# Patient Record
Sex: FEMALE | Race: WHITE | NOT HISPANIC OR LATINO | Employment: OTHER | URBAN - METROPOLITAN AREA
[De-identification: names, ages, dates, MRNs, and addresses within clinical notes are randomized per-mention and may not be internally consistent; named-entity substitution may affect disease eponyms.]

---

## 2017-03-30 ENCOUNTER — OFFICE VISIT (OUTPATIENT)
Dept: URGENT CARE | Facility: URGENT CARE | Age: 62
End: 2017-03-30
Payer: COMMERCIAL

## 2017-03-30 ENCOUNTER — RADIANT APPOINTMENT (OUTPATIENT)
Dept: GENERAL RADIOLOGY | Facility: CLINIC | Age: 62
End: 2017-03-30
Attending: FAMILY MEDICINE
Payer: COMMERCIAL

## 2017-03-30 VITALS
OXYGEN SATURATION: 99 % | TEMPERATURE: 98.4 F | SYSTOLIC BLOOD PRESSURE: 138 MMHG | DIASTOLIC BLOOD PRESSURE: 84 MMHG | HEART RATE: 90 BPM | BODY MASS INDEX: 28.21 KG/M2 | WEIGHT: 169.5 LBS

## 2017-03-30 DIAGNOSIS — S99.912A ANKLE INJURY, LEFT, INITIAL ENCOUNTER: Primary | ICD-10-CM

## 2017-03-30 PROCEDURE — 73610 X-RAY EXAM OF ANKLE: CPT | Mod: LT

## 2017-03-30 PROCEDURE — 99213 OFFICE O/P EST LOW 20 MIN: CPT | Performed by: FAMILY MEDICINE

## 2017-03-30 RX ORDER — DEXTROAMPHETAMINE SACCHARATE, AMPHETAMINE ASPARTATE, DEXTROAMPHETAMINE SULFATE AND AMPHETAMINE SULFATE 3.75; 3.75; 3.75; 3.75 MG/1; MG/1; MG/1; MG/1
15-30 TABLET ORAL
COMMUNITY
Start: 2015-04-25 | End: 2018-06-27 | Stop reason: DRUGHIGH

## 2017-03-30 NOTE — MR AVS SNAPSHOT
After Visit Summary   3/30/2017    Karine Moss    MRN: [id number]           Patient Information     Date Of Birth          1955        Visit Information        Provider Department      3/30/2017 6:15 PM Austin Casper,  [facility]        Today's Diagnoses     Ankle injury, left, initial encounter    -  1       Follow-ups after your visit        Additional Services     PODIATRY/FOOT & ANKLE SURGERY REFERRAL       Your provider has referred you to: FMG: [facility] [phone]   [email or web address]  FMG: [facility] [phone]   [email or web address]  FMG: [facility] [phone]   [email or web address]  FMG: [facility] [phone]   [email or web address]    Please be aware that coverage of these services is subject to the terms and limitations of your health insurance plan.  Call member services at your health plan with any benefit or coverage questions.      Please bring the following to your appointment:  >>   Any x-rays, CTs or MRIs which have been performed.  Contact the facility where they were done to arrange for  prior to your scheduled appointment.    >>   List of current medications   >>   This referral request   >>   Any documents/labs given to you for this referral                  Who to contact     If you have questions or need follow up information about today's clinic visit or your schedule please contact [facility] directly at [phone].  Normal or non-critical lab and imaging results will be communicated to you by MyChart, letter or phone within 4 business days after the clinic has received the results. If you do not hear from us within 7 days, please contact the clinic through MyChart or phone. If  you have a critical or abnormal lab result, we will notify you by phone as soon as possible.  Submit refill requests through "[employer]," or call your pharmacy and they will forward the refill request to us. Please allow 3 business days for your refill to be completed.          Additional Information About Your Visit        [employer] Information     "[employer]," gives you secure access to your electronic health record. If you see a primary care provider, you can also send messages to your care team and make appointments. If you have questions, please call your primary care clinic.  If you do not have a primary care provider, please call [phone] and they will assist you.        Care EveryWhere ID     This is your Care EveryWhere ID. This could be used by other organizations to access your [place] medical records  [id number]        Your Vitals Were     Pulse Temperature Pulse Oximetry BMI (Body Mass Index)          90 98.4  F (36.9  C) (Oral) 99% 28.21 kg/m2         Blood Pressure from Last 3 Encounters:   03/30/17 138/84   12/12/13 143/78   12/03/13 150/84    Weight from Last 3 Encounters:   03/30/17 169 lb 8 oz (76.9 kg)   12/13/13 217 lb (98.4 kg)   12/12/13 217 lb 9.6 oz (98.7 kg)              We Performed the Following     PODIATRY/FOOT & ANKLE SURGERY REFERRAL     XR Ankle Left G/E 3 Views          Today's Medication Changes          These changes are accurate as of: 3/30/17  7:25 PM.  If you have any questions, ask your nurse or doctor.               Start taking these medicines.        Dose/Directions    order for DME   Used for:  Ankle injury, left, initial encounter   Started by:  Austin Casper,         Equipment being ordered: long left cam walker   Quantity:  1 Device   Refills:  0            Where to get your medicines      Some of these will need a paper prescription and others can be bought over the counter.  Ask your nurse if you have questions.     Bring a paper prescription for each of these  medications     order for DME                Primary Care Provider Office Phone # Fax #    Edmundo Cady Benjamin [phone]       [facility] [address]  [facility] [address]        Thank you!     Thank you for choosing [facility]  for your care. Our goal is always to provide you with excellent care. Hearing back from our patients is one way we can continue to improve our services. Please take a few minutes to complete the written survey that you may receive in the mail after your visit with us. Thank you!             Your Updated Medication List - Protect others around you: Learn how to safely use, store and throw away your medicines at www.disposemymeds.org.          This list is accurate as of: 3/30/17  7:25 PM.  Always use your most recent med list.                   Brand Name Dispense Instructions for use    amphetamine-dextroamphetamine 15 MG per tablet    ADDERALL     Take 15-30 mg by mouth       ASPIRIN LOW DOSE 81 MG EC tablet   Generic drug:  aspirin     90 tablet    TAKE 1 TABLET BY MOUTH DAILY       COZAAR 25 MG tablet   Generic drug:  losartan      Take 2 tablets by mouth daily Reported on 3/30/2017       hydrocortisone 2 % Lotn      Externally apply 1 applicator topically 2 times daily Reported on 3/30/2017       insulin pen needle 32G X 6 MM    NOVOFINE    200 each    1 Device 2 times daily       LANTUS SOLOSTAR 100 UNIT/ML injection   Generic drug:  insulin glargine     10 Month    Reported on 3/30/2017       metFORMIN 500 MG 24 hr tablet    GLUCOPHAGE-XR    360 tablet    TAKE 4 TABLETS BY MOUTH EVERY DAY       metoprolol 25 MG 24 hr tablet    TOPROL-XL    90 tablet    TAKE 1 TABLET BY MOUTH DAILY .       NEURONTIN 100 MG capsule   Generic drug:  gabapentin      Take 100 mg by mouth 2 times daily       nitroglycerin 0.4 MG sublingual tablet    NITROSTAT    25 tablet    Place 1 tablet (0.4 mg) under the tongue every 5 minutes as  needed for chest pain       NovoLOG FLEXpen 100 UNITS/ML injection   Generic drug:  insulin aspart      Inject Subcutaneous See Admin Instructions Reported on 3/30/2017       order for DME     540 each    Test strips for pt's glucometer, brand as covered by insurance. Test four times daily and prn.       order for DME     1 Device    Equipment being ordered: long left cam walker       pramipexole 0.125 MG tablet    MIRAPEX    360 tablet    Take 3-4 tablets (0.375-0.5 mg) by mouth At Bedtime       pravastatin 20 MG tablet    PRAVACHOL    90 tablet    Take 1 tablet (20 mg) by mouth daily       temazepam 15 MG capsule    RESTORIL     Take 1-2 capsules by mouth At Bedtime Reported on 3/30/2017       vitamin D 59100 UNIT capsule    ERGOCALCIFEROL     2000 mg 1 tab daily.

## 2017-03-30 NOTE — NURSING NOTE
"Chief Complaint   Patient presents with     Ankle/Foot left     Fell off a platform x 10 days ago. pain and swelling        Initial /84 (BP Location: Left arm, Patient Position: Chair, Cuff Size: Adult Regular)  Pulse 90  Temp 98.4  F (36.9  C) (Oral)  Wt 169 lb 8 oz (76.9 kg)  SpO2 99%  BMI 28.21 kg/m2 Estimated body mass index is 28.21 kg/(m^2) as calculated from the following:    Height as of 12/13/13: 5' 5\" (1.651 m).    Weight as of this encounter: 169 lb 8 oz (76.9 kg).  Medication Reconciliation: complete    "

## 2017-04-10 NOTE — PROGRESS NOTES
SUBJECTIVE:  Chief Complaint   Patient presents with     Ankle/Foot left     Fell off a platform x 10 days ago. pain and swelling    .juhi presents with a chief complaint of left ankle.  The injury occurred 10 days ago.   The injury happened while while walking.   How: fall  immediate pain  The patient complained of moderate pain and has had decreased ROM.    Pain exacerbated by movement    He treated it initially with no therapy.   This is the first time this type of injury has occurred to this patient.     Past Medical History:   Diagnosis Date     Arthritis      Chest pain     seeing Cardiology     Depression 1991    after 's death     Diabetes mellitus (H)      Diabetic renal disease (H)     microalbuminuria     DJD (degenerative joint disease) of knee      H/O vitamin D deficiency      Hypertension      IBS (irritable bowel syndrome)     diarrhea      Keratoconus      Low back pain      Narcolepsy 2007    Dx'd by Sleep specialist 347.00, pt discontinued Adderal mid Nov. 13 due to tacycardia concerns     Obesity      Obstructive sleep apnea     327.23, 3 sleep studies,Dr. Sam [facility]     Pancreatitis     related to Victoza, also on Xyrem     Panic      RLS (restless legs syndrome)      Sinus tachycardia      Allergies   Allergen Reactions     Codeine Sulfate      Social History   Substance Use Topics     Smoking status: Never Smoker     Smokeless tobacco: Never Used     Alcohol use Yes      Comment: rare       ROSINTEGUMENTARY/SKIN: NEGATIVE for open wound/bleeding and NEGATIVE for bruising    EXAM: /84 (BP Location: Left arm, Patient Position: Chair, Cuff Size: Adult Regular)  Pulse 90  Temp 98.4  F (36.9  C) (Oral)  Wt 169 lb 8 oz (76.9 kg)  SpO2 99%  BMI 28.21 kg/m2Gen: healthy,alert,no distress  Extremity: ankle has pain with palpation and rom.   There is not compromise to the distal circulation.  Pulses are +2 and CRT is brisk.GENERAL APPEARANCE: healthy, alert and no  distress  EXTREMITIES: peripheral pulses normal  SKIN: no suspicious lesions or rashes  NEURO: Normal strength and tone, sensory exam grossly normal, mentation intact and speech normal    Xray without acute findings, read by Austin Casper D.O.      ICD-10-CM    1. Ankle injury, left, initial encounter S99.912A XR Ankle Left G/E 3 Views     order for DME     PODIATRY/FOOT & ANKLE SURGERY REFERRAL       RICE

## 2017-06-03 ENCOUNTER — HEALTH MAINTENANCE LETTER (OUTPATIENT)
Age: 62
End: 2017-06-03

## 2018-04-27 ENCOUNTER — TRANSFERRED RECORDS (OUTPATIENT)
Dept: HEALTH INFORMATION MANAGEMENT | Facility: CLINIC | Age: 63
End: 2018-04-27

## 2018-06-27 ENCOUNTER — OFFICE VISIT (OUTPATIENT)
Dept: FAMILY MEDICINE | Facility: CLINIC | Age: 63
End: 2018-06-27
Payer: COMMERCIAL

## 2018-06-27 VITALS
SYSTOLIC BLOOD PRESSURE: 129 MMHG | DIASTOLIC BLOOD PRESSURE: 83 MMHG | WEIGHT: 154 LBS | BODY MASS INDEX: 26.29 KG/M2 | HEART RATE: 78 BPM | OXYGEN SATURATION: 98 % | HEIGHT: 64 IN

## 2018-06-27 DIAGNOSIS — Z98.84 HISTORY OF GASTRIC BYPASS: ICD-10-CM

## 2018-06-27 DIAGNOSIS — E11.9 TYPE 2 DIABETES MELLITUS WITHOUT COMPLICATION, WITHOUT LONG-TERM CURRENT USE OF INSULIN (H): Primary | ICD-10-CM

## 2018-06-27 DIAGNOSIS — G62.9 PERIPHERAL POLYNEUROPATHY: ICD-10-CM

## 2018-06-27 PROBLEM — Z79.4 TYPE 2 DIABETES MELLITUS WITHOUT COMPLICATION, WITH LONG-TERM CURRENT USE OF INSULIN (H): Status: ACTIVE | Noted: 2018-06-27

## 2018-06-27 LAB
ALBUMIN SERPL-MCNC: 3.8 G/DL (ref 3.2–4.5)
ALP SERPL-CCNC: 83 U/L (ref 40–150)
ALT SERPL-CCNC: 18 U/L (ref 0–50)
AST SERPL-CCNC: 25 U/L (ref 0–45)
BILIRUB SERPL-MCNC: 1 MG/DL (ref 0.2–1.3)
BUN SERPL-MCNC: 16 MG/DL (ref 7–30)
CALCIUM SERPL-MCNC: 10 MG/DL (ref 8.5–10.4)
CHLORIDE SERPLBLD-SCNC: 101 MMOL/L (ref 94–109)
CHOLEST SERPL-MCNC: 178 MG/DL (ref 0–200)
CHOLEST/HDLC SERPL: 3.8 {RATIO} (ref 0–5)
CO2 SERPL-SCNC: 29 MMOL/L (ref 20–32)
CREAT SERPL-MCNC: 1 MG/DL (ref 0.6–1.3)
CREAT UR-MCNC: 108 MG/DL
EGFR CALCULATED (BLACK REFERENCE): 72
EGFR CALCULATED (NON BLACK REFERENCE): 59.5
FASTING SPECIMEN: YES
FERRITIN SERPL-MCNC: 63 NG/ML (ref 8–252)
GLUCOSE SERPL-MCNC: 122 MG/DL (ref 60–109)
HBA1C MFR BLD: 6.7 % (ref 4.1–5.7)
HDLC SERPL-MCNC: 46 MG/DL
IRON SATN MFR SERPL: 26 % (ref 15–46)
IRON SERPL-MCNC: 94 UG/DL (ref 35–180)
LDLC SERPL CALC-MCNC: 107 MG/DL (ref 0–129)
MICROALBUMIN UR-MCNC: 194 MG/L
MICROALBUMIN/CREAT UR: 179.63 MG/G CR (ref 0–25)
POTASSIUM SERPL-SCNC: 4.3 MMOL/L (ref 3.4–5.3)
PROT SERPL-MCNC: 8.3 G/DL (ref 6.8–8.8)
SODIUM SERPL-SCNC: 140 MMOL/L (ref 133–144)
TIBC SERPL-MCNC: 364 UG/DL (ref 240–430)
TRIGL SERPL-MCNC: 120 MG/DL (ref 0–150)
TSH SERPL DL<=0.005 MIU/L-ACNC: 0.89 MU/L (ref 0.4–4)
VIT B12 SERPL-MCNC: 509 PG/ML (ref 193–986)
VLDL-CHOLESTEROL: 24 (ref 7–32)

## 2018-06-27 RX ORDER — LOSARTAN POTASSIUM 100 MG/1
100 TABLET ORAL DAILY
Refills: 2 | COMMUNITY
Start: 2018-06-15 | End: 2019-01-28

## 2018-06-27 RX ORDER — METFORMIN HCL 500 MG
TABLET, EXTENDED RELEASE 24 HR ORAL
Qty: 360 TABLET | Refills: 1 | COMMUNITY
Start: 2018-06-27 | End: 2019-01-28

## 2018-06-27 RX ORDER — DEXTROAMPHETAMINE SACCHARATE, AMPHETAMINE ASPARTATE, DEXTROAMPHETAMINE SULFATE AND AMPHETAMINE SULFATE 7.5; 7.5; 7.5; 7.5 MG/1; MG/1; MG/1; MG/1
30 TABLET ORAL DAILY
Refills: 0 | COMMUNITY
Start: 2018-06-18 | End: 2018-06-27

## 2018-06-27 RX ORDER — TRAZODONE HYDROCHLORIDE 50 MG/1
50 TABLET, FILM COATED ORAL DAILY
Refills: 4 | COMMUNITY
Start: 2018-06-04 | End: 2019-04-12

## 2018-06-27 RX ORDER — METFORMIN HCL 500 MG
1000 TABLET, EXTENDED RELEASE 24 HR ORAL 2 TIMES DAILY
COMMUNITY
Start: 2018-04-09 | End: 2018-06-27 | Stop reason: DRUGHIGH

## 2018-06-27 RX ORDER — GABAPENTIN 300 MG/1
2 CAPSULE ORAL 3 TIMES DAILY
Refills: 1 | COMMUNITY
Start: 2018-05-21 | End: 2018-06-27

## 2018-06-27 RX ORDER — CHOLECALCIFEROL (VITAMIN D3) 50 MCG
2000 TABLET ORAL DAILY
Qty: 100 TABLET | Refills: 3 | COMMUNITY
Start: 2018-06-27 | End: 2018-10-31

## 2018-06-27 RX ORDER — TERBINAFINE HYDROCHLORIDE 250 MG/1
250 TABLET ORAL DAILY
Refills: 0 | COMMUNITY
Start: 2018-05-18 | End: 2018-10-31

## 2018-06-27 RX ORDER — GABAPENTIN 300 MG/1
CAPSULE ORAL
Qty: 90 CAPSULE | Refills: 1 | COMMUNITY
Start: 2018-06-27 | End: 2019-04-12 | Stop reason: DRUGHIGH

## 2018-06-27 RX ORDER — DEXTROAMPHETAMINE SACCHARATE, AMPHETAMINE ASPARTATE, DEXTROAMPHETAMINE SULFATE AND AMPHETAMINE SULFATE 7.5; 7.5; 7.5; 7.5 MG/1; MG/1; MG/1; MG/1
TABLET ORAL
Qty: 90 TABLET | Refills: 0 | COMMUNITY
Start: 2018-06-27 | End: 2019-04-12

## 2018-06-27 ASSESSMENT — ANXIETY QUESTIONNAIRES
GAD7 TOTAL SCORE: 2
1. FEELING NERVOUS, ANXIOUS, OR ON EDGE: NOT AT ALL
2. NOT BEING ABLE TO STOP OR CONTROL WORRYING: NOT AT ALL
5. BEING SO RESTLESS THAT IT IS HARD TO SIT STILL: NOT AT ALL
6. BECOMING EASILY ANNOYED OR IRRITABLE: NOT AT ALL
7. FEELING AFRAID AS IF SOMETHING AWFUL MIGHT HAPPEN: NOT AT ALL
3. WORRYING TOO MUCH ABOUT DIFFERENT THINGS: MORE THAN HALF THE DAYS

## 2018-06-27 ASSESSMENT — PATIENT HEALTH QUESTIONNAIRE - PHQ9: 5. POOR APPETITE OR OVEREATING: NOT AT ALL

## 2018-06-27 NOTE — PROGRESS NOTES
Karine Moss is a 63 year old female, [place] [facility] She wishes to establish care. Karine is the mother of one of my longstanding patients. She lives in [place] from May 1st to October 1st, then moves to [place] the rest of the year.  She is here for the following issues.    HCM  Colonoscopy: hx of colon polyps in 2013, recent colonoscopy had inferior prep. She cannot recall location. Is not inclined to return for testing. Last mammogram 7/2017, normal. Last pap 6/27/2017    Specialists  Sleep clinic, Dr. Mary Kay Sam, for Narcolepsy and sleep apnea (CPAP)    Vaccines: Never had a flu shot or pneumococcal 23 vaccine.     DIABETES II  Karine was diagnosed in 2004. She initially started Metformin. She started using long acting and short acting insulin which brought her numbers down but her weight increased. Her diabetes became difficult to control with insulin because of her weight gain. She then had gastric bypass in 2015, she hasn't been back to see her gastric bypass surgeon. She has hx of hypertension but denies history of ASCVD.     Last eye exam was 5/2018 at [facility], they diagnosed Keratoconus, no retinopathy.. She also had Lasik bilaterally 1998.  Retinopathy: none    Peripheral neuropathy  She reports burning sensation in both feet, up to the level of her ankles. Does not find gabapentin to be very helpful. She is taking 600mg tid. She has not seen a podiatrist so we discussed referral. Pain started 12 years ago when the diabetes was diagnosed. Karine has seen a podiatrist for treatment of warts but not for evaluation for orthotics.     Karine is checking her blood sugars 3-4x per day and she notes she would like more strips to check more frequently.       Weight: 154 lbs 0 oz. She often loses weight during the summer, but will put it back on in the winter.    Wt Readings from Last 3 Encounters:   06/27/18 154 lb (69.9 kg)   03/30/17 169 lb 8 oz (76.9 kg)   12/13/13 217 lb (98.4 kg)      Frequency of FBS:3-4 x day, before and after meals   General range of FBS: This morning it was 150. Typically around 200.   Hypoglycemia: Never had low blood sugar taking 1000 mg metformin bid.     Lab Results   Component Value Date    A1C 9.4 10/09/2013    A1C 7.7 06/11/2013   Her last A1C was 14 in 3/12/18 from [place] with Dr. Alycia Avila, this was a surprise to her since she was feeling very good.     No results found for: MICROALBUMIN    DM Meds:   Metformin 1000 mg BID    Compliance: If her BS numbers are lower, she doesn't take her medications. She has done diabetic education. She feels overwhelmed with all the medications she is on.     Diet: According to her daughter, she eats irradically.  Sometimes she doesn't eat until 8 pm. She mainly cooks at home, she eats meats, veggies, fruits. Occasional pasta, rice, potatoes. Daily cheese. She drinks 1 cup caffeine/day. Karine tries to drink a protein drink daily.     Non-smoker. No history of heart problems.     Aspirin Use: Not currently taking anything because of gastric bypass surgery.     Peripheral neuropathy  She reports burning sensation in both feet, up to the level of her ankles. Does not find gabapentin to be very helpful. She is taking 600mg tid. She has not seen a podiatrist so we discussed referral. Pain started 12 years ago when the diabetes was diagnosed. Karine has seen a podiatrist for treatment of warts but not for evaluation for orthotics.     HYPERLIPIDEMIA  She is not currently on a statin medication. No hx of ASCVD or stroke.   Recent Labs   Lab Test  06/27/18   1439  10/09/13   1518   CHOL  178.0  180   HDL  46.0*  29*   LDL  107.0  106   TRIG  120.0  225*   CHOLHDLRATIO  3.8  6.2*   LDL is just above target range. .     HYPERTENSION  BP Readings from Last 3 Encounters:   06/27/18 142/87   03/30/17 138/84   12/12/13 143/78     Here for blood pressure check. Karine is currently on losartan 100 mg QD. No current chest pain.  No THAPA. She  "sometimes forgets her medication (yesterday). Denies any side effects. Blood pressure readings have usually been in target range.     EXAM  /87  Pulse 78  Ht 5' 3.58\" (161.5 cm)  Wt 154 lb (69.9 kg)  SpO2 98%  BMI 26.78 kg/m2  Repeat /83  Gen: Alert, NAD, a bit flustered, pleasant  HEENT: OP clear, no thrush  Neck without LAD or TM  Cor: S1S2, no murmurl.   Lungs: CTA bilaterally  Abd: soft nontender active BS  Ext: no edema, pulses  palpable  Skin: no rash  MS: Knees and ankles without redness, swelling or warmth  Feet: no ulcerations, Callouses absent, sensation dampened to level of ankles bilaterally  Ext: Good pedal pulses.    Assessment:  (E11.9) Type 2 diabetes mellitus without complication, without long-term current use of insulin (H)  (primary encounter diagnosis)  Comment:  Last A1c in 3/2018 was 14.1, previous A1c in 7 range. I believe this was a lab error.   Lab Results   Component Value Date    A1C 6.7 06/27/2018   Plan: Albumin Random Urine Quantitative with Creat         Ratio, TSH WITH FREE T4 REFLEX - (Today),         Hemoglobin A1c ([place]), Lipid Panel         ([facility])        Continue current dose of metformin. Recommend she check glucose readings 2-3 x per day. Recommend pneumovax 23 vaccine at her next visit. She is overwhelmed today with information.     (G62.9) Peripheral polyneuropathy  Comment: likely secondary to diabetes  Plan: Comprehensive Metabolic Panel ([place])        Recommend she see a podiatrist at the [facility]. Number will be forwarded via my chart.     (Z98.250) History of gastric bypass  Comment: she takes supplements intermittently, surgery was at [facility] but she has not returned for recheck  Plan: Vitamin B12, Vitamin D Deficiency, Iron and         iron binding capacity, Ferritin        Recommended checking levels today, recommend pill box to help her stay organized with meds.    Future points of disussion  Karine would like to discuss hair thinning, " sleep apnea, and joint pain at a future appointment.  Time did not allow us to do this today.     Total visit time today 60 minutes.  More than 50% of the time was spent in counseling on multiple medical issues, 20 min alone spent reconciling medication list and medical histories from outside records    Anay Martinez MD  Internal Medicine/Pediatrics      I, Yari Kenyon, am serving as a scribe to document services personally performed by Dr. Anay Martinez, based on data collection and the provider's statements to me.

## 2018-06-27 NOTE — NURSING NOTE
"63 year old  Chief Complaint   Patient presents with     [facility]     Diabetes     Diabetes management       Blood pressure 142/87, pulse 78, height 5' 3.58\" (161.5 cm), weight 154 lb (69.9 kg), SpO2 98 %. Body mass index is 26.78 kg/(m^2).  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)     Plantar warts     Comprehensive diabetic foot examination, type 2 DM, encounter for (H)     Low back pain     DJD (degenerative joint disease) of knee     Pancreatitis     Panic     Chest pain     Sinus tachycardia     IBS (irritable bowel syndrome)     Heart murmur     Hypertension goal BP (blood pressure) < 140/90     Type 2 diabetes, HbA1C goal < 8% (H)       Wt Readings from Last 2 Encounters:   06/27/18 154 lb (69.9 kg)   03/30/17 169 lb 8 oz (76.9 kg)     BP Readings from Last 3 Encounters:   06/27/18 142/87   03/30/17 138/84   12/12/13 143/78         Current Outpatient Prescriptions   Medication     amphetamine-dextroamphetamine (ADDERALL) 15 MG per tablet     ASPIRIN LOW DOSE 81 MG EC tablet     gabapentin (NEURONTIN) 100 MG capsule     hydrocortisone 2 % LOTN     insulin aspart (NOVOLOG FLEXPEN) SOLN 100 UNIT/ML     insulin pen needle (NOVOFINE) 32G X 6 MM MISC     LANTUS SOLOSTAR 100 UNIT/ML injection     losartan (COZAAR) 25 MG tablet     metFORMIN (GLUCOPHAGE-XR) 500 MG 24 hr tablet     metoprolol (TOPROL-XL) 25 MG 24 hr tablet     nitroglycerin (NITROSTAT) 0.4 MG SL tablet     ORDER FOR DME "     pramipexole (MIRAPEX) 0.125 MG tablet     pravastatin (PRAVACHOL) 20 MG tablet     temazepam (RESTORIL) 15 MG capsule     VITAMIN D (ERGOCALCIFEROL) 93828 UNIT PO CAPS     [DISCONTINUED] sertraline (ZOLOFT) 100 MG tablet     No current facility-administered medications for this visit.        Social History   Substance Use Topics     Smoking status: Never Smoker     Smokeless tobacco: Never Used     Alcohol use Yes      Comment: rare       Health Maintenance Due   Topic Date Due     FOOT EXAM Q1 YEAR  [birth year]11/1956     PNEUMOVAX 1X HI RISK PATIENT < 65 (NO IB MSG)  03/11/1957     HIV SCREEN (SYSTEM ASSIGNED)  03/11/1973     HEPATITIS C SCREENING  03/11/1973     ADVANCE DIRECTIVE PLANNING Q5 YRS  03/11/2010     A1C Q6 MO  04/09/2014     PHQ-9 Q6 MONTHS  05/05/2014     LIPID MONITORING Q1 YEAR  10/09/2014     MICROALBUMIN Q1 YEAR  10/09/2014     EYE EXAM Q1 YEAR  11/05/2014     DEPRESSION ACTION PLAN Q1 YR  11/05/2014     CREATININE Q1 YEAR  12/03/2014     MAMMO SCREEN Q2 YR (SYSTEM ASSIGNED)  06/06/2015     TSH W/ FREE T4 REFLEX Q2 YEAR  12/03/2015     PAP Q3 YR  01/30/2018       Lab Results   Component Value Date    PAP NIL 06/11/2013 June 27, 2018 1:45 PM

## 2018-06-27 NOTE — MR AVS SNAPSHOT
"              After Visit Summary   6/27/2018    Karine Moss    MRN: [id number]           Patient Information     Date Of Birth          1955        Visit Information        Provider Department      6/27/2018 1:40 PM Anay Martinez MD [facility]        Today's Diagnoses     Type 2 diabetes mellitus without complication, with long-term current use of insulin (H)    -  1    Need for hepatitis C screening test        Peripheral polyneuropathy        History of gastric bypass           Follow-ups after your visit        Who to contact     Please call your clinic at [phone] to:    Ask questions about your health    Make or cancel appointments    Discuss your medicines    Learn about your test results    Speak to your doctor            Additional Information About Your Visit        [employer] Information     [employer] gives you secure access to your electronic health record. If you see a primary care provider, you can also send messages to your care team and make appointments. If you have questions, please call your primary care clinic.  If you do not have a primary care provider, please call [phone] and they will assist you.      [employer] is an electronic gateway that provides easy, online access to your medical records. With [employer], you can request a clinic appointment, read your test results, renew a prescription or communicate with your care team.     To access your existing account, please contact your [facility] or call [phone] for assistance.        Care EveryWhere ID     This is your Care EveryWhere ID. This could be used by other organizations to access your [place] medical records  [id number]        Your Vitals Were     Pulse Height Pulse Oximetry BMI (Body Mass Index)          78 5' 3.58\" (161.5 cm) 98% 26.78 kg/m2         Blood Pressure from Last 3 Encounters:   06/27/18 142/87   03/30/17 138/84   12/12/13 143/78    Weight from Last 3 " Encounters:   06/27/18 154 lb (69.9 kg)   03/30/17 169 lb 8 oz (76.9 kg)   12/13/13 217 lb (98.4 kg)              We Performed the Following     Albumin Random Urine Quantitative with Creat Ratio     Comprehensive Metabolic Panel ([place])     Ferritin     Hemoglobin A1c ([place])     Iron and iron binding capacity     Lipid Panel ([facility])     TSH WITH FREE T4 REFLEX - (Today)     Vitamin B12     Vitamin D Deficiency        Primary Care Provider Office Phone # Fax #    Anay Martinez [phone]       [address] [place] [id number]        Equal Access to Services     MARGARITA SEGURA : Hadii jenniffer Welch, mandy yip, elroy galicia, jules tomas . So [facility] [phone].    ATENCIÓN: Si habla español, tiene a chávez disposición servicios gratuitos de asistencia lingüística. [facility] [phone].    We comply with applicable federal civil rights laws and [place] laws. We do not discriminate on the basis of race, color, national origin, age, disability, sex, sexual orientation, or gender identity.            Thank you!     Thank you for choosing [facility]  for your care. Our goal is always to provide you with excellent care. Hearing back from our patients is one way we can continue to improve our services. Please take a few minutes to complete the written survey that you may receive in the mail after your visit with us. Thank you!             Your Updated Medication List - Protect others around you: Learn how to safely use, store and throw away your medicines at www.disposemymeds.org.          This list is accurate as of 6/27/18  2:39 PM.  Always use your most recent med list.                   Brand Name Dispense Instructions for use Diagnosis    * amphetamine-dextroamphetamine 15 MG per tablet    ADDERALL     Take 15-30 mg by mouth        * amphetamine-dextroamphetamine 30 MG per tablet    ADDERALL     Take 30 mg by mouth  daily        ASPIRIN LOW DOSE 81 MG EC tablet   Generic drug:  aspirin     90 tablet    TAKE 1 TABLET BY MOUTH DAILY    Type II or unspecified type diabetes mellitus without mention of complication, not stated as uncontrolled       * COZAAR 25 MG tablet   Generic drug:  losartan      Take 2 tablets by mouth daily Reported on 3/30/2017        * losartan 100 MG tablet    COZAAR     Take 100 mg by mouth daily        hydrocortisone 2 % Lotn      Externally apply 1 applicator topically 2 times daily Reported on 3/30/2017        insulin pen needle 32G X 6 MM    NOVOFINE    200 each    1 Device 2 times daily    Type II or unspecified type diabetes mellitus without mention of complication, not stated as uncontrolled       LANTUS SOLOSTAR 100 UNIT/ML injection   Generic drug:  insulin glargine     10 Month    Reported on 3/30/2017    Type 2 diabetes, HbA1c goal < 7% (H)       * metFORMIN 500 MG 24 hr tablet    GLUCOPHAGE-XR    360 tablet    TAKE 4 TABLETS BY MOUTH EVERY DAY    Type II or unspecified type diabetes mellitus without mention of complication, not stated as uncontrolled       * metFORMIN 500 MG 24 hr tablet    GLUCOPHAGE-XR     Take 1,000 mg by mouth 2 times daily        metoprolol succinate 25 MG 24 hr tablet    TOPROL-XL    90 tablet    TAKE 1 TABLET BY MOUTH DAILY .    Unspecified essential hypertension       * NEURONTIN 100 MG capsule   Generic drug:  gabapentin      Take 100 mg by mouth 2 times daily        * gabapentin 300 MG capsule    NEURONTIN     Take 2 capsules by mouth 3 times daily        nitroGLYcerin 0.4 MG sublingual tablet    NITROSTAT    25 tablet    Place 1 tablet (0.4 mg) under the tongue every 5 minutes as needed for chest pain    Tachycardia       NovoLOG FLEXpen 100 UNITS/ML injection   Generic drug:  insulin aspart      Inject Subcutaneous See Admin Instructions Reported on 3/30/2017        order for [facility]     540 each    Test strips for pt's glucometer, brand as covered by insurance. Test  four times daily and prn.    Type II or unspecified type diabetes mellitus without mention of complication, not stated as uncontrolled       pramipexole 0.125 MG tablet    MIRAPEX    360 tablet    Take 3-4 tablets (0.375-0.5 mg) by mouth At Bedtime    Restless leg syndrome       pravastatin 20 MG tablet    PRAVACHOL    90 tablet    Take 1 tablet (20 mg) by mouth daily    Other and unspecified hyperlipidemia       temazepam 15 MG capsule    RESTORIL     Take 1-2 capsules by mouth At Bedtime Reported on 3/30/2017        terbinafine 250 MG tablet    lamISIL     Take 250 mg by mouth daily        traZODone 50 MG tablet    DESYREL     Take 50 mg by mouth daily        vitamin D 50523 UNIT capsule    ERGOCALCIFEROL     2000 mg 1 tab daily.        * Notice:  This list has 8 medication(s) that are the same as other medications prescribed for you. Read the directions carefully, and ask your doctor or other care provider to review them with you.

## 2018-06-28 LAB — DEPRECATED CALCIDIOL+CALCIFEROL SERPL-MC: 30 UG/L (ref 20–75)

## 2018-06-28 ASSESSMENT — ANXIETY QUESTIONNAIRES: GAD7 TOTAL SCORE: 2

## 2018-06-28 ASSESSMENT — PATIENT HEALTH QUESTIONNAIRE - PHQ9: SUM OF ALL RESPONSES TO PHQ QUESTIONS 1-9: 2

## 2018-06-30 ENCOUNTER — HEALTH MAINTENANCE LETTER (OUTPATIENT)
Age: 63
End: 2018-06-30

## 2018-08-01 ENCOUNTER — OFFICE VISIT (OUTPATIENT)
Dept: ORTHOPEDICS | Facility: CLINIC | Age: 63
End: 2018-08-01
Payer: COMMERCIAL

## 2018-08-01 ENCOUNTER — OFFICE VISIT (OUTPATIENT)
Dept: FAMILY MEDICINE | Facility: CLINIC | Age: 63
End: 2018-08-01
Payer: COMMERCIAL

## 2018-08-01 VITALS
HEIGHT: 64 IN | WEIGHT: 153 LBS | DIASTOLIC BLOOD PRESSURE: 76 MMHG | BODY MASS INDEX: 26.12 KG/M2 | SYSTOLIC BLOOD PRESSURE: 119 MMHG

## 2018-08-01 VITALS
SYSTOLIC BLOOD PRESSURE: 119 MMHG | TEMPERATURE: 97.8 F | HEART RATE: 76 BPM | OXYGEN SATURATION: 96 % | WEIGHT: 153.5 LBS | BODY MASS INDEX: 26.21 KG/M2 | HEIGHT: 64 IN | DIASTOLIC BLOOD PRESSURE: 76 MMHG

## 2018-08-01 DIAGNOSIS — M54.32 SCIATICA OF LEFT SIDE: Primary | ICD-10-CM

## 2018-08-01 DIAGNOSIS — M79.652 PAIN OF LEFT THIGH: Primary | ICD-10-CM

## 2018-08-01 RX ORDER — TRAMADOL HYDROCHLORIDE 50 MG/1
50 TABLET ORAL 3 TIMES DAILY PRN
Qty: 20 TABLET | Refills: 0 | Status: SHIPPED | OUTPATIENT
Start: 2018-08-01 | End: 2018-10-31

## 2018-08-01 RX ORDER — CYCLOBENZAPRINE HCL 10 MG
10 TABLET ORAL
Qty: 30 TABLET | Refills: 0 | Status: SHIPPED | OUTPATIENT
Start: 2018-08-01 | End: 2019-04-12

## 2018-08-01 ASSESSMENT — PAIN SCALES - GENERAL: PAINLEVEL: SEVERE PAIN (6)

## 2018-08-01 NOTE — LETTER
8/1/2018       RE: Karine Moss  [address]  [facility] [address]     Dear Colleague,    Thank you for referring your patient, Karine Moss, to the [facility] SPORTS AND ORTHOPAEDIC WALK IN CLINIC at [facility]. Please see a copy of my visit note below.          SPORTS & ORTHOPEDIC WALK-IN VISIT 8/1/2018    Primary Care Physician: Dr. Martinez    Starts in lateral hip and goes down leg into lower shin. Better when moving around, sitting and laying in bed are worse. Started about a week and a half ago, not sure what happened. Feels like a bruised bone. Reports some low back stiffness.     Reason for visit:     What part of your body is injured / painful?  left hip    What caused the injury /pain? Unsure    How long ago did your injury occur or pain begin? several days ago    What are your most bothersome symptoms? Pain    How would you characterize your symptom?  aching and radiating    What makes your symptoms better? Ice and Heat    What makes your symptoms worse? Sitting and Other: stairs, laying on left side    Have you been previously seen for this problem? Yes, Pingel - suspect trochanteric bursitis     Medical History:    Any recent changes to your medical history? No    Any new medication prescribed since last visit? No    Have you had surgery on this body part before? No    Social History:    Occupation: retired    Handedness: Right    Exercise: active but doesn't exercise    Review of Systems:    Do you have fever, chills, weight loss? No    Do you have any vision problems? No    Do you have any chest pain or edema? No    Do you have any shortness of breath or wheezing?  No    Do you have stomach problems? No    Do you have any numbness or focal weakness? Yes, neuropathy    Do you have diabetes? Yes, type 2    Do you have problems with bleeding or clotting? No    Do you have an rashes or other skin lesions? No           PMH:  Past Medical History:    Diagnosis Date     Arthritis      Chest pain     seeing Cardiology     Depression 1991    after 's death     Diabetes mellitus (H)      Diabetic renal disease (H)     microalbuminuria     DJD (degenerative joint disease) of knee      H/O vitamin D deficiency      Hypertension      IBS (irritable bowel syndrome)     diarrhea      Keratoconus      Low back pain      Narcolepsy 2007    Dx'd by Sleep specialist 347.00, pt discontinued Adderal mid Nov. 13 due to tacycardia concerns     Obesity      Obstructive sleep apnea     327.23, 3 sleep studies,Dr. Flaco SELBY Lung     Pancreatitis     related to Victoza, also on Xyrem     Panic      RLS (restless legs syndrome)      Sinus tachycardia        Active problem list:  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)     Keratoconus     Hyperlipidemia LDL goal <100     Obstructive sleep apnea     Vitamin D insufficiency     Major depression in partial remission (H)     Plantar warts     Low back pain     DJD (degenerative joint disease) of knee     Pancreatitis     Panic     Chest pain     IBS (irritable bowel syndrome)     Heart murmur     Hypertension goal BP (blood pressure) < 140/90     Type 2 diabetes mellitus without complication, with long-term current use of insulin (H)       FH:  Family History   Problem Relation Age of Onset     Diabetes Father      Cancer - colorectal Maternal Grandmother      Cancer Daughter      Obesity Daughter      s/p lap band     Cancer Brother      lung     Hypertension Sister        SH:  Social History     Social History     Marital status: Single     Spouse name: N/A     Number of children: 1     Years of education: N/A     Occupational History      [employer]     on disability     Social History Main Topics     Smoking status: Never Smoker     Smokeless tobacco: Never Used     Alcohol use Yes      Comment: rare     Drug use: No      Sexual activity: No     Other Topics Concern     Parent/Sibling W/ Cabg, Mi Or Angioplasty Before 65f 55m? No     Social History Narrative    Daughter- 42,  since 1991    Drives only short distances due to narcolepsy            --------------------------------------------------------------------------------    Surgical History  Return To Top     Status Surgery Time Frame Comment Record Date     Inactive  ear surgery  1/04 5/27/2008      Inactive  eye surgery  2002 5/27/2008      Inactive  Hemorrhoidectomy  9/14/00 5/27/2008          --------------------------------------------------------------------------------    Food Allergy  Return To Top     Allergen Reaction Comment Record Date     * No known food allergies      10/28/2008          --------------------------------------------------------------------------------    Drug Allergy  Return To Top     Allergen Reaction Comment Record Date     Codeine  nausea    6/21/2007          --------------------------------------------------------------------------------    Environment Allergy  Return To Top     Allergen Reaction Comment Record Date     * No known environmental allergies      10/28/2008          --------------------------------------------------------------------------------    Social History  Return To Top     Question Answer Comment Record Date     Marital status      5/27/2008      Advance Directive or Living Will  Form Given to Patient    1/18/2010      Emotional Abuse  Yes    1/18/2010      Exercise  No    1/18/2010      Caffeine  Yes    5/27/2008      Physical Abuse  No    1/18/2010      Sealtbelts  Yes    1/18/2010      Sexual Abuse  No    1/18/2010      Breast/Testicle Self Check  No    1/18/2010      Number of children  1    1/18/2010      Living arrangements  House    1/18/2010      Number of adults in household  2    1/18/2010      Education level  High School Graduate    1/18/2010      Employment  Currently employed     1/18/2010      Tobacco history  Has never smoked or chewed tobacco    5/27/2008      Alcohol history  Currently drinks alcohol    5/27/2008      Has the patient ever used illegal drugs?  Has never used illegal drugs    5/27/2008          --------------------------------------------------------------------------------    History - Overall Remark: 3/21/2012 Return To Top         --------------------------------------------------------------------------------    Medical History Return To Top     Status Diagnosis Time Frame Comment Record Date     Active (250.00) - C - Diabetes mellitus, type II controlled      5/15/2012      Active (788.41) - C - Urinary frequency      4/17/2012      Active (250.02) - C - Diabetes mellitus, type II uncontrolled      3/6/2012      Active (278.00) - C - Obesity      2/14/2011      Active (250.00) - C - Diabetes mellitus, type II controlled      2/14/2011      Active (739.3) - C - Somatic dysfunction, lumbar region      8/16/2010      Active (739.5) - C - Somatic dysfunction, pelvic region      8/16/2010      Active (401.9) - C - Hypertension      2/8/2010      Active 268.9 UNSP VITAMIN D DEFICIENCY      1/18/2010      Active (695.3) - C - Rosacea      1/18/2010      Active 272.1 Hypertriglyceridemia      1/18/2010      Active 300.4 Depression with Anxiety (Dysthymic Disorder)      10/28/2008      Active 739.6 Somatic dysfunction, lower extremities      10/28/2008      Active 780.79 Fatigue      6/23/2008      Active 278.01 Obesity, morbid      6/19/2008      Active 347.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, 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      6/19/2008      Inactive  (462) - C - Pharyngitis, acute      1/15/2011      Inactive  250.02 Diabetes mellitus, type II uncontrolled      1/18/2010      Inactive  726.71 Tendinitis, achilles      10/28/2008      Inactive  (250.00) - C - Diabetes mellitus, type II controlled      10/28/2008      Inactive  (250.00) - C - Diabetes mellitus, type II controlled      6/23/2008      Inactive   V77.91 Screening, lipids      2008      Inactive  599.0 Urinary tract infection, unspec./pyuria (UTI)      2008      Inactive  V70.0 Routine Medical Exam      2008      Active Constipation      2008      Active Hemorrhoids      2008      Pancreatitis pancreatitis 2013     --------------------------------------------------------------------------------    M        --------------------------------------------------------------------------------    Family History  Return To Top     Status Relationship Disease Comment Record Date     Alive Sister  *Denies any medical problems  3 sisters  2008      Alive Brother  *Denies any medical problems  2 brothers  2008         Father    Age of death 56  2010         Mother  Dementia  Age of death 82  2008          --------------------------------------------------------------------------------                           MEDS:  See EMR, reviewed  ALL:  See EMR, reviewed    REVIEW OF SYSTEMS:  CONSTITUTIONAL:NEGATIVE for fever, chills, change in weight  INTEGUMENTARY/SKIN: NEGATIVE for worrisome rashes, moles or lesions  EYES: NEGATIVE for vision changes or irritation  ENT/MOUTH: NEGATIVE for ear, mouth and throat problems  RESP:NEGATIVE for significant cough or SOB  BREAST: NEGATIVE for masses, tenderness or discharge  CV: NEGATIVE for chest pain, palpitations or peripheral edema  GI: NEGATIVE for nausea, abdominal pain, heartburn, or change in bowel habits  :NEGATIVE for frequency, dysuria, or hematuria  :NEGATIVE for frequency, dysuria, or hematuria  NEURO: NEGATIVE for weakness, dizziness or paresthesias  ENDOCRINE: NEGATIVE for temperature intolerance, skin/hair changes  HEME/ALLERGY/IMMUNE: NEGATIVE for bleeding problems  PSYCHIATRIC: NEGATIVE for changes in mood or affect      SUBJECTIVE:  This 53-year-old female in the last 3 days has had discomfort she feels on the left side of her leg that extends down  "the thigh past the knee into her calf and down to her foot.  It started about a week and half ago, but it is worse over the last 3 days.  It feels like a \"deep inside bruise\".  It is not associated with numbness and tingling.  It does not involve the foot or toes.  It is worse the longer she sits or longer she stands.  It is an aching, radiating discomfort.  She can think of no particular injury.  She denies past problems with her low back, although I do see chart indications of previous visits for low back problems in the past.  She has had no past imaging of her lumbar spine.  There have been no recent injuries.  Sometimes her back can feel some \"stiffness\" but no specific pain.  Her primary provider wondered if she had trochanteric bursitis and she was sent to the Urgent Care for a possible injection.  She lives in [place].  She is here with a family member who lives in the [place].  She has a past history of peripheral neuropathy that affects both her feet in the setting of diabetes.      OBJECTIVE:  She moves easily about the exam room.  She points in general to her left thigh, knee, shin and calf as the area of discomfort.  She can forward flex and extend the lumbar spine.  She is uncomfortable sitting for too long.  The skin overlying the low back is normal.  Spring testing of the lumbar spine is without discomfort and she is nontender over the sacroiliac joints or the sacrum.  She is consistently nontender over the greater trochanter on the left and nontender over the distal IT band.  She has normal range of motion at the hips to internal rotation, external rotation and abduction.  A straight leg raise is negative.  Lower extremity strength to flexion/extension at hip, knee, ankle including toe strength and foot evertor strength are 5 out of 5 symmetrically bilaterally.  Distal sensation is intact.  Pulses are normal.  There is no swelling at the calf, no tenderness or palpable cords in the calf.    "   ASSESSMENT:  Left radicular leg discomfort x1 week, suspect sciatica.      PLAN:  The nature of sciatica and, its common association with degenerative disk disease was explained.  She takes trazodone for sleep at night.  She would like something else to help with sleep.  I cautioned her that too many sedatives can affect and can affect breathing and be dangerous when combined.  She agrees to stop the trazodone and instead substitute Flexeril to assist with this current discomfort.  For daytime use she would like some tramadol.  She cannot take nonsteroidal anti-inflammatories because of GI intolerance.  She understands this is a narcotic.  She has had past trouble with not tolerating codeine but she has tolerated other narcotics in the past.  She knows this is a distant relative of narcotics and I think it would be unlikely to make her feel too tired or constipated with stool or have an adverse reaction.  She is willing to try it.  She knows I cannot refill this chronically.  She would like to see a physical therapist in [place] for sciatica and I think this is reasonable.  She knows if it is not improving over the upcoming weeks or she is having worsening symptoms of numbness, tingling, pain or weakness that she should follow up with a primary provider.  X-rays of the back or potentially an MRI of the lumbar spine to be done if needed.  She will follow up if not improved.     Again, thank you for allowing me to participate in the care of your patient.      Sincerely,    Harlan Wallis MD

## 2018-08-01 NOTE — PROGRESS NOTES
"Karine Moss is a 63 year old female here for the following issues:    Left Hip Pain  Karine notes a 10 day hx of left hip pain. She has not had left hip problems in the past, denies injury or fall. She woke up one morning and it was in one area of her hip, it then started to radiate down her leg into her knee and calf. She thought she had pulled a muscle but the pain has not resolved. The pain is very achy and radiates down the lateral side of her leg to her foot. She does have lower back pain. She is having problems getting into/out of a car, using the stairs, getting in and out of bed, and walking. She isn't able to lay on her left side. She is constantly having to change positions when sitting and lying down. She is still doing projects outside and around the house, but it just takes her longer. She uses ice, heat, and tylenol 650 mg. The ice and heat help the pain. She does not have a hx of a arthritis.     Rib Cage Pain   Karine has had rib pain at night for the past 2 months, \"it feels like a bruise.\" During the day she doesn't experience it but only when she rolls over at night. Denies any injury or fall. She has no problems breathing. The pain is on both sides of her ribs.   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)     Keratoconus     Hyperlipidemia LDL goal <100     Obstructive sleep apnea     Vitamin D insufficiency     Major depression in partial remission (H)     Plantar warts     Low back pain     DJD (degenerative joint disease) of knee     Pancreatitis     Panic     Chest pain     IBS (irritable bowel syndrome)     Heart murmur     Hypertension goal BP (blood pressure) < 140/90     Type 2 diabetes mellitus without complication, with long-term current use of insulin (H)       Current Outpatient Prescriptions   Medication Sig Dispense Refill     amphetamine-dextroamphetamine (ADDERALL) " "30 MG per tablet Take 2 po q am and one po in the afternoon 90 tablet 0     ASPIRIN LOW DOSE 81 MG EC tablet TAKE 1 TABLET BY MOUTH DAILY 90 tablet 2     Calcium-Magnesium-Vitamin D 600- MG-MG-UNIT TB24 Take 1 tablet by mouth 2 times daily 60 tablet 11     Cholecalciferol (VITAMIN D) 2000 units tablet Take 2,000 Units by mouth daily 100 tablet 3     cyanocobalamin 1000 MCG SUBL Take one daily under the tongue 90 tablet 3     gabapentin (NEURONTIN) 300 MG capsule Take 3 po bid 90 capsule 1     losartan (COZAAR) 100 MG tablet Take 100 mg by mouth daily  2     metFORMIN (GLUCOPHAGE-XR) 500 MG 24 hr tablet Take 2 tablet po bid 360 tablet 1     multivitamin peds with iron (FLINTSTONES PLUS IRON) CHEW Take one daily 90 tablet 3     terbinafine (LAMISIL) 250 MG tablet Take 250 mg by mouth daily  0     traZODone (DESYREL) 50 MG tablet Take 50 mg by mouth daily  4     [DISCONTINUED] sertraline (ZOLOFT) 100 MG tablet Take 1 tablet (100 mg) by mouth daily 90 tablet 3       Allergies   Allergen Reactions     Codeine Sulfate      Nsaids GI Disturbance     Pt had bariatric surgery, should not ever take oral NSAIDS due to risk of gastric ulcers.        EXAM  /76  Pulse 76  Temp 97.8  F (36.6  C) (Oral)  Ht 5' 3.58\" (161.5 cm)  Wt 153 lb 8 oz (69.6 kg)  SpO2 96%  BMI 26.69 kg/m2  Gen: Limping, unable to sit comfortably, pleasant, NAD  HEENT: PERRL, EOMI  COR: S1,S2, no murmur  Lungs: CTA bilaterally, no rhonchi, wheezes or rales.   Chest wall: No tenderness with palpation over the bony ribs anterior or posterior.   Hip: Good internal and external rotation of left hip. Point tenderness over bony lateral left hip, IT band, and joint line of the left lateral knee. Tenderness over the left gastrocnemius muscle. No swelling, redness, or warmth.   Back: Mild tenderness with palpation over the bony LS spine. Straight leg raise negative.   Neuro: DTR +1/4 at patella and achilles bilaterally.      Assessment:  (M79.652) " Pain of left thigh  (primary encounter diagnosis)  Comment: Suspect trochanteric bursitis vs sciatica, arthritis, IT band pain   Plan: ES Tylenol 1000 mg TID, cool pack to bony hip and knee  Refer to walk in sports clinic for Xrays, evaluation and treatment.     Anay Martinez MD  Internal Medicine/Pediatrics    I, Yari Kenyon, am serving as a scribe to document services personally performed by Dr. Anay Martinez, based on data collection and the provider's statements to me.

## 2018-08-01 NOTE — MR AVS SNAPSHOT
"              After Visit Summary   8/1/2018    Karine Moss    MRN: [id number]           Patient Information     Date Of Birth          1955        Visit Information        Provider Department      8/1/2018 6:10 PM Harlan Wallis MD [facility]        Today's Diagnoses     Sciatica of left side    -  1       Follow-ups after your visit        Additional Services     PHYSICAL THERAPY REFERRAL (External-Prints)       Physical Therapy Referral                  Who to contact     Please call your clinic at [phone] to:    Ask questions about your health    Make or cancel appointments    Discuss your medicines    Learn about your test results    Speak to your doctor            Additional Information About Your Visit        [employer] Information     [employer] gives you secure access to your electronic health record. If you see a primary care provider, you can also send messages to your care team and make appointments. If you have questions, please call your primary care clinic.  If you do not have a primary care provider, please call [phone] and they will assist you.      [employer] is an electronic gateway that provides easy, online access to your medical records. With [employer], you can request a clinic appointment, read your test results, renew a prescription or communicate with your care team.     To access your existing account, please contact your [facility] or call [phone] for assistance.        Care EveryWhere ID     This is your Care EveryWhere ID. This could be used by other organizations to access your [facility] medical records  [id number]        Your Vitals Were     Height BMI (Body Mass Index)                5' 3.5\" (1.613 m) 26.68 kg/m2           Blood Pressure from Last 3 Encounters:   08/01/18 119/76   08/01/18 119/76   06/27/18 129/83    Weight from Last 3 Encounters:   08/01/18 153 lb (69.4 kg)   08/01/18 153 lb 8 " oz (69.6 kg)   06/27/18 154 lb (69.9 kg)              We Performed the Following     PHYSICAL THERAPY REFERRAL (External-Prints)          Today's Medication Changes          These changes are accurate as of 8/1/18 11:59 PM.  If you have any questions, ask your nurse or doctor.               Start taking these medicines.        Dose/Directions    cyclobenzaprine 10 MG tablet   Commonly known as:  FLEXERIL   Used for:  Sciatica of left side   Started by:  Harlan Wallis MD        Dose:  10 mg   Take 1 tablet (10 mg) by mouth nightly as needed for muscle spasms   Quantity:  30 tablet   Refills:  0       traMADol 50 MG tablet   Commonly known as:  ULTRAM   Used for:  Sciatica of left side   Started by:  Harlan Wallis MD        Dose:  50 mg   Take 1 tablet (50 mg) by mouth 3 times daily as needed for severe pain   Quantity:  20 tablet   Refills:  0            Where to get your medicines      Some of these will need a paper prescription and others can be bought over the counter.  Ask your nurse if you have questions.     Bring a paper prescription for each of these medications     cyclobenzaprine 10 MG tablet    traMADol 50 MG tablet               Information about OPIOIDS     PRESCRIPTION OPIOIDS: WHAT YOU NEED TO KNOW   We gave you an opioid (narcotic) pain medicine. It is important to manage your pain, but opioids are not always the best choice. You should first try all the other options your care team gave you. Take this medicine for as short a time (and as few doses) as possible.     These medicines have risks:    DO NOT drive when on new or higher doses of pain medicine. These medicines can affect your alertness and reaction times, and you could be arrested for driving under the influence (DUI). If you need to use opioids long-term, talk to your care team about driving.    DO NOT operate heave machinery    DO NOT do any other dangerous activities while taking these medicines.     DO NOT drink any  alcohol while taking these medicines.      If the opioid prescribed includes acetaminophen, DO NOT take with any other medicines that contain acetaminophen. Read all labels carefully. Look for the word  acetaminophen  or  Tylenol.  Ask your pharmacist if you have questions or are unsure.    You can get addicted to pain medicines, especially if you have a history of addiction (chemical, alcohol or substance dependence). Talk to your care team about ways to reduce this risk.    Store your pills in a secure place, locked if possible. We will not replace any lost or stolen medicine. If you don t finish your medicine, please throw away (dispose) as directed by your pharmacist. The [place] Pollution Control Agency has more information about safe disposal: https://www.pca.[facility].[place].us/living-green/managing-unwanted-medications.     All opioids tend to cause constipation. Drink plenty of water and eat foods that have a lot of fiber, such as fruits, vegetables, prune juice, apple juice and high-fiber cereal. Take a laxative (Miralax, milk of magnesia, Colace, Senna) if you don t move your bowels at least every other day.          Primary Care Provider Office Phone # Fax #    Anay Martinez [phone] [address] [place] [address]        Equal Access to Services     MARGARITA SEGURA : Hadgriffin rodriguezo Rebekah, waaxda luqadaha, qaybta kaalmada juanda, jules moe. So [facility] [phone].    ATENCIÓN: Si habla español, tiene a chávez disposición servicios gratuitos de asistencia lingüística. Llame al [phone].    We comply with applicable federal civil rights laws and [place] laws. We do not discriminate on the basis of race, color, national origin, age, disability, sex, sexual orientation, or gender identity.            Thank you!     Thank you for choosing [facility]  for your care. Our goal is always to provide  you with excellent care. Hearing back from our patients is one way we can continue to improve our services. Please take a few minutes to complete the written survey that you may receive in the mail after your visit with us. Thank you!             Your Updated Medication List - Protect others around you: Learn how to safely use, store and throw away your medicines at www.disposemymeds.org.          This list is accurate as of 8/1/18 11:59 PM.  Always use your most recent med list.                   Brand Name Dispense Instructions for use Diagnosis    amphetamine-dextroamphetamine 30 MG per tablet    ADDERALL    90 tablet    Take 2 po q am and one po in the afternoon        ASPIRIN LOW DOSE 81 MG EC tablet   Generic drug:  aspirin     90 tablet    TAKE 1 TABLET BY MOUTH DAILY    Type II or unspecified type diabetes mellitus without mention of complication, not stated as uncontrolled       Calcium-Magnesium-Vitamin D 600- MG-MG-UNIT Tb24     60 tablet    Take 1 tablet by mouth 2 times daily        cyanocobalamin 1000 MCG Subl     90 tablet    Take one daily under the tongue        cyclobenzaprine 10 MG tablet    FLEXERIL    30 tablet    Take 1 tablet (10 mg) by mouth nightly as needed for muscle spasms    Sciatica of left side       gabapentin 300 MG capsule    NEURONTIN    90 capsule    Take 3 po bid        losartan 100 MG tablet    COZAAR     Take 100 mg by mouth daily        metFORMIN 500 MG 24 hr tablet    GLUCOPHAGE-XR    360 tablet    Take 2 tablet po bid        multivitamin peds with iron Chew     90 tablet    Take one daily        terbinafine 250 MG tablet    lamISIL     Take 250 mg by mouth daily        traMADol 50 MG tablet    ULTRAM    20 tablet    Take 1 tablet (50 mg) by mouth 3 times daily as needed for severe pain    Sciatica of left side       traZODone 50 MG tablet    DESYREL     Take 50 mg by mouth daily        vitamin D 2000 units tablet     100 tablet    Take 2,000 Units by mouth daily

## 2018-08-01 NOTE — Clinical Note
Thank you for allowing me to see your patient in Sports Medicine Clinic.  Please see the attached copy of our visit.  Sincerely,  Harlan Wallis MD

## 2018-08-01 NOTE — NURSING NOTE
"63 year old  Chief Complaint   Patient presents with     Pain     started in her rib cage about 2 months. Now her right hip has pain she reports its worse at night time but feels better when she is active during the day.       Blood pressure 119/76, pulse 76, temperature 97.8  F (36.6  C), temperature source Oral, height 5' 3.58\" (161.5 cm), weight 153 lb 8 oz (69.6 kg), SpO2 96 %. Body mass index is 26.69 kg/(m^2).  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)     Keratoconus     Hyperlipidemia LDL goal <100     Obstructive sleep apnea     Vitamin D insufficiency     Major depression in partial remission (H)     Plantar warts     Low back pain     DJD (degenerative joint disease) of knee     Pancreatitis     Panic     Chest pain     IBS (irritable bowel syndrome)     Heart murmur     Hypertension goal BP (blood pressure) < 140/90     Type 2 diabetes mellitus without complication, with long-term current use of insulin (H)       Wt Readings from Last 2 Encounters:   08/01/18 153 lb 8 oz (69.6 kg)   06/27/18 154 lb (69.9 kg)     BP Readings from Last 3 Encounters:   08/01/18 119/76   06/27/18 129/83   03/30/17 138/84         Current Outpatient Prescriptions   Medication     amphetamine-dextroamphetamine (ADDERALL) 30 MG per tablet     ASPIRIN LOW DOSE 81 MG EC tablet     Calcium-Magnesium-Vitamin D 600- MG-MG-UNIT TB24     Cholecalciferol (VITAMIN D) 2000 units tablet     cyanocobalamin 1000 MCG SUBL     gabapentin (NEURONTIN) 300 MG capsule     losartan (COZAAR) 100 MG tablet     metFORMIN (GLUCOPHAGE-XR) 500 MG 24 hr tablet     multivitamin peds with iron (FLINTSTONES PLUS IRON) CHEW     terbinafine (LAMISIL) 250 MG tablet     traZODone (DESYREL) 50 MG tablet     [DISCONTINUED] sertraline (ZOLOFT) 100 MG tablet     No current facility-administered medications for this visit.        Social History "   Substance Use Topics     Smoking status: Never Smoker     Smokeless tobacco: Never Used     Alcohol use Yes      Comment: rare       Health Maintenance Due   Topic Date Due     FOOT EXAM Q1 YEAR  03/11/1956     PNEUMOVAX 1X HI RISK PATIENT < 65 (NO IB MSG)  03/11/1957     HIV SCREEN (SYSTEM ASSIGNED)  03/11/1973     HEPATITIS C SCREENING  03/11/1973     ADVANCE DIRECTIVE PLANNING Q5 YRS  03/11/2010     EYE EXAM Q1 YEAR  11/05/2014     DEPRESSION ACTION PLAN Q1 YR  11/05/2014     MAMMO SCREEN Q2 YR (SYSTEM ASSIGNED)  06/06/2015     PAP Q3 YR  01/30/2018     COLON CANCER SCREEN (SYSTEM ASSIGNED)  08/20/2018       Lab Results   Component Value Date    PAP NIL 06/11/2013 August 1, 2018 2:36 PM

## 2018-08-01 NOTE — MR AVS SNAPSHOT
"              After Visit Summary   8/1/2018    Karine Moss    MRN: [id number]           Patient Information     Date Of Birth          1955        Visit Information        Provider Department      8/1/2018 2:40 PM Anay Martinez MD [facility]        Today's Diagnoses     Pain of left thigh    -  1       Follow-ups after your visit        Who to contact     Please call your clinic at [phone] to:    Ask questions about your health    Make or cancel appointments    Discuss your medicines    Learn about your test results    Speak to your doctor            Additional Information About Your Visit        MyChart Information     [employer] gives you secure access to your electronic health record. If you see a primary care provider, you can also send messages to your care team and make appointments. If you have questions, please call your primary care clinic.  If you do not have a primary care provider, please call [phone] and they will assist you.      [employer] is an electronic gateway that provides easy, online access to your medical records. With [employer], you can request a clinic appointment, read your test results, renew a prescription or communicate with your care team.     To access your existing account, please contact your [facility] or call [phone] for assistance.        Care EveryWhere ID     This is your Care EveryWhere ID. This could be used by other organizations to access your [facility] medical records  [id number]        Your Vitals Were     Pulse Temperature Height Pulse Oximetry BMI (Body Mass Index)       76 97.8  F (36.6  C) (Oral) 5' 3.58\" (161.5 cm) 96% 26.69 kg/m2        Blood Pressure from Last 3 Encounters:   08/01/18 119/76   08/01/18 119/76   06/27/18 129/83    Weight from Last 3 Encounters:   08/01/18 153 lb (69.4 kg)   08/01/18 153 lb 8 oz (69.6 kg)   06/27/18 154 lb (69.9 kg)              Today, you had the following     No " orders found for display         Today's Medication Changes          These changes are accurate as of 8/1/18 11:59 PM.  If you have any questions, ask your nurse or doctor.               Start taking these medicines.        Dose/Directions    cyclobenzaprine 10 MG tablet   Commonly known as:  FLEXERIL   Used for:  Sciatica of left side   Started by:  Harlan Wallis MD        Dose:  10 mg   Take 1 tablet (10 mg) by mouth nightly as needed for muscle spasms   Quantity:  30 tablet   Refills:  0       traMADol 50 MG tablet   Commonly known as:  ULTRAM   Used for:  Sciatica of left side   Started by:  Harlan Wallis MD        Dose:  50 mg   Take 1 tablet (50 mg) by mouth 3 times daily as needed for severe pain   Quantity:  20 tablet   Refills:  0            Where to get your medicines      Some of these will need a paper prescription and others can be bought over the counter.  Ask your nurse if you have questions.     Bring a paper prescription for each of these medications     cyclobenzaprine 10 MG tablet    traMADol 50 MG tablet                Primary Care Provider Office Phone # Fax #    Anay Martinez [phone]       [address] [place] [address]        Equal Access to Services     [facility]: Hadii jenniffer salmon Soelise, waneptalida lutyson, qaybta kaalbelkis galicia, jules tomas . So [facility] [phone].    ATENCIÓN: Si habla español, tiene a chávez disposición servicios gratuitos de asistencia lingüística. [facility] [phone].    We comply with applicable federal civil rights laws and [place] laws. We do not discriminate on the basis of race, color, national origin, age, disability, sex, sexual orientation, or gender identity.            Thank you!     Thank you for choosing [facility]  for your care. Our goal is always to provide you with excellent care. Hearing back from our patients is one way we can continue to improve our  services. Please take a few minutes to complete the written survey that you may receive in the mail after your visit with us. Thank you!             Your Updated Medication List - Protect others around you: Learn how to safely use, store and throw away your medicines at www.disposemymeds.org.          This list is accurate as of 8/1/18 11:59 PM.  Always use your most recent med list.                   Brand Name Dispense Instructions for use Diagnosis    amphetamine-dextroamphetamine 30 MG per tablet    ADDERALL    90 tablet    Take 2 po q am and one po in the afternoon        ASPIRIN LOW DOSE 81 MG EC tablet   Generic drug:  aspirin     90 tablet    TAKE 1 TABLET BY MOUTH DAILY    Type II or unspecified type diabetes mellitus without mention of complication, not stated as uncontrolled       Calcium-Magnesium-Vitamin D 600- MG-MG-UNIT Tb24     60 tablet    Take 1 tablet by mouth 2 times daily        cyanocobalamin 1000 MCG Subl     90 tablet    Take one daily under the tongue        cyclobenzaprine 10 MG tablet    FLEXERIL    30 tablet    Take 1 tablet (10 mg) by mouth nightly as needed for muscle spasms    Sciatica of left side       gabapentin 300 MG capsule    NEURONTIN    90 capsule    Take 3 po bid        losartan 100 MG tablet    COZAAR     Take 100 mg by mouth daily        metFORMIN 500 MG 24 hr tablet    GLUCOPHAGE-XR    360 tablet    Take 2 tablet po bid        multivitamin peds with iron Chew     90 tablet    Take one daily        terbinafine 250 MG tablet    lamISIL     Take 250 mg by mouth daily        traMADol 50 MG tablet    ULTRAM    20 tablet    Take 1 tablet (50 mg) by mouth 3 times daily as needed for severe pain    Sciatica of left side       traZODone 50 MG tablet    DESYREL     Take 50 mg by mouth daily        vitamin D 2000 units tablet     100 tablet    Take 2,000 Units by mouth daily

## 2018-08-01 NOTE — PROGRESS NOTES
SPORTS & ORTHOPEDIC WALK-IN VISIT 8/1/2018    Primary Care Physician: Dr. Martinez    Starts in lateral hip and goes down leg into lower shin. Better when moving around, sitting and laying in bed are worse. Started about a week and a half ago, not sure what happened. Feels like a bruised bone. Reports some low back stiffness.     Reason for visit:     What part of your body is injured / painful?  left hip    What caused the injury /pain? Unsure    How long ago did your injury occur or pain begin? several days ago    What are your most bothersome symptoms? Pain    How would you characterize your symptom?  aching and radiating    What makes your symptoms better? Ice and Heat    What makes your symptoms worse? Sitting and Other: stairs, laying on left side    Have you been previously seen for this problem? Yes, Michelle - suspect trochanteric bursitis     Medical History:    Any recent changes to your medical history? No    Any new medication prescribed since last visit? No    Have you had surgery on this body part before? No    Social History:    Occupation: retired    Handedness: Right    Exercise: active but doesn't exercise    Review of Systems:    Do you have fever, chills, weight loss? No    Do you have any vision problems? No    Do you have any chest pain or edema? No    Do you have any shortness of breath or wheezing?  No    Do you have stomach problems? No    Do you have any numbness or focal weakness? Yes, neuropathy    Do you have diabetes? Yes, type 2    Do you have problems with bleeding or clotting? No    Do you have an rashes or other skin lesions? No           PMH:  Past Medical History:   Diagnosis Date     Arthritis      Chest pain     seeing Cardiology     Depression 1991    after 's death     Diabetes mellitus (H)      Diabetic renal disease (H)     microalbuminuria     DJD (degenerative joint disease) of knee      H/O vitamin D deficiency      Hypertension      IBS (irritable bowel  syndrome)     diarrhea      Keratoconus      Low back pain      Narcolepsy 2007    Dx'd by Sleep specialist 347.00, pt discontinued Adderal mid Nov. 13 due to tacycardia concerns     Obesity      Obstructive sleep apnea     327.23, 3 sleep studies,Dr. Flaco [facility]     Pancreatitis     related to Victoza, also on Xyrem     Panic      RLS (restless legs syndrome)      Sinus tachycardia        Active problem list:  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)     Keratoconus     Hyperlipidemia LDL goal <100     Obstructive sleep apnea     Vitamin D insufficiency     Major depression in partial remission (H)     Plantar warts     Low back pain     DJD (degenerative joint disease) of knee     Pancreatitis     Panic     Chest pain     IBS (irritable bowel syndrome)     Heart murmur     Hypertension goal BP (blood pressure) < 140/90     Type 2 diabetes mellitus without complication, with long-term current use of insulin (H)       FH:  Family History   Problem Relation Age of Onset     Diabetes Father      Cancer - colorectal Maternal Grandmother      Cancer Daughter      Obesity Daughter      s/p lap band     Cancer Brother      lung     Hypertension Sister        SH:  Social History     Social History     Marital status: Single     Spouse name: N/A     Number of children: 1     Years of education: N/A     Occupational History      [employer]     on disability     Social History Main Topics     Smoking status: Never Smoker     Smokeless tobacco: Never Used     Alcohol use Yes      Comment: rare     Drug use: No     Sexual activity: No     Other Topics Concern     Parent/Sibling W/ Cabg, Mi Or Angioplasty Before 65f 55m? No     Social History Narrative    Daughter- 42,  since 1991    Drives only short distances due to narcolepsy             --------------------------------------------------------------------------------    Surgical History  Return To Top     Status Surgery Time Frame Comment Record Date     Inactive  ear surgery  1/04 5/27/2008      Inactive  eye surgery  2002 5/27/2008      Inactive  Hemorrhoidectomy  9/14/00 5/27/2008          --------------------------------------------------------------------------------    Food Allergy  Return To Top     Allergen Reaction Comment Record Date     * No known food allergies      10/28/2008          --------------------------------------------------------------------------------    Drug Allergy  Return To Top     Allergen Reaction Comment Record Date     Codeine  nausea    6/21/2007          --------------------------------------------------------------------------------    Environment Allergy  Return To Top     Allergen Reaction Comment Record Date     * No known environmental allergies      10/28/2008          --------------------------------------------------------------------------------    Social History  Return To Top     Question Answer Comment Record Date     Marital status      5/27/2008      Advance Directive or Living Will  Form Given to Patient    1/18/2010      Emotional Abuse  Yes    1/18/2010      Exercise  No    1/18/2010      Caffeine  Yes    5/27/2008      Physical Abuse  No    1/18/2010      Sealtbelts  Yes    1/18/2010      Sexual Abuse  No    1/18/2010      Breast/Testicle Self Check  No    1/18/2010      Number of children  1    1/18/2010      Living arrangements  House    1/18/2010      Number of adults in household  2    1/18/2010      Education level  High School Graduate    1/18/2010      Employment  Currently employed    1/18/2010      Tobacco history  Has never smoked or chewed tobacco    5/27/2008      Alcohol history  Currently drinks alcohol    5/27/2008      Has the patient ever used illegal drugs?  Has never used illegal drugs    5/27/2008           --------------------------------------------------------------------------------    History - Overall Remark: 3/21/2012 Return To Top         --------------------------------------------------------------------------------    Medical History Return To Top     Status Diagnosis Time Frame Comment Record Date     Active (250.00) - C - Diabetes mellitus, type II controlled      5/15/2012      Active (788.41) - C - Urinary frequency      4/17/2012      Active (250.02) - C - Diabetes mellitus, type II uncontrolled      3/6/2012      Active (278.00) - C - Obesity      2/14/2011      Active (250.00) - C - Diabetes mellitus, type II controlled      2/14/2011      Active (739.3) - C - Somatic dysfunction, lumbar region      8/16/2010      Active (739.5) - C - Somatic dysfunction, pelvic region      8/16/2010      Active (401.9) - C - Hypertension      2/8/2010      Active 268.9 UNSP VITAMIN D DEFICIENCY      1/18/2010      Active (695.3) - C - Rosacea      1/18/2010      Active 272.1 Hypertriglyceridemia      1/18/2010      Active 300.4 Depression with Anxiety (Dysthymic Disorder)      10/28/2008      Active 739.6 Somatic dysfunction, lower extremities      10/28/2008      Active 780.79 Fatigue      6/23/2008      Active 278.01 Obesity, morbid      6/19/2008      Active 347.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, 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      6/19/2008      Inactive  (462) - C - Pharyngitis, acute      1/15/2011      Inactive  250.02 Diabetes mellitus, type II uncontrolled      1/18/2010      Inactive  726.71 Tendinitis, achilles      10/28/2008      Inactive  (250.00) - C - Diabetes mellitus, type II controlled      10/28/2008      Inactive  (250.00) - C - Diabetes mellitus, type II controlled      6/23/2008      Inactive  V77.91 Screening, lipids      6/23/2008      Inactive  599.0 Urinary tract infection, unspec./pyuria (UTI)      6/23/2008      Inactive  V70.0 Routine Medical Exam      6/19/2008      Active Constipation      5/27/2008      Active  "Hemorrhoids      2008      Pancreatitis pancreatitis 2013     --------------------------------------------------------------------------------    M        --------------------------------------------------------------------------------    Family History  Return To Top     Status Relationship Disease Comment Record Date     Alive Sister  *Denies any medical problems  3 sisters  2008      Alive Brother  *Denies any medical problems  2 brothers  2008         Father    Age of death 56  2010         Mother  Dementia  Age of death 82  2008          --------------------------------------------------------------------------------                           MEDS:  See EMR, reviewed  ALL:  See EMR, reviewed    REVIEW OF SYSTEMS:  CONSTITUTIONAL:NEGATIVE for fever, chills, change in weight  INTEGUMENTARY/SKIN: NEGATIVE for worrisome rashes, moles or lesions  EYES: NEGATIVE for vision changes or irritation  ENT/MOUTH: NEGATIVE for ear, mouth and throat problems  RESP:NEGATIVE for significant cough or SOB  BREAST: NEGATIVE for masses, tenderness or discharge  CV: NEGATIVE for chest pain, palpitations or peripheral edema  GI: NEGATIVE for nausea, abdominal pain, heartburn, or change in bowel habits  :NEGATIVE for frequency, dysuria, or hematuria  :NEGATIVE for frequency, dysuria, or hematuria  NEURO: NEGATIVE for weakness, dizziness or paresthesias  ENDOCRINE: NEGATIVE for temperature intolerance, skin/hair changes  HEME/ALLERGY/IMMUNE: NEGATIVE for bleeding problems  PSYCHIATRIC: NEGATIVE for changes in mood or affect      SUBJECTIVE:  This 53-year-old female in the last 3 days has had discomfort she feels on the left side of her leg that extends down the thigh past the knee into her calf and down to her foot.  It started about a week and half ago, but it is worse over the last 3 days.  It feels like a \"deep inside bruise\".  It is not associated with numbness and tingling.  It " "does not involve the foot or toes.  It is worse the longer she sits or longer she stands.  It is an aching, radiating discomfort.  She can think of no particular injury.  She denies past problems with her low back, although I do see chart indications of previous visits for low back problems in the past.  She has had no past imaging of her lumbar spine.  There have been no recent injuries.  Sometimes her back can feel some \"stiffness\" but no specific pain.  Her primary provider wondered if she had trochanteric bursitis and she was sent to the Urgent Care for a possible injection.  She lives in [place].  She is here with a family member who lives in the [facility].  She has a past history of peripheral neuropathy that affects both her feet in the setting of diabetes.      OBJECTIVE:  She moves easily about the exam room.  She points in general to her left thigh, knee, shin and calf as the area of discomfort.  She can forward flex and extend the lumbar spine.  She is uncomfortable sitting for too long.  The skin overlying the low back is normal.  Spring testing of the lumbar spine is without discomfort and she is nontender over the sacroiliac joints or the sacrum.  She is consistently nontender over the greater trochanter on the left and nontender over the distal IT band.  She has normal range of motion at the hips to internal rotation, external rotation and abduction.  A straight leg raise is negative.  Lower extremity strength to flexion/extension at hip, knee, ankle including toe strength and foot evertor strength are 5 out of 5 symmetrically bilaterally.  Distal sensation is intact.  Pulses are normal.  There is no swelling at the calf, no tenderness or palpable cords in the calf.      ASSESSMENT:  Left radicular leg discomfort x1 week, suspect sciatica.      PLAN:  The nature of sciatica and, its common association with degenerative disk disease was explained.  She takes trazodone for sleep at night.  She would " like something else to help with sleep.  I cautioned her that too many sedatives can affect and can affect breathing and be dangerous when combined.  She agrees to stop the trazodone and instead substitute Flexeril to assist with this current discomfort.  For daytime use she would like some tramadol.  She cannot take nonsteroidal anti-inflammatories because of GI intolerance.  She understands this is a narcotic.  She has had past trouble with not tolerating codeine but she has tolerated other narcotics in the past.  She knows this is a distant relative of narcotics and I think it would be unlikely to make her feel too tired or constipated with stool or have an adverse reaction.  She is willing to try it.  She knows I cannot refill this chronically.  She would like to see a physical therapist in [place] for sciatica and I think this is reasonable.  She knows if it is not improving over the upcoming weeks or she is having worsening symptoms of numbness, tingling, pain or weakness that she should follow up with a primary provider.  X-rays of the back or potentially an MRI of the lumbar spine to be done if needed.  She will follow up if not improved.

## 2018-08-20 ENCOUNTER — OFFICE VISIT (OUTPATIENT)
Dept: FAMILY MEDICINE | Facility: CLINIC | Age: 63
End: 2018-08-20
Payer: COMMERCIAL

## 2018-08-20 ENCOUNTER — RADIANT APPOINTMENT (OUTPATIENT)
Dept: GENERAL RADIOLOGY | Facility: CLINIC | Age: 63
End: 2018-08-20
Attending: FAMILY MEDICINE
Payer: COMMERCIAL

## 2018-08-20 VITALS
SYSTOLIC BLOOD PRESSURE: 124 MMHG | TEMPERATURE: 97.7 F | OXYGEN SATURATION: 100 % | BODY MASS INDEX: 25.52 KG/M2 | HEIGHT: 64 IN | DIASTOLIC BLOOD PRESSURE: 77 MMHG | HEART RATE: 93 BPM | WEIGHT: 149.5 LBS

## 2018-08-20 DIAGNOSIS — M54.42 CHRONIC LEFT-SIDED LOW BACK PAIN WITH LEFT-SIDED SCIATICA: Primary | ICD-10-CM

## 2018-08-20 DIAGNOSIS — G89.29 CHRONIC LEFT-SIDED LOW BACK PAIN WITH LEFT-SIDED SCIATICA: Primary | ICD-10-CM

## 2018-08-20 ASSESSMENT — PAIN SCALES - GENERAL: PAINLEVEL: MODERATE PAIN (4)

## 2018-08-20 NOTE — PROGRESS NOTES
Karine Moss is a 63 year old female who presents to [facility] due to persistent LEFT low back and LEFT hip pain with associated pains in the LEFT leg. This started about 1 month ago in mid-to-late July. Has had off and on back pain for decades.     Saw Dr. Martinez on Aug 1 (3 weeks ago) sent to walk-in clinic. Recommendation was for PT. Ms Moss is skeptical about pursuing PT without imaging.     States persistent pains at night. Overall, daytime pains are becoming more tolerable. Interested in more in-depth evaluation.         Review Of Systems:  No fevers, sweats or chills.     Has otherwise been in usual state of health, e.g.   Cardiovascular: negative  Respiratory: No shortness of breath, dyspnea on exertion, cough, or hemoptysis  Gastrointestinal: negative  Genitourinary: negative    Problem list per EMR:  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)     Keratoconus     Hyperlipidemia LDL goal <100     Obstructive sleep apnea     Vitamin D insufficiency     Major depression in partial remission (H)     Plantar warts     Low back pain     DJD (degenerative joint disease) of knee     Pancreatitis     Panic     Chest pain     IBS (irritable bowel syndrome)     Heart murmur     Hypertension goal BP (blood pressure) < 140/90     Type 2 diabetes mellitus without complication, with long-term current use of insulin (H)       Current Outpatient Prescriptions   Medication Sig Dispense Refill     amphetamine-dextroamphetamine (ADDERALL) 30 MG per tablet Take 2 po q am and one po in the afternoon 90 tablet 0     ASPIRIN LOW DOSE 81 MG EC tablet TAKE 1 TABLET BY MOUTH DAILY 90 tablet 2     Calcium-Magnesium-Vitamin D 600- MG-MG-UNIT TB24 Take 1 tablet by mouth 2 times daily 60 tablet 11     Cholecalciferol (VITAMIN D) 2000 units tablet Take 2,000 Units by mouth daily 100 tablet 3      "cyanocobalamin 1000 MCG SUBL Take one daily under the tongue 90 tablet 3     cyclobenzaprine (FLEXERIL) 10 MG tablet Take 1 tablet (10 mg) by mouth nightly as needed for muscle spasms 30 tablet 0     gabapentin (NEURONTIN) 300 MG capsule Take 3 po bid 90 capsule 1     losartan (COZAAR) 100 MG tablet Take 100 mg by mouth daily  2     metFORMIN (GLUCOPHAGE-XR) 500 MG 24 hr tablet Take 2 tablet po bid 360 tablet 1     multivitamin peds with iron (FLINTSTONES PLUS IRON) CHEW Take one daily 90 tablet 3     terbinafine (LAMISIL) 250 MG tablet Take 250 mg by mouth daily  0     traMADol (ULTRAM) 50 MG tablet Take 1 tablet (50 mg) by mouth 3 times daily as needed for severe pain 20 tablet 0     traZODone (DESYREL) 50 MG tablet Take 50 mg by mouth daily  4     [DISCONTINUED] sertraline (ZOLOFT) 100 MG tablet Take 1 tablet (100 mg) by mouth daily 90 tablet 3       Allergies   Allergen Reactions     Nsaids GI Disturbance     Pt had bariatric surgery, should not ever take oral NSAIDS due to risk of gastric ulcers.        Social:   Lives in [place], [place] and also in [facility].     EXAM    Vitals: /77  Pulse 93  Temp 97.7  F (36.5  C) (Oral)  Ht 5' 3.58\" (161.5 cm)  Wt 149 lb 8 oz (67.8 kg)  SpO2 100%  BMI 26 kg/m2  BMI= Body mass index is 26 kg/(m^2).  Appears in mild - moderate distress due to the low back pain  Low back pain is across lumbar spine. Lacks normal lordosis  Can forward bend to touch mid lower legs.  Negative SLR and FROM of hip but with limited L4 /patella reflex on Left vs Right.   strength intact distally.         ASSESSMENT:  -LEFT sided low back pain with Left sided radiculopathy and decreased Left patellar reflex    PLAN:  - Discussed evaluation and treatment options at length.   -Will start with an X-ray and some physical therapy.   - Discussed that PT can be beneficial even when diagnosis is uncertain.  -If no improvement in 2-3 weeks, consider MRI evaluation.   Follow up in 3 weeks, " sooner if needed.     Over 50% of the 30 minute in room time spent discussing above treatment plan    --Mario Mckinley MD  [facility], Department of Family Medicine and Community Health

## 2018-08-20 NOTE — NURSING NOTE
"63 year old  Chief Complaint   Patient presents with     Pain     pt reports some back and lower leg pain. Pt reports its worse at night while in bed and its also hard to rollover. Also her ankle swells up.       Blood pressure 124/77, pulse 93, temperature 97.7  F (36.5  C), temperature source Oral, height 5' 3.58\" (161.5 cm), weight 149 lb 8 oz (67.8 kg), SpO2 100 %. Body mass index is 26 kg/(m^2).  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)     Keratoconus     Hyperlipidemia LDL goal <100     Obstructive sleep apnea     Vitamin D insufficiency     Major depression in partial remission (H)     Plantar warts     Low back pain     DJD (degenerative joint disease) of knee     Pancreatitis     Panic     Chest pain     IBS (irritable bowel syndrome)     Heart murmur     Hypertension goal BP (blood pressure) < 140/90     Type 2 diabetes mellitus without complication, with long-term current use of insulin (H)       Wt Readings from Last 2 Encounters:   08/20/18 149 lb 8 oz (67.8 kg)   08/01/18 153 lb (69.4 kg)     BP Readings from Last 3 Encounters:   08/20/18 124/77   08/01/18 119/76   08/01/18 119/76         Current Outpatient Prescriptions   Medication     amphetamine-dextroamphetamine (ADDERALL) 30 MG per tablet     ASPIRIN LOW DOSE 81 MG EC tablet     Calcium-Magnesium-Vitamin D 600- MG-MG-UNIT TB24     Cholecalciferol (VITAMIN D) 2000 units tablet     cyanocobalamin 1000 MCG SUBL     cyclobenzaprine (FLEXERIL) 10 MG tablet     gabapentin (NEURONTIN) 300 MG capsule     losartan (COZAAR) 100 MG tablet     metFORMIN (GLUCOPHAGE-XR) 500 MG 24 hr tablet     multivitamin peds with iron (FLINTSTONES PLUS IRON) CHEW     terbinafine (LAMISIL) 250 MG tablet     traMADol (ULTRAM) 50 MG tablet     traZODone (DESYREL) 50 MG tablet     [DISCONTINUED] sertraline (ZOLOFT) 100 MG tablet     No current " facility-administered medications for this visit.        Social History   Substance Use Topics     Smoking status: Never Smoker     Smokeless tobacco: Never Used     Alcohol use Yes      Comment: rare       Health Maintenance Due   Topic Date Due     FOOT EXAM Q1 YEAR  [birth year]11/1956     PNEUMOVAX 1X HI RISK PATIENT < 65 (NO IB MSG)  03/11/1957     HIV SCREEN (SYSTEM ASSIGNED)  03/11/1973     HEPATITIS C SCREENING  03/11/1973     ADVANCE DIRECTIVE PLANNING Q5 YRS  03/11/2010     EYE EXAM Q1 YEAR  11/05/2014     DEPRESSION ACTION PLAN Q1 YR  11/05/2014     MAMMO SCREEN Q2 YR (SYSTEM ASSIGNED)  06/06/2015     PAP Q3 YR  01/30/2018     COLON CANCER SCREEN (SYSTEM ASSIGNED)  08/20/2018       Lab Results   Component Value Date    PAP NIL 06/11/2013 August 20, 2018 1:48 PM

## 2018-08-20 NOTE — MR AVS SNAPSHOT
After Visit Summary   8/20/2018    Karine Moss    MRN: [id number]           Patient Information     Date Of Birth          1955        Visit Information        Provider Department      8/20/2018 1:40 PM Mario Mckinley MD [facility]        Today's Diagnoses     Chronic left-sided low back pain with left-sided sciatica    -  1      Care Instructions      ASSESSMENT:  -LEFT sided low back pain with Left sided radiculopathy and decreased Left patellar reflex    PLAN:  - Discussed evaluation and treatment options at length.   -Will start with an X-ray and some physical therapy.   - Discussed that PT can be beneficial even when diagnosis is uncertain.  -If no improvement in 2-3 weeks, consider MRI evaluation.   Follow up in 3 weeks, sooner if needed.          Follow-ups after your visit        Additional Services     [facility]ERO PT, HAND, AND CHIROPRACTIC REFERRAL       **This order will print in the [facility] Scheduling Office**    Physical Therapy, Hand Therapy and Chiropractic Care are available through:    *[facility]  *[facility]  *[facility]    Call one number to schedule at any of the above locations: [phone].    Your provider has referred you to: Physical Therapy at [facility] or [facility]    Indication/Reason for Referral: Low Back Pain  Onset of Illness: months  Therapy Orders: Evaluate and Treat  Please be aware that coverage of these services is subject to the terms and limitations of your health insurance plan.  Call member services at your health plan with any benefit or coverage questions.      Please bring the following to your appointment:    *Your personal calendar for scheduling future appointments  *Comfortable clothing                  Future tests that were ordered for you today     Open Future Orders        Priority Expected Expires Ordered    MRI Lumbar spine w/o contrast Routine  8/20/2019 8/20/2018            Who to  "contact     Please call your clinic at [phone] to:    Ask questions about your health    Make or cancel appointments    Discuss your medicines    Learn about your test results    Speak to your doctor            Additional Information About Your Visit        [employer] Information     [employer] gives you secure access to your electronic health record. If you see a primary care provider, you can also send messages to your care team and make appointments. If you have questions, please call your primary care clinic.  If you do not have a primary care provider, please call [phone] and they will assist you.      [employer] is an electronic gateway that provides easy, online access to your medical records. With [employer], you can request a clinic appointment, read your test results, renew a prescription or communicate with your care team.     To access your existing account, please contact your [facility] or call [phone] for assistance.        Care EveryWhere ID     This is your Care EveryWhere ID. This could be used by other organizations to access your [place] medical records  [id number]        Your Vitals Were     Pulse Temperature Height Pulse Oximetry BMI (Body Mass Index)       93 97.7  F (36.5  C) (Oral) 5' 3.58\" (161.5 cm) 100% 26 kg/m2        Blood Pressure from Last 3 Encounters:   08/20/18 124/77   08/01/18 119/76   08/01/18 119/76    Weight from Last 3 Encounters:   08/20/18 149 lb 8 oz (67.8 kg)   08/01/18 153 lb (69.4 kg)   08/01/18 153 lb 8 oz (69.6 kg)              We Performed the Following     [facility] PT, HAND, AND CHIROPRACTIC REFERRAL     X-ray lumbar spine 2-3 views*        Primary Care Provider Office Phone # Fax #    Anay Martinez [phone] [address] [place] [address]        Equal Access to Services     MARGARITA SEGURA : Joanie Welch, mandy yip, jules bello " lolis tomas ah. So [facility] [phone].    ATENCIÓN: Si joselyn pat, tiene a chávez disposición servicios gratuitos de asistencia lingüística. Alcon toney [phone].    We comply with applicable federal civil rights laws and [place] laws. We do not discriminate on the basis of race, color, national origin, age, disability, sex, sexual orientation, or gender identity.            Thank you!     Thank you for choosing [facility]  for your care. Our goal is always to provide you with excellent care. Hearing back from our patients is one way we can continue to improve our services. Please take a few minutes to complete the written survey that you may receive in the mail after your visit with us. Thank you!             Your Updated Medication List - Protect others around you: Learn how to safely use, store and throw away your medicines at www.disposemymeds.org.          This list is accurate as of 8/20/18  2:19 PM.  Always use your most recent med list.                   Brand Name Dispense Instructions for use Diagnosis    amphetamine-dextroamphetamine 30 MG per tablet    ADDERALL    90 tablet    Take 2 po q am and one po in the afternoon        ASPIRIN LOW DOSE 81 MG EC tablet   Generic drug:  aspirin     90 tablet    TAKE 1 TABLET BY MOUTH DAILY    Type II or unspecified type diabetes mellitus without mention of complication, not stated as uncontrolled       Calcium-Magnesium-Vitamin D 600- MG-MG-UNIT Tb24     60 tablet    Take 1 tablet by mouth 2 times daily        cyanocobalamin 1000 MCG Subl     90 tablet    Take one daily under the tongue        cyclobenzaprine 10 MG tablet    FLEXERIL    30 tablet    Take 1 tablet (10 mg) by mouth nightly as needed for muscle spasms    Sciatica of left side       gabapentin 300 MG capsule    NEURONTIN    90 capsule    Take 3 po bid        losartan 100 MG tablet    COZAAR     Take 100 mg by mouth daily        metFORMIN 500 MG 24 hr tablet    GLUCOPHAGE-XR    360 tablet     Take 2 tablet po bid        multivitamin peds with iron Chew     90 tablet    Take one daily        terbinafine 250 MG tablet    lamISIL     Take 250 mg by mouth daily        traMADol 50 MG tablet    ULTRAM    20 tablet    Take 1 tablet (50 mg) by mouth 3 times daily as needed for severe pain    Sciatica of left side       traZODone 50 MG tablet    DESYREL     Take 50 mg by mouth daily        vitamin D 2000 units tablet     100 tablet    Take 2,000 Units by mouth daily

## 2018-08-20 NOTE — PATIENT INSTRUCTIONS
ASSESSMENT:  -LEFT sided low back pain with Left sided radiculopathy and decreased Left patellar reflex    PLAN:  - Discussed evaluation and treatment options at length.   -Will start with an X-ray and some physical therapy.   - Discussed that PT can be beneficial even when diagnosis is uncertain.  -If no improvement in 2-3 weeks, consider MRI evaluation.   Follow up in 3 weeks, sooner if needed.

## 2018-08-24 ENCOUNTER — HOSPITAL ENCOUNTER (OUTPATIENT)
Dept: PHYSICAL THERAPY | Facility: CLINIC | Age: 63
Setting detail: THERAPIES SERIES
End: 2018-08-24
Attending: FAMILY MEDICINE
Payer: COMMERCIAL

## 2018-08-24 PROCEDURE — G8978 MOBILITY CURRENT STATUS: HCPCS | Mod: GP

## 2018-08-24 PROCEDURE — G8979 MOBILITY GOAL STATUS: HCPCS | Mod: CJ,GP

## 2018-08-24 PROCEDURE — 40000718 ZZHC STATISTIC PT DEPARTMENT ORTHO VISIT

## 2018-08-24 PROCEDURE — 97112 NEUROMUSCULAR REEDUCATION: CPT | Mod: GP

## 2018-08-24 PROCEDURE — 97163 PT EVAL HIGH COMPLEX 45 MIN: CPT | Mod: GP

## 2018-08-24 NOTE — PROGRESS NOTES
08/24/18 1315   General Information   Type of Visit Initial OP Ortho PT Evaluation   Start of Care Date 08/24/18   Referring Physician Mario Mckinley MD   Orders Evaluate and Treat   Date of Order 08/20/18   Insurance Type [employer]   Medical Diagnosis Chronic Left Sided LBP w/ Sciatica   Surgical/Medical history reviewed Yes   Precautions/Limitations no known precautions/limitations   General Information Comments Back pain off and on her whole life.  She woke up one day and it was really bad going down        Present No   Body Part(s)   Body Part(s) Lumbar Spine/SI   Presentation and Etiology   Impairments A. Pain   Symptom Location Pain in Low Back down to ankle on left and up to her left rib   How/Where did it occur From insidious onset   Onset date of current episode/exacerbation 07/27/18   Chronicity Chronic   Pain rating (0-10 point scale) Best (/10);Worst (/10)   Best (/10) 4   Worst (/10) 7   Pain quality C. Aching   Frequency of pain/symptoms B. Intermittent   Pain/symptoms are: Worse during the night   Pain/symptoms exacerbated by G. Certain positions;A. Sitting;I. Bending   Pain/symptoms eased by E. Changing positions;B. Walking   Progression of symptoms since onset: Worsened   Prior Level of Function   Prior Level of Function-Mobility Ind   Prior Level of Function-ADLs Ind   Functional Level Prior Comment Ind   Current Level of Function   Current Community Support Family/friend caregiver   Patient role/employment history E. Unemployed   [facility]   Current equipment-Gait/Locomotion None   Current equipment-ADL None   Fall Risk Screen   Fall screen completed by PT   Have you fallen 2 or more times in the past year? No   Have you fallen and had an injury in the past year? No   Is patient a fall risk? No   Lumbar Spine/SI Objective Findings   Observation Slow moving.  Looks like in pain. Leaning away from the pain.   Posture Leans towards right side while  sitting   Gait/Locomotion Slow antalgic on left.. Increased DL support.   Flexion ROM Restricted. No increase in pain   Extension ROM Restricted. Increased symptoms/   Right Side Bending ROM Restricted and caused pain to shift to the left side.   Left Side Bending ROM Restricted. Some pain.   Repeated Extension-Standing ROM Pain increased peripheralized   Repeated Flexion-Standing ROM Pain didn't increase or decrease   Hip Flexion (L2) Strength R: 4-/5 L: 4-/5   Hip Abduction Strength R/L: 4-/5    Knee Flexion Strength R: 4-/5 L: 4-/5   Knee Extension (L3) Strength R: 4+/5 L: 4-/5   Ankle Dorsiflexion (L4) Strength R: 4/5 L: 4/5   Great Toe Extension (L5) Strength R: 4/5 L: 4/5   Ankle Plantar Flexion (S1) Strength R: 4/5 L: 4/5   SLR Positive on Left   Slump Test Positve on Left, Negative on RIght   Planned Therapy Interventions   Planned Therapy Interventions joint mobilization;manual therapy;neuromuscular re-education;ROM;strengthening;stretching   Planned Modality Interventions   Planned Modality Interventions Cryotherapy;Electrical stimulation;Hot packs;TENS;Ultrasound;Traction   Clinical Impression   Criteria for Skilled Therapeutic Interventions Met yes, treatment indicated   PT Diagnosis Left Sided LBP with referred pain down to Left Foot   Influenced by the following impairments Pain down back of leg to left ankle, Pain with movement, LLE weakness, LBP   Functional limitations due to impairments Similar positions for long periods, Sleeping   Clinical Presentation Evolving/Changing   Clinical Decision Making (Complexity) High complexity   Therapy Frequency 2 times/Week   Predicted Duration of Therapy Intervention (days/wks) 6-12 weeks   Risk & Benefits of therapy have been explained Yes   Patient, Family & other staff in agreement with plan of care Yes   ORTHO GOALS   PT Ortho Eval Goals 1;2;3   Ortho Goal 1   Goal Identifier Sleep   Goal Description Patient will be able to get a full nights rest without  disturbance from back pain   Target Date 10/22/18   Ortho Goal 2   Goal Identifier Walking   Goal Description Patient will be able to walk without gait deviations due to pain for 1000+ feet   Target Date 11/21/18   Ortho Goal 3   Goal Identifier Pain   Goal Description Patient will experience centralization of pain and only experience pain of 4/10 or less in her low back.   Target Date 10/02/18   Total Evaluation Time   Total Evaluation Time 50

## 2018-08-24 NOTE — PROGRESS NOTES
08/24/18 1315   Signing Clinician's Name / Credentials   Signing clinician's name / credentials Freddy Alonzo, PT, DPT, CSCS   Session Number   Session Number 1   Authorization status Pending   Progress Note/Recertification   Progress Note Due Date 11/21/18   PT Medicare Only G-code   G-code Mobility: Walking & Moving Around   Mobility: Walking & Moving Around   Mobility Current Status,  (eval/re-eval & every progress note) CK: 40-59% impairment   Current Mobility Modifier Rationale Based on inability to walk, work, weakness, and loss of function    Mobility Goal,  (eval/re-eval, every progress note, & discharge) CJ: 20-39% impairment   Mobility Discharge Status,  (discharge) CI: 1-19% impairment   Adult Goals   PT Ortho Eval Goals 1;2;3   Ortho Goal 1   Goal Identifier Sleep   Goal Description Patient will be able to get a full nights rest without disturbance from back pain   Target Date 10/22/18   Ortho Goal 2   Goal Identifier Walking   Goal Description Patient will be able to walk without gait deviations due to pain for 1000+ feet   Target Date 11/21/18   Ortho Goal 3   Goal Identifier Pain   Goal Description Patient will experience centralization of pain and only experience pain of 4/10 or less in her low back.   Target Date 10/02/18   Subjective Report   Subjective Report See Eval for details   System Outcome Measures   Outcome Measures Low Back Pain (see Oswestry and Robinson)  (Robinson: 5/9)   Treatment Interventions   Interventions Neuromuscular Re-education   Neuromuscular Re-education   Minutes 16   Skilled Intervention Nerve Flossing, Neuromuscular Re-Education, Posture/positioning   Patient Response Patient felt sxs improve slightly   Treatment Detail Seated nerve flossing consisting of only knee extension and ankle DF (could not tolerate more due to pain) 3x15, DL Bridging 2x10 with cueing for increased glute activity, Prone and prone on elbow positioning for sxs relief   Plan   Homework  Follow HEP and avoid irritating   Home program Nerve Flossing, Prone Lying, Glute DL Bridging   Plan for next session Manual Therapy, Nerve Flossing/Gliding, Extension activities, glute strengthening/re-education   Total Session Time   Timed Code Treatment Minutes 16   Total Treatment Time (sum of timed and untimed services) 66

## 2018-08-27 ENCOUNTER — HOSPITAL ENCOUNTER (OUTPATIENT)
Dept: PHYSICAL THERAPY | Facility: CLINIC | Age: 63
Setting detail: THERAPIES SERIES
End: 2018-08-27
Attending: FAMILY MEDICINE
Payer: COMMERCIAL

## 2018-08-27 PROCEDURE — 97110 THERAPEUTIC EXERCISES: CPT | Mod: GP

## 2018-08-27 PROCEDURE — 40000718 ZZHC STATISTIC PT DEPARTMENT ORTHO VISIT

## 2018-08-27 PROCEDURE — 97112 NEUROMUSCULAR REEDUCATION: CPT | Mod: GP

## 2018-08-29 ENCOUNTER — HOSPITAL ENCOUNTER (OUTPATIENT)
Dept: PHYSICAL THERAPY | Facility: CLINIC | Age: 63
Setting detail: THERAPIES SERIES
End: 2018-08-29
Attending: FAMILY MEDICINE
Payer: COMMERCIAL

## 2018-08-29 PROCEDURE — 97110 THERAPEUTIC EXERCISES: CPT | Mod: GP

## 2018-08-29 PROCEDURE — 40000718 ZZHC STATISTIC PT DEPARTMENT ORTHO VISIT

## 2018-08-29 PROCEDURE — 97112 NEUROMUSCULAR REEDUCATION: CPT | Mod: GP

## 2018-09-07 ENCOUNTER — HOSPITAL ENCOUNTER (OUTPATIENT)
Dept: PHYSICAL THERAPY | Facility: CLINIC | Age: 63
Setting detail: THERAPIES SERIES
End: 2018-09-07
Attending: FAMILY MEDICINE
Payer: COMMERCIAL

## 2018-09-07 PROCEDURE — 40000718 ZZHC STATISTIC PT DEPARTMENT ORTHO VISIT: Performed by: PHYSICAL THERAPIST

## 2018-09-07 PROCEDURE — 97530 THERAPEUTIC ACTIVITIES: CPT | Mod: GP | Performed by: PHYSICAL THERAPIST

## 2018-09-10 ENCOUNTER — HOSPITAL ENCOUNTER (OUTPATIENT)
Dept: PHYSICAL THERAPY | Facility: CLINIC | Age: 63
Setting detail: THERAPIES SERIES
End: 2018-09-10
Attending: FAMILY MEDICINE
Payer: COMMERCIAL

## 2018-09-10 PROCEDURE — 97112 NEUROMUSCULAR REEDUCATION: CPT | Mod: GP

## 2018-09-10 PROCEDURE — 40000719 ZZHC STATISTIC PT DEPARTMENT NEURO VISIT

## 2018-09-13 ENCOUNTER — HOSPITAL ENCOUNTER (OUTPATIENT)
Dept: PHYSICAL THERAPY | Facility: CLINIC | Age: 63
Setting detail: THERAPIES SERIES
End: 2018-09-13
Attending: FAMILY MEDICINE
Payer: COMMERCIAL

## 2018-09-13 PROCEDURE — 97110 THERAPEUTIC EXERCISES: CPT | Mod: GP | Performed by: PHYSICAL THERAPIST

## 2018-09-13 PROCEDURE — 97140 MANUAL THERAPY 1/> REGIONS: CPT | Mod: GP | Performed by: PHYSICAL THERAPIST

## 2018-09-13 PROCEDURE — 97530 THERAPEUTIC ACTIVITIES: CPT | Mod: GP | Performed by: PHYSICAL THERAPIST

## 2018-09-13 PROCEDURE — 40000718 ZZHC STATISTIC PT DEPARTMENT ORTHO VISIT: Performed by: PHYSICAL THERAPIST

## 2018-09-18 ENCOUNTER — HOSPITAL ENCOUNTER (OUTPATIENT)
Dept: PHYSICAL THERAPY | Facility: CLINIC | Age: 63
Setting detail: THERAPIES SERIES
End: 2018-09-18
Attending: FAMILY MEDICINE
Payer: COMMERCIAL

## 2018-09-18 PROCEDURE — 97110 THERAPEUTIC EXERCISES: CPT | Mod: GP | Performed by: PHYSICAL THERAPIST

## 2018-09-18 PROCEDURE — 97140 MANUAL THERAPY 1/> REGIONS: CPT | Mod: GP | Performed by: PHYSICAL THERAPIST

## 2018-09-18 PROCEDURE — 40000718 ZZHC STATISTIC PT DEPARTMENT ORTHO VISIT: Performed by: PHYSICAL THERAPIST

## 2018-09-21 NOTE — ADDENDUM NOTE
Encounter addended by: Freddy Alonzo PT on: 9/21/2018 10:12 AM<BR>     Actions taken: Charge Capture section accepted

## 2018-09-21 NOTE — ADDENDUM NOTE
Encounter addended by: Freddy Alonzo PT on: 9/21/2018  7:54 AM<BR>     Actions taken: Flowsheet accepted

## 2018-09-24 ENCOUNTER — HOSPITAL ENCOUNTER (OUTPATIENT)
Dept: PHYSICAL THERAPY | Facility: CLINIC | Age: 63
Setting detail: THERAPIES SERIES
End: 2018-09-24
Attending: FAMILY MEDICINE
Payer: COMMERCIAL

## 2018-09-24 PROCEDURE — 40000718 ZZHC STATISTIC PT DEPARTMENT ORTHO VISIT: Performed by: PHYSICAL THERAPIST

## 2018-09-24 PROCEDURE — 97535 SELF CARE MNGMENT TRAINING: CPT | Mod: GP | Performed by: PHYSICAL THERAPIST

## 2018-09-26 ENCOUNTER — HOSPITAL ENCOUNTER (OUTPATIENT)
Dept: PHYSICAL THERAPY | Facility: CLINIC | Age: 63
Setting detail: THERAPIES SERIES
End: 2018-09-26
Attending: FAMILY MEDICINE
Payer: COMMERCIAL

## 2018-09-26 ENCOUNTER — HOSPITAL ENCOUNTER (OUTPATIENT)
Dept: MRI IMAGING | Facility: CLINIC | Age: 63
Discharge: HOME | End: 2018-09-26
Attending: FAMILY MEDICINE | Admitting: FAMILY MEDICINE
Payer: COMMERCIAL

## 2018-09-26 DIAGNOSIS — M54.42 CHRONIC LEFT-SIDED LOW BACK PAIN WITH LEFT-SIDED SCIATICA: ICD-10-CM

## 2018-09-26 DIAGNOSIS — G89.29 CHRONIC LEFT-SIDED LOW BACK PAIN WITH LEFT-SIDED SCIATICA: ICD-10-CM

## 2018-09-26 PROCEDURE — 40000718 ZZHC STATISTIC PT DEPARTMENT ORTHO VISIT

## 2018-09-26 PROCEDURE — 97110 THERAPEUTIC EXERCISES: CPT | Mod: GP

## 2018-09-26 PROCEDURE — 72148 MRI LUMBAR SPINE W/O DYE: CPT

## 2018-09-26 NOTE — PROGRESS NOTES
Outpatient Physical Therapy Discharge     Patient: Karine Moss  : 1955    Beginning/End Dates of Reporting Period:  18 to 2018    Referring Provider: Mario Mckinley MD    Therapy Diagnosis: Chronic LBP     Client Self Report: Patient will be moving and will no longer be able to attend PT    Objective Measurements:  Patient pain centralizing and now limited to only the Right side.    Outcome Measures (most recent score):  Not performed at this time.    Goals:  Patient did not accomplish sleep goal and is unable to get a good nights rest without limitation by pain.  Patient did accomplish walking goal and is able to ambulate 200 ft without gait deviations due to pain.  Patient did not accomplish pain goal due to variability of pain symptoms.    Plan:  Discharge from therapy.    Discharge:    Reason for Discharge: Patient chooses to discontinue therapy.    Equipment Issued: None    Discharge Plan: Patient to continue home program.

## 2018-10-01 NOTE — ADDENDUM NOTE
Encounter addended by: Melissa Vale PT on: 10/1/2018  9:03 AM<BR>     Actions taken: Flowsheet accepted

## 2018-10-03 ENCOUNTER — OFFICE VISIT (OUTPATIENT)
Dept: FAMILY MEDICINE | Facility: CLINIC | Age: 63
End: 2018-10-03
Payer: COMMERCIAL

## 2018-10-03 VITALS
BODY MASS INDEX: 24.97 KG/M2 | SYSTOLIC BLOOD PRESSURE: 153 MMHG | TEMPERATURE: 98.8 F | DIASTOLIC BLOOD PRESSURE: 102 MMHG | WEIGHT: 146.25 LBS | OXYGEN SATURATION: 98 % | HEIGHT: 64 IN | HEART RATE: 102 BPM

## 2018-10-03 DIAGNOSIS — M54.50 BILATERAL LOW BACK PAIN WITHOUT SCIATICA, UNSPECIFIED CHRONICITY: Primary | ICD-10-CM

## 2018-10-03 DIAGNOSIS — I10 HYPERTENSION GOAL BP (BLOOD PRESSURE) < 140/90: ICD-10-CM

## 2018-10-03 ASSESSMENT — PAIN SCALES - GENERAL: PAINLEVEL: NO PAIN (0)

## 2018-10-03 NOTE — PROGRESS NOTES
"REASON FOR VISIT:  LEFT hip and low back pain follow up       HISTORY OF PRESENT ILLNESS: Karine Moss is a 63 year old female who presents for follow-up. She was last here 8/20 due to persistent LEFT low back and LEFT hip pain with associated pains in the LEFT leg. Pain started mid-to-late July. She has had off and on back pain for decades. She was skeptical about pursuing PT without imaging so I ordered a lumbar MRI and X-ray. MRI from 9/26 showed multilevel degenerative changes. No focal disc herniations seen. X-ray from 8/20 showed mild multilevel spondylosis including grade 1 anterolisthesis at L4-5.     Today she reports that she has done physical therapy and is feeling much better. She is doing her exercises at home. She notes that she benefited from physical therapy but is unsure if she can continue due to finances.     Karine had elevated blood pressure in clinic today, 153/102. She has not taken her blood pressure medication in 5 days because she was stressed about switching homes. She was taking losartan 100 mg daily. She has her medication and knows where it is.         SOCIAL HISTORY: Karine spends May-September in [place]. She lives in the [facility] when the campground closes. She notes that things at home are stressful and she much prefers to live in [place].     MEDICATIONS:  Reviewed.       REVIEW OF SYSTEMS:  A 10-point review is negative except as otherwise noted.      OBJECTIVE:      EXAM:  VITAL SIGNS: BP (!) 153/102 (BP Location: Right arm, Patient Position: Chair, Cuff Size: Adult Regular)  Pulse 102  Temp 98.8  F (37.1  C) (Oral)  Ht 5' 3.58\" (161.5 cm)  Wt 146 lb 4 oz (66.3 kg)  SpO2 98%  BMI 25.43 kg/m2  Gen: Alert, pleasant, NAD  Ext: no peripheral edema, pulses full. Can forward bend to touch toes.   Neuro: DTR +2/4 on RIGHT leg. +1/4 on LEFT. Straight leg test is negative.     Imaging - Reviewed as per HPI      ASSESSMENT AND PLAN:   A/ [age] yo with low back pain secondary to " DJD. Much improved with Physical therapy     P/  Written for up to  8 more sessions of P.T.   Ask them to teach you a home exercise program with a physioball  Obtain a 55cm Non-burst physioball     Follow up if symptoms recur.      A/ - Elevated blood pressure, but has not medication in past 5 days.    P/ - Please take your Losartan as soon as you get home.   Also, take daily.   Take your blood pressure at home daily.   If over 140/90, return to clinic.    Call with any problems or questions.     --Mario Mckinley MD             I, Yari Kenyon, am serving as a scribe to document services personally performed by Dr. Mario Mckinley, based on data collection and the provider's statements to me.

## 2018-10-03 NOTE — NURSING NOTE
"63 year old  Chief Complaint   Patient presents with     RECHECK     F/U for hip and lower back pain. Pt wants to go over results of recent MRI.       Blood pressure (!) 153/102, pulse 102, temperature 98.8  F (37.1  C), temperature source Oral, height 5' 3.58\" (161.5 cm), weight 146 lb 4 oz (66.3 kg), SpO2 98 %. Body mass index is 25.43 kg/(m^2).  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)     Keratoconus     Hyperlipidemia LDL goal <100     Obstructive sleep apnea     Vitamin D insufficiency     Major depression in partial remission (H)     Plantar warts     Low back pain     DJD (degenerative joint disease) of knee     Pancreatitis     Panic     Chest pain     IBS (irritable bowel syndrome)     Heart murmur     Hypertension goal BP (blood pressure) < 140/90     Type 2 diabetes mellitus without complication, with long-term current use of insulin (H)       Wt Readings from Last 2 Encounters:   10/03/18 146 lb 4 oz (66.3 kg)   08/20/18 149 lb 8 oz (67.8 kg)     BP Readings from Last 3 Encounters:   10/03/18 (!) 153/102   08/20/18 124/77   08/01/18 119/76         Current Outpatient Prescriptions   Medication     amphetamine-dextroamphetamine (ADDERALL) 30 MG per tablet     ASPIRIN LOW DOSE 81 MG EC tablet     Calcium-Magnesium-Vitamin D 600- MG-MG-UNIT TB24     Cholecalciferol (VITAMIN D) 2000 units tablet     cyanocobalamin 1000 MCG SUBL     cyclobenzaprine (FLEXERIL) 10 MG tablet     gabapentin (NEURONTIN) 300 MG capsule     losartan (COZAAR) 100 MG tablet     metFORMIN (GLUCOPHAGE-XR) 500 MG 24 hr tablet     multivitamin peds with iron (FLINTSTONES PLUS IRON) CHEW     traZODone (DESYREL) 50 MG tablet     terbinafine (LAMISIL) 250 MG tablet     traMADol (ULTRAM) 50 MG tablet     [DISCONTINUED] sertraline (ZOLOFT) 100 MG tablet     No current facility-administered medications for this visit.  "       Social History   Substance Use Topics     Smoking status: Never Smoker     Smokeless tobacco: Never Used     Alcohol use Yes      Comment: rare       Health Maintenance Due   Topic Date Due     FOOT EXAM Q1 YEAR  03/11/1956     PNEUMOVAX 1X HI RISK PATIENT < 65 (NO IB MSG)  03/11/1957     HIV SCREEN (SYSTEM ASSIGNED)  03/11/1973     HEPATITIS C SCREENING  03/11/1973     ADVANCE DIRECTIVE PLANNING Q5 YRS  03/11/2010     EYE EXAM Q1 YEAR  11/05/2014     DEPRESSION ACTION PLAN Q1 YR  11/05/2014     MAMMO SCREEN Q2 YR (SYSTEM ASSIGNED)  06/06/2015     PAP Q3 YR  01/30/2018     INFLUENZA VACCINE (1) 09/01/2018     COLON CANCER SCREEN (SYSTEM ASSIGNED)  08/20/2018       Lab Results   Component Value Date    PAP NIL 06/11/2013       Gina Brooks MA  October 3, 2018 2:00 PM

## 2018-10-03 NOTE — PATIENT INSTRUCTIONS
A/ 64 yo with low back pain secondary to DJD. Much improved with Physical therapy    P/  Written for up to  8 more sessions of P.T.   Ask them to teach you a home exercise program with a physioball  Obtain a 55cm Non-burst physioball    Follow up if symptoms recur.     A/ - Elevated blood pressure, but has not medication in past 5 days.   P/ - Please take your Losartan as soon as you get home.   Also, take daily.   Take your blood pressure at home daily.   If over 140/90, return to clinic.      --Mario Mckinley MD

## 2018-10-03 NOTE — MR AVS SNAPSHOT
"              After Visit Summary   10/3/2018    Karine Moss    MRN: [id number]           Patient Information     Date Of Birth          1955        Visit Information        Provider Department      10/3/2018 2:00 PM Mario Mckinley MD [facility]        Care Instructions    A/ 64 yo with low back pain secondary to DJD. Much improved with Physical therapy    P/  Written for up to  8 more sessions of P.T.   Ask them to teach you a home exercise program with a physioball  Obtain a 55cm Non-burst physioball    Follow up if symptoms recur.     --Mario Mckinley MD          Follow-ups after your visit        Who to contact     Please call your clinic at [phone] to:    Ask questions about your health    Make or cancel appointments    Discuss your medicines    Learn about your test results    Speak to your doctor            Additional Information About Your Visit        [employer] Information     [facility] gives you secure access to your electronic health record. If you see a primary care provider, you can also send messages to your care team and make appointments. If you have questions, please call your primary care clinic.  If you do not have a primary care provider, please call [phone] and they will assist you.      [facility] is an electronic gateway that provides easy, online access to your medical records. With [facility], you can request a clinic appointment, read your test results, renew a prescription or communicate with your care team.     To access your existing account, please contact your [facility] or call [phone] for assistance.        Care EveryWhere ID     This is your Care EveryWhere ID. This could be used by other organizations to access your [place] medical records  [id number]        Your Vitals Were     Pulse Temperature Height Pulse Oximetry BMI (Body Mass Index)       102 98.8  F (37.1  C) (Oral) 5' 3.58\" (161.5 cm) 98% 25.43 kg/m2        " Blood Pressure from Last 3 Encounters:   10/03/18 (!) 153/102   08/20/18 124/77   08/01/18 119/76    Weight from Last 3 Encounters:   10/03/18 146 lb 4 oz (66.3 kg)   08/20/18 149 lb 8 oz (67.8 kg)   08/01/18 153 lb (69.4 kg)              Today, you had the following     No orders found for display       Primary Care Provider Office Phone # Fax #    Anay Martinez [phone]       [address] [place] [address]        Equal Access to Services     [facility]: Hadii jenniffer Welch, mandy yip, elroy galicia, jules tomas . So [facility] [phone].    ATENCIÓN: Si habla español, tiene a chávez disposición servicios gratuitos de asistencia lingüística. [facility] [phone].    We comply with applicable federal civil rights laws and [place] laws. We do not discriminate on the basis of race, color, national origin, age, disability, sex, sexual orientation, or gender identity.            Thank you!     Thank you for choosing [facility]  for your care. Our goal is always to provide you with excellent care. Hearing back from our patients is one way we can continue to improve our services. Please take a few minutes to complete the written survey that you may receive in the mail after your visit with us. Thank you!             Your Updated Medication List - Protect others around you: Learn how to safely use, store and throw away your medicines at www.disposemymeds.org.          This list is accurate as of 10/3/18  2:29 PM.  Always use your most recent med list.                   Brand Name Dispense Instructions for use Diagnosis    amphetamine-dextroamphetamine 30 MG per tablet    ADDERALL    90 tablet    Take 2 po q am and one po in the afternoon        ASPIRIN LOW DOSE 81 MG EC tablet   Generic drug:  aspirin     90 tablet    TAKE 1 TABLET BY MOUTH DAILY    Type II or unspecified type diabetes mellitus without mention of  complication, not stated as uncontrolled       Calcium-Magnesium-Vitamin D 600- MG-MG-UNIT Tb24     60 tablet    Take 1 tablet by mouth 2 times daily        cyanocobalamin 1000 MCG Subl     90 tablet    Take one daily under the tongue        cyclobenzaprine 10 MG tablet    FLEXERIL    30 tablet    Take 1 tablet (10 mg) by mouth nightly as needed for muscle spasms    Sciatica of left side       gabapentin 300 MG capsule    NEURONTIN    90 capsule    Take 3 po bid        losartan 100 MG tablet    COZAAR     Take 100 mg by mouth daily        metFORMIN 500 MG 24 hr tablet    GLUCOPHAGE-XR    360 tablet    Take 2 tablet po bid        multivitamin peds with iron Chew     90 tablet    Take one daily        terbinafine 250 MG tablet    lamISIL     Take 250 mg by mouth daily        traMADol 50 MG tablet    ULTRAM    20 tablet    Take 1 tablet (50 mg) by mouth 3 times daily as needed for severe pain    Sciatica of left side       traZODone 50 MG tablet    DESYREL     Take 50 mg by mouth daily        vitamin D 2000 units tablet     100 tablet    Take 2,000 Units by mouth daily

## 2018-10-05 ASSESSMENT — PATIENT HEALTH QUESTIONNAIRE - PHQ9: SUM OF ALL RESPONSES TO PHQ QUESTIONS 1-9: 6

## 2018-10-18 ENCOUNTER — TRANSFERRED RECORDS (OUTPATIENT)
Dept: HEALTH INFORMATION MANAGEMENT | Facility: CLINIC | Age: 63
End: 2018-10-18

## 2018-10-26 DIAGNOSIS — Z53.9 ERRONEOUS ENCOUNTER--DISREGARD: ICD-10-CM

## 2018-10-26 DIAGNOSIS — E11.9 TYPE 2 DIABETES MELLITUS WITHOUT COMPLICATION, WITHOUT LONG-TERM CURRENT USE OF INSULIN (H): Primary | ICD-10-CM

## 2018-10-26 DIAGNOSIS — Z79.4 TYPE 2 DIABETES MELLITUS WITHOUT COMPLICATION, WITH LONG-TERM CURRENT USE OF INSULIN (H): ICD-10-CM

## 2018-10-26 DIAGNOSIS — E11.9 TYPE 2 DIABETES MELLITUS WITHOUT COMPLICATION, WITH LONG-TERM CURRENT USE OF INSULIN (H): ICD-10-CM

## 2018-10-26 NOTE — TELEPHONE ENCOUNTER
Refill request received from pharmacy    Onetouch ultra blue test strip  Onetouch Ultra 2 kit  Onetouch Ultra Delica 30g Lancets 100's    Last Office Visit: 10/3/18  Future Office visit:   10/31/18    Routing refill request to provider for review/approval because:  Drug not active on patient's medication list    Agnieszka Sr RN   [facility], Primary Care

## 2018-10-29 DIAGNOSIS — E11.9 TYPE 2 DIABETES MELLITUS WITHOUT COMPLICATION, WITHOUT LONG-TERM CURRENT USE OF INSULIN (H): Primary | ICD-10-CM

## 2018-10-29 NOTE — TELEPHONE ENCOUNTER
Last visit 10/3/18, future appt 10/31/18    Prescription approved per [facility] Refill Protocol.  To provider for mail order signing  Davina Chawla RN  Care Coordinator  [facility]

## 2018-10-31 ENCOUNTER — OFFICE VISIT (OUTPATIENT)
Dept: FAMILY MEDICINE | Facility: CLINIC | Age: 63
End: 2018-10-31
Payer: COMMERCIAL

## 2018-10-31 VITALS
SYSTOLIC BLOOD PRESSURE: 142 MMHG | HEART RATE: 85 BPM | TEMPERATURE: 97.6 F | WEIGHT: 149.25 LBS | BODY MASS INDEX: 26.45 KG/M2 | DIASTOLIC BLOOD PRESSURE: 82 MMHG | OXYGEN SATURATION: 100 % | HEIGHT: 63 IN

## 2018-10-31 DIAGNOSIS — Z23 INFLUENZA VACCINE NEEDED: ICD-10-CM

## 2018-10-31 DIAGNOSIS — Z01.818 PREOP GENERAL PHYSICAL EXAM: Primary | ICD-10-CM

## 2018-10-31 DIAGNOSIS — I10 HYPERTENSION GOAL BP (BLOOD PRESSURE) < 140/90: ICD-10-CM

## 2018-10-31 DIAGNOSIS — E11.41 DIABETIC MONONEUROPATHY ASSOCIATED WITH TYPE 2 DIABETES MELLITUS (H): ICD-10-CM

## 2018-10-31 DIAGNOSIS — E11.9 TYPE 2 DIABETES MELLITUS WITHOUT COMPLICATION, WITH LONG-TERM CURRENT USE OF INSULIN (H): ICD-10-CM

## 2018-10-31 DIAGNOSIS — Z79.4 TYPE 2 DIABETES MELLITUS WITHOUT COMPLICATION, WITH LONG-TERM CURRENT USE OF INSULIN (H): ICD-10-CM

## 2018-10-31 DIAGNOSIS — R68.2 DRY MOUTH, UNSPECIFIED: ICD-10-CM

## 2018-10-31 DIAGNOSIS — L65.9 HAIR THINNING: ICD-10-CM

## 2018-10-31 LAB
ALBUMIN SERPL-MCNC: 3.8 G/DL (ref 3.2–4.5)
ALP SERPL-CCNC: 70 U/L (ref 40–150)
ALT SERPL-CCNC: 22 U/L (ref 0–50)
AST SERPL-CCNC: 27 U/L (ref 0–45)
BILIRUB SERPL-MCNC: 0.8 MG/DL (ref 0.2–1.3)
BUN SERPL-MCNC: 29 MG/DL (ref 7–30)
CALCIUM SERPL-MCNC: 9.5 MG/DL (ref 8.5–10.4)
CHLORIDE SERPLBLD-SCNC: 103 MMOL/L (ref 94–109)
CO2 SERPL-SCNC: 28 MMOL/L (ref 20–32)
CREAT SERPL-MCNC: 1.2 MG/DL (ref 0.6–1.3)
EGFR CALCULATED (BLACK REFERENCE): 58.4
EGFR CALCULATED (NON BLACK REFERENCE): 48.2
GLUCOSE SERPL-MCNC: 112 MG/DL (ref 60–109)
HBA1C MFR BLD: 6.3 % (ref 0–5.6)
POTASSIUM SERPL-SCNC: 4.9 MMOL/L (ref 3.4–5.3)
PROT SERPL-MCNC: 8.1 G/DL (ref 6.8–8.8)
SODIUM SERPL-SCNC: 145 MMOL/L (ref 133–144)
TSH SERPL DL<=0.005 MIU/L-ACNC: 1.47 MU/L (ref 0.4–4)
VIT B12 SERPL-MCNC: 348 PG/ML (ref 193–986)

## 2018-10-31 RX ORDER — CEVIMELINE HYDROCHLORIDE 30 MG/1
30 CAPSULE ORAL 3 TIMES DAILY
Qty: 90 CAPSULE | Refills: 0 | Status: SHIPPED | OUTPATIENT
Start: 2018-10-31 | End: 2018-11-26

## 2018-10-31 RX ORDER — CEVIMELINE HYDROCHLORIDE 30 MG/1
30 CAPSULE ORAL 3 TIMES DAILY
Qty: 90 CAPSULE | Refills: 0 | Status: SHIPPED | OUTPATIENT
Start: 2018-10-31 | End: 2018-12-26

## 2018-10-31 ASSESSMENT — PAIN SCALES - GENERAL: PAINLEVEL: NO PAIN (0)

## 2018-10-31 NOTE — PROGRESS NOTES
[address], [address]  [facility] [address]  [phone]  Dept: [phone]    PRE-OP EVALUATION:  Today's date: 10/31/2018    Karine Moss (: 1955) presents for pre-operative evaluation assessment as requested by Dr. Davin Kohli.  She requires evaluation and anesthesia risk assessment prior to undergoing surgery/procedure for treatment of cataracts.    Proposed Surgery/ Procedure: cataracts surgery of both eyes  Date of Surgery/ Procedure: 18  Time of Surgery/ Procedure: 1:00 PM  Hospital/Surgical Facility: [facility] - [place], [place]  Fax number for surgical facility: [phone]  Primary Physician: Anay Martinez  Type of Anesthesia Anticipated: General    Patient has a Health Care Directive or Living Will:  NO    1. NO - Do you have a history of heart attack, stroke, stent, bypass or surgery on an artery in the head, neck, heart or legs?  2. NO - Do you ever have any pain or discomfort in your chest?  3. NO - Do you have a history of  Heart Failure?  4. NO - Are you troubled by shortness of breath when: walking on the level, up a slight hill or at night?  5. NO - Do you currently have a cold, bronchitis or other respiratory infection?  6. NO - Do you have a cough, shortness of breath or wheezing?  7. NO - Do you sometimes get pains in the calves of your legs when you walk?  8. NO - Do you or anyone in your family have previous history of blood clots?  9. NO - Do you or does anyone in your family have a serious bleeding problem such as prolonged bleeding following surgeries or cuts?  10. NO - Have you ever had problems with anemia or been told to take iron pills?  11. NO - Have you had any abnormal blood loss such as black, tarry or bloody stools, or abnormal vaginal bleeding?  12. NO - Have you ever had a blood transfusion?  13. YES, nausea and vomiting. - Have you or any of your relatives ever had problems with  anesthesia?  14. YES, sleep apnea - Do you have sleep apnea, excessive snoring or daytime drowsiness?  15. NO - Do you have any prosthetic heart valves?  16. NO - Do you have prosthetic joints?  17. NO - Is there any chance that you may be pregnant?      HPI:     HPI related to upcoming procedure:   Karine will be having cataract surgery in both eyes due to secondary cataracts. She remembers her first cataract surgery was done 5 years ago at [facility]. She has no retinopathy or glaucoma.     DIABETES - Karine has a history of diabetes Type II . She is taking metformin 1000 mg daily BID and denies significant side effects. Her blood sugars have been running around 140. Control has been good.    She is concerned that her glucose meter is not reading properly, within 10 minutes at night she had readings of 315, 205, and 186. She was told she does not need to calibrate her meter.     Dry mouth  She notes having dry mouth, but has had this for a long time. She has tried to increase her fluids and use Biotene without relief. She has not been on any medications for dry mouth. No skin infections.                                            HYPERTENSION - Karine has a history of HTN she is taking losartan 100 mg daily. She currently denies any symptoms referable to elevated blood pressure. Denies chest pain, palpitations, dyspnea, orthopnea, PND or peripheral edema. Blood pressure readings have been in normal range. She sometimes forgets her medication (yesterday).     BP Readings from Last 3 Encounters:   10/31/18 142/82   10/03/18 (!) 153/102   08/20/18 124/77                                                                                                       Karine is a healthy individual, without history of heart dx, lung dx. Nonsmoker. Hx of sleep apnea (uses CPAP), follows with Dr. Sam . No personal or family history of bleeding or clotting disorders. No personal or family history of intolerance to  anesthesia. She has problems with vomiting and nausea when she comes out of anesthesia. She has not had any previous complications with surgeries.                              History of gastric bypass  Karine is s/p a gastric bypass. She stopped taking the calcium-magnesium-vitamin D supplement and multivitamin with iron. She stopped taking these is June because she read that they can cause hair loss.  She has not followed with gastric bypass surgeon since surgery.     MEDICAL HISTORY:     Patient Active Problem List    Diagnosis Date Noted     Type 2 diabetes mellitus without complication, with long-term current use of insulin (H) 06/27/2018     Priority: Medium     Hypertension goal BP (blood pressure) < 140/90 02/04/2014     Priority: Medium     Heart murmur 12/05/2013     Priority: Medium     Low back pain      Priority: Medium     DJD (degenerative joint disease) of knee      Priority: Medium     Pancreatitis      Priority: Medium     related to Victoza, also on Xyrem       Panic      Priority: Medium     Chest pain      Priority: Medium     seeing Cardiology       IBS (irritable bowel syndrome)      Priority: Medium     diarrhea        Plantar warts 10/09/2013     Priority: Medium     RIGHT OUTER POSTERIOR HEEL THERE FOR YEARS       Vitamin D insufficiency 06/03/2013     Priority: Medium     Major depression in partial remission (H) 06/03/2013     Priority: Medium     Obstructive sleep apnea      Priority: Medium     327.23       Hyperlipidemia LDL goal <100 12/12/2012     Priority: Medium     Keratoconus 11/14/2012     Priority: Medium     Vitamin D deficiency 11/12/2012     Priority: Medium     Problem list name updated by automated process. Provider to review       Dysthymic disorder 11/12/2012     Priority: Medium     Malaise and fatigue 11/12/2012     Priority: Medium     Problem list name updated by automated process. Provider to review       Narcolepsy without cataplexy(347.00) 11/12/2012     Priority:  Medium      Past Medical History:   Diagnosis Date     Arthritis      Chest pain     seeing Cardiology     Depression 1991    after 's death     Diabetes mellitus (H)      Diabetic renal disease (H)     microalbuminuria     DJD (degenerative joint disease) of knee      H/O vitamin D deficiency      Hypertension      IBS (irritable bowel syndrome)     diarrhea      Keratoconus      Low back pain      Narcolepsy 2007    Dx'd by Sleep specialist 347.00, pt discontinued Adderal mid Nov. 13 due to tacycardia concerns     Obesity      Obstructive sleep apnea     327.23, 3 sleep studies,Dr. Sam [facility]     Pancreatitis     related to Victoza, also on Xyrem     Panic      RLS (restless legs syndrome)      Sinus tachycardia      Past Surgical History:   Procedure Laterality Date     CHOLECYSTECTOMY  2015     ear surgery  2002 and 01/2004     GASTRIC BYPASS  2013    [place]     HEMORRHOIDECTOMY  09/14/2000     LASIK BILATERAL       UVULOPALATOPHARYNGOPLASTY       UVVP       Current Outpatient Prescriptions   Medication Sig Dispense Refill     amphetamine-dextroamphetamine (ADDERALL) 30 MG per tablet Take 2 po q am and one po in the afternoon 90 tablet 0     ASPIRIN LOW DOSE 81 MG EC tablet TAKE 1 TABLET BY MOUTH DAILY 90 tablet 2     blood glucose (NO BRAND SPECIFIED) lancets standard Use to test blood sugar 2 to 3 times daily or as directed. Dispense one touch ultra blue lancets 200 each 3     blood glucose calibration (NO BRAND SPECIFIED) solution Use to calibrate blood glucose monitor as needed as directed. To accompany: Blood Glucose Monitor Brands: per insurance. 1 Bottle 3     blood glucose monitoring (NO BRAND SPECIFIED) meter device kit Use to test blood sugar 4 times daily or as directed. 1 kit 0     blood glucose monitoring (NO BRAND SPECIFIED) test strip Use to test blood sugar 4 times daily or as directed. To accompany: Blood Glucose Monitor Brands: per insurance. 120 strip 11     blood glucose monitoring  (NO BRAND SPECIFIED) test strip Use to test blood sugars 2 to 3 times daily or as directed - send one touch ultra blue test strips 200 strip 3     cevimeline (EVOXAC) 30 MG capsule Take 1 capsule (30 mg) by mouth 3 times daily 90 capsule 0     cevimeline (EVOXAC) 30 MG capsule Take 1 capsule (30 mg) by mouth 3 times daily 90 capsule 0     cyclobenzaprine (FLEXERIL) 10 MG tablet Take 1 tablet (10 mg) by mouth nightly as needed for muscle spasms 30 tablet 0     gabapentin (NEURONTIN) 300 MG capsule Take 3 po bid 90 capsule 1     losartan (COZAAR) 100 MG tablet Take 100 mg by mouth daily  2     metFORMIN (GLUCOPHAGE-XR) 500 MG 24 hr tablet Take 2 tablet po bid 360 tablet 1     multivitamin peds with iron (FLINTSTONES PLUS IRON) CHEW Take one daily 90 tablet 3     traZODone (DESYREL) 50 MG tablet Take 50 mg by mouth daily  4     OTC products: None, except as noted above    Allergies   Allergen Reactions     Nsaids GI Disturbance     Pt had bariatric surgery, should not ever take oral NSAIDS due to risk of gastric ulcers.      Latex Allergy: NO    Social History   Substance Use Topics     Smoking status: Never Smoker     Smokeless tobacco: Never Used     Alcohol use Yes      Comment: rare     History   Drug Use No       REVIEW OF SYSTEMS:   CONSTITUTIONAL: NEGATIVE for fever, chills, change in weight  INTEGUMENTARY/SKIN: NEGATIVE for worrisome rashes, moles or lesions  EYES: NEGATIVE for vision changes or irritation  ENT/MOUTH: NEGATIVE for ear, mouth and throat problems  RESP: NEGATIVE for significant cough or SOB  BREAST: NEGATIVE for masses, tenderness or discharge  CV: NEGATIVE for chest pain, palpitations or peripheral edema  GI: NEGATIVE for nausea, abdominal pain, heartburn, or change in bowel habits  : NEGATIVE for frequency, dysuria, or hematuria  MUSCULOSKELETAL: NEGATIVE for significant arthralgias or myalgia  NEURO: NEGATIVE for weakness, dizziness or paresthesias  ENDOCRINE: NEGATIVE for temperature  "intolerance, skin/hair changes  HEME: NEGATIVE for bleeding problems  PSYCHIATRIC: NEGATIVE for changes in mood or affect    EXAM:   /82 (BP Location: Right arm, Patient Position: Chair, Cuff Size: Adult Regular)  Pulse 85  Temp 97.6  F (36.4  C) (Oral)  Ht 5' 3.35\" (160.9 cm)  Wt 149 lb 4 oz (67.7 kg)  SpO2 100%  BMI 26.15 kg/m2    GENERAL APPEARANCE: healthy, alert and no distress     EYES: EOMI, PERRL. Cataracts noted, L>R.      HENT: ear canals and TM's normal and nose and mouth without ulcers or lesions     NECK: no adenopathy, no asymmetry,  thyroid normal to palpation     RESP: lungs clear to auscultation - no rales, rhonchi or wheezes     CV: regular rates and rhythm, normal S1 S2, no murmur,     ABDOMEN:  soft, nontender, no HSM or masses and bowel sounds normal     MS: extremities normal- no gross deformities noted, no evidence of inflammation in joints, FROM in all extremities.     SKIN: no suspicious lesions or rashes     NEURO: Normal strength and tone, sensory exam grossly normal, mentation intact and speech normal.   Diabetic foot exam: normal DP and PT pulses,  monofilament exam-diminished sensation to the level of both ankles     PSYCH: mentation appears normal. and affect normal/bright     Ext: no edema    DIAGNOSTICS:     Results for orders placed or performed in visit on 10/31/18   Comprehensive Metabolic Panel ([place])   Result Value Ref Range    Glucose 112.0 (H) 60.0 - 109.0 mg/dL    Urea Nitrogen 29.0 7.0 - 30.0 mg/dL    Calcium 9.5 8.5 - 10.4 mg/dL    Creatinine 1.2 0.6 - 1.3 mg/dL    eGFR Calculated (Non Black Reference) 48.2 (L) >60.0    eGFR Calculated (Black Reference) 58.4 (L) >60.0    Sodium 145.0 (H) 133.0 - 144.0 mmol/L    Potassium 4.9 3.4 - 5.3 mmol/L    Chloride 103.0 94.0 - 109.0 mmol/L    Carbon Dioxide 28.0 20.0 - 32.0 mmol/L    Albumin 3.8 3.2 - 4.5 g/dL    Alkaline Phosphatase 70.0 40.0 - 150.0 U/L    ALT 22.0 0.0 - 50.0 U/L    AST 27.0 0.0 - 45.0 U/L    " Bilirubin Total 0.8 0.2 - 1.3 mg/dL    Protein Total 8.1 6.8 - 8.8 g/dL   Vitamin B12   Result Value Ref Range    Vitamin B12 348 193 - 986 pg/mL   Vitamin D Deficiency   Result Value Ref Range    Vitamin D Deficiency screening 27 20 - 75 ug/L   TSH with free T4 reflex   Result Value Ref Range    TSH 1.47 0.40 - 4.00 mU/L   SSA Ro RAMIRO Antibody IgG   Result Value Ref Range    SSA (Ro) (RAMIRO) Antibody, IgG <0.2 0.0 - 0.9 AI   SSB La RAMIRO Antibody IgG   Result Value Ref Range    SSB (La) (RAMIRO) Antibody, IgG <0.2 0.0 - 0.9 AI   Hemoglobin A1c   Result Value Ref Range    Hemoglobin A1C 6.3 (H) 0 - 5.6 %          IMPRESSION:   (Z01.818) Preop general physical exam  (primary encounter diagnosis)  Comment: elective cataract surgery, bilateral  Plan: no contraindication to surgery, proceed as planned. No NSAIDs, vitamins or herbs 10 days prior to procedure    (E11.9,  Z79.4) Type 2 diabetes mellitus without complication, with long-term current use of insulin (H)  Comment: doing very well  Lab Results   Component Value Date    A1C 6.3 10/31/2018    A1C 7.8 06/06/2013     Plan: Hemoglobin A1c, Comprehensive Metabolic Panel         ([place]), blood glucose monitoring (NO BRAND        SPECIFIED) meter device kit, blood glucose         monitoring (NO BRAND SPECIFIED) test strip,         blood glucose calibration (NO BRAND SPECIFIED)         solution        Hold metformin on morning of surgery    (I10) Hypertension goal BP (blood pressure) < 140/90  Comment: blood pressure in target range  Hold losartan on morning of surgery.       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    REVISED CARDIAC RISK INDEX  The patient has the following serious cardiovascular risks for perioperative complications such as (MI, PE, VFib and 3  AV Block):  No serious cardiac risks  INTERPRETATION: 0 risks: Class I (very low risk - 0.4% complication rate)    The patient has the following additional risks for perioperative complications:  No identified additional risks      ICD-10-CM    1. Preop general physical exam Z01.818    2. Type 2 diabetes mellitus without complication, with long-term current use of insulin (H) E11.9     Z79.4    3. Hypertension goal BP (blood pressure) < 140/90 I10    4. Dry mouth, unspecified R68.2        RECOMMENDATIONS:       Obstructive Sleep Apnea (or suspected sleep apnea)  Patient is to bring their home CPAP with them on the day of surgery    Hold Metformin before procedure. Hold losarten, no vitamins, herb, supplements 10 days prior to procedure. Take all scheduled medications on the day of surgery EXCEPT for modifications listed below.\"}    APPROVAL GIVEN to proceed with proposed procedure, without further diagnostic evaluation       Signed Electronically by: Anay Martinez MD    Copy of this evaluation report is provided to requesting physician.    [facility] Preop Guidelines    Revised Cardiac Risk Index    I, Yari Kenyon, am serving as a scribe to document services personally performed by Dr. Anay Martinez, based on data collection and the provider's statements to me.     "

## 2018-10-31 NOTE — MR AVS SNAPSHOT
After Visit Summary   10/31/2018    Karine Moss    MRN: [id number]           Patient Information     Date Of Birth          1955        Visit Information        Provider Department      10/31/2018 11:20 AM Anay Martinez MD [facility]        Today's Diagnoses     Preop general physical exam    -  1    Type 2 diabetes mellitus without complication, with long-term current use of insulin (H)        Hypertension goal BP (blood pressure) < 140/90        Dry mouth, unspecified        Influenza vaccine needed        Hair thinning        Diabetic mononeuropathy associated with type 2 diabetes mellitus (H)          Care Instructions      Before Your Surgery      Call your surgeon if there is any change in your health. This includes signs of a cold or flu (such as a sore throat, runny nose, cough, rash or fever).    Do not smoke, drink alcohol or take over the counter medicine (unless your surgeon or primary care doctor tells you to) for the 24 hours before and after surgery.    If you take prescribed drugs: Follow your doctor s orders about which medicines to take and which to stop until after surgery.    Eating and drinking prior to surgery: follow the instructions from your surgeon    Take a shower or bath the night before surgery. Use the soap your surgeon gave you to gently clean your skin. If you do not have soap from your surgeon, use your regular soap. Do not shave or scrub the surgery site.  Wear clean pajamas and have clean sheets on your bed.           Follow-ups after your visit        Who to contact     Please call your clinic at [phone] to:    Ask questions about your health    Make or cancel appointments    Discuss your medicines    Learn about your test results    Speak to your doctor            Additional Information About Your Visit        MyChart Information     [employer] gives you secure access to your electronic health record. If you see a primary care provider,  "you can also send messages to your care team and make appointments. If you have questions, please call your primary care clinic.  If you do not have a primary care provider, please call [phone] and they will assist you.      [employer]back is an electronic gateway that provides easy, online access to your medical records. With [employer], you can request a clinic appointment, read your test results, renew a prescription or communicate with your care team.     To access your existing account, please contact your [facility] or call [phone] for assistance.        Care EveryWhere ID     This is your Care EveryWhere ID. This could be used by other organizations to access your [place] medical records  [id number]        Your Vitals Were     Pulse Temperature Height Pulse Oximetry BMI (Body Mass Index)       85 97.6  F (36.4  C) (Oral) 5' 3.35\" (160.9 cm) 100% 26.15 kg/m2        Blood Pressure from Last 3 Encounters:   10/31/18 142/82   10/03/18 (!) 153/102   08/20/18 124/77    Weight from Last 3 Encounters:   10/31/18 149 lb 4 oz (67.7 kg)   10/03/18 146 lb 4 oz (66.3 kg)   08/20/18 149 lb 8 oz (67.8 kg)              We Performed the Following     C RIV4 (FLUBLOK) VACCINE RECOMBINANT DNA PRSRV ANTIBIO FREE, IM     Comprehensive Metabolic Panel ([place])     Hemoglobin A1c     SSA Ro RAMIRO Antibody IgG     SSB La RAMIRO Antibody IgG     TSH with free T4 reflex     Vitamin B12     Vitamin D Deficiency          Today's Medication Changes          These changes are accurate as of 10/31/18 12:13 PM.  If you have any questions, ask your nurse or doctor.               Start taking these medicines.        Dose/Directions    blood glucose calibration solution   Commonly known as:  NO BRAND SPECIFIED   Used for:  Type 2 diabetes mellitus without complication, with long-term current use of insulin (H)   Started by:  Anay Martinez MD        Use to calibrate blood glucose monitor as needed as " directed. To accompany: Blood Glucose Monitor Brands: per insurance.   Quantity:  1 Bottle   Refills:  3       blood glucose monitoring meter device kit   Commonly known as:  no brand specified   Used for:  Type 2 diabetes mellitus without complication, with long-term current use of insulin (H)   Started by:  Anay Martinez MD        Use to test blood sugar 4 times daily or as directed.   Quantity:  1 kit   Refills:  0       * cevimeline 30 MG capsule   Commonly known as:  EVOXAC   Used for:  Dry mouth, unspecified   Started by:  Anay Martinez MD        Dose:  30 mg   Take 1 capsule (30 mg) by mouth 3 times daily   Quantity:  90 capsule   Refills:  0       * cevimeline 30 MG capsule   Commonly known as:  EVOXAC   Used for:  Dry mouth, unspecified   Started by:  Anay Martinez MD        Dose:  30 mg   Take 1 capsule (30 mg) by mouth 3 times daily   Quantity:  90 capsule   Refills:  0       * Notice:  This list has 2 medication(s) that are the same as other medications prescribed for you. Read the directions carefully, and ask your doctor or other care provider to review them with you.      These medicines have changed or have updated prescriptions.        Dose/Directions    * blood glucose monitoring test strip   Commonly known as:  no brand specified   This may have changed:  Another medication with the same name was added. Make sure you understand how and when to take each.   Used for:  Type 2 diabetes mellitus without complication, without long-term current use of insulin (H)   Changed by:  Anay Martinez MD        Use to test blood sugars 2 to 3 times daily or as directed - send one touch ultra blue test strips   Quantity:  200 strip   Refills:  3       * blood glucose monitoring test strip   Commonly known as:  no brand specified   This may have changed:  You were already taking a medication with the same name, and this prescription was added. Make sure you understand how and  when to take each.   Used for:  Type 2 diabetes mellitus without complication, with long-term current use of insulin (H)   Changed by:  Anay Martinez MD        Use to test blood sugar 4 times daily or as directed. To accompany: Blood Glucose Monitor Brands: per insurance.   Quantity:  120 strip   Refills:  11       * Notice:  This list has 2 medication(s) that are the same as other medications prescribed for you. Read the directions carefully, and ask your doctor or other care provider to review them with you.         Where to get your medicines      These medications were sent to [address] [facility] [id number]    Hours:  24-hours Phone:  [phone]     cevimeline 30 MG capsule         These medications were sent to [facility] [address] [place] [address] AT [facility] & [address] [place] [id number]     Phone:  [phone]     blood glucose calibration solution    blood glucose monitoring meter device kit    blood glucose monitoring test strip    cevimeline 30 MG capsule                Primary Care Provider Office Phone # Fax #    Anay Martinez [phone]       [address] [place] [address]        Equal Access to Services     MARGARITA SEGURA AH: Hadii jenniffer min hadasho Soomaali, waaxda luqadaha, qaybta kaalmada adeegyada, jules wilcox hayeaston moe. So [facility] [phone].    ATENCIÓN: Si habla español, tiene a chávez disposición servicios gratuitos de asistencia lingüística. Llame al [phone].    We comply with applicable federal civil rights laws and [place] laws. We do not discriminate on the basis of race, color, national origin, age, disability, sex, sexual orientation, or gender identity.            Thank you!     Thank you for choosing [facility]  for your care. Our goal is always to provide you with excellent care.  Hearing back from our patients is one way we can continue to improve our services. Please take a few minutes to complete the written survey that you may receive in the mail after your visit with us. Thank you!             Your Updated Medication List - Protect others around you: Learn how to safely use, store and throw away your medicines at www.disposemymeds.org.          This list is accurate as of 10/31/18 12:13 PM.  Always use your most recent med list.                   Brand Name Dispense Instructions for use Diagnosis    amphetamine-dextroamphetamine 30 MG per tablet    ADDERALL    90 tablet    Take 2 po q am and one po in the afternoon        ASPIRIN LOW DOSE 81 MG EC tablet   Generic drug:  aspirin     90 tablet    TAKE 1 TABLET BY MOUTH DAILY    Type II or unspecified type diabetes mellitus without mention of complication, not stated as uncontrolled       blood glucose calibration solution    NO BRAND SPECIFIED    1 Bottle    Use to calibrate blood glucose monitor as needed as directed. To accompany: Blood Glucose Monitor Brands: per insurance.    Type 2 diabetes mellitus without complication, with long-term current use of insulin (H)       blood glucose lancets standard    no brand specified    200 each    Use to test blood sugar 2 to 3 times daily or as directed. Dispense one touch ultra blue lancets    Type 2 diabetes mellitus without complication, without long-term current use of insulin (H)       blood glucose monitoring meter device kit    no brand specified    1 kit    Use to test blood sugar 4 times daily or as directed.    Type 2 diabetes mellitus without complication, with long-term current use of insulin (H)       * blood glucose monitoring test strip    no brand specified    200 strip    Use to test blood sugars 2 to 3 times daily or as directed - send one touch ultra blue test strips    Type 2 diabetes mellitus without complication, without long-term current use of insulin (H)       * blood  glucose monitoring test strip    no brand specified    120 strip    Use to test blood sugar 4 times daily or as directed. To accompany: Blood Glucose Monitor Brands: per insurance.    Type 2 diabetes mellitus without complication, with long-term current use of insulin (H)       * cevimeline 30 MG capsule    EVOXAC    90 capsule    Take 1 capsule (30 mg) by mouth 3 times daily    Dry mouth, unspecified       * cevimeline 30 MG capsule    EVOXAC    90 capsule    Take 1 capsule (30 mg) by mouth 3 times daily    Dry mouth, unspecified       cyclobenzaprine 10 MG tablet    FLEXERIL    30 tablet    Take 1 tablet (10 mg) by mouth nightly as needed for muscle spasms    Sciatica of left side       gabapentin 300 MG capsule    NEURONTIN    90 capsule    Take 3 po bid        losartan 100 MG tablet    COZAAR     Take 100 mg by mouth daily        metFORMIN 500 MG 24 hr tablet    GLUCOPHAGE-XR    360 tablet    Take 2 tablet po bid        multivitamin peds with iron Chew     90 tablet    Take one daily        traZODone 50 MG tablet    DESYREL     Take 50 mg by mouth daily        * Notice:  This list has 4 medication(s) that are the same as other medications prescribed for you. Read the directions carefully, and ask your doctor or other care provider to review them with you.

## 2018-10-31 NOTE — NURSING NOTE
"63 year old  Chief Complaint   Patient presents with     Pre-Op Exam     Cataract surgery on 11/6/18.        Blood pressure 142/82, pulse 85, temperature 97.6  F (36.4  C), temperature source Oral, height 5' 3.35\" (160.9 cm), weight 149 lb 4 oz (67.7 kg), SpO2 100 %. Body mass index is 26.15 kg/(m^2).  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)     Keratoconus     Hyperlipidemia LDL goal <100     Obstructive sleep apnea     Vitamin D insufficiency     Major depression in partial remission (H)     Plantar warts     Low back pain     DJD (degenerative joint disease) of knee     Pancreatitis     Panic     Chest pain     IBS (irritable bowel syndrome)     Heart murmur     Hypertension goal BP (blood pressure) < 140/90     Type 2 diabetes mellitus without complication, with long-term current use of insulin (H)       Wt Readings from Last 2 Encounters:   10/31/18 149 lb 4 oz (67.7 kg)   10/03/18 146 lb 4 oz (66.3 kg)     BP Readings from Last 3 Encounters:   10/31/18 142/82   10/03/18 (!) 153/102   08/20/18 124/77         Current Outpatient Prescriptions   Medication     amphetamine-dextroamphetamine (ADDERALL) 30 MG per tablet     ASPIRIN LOW DOSE 81 MG EC tablet     blood glucose (NO BRAND SPECIFIED) lancets standard     blood glucose monitoring (NO BRAND SPECIFIED) test strip     Calcium-Magnesium-Vitamin D 600- MG-MG-UNIT TB24     Cholecalciferol (VITAMIN D) 2000 units tablet     cyanocobalamin 1000 MCG SUBL     cyclobenzaprine (FLEXERIL) 10 MG tablet     gabapentin (NEURONTIN) 300 MG capsule     losartan (COZAAR) 100 MG tablet     metFORMIN (GLUCOPHAGE-XR) 500 MG 24 hr tablet     multivitamin peds with iron (FLINTSTONES PLUS IRON) CHEW     terbinafine (LAMISIL) 250 MG tablet     traMADol (ULTRAM) 50 MG tablet     traZODone (DESYREL) 50 MG tablet     [DISCONTINUED] sertraline (ZOLOFT) 100 MG tablet " "    No current facility-administered medications for this visit.        Social History   Substance Use Topics     Smoking status: Never Smoker     Smokeless tobacco: Never Used     Alcohol use Yes      Comment: rare       Health Maintenance Due   Topic Date Due     FOOT EXAM Q1 YEAR  03/11/1956     PNEUMOVAX 1X HI RISK PATIENT < 65 (NO IB MSG)  03/11/1957     HIV SCREEN (SYSTEM ASSIGNED)  03/11/1973     HEPATITIS C SCREENING  03/11/1973     ADVANCE DIRECTIVE PLANNING Q5 YRS  03/11/2010     EYE EXAM Q1 YEAR  11/05/2014     DEPRESSION ACTION PLAN Q1 YR  11/05/2014     MAMMO SCREEN Q2 YR (SYSTEM ASSIGNED)  06/06/2015     PAP Q3 YR  01/30/2018     COLON CANCER SCREEN (SYSTEM ASSIGNED)  08/20/2018     INFLUENZA VACCINE (1) 09/01/2018       Lab Results   Component Value Date    PAP NIL 06/11/2013       Gina Brooks MA  October 31, 2018 11:32 AM      Injectable Influenza Immunization Documentation    1.  Has the patient received the information for the injectable influenza vaccine? YES     2. Is the patient 6 months of age or older? YES     3. Does the patient have any of the following contraindications?         Severe allergy to eggs? No     Severe allergic reaction to previous influenza vaccines? No   Severe allergy to latex? No       History of Guillain-Tilghman syndrome? No     Currently have a temperature greater than 100.4F? No        4.  Severely egg allergic patients should have flu vaccine eligibility assessed by an MD, RN, or pharmacist, and those who received flu vaccine should be observed for 15 min by an MD, RN, Pharmacist, Medical Technician, or member of clinic staff.\": YES    5. Latex-allergic patients should be given latex-free influenza vaccine Yes. Please reference the Vaccine latex table to determine if your clinic s product is latex-containing.       Vaccination given by Arianna Mitchell CMA  October 31, 2018 12:31 PM                "

## 2018-11-01 LAB — DEPRECATED CALCIDIOL+CALCIFEROL SERPL-MC: 27 UG/L (ref 20–75)

## 2018-11-01 NOTE — TELEPHONE ENCOUNTER
This encounter was opened in error. Please disregard. This request form 10/26 is old  Pt had refills done on 10/29 and in appt on 10/31 with Pingel and diabetic supplies were ordered then    Jessica Chandler RN  October 31, 2018 8:39 PM

## 2018-11-02 LAB
ENA SS-A IGG SER IA-ACNC: <0.2 AI (ref 0–0.9)
ENA SS-B IGG SER IA-ACNC: <0.2 AI (ref 0–0.9)

## 2018-11-16 DIAGNOSIS — Z79.4 TYPE 2 DIABETES MELLITUS WITHOUT COMPLICATION, WITH LONG-TERM CURRENT USE OF INSULIN (H): ICD-10-CM

## 2018-11-16 DIAGNOSIS — E55.9 VITAMIN D DEFICIENCY: Primary | ICD-10-CM

## 2018-11-16 DIAGNOSIS — E11.9 TYPE 2 DIABETES MELLITUS WITHOUT COMPLICATION, WITH LONG-TERM CURRENT USE OF INSULIN (H): ICD-10-CM

## 2018-11-26 DIAGNOSIS — R68.2 DRY MOUTH, UNSPECIFIED: ICD-10-CM

## 2018-11-26 RX ORDER — CEVIMELINE HYDROCHLORIDE 30 MG/1
30 CAPSULE ORAL 3 TIMES DAILY
Qty: 90 CAPSULE | Refills: 0 | Status: SHIPPED | OUTPATIENT
Start: 2018-11-26 | End: 2018-12-26

## 2018-11-26 NOTE — TELEPHONE ENCOUNTER
Last office visit was on 10/31/2018, no future visits scheduled.    To MD to auth, not on RN refill protocol     Would you like to add refills? , or make for 90 day supply.  (#[phone])  This is going to her mail order Pill [facility]    Jessica Chandler RN  November 26, 2018 10:18 AM

## 2018-12-26 DIAGNOSIS — R68.2 DRY MOUTH, UNSPECIFIED: ICD-10-CM

## 2018-12-26 RX ORDER — CEVIMELINE HYDROCHLORIDE 30 MG/1
30 CAPSULE ORAL 3 TIMES DAILY
Qty: 270 CAPSULE | Refills: 1 | Status: SHIPPED | OUTPATIENT
Start: 2018-12-26 | End: 2019-04-12

## 2019-01-28 DIAGNOSIS — E11.9 TYPE 2 DIABETES MELLITUS WITHOUT COMPLICATION, WITH LONG-TERM CURRENT USE OF INSULIN (H): ICD-10-CM

## 2019-01-28 DIAGNOSIS — I10 HYPERTENSION GOAL BP (BLOOD PRESSURE) < 140/90: Primary | ICD-10-CM

## 2019-01-28 DIAGNOSIS — Z79.4 TYPE 2 DIABETES MELLITUS WITHOUT COMPLICATION, WITH LONG-TERM CURRENT USE OF INSULIN (H): ICD-10-CM

## 2019-01-28 RX ORDER — LOSARTAN POTASSIUM 100 MG/1
100 TABLET ORAL DAILY
Qty: 90 TABLET | Refills: 1 | Status: SHIPPED | OUTPATIENT
Start: 2019-01-28 | End: 2019-04-12

## 2019-01-28 RX ORDER — METFORMIN HCL 500 MG
TABLET, EXTENDED RELEASE 24 HR ORAL
Qty: 360 TABLET | Refills: 0 | Status: SHIPPED | OUTPATIENT
Start: 2019-01-28 | End: 2019-03-20

## 2019-01-28 NOTE — TELEPHONE ENCOUNTER
Last seen 10/31/18, no future appt     BP was 142/82 at appt with Pingel.  She is happy with this BP ( see visit note).  OK to refill.    Prescription approved per [facility] Refill Protocol.   90 day supply times one for DM med,  6 month BP med .     Jessica Chandler RN  January 28, 2019 9:00 AM

## 2019-03-20 DIAGNOSIS — E11.9 TYPE 2 DIABETES MELLITUS WITHOUT COMPLICATION, WITH LONG-TERM CURRENT USE OF INSULIN (H): ICD-10-CM

## 2019-03-20 DIAGNOSIS — Z79.4 TYPE 2 DIABETES MELLITUS WITHOUT COMPLICATION, WITH LONG-TERM CURRENT USE OF INSULIN (H): ICD-10-CM

## 2019-03-20 RX ORDER — METFORMIN HCL 500 MG
TABLET, EXTENDED RELEASE 24 HR ORAL
Qty: 360 TABLET | Refills: 0 | Status: SHIPPED | OUTPATIENT
Start: 2019-03-20 | End: 2019-04-12

## 2019-03-20 NOTE — TELEPHONE ENCOUNTER
Last Office Visit: 10/31/18  Future [facility] Appointments: NONE  Medication last refilled: 1/28/19 #90  Last labs: 10/31/18     BP Readings from Last 3 Encounters:   10/31/18 142/82   10/03/18 (!) 153/102   08/20/18 124/77     Prescription approved per [facility] [place] Protocol.  Karen Sosa RN  03/20/19  2:49 PM

## 2019-04-10 ENCOUNTER — TRANSFERRED RECORDS (OUTPATIENT)
Dept: HEALTH INFORMATION MANAGEMENT | Facility: CLINIC | Age: 64
End: 2019-04-10

## 2019-04-12 ENCOUNTER — OFFICE VISIT (OUTPATIENT)
Dept: FAMILY MEDICINE | Facility: CLINIC | Age: 64
End: 2019-04-12
Payer: COMMERCIAL

## 2019-04-12 VITALS
DIASTOLIC BLOOD PRESSURE: 89 MMHG | TEMPERATURE: 98.6 F | WEIGHT: 163 LBS | HEIGHT: 63 IN | BODY MASS INDEX: 28.88 KG/M2 | OXYGEN SATURATION: 98 % | HEART RATE: 91 BPM | SYSTOLIC BLOOD PRESSURE: 143 MMHG

## 2019-04-12 DIAGNOSIS — E11.9 TYPE 2 DIABETES MELLITUS WITHOUT COMPLICATION, WITHOUT LONG-TERM CURRENT USE OF INSULIN (H): ICD-10-CM

## 2019-04-12 DIAGNOSIS — Z12.4 SCREENING FOR CERVICAL CANCER: ICD-10-CM

## 2019-04-12 DIAGNOSIS — Z12.31 VISIT FOR SCREENING MAMMOGRAM: ICD-10-CM

## 2019-04-12 DIAGNOSIS — R68.2 DRY MOUTH, UNSPECIFIED: ICD-10-CM

## 2019-04-12 DIAGNOSIS — Z13.9 SCREENING FOR CONDITION: ICD-10-CM

## 2019-04-12 DIAGNOSIS — E11.9 TYPE 2 DIABETES MELLITUS WITHOUT COMPLICATION, WITH LONG-TERM CURRENT USE OF INSULIN (H): ICD-10-CM

## 2019-04-12 DIAGNOSIS — I10 HYPERTENSION GOAL BP (BLOOD PRESSURE) < 140/90: ICD-10-CM

## 2019-04-12 DIAGNOSIS — Z00.00 PREVENTATIVE HEALTH CARE: Primary | ICD-10-CM

## 2019-04-12 DIAGNOSIS — F43.9 SITUATIONAL STRESS: ICD-10-CM

## 2019-04-12 DIAGNOSIS — L65.9 HAIR THINNING: ICD-10-CM

## 2019-04-12 DIAGNOSIS — Z79.4 TYPE 2 DIABETES MELLITUS WITHOUT COMPLICATION, WITH LONG-TERM CURRENT USE OF INSULIN (H): ICD-10-CM

## 2019-04-12 LAB
% GRANULOCYTES: 55.1 %G (ref 40–75)
ALBUMIN SERPL-MCNC: 4.1 G/DL (ref 3.2–4.5)
ALP SERPL-CCNC: 94 U/L (ref 40–150)
ALT SERPL-CCNC: 25 U/L (ref 0–50)
AST SERPL-CCNC: 27 U/L (ref 0–45)
BILIRUB SERPL-MCNC: 1.2 MG/DL (ref 0.2–1.3)
BUN SERPL-MCNC: 26 MG/DL (ref 7–30)
CALCIUM SERPL-MCNC: 9.5 MG/DL (ref 8.5–10.4)
CHLORIDE SERPLBLD-SCNC: 103 MMOL/L (ref 94–109)
CHOLEST SERPL-MCNC: 173 MG/DL
CO2 SERPL-SCNC: 31 MMOL/L (ref 20–32)
CREAT SERPL-MCNC: 1 MG/DL (ref 0.6–1.3)
CREAT UR-MCNC: 70 MG/DL
EGFR CALCULATED (BLACK REFERENCE): 71.8
EGFR CALCULATED (NON BLACK REFERENCE): 59.3
ERYTHROCYTE [DISTWIDTH] IN BLOOD BY AUTOMATED COUNT: 12.6 %
GLUCOSE SERPL-MCNC: 107 MG/DL (ref 60–109)
GRANULOCYTES #: 5.7 K/UL (ref 1.6–8.3)
HBA1C MFR BLD: 6.2 % (ref 4.1–5.7)
HCT VFR BLD AUTO: 41.9 % (ref 35–47)
HDLC SERPL-MCNC: 49 MG/DL
HEMOGLOBIN: 13.3 G/DL (ref 11.7–15.7)
LDLC SERPL CALC-MCNC: 97 MG/DL
LYMPHOCYTES # BLD AUTO: 4.1 K/UL (ref 0.8–5.3)
LYMPHOCYTES NFR BLD AUTO: 39.4 %L (ref 20–48)
MCH RBC QN AUTO: 28.2 PG (ref 26.5–35)
MCHC RBC AUTO-ENTMCNC: 31.7 G/DL (ref 32–36)
MCV RBC AUTO: 88.8 FL (ref 78–100)
MICROALBUMIN UR-MCNC: 197 MG/L
MICROALBUMIN/CREAT UR: 282.24 MG/G CR (ref 0–25)
MID #: 0.6 K/UL (ref 0–2.2)
MID %: 5.5 %M (ref 0–20)
NONHDLC SERPL-MCNC: 124 MG/DL
PLATELET # BLD AUTO: 192 K/UL (ref 150–450)
POTASSIUM SERPL-SCNC: 5.1 MMOL/L (ref 3.4–5.3)
PROT SERPL-MCNC: 8.2 G/DL (ref 6.8–8.8)
RBC # BLD AUTO: 4.72 M/UL (ref 3.8–5.2)
SODIUM SERPL-SCNC: 144 MMOL/L (ref 136–145)
TRIGL SERPL-MCNC: 135 MG/DL
WBC # BLD AUTO: 10.4 K/UL (ref 4–11)

## 2019-04-12 RX ORDER — TRAZODONE HYDROCHLORIDE 50 MG/1
TABLET, FILM COATED ORAL
Qty: 180 TABLET | Refills: 3 | COMMUNITY
Start: 2019-04-12 | End: 2019-10-22

## 2019-04-12 RX ORDER — DEXTROAMPHETAMINE SACCHARATE, AMPHETAMINE ASPARTATE, DEXTROAMPHETAMINE SULFATE AND AMPHETAMINE SULFATE 7.5; 7.5; 7.5; 7.5 MG/1; MG/1; MG/1; MG/1
TABLET ORAL
Qty: 90 TABLET | Refills: 0 | COMMUNITY
Start: 2019-04-12 | End: 2021-11-16

## 2019-04-12 RX ORDER — CEVIMELINE HYDROCHLORIDE 30 MG/1
30 CAPSULE ORAL 3 TIMES DAILY
Qty: 270 CAPSULE | Refills: 3 | Status: SHIPPED | OUTPATIENT
Start: 2019-04-12 | End: 2020-03-11

## 2019-04-12 RX ORDER — GLIPIZIDE 2.5 MG/1
TABLET, EXTENDED RELEASE ORAL
Qty: 60 TABLET | Refills: 0 | Status: SHIPPED | OUTPATIENT
Start: 2019-04-12 | End: 2020-02-06

## 2019-04-12 RX ORDER — METFORMIN HCL 500 MG
TABLET, EXTENDED RELEASE 24 HR ORAL
Qty: 360 TABLET | Refills: 3 | Status: SHIPPED | OUTPATIENT
Start: 2019-04-12 | End: 2020-03-20

## 2019-04-12 RX ORDER — GABAPENTIN 400 MG/1
CAPSULE ORAL
Qty: 360 CAPSULE | Refills: 3 | COMMUNITY
Start: 2019-04-12 | End: 2020-03-05 | Stop reason: DRUGHIGH

## 2019-04-12 RX ORDER — LOSARTAN POTASSIUM 100 MG/1
100 TABLET ORAL DAILY
Qty: 90 TABLET | Refills: 3 | Status: SHIPPED | OUTPATIENT
Start: 2019-04-12 | End: 2020-03-20

## 2019-04-12 ASSESSMENT — MIFFLIN-ST. JEOR: SCORE: 1263.98

## 2019-04-12 NOTE — Clinical Note
Karine Wilks would like to discuss counseling, intervention resources regarding mental health. Please refer to note.Anay Martinez MDInternal Medicine/Pediatrics
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
## 2019-04-12 NOTE — NURSING NOTE
"64 year old  Chief Complaint   Patient presents with     Gyn Exam     pap test      Blood Draw     A1C check and also pt reports she needs to talk with provider about other things.       Blood pressure 143/89, pulse 91, temperature 98.6  F (37  C), temperature source Oral, height 1.609 m (5' 3.35\"), weight 73.9 kg (163 lb), SpO2 98 %. Body mass index is 28.56 kg/m .  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)     Keratoconus     Hyperlipidemia LDL goal <100     Obstructive sleep apnea     Vitamin D insufficiency     Major depression in partial remission (H)     Plantar warts     Low back pain     DJD (degenerative joint disease) of knee     Pancreatitis     Panic     Chest pain     IBS (irritable bowel syndrome)     Heart murmur     Hypertension goal BP (blood pressure) < 140/90     Type 2 diabetes mellitus without complication, with long-term current use of insulin (H)       Wt Readings from Last 2 Encounters:   04/12/19 73.9 kg (163 lb)   10/31/18 67.7 kg (149 lb 4 oz)     BP Readings from Last 3 Encounters:   04/12/19 143/89   10/31/18 142/82   10/03/18 (!) 153/102         Current Outpatient Medications   Medication     amphetamine-dextroamphetamine (ADDERALL) 30 MG per tablet     ASPIRIN LOW DOSE 81 MG EC tablet     blood glucose (NO BRAND SPECIFIED) lancets standard     blood glucose calibration (NO BRAND SPECIFIED) solution     blood glucose monitoring (NO BRAND SPECIFIED) meter device kit     blood glucose monitoring (NO BRAND SPECIFIED) test strip     blood glucose monitoring (NO BRAND SPECIFIED) test strip     cevimeline (EVOXAC) 30 MG capsule     cholecalciferol (VITAMIN D3) 1000 UNIT tablet     cyclobenzaprine (FLEXERIL) 10 MG tablet     gabapentin (NEURONTIN) 300 MG capsule     losartan (COZAAR) 100 MG tablet     metFORMIN (GLUCOPHAGE-XR) 500 MG 24 hr tablet     multivitamin peds with iron " (FLINTSTONES PLUS IRON) CHEW     sertraline (ZOLOFT) 100 MG tablet     traZODone (DESYREL) 50 MG tablet     No current facility-administered medications for this visit.        Social History     Tobacco Use     Smoking status: Never Smoker     Smokeless tobacco: Never Used   Substance Use Topics     Alcohol use: Yes     Comment: rare     Drug use: No       Health Maintenance Due   Topic Date Due     HIV SCREEN (SYSTEM ASSIGNED)  03/11/1973     HEPATITIS C SCREENING  03/11/1973     ZOSTER IMMUNIZATION (1 of 2) 03/11/2005     ADVANCE DIRECTIVE PLANNING Q5 YRS  03/11/2010     EYE EXAM Q1 YEAR  11/05/2014     MAMMO SCREEN Q2 YR (SYSTEM ASSIGNED)  06/06/2015     PAP Q3 YR  01/30/2018     COLON CANCER SCREEN (SYSTEM ASSIGNED)  08/20/2018     PHQ-9 Q6 MONTHS  04/03/2019     A1C Q6 MO  04/30/2019       Lab Results   Component Value Date    PAP NIL 06/11/2013 April 12, 2019 1:26 PM

## 2019-04-12 NOTE — PATIENT INSTRUCTIONS
Call insurance to see what podiatrist is available in [place].    Diabetes  Metformin XL (long acting)   500mg , take 2 twice a day maximum, may take 4 each morning    Glipizide XL 2.5mg dose  This can lower blood sugar so make sure to eat consistently throughout the day  Take alongside the metformin 2.5mg dose, x 2-3 days    Fasting sugars goal <200 consistently  If not at goal,  Increase    Glipizide to 5mg daily  Then  If needed   7.5mg daily , etc    Touch base in 2-4 wks to discuss blood sugar readings.      Patrizia -      Mammogram and Bone density testing  For scheduling in the [facility] ([facility], [facility]) call [phone]  or   [phone]

## 2019-04-13 LAB — VZV IGG SER QL IA: 1.7 AI (ref 0–0.8)

## 2019-04-15 ENCOUNTER — TELEPHONE (OUTPATIENT)
Dept: FAMILY MEDICINE | Facility: CLINIC | Age: 64
End: 2019-04-15

## 2019-04-15 DIAGNOSIS — E11.9 TYPE 2 DIABETES MELLITUS WITHOUT COMPLICATION, WITH LONG-TERM CURRENT USE OF INSULIN (H): Primary | ICD-10-CM

## 2019-04-15 DIAGNOSIS — Z79.4 TYPE 2 DIABETES MELLITUS WITHOUT COMPLICATION, WITH LONG-TERM CURRENT USE OF INSULIN (H): Primary | ICD-10-CM

## 2019-04-15 NOTE — TELEPHONE ENCOUNTER
Call Center - OK to transfer to clinic nurse.   ++++++++++++++++++++++++++++++  Called and LVM for patient to call clinic and ask to be put through to one of the nurses. We would like to discuss her glucometer and to hold off on starting the new medication Dr. Martinez ordered on Saturday. Will wait to hear back from patient.   Karen Sosa RN  04/15/19  5:20 PM

## 2019-04-15 NOTE — TELEPHONE ENCOUNTER
----- Message from Anay Martinez MD sent at 4/13/2019  1:46 PM CDT -----  Regarding: glucometer and supplies  I saw this patient on Friday for diabetic check and she reports FBS in the 200s, however her A1c returned at 6.2%.  She was on metformin.  I prescribed Glipizide for her at the visit but now am unsure if the readings are an error.    I left message on her cell phone Sat to hold off on starting the Glipizide.     Will you please call her on Monday, 4/15 to see how old her glucometer is? If she has calibrated it? Thinking she may need a new device. She also needed supplies, test strips, etc refilled at the mail order pharmacy.     Thanks very much    Anay

## 2019-04-16 LAB
COPATH REPORT: NORMAL
PAP: NORMAL

## 2019-04-16 NOTE — TELEPHONE ENCOUNTER
Patient calls back and states the glucometer she is using is fairly new and she was having the same high blood glucose readings with her old meter. Both meters were One Touch brands. She is checking with insurance to find out which brand(s) are covered, and we can initiate a new order for a different brand blood glucose machine. She did receive Dr. Martinez's voicemail and she did not continue with the new order of glipizide. Will wait to hear back from patient.     Karen Sosa RN  04/16/19  12:12 PM

## 2019-04-18 ENCOUNTER — MYC MEDICAL ADVICE (OUTPATIENT)
Dept: FAMILY MEDICINE | Facility: CLINIC | Age: 64
End: 2019-04-18

## 2019-04-18 DIAGNOSIS — E11.9 TYPE 2 DIABETES MELLITUS WITHOUT COMPLICATION, WITH LONG-TERM CURRENT USE OF INSULIN (H): ICD-10-CM

## 2019-04-18 DIAGNOSIS — Z78.0 MENOPAUSE: Primary | ICD-10-CM

## 2019-04-18 DIAGNOSIS — Z79.4 TYPE 2 DIABETES MELLITUS WITHOUT COMPLICATION, WITH LONG-TERM CURRENT USE OF INSULIN (H): ICD-10-CM

## 2019-04-18 DIAGNOSIS — E11.9 TYPE 2 DIABETES MELLITUS WITHOUT COMPLICATION, WITHOUT LONG-TERM CURRENT USE OF INSULIN (H): ICD-10-CM

## 2019-04-18 LAB
FINAL DIAGNOSIS: NORMAL
HPV HR 12 DNA CVX QL NAA+PROBE: NEGATIVE
HPV16 DNA SPEC QL NAA+PROBE: NEGATIVE
HPV18 DNA SPEC QL NAA+PROBE: NEGATIVE
SPECIMEN DESCRIPTION: NORMAL
SPECIMEN SOURCE CVX/VAG CYTO: NORMAL

## 2019-04-19 ENCOUNTER — TELEPHONE (OUTPATIENT)
Dept: FAMILY MEDICINE | Facility: CLINIC | Age: 64
End: 2019-04-19

## 2019-04-19 NOTE — TELEPHONE ENCOUNTER
LM for pt to read Gabrielle Blas's Mychart note to her re: new meter and supplies.   Pt is to call back with any questions.  Davina Chawla RN  [facility]

## 2019-04-19 NOTE — TELEPHONE ENCOUNTER
Contacted by RN regarding a dysfunctioning meter for patient. Sent new Rx for new OneTouch Verio IQ meter that is covered by patient's insurance.     Gabrielle Blas, PharmD  Medication Management (MTM) Pharmacist  [facility]

## 2019-04-19 NOTE — TELEPHONE ENCOUNTER
Pt states Mica has texted her and would be authorizing the One Touch Ultra 2 , which pt already has. Spoke to pharmacy - they re-ran claim for One Touch Verio IQ, and states she can have this meter and supplies. LM for pt re; this.  Davina Chawla RN  [facility]

## 2019-04-19 NOTE — TELEPHONE ENCOUNTER
[facility] Call Center    Phone Message    May a detailed message be left on voicemail: yes    Reason for Call: Other: Blood glucose monitor she received is incorrect. Would like to discuss with Davina.      Action Taken: Message routed to:  [facility]: [facility]

## 2019-04-24 ASSESSMENT — PATIENT HEALTH QUESTIONNAIRE - PHQ9
SUM OF ALL RESPONSES TO PHQ QUESTIONS 1-9: 6
5. POOR APPETITE OR OVEREATING: SEVERAL DAYS

## 2019-04-24 ASSESSMENT — ANXIETY QUESTIONNAIRES
5. BEING SO RESTLESS THAT IT IS HARD TO SIT STILL: SEVERAL DAYS
2. NOT BEING ABLE TO STOP OR CONTROL WORRYING: MORE THAN HALF THE DAYS
1. FEELING NERVOUS, ANXIOUS, OR ON EDGE: MORE THAN HALF THE DAYS
3. WORRYING TOO MUCH ABOUT DIFFERENT THINGS: MORE THAN HALF THE DAYS
6. BECOMING EASILY ANNOYED OR IRRITABLE: NEARLY EVERY DAY
IF YOU CHECKED OFF ANY PROBLEMS ON THIS QUESTIONNAIRE, HOW DIFFICULT HAVE THESE PROBLEMS MADE IT FOR YOU TO DO YOUR WORK, TAKE CARE OF THINGS AT HOME, OR GET ALONG WITH OTHER PEOPLE: VERY DIFFICULT

## 2019-04-25 ENCOUNTER — PATIENT OUTREACH (OUTPATIENT)
Dept: FAMILY MEDICINE | Facility: CLINIC | Age: 64
End: 2019-04-25

## 2019-04-25 ENCOUNTER — OFFICE VISIT (OUTPATIENT)
Dept: FAMILY MEDICINE | Facility: CLINIC | Age: 64
End: 2019-04-25
Payer: COMMERCIAL

## 2019-04-25 VITALS
SYSTOLIC BLOOD PRESSURE: 123 MMHG | HEART RATE: 98 BPM | WEIGHT: 165.25 LBS | BODY MASS INDEX: 28.95 KG/M2 | OXYGEN SATURATION: 97 % | TEMPERATURE: 98.8 F | DIASTOLIC BLOOD PRESSURE: 81 MMHG

## 2019-04-25 DIAGNOSIS — E11.9 TYPE 2 DIABETES MELLITUS WITHOUT COMPLICATION, WITHOUT LONG-TERM CURRENT USE OF INSULIN (H): Primary | ICD-10-CM

## 2019-04-25 LAB
GLUCOSE CASUAL: 137 MG/DL (ref 51–200)
HBA1C MFR BLD: 6.5 % (ref 4.1–5.7)

## 2019-04-25 NOTE — NURSING NOTE
64 year old  Chief Complaint   Patient presents with     Diabetes     follow up, and check glucose to new meter        Blood pressure 123/81, pulse 98, temperature 98.8  F (37.1  C), temperature source Oral, weight 75 kg (165 lb 4 oz), SpO2 97 %. Body mass index is 28.95 kg/m .  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)     Keratoconus     Hyperlipidemia LDL goal <100     Obstructive sleep apnea     Vitamin D insufficiency     Major depression in partial remission (H)     Plantar warts     Low back pain     DJD (degenerative joint disease) of knee     Pancreatitis     Panic     Chest pain     IBS (irritable bowel syndrome)     Heart murmur     Hypertension goal BP (blood pressure) < 140/90     Type 2 diabetes mellitus without complication, with long-term current use of insulin (H)       Wt Readings from Last 2 Encounters:   04/25/19 75 kg (165 lb 4 oz)   04/12/19 73.9 kg (163 lb)     BP Readings from Last 3 Encounters:   04/25/19 123/81   04/12/19 143/89   10/31/18 142/82         Current Outpatient Medications   Medication     amphetamine-dextroamphetamine (ADDERALL) 30 MG tablet     ASPIRIN LOW DOSE 81 MG EC tablet     blood glucose (NO BRAND SPECIFIED) lancets standard     blood glucose (NO BRAND SPECIFIED) test strip     blood glucose (NO BRAND SPECIFIED) test strip     blood glucose calibration (NO BRAND SPECIFIED) solution     blood glucose monitoring (NO BRAND SPECIFIED) meter device kit     cevimeline (EVOXAC) 30 MG capsule     cholecalciferol (VITAMIN D3) 1000 UNIT tablet     gabapentin (NEURONTIN) 400 MG capsule     glipiZIDE (GLUCOTROL XL) 2.5 MG 24 hr tablet     losartan (COZAAR) 100 MG tablet     metFORMIN (GLUCOPHAGE-XR) 500 MG 24 hr tablet     traZODone (DESYREL) 50 MG tablet     No current facility-administered medications for this visit.        Social History     Tobacco Use     Smoking  status: Never Smoker     Smokeless tobacco: Never Used   Substance Use Topics     Alcohol use: Yes     Comment: rare     Drug use: No       Health Maintenance Due   Topic Date Due     HIV SCREEN (SYSTEM ASSIGNED)  03/11/1973     HEPATITIS C SCREENING  03/11/1973     ZOSTER IMMUNIZATION (1 of 2) 03/11/2005     ADVANCE DIRECTIVE PLANNING Q5 YRS  03/11/2010     EYE EXAM Q1 YEAR  11/05/2014     MAMMO SCREEN Q2 YR (SYSTEM ASSIGNED)  06/06/2015       Lab Results   Component Value Date    PAP NIL 04/12/2019 April 25, 2019 3:22 PM

## 2019-04-25 NOTE — PROGRESS NOTES
Karine Moss is a 64 year old female here for the following issues:    Type 2 diabetes follow-up  Karine has type II DM. At her appointment on 4/12, Karine reported that her home blood glucose readings were ranging 200-300. Initially, I added glipizide 2.5mg daily to existing Rx for Metformin. However, her A1c returned as 6.2%. Given this, I recommended she buy a new meter, hold on taking the glipizide and return to clinic.     She reports today that she has purchased a new meter. Her old meter is still reading very high. Her blood glucose at the start of her appointment was 140 by her new meter. Her readings for the past several days have been ranging from 113-145, with a few isolated readings of 170-205 2-3 hours after eating. The average is 138. She has been checking her glucose very frequently, because she was concerned about the higher readings. She reports she did not understand my message, and did start the glipizide 2.5mg. She denies hypoglycemia.      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)     Keratoconus     Hyperlipidemia LDL goal <100     Obstructive sleep apnea     Vitamin D insufficiency     Major depression in partial remission (H)     Plantar warts     Low back pain     DJD (degenerative joint disease) of knee     Pancreatitis     Panic     Chest pain     IBS (irritable bowel syndrome)     Heart murmur     Hypertension goal BP (blood pressure) < 140/90     Type 2 diabetes mellitus without complication, with long-term current use of insulin (H)       Current Outpatient Medications   Medication Sig Dispense Refill     amphetamine-dextroamphetamine (ADDERALL) 30 MG tablet Take 1.5 tablets bid, Rx from Dr. Sam, [place] LUNG 90 tablet 0     ASPIRIN LOW DOSE 81 MG EC tablet TAKE 1 TABLET BY MOUTH DAILY 90 tablet 2     blood glucose (NO BRAND SPECIFIED) lancets standard Use to test blood sugar 4  times daily or as directed. Dispense appropriate lancets for meter. 200 each 3     blood glucose (NO BRAND SPECIFIED) test strip Use to test blood sugar 4 times daily or as directed. To accompany: Blood Glucose Monitor Brands: per insurance. 120 strip 11     blood glucose (NO BRAND SPECIFIED) test strip Use to test blood sugar 4 times daily or as directed. To accompany: Blood Glucose Monitor Brands: per insurance. 120 strip 11     blood glucose calibration (NO BRAND SPECIFIED) solution Use to calibrate blood glucose monitor as needed as directed. To accompany: Blood Glucose Monitor Brands: per insurance. 1 Bottle 3     blood glucose monitoring (NO BRAND SPECIFIED) meter device kit Use to test blood sugar 4 times daily or as directed. Dispense One-Touch Verio IQ Kit. 1 kit 0     cevimeline (EVOXAC) 30 MG capsule Take 1 capsule (30 mg) by mouth 3 times daily 270 capsule 3     cholecalciferol (VITAMIN D3) 1000 UNIT tablet Take 1 tablet (1,000 Units) by mouth daily 100 tablet 3     gabapentin (NEURONTIN) 400 MG capsule Take 2 po bid, Dr. Sam prescribing 360 capsule 3     glipiZIDE (GLUCOTROL XL) 2.5 MG 24 hr tablet Take one daily, increase dose to 2 daily if fasting sugar is >200 60 tablet 0     losartan (COZAAR) 100 MG tablet Take 1 tablet (100 mg) by mouth daily 90 tablet 3     metFORMIN (GLUCOPHAGE-XR) 500 MG 24 hr tablet Take 4 po q am with breakfast 360 tablet 3     traZODone (DESYREL) 50 MG tablet Take 2 po q hs 180 tablet 3       Allergies   Allergen Reactions     Nsaids GI Disturbance     Pt had bariatric surgery, should not ever take oral NSAIDS due to risk of gastric ulcers.        EXAM  /81 (BP Location: Left arm, Patient Position: Sitting, Cuff Size: Adult Regular)   Pulse 98   Temp 98.8  F (37.1  C) (Oral)   Wt 75 kg (165 lb 4 oz)   SpO2 97%   BMI 28.95 kg/m    Gen: Alert, pleasant, NAD  Exam is deferred      Assessment:  (E11.9) Type 2 diabetes mellitus without complication, without long-term  current use of insulin (H)  (primary encounter diagnosis)  Comment: doing well, had a malfunctioning meter that was given false elevated readings  Lab Results   Component Value Date    A1C 6.5 04/25/2019    A1C 6.2 04/12/2019   Plan: Hemoglobin A1c ([place]), Glucose Casual         ([facility]), blood glucose (NO BRAND SPECIFIED)         test strip, blood glucose (NO BRAND SPECIFIED)         lancets standard        Home glucose monitor correlates with lab glucose        Continue Metformin, no  Need to take the glipizide at this time.  RTC 3 months      Anay Martinez MD  Internal Medicine    I, Ivy Patel, am serving as a scribe to document services personally performed by Dr. Anay Martinez, based on data collection and the provider's statements to me. Dr. Martinez has reviewed, edited, and approved the above note.

## 2019-04-26 ENCOUNTER — HOSPITAL ENCOUNTER (OUTPATIENT)
Dept: MAMMOGRAPHY | Facility: CLINIC | Age: 64
End: 2019-04-26
Attending: INTERNAL MEDICINE
Payer: COMMERCIAL

## 2019-04-26 ENCOUNTER — HOSPITAL ENCOUNTER (OUTPATIENT)
Dept: BONE DENSITY | Facility: CLINIC | Age: 64
Discharge: HOME | End: 2019-04-26
Attending: INTERNAL MEDICINE | Admitting: INTERNAL MEDICINE
Payer: COMMERCIAL

## 2019-04-26 ENCOUNTER — TRANSFERRED RECORDS (OUTPATIENT)
Dept: HEALTH INFORMATION MANAGEMENT | Facility: CLINIC | Age: 64
End: 2019-04-26

## 2019-04-26 DIAGNOSIS — Z78.0 MENOPAUSE: ICD-10-CM

## 2019-04-26 DIAGNOSIS — Z12.31 VISIT FOR SCREENING MAMMOGRAM: ICD-10-CM

## 2019-04-26 PROCEDURE — 77080 DXA BONE DENSITY AXIAL: CPT

## 2019-04-26 PROCEDURE — 77067 SCR MAMMO BI INCL CAD: CPT
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
## 2019-04-29 PROBLEM — M85.859 OSTEOPENIA OF HIP: Status: ACTIVE | Noted: 2019-04-29

## 2019-05-09 ENCOUNTER — PATIENT OUTREACH (OUTPATIENT)
Dept: FAMILY MEDICINE | Facility: CLINIC | Age: 64
End: 2019-05-09

## 2019-05-09 NOTE — PROGRESS NOTES
Called pt to f/u from visit 2 weeks prior.  No answer, left message encouraging pt to call clinic if needing any support.  I will reach out again in 2 weeks if I do not hear from her.    MARICEL Peraza

## 2019-05-11 ENCOUNTER — NURSE TRIAGE (OUTPATIENT)
Dept: NURSING | Facility: CLINIC | Age: 64
End: 2019-05-11

## 2019-05-11 NOTE — TELEPHONE ENCOUNTER
"\"I think I have pink eye and it is going on for a few days.\" Patient reporting left eye \"burning\" with mucus discharge. Reporting redness surrounding eye lid. Mild swelling. Afebrile.  Per guidelines advised to be seen with in 24 hours. Caller verbalized understanding. Denies further questions.      Soledad Wade RN  [facility]        Reason for Disposition    Eyelid is red and painful (or tender to touch)    Additional Information    Negative: Eye exposure to chemical or fumes    Negative: Redness of white of eye (sclera), but no pus or only a small amount of brief pus    Negative: SEVERE eye pain (e.g., excruciating)    Negative: [1] Eyelids are very swollen (shut or almost) AND [2] fever    Negative: [1] Eyelid (outer) is very red (or tender to touch) AND [2] fever    Negative: Patient sounds very sick or weak to the triager    Negative: MODERATE eye pain (e.g., interferes with normal activities)    Negative: Fever > 104 F (40 C)    Negative: Cloudy spot or sore seen on the cornea (clear part of the eye)    Negative: Blurred vision    Negative: Eye is very swollen (shut or almost)    Negative: [1] MILD eye pain or discomfort AND [2] wears contacts    Protocols used: EYE - PUS OR [id number]      "

## 2019-05-13 DIAGNOSIS — Z79.4 TYPE 2 DIABETES MELLITUS WITHOUT COMPLICATION, WITH LONG-TERM CURRENT USE OF INSULIN (H): ICD-10-CM

## 2019-05-13 DIAGNOSIS — E11.9 TYPE 2 DIABETES MELLITUS WITHOUT COMPLICATION, WITH LONG-TERM CURRENT USE OF INSULIN (H): ICD-10-CM

## 2019-05-13 NOTE — TELEPHONE ENCOUNTER
Last time prescribed: 4/12/19 , 60 tabs/caps x 0 refills  Last office visit: 4/25/19  Next appointment: 5/20/19     Med discontinued per Dr Martinez at visit on 4/25 - verified with pt, LM for pharmacy re: this.  Davina Chawla RN  [facility]

## 2019-05-14 ENCOUNTER — TELEPHONE (OUTPATIENT)
Dept: FAMILY MEDICINE | Facility: CLINIC | Age: 64
End: 2019-05-14

## 2019-05-14 DIAGNOSIS — B30.9 ACUTE VIRAL CONJUNCTIVITIS OF LEFT EYE: Primary | ICD-10-CM

## 2019-05-14 RX ORDER — GLIPIZIDE 2.5 MG/1
TABLET, EXTENDED RELEASE ORAL
Qty: 60 TABLET | Refills: 0 | OUTPATIENT
Start: 2019-05-14

## 2019-05-14 RX ORDER — POLYMYXIN B SULFATE AND TRIMETHOPRIM 1; 10000 MG/ML; [USP'U]/ML
SOLUTION OPHTHALMIC
Qty: 1 BOTTLE | Refills: 0 | Status: SHIPPED | OUTPATIENT
Start: 2019-05-14 | End: 2020-03-05

## 2019-05-14 NOTE — TELEPHONE ENCOUNTER
Spoke to pt today re; refill questions. States she has had mattering of left eye for several days. Notes left eye itchy and burning at times. Notes slight red color to sclera. Reports eye matted shut last 2 mornings. Drainage is yellowish in color. Has had pink eye in past, and feels she has this again. Does not report any visual problems; does not have a cold.   Per protocol for conjunctivitis - OK for polytrim drops for treatment. Script to pharmacy - pt will call back prn.  Davina Chawla RN  [facility]

## 2019-05-20 ENCOUNTER — TELEPHONE (OUTPATIENT)
Dept: OTHER | Facility: CLINIC | Age: 64
End: 2019-05-20

## 2019-05-20 ENCOUNTER — OFFICE VISIT (OUTPATIENT)
Dept: FAMILY MEDICINE | Facility: CLINIC | Age: 64
End: 2019-05-20
Payer: COMMERCIAL

## 2019-05-20 VITALS
DIASTOLIC BLOOD PRESSURE: 82 MMHG | HEIGHT: 63 IN | SYSTOLIC BLOOD PRESSURE: 148 MMHG | BODY MASS INDEX: 29.59 KG/M2 | OXYGEN SATURATION: 98 % | WEIGHT: 167 LBS | HEART RATE: 93 BPM

## 2019-05-20 DIAGNOSIS — L98.9 SKIN LESION: ICD-10-CM

## 2019-05-20 DIAGNOSIS — H10.32 ACUTE CONJUNCTIVITIS OF LEFT EYE, UNSPECIFIED ACUTE CONJUNCTIVITIS TYPE: ICD-10-CM

## 2019-05-20 DIAGNOSIS — I83.93 VARICOSE VEINS OF BOTH LOWER EXTREMITIES, UNSPECIFIED WHETHER COMPLICATED: Primary | ICD-10-CM

## 2019-05-20 ASSESSMENT — MIFFLIN-ST. JEOR: SCORE: 1282.13

## 2019-05-20 NOTE — PROGRESS NOTES
Karine Moss is a 64 year old female here for the following issues:    Eye problem  For the past 2 weeks, she has had mattering of her left eye. The drainage was yellow in color, and her eye was matted shut in the mornings. Her left eye has also been intermittently itching and burning. She called the clinic on 5/14/19, and I prescribed Polytrim drops. She reports that the mattering has improved with the eye drops, but has not completely resolved.    Skin concerns  She developed a lesion on her right forearm about 2 weeks ago, which consisted of a large, flat, dark purplish-red area on her arm. The area was painful, and would bleed if she bumped it. The discoloration has now resolved. She does not recall an injury to the area. She denies any abnormal bleeding. She takes aspirin 81mg daily.    She also reports that she has varicose and spider veins on her legs, which have worsened recently. She denies any pain. She is interested in treatment options.    Forms  She has 2 forms to fill out for [facility], verifying that she has completed her mammogram and hemoglobin A1c so she can receive a gift card.    Lab result  Her urine albumin was high when last checked on 4/12/19. I advised her to continue with her losartan 100mg. She has been taking the medication consistently, but forgot her dose last night, so blood pressure is elevated today.     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)     Keratoconus     Hyperlipidemia LDL goal <100     Obstructive sleep apnea     Vitamin D insufficiency     Major depression in partial remission (H)     Plantar warts     Low back pain     DJD (degenerative joint disease) of knee     Pancreatitis     Panic     Chest pain     IBS (irritable bowel syndrome)     Heart murmur     Hypertension goal BP (blood pressure) < 140/90     Type 2 diabetes mellitus without complication, with  long-term current use of insulin (H)     Osteopenia of hip       Current Outpatient Medications   Medication Sig Dispense Refill     amphetamine-dextroamphetamine (ADDERALL) 30 MG tablet Take 1.5 tablets bid, Rx from Dr. Sam, [place] LUNG 90 tablet 0     ASPIRIN LOW DOSE 81 MG EC tablet TAKE 1 TABLET BY MOUTH DAILY 90 tablet 2     blood glucose (NO BRAND SPECIFIED) lancets standard Use to test blood sugar 4 times daily or as directed. Dispense appropriate lancets for meter. 360 each 3     blood glucose (NO BRAND SPECIFIED) test strip Use to test blood sugar 4 times daily or as directed. To accompany: Blood Glucose Monitor Brands: per insurance. 360 strip 3     blood glucose (NO BRAND SPECIFIED) test strip Use to test blood sugar 4 times daily or as directed. To accompany: Blood Glucose Monitor Brands: per insurance. 120 strip 11     blood glucose calibration (NO BRAND SPECIFIED) solution Use to calibrate blood glucose monitor as needed as directed. To accompany: Blood Glucose Monitor Brands: per insurance. 1 Bottle 3     blood glucose monitoring (NO BRAND SPECIFIED) meter device kit Use to test blood sugar 4 times daily or as directed. Dispense One-Touch Verio IQ Kit. 1 kit 0     cevimeline (EVOXAC) 30 MG capsule Take 1 capsule (30 mg) by mouth 3 times daily 270 capsule 3     cholecalciferol (VITAMIN D3) 1000 UNIT tablet Take 1 tablet (1,000 Units) by mouth daily 100 tablet 3     gabapentin (NEURONTIN) 400 MG capsule Take 2 po bid, Dr. Sam prescribing 360 capsule 3     glipiZIDE (GLUCOTROL XL) 2.5 MG 24 hr tablet Take one daily, increase dose to 2 daily if fasting sugar is >200 60 tablet 0     losartan (COZAAR) 100 MG tablet Take 1 tablet (100 mg) by mouth daily 90 tablet 3     metFORMIN (GLUCOPHAGE-XR) 500 MG 24 hr tablet Take 4 po q am with breakfast 360 tablet 3     traZODone (DESYREL) 50 MG tablet Take 2 po q hs 180 tablet 3     trimethoprim-polymyxin b (POLYTRIM) 45203-3.1 UNIT/ML-% ophthalmic solution Instill 2  "drops into affected eye 4 times daily until 24 hours after symptoms gone 1 Bottle 0       Allergies   Allergen Reactions     Nsaids GI Disturbance     Pt had bariatric surgery, should not ever take oral NSAIDS due to risk of gastric ulcers.        EXAM  /82   Pulse 93   Ht 1.609 m (5' 3.35\")   Wt 75.8 kg (167 lb)   SpO2 98%   BMI 29.26 kg/m    Gen: Alert, pleasant, NAD  HEENT:  Conjunctiva nl, TM normal bilaterally, OP clear, no posterior erythema  COR: S1,S2, no murmur  Skin: Healing, scabbed lesion without surrounding erythema at right forearm.   Spider veins and varicose veins present at the medial thigh and calf, near knees on bilateral lower extremities.      Assessment:  (I83.93) Varicose veins of both lower extremities, unspecified whether complicated  (primary encounter diagnosis)  Comment: asymptomatic, would like referral for treatment  Plan: Vascular Surgery Referral - [facility]        Discussed compression hose. Referred to [facility].      (H10.9) Conjunctivitis  Comment: left eye treated with Polytrim drops on 5/14/19, improving  Plan: Use drops for one additional day    (L98.9) Skin lesion  Comment: right forearm, scabbed lesion, likely resolving bruise. No other symptoms, no other lesions.   Plan: no further workup indicated, reassurance.       Anay Martinez MD  Internal Medicine    I, Ivy Patel, am serving as a scribe to document services personally performed by Dr. Anay Martinez, based on data collection and the provider's statements to me. Dr. Martinez has reviewed, edited, and approved the above note.     "

## 2019-05-20 NOTE — TELEPHONE ENCOUNTER
"Referral received via [facility] \"Work Que\", per  guidelines referral forwarded to [facility].     Yari Dasilva, DELIAN, RN    "

## 2019-05-20 NOTE — NURSING NOTE
"64 year old  Chief Complaint   Patient presents with     Eye Problem     possible pink eye for ablut 2 weeks     Derm Problem     pt reports some red blotches that has since gone away but blotches were painful. Also has some dark veins on her legs she would like looked at.     Forms       Blood pressure 149/84, pulse 93, height 1.609 m (5' 3.35\"), weight 75.8 kg (167 lb), SpO2 98 %. Body mass index is 29.26 kg/m .  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)     Keratoconus     Hyperlipidemia LDL goal <100     Obstructive sleep apnea     Vitamin D insufficiency     Major depression in partial remission (H)     Plantar warts     Low back pain     DJD (degenerative joint disease) of knee     Pancreatitis     Panic     Chest pain     IBS (irritable bowel syndrome)     Heart murmur     Hypertension goal BP (blood pressure) < 140/90     Type 2 diabetes mellitus without complication, with long-term current use of insulin (H)     Osteopenia of hip       Wt Readings from Last 2 Encounters:   05/20/19 75.8 kg (167 lb)   04/25/19 75 kg (165 lb 4 oz)     BP Readings from Last 3 Encounters:   05/20/19 149/84   04/25/19 123/81   04/12/19 143/89         Current Outpatient Medications   Medication     amphetamine-dextroamphetamine (ADDERALL) 30 MG tablet     ASPIRIN LOW DOSE 81 MG EC tablet     blood glucose (NO BRAND SPECIFIED) lancets standard     blood glucose (NO BRAND SPECIFIED) test strip     blood glucose (NO BRAND SPECIFIED) test strip     blood glucose calibration (NO BRAND SPECIFIED) solution     blood glucose monitoring (NO BRAND SPECIFIED) meter device kit     cevimeline (EVOXAC) 30 MG capsule     cholecalciferol (VITAMIN D3) 1000 UNIT tablet     gabapentin (NEURONTIN) 400 MG capsule     glipiZIDE (GLUCOTROL XL) 2.5 MG 24 hr tablet     losartan (COZAAR) 100 MG tablet     metFORMIN (GLUCOPHAGE-XR) 500 MG 24 hr " tablet     traZODone (DESYREL) 50 MG tablet     trimethoprim-polymyxin b (POLYTRIM) 16793-9.1 UNIT/ML-% ophthalmic solution     No current facility-administered medications for this visit.        Social History     Tobacco Use     Smoking status: Never Smoker     Smokeless tobacco: Never Used   Substance Use Topics     Alcohol use: Yes     Comment: rare     Drug use: No       Health Maintenance Due   Topic Date Due     A1C  1955     HEPATITIS C SCREENING  1955     ADVANCED DIRECTIVE PLANNING  1955     DIABETIC EYE EXAM  1955     HIV SCREENING  03/11/1970     ZOSTER IMMUNIZATION (1 of 2) 03/11/2005       Lab Results   Component Value Date    PAP NIL 04/12/2019         May 20, 2019 3:36 PM

## 2019-05-21 NOTE — TELEPHONE ENCOUNTER
Rec'vd referral from PCP that patient needs to be seen at [facility] for varicose [facility]. I called ptPRINCESS.

## 2019-05-24 ENCOUNTER — PATIENT OUTREACH (OUTPATIENT)
Dept: FAMILY MEDICINE | Facility: CLINIC | Age: 64
End: 2019-05-24

## 2019-05-24 NOTE — PROGRESS NOTES
2nd attempt at follow up from face to face visit in April to check on coping and if any additional resources are needed.  Will attempt once more in 2 weeks if I do not hear from pt.     MARICEL Peraza

## 2019-05-29 ENCOUNTER — MYC MEDICAL ADVICE (OUTPATIENT)
Dept: FAMILY MEDICINE | Facility: CLINIC | Age: 64
End: 2019-05-29

## 2019-07-10 ENCOUNTER — TELEPHONE (OUTPATIENT)
Dept: FAMILY MEDICINE | Facility: CLINIC | Age: 64
End: 2019-07-10

## 2019-07-10 NOTE — TELEPHONE ENCOUNTER
[facility] Call Center    Phone Message    May a detailed message be left on voicemail: yes    Reason for Call: Medication Refill Request    Has the patient contacted the pharmacy for the refill? Yes   Name of medication being requested: Requesting a refill for test strips   Provider who prescribed the medication: Dr. Henderson   Pharmacy:    [address]    Date medication is needed: 07/22/2019         Action Taken: Message routed to:  [facility]: CINDY

## 2019-07-10 NOTE — TELEPHONE ENCOUNTER
Called Pharmacy, informed them that patient has refills on file with them, pharmacy confirmed this. No refill needed at this time.   Karen Sosa RN  07/10/19  3:46 PM

## 2019-09-05 ENCOUNTER — PATIENT OUTREACH (OUTPATIENT)
Dept: FAMILY MEDICINE | Facility: CLINIC | Age: 64
End: 2019-09-05
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
## 2019-09-29 ENCOUNTER — HEALTH MAINTENANCE LETTER (OUTPATIENT)
Age: 64
End: 2019-09-29

## 2019-10-01 ENCOUNTER — TELEPHONE (OUTPATIENT)
Dept: FAMILY MEDICINE | Facility: CLINIC | Age: 64
End: 2019-10-01

## 2019-10-01 NOTE — TELEPHONE ENCOUNTER
Called patient, LVM that she is due for an office visit with Dr. Martinez for DIABETES FOLLOW UP. Was due in clinic July/August. Please call and schedule.     Karen Sosa RN  10/01/19  3:05 PM

## 2019-10-04 ENCOUNTER — PATIENT OUTREACH (OUTPATIENT)
Dept: FAMILY MEDICINE | Facility: CLINIC | Age: 64
End: 2019-10-04
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
## 2019-10-14 ENCOUNTER — OFFICE VISIT (OUTPATIENT)
Dept: VASCULAR SURGERY | Facility: CLINIC | Age: 64
End: 2019-10-14
Payer: COMMERCIAL

## 2019-10-14 DIAGNOSIS — M79.662 PAIN OF LEFT LOWER LEG: Primary | ICD-10-CM

## 2019-10-14 PROCEDURE — 99202 OFFICE O/P NEW SF 15 MIN: CPT | Performed by: SURGERY

## 2019-10-14 NOTE — PROGRESS NOTES
[facility]: Adrianne Moss comes to see me today for evaluation of her legs.  This 64-year-old non-insulin-dependent diabetic has noted progressively painful legs and feet.  She has noticed problems when she walks that she is having difficulty feeling her feet and the position of her feet in space makes her balance more precarious.  She has had a history of localized dependent ankle edema for which she occasionally wears compression stockings.  She wanted to know if this was related to her spider veins or several of the small veins that she has on both legs.    Has been diagnosed in the past of having restless leg syndrome.  Her physicians gave her multiple medications which were not helpful and she is discontinued this.    She will wear the over-the-counter knee-high compression stockings.  She primarily wears these in the night thinking that the tingling and discomfort will get better.  She always feels that her distal feet and toes are very cold.  She is never had any ulcerations.    PMH: Medications: Metformin, losartan, aspirin,                                      Adderall,Celvimeline                Gastric bypass surgery in 2015 with significant weight                       loss.              Medical: Type II non-insulin-dependent diabetes recent                                         A1c= 6.5                               Hypertension.                             Narcolepsy-sleep apnea                            Restless leg syndrome    Non-smoker.  Lives independently.    Retired.  However when she is working she would stand for 8 to 10 hours.    Exam: Very pleasant woman.  Obviously frustrated.              Chest= clear               Cardiovascular= regular rate               Extremities= a few scattered 1 to 2 mm varicose veins but no clinical significance.  Spider telangiectasias are noted diffusely but more in the left distal thigh and medial knee area.  No distal edema.  No  ulcerations.  +3 palpable popliteal and posterior tibial pulses bilaterally.  Decreased 5.07SW probe sensation to the mid calf bilaterally.      Impression: #1.  Bilateral leg and foot discomfort.  This is not at all related to her varicose veins or spider veins.  Though she may desire sclerotherapy for spider veins for cosmetic reasons that will not help her leg discomfort and I discussed this with her at length today.  Her compression may help the dependent swelling but very unlikely to help with her leg discomfort.                        #2.  No evidence of PAD with excellent palpable popliteal and posterior tibial pulses bilaterally very adequate circulation.                          #3.  Symptoms are more compatible with diabetic peripheral neuropathy.  She has decreased sensation by 5.07 NSW probe which is protective sensation.  Discussed with the patient.  Would recommend with her pain which is likely related to worsening neuropathy that establishing care with a diabetic specialist who can give several options may be helpful.  She is tried gabapentin in the past with no help.     I spent 30 minutes with the patient today with over 50% in counseling.  No specific further vascular work-up is necessary.  PRN treatment of spider veins if she so desires.       Juan Manuel Smith MD     CC:  Dr Anay Martinez

## 2019-10-21 ENCOUNTER — PATIENT OUTREACH (OUTPATIENT)
Dept: FAMILY MEDICINE | Facility: CLINIC | Age: 64
End: 2019-10-21

## 2019-10-22 ENCOUNTER — OFFICE VISIT (OUTPATIENT)
Dept: FAMILY MEDICINE | Facility: CLINIC | Age: 64
End: 2019-10-22
Payer: COMMERCIAL

## 2019-10-22 VITALS
SYSTOLIC BLOOD PRESSURE: 145 MMHG | DIASTOLIC BLOOD PRESSURE: 97 MMHG | HEART RATE: 101 BPM | WEIGHT: 166.5 LBS | BODY MASS INDEX: 29.5 KG/M2 | HEIGHT: 63 IN | TEMPERATURE: 97.8 F | OXYGEN SATURATION: 99 %

## 2019-10-22 DIAGNOSIS — M79.672 PAIN IN BOTH FEET: ICD-10-CM

## 2019-10-22 DIAGNOSIS — E11.9 TYPE 2 DIABETES MELLITUS WITHOUT COMPLICATION, WITHOUT LONG-TERM CURRENT USE OF INSULIN (H): Primary | ICD-10-CM

## 2019-10-22 DIAGNOSIS — I10 HYPERTENSION GOAL BP (BLOOD PRESSURE) < 140/90: ICD-10-CM

## 2019-10-22 DIAGNOSIS — Z23 FLU VACCINE NEED: ICD-10-CM

## 2019-10-22 DIAGNOSIS — M79.671 PAIN IN BOTH FEET: ICD-10-CM

## 2019-10-22 DIAGNOSIS — G60.9 IDIOPATHIC PERIPHERAL NEUROPATHY: ICD-10-CM

## 2019-10-22 DIAGNOSIS — F34.1 DYSTHYMIC DISORDER: ICD-10-CM

## 2019-10-22 DIAGNOSIS — B07.0 PLANTAR WARTS: ICD-10-CM

## 2019-10-22 LAB
ALBUMIN SERPL-MCNC: 3.8 G/DL (ref 3.4–5)
ALP SERPL-CCNC: 93 U/L (ref 40–150)
ALT SERPL W P-5'-P-CCNC: 29 U/L (ref 0–50)
ANION GAP SERPL CALCULATED.3IONS-SCNC: 6 MMOL/L (ref 3–14)
AST SERPL W P-5'-P-CCNC: 20 U/L (ref 0–45)
BILIRUB SERPL-MCNC: 0.6 MG/DL (ref 0.2–1.3)
BUN SERPL-MCNC: 21 MG/DL (ref 7–30)
CALCIUM SERPL-MCNC: 9.4 MG/DL (ref 8.5–10.1)
CHLORIDE SERPL-SCNC: 108 MMOL/L (ref 94–109)
CO2 SERPL-SCNC: 26 MMOL/L (ref 20–32)
CREAT SERPL-MCNC: 0.94 MG/DL (ref 0.52–1.04)
DEPRECATED CALCIDIOL+CALCIFEROL SERPL-MC: 36 UG/L (ref 20–75)
GFR SERPL CREATININE-BSD FRML MDRD: 64 ML/MIN/{1.73_M2}
GLUCOSE SERPL-MCNC: 139 MG/DL (ref 70–99)
HBA1C MFR BLD: 6.7 % (ref 4.1–5.7)
POTASSIUM SERPL-SCNC: 4.8 MMOL/L (ref 3.4–5.3)
PROT SERPL-MCNC: 8.2 G/DL (ref 6.8–8.8)
SODIUM SERPL-SCNC: 139 MMOL/L (ref 133–144)
TSH SERPL DL<=0.005 MIU/L-ACNC: 1.69 MU/L (ref 0.4–4)
VIT B12 SERPL-MCNC: 236 PG/ML (ref 193–986)

## 2019-10-22 RX ORDER — ATORVASTATIN CALCIUM 20 MG/1
20 TABLET, FILM COATED ORAL DAILY
Qty: 90 TABLET | Refills: 3 | Status: SHIPPED | OUTPATIENT
Start: 2019-10-22 | End: 2020-09-05

## 2019-10-22 RX ORDER — AMLODIPINE BESYLATE 5 MG/1
5 TABLET ORAL DAILY
Qty: 90 TABLET | Refills: 0 | Status: SHIPPED | OUTPATIENT
Start: 2019-10-22 | End: 2019-12-26

## 2019-10-22 ASSESSMENT — MIFFLIN-ST. JEOR: SCORE: 1279.86

## 2019-10-22 NOTE — Clinical Note
Livan Bishop, refer to my note from 10/22 and 4/26 note from Patrizia. She has an appt with you scheduled in Nov. May need some additional resources. Thanks very much, Anay

## 2019-10-22 NOTE — NURSING NOTE
"64 year old  Chief Complaint   Patient presents with     Diabetes     check up. Pt reports her legs and feet are bothering her.       Blood pressure (!) 145/97, pulse 101, temperature 97.8  F (36.6  C), temperature source Oral, height 1.609 m (5' 3.35\"), weight 75.5 kg (166 lb 8 oz), SpO2 99 %. Body mass index is 29.17 kg/m .  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)     Keratoconus     Hyperlipidemia LDL goal <100     Obstructive sleep apnea     Vitamin D insufficiency     Major depression in partial remission (H)     Plantar warts     Low back pain     DJD (degenerative joint disease) of knee     Pancreatitis     Panic     Chest pain     IBS (irritable bowel syndrome)     Heart murmur     Hypertension goal BP (blood pressure) < 140/90     Type 2 diabetes mellitus without complication, with long-term current use of insulin (H)     Osteopenia of hip     Acute conjunctivitis of left eye       Wt Readings from Last 2 Encounters:   10/22/19 75.5 kg (166 lb 8 oz)   05/20/19 75.8 kg (167 lb)     BP Readings from Last 3 Encounters:   10/22/19 (!) 145/97   05/20/19 148/82   04/25/19 123/81         Current Outpatient Medications   Medication     amphetamine-dextroamphetamine (ADDERALL) 30 MG tablet     ASPIRIN LOW DOSE 81 MG EC tablet     blood glucose (NO BRAND SPECIFIED) lancets standard     blood glucose (NO BRAND SPECIFIED) test strip     blood glucose (NO BRAND SPECIFIED) test strip     blood glucose calibration (NO BRAND SPECIFIED) solution     cevimeline (EVOXAC) 30 MG capsule     cholecalciferol (VITAMIN D3) 1000 UNIT tablet     gabapentin (NEURONTIN) 400 MG capsule     glipiZIDE (GLUCOTROL XL) 2.5 MG 24 hr tablet     losartan (COZAAR) 100 MG tablet     metFORMIN (GLUCOPHAGE-XR) 500 MG 24 hr tablet     trimethoprim-polymyxin b (POLYTRIM) 61453-7.1 UNIT/ML-% ophthalmic solution     blood glucose monitoring " (NO BRAND SPECIFIED) meter device kit     traZODone (DESYREL) 50 MG tablet     No current facility-administered medications for this visit.        Social History     Tobacco Use     Smoking status: Never Smoker     Smokeless tobacco: Never Used   Substance Use Topics     Alcohol use: Yes     Comment: rare     Drug use: No       Health Maintenance Due   Topic Date Due     HEPATITIS C SCREENING  1955     ADVANCE CARE PLANNING  1955     EYE EXAM  1955     HIV SCREENING  03/11/1970     PNEUMOCOCCAL IMMUNIZATION 19-64 MEDIUM RISK (1 of 1 - PPSV23) 03/11/1974     ZOSTER IMMUNIZATION (1 of 2) 03/11/2005     INFLUENZA VACCINE (1) 09/01/2019     PHQ-9  10/12/2019     A1C  10/25/2019     DIABETIC FOOT EXAM  10/31/2019       Lab Results   Component Value Date    PAP NIL 04/12/2019 October 22, 2019 10:06 AM
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
## 2019-10-24 ASSESSMENT — ANXIETY QUESTIONNAIRES
GAD7 TOTAL SCORE: 7
6. BECOMING EASILY ANNOYED OR IRRITABLE: MORE THAN HALF THE DAYS
3. WORRYING TOO MUCH ABOUT DIFFERENT THINGS: NOT AT ALL
2. NOT BEING ABLE TO STOP OR CONTROL WORRYING: MORE THAN HALF THE DAYS
7. FEELING AFRAID AS IF SOMETHING AWFUL MIGHT HAPPEN: SEVERAL DAYS
5. BEING SO RESTLESS THAT IT IS HARD TO SIT STILL: NOT AT ALL
1. FEELING NERVOUS, ANXIOUS, OR ON EDGE: MORE THAN HALF THE DAYS

## 2019-10-24 ASSESSMENT — PATIENT HEALTH QUESTIONNAIRE - PHQ9: 5. POOR APPETITE OR OVEREATING: NOT AT ALL

## 2019-10-25 ASSESSMENT — ANXIETY QUESTIONNAIRES: GAD7 TOTAL SCORE: 7

## 2019-10-28 ENCOUNTER — PATIENT OUTREACH (OUTPATIENT)
Dept: FAMILY MEDICINE | Facility: CLINIC | Age: 64
End: 2019-10-28

## 2019-10-28 ENCOUNTER — HEALTH MAINTENANCE LETTER (OUTPATIENT)
Age: 64
End: 2019-10-28

## 2019-10-28 NOTE — PROGRESS NOTES
Called pt for f/u.  Pt has appointment with [facility] on 11/4.  LM encouraging pt to reach out with any needs.  Will follow up again in a week.

## 2019-11-04 ENCOUNTER — ALLIED HEALTH/NURSE VISIT (OUTPATIENT)
Dept: FAMILY MEDICINE | Facility: CLINIC | Age: 64
End: 2019-11-04
Payer: COMMERCIAL

## 2019-11-04 ENCOUNTER — OFFICE VISIT (OUTPATIENT)
Dept: BEHAVIORAL HEALTH | Facility: CLINIC | Age: 64
End: 2019-11-04
Payer: COMMERCIAL

## 2019-11-04 VITALS — HEART RATE: 81 BPM | DIASTOLIC BLOOD PRESSURE: 88 MMHG | SYSTOLIC BLOOD PRESSURE: 159 MMHG

## 2019-11-04 DIAGNOSIS — I10 HYPERTENSION GOAL BP (BLOOD PRESSURE) < 140/90: Primary | ICD-10-CM

## 2019-11-04 DIAGNOSIS — F43.10 PTSD (POST-TRAUMATIC STRESS DISORDER): ICD-10-CM

## 2019-11-04 ASSESSMENT — COLUMBIA-SUICIDE SEVERITY RATING SCALE - C-SSRS
TOTAL  NUMBER OF ABORTED OR SELF INTERRUPTED ATTEMPTS PAST 3 MONTHS: NO
5. HAVE YOU STARTED TO WORK OUT OR WORKED OUT THE DETAILS OF HOW TO KILL YOURSELF? DO YOU INTEND TO CARRY OUT THIS PLAN?: NO
2. HAVE YOU ACTUALLY HAD ANY THOUGHTS OF KILLING YOURSELF LIFETIME?: NO
4. HAVE YOU HAD THESE THOUGHTS AND HAD SOME INTENTION OF ACTING ON THEM?: NO
5. HAVE YOU STARTED TO WORK OUT OR WORKED OUT THE DETAILS OF HOW TO KILL YOURSELF? DO YOU INTEND TO CARRY OUT THIS PLAN?: NO
6. HAVE YOU EVER DONE ANYTHING, STARTED TO DO ANYTHING, OR PREPARED TO DO ANYTHING TO END YOUR LIFE?: NO
TOTAL  NUMBER OF INTERRUPTED ATTEMPTS LIFETIME: NO
ATTEMPT LIFETIME: NO
TOTAL  NUMBER OF INTERRUPTED ATTEMPTS PAST 3 MONTHS: NO
2. HAVE YOU ACTUALLY HAD ANY THOUGHTS OF KILLING YOURSELF?: NO
TOTAL  NUMBER OF ABORTED OR SELF INTERRUPTED ATTEMPTS PAST LIFETIME: NO
6. HAVE YOU EVER DONE ANYTHING, STARTED TO DO ANYTHING, OR PREPARED TO DO ANYTHING TO END YOUR LIFE?: NO
3. HAVE YOU BEEN THINKING ABOUT HOW YOU MIGHT KILL YOURSELF?: NO
4. HAVE YOU HAD THESE THOUGHTS AND HAD SOME INTENTION OF ACTING ON THEM?: NO
1. IN THE PAST MONTH, HAVE YOU WISHED YOU WERE DEAD OR WISHED YOU COULD GO TO SLEEP AND NOT WAKE UP?: NO
ATTEMPT PAST THREE MONTHS: NO

## 2019-11-04 ASSESSMENT — ANXIETY QUESTIONNAIRES
IF YOU CHECKED OFF ANY PROBLEMS ON THIS QUESTIONNAIRE, HOW DIFFICULT HAVE THESE PROBLEMS MADE IT FOR YOU TO DO YOUR WORK, TAKE CARE OF THINGS AT HOME, OR GET ALONG WITH OTHER PEOPLE: VERY DIFFICULT
5. BEING SO RESTLESS THAT IT IS HARD TO SIT STILL: NEARLY EVERY DAY
GAD7 TOTAL SCORE: 19
7. FEELING AFRAID AS IF SOMETHING AWFUL MIGHT HAPPEN: NEARLY EVERY DAY
6. BECOMING EASILY ANNOYED OR IRRITABLE: NEARLY EVERY DAY
1. FEELING NERVOUS, ANXIOUS, OR ON EDGE: NEARLY EVERY DAY
2. NOT BEING ABLE TO STOP OR CONTROL WORRYING: NEARLY EVERY DAY
3. WORRYING TOO MUCH ABOUT DIFFERENT THINGS: MORE THAN HALF THE DAYS

## 2019-11-04 ASSESSMENT — PATIENT HEALTH QUESTIONNAIRE - PHQ9
SUM OF ALL RESPONSES TO PHQ QUESTIONS 1-9: 7
5. POOR APPETITE OR OVEREATING: MORE THAN HALF THE DAYS

## 2019-11-04 NOTE — PROGRESS NOTES
"Karine Moss comes into clinic today at the request of Michelle Ordering Provider for BP Check: BP at visit: 159/88, a verage given for the 3 obtained BP's per clinic machine, HR 81.     3 BP's per protocol  were, 158/84, 159/91,  161/89    Pt requesting a BP check today while she was here to see our Mental Health Provider.  She was to have started amlodipine 5 mg daily from time of  last visit with MD on 10/22/19 when that pressure was 145/97.  Problem is ,  [facility] never sent medication yet, as it was not on her pack cycle creating schedule.?  I called [facility] and told the pt is to have it now, and should not wait until her \"cycle\" comes up .  They will mail med out by end of day today, so she should get it by tomorrow.  Pt instructed now to come back in for another BP check in a few weeks after on  Med for awhile.  Pt agrees to plan  Diastolic was better today.      This service provided today was under the supervising provider of the day michelle, who was available if needed.    Jessica Chandler RN    "
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
## 2019-11-05 ASSESSMENT — ANXIETY QUESTIONNAIRES: GAD7 TOTAL SCORE: 19

## 2019-11-07 ENCOUNTER — PATIENT OUTREACH (OUTPATIENT)
Dept: FAMILY MEDICINE | Facility: CLINIC | Age: 64
End: 2019-11-07

## 2019-11-08 NOTE — TELEPHONE ENCOUNTER
DIAGNOSIS: BILATERAL FOOT PAIN   APPOINTMENT DATE: 11/27/2019   NOTES STATUS DETAILS   OFFICE NOTE from referring provider Internal DR JOSEPH  10/22/2019   OFFICE NOTE from other specialist N/A    DISCHARGE SUMMARY from hospital N/A    DISCHARGE REPORT from the ER N/A    OPERATIVE REPORT N/A    MEDICATION LIST N/A    IMPLANT RECORD/STICKER N/A    LABS     CBC/DIFF N/A 04/12/2019     CULTURES N/A    INJECTIONS DONE IN RADIOLOGY N/A    MRI Internal 09/26/2019   CT SCAN N/A    XRAYS (IMAGES & REPORTS) Internal 08/20/2018   TUMOR     PATHOLOGY  Slides & report N/A

## 2019-11-12 PROBLEM — F43.10 PTSD (POST-TRAUMATIC STRESS DISORDER): Status: ACTIVE | Noted: 2019-11-12

## 2019-11-14 ENCOUNTER — TRANSFERRED RECORDS (OUTPATIENT)
Dept: HEALTH INFORMATION MANAGEMENT | Facility: CLINIC | Age: 64
End: 2019-11-14

## 2019-11-25 ENCOUNTER — PATIENT OUTREACH (OUTPATIENT)
Dept: FAMILY MEDICINE | Facility: CLINIC | Age: 64
End: 2019-11-25

## 2019-11-25 NOTE — PROGRESS NOTES
Called pt to check in and LM offering support and resources. Encouraged pt to return call if needing anything.  Will check back in 1 month.    MARICEL Peraza

## 2019-11-26 ENCOUNTER — TELEPHONE (OUTPATIENT)
Dept: WOUND CARE | Facility: CLINIC | Age: 64
End: 2019-11-26

## 2019-11-26 NOTE — TELEPHONE ENCOUNTER
Called pt to reschedule appt since Pat Marie no longer works in our clinic. Left [facility] for pt with clinic number to reschedule

## 2019-11-27 ENCOUNTER — PRE VISIT (OUTPATIENT)
Dept: ORTHOPEDICS | Facility: CLINIC | Age: 64
End: 2019-11-27

## 2019-12-23 DIAGNOSIS — I10 HYPERTENSION GOAL BP (BLOOD PRESSURE) < 140/90: ICD-10-CM

## 2019-12-23 NOTE — TELEPHONE ENCOUNTER
Last time prescribed: 10/22/19 , 90 tabs/caps x 0 refills  Last office visit: 10/22/19  Next appointment: No Future Appointment Scheduled    Medication is being filled for 1 time refill only due to:  Patient needs to be seen because needs BP check in clinic.   Davina Chawla RN  [facility]

## 2019-12-26 ENCOUNTER — PATIENT OUTREACH (OUTPATIENT)
Dept: FAMILY MEDICINE | Facility: CLINIC | Age: 64
End: 2019-12-26

## 2019-12-26 RX ORDER — AMLODIPINE BESYLATE 5 MG/1
5 TABLET ORAL DAILY
Qty: 90 TABLET | Refills: 0 | Status: SHIPPED | OUTPATIENT
Start: 2019-12-26 | End: 2020-03-20

## 2019-12-26 NOTE — PROGRESS NOTES
5th attempt to reach pt with no response. LM on pt's voicemail encouraging to call if she has any needs. No further follow up unless requested by PCP or patient.    ANNA PerazaSW

## 2020-01-09 NOTE — PROGRESS NOTES
Date of Service: 1/14/2020    Chief Complaint   Patient presents with     Consult     Bone spurs, verruca, neuropathy.            HPI: Karine is a 64 year old female who presents today for further evaluation of BL foot pain. She is a diabetic and has been for years. She relates that she was seen by a vascular physician and they thought that her pain was not associated with circulation, but with neuropathy. She has bone spurs on the dorsal feet that cause her pain. She was taking 2400mg gabapentin BID. She stopped this as it was not helping. She has an upcoming appt with neurology. Relates that she has a wart on the medial left heel that she cannot get cured. Has bone spurs that hurt in shoes on the tops of both feet.     Review of Systems: No n/v/d/f/c/ns/sob/cp    PMH:   Past Medical History:   Diagnosis Date     Arthritis      Chest pain     seeing Cardiology     Depression 1991    after 's death     Diabetes mellitus (H)      Diabetic renal disease (H)     microalbuminuria     DJD (degenerative joint disease) of knee      H/O vitamin D deficiency      Hypertension      IBS (irritable bowel syndrome)     diarrhea      Keratoconus      Low back pain      Narcolepsy 2007    Dx'd by Sleep specialist 347.00, pt discontinued Adderal mid Nov. 13 due to tacycardia concerns     Obesity      Obstructive sleep apnea     327.23, 3 sleep studies,Dr. [facility]     Pancreatitis     related to Victoza, also on Xyrem     Panic      RLS (restless legs syndrome)      Sinus tachycardia        PSxH:   Past Surgical History:   Procedure Laterality Date     CHOLECYSTECTOMY  2015     ear surgery  2002 and 01/2004     GASTRIC BYPASS  2013    [place]     HEMORRHOIDECTOMY  09/14/2000     LASIK BILATERAL       UVULOPALATOPHARYNGOPLASTY       UVVP         Allergies: Nsaids    SH:   Social History     Socioeconomic History     Marital status: Single     Spouse name: Not on file     Number of children: 1     Years of education: Not on  file     Highest education level: Not on file   Occupational History     Employer: [employer]     Comment: on disability   Social Needs     Financial resource strain: Not on file     Food insecurity:     Worry: Not on file     Inability: Not on file     Transportation needs:     Medical: Not on file     Non-medical: Not on file   Tobacco Use     Smoking status: Never Smoker     Smokeless tobacco: Never Used   Substance and Sexual Activity     Alcohol use: Yes     Comment: rare     Drug use: No     Sexual activity: Never   Lifestyle     Physical activity:     Days per week: Not on file     Minutes per session: Not on file     Stress: Not on file   Relationships     Social connections:     Talks on phone: Not on file     Gets together: Not on file     Attends Shinto service: Not on file     Active member of club or organization: Not on file     Attends meetings of clubs or organizations: Not on file     Relationship status: Not on file     Intimate partner violence:     Fear of current or ex partner: Not on file     Emotionally abused: Not on file     Physically abused: Not on file     Forced sexual activity: Not on file   Other Topics Concern     Parent/sibling w/ CABG, MI or angioplasty before 65F 55M? No   Social History Narrative    Daughter- 42,  since 1991    Drives only short distances due to narcolepsy            --------------------------------------------------------------------------------    Surgical History  Return To Top     Status Surgery Time Frame Comment Record Date     Inactive  ear surgery  1/04 5/27/2008      Inactive  eye surgery  2002    5/27/2008      Inactive  Hemorrhoidectomy  9/14/00 5/27/2008          --------------------------------------------------------------------------------    Food Allergy  Return To Top     Allergen Reaction Comment Record Date     * No known food allergies      10/28/2008           --------------------------------------------------------------------------------    Drug Allergy  Return To Top     Allergen Reaction Comment Record Date     Codeine  nausea    6/21/2007          --------------------------------------------------------------------------------    Environment Allergy  Return To Top     Allergen Reaction Comment Record Date     * No known environmental allergies      10/28/2008          --------------------------------------------------------------------------------    Social History  Return To Top     Question Answer Comment Record Date     Marital status      5/27/2008      Advance Directive or Living Will  Form Given to Patient    1/18/2010      Emotional Abuse  Yes    1/18/2010      Exercise  No    1/18/2010      Caffeine  Yes    5/27/2008      Physical Abuse  No    1/18/2010      Sealtbelts  Yes    1/18/2010      Sexual Abuse  No    1/18/2010      Breast/Testicle Self Check  No    1/18/2010      Number of children  1    1/18/2010      Living arrangements  House    1/18/2010      Number of adults in household  2    1/18/2010      Education level  High School Graduate    1/18/2010      Employment  Currently employed    1/18/2010      Tobacco history  Has never smoked or chewed tobacco    5/27/2008      Alcohol history  Currently drinks alcohol    5/27/2008      Has the patient ever used illegal drugs?  Has never used illegal drugs    5/27/2008          --------------------------------------------------------------------------------    History - Overall Remark: 3/21/2012 Return To Top         --------------------------------------------------------------------------------    Medical History Return To Top     Status Diagnosis Time Frame Comment Record Date     Active (250.00) - C - Diabetes mellitus, type II controlled      5/15/2012      Active (788.41) - C - Urinary frequency      4/17/2012      Active (250.02) - C - Diabetes mellitus, type II uncontrolled      3/6/2012       Active (278.00) - C - Obesity      2011      Active (250.00) - C - Diabetes mellitus, type II controlled      2011      Active (739.3) - C - Somatic dysfunction, lumbar region      2010      Active (739.5) - C - Somatic dysfunction, pelvic region      2010      Active (401.9) - C - Hypertension      2010      Active 268.9 UNSP VITAMIN D DEFICIENCY      2010      Active (695.3) - C - Rosacea      2010      Active 272.1 Hypertriglyceridemia      2010      Active 300.4 Depression with Anxiety (Dysthymic Disorder)      10/28/2008      Active 739.6 Somatic dysfunction, lower extremities      10/28/2008      Active 780.79 Fatigue      2008      Active 278.01 Obesity, morbid      2008      Active 347.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, 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      2008      Inactive  (462) - C - Pharyngitis, acute      1/15/2011      Inactive  250.02 Diabetes mellitus, type II uncontrolled      2010      Inactive  726.71 Tendinitis, achilles      10/28/2008      Inactive  (250.00) - C - Diabetes mellitus, type II controlled      10/28/2008      Inactive  (250.00) - C - Diabetes mellitus, type II controlled      2008      Inactive  V77.91 Screening, lipids      2008      Inactive  599.0 Urinary tract infection, unspec./pyuria (UTI)      2008      Inactive  V70.0 Routine Medical Exam      2008      Active Constipation      2008      Active Hemorrhoids      2008      Pancreatitis pancreatitis 2013-------------------------------------------------------------------------------    M        --------------------------------------------------------------------------------    Family History  Return To Top     Status Relationship Disease Comment Record Date     Alive Sister  *Denies any medical problems  3 sisters  2008      Alive Brother  *Denies any medical problems  2 brothers  2008         Father    Age of death 56  2010          Mother  Dementia  Age of death 82  5/27/2008          --------------------------------------------------------------------------------                           FH:   Family History   Problem Relation Age of Onset     Diabetes Father      Cancer - colorectal Maternal Grandmother      Cancer Daughter      Obesity Daughter         s/p lap band     Cancer Brother         lung     Hypertension Sister        Objective:  Data Unavailable Data Unavailable Data Unavailable Data Unavailable Data Unavailable 0 lbs 0 oz    Hemoglobin A1C   Date Value Ref Range Status   10/22/2019 6.7 (H) 4.1 - 5.7 % Final   04/25/2019 6.5 (H) 4.1 - 5.7 % Final   04/12/2019 6.2 (H) 4.1 - 5.7 % Final         PT and DP pulses are 2/4 bilaterally. CRT is instant. Positive pedal hair.   Gross sensation is diminished bilaterally. Protective sensation is diminished as demonstrated by a 5.07G monofilament. Right ankle and knee reflexes are decreased. Negative SLT. + Tinel's with percussion of the tibial and common peroneal nerves BL.   Equinus is noted bilaterally. No pain with active or passive ROM of the ankle, MTJ, 1st ray, or halluces bilaterally,. Painnoted at the base of the 1st met BL with dorsal osteophyte formation. Reduced BL 1st ray ROM.   Nails normal bilaterally. No open lesions are noted. Verruca noted to the medial left heel.     bilateral foot xrays indicated in 6 weightbearing views.    degenerative changes noted to the BL TN and 1st met-cuneiform joints. Spurring noted to the TN and 1st met-cuneiform. Retrocalcaneal and plantar calcaneal spurring noted.     MRi FINDINGS: The report is dictated assuming five lumbar-type vertebral  bodies, and radiographic correlation may be necessary.  The distal  spinal cord and cauda equina appear normal. [The bone marrow appears  normal.]  [The paraspinal soft tissues appear normal.]     T12-L1:  Degeneration of the disc. Mild annular disc bulge. Small left  central disc protrusion causing slight  mass effect on the dural sac.  Central canal and neural foramen are patent.      L1-L2:  Mild symmetric annular disc bulge. Central canal and neural  foramen are patent.     L2-L3:  Degeneration of the disc. Loss of disc space height. Mild  symmetric annular disc bulge. Central canal is normal. There is mild  bilateral foraminal stenosis due to the bulging disc.     L3-L4:  Degeneration of the disc. Loss of disc space height. Mild  symmetric annular disc bulge. Mild degenerative change in the facet  joints. Central canal lower limits of normal. Moderate right 2 mild  left foraminal stenosis due to the bulging disc.     L4-L5:  Degeneration of the disc. Loss of disc space height. Mild  symmetric annular disc bulge. Moderate degenerative change in the  facet joints. Mild foraminal stenosis due to the bulging disc loss of  disc space height.     L5-S1:  Degeneration of the disc. Loss of disc space height. Mild  annular disc bulge. Central canal normal. Mild-moderate degenerative  change in the right facet joint. Mild degenerative change left facet  joint. Central canal is normal. Mild foraminal stenosis due to the  bulging disc.                                                                      IMPRESSION:  Multilevel degenerative changes. No focal disc  herniations are seen.     JORGE MOLINA MD    Assessment: Karine is a 63 YO with DM2 and neuropathy. Likely has some radiculopathic symptoms. Seeing neurology. Will defer to their dept for treatment.   Verruca plantaris - has used duct tape with good results. Recommend continuation of this. Will hold off on cryo as she is neuropathic. Rx for PO zinc.   Bone spurring - OA.    Plan:  - Pt seen and evaluated.  - XRs taken and discussed with her.  - Recommend f/u with neuro to discuss neuropathic tx.  - Rx for PO zinc for verruca.  - She would like to defer tx for the bone spurring.   - See again PRN,.

## 2020-01-14 ENCOUNTER — ANCILLARY PROCEDURE (OUTPATIENT)
Dept: GENERAL RADIOLOGY | Facility: CLINIC | Age: 65
End: 2020-01-14
Attending: PODIATRIST
Payer: COMMERCIAL

## 2020-01-14 ENCOUNTER — OFFICE VISIT (OUTPATIENT)
Dept: ORTHOPEDICS | Facility: CLINIC | Age: 65
End: 2020-01-14
Payer: COMMERCIAL

## 2020-01-14 DIAGNOSIS — M79.672 PAIN IN BOTH FEET: ICD-10-CM

## 2020-01-14 DIAGNOSIS — M19.072 PRIMARY OSTEOARTHRITIS OF BOTH FEET: ICD-10-CM

## 2020-01-14 DIAGNOSIS — M79.671 PAIN IN BOTH FEET: ICD-10-CM

## 2020-01-14 DIAGNOSIS — B07.0 VERRUCA PLANTARIS: Primary | ICD-10-CM

## 2020-01-14 DIAGNOSIS — M19.071 PRIMARY OSTEOARTHRITIS OF BOTH FEET: ICD-10-CM

## 2020-01-14 RX ORDER — ZINC SULFATE 50(220)MG
220 CAPSULE ORAL 2 TIMES DAILY
Qty: 60 CAPSULE | Refills: 1 | Status: SHIPPED | OUTPATIENT
Start: 2020-01-14 | End: 2020-09-04

## 2020-01-14 NOTE — LETTER
1/14/2020       RE: Karine Moss  [address]  [facility] [address]     Dear Colleague,    Thank you for referring your patient, Karine Moss, to the [facility] at [facility]. Please see a copy of my visit note below.    Date of Service: 1/14/2020    Chief Complaint   Patient presents with     Consult     Bone spurs, verruca, neuropathy.            HPI: Karine is a 64 year old female who presents today for further evaluation of BL foot pain. She is a diabetic and has been for years. She relates that she was seen by a vascular physician and they thought that her pain was not associated with circulation, but with neuropathy. She has bone spurs on the dorsal feet that cause her pain. She was taking 2400mg gabapentin BID. She stopped this as it was not helping. She has an upcoming appt with neurology. Relates that she has a wart on the medial left heel that she cannot get cured. Has bone spurs that hurt in shoes on the tops of both feet.     Review of Systems: No n/v/d/f/c/ns/sob/cp    PMH:   Past Medical History:   Diagnosis Date     Arthritis      Chest pain     seeing Cardiology     Depression 1991    after 's death     Diabetes mellitus (H)      Diabetic renal disease (H)     microalbuminuria     DJD (degenerative joint disease) of knee      H/O vitamin D deficiency      Hypertension      IBS (irritable bowel syndrome)     diarrhea      Keratoconus      Low back pain      Narcolepsy 2007    Dx'd by Sleep specialist 347.00, pt discontinued Adderal mid Nov. 13 due to tacycardia concerns     Obesity      Obstructive sleep apnea     327.23, 3 sleep studies,Dr. [facility]     Pancreatitis     related to Victoza, also on Xyrem     Panic      RLS (restless legs syndrome)      Sinus tachycardia        PSxH:   Past Surgical History:   Procedure Laterality Date     CHOLECYSTECTOMY  2015     ear surgery  2002 and 01/2004     GASTRIC BYPASS  2013     [place]     HEMORRHOIDECTOMY  09/14/2000     LASIK BILATERAL       UVULOPALATOPHARYNGOPLASTY       UVVP         Allergies: Nsaids    SH:   Social History     Socioeconomic History     Marital status: Single     Spouse name: Not on file     Number of children: 1     Years of education: Not on file     Highest education level: Not on file   Occupational History     Employer: [employer]     Comment: on disability   Social Needs     Financial resource strain: Not on file     Food insecurity:     Worry: Not on file     Inability: Not on file     Transportation needs:     Medical: Not on file     Non-medical: Not on file   Tobacco Use     Smoking status: Never Smoker     Smokeless tobacco: Never Used   Substance and Sexual Activity     Alcohol use: Yes     Comment: rare     Drug use: No     Sexual activity: Never   Lifestyle     Physical activity:     Days per week: Not on file     Minutes per session: Not on file     Stress: Not on file   Relationships     Social connections:     Talks on phone: Not on file     Gets together: Not on file     Attends Yazidism service: Not on file     Active member of club or organization: Not on file     Attends meetings of clubs or organizations: Not on file     Relationship status: Not on file     Intimate partner violence:     Fear of current or ex partner: Not on file     Emotionally abused: Not on file     Physically abused: Not on file     Forced sexual activity: Not on file   Other Topics Concern     Parent/sibling w/ CABG, MI or angioplasty before 65F 55M? No   Social History Narrative    Daughter- 42,  since 1991    Drives only short distances due to narcolepsy            --------------------------------------------------------------------------------    Surgical History  Return To Top     Status Surgery Time Frame Comment Record Date     Inactive  ear surgery  1/04 5/27/2008      Inactive  eye surgery  2002 5/27/2008      Inactive  Hemorrhoidectomy  9/14/00     5/27/2008          --------------------------------------------------------------------------------    Food Allergy  Return To Top     Allergen Reaction Comment Record Date     * No known food allergies      10/28/2008          --------------------------------------------------------------------------------    Drug Allergy  Return To Top     Allergen Reaction Comment Record Date     Codeine  nausea    6/21/2007          --------------------------------------------------------------------------------    Environment Allergy  Return To Top     Allergen Reaction Comment Record Date     * No known environmental allergies      10/28/2008          --------------------------------------------------------------------------------    Social History  Return To Top     Question Answer Comment Record Date     Marital status      5/27/2008      Advance Directive or Living Will  Form Given to Patient    1/18/2010      Emotional Abuse  Yes    1/18/2010      Exercise  No    1/18/2010      Caffeine  Yes    5/27/2008      Physical Abuse  No    1/18/2010      Sealtbelts  Yes    1/18/2010      Sexual Abuse  No    1/18/2010      Breast/Testicle Self Check  No    1/18/2010      Number of children  1    1/18/2010      Living arrangements  House    1/18/2010      Number of adults in household  2    1/18/2010      Education level  High School Graduate    1/18/2010      Employment  Currently employed    1/18/2010      Tobacco history  Has never smoked or chewed tobacco    5/27/2008      Alcohol history  Currently drinks alcohol    5/27/2008      Has the patient ever used illegal drugs?  Has never used illegal drugs    5/27/2008          --------------------------------------------------------------------------------    History - Overall Remark: 3/21/2012 Return To Top         --------------------------------------------------------------------------------    Medical History Return To Top     Status Diagnosis Time Frame Comment Record  Date     Active (250.00) - C - Diabetes mellitus, type II controlled      5/15/2012      Active (788.41) - C - Urinary frequency      4/17/2012      Active (250.02) - C - Diabetes mellitus, type II uncontrolled      3/6/2012      Active (278.00) - C - Obesity      2/14/2011      Active (250.00) - C - Diabetes mellitus, type II controlled      2/14/2011      Active (739.3) - C - Somatic dysfunction, lumbar region      8/16/2010      Active (739.5) - C - Somatic dysfunction, pelvic region      8/16/2010      Active (401.9) - C - Hypertension      2/8/2010      Active 268.9 UNSP VITAMIN D DEFICIENCY      1/18/2010      Active (695.3) - C - Rosacea      1/18/2010      Active 272.1 Hypertriglyceridemia      1/18/2010      Active 300.4 Depression with Anxiety (Dysthymic Disorder)      10/28/2008      Active 739.6 Somatic dysfunction, lower extremities      10/28/2008      Active 780.79 Fatigue      6/23/2008      Active 278.01 Obesity, morbid      6/19/2008      Active 347.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, 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      6/19/2008      Inactive  (462) - C - Pharyngitis, acute      1/15/2011      Inactive  250.02 Diabetes mellitus, type II uncontrolled      1/18/2010      Inactive  726.71 Tendinitis, achilles      10/28/2008      Inactive  (250.00) - C - Diabetes mellitus, type II controlled      10/28/2008      Inactive  (250.00) - C - Diabetes mellitus, type II controlled      6/23/2008      Inactive  V77.91 Screening, lipids      6/23/2008      Inactive  599.0 Urinary tract infection, unspec./pyuria (UTI)      6/23/2008      Inactive  V70.0 Routine Medical Exam      6/19/2008      Active Constipation      5/27/2008      Active Hemorrhoids      5/27/2008      Pancreatitis pancreatitis 2013 April     --------------------------------------------------------------------------------    M        --------------------------------------------------------------------------------    Family History  Return To Top     Status Relationship Disease  Comment Record Date     Alive Sister  *Denies any medical problems  3 sisters  2008      Alive Brother  *Denies any medical problems  2 brothers  2008         Father    Age of death 56  2010         Mother  Dementia  Age of death 82  2008          --------------------------------------------------------------------------------                           FH:   Family History   Problem Relation Age of Onset     Diabetes Father      Cancer - colorectal Maternal Grandmother      Cancer Daughter      Obesity Daughter         s/p lap band     Cancer Brother         lung     Hypertension Sister        Objective:  Data Unavailable Data Unavailable Data Unavailable Data Unavailable Data Unavailable 0 lbs 0 oz    Hemoglobin A1C   Date Value Ref Range Status   10/22/2019 6.7 (H) 4.1 - 5.7 % Final   2019 6.5 (H) 4.1 - 5.7 % Final   2019 6.2 (H) 4.1 - 5.7 % Final         PT and DP pulses are 2/4 bilaterally. CRT is instant. Positive pedal hair.   Gross sensation is diminished bilaterally. Protective sensation is diminished as demonstrated by a 5.07G monofilament. Right ankle and knee reflexes are decreased. Negative SLT. + Tinel's with percussion of the tibial and common peroneal nerves BL.   Equinus is noted bilaterally. No pain with active or passive ROM of the ankle, MTJ, 1st ray, or halluces bilaterally,. Painnoted at the base of the 1st met BL with dorsal osteophyte formation. Reduced BL 1st ray ROM.   Nails normal bilaterally. No open lesions are noted. Verruca noted to the medial left heel.     bilateral foot xrays indicated in 6 weightbearing views.    degenerative changes noted to the BL TN and 1st met-cuneiform joints. Spurring noted to the TN and 1st met-cuneiform. Retrocalcaneal and plantar calcaneal spurring noted.     MRi FINDINGS: The report is dictated assuming five lumbar-type vertebral  bodies, and radiographic correlation may be necessary.  The distal  spinal  cord and cauda equina appear normal. [The bone marrow appears  normal.]  [The paraspinal soft tissues appear normal.]     T12-L1:  Degeneration of the disc. Mild annular disc bulge. Small left  central disc protrusion causing slight mass effect on the dural sac.  Central canal and neural foramen are patent.      L1-L2:  Mild symmetric annular disc bulge. Central canal and neural  foramen are patent.     L2-L3:  Degeneration of the disc. Loss of disc space height. Mild  symmetric annular disc bulge. Central canal is normal. There is mild  bilateral foraminal stenosis due to the bulging disc.     L3-L4:  Degeneration of the disc. Loss of disc space height. Mild  symmetric annular disc bulge. Mild degenerative change in the facet  joints. Central canal lower limits of normal. Moderate right 2 mild  left foraminal stenosis due to the bulging disc.     L4-L5:  Degeneration of the disc. Loss of disc space height. Mild  symmetric annular disc bulge. Moderate degenerative change in the  facet joints. Mild foraminal stenosis due to the bulging disc loss of  disc space height.     L5-S1:  Degeneration of the disc. Loss of disc space height. Mild  annular disc bulge. Central canal normal. Mild-moderate degenerative  change in the right facet joint. Mild degenerative change left facet  joint. Central canal is normal. Mild foraminal stenosis due to the  bulging disc.                                                                      IMPRESSION:  Multilevel degenerative changes. No focal disc  herniations are seen.     JORGE MOLINA MD    Assessment: Karine is a 65 YO with DM2 and neuropathy. Likely has some radiculopathic symptoms. Seeing neurology. Will defer to their dept for treatment.   Verruca plantaris - has used duct tape with good results. Recommend continuation of this. Will hold off on cryo as she is neuropathic. Rx for PO zinc.   Bone spurring - OA.    Plan:  - Pt seen and evaluated.  - XRs taken and discussed with  her.  - Recommend f/u with neuro to discuss neuropathic tx.  - Rx for PO zinc for verruca.  - She would like to defer tx for the bone spurring.   - See again PRN,.              Again, thank you for allowing me to participate in the care of your patient.      Sincerely,    Burak Mendez DPM

## 2020-01-14 NOTE — LETTER
Date:January 15, 2020      Patient was self referred, no letter generated. Do not send.        [facility] Health Information

## 2020-01-31 ENCOUNTER — TELEPHONE (OUTPATIENT)
Dept: FAMILY MEDICINE | Facility: CLINIC | Age: 65
End: 2020-01-31

## 2020-01-31 NOTE — TELEPHONE ENCOUNTER
"Called patient to encourage her to schedule DM follow up appointment. She agrees to do this and then proceeds to say that her blood glucose meter is giving her inconsistent results, sometimes it will be > 600 and when she takes it again it will be in the 200's. When asked what her blood glucose levels typically run, she reports \"in the 200's and I think it is my blood pressure medication that is causing my readings to be goofy\".     Karen Sosa RN  01/31/20  3:27 PM    "

## 2020-02-06 ENCOUNTER — OFFICE VISIT (OUTPATIENT)
Dept: FAMILY MEDICINE | Facility: CLINIC | Age: 65
End: 2020-02-06
Payer: COMMERCIAL

## 2020-02-06 VITALS
HEART RATE: 98 BPM | OXYGEN SATURATION: 99 % | BODY MASS INDEX: 28.98 KG/M2 | SYSTOLIC BLOOD PRESSURE: 122 MMHG | TEMPERATURE: 98 F | WEIGHT: 169.75 LBS | HEIGHT: 64 IN | DIASTOLIC BLOOD PRESSURE: 80 MMHG | RESPIRATION RATE: 16 BRPM

## 2020-02-06 DIAGNOSIS — I10 HYPERTENSION GOAL BP (BLOOD PRESSURE) < 140/90: ICD-10-CM

## 2020-02-06 DIAGNOSIS — E11.9 TYPE 2 DIABETES MELLITUS WITHOUT COMPLICATION, WITHOUT LONG-TERM CURRENT USE OF INSULIN (H): Primary | ICD-10-CM

## 2020-02-06 DIAGNOSIS — M54.31 BILATERAL SCIATICA: ICD-10-CM

## 2020-02-06 DIAGNOSIS — B07.0 PLANTAR WARTS: ICD-10-CM

## 2020-02-06 DIAGNOSIS — E78.5 HYPERLIPIDEMIA LDL GOAL <100: ICD-10-CM

## 2020-02-06 DIAGNOSIS — E11.42 DIABETIC PERIPHERAL NEUROPATHY (H): ICD-10-CM

## 2020-02-06 DIAGNOSIS — M54.32 BILATERAL SCIATICA: ICD-10-CM

## 2020-02-06 LAB
ALT SERPL-CCNC: 24 U/L (ref 0–50)
AST SERPL-CCNC: 25 U/L (ref 0–45)
CHOLEST SERPL-MCNC: 111 MG/DL (ref 0–200)
CHOLEST/HDLC SERPL: 2.4 {RATIO} (ref 0–5)
CREAT UR-MCNC: 141 MG/DL
FASTING SPECIMEN: NO
GLUCOSE SERPL-MCNC: 164 MG/DL (ref 60–99)
HBA1C MFR BLD: 7.9 % (ref 4.1–5.7)
HDLC SERPL-MCNC: 47 MG/DL
LDLC SERPL CALC-MCNC: 29 MG/DL (ref 0–129)
MICROALBUMIN UR-MCNC: 215 MG/L
MICROALBUMIN/CREAT UR: 152.48 MG/G CR (ref 0–25)
TRIGL SERPL-MCNC: 179 MG/DL (ref 0–150)
VLDL-CHOLESTEROL: 36 (ref 7–32)

## 2020-02-06 RX ORDER — GLIPIZIDE 2.5 MG/1
TABLET, EXTENDED RELEASE ORAL
Qty: 90 TABLET | Refills: 3 | Status: SHIPPED | OUTPATIENT
Start: 2020-02-06 | End: 2020-03-05 | Stop reason: DRUGHIGH

## 2020-02-06 ASSESSMENT — MIFFLIN-ST. JEOR: SCORE: 1300.23

## 2020-02-06 NOTE — NURSING NOTE
"64 year old  Chief Complaint   Patient presents with     Diabetes     follow up, needs A1C     Derm Problem     warts on feet     Recheck Medication     talk about cholesterol medication       Blood pressure 122/80, pulse 98, temperature 98  F (36.7  C), temperature source Oral, resp. rate 16, height 1.618 m (5' 3.7\"), weight 77 kg (169 lb 12 oz), SpO2 99 %. Body mass index is 29.41 kg/m .  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)     Keratoconus     Hyperlipidemia LDL goal <100     Obstructive sleep apnea     Vitamin D insufficiency     Major depression in partial remission (H)     Plantar warts     Low back pain     DJD (degenerative joint disease) of knee     Pancreatitis     Panic     Chest pain     IBS (irritable bowel syndrome)     Heart murmur     Hypertension goal BP (blood pressure) < 140/90     Type 2 diabetes mellitus without complication, with long-term current use of insulin (H)     Osteopenia of hip     Acute conjunctivitis of left eye     PTSD (post-traumatic stress disorder)       Wt Readings from Last 2 Encounters:   02/06/20 77 kg (169 lb 12 oz)   10/22/19 75.5 kg (166 lb 8 oz)     BP Readings from Last 3 Encounters:   02/06/20 122/80   11/04/19 (!) 159/88   10/22/19 (!) 145/97         Current Outpatient Medications   Medication     amLODIPine (NORVASC) 5 MG tablet     amphetamine-dextroamphetamine (ADDERALL) 30 MG tablet     ASPIRIN LOW DOSE 81 MG EC tablet     atorvastatin (LIPITOR) 20 MG tablet     blood glucose (NO BRAND SPECIFIED) lancets standard     blood glucose (NO BRAND SPECIFIED) test strip     blood glucose (NO BRAND SPECIFIED) test strip     blood glucose calibration (NO BRAND SPECIFIED) solution     blood glucose monitoring (NO BRAND SPECIFIED) meter device kit     cevimeline (EVOXAC) 30 MG capsule     cholecalciferol (VITAMIN D3) 1000 UNIT tablet     glipiZIDE (GLUCOTROL " XL) 2.5 MG 24 hr tablet     losartan (COZAAR) 100 MG tablet     metFORMIN (GLUCOPHAGE-XR) 500 MG 24 hr tablet     trimethoprim-polymyxin b (POLYTRIM) 74256-3.1 UNIT/ML-% ophthalmic solution     zinc sulfate (ZINCATE) 220 (50 Zn) MG capsule     gabapentin (NEURONTIN) 400 MG capsule     No current facility-administered medications for this visit.        Social History     Tobacco Use     Smoking status: Never Smoker     Smokeless tobacco: Never Used   Substance Use Topics     Alcohol use: Yes     Comment: rare     Drug use: No       Health Maintenance Due   Topic Date Due     HEPATITIS C SCREENING  1955     ADVANCE CARE PLANNING  1955     EYE EXAM  1955     HIV SCREENING  03/11/1970     PNEUMOCOCCAL IMMUNIZATION 19-64 MEDIUM RISK (1 of 1 - PPSV23) 03/11/1974     ZOSTER IMMUNIZATION (1 of 2) 03/11/2005     DIABETIC FOOT EXAM  10/31/2019       Lab Results   Component Value Date    PAP NIL 04/12/2019 February 6, 2020 2:40 PM

## 2020-02-06 NOTE — PROGRESS NOTES
Karine Moss is a 64 year old female who presents to the clinic today for a recheck of    DIABETES  Here for Type II diabetes.  She is due for an eye exam. She had Lasik bilaterally in 1998. Retinopathy: none  Peripheral neuropathy: yes, chronic issue, see below  Weight: 3 pound increase since 10/2019  Wt Readings from Last 3 Encounters:   02/06/20 77 kg (169 lb 12 oz)   10/22/19 75.5 kg (166 lb 8 oz)   05/20/19 75.8 kg (167 lb)     Candidiasis/skin issues: She has 2 warts on her left foot, currently taking zinc as recommended by podiatry. She has thickened callous, painful. Would like treatment today with liquid N2 as it seemed to help last time.   UTI/ yeast infections: none    Frequency of FBS: Daily   General range of FBS: 150s-250s, inconsistent readings  She worries her meter is not working properly or that test strips are bad  Hypoglycemia: none    Lab Results   Component Value Date    A1C 6.7 10/22/2019    A1C 6.5 04/25/2019     Lab Results   Component Value Date    ALBUMIN 3.8 10/22/2019     DM Meds:   Metformin 2000mg daily, no longer taking Glipizide XL 2.5 mg.   Had been using glipizide only if fasting sugars were running high, rarely used it.   Compliance: good    Aspirin Use: yes    PMH, PSH, FH, medications, allergies and immunizations are updated this visit.      HYPERLIPIDEMIA  Recent Labs   Lab Test 04/12/19  1443 06/27/18  1439 10/09/13  1518   CHOL 173 178.0 180   HDL 49* 46.0* 29*   LDL 97 107.0 106   TRIG 135 120.0 225*   CHOLHDLRATIO  --  3.8 6.2*     LDL is in target range. Currently taking atorvastatin 20 mg daily. Compliant with medication. No reported myalgias or other side effects. She asks if this medication causes memory loss. She tells me she heard the medication was taken off the market in [place] due to the issues with memory loss. I let her know that it can be associated with memory loss in older individuals and benefits/risks need to be weighed. I am not aware of it being taken  "off the market in [place], would ask our pharmacist to help confirm these concerns.     HYPERTENSION  BP Readings from Last 3 Encounters:   02/06/20 122/80   11/04/19 (!) 159/88   10/22/19 (!) 145/97     Here for blood pressure check. She is currently on losartan 100 mg and amlodipine 5 mg daily. No current chest pain.  No THAPA. She is compliant with meds, no reported side effects. Blood pressure readings have been in target range.     Chronic peripheral neuropathy  Karine has a history of peripheral neuropathy for the past 5 years. Followed with podiatry at the [facility]. She has had no success with gabapentin. Reports she was taking 2400mg twice daily. She will be following with neurologist in March 2020. She does not recall taking depakote or lyrica. Unsure if these would help if gabapentin failed.     Chronic low back pain  Karine has chronic low back pain radiating to both lower extremities, (R>L). She has had back pain in the past and did physical therapy with improvement. She has not done physical therapy, reports cost was a factor.  She had an MRI on 09/2018 that showed multilevel degenerative changes and an XR on 08/2018 that showed mild multilevel spondylosis including grade 1 anterolisthesis at L4-5.    EXAM  /80 (BP Location: Right arm, Patient Position: Sitting, Cuff Size: Adult Regular)   Pulse 98   Temp 98  F (36.7  C) (Oral)   Resp 16   Ht 1.618 m (5' 3.7\")   Wt 77 kg (169 lb 12 oz)   SpO2 99%   BMI 29.41 kg/m    Gen: Alert, NAD, overweight  Cor: S1S2, no murmur  Lungs: CTA bilaterally  Abd: soft nontender +BS   Ext: no edema, pulses palpable  Skin: no rash   Feet: no ulcerations, Callouses present at heels . Sensation dampened over both foot to level of ankles bilaterally  Verrucous lesions x2 at the heel of the left foot.    Procedure Note:   Callouses (2) at left heel pared with #15 blade scalpel. Multiple freeze thaw with liquid N2 using cotton swab over central cores at location of " "plantar warts. Patient tolerated procedure.    Assessment:  (E11.9) Type 2 diabetes mellitus without complication, without long-term current use of insulin (H)  (primary encounter diagnosis)  Comment: Current glucose 180 on her meter, erratic readings at home  Plan: Hemoglobin A1c (Page), Albumin Random         Urine Quantitative with Creat Ratio        Consider CGM, recommend appointment with our pharmD. Encouraged her to call for an appt.  Addendum  Lab Results   Component Value Date    A1C 7.9 02/06/2020    A1C 6.7 10/22/2019   Continue metformin  Add glipizide XL 2.5mg daily x 2 wks. Monitor for hypoglycemia. If readings still >140 in am or 2 hr after meals then increase dose to 5mg daily.  RTC 3 months for next check.      (M54.5) Nonspecific low back pain  Comment: Bilateral radiculopathy  Plan: [facility] REFERR[facility]        Refer to PT, no success with gabapentin 2400 mg BID, keep appointment with neurologist in March. Maybe candidate for epidural injection or other nerve block.    (I10) Hypertension goal BP (blood pressure) < 140/90  Comment: Blood pressure in target range.  Plan: No changes in medications    (E78.5) Hyperlipidemia LDL goal <100  Comment: Currently on atorvastatin, she mentions concerns about this medication being \"taken off the market in [place]\" due to memory loss.  Plan: Lipid Panel Plus (Page)        Will investigate this concern and contact patient, continue medication for now.    (B07.0) Plantar warts  Comment: 2 treated today  Plan: Keep covered with bandage until completely healed. Return if needed.    Total visit time today 40 minutes.  More than 50% of the time was spent in counseling and coordination of care. Procedure occurred within that 40 min time frame.    Anay Martinez MD  Internal Medicine/Pediatrics    I, Hal Box, am serving as a scribe to document services personally performed by Dr. Anay Martinez, based on data collection and the " provider's statements to me. Dr. Martinez has reviewed, edited and approved the above note.

## 2020-02-11 DIAGNOSIS — Z79.4 TYPE 2 DIABETES MELLITUS WITHOUT COMPLICATION, WITH LONG-TERM CURRENT USE OF INSULIN (H): ICD-10-CM

## 2020-02-11 DIAGNOSIS — E11.9 TYPE 2 DIABETES MELLITUS WITHOUT COMPLICATION, WITH LONG-TERM CURRENT USE OF INSULIN (H): ICD-10-CM

## 2020-02-11 NOTE — TELEPHONE ENCOUNTER
Received a refill request from [employer] for glucose meter device. Chart review shows patient has Rx from April, 2019 sent to local pharmacy, and pharmacy verified that patient picked this up 4/19/19. Called [facility] and patient has requested a new device through that pharmacy.     Prescription approved per [facility] Refill Protocol.    Karen Sosa RN  02/11/20  10:51 AM

## 2020-02-19 DIAGNOSIS — B07.0 VERRUCA PLANTARIS: ICD-10-CM

## 2020-02-21 ENCOUNTER — THERAPY VISIT (OUTPATIENT)
Dept: PHYSICAL THERAPY | Facility: CLINIC | Age: 65
End: 2020-02-21
Attending: INTERNAL MEDICINE
Payer: COMMERCIAL

## 2020-02-21 DIAGNOSIS — M54.32 BILATERAL SCIATICA: ICD-10-CM

## 2020-02-21 DIAGNOSIS — M54.42 BILATERAL LOW BACK PAIN WITH BILATERAL SCIATICA: ICD-10-CM

## 2020-02-21 DIAGNOSIS — M54.31 BILATERAL SCIATICA: ICD-10-CM

## 2020-02-21 DIAGNOSIS — M54.41 BILATERAL LOW BACK PAIN WITH BILATERAL SCIATICA: ICD-10-CM

## 2020-02-21 PROCEDURE — 97110 THERAPEUTIC EXERCISES: CPT | Mod: GP | Performed by: PHYSICAL THERAPIST

## 2020-02-21 PROCEDURE — 97161 PT EVAL LOW COMPLEX 20 MIN: CPT | Mod: GP | Performed by: PHYSICAL THERAPIST

## 2020-02-21 NOTE — PROGRESS NOTES
[facility] Initial Evaluation  Subjective:    Therapist Generated HPI Evaluation  Problem details: Pt presents to PT with a chief complaint of chronic LBP w/ radiation into bilateral LE's for ~2 years. Primary pain location identified at bilateral lower lumbar spine with intermittent radiation into bilateral thighs and calfs. Pain reported to be worse with prolonged standing, walking, and prolonged sitting. Pain improved with PT in 2018. .         Type of problem:  Lumbar.    This is a chronic condition.  Condition occurred with:  Degenerative joint disease.  Where condition occurred: for unknown reasons.  Patient reports pain:  Lower lumbar spine.  Pain is described as aching and is intermittent.  Pain radiates to:  Gluteals left, gluteals right, thigh left, thigh right, lower leg left and lower leg right. Pain is worse in the P.M..  Since onset symptoms are gradually worsening.  Associated symptoms:  Loss of motion/stiffness. Symptoms are exacerbated by standing, walking, lifting and twisting  and relieved by rest and heat.  Special tests included:  X-ray.  Previous treatment includes physical therapy. There was moderate improvement following previous treatment.  Barriers include:  None as reported by patient.    Patient Entered Health History - See Therapist Evaluation below  Symptom: LBP.     Date of Onset: 2 years ago (MD 01/2020)   HPI: Documentation: insidious.  and reported as 8/10 on pain scale.  General health as reported by patient is fair.  Pertinent medical history includes: diabetes and high blood pressure.   Red flags:  None as reported by patient.  Medical allergies: latex.   Surgeries include:  Other (gastric).    Current medications:  High blood pressure medication.    Occupation: Retired.                     Physical Exam                    Objective:  Standing Alignment:        Lumbar:  Lordosis decr                           Lumbar/SI Evaluation  ROM:    AROM Lumbar:    Flexion:          Min loss, dec pain   Ext:                    Mod loss, pain +   Side Bend:        Left:     Right:   Rotation:           Left:  Mod loss, pain ++    Right:  Min loss, pain ++  Side Glide:        Left:     Right:           Lumbar Myotomes:  normal                Lumbar Dermtomes:  normal                  Lumbar Palpation:    Tenderness present at Left:    Gluteus Medius  Tenderness present at Right: Gluteus Medius  Functional Tests:  Core strength and proprioception lumbar: Decreased LBP w/ manual traction.          Spinal Segmental Conclusions:     Level: noted at L4 and L5                                                   General     ROS    Assessment/Plan:    Patient is a 64 year old female with lumbar complaints.    Patient has the following significant findings with corresponding treatment plan.                Diagnosis 1:  LBP  Pain -  hot/cold therapy, manual therapy, splint/taping/bracing/orthotics, self management, education and directional preference exercise, mech traction  Decreased ROM/flexibility - manual therapy and therapeutic exercise  Decreased joint mobility - manual therapy and therapeutic exercise  Decreased strength - therapeutic exercise and therapeutic activities  Impaired muscle performance - neuro re-education    Therapy Evaluation Codes:   Cumulative Therapy Evaluation is: Low complexity.    Previous and current functional limitations:  (See Goal Flow Sheet for this information)    Short term and Long term goals: (See Goal Flow Sheet for this information)     Communication ability:  Patient appears to be able to clearly communicate and understand verbal and written communication and follow directions correctly.  Treatment Explanation - The following has been discussed with the patient:   RX ordered/plan of care  Anticipated outcomes  Possible risks and side effects  This patient would benefit from PT intervention to resume normal activities.   Rehab potential is  good.    Frequency:  2 X week, once daily  Duration:  for 4 weeks tapering to 1 x week for 4 weeks  Discharge Plan:  Achieve all LTG.  Independent in home treatment program.  Reach maximal therapeutic benefit.    Please refer to the daily flowsheet for treatment today, total treatment time and time spent performing 1:1 timed codes.

## 2020-02-25 PROBLEM — M54.42 BILATERAL LOW BACK PAIN WITH BILATERAL SCIATICA: Status: ACTIVE | Noted: 2020-02-25

## 2020-02-25 PROBLEM — M54.41 BILATERAL LOW BACK PAIN WITH BILATERAL SCIATICA: Status: ACTIVE | Noted: 2020-02-25

## 2020-02-27 DIAGNOSIS — E11.9 TYPE 2 DIABETES MELLITUS WITHOUT COMPLICATION, WITHOUT LONG-TERM CURRENT USE OF INSULIN (H): Primary | ICD-10-CM

## 2020-02-27 RX ORDER — GLIPIZIDE 5 MG/1
5 TABLET, FILM COATED, EXTENDED RELEASE ORAL DAILY
Qty: 90 TABLET | Refills: 1 | Status: SHIPPED | OUTPATIENT
Start: 2020-02-27 | End: 2020-08-19

## 2020-03-05 ENCOUNTER — THERAPY VISIT (OUTPATIENT)
Dept: PHYSICAL THERAPY | Facility: CLINIC | Age: 65
End: 2020-03-05
Payer: COMMERCIAL

## 2020-03-05 ENCOUNTER — OFFICE VISIT (OUTPATIENT)
Dept: FAMILY MEDICINE | Facility: CLINIC | Age: 65
End: 2020-03-05
Payer: COMMERCIAL

## 2020-03-05 ENCOUNTER — MYC MEDICAL ADVICE (OUTPATIENT)
Dept: FAMILY MEDICINE | Facility: CLINIC | Age: 65
End: 2020-03-05

## 2020-03-05 DIAGNOSIS — E11.9 TYPE 2 DIABETES MELLITUS WITHOUT COMPLICATION, WITHOUT LONG-TERM CURRENT USE OF INSULIN (H): Primary | ICD-10-CM

## 2020-03-05 DIAGNOSIS — M54.42 BILATERAL LOW BACK PAIN WITH BILATERAL SCIATICA: ICD-10-CM

## 2020-03-05 DIAGNOSIS — M54.41 BILATERAL LOW BACK PAIN WITH BILATERAL SCIATICA: ICD-10-CM

## 2020-03-05 DIAGNOSIS — Z00.00 PREVENTATIVE HEALTH CARE: ICD-10-CM

## 2020-03-05 PROCEDURE — 97012 MECHANICAL TRACTION THERAPY: CPT | Mod: GP | Performed by: PHYSICAL THERAPIST

## 2020-03-05 PROCEDURE — 97112 NEUROMUSCULAR REEDUCATION: CPT | Mod: GP | Performed by: PHYSICAL THERAPIST

## 2020-03-05 PROCEDURE — 97110 THERAPEUTIC EXERCISES: CPT | Mod: GP | Performed by: PHYSICAL THERAPIST

## 2020-03-05 RX ORDER — FLASH GLUCOSE SENSOR
KIT MISCELLANEOUS
Qty: 2 EACH | Refills: 11 | Status: SHIPPED | OUTPATIENT
Start: 2020-03-05 | End: 2020-11-06

## 2020-03-05 RX ORDER — LANOLIN ALCOHOL/MO/W.PET/CERES
1000 CREAM (GRAM) TOPICAL DAILY
Qty: 90 TABLET | Refills: 3 | Status: SHIPPED | OUTPATIENT
Start: 2020-03-05 | End: 2021-02-12

## 2020-03-05 RX ORDER — FLASH GLUCOSE SCANNING READER
EACH MISCELLANEOUS
Qty: 1 EACH | Refills: 0 | Status: SHIPPED | OUTPATIENT
Start: 2020-03-05 | End: 2020-11-06

## 2020-03-05 NOTE — PROGRESS NOTES
"SUBJECTIVE:  Karine is a 64 year old female here for a follow up visit for Medication Therapy Management Services. Primary care provider is Anay Martinez. Was referred by her provider for medication management. Karine brought medications to the visit: no    Main concern today is blood sugars and current SMBG meter accuracy.    Allergies reviewed: Yes, as listed  Pt reports using the following medical devices: OneTouch BG meter  Pt reports the following barriers to care: none  Adverse reactions to medications: none  Concerns with medications: none  Overall medication experience: comfortable taking medications, would like it to be as simple as possible    Overall Adherence:Patient reports taking medication 3 times each day and reports missing this many doses never.  Patient does  a pill box to help manage medications - using [employer] services. Reports able to afford medications.    HTN - is taking amlodipine 5 mg and losartan 100 mg daily. Denies dizziness, lightheadedness, chest pain, SOB or falls. Discussed in detail the mechanism of action of each drug.     Hyperlipidemia - is taking atorvastatin 20 mg. Denies muscle aches or pains.     DM - Glipizide 2.5mg currently, increasing to 5 in next shipment from [employer]. Also taking 4 tablets of metformin 500 mg daily. States comfortable with these medications. Received new meter, OneTouch Verio Flex. States she doesn't like it as much as other OneTouch, doesn't give averages, just readings. Still things readings are not accurate, not sure why but numbers seem to be \"all over the place\" States she can feel lightheaded or dizzy when numbers are less than 100. Doesn't feel any different if numbers are over 200. Eating very small meals frequently throughout the day due to hx of gastric bypass, gets full quickly. Usually eats brunch around 9-10am, sometimes won't eat again until dinner. Maybe a snack inbetween. Snacks later at night after dinner. Goes to bed between " midnight-2am. Checks sugar in the AM, before dinner and before bed. Readings below. Interested in CGM to get more consistent readings.     AM:   206  99  124  146  162  110  156  157  125  125  125  215  102  173    Afternoon:   175  187  203  193  180  166  194  276  180  241  250  188    Evenin  142  149  95  146  120  182    Pain/neuropathy - Lower back pain with pinching of nerves. Pain is influencing movement throughout the day, not moving as much. Could be influencing sugars with less movement. Working through physical therapy. Capsacin as needed topically. Tried Aspercreme with less effectiveness.       Supplements: yes  Alcohol use: no  Tobacco use: no  Illicit drug use:no   Aspirin Use: no      OBJECTIVE:   Patient Care Team:  Anay Martinez MD as PCP - General (Internal Medicine)  Patrizia Garces LICSW as Lead Care Coordinator  Gabrielle Blas RPH as Pharmacist (Pharmacist)  Current Outpatient Medications   Medication Sig Dispense Refill     amLODIPine (NORVASC) 5 MG tablet Take 1 tablet (5 mg) by mouth daily Needs clinic visit for further refills 90 tablet 0     amphetamine-dextroamphetamine (ADDERALL) 30 MG tablet Take 1.5 tablets bid, Rx from Dr. Sam, [place] [facility] 90 tablet 0     ASPIRIN LOW DOSE 81 MG EC tablet TAKE 1 TABLET BY MOUTH DAILY 90 tablet 2     atorvastatin (LIPITOR) 20 MG tablet Take 1 tablet (20 mg) by mouth daily 90 tablet 3     blood glucose (NO BRAND SPECIFIED) lancets standard Use to test blood sugar 4 times daily or as directed. Dispense appropriate lancets for meter. 360 each 3     blood glucose (NO BRAND SPECIFIED) test strip Use to test blood sugar 4 times daily or as directed. To accompany: Blood Glucose Monitor Brands: per insurance. 360 strip 3     blood glucose (NO BRAND SPECIFIED) test strip Use to test blood sugar 4 times daily or as directed. To accompany: Blood Glucose Monitor Brands: per insurance. 120 strip 11     blood glucose calibration (NO BRAND  SPECIFIED) solution Use to calibrate blood glucose monitor as needed as directed. To accompany: Blood Glucose Monitor Brands: per insurance. 1 Bottle 3     blood glucose monitoring (NO BRAND SPECIFIED) meter device kit Use to test blood sugar 4 times daily or as directed. Dispense One-Touch Verio IQ Kit - per patient request. 1 kit 0     cevimeline (EVOXAC) 30 MG capsule Take 1 capsule (30 mg) by mouth 3 times daily 270 capsule 3     cholecalciferol (VITAMIN D3) 1000 UNIT tablet Take 1 tablet (1,000 Units) by mouth daily 100 tablet 3     gabapentin (NEURONTIN) 400 MG capsule Take 2 po bid, Dr. Sam prescribing 360 capsule 3     glipiZIDE (GLIPIZIDE XL) 2.5 MG 24 hr tablet Take one daily 90 tablet 3     glipiZIDE (GLUCOTROL XL) 5 MG 24 hr tablet Take 1 tablet (5 mg) by mouth daily 90 tablet 1     losartan (COZAAR) 100 MG tablet Take 1 tablet (100 mg) by mouth daily 90 tablet 3     metFORMIN (GLUCOPHAGE-XR) 500 MG 24 hr tablet Take 4 po q am with breakfast 360 tablet 3     trimethoprim-polymyxin b (POLYTRIM) 37541-6.1 UNIT/ML-% ophthalmic solution Instill 2 drops into affected eye 4 times daily until 24 hours after symptoms gone 1 Bottle 0     zinc sulfate (ZINCATE) 220 (50 Zn) MG capsule Take 1 capsule (220 mg) by mouth 2 times daily 60 capsule 1       Allergies   Allergen Reactions     Pravastatin Other (See Comments)     woozy     Nsaids GI Disturbance and Other (See Comments)     Pt had bariatric surgery, should not ever take oral NSAIDS due to risk of gastric ulcers.  Pt had bariatric surgery, should not ever take oral NSAIDS due to risk of gastric ulcers.     Past Medical History:   Diagnosis Date     Arthritis      Chest pain     seeing Cardiology     Depression 1991    after 's death     Diabetes mellitus (H)      Diabetic renal disease (H)     microalbuminuria     DJD (degenerative joint disease) of knee      H/O vitamin D deficiency      Hypertension      IBS (irritable bowel syndrome)     diarrhea   "    Keratoconus      Low back pain      Narcolepsy 2007    Dx'd by Sleep specialist 347.00, pt discontinued Adderal mid Nov. 13 due to tacycardia concerns     Obesity      Obstructive sleep apnea     327.23, 3 sleep studies,Dr. Flaco [facility]     Pancreatitis     related to Victoza, also on Xyrem     Panic      RLS (restless legs syndrome)      Sinus tachycardia         Estimated body mass index is 29.41 kg/m  as calculated from the following:    Height as of 2/6/20: 1.618 m (5' 3.7\").    Weight as of 2/6/20: 77 kg (169 lb 12 oz).    Last Comprehensive Metabolic Panel:  Sodium   Date Value Ref Range Status   10/22/2019 139 133 - 144 mmol/L Final     Potassium   Date Value Ref Range Status   10/22/2019 4.8 3.4 - 5.3 mmol/L Final     Chloride   Date Value Ref Range Status   10/22/2019 108 94 - 109 mmol/L Final     Carbon Dioxide   Date Value Ref Range Status   10/22/2019 26 20 - 32 mmol/L Final     Anion Gap   Date Value Ref Range Status   10/22/2019 6 3 - 14 mmol/L Final     Glucose   Date Value Ref Range Status   02/06/2020 164.0 (H) 60.0 - 99.0 mg/dL Final     Urea Nitrogen   Date Value Ref Range Status   10/22/2019 21 7 - 30 mg/dL Final     Creatinine   Date Value Ref Range Status   10/22/2019 0.94 0.52 - 1.04 mg/dL Final     GFR Estimate   Date Value Ref Range Status   10/22/2019 64 >60 mL/min/[1.73_m2] Final     Comment:     Non  GFR Calc  Starting 12/18/2018, serum creatinine based estimated GFR (eGFR) will be   calculated using the Chronic Kidney Disease Epidemiology Collaboration   (CKD-EPI) equation.       Calcium   Date Value Ref Range Status   10/22/2019 9.4 8.5 - 10.1 mg/dL Final      BP Readings from Last 3 Encounters:   02/06/20 122/80   11/04/19 (!) 159/88   10/22/19 (!) 145/97       Cholesterol   Date Value Ref Range Status   02/06/2020 111.0 0.0 - 200.0 Final   04/12/2019 173 <200 mg/dL Final     HDL Cholesterol   Date Value Ref Range Status   02/06/2020 47.0 (L) >50.0 Final   04/12/2019 " 49 (L) >49 mg/dL Final     LDL Cholesterol Calculated   Date Value Ref Range Status   04/12/2019 97 <100 mg/dL Final     Comment:     Desirable:       <100 mg/dl   10/09/2013 106 0 - 129 mg/dL Final     Comment:     LDL Cholesterol is the primary guide to therapy: LDL-cholesterol goal in high   risk patients is <100 mg/dL and in very high risk patients is <70 mg/dL.     LDL Cholesterol Direct   Date Value Ref Range Status   02/06/2020 29.0 0.0 - 129.0 Final   06/27/2018 107.0 0.0 - 129.0 Final     Triglycerides   Date Value Ref Range Status   02/06/2020 179.0 (H) 0.0 - 150.0 Final   04/12/2019 135 <150 mg/dL Final     Cholesterol/HDL Ratio   Date Value Ref Range Status   02/06/2020 2.4 0.0 - 5.0 Final   06/27/2018 3.8 0.0 - 5.0 Final          ASSESSMENT:  HgbA1C goal: <8%: Pt is at goal.  BP goal: <140/90 mmHg: Pt is at goal.  Aspirin: Pt is at goal.  Statin therapy: Pt is at goal.  No tobacco use: Pt is at goal.    1. Condition - DM: Efficacy - Needs additional monitoring: Medication requires monitoring  Inconsistent glucose readings from current meter causes concern with ability to monitor blood sugars, especially with hypoglycemic medication use and borderline controlled A1c. Current A1c is <8%, although given patient's age, aiming for <7.5% would be reasonable. Fluctuations in blood glucose numbers gives reason to support use of CGM device to gain more information on BG trends with changes in therapy.      2. Condition - Supplements: Adherence - Adherence: Patient prefers not to take  Addition of vit b12 supplement to pillpack would better support patient's adherence to medication.     3. Condition - HTN, lipids: No drug therapy problem  4. Condition - Pain: No drug therapy problem       PLAN:  Medications comments/ concerns are:   1. Ordered Freestyle CGM to local [facility] pharmacy. -completed   2. Sent rx for cyanocobalmin 1000 mcg to [facility] to be included in next shipment. -completed    Follow up: As soon  as CGM sensors are received to place first sensor.     Options for treatment and/or follow-up care were reviewed with the patient. Karine was engaged and actively involved in the decision making process. Karine verbalized understanding of the options discussed and was satisfied with the final plan. Karine was given a copy of these recommendations and an up to date medication list in an after visit summary. This report was sent to Anay Martinez.    Joshua MeiD   [facility] Pharmacist  [facility]    ---   Flowsheet completed.  # of medical conditions reviewed: 4  # of medications reviewed: 10  # of DTP identified: 2  Time spent: 45 minutes  Level of service:3

## 2020-03-05 NOTE — PROGRESS NOTES
See other note for visit documentation.     Gabrielle Blas, PharmD  Medication Management (MTM) Pharmacist  [facility]

## 2020-03-10 ENCOUNTER — TRANSFERRED RECORDS (OUTPATIENT)
Dept: HEALTH INFORMATION MANAGEMENT | Facility: CLINIC | Age: 65
End: 2020-03-10

## 2020-03-10 LAB — RETINOPATHY: NEGATIVE

## 2020-03-10 RX ORDER — BLOOD-GLUCOSE CONTROL, NORMAL
EACH MISCELLANEOUS
Qty: 1 EACH | Refills: 0 | Status: SHIPPED | OUTPATIENT
Start: 2020-03-10 | End: 2021-08-17

## 2020-03-11 DIAGNOSIS — R68.2 DRY MOUTH, UNSPECIFIED: ICD-10-CM

## 2020-03-11 RX ORDER — CEVIMELINE HYDROCHLORIDE 30 MG/1
CAPSULE ORAL
Qty: 180 CAPSULE | Refills: 3 | Status: SHIPPED | OUTPATIENT
Start: 2020-03-11 | End: 2021-02-12

## 2020-03-12 ENCOUNTER — THERAPY VISIT (OUTPATIENT)
Dept: PHYSICAL THERAPY | Facility: CLINIC | Age: 65
End: 2020-03-12
Payer: COMMERCIAL

## 2020-03-12 DIAGNOSIS — M54.41 BILATERAL LOW BACK PAIN WITH BILATERAL SCIATICA: ICD-10-CM

## 2020-03-12 DIAGNOSIS — M54.42 BILATERAL LOW BACK PAIN WITH BILATERAL SCIATICA: ICD-10-CM

## 2020-03-12 PROCEDURE — 97110 THERAPEUTIC EXERCISES: CPT | Mod: GP | Performed by: PHYSICAL THERAPIST

## 2020-03-12 PROCEDURE — 97012 MECHANICAL TRACTION THERAPY: CPT | Mod: GP | Performed by: PHYSICAL THERAPIST

## 2020-03-12 PROCEDURE — 97112 NEUROMUSCULAR REEDUCATION: CPT | Mod: GP | Performed by: PHYSICAL THERAPIST

## 2020-03-20 DIAGNOSIS — E11.9 TYPE 2 DIABETES MELLITUS WITHOUT COMPLICATION, WITH LONG-TERM CURRENT USE OF INSULIN (H): ICD-10-CM

## 2020-03-20 DIAGNOSIS — I10 HYPERTENSION GOAL BP (BLOOD PRESSURE) < 140/90: ICD-10-CM

## 2020-03-20 DIAGNOSIS — Z79.4 TYPE 2 DIABETES MELLITUS WITHOUT COMPLICATION, WITH LONG-TERM CURRENT USE OF INSULIN (H): ICD-10-CM

## 2020-03-20 RX ORDER — METFORMIN HCL 500 MG
TABLET, EXTENDED RELEASE 24 HR ORAL
Qty: 360 TABLET | Refills: 3 | Status: SHIPPED | OUTPATIENT
Start: 2020-03-20 | End: 2021-02-12

## 2020-03-20 RX ORDER — AMLODIPINE BESYLATE 5 MG/1
5 TABLET ORAL DAILY
Qty: 90 TABLET | Refills: 3 | Status: SHIPPED | OUTPATIENT
Start: 2020-03-20 | End: 2021-02-12

## 2020-03-20 RX ORDER — LOSARTAN POTASSIUM 100 MG/1
100 TABLET ORAL DAILY
Qty: 90 TABLET | Refills: 3 | Status: SHIPPED | OUTPATIENT
Start: 2020-03-20 | End: 2021-02-12

## 2020-03-20 NOTE — TELEPHONE ENCOUNTER
Prescription approved per [facility] Refill Protocol.  Davina Chawla RN  [facility]

## 2020-03-24 ENCOUNTER — VIRTUAL VISIT (OUTPATIENT)
Dept: NEUROLOGY | Facility: CLINIC | Age: 65
End: 2020-03-24
Attending: INTERNAL MEDICINE
Payer: COMMERCIAL

## 2020-03-24 DIAGNOSIS — E11.42 DIABETIC POLYNEUROPATHY ASSOCIATED WITH TYPE 2 DIABETES MELLITUS (H): Primary | ICD-10-CM

## 2020-03-24 RX ORDER — SAW/PYGEUM/BETA/HERB/D3/B6/ZN 30 MG-25MG
200 CAPSULE ORAL 3 TIMES DAILY
Qty: 90 CAPSULE | Refills: 3 | Status: SHIPPED | OUTPATIENT
Start: 2020-03-24 | End: 2020-11-06

## 2020-03-24 RX ORDER — DULOXETIN HYDROCHLORIDE 30 MG/1
30 CAPSULE, DELAYED RELEASE ORAL 2 TIMES DAILY
Qty: 60 CAPSULE | Refills: 3 | Status: SHIPPED | OUTPATIENT
Start: 2020-03-24 | End: 2020-06-23

## 2020-03-24 NOTE — TELEPHONE ENCOUNTER
Called patient to follow up on pieces from [employer] message. Wondering if patient was able to  control solution for OneTouch meter from [facility] pharmacy. If so, it would be most beneficial and safest for patient to continue to use SMBG OneTouch meter and use control solution to verify accuracy of meter. Education regarding application and use of Freestyle Device will likely be difficult over the phone or through telehealth visit and would be best to wait until an in-person education session is available. LVM for patient with this information and for patient to call back with questions or respond via [employer].    Gabrielle Blas, PharmD  Medication Management (MTM) Pharmacist  [facility]

## 2020-03-24 NOTE — PATIENT INSTRUCTIONS
1. Start Cymbalta: take 1 pill (30 mg) for 1 week. After 1 week increase to 1 pill twice per day.   2. Start Alpha lipoic acid: 200 mg three times per day  3. Start melatonin: 1 mg about 1 hour before bedtime  4. We will arrange the EMG for June and follow up in clinic for sometime shortly after.

## 2020-03-24 NOTE — PROGRESS NOTES
"Karine Moss is a 65 year old female who is being evaluated via a billable telephone visit.      The patient has been notified of following:     \"This telephone visit will be conducted via a call between you and your physician/provider. We have found that certain health care needs can be provided without the need for a physical exam.  This service lets us provide the care you need with a short phone conversation.  If a prescription is necessary we can send it directly to your pharmacy.  If lab work is needed we can place an order for that and you can then stop by our lab to have the test done at a later time.    If during the course of the call the physician/provider feels a telephone visit is not appropriate, you will not be charged for this service.\"     Refugio Huerta [facility]  -----  History of Present Illness:    Karine Moss is a 65 year old woman who I am seeing during this telephone new patient visit for polyneuropathy. Her primary concern is leg, back and foot pain. She thinks she has had foot pain for at least 5 years. Pain is sometimes burning. She also describes paresthesias and dysesthesias in her feet. Both are affected, but the right slightly more so than the left. The more she is active on her feet the more pain she experiences in her feet. Pain is especially problematic at night. It keeps her awake. She also has numbness in her feet. The affected area is both feet on the plantar surface, and less so on the top of her feet below the ankle. Over the last several years pain has worsened. Her balance is poor. She does not use any gait support at home, but when out she likes to have something like a grocery cart for support.    She also reports pain in her low back. Low back pain has also been present for at least 5 years. She does not describe radicular shooting pain, but does have achy pain in her calves and thighs.  She does physical therapy, which has been helpful for back pain.     She denies " numbness or pain in her fingers. She has not experienced changes in sweating, anhidrosis, syncope, or early satiety and has not noticed changes in bowel or bladder function. She sometimes develops cramps, but not frequent. She does not describe fasciculations. Speech and swallowing are normal. Appetite is good and weight is relatively stable. She did have a gastric bypass several years ago. Sleep is poor. She has trouble falling and staying asleep due to pain.     She has tried gabapentin for pain, up to 2400 mg. This was not helpful. She has not seen a pain specialist.     Prior pertinent laboratory work-up:  2/20/20: Hba1c 7.9  10/19: B12 236. Hba1c 6.7. Normal TSH.   10/18: B12 348. Normal SSa, SSb. Hba1c 6.3.     Prior pertinent radiology work-up:  9/18: MRTI LS spine showed multilevel degenerative changes, generally mild to moderate.     Prior electrophysiologic work-up:  None    Past Medical History:   Past Medical History:   Diagnosis Date     Arthritis      Chest pain     seeing Cardiology     Depression 1991    after 's death     Diabetes mellitus (H)      Diabetic renal disease (H)     microalbuminuria     DJD (degenerative joint disease) of knee      H/O vitamin D deficiency      Hypertension      IBS (irritable bowel syndrome)     diarrhea      Keratoconus      Low back pain      Narcolepsy 2007    Dx'd by Sleep specialist 347.00, pt discontinued Adderal mid Nov. 13 due to tacycardia concerns     Obesity      Obstructive sleep apnea     327.23, 3 sleep studies,Dr. Sam [facility]     Pancreatitis     related to Victoza, also on Xyrem     Panic      RLS (restless legs syndrome)      Sinus tachycardia      Past Surgical History:  Past Surgical History:   Procedure Laterality Date     CHOLECYSTECTOMY  2015     ear surgery  2002 and 01/2004     GASTRIC BYPASS  2013    [place]     HEMORRHOIDECTOMY  09/14/2000     LASIK BILATERAL       UVULOPALATOPHARYNGOPLASTY       UVVP       Family history:    There is no  known family history of hereditary neuropathies or other neuromuscular disorders.    Social History:    She denies tobacco, alcohol, or illicit drug use.     Medical Allergies:    Allergies   Allergen Reactions     Pravastatin Other (See Comments)     woozy     Codeine Nausea and Vomiting     Nsaids GI Disturbance and Other (See Comments)     Pt had bariatric surgery, should not ever take oral NSAIDS due to risk of gastric ulcers.  Pt had bariatric surgery, should not ever take oral NSAIDS due to risk of gastric ulcers.      Current Medications:   Current Outpatient Medications   Medication     amLODIPine (NORVASC) 5 MG tablet     amphetamine-dextroamphetamine (ADDERALL) 30 MG tablet     ASPIRIN LOW DOSE 81 MG EC tablet     atorvastatin (LIPITOR) 20 MG tablet     blood glucose (FREESTYLE LITE) test strip     blood glucose (NO BRAND SPECIFIED) lancets standard     blood glucose (NO BRAND SPECIFIED) test strip     calcium carbonate (OS-KINJAL) 1500 (600 Ca) MG tablet     cevimeline (EVOXAC) 30 MG capsule     cholecalciferol (VITAMIN D3) 1000 UNIT tablet     cyanocobalamin (VITAMIN B-12) 1000 MCG tablet     glipiZIDE (GLUCOTROL XL) 5 MG 24 hr tablet     losartan (COZAAR) 100 MG tablet     metFORMIN (GLUCOPHAGE-XR) 500 MG 24 hr tablet     blood glucose calibration (NO BRAND SPECIFIED) solution     blood glucose calibration (NO BRAND SPECIFIED) solution     blood glucose monitoring (NO BRAND SPECIFIED) meter device kit     Continuous Blood Gluc  (FREESTYLE JORDIN 14 DAY READER) EBEN     Continuous Blood Gluc Sensor (FREESTYLE JORDIN 14 DAY SENSOR) MISC     zinc sulfate (ZINCATE) 220 (50 Zn) MG capsule     No current facility-administered medications for this visit.       Review of Systems: A complete review of systems was obtained and was negative except for what was noted above.    Physical examination:    No examination was performed for this telephone encounter.     Assessment:    Karine Moss is a 65 year old  woman with symptoms compatible with a length-dependant large and small fiber polyneuropathy. The most likely etiology is diabetes. Her B12 is in the low/normal range. While I do not think this caused her neuropathy, it may magnify the problem. Karine also has lumbosacral spondylosis. While it can be difficult to differentiate symptoms attributable to her back to those from the neuropathy, for several reasons I suspect the neuropathy to be largely responsible for her foot pain. NCS/EMG should be helpful to clarify this further. We discussed management of diabetic neuropathy, as detailed below. All questions answered.     Plan:      1. Nerve conduction studies/EMG: Due to COVID-19 we are only performing urgent NCS/EMG studies. NCS/EMG will be arranged at a later date.   2. Encouraged optimization of blood sugars. She will continue to work with her PCP, Dr. Martinez, on this.   3. Symptomatic management of pain and paresthesias: Will start Cymbalta 30 mg BID. Discussed side effects.  Also recommended she start Alpha lipoic acid 200 mg BID. Discussed side effects for both. Prescriptions provided.  4. Encouraged continued PT for balance and low back pain  5. Agree with B12 replacement, as is currently being coordinated by Dr. Martinez  6. Encouraged frequent foot inspection, good foot hygiene, and supportive comfortable foot wear.   7. Sleep: Start melatonin 1 gm 60 min before bedtime.   8. If pain persists or worsens then will arrange referral to pain management specialist.   9. Follow up for in person examination when able. Will arrange for follow up in 3 months and adjust if needed.     Phone call duration: 35  minutes    Brandon Paul MD  Department of Neurology

## 2020-03-26 ENCOUNTER — THERAPY VISIT (OUTPATIENT)
Dept: PHYSICAL THERAPY | Facility: CLINIC | Age: 65
End: 2020-03-26
Payer: COMMERCIAL

## 2020-03-26 DIAGNOSIS — M54.41 BILATERAL LOW BACK PAIN WITH BILATERAL SCIATICA: ICD-10-CM

## 2020-03-26 DIAGNOSIS — M54.42 BILATERAL LOW BACK PAIN WITH BILATERAL SCIATICA: ICD-10-CM

## 2020-03-26 PROCEDURE — 97012 MECHANICAL TRACTION THERAPY: CPT | Mod: GP | Performed by: PHYSICAL THERAPIST

## 2020-03-26 PROCEDURE — 97110 THERAPEUTIC EXERCISES: CPT | Mod: GP | Performed by: PHYSICAL THERAPIST

## 2020-04-01 ENCOUNTER — TELEPHONE (OUTPATIENT)
Dept: NEUROLOGY | Facility: CLINIC | Age: 65
End: 2020-04-01

## 2020-04-02 ENCOUNTER — VIRTUAL VISIT (OUTPATIENT)
Dept: PHYSICAL THERAPY | Facility: CLINIC | Age: 65
End: 2020-04-02
Payer: COMMERCIAL

## 2020-04-02 DIAGNOSIS — M54.41 BILATERAL LOW BACK PAIN WITH BILATERAL SCIATICA: ICD-10-CM

## 2020-04-02 DIAGNOSIS — M54.42 BILATERAL LOW BACK PAIN WITH BILATERAL SCIATICA: ICD-10-CM

## 2020-04-02 PROCEDURE — 97110 THERAPEUTIC EXERCISES: CPT | Mod: 95 | Performed by: PHYSICAL THERAPIST

## 2020-04-02 NOTE — PROGRESS NOTES
"Physical Therapy Virtual Follow Up Visit      The patient has been notified of following:     \"This virtual visit will be conducted between you and your provider. We have found that certain health care needs can be provided without the need for physical presence.  This service lets us provide the care you need with a virtual visit.\"    Due to external, as well as internal [facility] management of the COVID-19 Virus, Karine Moss was not seen in our clinic.  As a substitution, we implemented a virtual visit to manage this patient's condition utilizing the PTRx virtual visit platform via the patient s existing code.  The provider, Gerson Mahmood, reviewed the patient's chart, PTRx prescription, and spoke with the patient to determine the following telemedicine visit is appropriate and effective for the patient's care.    The following type of visit was completed:   Telephone Visit:  A telephone visit was used in conjunction with the online PTRx exercise platform.        S: Karine Moss is a 65 year old female. Connected virtually on the PTRx platform to discuss their condition/progress. They noted improvements in back and leg pain. Reports being painfree since her last traction session  They noted ongoing pain or limitations with prolonged ambulation/standing but much improved since our last session. Would like to schedule another traction session when appropriate     Current pain level: 0/10  8/10 at worst    O: Patient demonstrated self reported improvements in Lumbar AROM in all directions, but pain remains with repeated lumbar extension     PTRx Content from today's visit:  Exercise Name: Double Knee to Chest, Sets: 1 - Reps: 10 sec holds, 6 reps - Sessions: 2-3x day   Exercise Name: Piriformis Stretch Above 90 Degress Supine, Sets: 1 set  - Reps: 10 sec holds, 6 reps  - Sessions: 2-3x day  Exercise Name: Posterior Pelvic Tilt, Sets: 1 - Reps: 10 reps, 5 sec holds  - Sessions: 2-3x day  Exercise " Name: Towel Stretch Gastroc, Sets: 1 - Reps: 10 sec holds, 6 reps  - Sessions: 2x day  Exercise Name: Ankle Alphabet, Sets: 1 - Reps: 1-2x through  - Sessions: 2x day    A:   Patient is progressing as expected.  Response to therapy has shown an improvement in  pain level and radicular symptoms      P: Patient will continue with the exercise program assigned on their PTRx code and will add the following measures to manage their pain/condition: cont w/ current program     Continue current treatment plan until patient demonstrates readiness to progress to higher level exercises.      Virtual visit contact time    Time of service began: 3:15 PM  Time of service ended: 3:30 PM  Total Time for set up, visit, and documentation: 20 minutes    Payor: [employer] / Plan: ARE MEDICARE / Product Type: HMO /   .  Procedure Code/s   Therapeutic Exercise (39552): 10 minutes    I have reviewed the note as documented above.  This accurately captures the substance of my conversation with the patient.  Provider location: [facility] ([facility]State)  Patient location: Home    ___________________________________________________    Any subjective info about the quality of the visit, ease of use: very easy but would prefer to come in  Patient's opinion about reasonable cost for this visit n/a

## 2020-04-09 ENCOUNTER — TELEPHONE (OUTPATIENT)
Dept: FAMILY MEDICINE | Facility: CLINIC | Age: 65
End: 2020-04-09

## 2020-04-09 NOTE — TELEPHONE ENCOUNTER
Prior Authorization Retail Medication Request    Medication/Dose: freestyle torsten 14 day sensor  ICD code (if different than what is on RX):  same  Previously Tried and Failed:    Rationale:      Insurance Name:  same  Insurance ID:  same      Pharmacy Information (if different than what is on RX)  Name:  [facility]  Phone:  [phone]    Davina Chawla RN  [facility]

## 2020-04-10 NOTE — TELEPHONE ENCOUNTER
Rec'd a fax from insurance for additional information. I have completed and faxed back right away.

## 2020-04-10 NOTE — TELEPHONE ENCOUNTER
PRIOR AUTHORIZATION DENIED    Medication: freestyle torsten 14 day sensor    Denial Date: 4/10/2020    Denial Rational:         Appeal Information:

## 2020-04-10 NOTE — TELEPHONE ENCOUNTER
Pharmacy has started a Prior Authorization request via Cover My Med[employer] for FreeStyle Nirmal 14 Day Sensor, Key: [id number]        [place] Prior Authorization Team   Phone: [phone]    PA Initiation    Medication: freestyle nirmal 14 day sensor  Insurance Company:    Pharmacy Filling the Rx: [id number] [place], [place] - [address] AT [facility] & [address] [place] [address]  Filling Pharmacy Phone: [phone]  Filling Pharmacy Fax:    Start Date: 4/10/2020

## 2020-04-16 ENCOUNTER — VIRTUAL VISIT (OUTPATIENT)
Dept: PHYSICAL THERAPY | Facility: CLINIC | Age: 65
End: 2020-04-16
Payer: COMMERCIAL

## 2020-04-16 DIAGNOSIS — M54.41 BILATERAL LOW BACK PAIN WITH BILATERAL SCIATICA: Primary | ICD-10-CM

## 2020-04-16 DIAGNOSIS — M54.42 BILATERAL LOW BACK PAIN WITH BILATERAL SCIATICA: Primary | ICD-10-CM

## 2020-04-16 PROCEDURE — 97110 THERAPEUTIC EXERCISES: CPT | Mod: 95 | Performed by: PHYSICAL THERAPIST

## 2020-04-17 NOTE — PROGRESS NOTES
"Physical Therapy Virtual Follow Up Visit      The patient has been notified of following:     \"This virtual visit will be conducted between you and your provider. We have found that certain health care needs can be provided without the need for physical presence.  This service lets us provide the care you need with a virtual visit.\"    Due to external, as well as internal [facility] management of the COVID-19 Virus, Karine Moss was not seen in our clinic.  As a substitution, we implemented a virtual visit to manage this patient's condition utilizing the PTRx virtual visit platform via the patient s existing code.  The provider, Gerson Mahmood, reviewed the patient's chart, PTRx prescription, and spoke with the patient to determine the following telemedicine visit is appropriate and effective for the patient's care.    The following type of visit was completed:   Telephone Visit:  A telephone visit was used in conjunction with the online PTRx exercise platform.        S: Karine Moss is a 65 year old female. Connected virtually on the PTRx platform to discuss their condition/progress. They noted improvements in back pain and nerve pain.  They noted ongoing pain or limitations with still has difficulty with prolonged standing, but reports significant improvement in pain. Pt would like to cont HEP independently and f/u after she returns from her summer home in the fall.      Current pain level: 3/10      O: Patient demonstrated painfree Lumbar AROM in all directions today. Reports painfree HEP and good tolerance for core stabilization today.   System  Physical Exam  General   ROS  PTRx Content from today's visit:  Exercise Name: Double Knee to Chest, Sets: 1 - Reps: 10 sec holds, 6 reps - Sessions: 2-3x day   Exercise Name: Piriformis Stretch Above 90 Degress Supine, Sets: 1 set  - Reps: 10 sec holds, 6 reps  - Sessions: 2-3x day  Exercise Name: Posterior Pelvic Tilt, Sets: 1 - Reps: 10 reps, 5 sec " holds  - Sessions: 2-3x day  Exercise Name: Supine Abdominal Exercise #3 (Marching), Sets: 1 set - Reps: 8-12 reps each leg - Sessions: 1x day, Notes: Keep back flat and abdominals tight  Exercise Name: Supine Abdominal Exercise #5 (Arm and Leg Extension), Sets: 1 - Reps: 8-12 reps each leg - Sessions: 1  Exercise Name: Towel Stretch Gastroc, Sets: 1 - Reps: 10 sec holds, 6 reps  - Sessions: 2x day  Exercise Name: Ankle Alphabet, Sets: 1 - Reps: 1-2x through  - Sessions: 2x day    A:   Patient's symptoms are resolving.  Response to therapy has shown an improvement in  pain level, radicular symptoms and ROM       P: Patient will continue with the exercise program assigned on their PTRx code and will add the following measures to manage their pain/condition: core stabilization     Current treatment program is being advanced to more complex exercises.  Frequency and/or duration have been changed to:  Yes, f/u as needed       Virtual visit contact time    Time of service began: 3:20 PM  Time of service ended: 3:40 PM  Total Time for set up, visit, and documentation: 25 minutes    Payor: [facility] / Plan: [facility] MEDICARE / Product Type: HMO /   .  Procedure Code/s   Therapeutic Exercise (31722): 10 minutes    I have reviewed the note as documented above.  This accurately captures the substance of my conversation with the patient.  Provider location: [facility] ([facility])  Patient location: Home

## 2020-04-22 NOTE — TELEPHONE ENCOUNTER
Patient has been contacts several times to make a 3 month follow up with Dr. Paul. Patient has been put on a recall list to be contacted at a later date.

## 2020-04-29 NOTE — PROGRESS NOTES
"Karine Moss is a 65 year old female who is being evaluated via a billable telephone visit.      The patient has been notified of following: Diabetes f/u     \"This telephone visit will be conducted via a call between you and your physician/provider. We have found that certain health care needs can be provided without the need for a physical exam.  This service lets us provide the care you need with a short phone conversation.  If a prescription is necessary we can send it directly to your pharmacy.  If lab work is needed we can place an order for that and you can then stop by our lab to have the test done at a later time.    Telephone visits are billed at different rates depending on your insurance coverage. During this emergency period, for some insurers they may be billed the same as an in-person visit.  Please reach out to your insurance provider with any questions.    If during the course of the call the physician/provider feels a telephone visit is not appropriate, you will not be charged for this service.\"    Patient has given verbal consent for Telephone visit?  Yes    Karine Moss is a 65 year old female who presents to the clinic today for a recheck of    Start time: 2:33 PM  End time: 2:52  Total : 21 minutes    DIABETES  Here for Type II diabetes.  Last eye exam was March 10, 2020.  Retinopathy: none  Peripheral neuropathy: yes, improved with cymbalta    Weight: up 6 pounds since last visit, back pain, goes for traction, but had to put off due to COVID  Meals 2 per day  Exercise: regular walking  Wt Readings from Last 3 Encounters:   02/06/20 77 kg (169 lb 12 oz)   10/22/19 75.5 kg (166 lb 8 oz)   05/20/19 75.8 kg (167 lb)     Candidiasis/skin issues: none  UTI/ yeast infections: none.  Foot exam: no open sores    Frequency of FBS 3-4 x per day   General range of FBS: am 123,   132 before bed, 140 in evening 151, 167, 131, 120, 222, 98  Hypoglycemia: one episode, forgot to eat    Lab Results "   Component Value Date    A1C 7.9 02/06/2020    A1C 6.7 10/22/2019     No results found for: MICROALBUMIN    DM Meds:   Metformin XR 500mg take 4 each morning  Glipizide 5 mg dose    Compliance: good compliance    Aspirin Use: 81 mg daily    PMH, PSH, FH, medications, allergies and immunizations are updated this visit.      HYPERLIPIDEMIA  Recent Labs   Lab Test 02/06/20  1523 04/12/19  1443 06/27/18  1439   CHOL 111.0 173 178.0   HDL 47.0* 49* 46.0*   LDL 29.0 97 107.0   TRIG 179.0* 135 120.0   CHOLHDLRATIO 2.4  --  3.8     LDL is currently in target range. Currently taking Atorvastatin 20mg daily. Compliant with med(s). No reported myalgias or other side effects.     HYPERTENSION  BP Readings from Last 3 Encounters:   02/06/20 122/80   11/04/19 (!) 159/88   10/22/19 (!) 145/97     Here for blood pressure check. She is currently on amlodipine 5mg daily and losartan 100mg daily.   No current chest pain.  no THAPA. She is compliant with meds, no reported side effects.  She does not have a home BP machine.     EXAM  Not examined    Assessment:  (E11.9) Type 2 diabetes mellitus without complication, without long-term current use of insulin (H)  (primary encounter diagnosis)  Comment: currently reports better numbers using new glucometer, peripheral neuropathy better using cymbalta. CGM sensors not covered by ins as she is not on insulin  Plan: Hemoglobin A1c, Albumin Random Urine         Quantitative with Creat Ratio        Karine will come in next week for lab appt on Tues May 5 at 1:20pm  Will get full set of vitals and do A1c and urine for microalbumin    Addendum: A1c looking much better, 7.0. continue current meds  Lab Results   Component Value Date    A1C 7.0 05/06/2020    A1C 7.9 02/06/2020    A1C 6.7 10/22/2019    A1C 6.5 04/25/2019    A1C 6.2 04/12/2019           (I10) Hypertension goal BP (blood pressure) < 140/90  Comment: she does not have home BP cuff  Plan: check next week at lab visit    Anay Martinez  MD  Internal Medicine/Pediatrics

## 2020-04-30 ENCOUNTER — VIRTUAL VISIT (OUTPATIENT)
Dept: FAMILY MEDICINE | Facility: CLINIC | Age: 65
End: 2020-04-30
Payer: COMMERCIAL

## 2020-04-30 DIAGNOSIS — I10 HYPERTENSION GOAL BP (BLOOD PRESSURE) < 140/90: ICD-10-CM

## 2020-04-30 DIAGNOSIS — E11.9 TYPE 2 DIABETES MELLITUS WITHOUT COMPLICATION, WITHOUT LONG-TERM CURRENT USE OF INSULIN (H): Primary | ICD-10-CM

## 2020-05-06 ENCOUNTER — ALLIED HEALTH/NURSE VISIT (OUTPATIENT)
Dept: FAMILY MEDICINE | Facility: CLINIC | Age: 65
End: 2020-05-06
Payer: COMMERCIAL

## 2020-05-06 DIAGNOSIS — E11.9 TYPE 2 DIABETES MELLITUS WITHOUT COMPLICATION, WITHOUT LONG-TERM CURRENT USE OF INSULIN (H): Primary | ICD-10-CM

## 2020-05-06 DIAGNOSIS — E11.9 TYPE 2 DIABETES MELLITUS WITHOUT COMPLICATION, WITHOUT LONG-TERM CURRENT USE OF INSULIN (H): ICD-10-CM

## 2020-05-07 VITALS
TEMPERATURE: 98.5 F | WEIGHT: 171.04 LBS | DIASTOLIC BLOOD PRESSURE: 81 MMHG | SYSTOLIC BLOOD PRESSURE: 127 MMHG | BODY MASS INDEX: 29.64 KG/M2 | OXYGEN SATURATION: 95 % | HEART RATE: 116 BPM

## 2020-05-07 VITALS
DIASTOLIC BLOOD PRESSURE: 81 MMHG | SYSTOLIC BLOOD PRESSURE: 127 MMHG | OXYGEN SATURATION: 95 % | TEMPERATURE: 98.5 F | BODY MASS INDEX: 29.64 KG/M2 | HEART RATE: 116 BPM | WEIGHT: 171.04 LBS

## 2020-05-07 LAB
CREAT UR-MCNC: 302 MG/DL
HBA1C MFR BLD: 7 % (ref 0–5.6)
MICROALBUMIN UR-MCNC: 947 MG/L
MICROALBUMIN/CREAT UR: 313.58 MG/G CR (ref 0–25)

## 2020-05-11 RX ORDER — ZINC SULFATE 50(220)MG
220 CAPSULE ORAL 2 TIMES DAILY
Qty: 60 CAPSULE | Refills: 0 | OUTPATIENT
Start: 2020-05-11

## 2020-06-16 ENCOUNTER — NURSE TRIAGE (OUTPATIENT)
Dept: NURSING | Facility: CLINIC | Age: 65
End: 2020-06-16

## 2020-06-17 ENCOUNTER — VIRTUAL VISIT (OUTPATIENT)
Dept: FAMILY MEDICINE | Facility: CLINIC | Age: 65
End: 2020-06-17
Payer: COMMERCIAL

## 2020-06-17 VITALS — HEIGHT: 64 IN | WEIGHT: 171 LBS | BODY MASS INDEX: 29.19 KG/M2

## 2020-06-17 DIAGNOSIS — R21 RASH: Primary | ICD-10-CM

## 2020-06-17 ASSESSMENT — MIFFLIN-ST. JEOR: SCORE: 1305.65

## 2020-06-18 ENCOUNTER — OFFICE VISIT (OUTPATIENT)
Dept: FAMILY MEDICINE | Facility: CLINIC | Age: 65
End: 2020-06-18
Payer: COMMERCIAL

## 2020-06-18 VITALS
BODY MASS INDEX: 29.46 KG/M2 | HEART RATE: 95 BPM | DIASTOLIC BLOOD PRESSURE: 74 MMHG | TEMPERATURE: 97.7 F | SYSTOLIC BLOOD PRESSURE: 118 MMHG | WEIGHT: 171.6 LBS | OXYGEN SATURATION: 99 % | RESPIRATION RATE: 18 BRPM

## 2020-06-18 DIAGNOSIS — R21 RASH: ICD-10-CM

## 2020-06-18 DIAGNOSIS — R79.82 ELEVATED C-REACTIVE PROTEIN (CRP): ICD-10-CM

## 2020-06-18 DIAGNOSIS — A69.20 ACUTE LYME DISEASE: Primary | ICD-10-CM

## 2020-06-18 LAB
ALBUMIN SERPL-MCNC: 3.8 G/DL (ref 3.4–5)
ALP SERPL-CCNC: 95 U/L (ref 40–150)
ALT SERPL W P-5'-P-CCNC: 29 U/L (ref 0–50)
ANION GAP SERPL CALCULATED.3IONS-SCNC: 5 MMOL/L (ref 3–14)
AST SERPL W P-5'-P-CCNC: 26 U/L (ref 0–45)
BILIRUB SERPL-MCNC: 1.2 MG/DL (ref 0.2–1.3)
BUN SERPL-MCNC: 37 MG/DL (ref 7–30)
CALCIUM SERPL-MCNC: 9.7 MG/DL (ref 8.5–10.1)
CHLORIDE SERPL-SCNC: 104 MMOL/L (ref 94–109)
CO2 SERPL-SCNC: 26 MMOL/L (ref 20–32)
CREAT SERPL-MCNC: 1.67 MG/DL (ref 0.52–1.04)
ERYTHROCYTE [DISTWIDTH] IN BLOOD BY AUTOMATED COUNT: 14.6 % (ref 10–15)
GFR SERPL CREATININE-BSD FRML MDRD: 32 ML/MIN/{1.73_M2}
GLUCOSE SERPL-MCNC: 153 MG/DL (ref 70–99)
HCT VFR BLD AUTO: 37.6 % (ref 35–47)
HGB BLD-MCNC: 11.9 G/DL (ref 11.7–15.7)
KOH PREP SPEC: NORMAL
MCH RBC QN AUTO: 27.4 PG (ref 26.5–33)
MCHC RBC AUTO-ENTMCNC: 31.6 G/DL (ref 31.5–36.5)
MCV RBC AUTO: 87 FL (ref 78–100)
PLATELET # BLD AUTO: 238 10E9/L (ref 150–450)
POTASSIUM SERPL-SCNC: 5.2 MMOL/L (ref 3.4–5.3)
PROT SERPL-MCNC: 8.9 G/DL (ref 6.8–8.8)
RBC # BLD AUTO: 4.34 10E12/L (ref 3.8–5.2)
SODIUM SERPL-SCNC: 135 MMOL/L (ref 133–144)
SPECIMEN SOURCE: NORMAL
WBC # BLD AUTO: 11 10E9/L (ref 4–11)

## 2020-06-18 PROCEDURE — 85027 COMPLETE CBC AUTOMATED: CPT | Performed by: NURSE PRACTITIONER

## 2020-06-18 PROCEDURE — 99214 OFFICE O/P EST MOD 30 MIN: CPT | Performed by: NURSE PRACTITIONER

## 2020-06-18 PROCEDURE — 86617 LYME DISEASE ANTIBODY: CPT | Mod: 90 | Performed by: NURSE PRACTITIONER

## 2020-06-18 PROCEDURE — 99000 SPECIMEN HANDLING OFFICE-LAB: CPT | Performed by: NURSE PRACTITIONER

## 2020-06-18 PROCEDURE — 87220 TISSUE EXAM FOR FUNGI: CPT | Performed by: NURSE PRACTITIONER

## 2020-06-18 PROCEDURE — 36415 COLL VENOUS BLD VENIPUNCTURE: CPT | Performed by: NURSE PRACTITIONER

## 2020-06-18 PROCEDURE — 86618 LYME DISEASE ANTIBODY: CPT | Performed by: NURSE PRACTITIONER

## 2020-06-18 PROCEDURE — 80053 COMPREHEN METABOLIC PANEL: CPT | Performed by: NURSE PRACTITIONER

## 2020-06-18 NOTE — PROGRESS NOTES
"Family Medicine Video Visit Note  Karine Moss is being evaluated via a billable video visit.    Consent documented below.  Chief Complaint   Patient presents with     Derm Problem     recent rash chest, back, leg and right toes               HPI     Video Start Time: 1:36 pm  THIS is the time provider and patient connect.    Karine Moss is a 65 year old woman with history of type II diabetes, hypertension, hyperlipidemia, depression and sleep apnea.  She presents for the following health issues:    Helio Milton is a 64 yo woman who is currently living in [facility] at her cabin. She reports abrupt onset of a red blotchy rash.    Onset: One day ago, she awoke with rash on her upper torso, back and upper legs. Four days ago she sprayed the lawn with weedkiller.     Description:   Location: chest, abdomen, back, upper legs  Character: flat, egg sized lesions, oval, red, blanches to palpitation  Itching (Pruritis): no     Progression of Symptoms:  Rash on upper legs is gone but rest of rash is unchanged.     Accompanying Signs & Symptoms:  Fever/chills: no   Fatigue: no  Body aches or joint pain: no   GI symptoms: no abdominal pain, no nausea or diarrhea  Headache: no  Sore throat symptoms: no , no mucosal lesions  Recent cold symptoms: no   No urinary symptoms    History:   Previous similar rash: no , \"this is not bruising\"    Precipitating factors:   Exposure to similar rash: no   New exposures: no new medications or topicals. She sprays herbicide on the lawn yesterday but it would have landed on her legs as torso was covered   Recent travel: no     Alleviating factors:  unknown    Therapies Tried and outcome: none    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)     Keratoconus     Hyperlipidemia LDL goal <100     Obstructive sleep apnea     Vitamin D insufficiency     Major depression in partial " "remission (H)     Plantar warts     Low back pain     DJD (degenerative joint disease) of knee     Pancreatitis     Panic     Chest pain     IBS (irritable bowel syndrome)     Heart murmur     Hypertension goal BP (blood pressure) < 140/90     Type 2 diabetes mellitus without complication, with long-term current use of insulin (H)     Osteopenia of hip     Acute conjunctivitis of left eye     PTSD (post-traumatic stress disorder)     Diabetic peripheral neuropathy (H)     Bilateral sciatica     Bilateral low back pain with bilateral sciatica     Past Surgical History:   Procedure Laterality Date     CHOLECYSTECTOMY  2015     ear surgery  2002 and 01/2004     GASTRIC BYPASS  2013    [place]     HEMORRHOIDECTOMY  09/14/2000     LASIK BILATERAL       UVULOPALATOPHARYNGOPLASTY       UVVP         Social History     Tobacco Use     Smoking status: Never Smoker     Smokeless tobacco: Never Used   Substance Use Topics     Alcohol use: Yes     Comment: rare     Family History   Problem Relation Age of Onset     Diabetes Father      Cancer - colorectal Maternal Grandmother      Cancer Daughter      Obesity Daughter         s/p lap band     Cancer Brother         lung     Hypertension Sister            Reviewed and updated as needed this visit by Provider              Review of Systems:     Constitutional, HEENT, cardiovascular, pulmonary, gi and gu systems are negative, except as otherwise noted.         Physical Exam:     Ht 1.626 m (5' 4\")   Wt 77.6 kg (171 lb)   BMI 29.35 kg/m      GENERAL: Alert, pleasant,  no distress  EYES: Eyes grossly normal to inspection, conjunctivae and sclerae normal  RESP: no audible wheeze, cough, or visible cyanosis.  No visible retractions or increased work of breathing.  Able to speak fully in complete sentences.  NEURO: Cranial nerves grossly intact, mentation intact and speech normal  PSYCH: mentation appears normal, affect normal/bright, judgement and insight intact, normal speech and " "appearance well-groomed  Skin: egg sized oval flat erythematous lesions on her chest, abdomen and back. It spares the face and extremities and groin. Blanches to pressure.           Assessment and Plan   (R21) Rash  (primary encounter diagnosis)  Comment: large oval flat erythematous lesions, blanching, nonpruritic rash, acute in onset, asymptomatic, no associated systemic symptoms. Recent use of herbicide, no other exposures.   DDX is viral exanthem, vasculitis, drug reaction, disseminated lyme disease, ITP? Rash cannot be easily assessed with video due to low lighting and video malfunction.  Plan: recommend that Karine be seen at an urgent care clinic or dermatologist office for evaluation. Would consider CBC with diff, ESR, CRP, Lyme test, biopsy of lesion. Assess for other systemic symptoms, adenopathy? Evolution of systemic symptoms?  Karine and daughter agree to proceed to urgent care visit at nearby clinic.       After Visit Information:  Patient chose to view AVS via [email or web address]      Anay Martinez MD      Video-Visit Details         Video Visit Consent       The patient has been notified of following: recent rash on chest, back, legas and right toes     \"This video visit will be conducted via a call between you and your physician/provider. We have found that certain health care needs can be provided without the need for an in-person physical exam.  This service lets us provide the care you need with a video conversation.  If a prescription is necessary we can send it directly to your pharmacy.  If lab work is needed we can place an order for that and you can then stop by our lab to have the test done at a later time.    Video visits are billed at different rates depending on your insurance coverage.  Please reach out to your insurance provider with any questions.    If during the course of the call the physician/provider feels a video visit is not appropriate, you will not be charged for this " "service.\"    Patient has given verbal consent for Video visit? Yes    How would you like to obtain your AVS? Yuval    Patient would like the video invitation sent by: my chart     Will anyone else be joining your video visit? No          ASK patient if they have taken their temperature and or blood pressure today --if yes enter into vitals under \"patient reported\".    Type of service:  Video Visit  Video Start Time: 1:36 pm  THIS is the time provider and patient connect.  Video End Time (time video stopped): 1:56pm    Video froze during our visit and could not regain connection. Had to convert to telephone visit.     Originating Location (pt. Location): Home    Distant Location (provider location):  [facility]     Mode of Communication:  Video Conference via [employer]                          "

## 2020-06-18 NOTE — PROGRESS NOTES
Subjective     Karine Moss is a 65 year old female who presents to clinic today for the following health issues:    HPI   Rash      Duration: Since yesterday( 1Day)    Description  Location: Back, Chest and arms  Itching: no    Intensity:  severe    Accompanying signs and symptoms: Says that she noticed a spot on her head but is unsure if it is related     History (similar episodes/previous evaluation): None    Precipitating or alleviating factors:  New exposures:  None  Recent travel: no      Therapies tried and outcome: none        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)     Keratoconus     Hyperlipidemia LDL goal <100     Obstructive sleep apnea     Vitamin D insufficiency     Major depression in partial remission (H)     Plantar warts     Low back pain     DJD (degenerative joint disease) of knee     Pancreatitis     Panic     Chest pain     IBS (irritable bowel syndrome)     Heart murmur     Hypertension goal BP (blood pressure) < 140/90     Type 2 diabetes mellitus without complication, with long-term current use of insulin (H)     Osteopenia of hip     Acute conjunctivitis of left eye     PTSD (post-traumatic stress disorder)     Diabetic peripheral neuropathy (H)     Bilateral sciatica     Bilateral low back pain with bilateral sciatica     Past Surgical History:   Procedure Laterality Date     CHOLECYSTECTOMY  2015     ear surgery  2002 and 01/2004     GASTRIC BYPASS  2013    [place]     HEMORRHOIDECTOMY  09/14/2000     LASIK BILATERAL       UVULOPALATOPHARYNGOPLASTY       UVVP         Social History     Tobacco Use     Smoking status: Never Smoker     Smokeless tobacco: Never Used   Substance Use Topics     Alcohol use: Yes     Comment: rare     Family History   Problem Relation Age of Onset     Diabetes Father      Cancer - colorectal Maternal Grandmother      Cancer Daughter      Obesity  Daughter         s/p lap band     Cancer Brother         lung     Hypertension Sister          Current Outpatient Medications   Medication Sig Dispense Refill     amLODIPine (NORVASC) 5 MG tablet Take 1 tablet (5 mg) by mouth daily Needs clinic visit for further refills 90 tablet 3     amphetamine-dextroamphetamine (ADDERALL) 30 MG tablet Take 1.5 tablets bid, Rx from Dr. Sam, [place] LUNG 90 tablet 0     ASPIRIN LOW DOSE 81 MG EC tablet TAKE 1 TABLET BY MOUTH DAILY 90 tablet 2     atorvastatin (LIPITOR) 20 MG tablet Take 1 tablet (20 mg) by mouth daily 90 tablet 3     blood glucose (FREESTYLE LITE) test strip Use to test blood sugar 2 times daily or as directed. 50 each 1     blood glucose (NO BRAND SPECIFIED) lancets standard Use to test blood sugar 4 times daily or as directed. Dispense appropriate lancets for meter. 360 each 3     blood glucose (NO BRAND SPECIFIED) test strip Use to test blood sugar 4 times daily or as directed. To accompany: Blood Glucose Monitor Brands: per insurance. 120 strip 11     blood glucose calibration (NO BRAND SPECIFIED) solution Use to calibrate blood glucose monitor as needed as directed. 1 each 0     blood glucose calibration (NO BRAND SPECIFIED) solution Use to calibrate blood glucose monitor as needed as directed. To accompany: Blood Glucose Monitor Brands: per insurance. 1 Bottle 3     blood glucose monitoring (NO BRAND SPECIFIED) meter device kit Use to test blood sugar 4 times daily or as directed. Dispense One-Touch Verio IQ Kit - per patient request. 1 kit 0     calcium carbonate (OS-KINJAL) 1500 (600 Ca) MG tablet Take 2 tablets (1,200 mg) by mouth daily       cevimeline (EVOXAC) 30 MG capsule take 1 cap twice daily 180 capsule 3     cholecalciferol (VITAMIN D3) 1000 UNIT tablet Take 1 tablet (1,000 Units) by mouth daily 100 tablet 3     cyanocobalamin (VITAMIN B-12) 1000 MCG tablet Take 1 tablet (1,000 mcg) by mouth daily 90 tablet 3     DULoxetine (CYMBALTA) 30 MG capsule Take 1  capsule (30 mg) by mouth 2 times daily 60 capsule 3     glipiZIDE (GLUCOTROL XL) 5 MG 24 hr tablet Take 1 tablet (5 mg) by mouth daily 90 tablet 1     losartan (COZAAR) 100 MG tablet Take 1 tablet (100 mg) by mouth daily 90 tablet 3     melatonin 1 MG CAPS Take 1 mg by mouth At Bedtime 30 capsule 3     metFORMIN (GLUCOPHAGE-XR) 500 MG 24 hr tablet Take 4 po q am with breakfast 360 tablet 3     Alpha Lipoic Acid 200 MG CAPS Take 200 mg by mouth 3 times daily (Patient not taking: Reported on 6/18/2020) 90 capsule 3     Continuous Blood Gluc  (FREESTYLE JORDIN 14 DAY READER) EBEN Use to read blood sugars as per 's instructions. (Patient not taking: Reported on 6/18/2020) 1 each 0     Continuous Blood Gluc Sensor (FREESTYLE JORDIN 14 DAY SENSOR) MISC Change every 14 days. (Patient not taking: Reported on 6/18/2020) 2 each 11     zinc sulfate (ZINCATE) 220 (50 Zn) MG capsule Take 1 capsule (220 mg) by mouth 2 times daily (Patient not taking: Reported on 4/30/2020) 60 capsule 1     Allergies   Allergen Reactions     Pravastatin Other (See Comments)     woozy     Codeine Nausea and Vomiting     Nsaids GI Disturbance and Other (See Comments)     Pt had bariatric surgery, should not ever take oral NSAIDS due to risk of gastric ulcers.  Pt had bariatric surgery, should not ever take oral NSAIDS due to risk of gastric ulcers.     Recent Labs   Lab Test 05/06/20  1645 02/06/20  1523 02/06/20  1505 10/22/19  1130 10/22/19  1113 10/22/19  1027  04/12/19  1443 04/12/19  1417 10/31/18  1225  06/27/18  1439 12/03/13  1513   A1C 7.0*  --  7.9*  --   --  6.7*   < >  --  6.2* 6.3*  --  6.7*  --    LDL  --  29.0  --   --   --   --   --  97  --   --   --  107.0  --    HDL  --  47.0*  --   --   --   --   --  49*  --   --   --  46.0*  --    TRIG  --  179.0*  --   --   --   --   --  135  --   --   --  120.0  --    ALT  --  24.0  --  29  --   --   --   --  25.0  --    < > 18.0 49   CR  --   --   --  0.94  --   --   --   --   1.0  --    < > 1.0 0.97   GFRESTIMATED  --   --   --  64  --   --   --   --   --   --   --   --  59*   GFRESTBLACK  --   --   --  74  --   --   --   --   --   --   --   --  71   POTASSIUM  --   --   --  4.8  --   --   --   --  5.1  --    < > 4.3 4.5   TSH  --   --   --   --  1.69  --   --   --   --  1.47   < >  --  2.31    < > = values in this interval not displayed.      BP Readings from Last 3 Encounters:   06/18/20 118/74   05/07/20 127/81   05/07/20 127/81    Wt Readings from Last 3 Encounters:   06/18/20 77.8 kg (171 lb 9.6 oz)   06/17/20 77.6 kg (171 lb)   05/07/20 77.6 kg (171 lb 0.6 oz)                    Reviewed and updated as needed this visit by Provider         Review of Systems   Constitutional, HEENT, cardiovascular, pulmonary, gi and gu systems are negative, except as otherwise noted.      Objective    /74 (BP Location: Right arm, Patient Position: Sitting, Cuff Size: Adult Large)   Pulse 95   Temp 97.7  F (36.5  C) (Tympanic)   Resp 18   Wt 77.8 kg (171 lb 9.6 oz)   SpO2 99%   BMI 29.46 kg/m    Body mass index is 29.46 kg/m .  Physical Exam   GENERAL: healthy, alert and no distress  NECK: no adenopathy, no asymmetry, masses, or scars and thyroid normal to palpation  RESP: lungs clear to auscultation - no rales, rhonchi or wheezes  CV: regular rate and rhythm, normal S1 S2, no S3 or S4, no murmur, click or rub, no peripheral edema and peripheral pulses strong  MS: no gross musculoskeletal defects noted, no edema  SKIN: no suspicious lesions or rashes  SKIN: Multiple annular rash to chest and back  NEURO: Normal strength and tone, mentation intact and speech normal  PSYCH: mentation appears normal, affect normal/bright        Results for orders placed or performed in visit on 06/18/20   Lyme Disease Kassy with reflex to WB Serum     Status: Abnormal   Result Value Ref Range    Lyme Disease Antibodies Serum 7.87 (H) 0.00 - 0.89   CBC with platelets     Status: None   Result Value Ref Range     WBC 11.0 4.0 - 11.0 10e9/L    RBC Count 4.34 3.8 - 5.2 10e12/L    Hemoglobin 11.9 11.7 - 15.7 g/dL    Hematocrit 37.6 35.0 - 47.0 %    MCV 87 78 - 100 fl    MCH 27.4 26.5 - 33.0 pg    MCHC 31.6 31.5 - 36.5 g/dL    RDW 14.6 10.0 - 15.0 %    Platelet Count 238 150 - 450 10e9/L   Comprehensive metabolic panel     Status: Abnormal   Result Value Ref Range    Sodium 135 133 - 144 mmol/L    Potassium 5.2 3.4 - 5.3 mmol/L    Chloride 104 94 - 109 mmol/L    Carbon Dioxide 26 20 - 32 mmol/L    Anion Gap 5 3 - 14 mmol/L    Glucose 153 (H) 70 - 99 mg/dL    Urea Nitrogen 37 (H) 7 - 30 mg/dL    Creatinine 1.67 (H) 0.52 - 1.04 mg/dL    GFR Estimate 32 (L) >60 mL/min/[1.73_m2]    GFR Estimate If Black 37 (L) >60 mL/min/[1.73_m2]    Calcium 9.7 8.5 - 10.1 mg/dL    Bilirubin Total 1.2 0.2 - 1.3 mg/dL    Albumin 3.8 3.4 - 5.0 g/dL    Protein Total 8.9 (H) 6.8 - 8.8 g/dL    Alkaline Phosphatase 95 40 - 150 U/L    ALT 29 0 - 50 U/L    AST 26 0 - 45 U/L   Lyme Conf IgG and IgM by Immunoblot     Status: Abnormal   Result Value Ref Range    Lyme Confirm IgG by Immunoblot Positive (A) Negative    Lyme Confirm IgM by Immunoblot Positive (A) Negative   KOH prep (skin, hair or nails only)     Status: None    Specimen: Back   Result Value Ref Range    Specimen Description Back     KOH Skin Hair Nails Test No fungal elements seen          Assessment & Plan     (A69.20) Acute Lyme disease  (primary encounter diagnosis)  Comment: Lyme's test did come back positive we will treat with a course of doxycycline  Plan: doxycycline hyclate (VIBRA-TABS) 100 MG tablet        *    (R21) Rash  Comment:  Plan: Lyme Disease Kassy with reflex to WB Serum, CBC         with platelets, Comprehensive metabolic panel,         KOH prep (skin, hair or nails only),         DERMATOLOGY REFERRAL, Lyme Conf IgG and IgM by         Immunoblot            (R79.82) Elevated C-reactive protein (CRP)  Comment: Patient noted to have elevated creatinine recommend rechecking in 1 to  "2 weeks lab only appointment  Plan: **Basic metabolic panel FUTURE anytime,         CANCELED: Basic metabolic panel               BMI:   Estimated body mass index is 29.46 kg/m  as calculated from the following:    Height as of 6/17/20: 1.626 m (5' 4\").    Weight as of this encounter: 77.8 kg (171 lb 9.6 oz).           See Patient Instructions    No follow-ups on file.    PARIS Mccain CNP  [facility]      "

## 2020-06-19 DIAGNOSIS — E11.42 DIABETIC POLYNEUROPATHY ASSOCIATED WITH TYPE 2 DIABETES MELLITUS (H): ICD-10-CM

## 2020-06-19 LAB — B BURGDOR IGG+IGM SER QL: 7.87 (ref 0–0.89)

## 2020-06-19 RX ORDER — DOXYCYCLINE HYCLATE 100 MG
100 TABLET ORAL 2 TIMES DAILY
Qty: 42 TABLET | Refills: 0 | Status: SHIPPED | OUTPATIENT
Start: 2020-06-19 | End: 2020-07-10

## 2020-06-20 ENCOUNTER — NURSE TRIAGE (OUTPATIENT)
Dept: NURSING | Facility: CLINIC | Age: 65
End: 2020-06-20

## 2020-06-20 NOTE — TELEPHONE ENCOUNTER
Patient asking general questions about Lyme Disease as she was seen at office on 6/18/20 and tested positive; prescribed medication.  Patient states provider told her it could have been dormant.  Patient asking if all ticks carry Lyme Disease, if able to tell how long she may have had Lyme Disease, and if she has had for a long time that may be the reason for her long standing joint pain.  Advised patient to call back with any new/worsening symptoms.

## 2020-06-21 LAB
B BURGDOR IGG SER QL IB: POSITIVE
B BURGDOR IGM SER QL IB: POSITIVE

## 2020-06-22 NOTE — PATIENT INSTRUCTIONS
Patient Education     Lyme Disease  Lyme disease is caused by bacteria. The infection is most often passed during the bite of a deer tick. The tick is very small, so many people with Lyme disease don't know they have been bitten. Tests for Lyme disease are not always accurate early in the disease. If the disease is suspected, treatment may start before testing confirms the infection. A long course of antibiotics is the standard treatment.  If untreated, Lyme disease can cause symptoms in many parts of the body that may worsen.    Early symptoms limited to a small area may appear within a few days to a month after the tick bite. These symptoms may include a round, red rash that sometimes looks like a bull's-eye target with darker outer ring and a darker center. There may fever, chills, fatigue, body aches, and headache. In time, the rash goes away, even without treatment. That doesn't mean the infection has gone away, however. In some cases, early local symptoms never develop.    Early body-wide symptoms may appear weeks to months after the bite. These can include rashes on the skin of various parts of the body, muscle aches, fatigue, fever, headache, stiff neck, weakness on one side of the face, dizziness, palpitations, passing out, and joint pain and swelling.    Late-stage symptoms can include weakness in an arm, or leg, headache, fever, and numbness and tingling in the arms or legs, joint pain and swelling, confusion, and memory loss.    Many people will have left over symptoms even after treatment and cure of the Lyme disease. These are called post-Lyme symptoms and may include fatigue, body aches, joint aches, and headaches, which generally improve with time. Repeated courses of antibiotics don't help these symptoms to resolve faster. And, having the symptoms after a course of treatment does not mean that the Lyme bacteria is still active in the body.  Testing is done to check for the bacteria. When the  infection is treated early, it can be cured. In some cases, a second or third course of antibiotics may be needed. Be sure to follow your healthcare providers directions about treatment.  Home care  If antibiotic pills have been prescribed, take them exactly as directed until they are completely gone. Don't stop taking them until you have taken the full course or your healthcare provider has told you to stop.  Ask your healthcare provider about taking over-the-counter medicines to control symptoms such as aches and fever.  Follow-up care  Follow up with your healthcare provider, or as advised. Be sure to return for follow-up testing as directed to be sure the infection has been treated.  When to seek medical advice  Call your healthcare provider right away if any of the following occur:    Current symptoms get worse    Unexplained fever, neck pain or stiffness, or headache    Arm, leg or facial weakness    Joint pain or swelling    Numbness and tingling in the arms or legs    Confusion or memory loss    Irregular or rapid heartbeat, palpitations, dizziness, or passing out  Date Last Reviewed: 3/1/2018    [phone] [employer]. [address] [place] [id number]. All rights reserved. This information is not intended as a substitute for professional medical care. Always follow your healthcare professional's instructions.

## 2020-06-23 RX ORDER — DULOXETIN HYDROCHLORIDE 30 MG/1
30 CAPSULE, DELAYED RELEASE ORAL 2 TIMES DAILY
Qty: 60 CAPSULE | Refills: 3 | Status: SHIPPED | OUTPATIENT
Start: 2020-06-23 | End: 2020-09-05

## 2020-08-13 NOTE — PROGRESS NOTES
"Family Medicine Telephone Visit Note    TELEPHONE VISIT DETAILS and Consent  Can you please confirm what state you are currently located in? [place]  \"If you are located in a state other than [place] we cannot proceed with your visit.  This is due to state licensing laws that do not allow your provider to practice in another state.  This is not a billing or insurance issue. Please let me know if you need prescriptions filled or have other immediate concerns and I will get that message to your care team. I can also have you speak to our  to make an appointment when you are back in the [place].      Karine Moss is a 65 year old female who is being evaluated via a billable telephone visit.      The patient has been notified of following:     \"This telephone visit will be conducted via a call between you and your physician/provider. We have found that certain health care needs can be provided without the need for a physical exam.  This service lets us provide the care you need with a short phone conversation.  If a prescription is necessary we can send it directly to your pharmacy.  If lab work is needed we can place an order for that and you can then stop by our lab to have the test done at a later time.    Telephone visits are billed at different rates depending on your insurance coverage. During this emergency period, for some insurers they may be billed the same as an in-person visit.  Please reach out to your insurance provider with any questions.    If during the course of the call the physician/provider feels a telephone visit is not appropriate, you will not be charged for this service.\"    Patient has given verbal consent for Telephone visit?  Yes    How would you like to obtain your AVS? MyChart    Subjective     Karine Moss is a 65 year old female with hx of type 2 DM, osteopenia, sleep apnea, PTSD, irritable bowel, HTN, hyperlipidemia and depression. She presents to clinic today for the " "following health issues:    Chief Complaint   Patient presents with     Incontinence     Started about 3-4 years ago but recently has got worse.       Urinary incontinence  Karine reports a 3-4 yr hx of urinary incontinence. It has progressively gotten worse, to the point that when she stands up, urine leaks. She now wears a pad daily, has nocturia x 2. No current dysuria, no hematuria or flank pain. She also has urinary frequency, sometime passes only a \"teaspoon\" of urine.     Does not typically eat or acidic foods. Rare etoh 2/month  Caffeine: 2/week    She has never had a formal evaluation for urinary incontinence. Has never done Kegels consistently or taken medication.     S/p vaginal delivery x 1 , 7# 14 oz    Diabetes II  Karine is checking sugars regularly, most are well under 200 range.   120-140 in the morning, sometime 80, no dizziness or shakiness  Weather has been hot so she is drinking more water but not sure if this is what is causing the urinary issues.       Fecal incontinence  Karine also describes a 2 yr hx of recal incontinence. She reports passing a \" thick paste\" and sometimes is not aware she is passing stools. Denies saddle paresthesia. No hx of low back problems or injury.   She is wearing a pad daily.    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)     Keratoconus     Hyperlipidemia LDL goal <100     Obstructive sleep apnea     Vitamin D insufficiency     Major depression in partial remission (H)     Plantar warts     Low back pain     DJD (degenerative joint disease) of knee     Pancreatitis     Panic     Chest pain     IBS (irritable bowel syndrome)     Heart murmur     Hypertension goal BP (blood pressure) < 140/90     Type 2 diabetes mellitus without complication, with long-term current use of insulin (H)     Osteopenia of hip     Acute conjunctivitis of left eye     PTSD " "(post-traumatic stress disorder)     Diabetic peripheral neuropathy (H)     Bilateral sciatica     Bilateral low back pain with bilateral sciatica     Past Surgical History:   Procedure Laterality Date     CHOLECYSTECTOMY  2015     ear surgery  2002 and 01/2004     GASTRIC BYPASS  2013    [place]     HEMORRHOIDECTOMY  09/14/2000     LASIK BILATERAL       UVULOPALATOPHARYNGOPLASTY       UVVP         Social History     Tobacco Use     Smoking status: Never Smoker     Smokeless tobacco: Never Used   Substance Use Topics     Alcohol use: Yes     Comment: rare     Family History   Problem Relation Age of Onset     Diabetes Father      Cancer - colorectal Maternal Grandmother      Cancer Daughter      Obesity Daughter         s/p lap band     Cancer Brother         lung     Hypertension Sister              Review of Systems   Constitutional, HEENT, cardiovascular, pulmonary, gi and gu systems are negative, except as otherwise noted.      Objective    Ht 1.618 m (5' 3.7\")   Wt 72.6 kg (160 lb)   BMI 27.72 kg/m        General:healthy, alert and no distress  Psych: Alert and oriented times 3; coherent speech, normal   rate and volume, able to articulate logical thoughts, able   to abstract reason, no tangential thoughts, no hallucinations   or delusions  Her affect is positive     Assessment/Plan:    ICD-10-CM    1. Mixed incontinence urge and stress (male)(female)  N39.46 tolterodine ER (DETROL LA) 2 MG 24 hr capsule   2. Incontinence of feces, unspecified fecal incontinence type  R15.9      Discussed she has likely mixed stress and urge incontinence. She would like a trial of Detrol LA 2mg daily. May increase to 4mg daily if needed. Discussed potential side effects.     Given progression of symptoms and associated fecal incontinence, I recommended referral to the Pelvic floor clinic. She is living in [facility] until the end of summer . She will see me the first week of Sept. Wishes to hold off on making an appt at this " time. We can discuss further when I see her for a DM II check at the clinic.     Anay Martinez MD      After Visit Information:  Patient chose to view AVS via [employer]    Start time:5:36 PM  5:58 PM end  Phone call duration:  22 minutes

## 2020-08-14 ENCOUNTER — VIRTUAL VISIT (OUTPATIENT)
Dept: FAMILY MEDICINE | Facility: CLINIC | Age: 65
End: 2020-08-14
Payer: COMMERCIAL

## 2020-08-14 VITALS — HEIGHT: 64 IN | BODY MASS INDEX: 27.31 KG/M2 | WEIGHT: 160 LBS

## 2020-08-14 DIAGNOSIS — N39.46 MIXED INCONTINENCE URGE AND STRESS (MALE)(FEMALE): Primary | ICD-10-CM

## 2020-08-14 DIAGNOSIS — R15.9 INCONTINENCE OF FECES, UNSPECIFIED FECAL INCONTINENCE TYPE: ICD-10-CM

## 2020-08-14 RX ORDER — TOLTERODINE 2 MG/1
2 CAPSULE, EXTENDED RELEASE ORAL DAILY
Qty: 30 CAPSULE | Refills: 1 | Status: SHIPPED | OUTPATIENT
Start: 2020-08-14 | End: 2020-09-04

## 2020-08-14 ASSESSMENT — MIFFLIN-ST. JEOR: SCORE: 1251

## 2020-08-18 DIAGNOSIS — E11.9 TYPE 2 DIABETES MELLITUS WITHOUT COMPLICATION, WITHOUT LONG-TERM CURRENT USE OF INSULIN (H): ICD-10-CM

## 2020-08-18 NOTE — TELEPHONE ENCOUNTER
Last time prescribed: 2/27/2020 , 90 tabs x 1 refills  Last office visit: 8/14/2020  Next appointment: 9/4/2020    Prescription approved per [facility] Refill Protocol.  Davina Chawla RN  [facility]

## 2020-08-19 RX ORDER — GLIPIZIDE 5 MG/1
5 TABLET, FILM COATED, EXTENDED RELEASE ORAL DAILY
Qty: 90 TABLET | Refills: 1 | Status: SHIPPED | OUTPATIENT
Start: 2020-08-19 | End: 2021-02-12

## 2020-09-04 ENCOUNTER — OFFICE VISIT (OUTPATIENT)
Dept: FAMILY MEDICINE | Facility: CLINIC | Age: 65
End: 2020-09-04
Payer: COMMERCIAL

## 2020-09-04 VITALS
SYSTOLIC BLOOD PRESSURE: 122 MMHG | HEART RATE: 100 BPM | BODY MASS INDEX: 29.9 KG/M2 | TEMPERATURE: 98.1 F | DIASTOLIC BLOOD PRESSURE: 65 MMHG | HEIGHT: 63 IN | WEIGHT: 168.75 LBS | OXYGEN SATURATION: 98 %

## 2020-09-04 DIAGNOSIS — E11.42 DIABETIC PERIPHERAL NEUROPATHY (H): ICD-10-CM

## 2020-09-04 DIAGNOSIS — R19.7 DIARRHEA, UNSPECIFIED TYPE: ICD-10-CM

## 2020-09-04 DIAGNOSIS — D64.9 ANEMIA, UNSPECIFIED TYPE: ICD-10-CM

## 2020-09-04 DIAGNOSIS — E11.9 TYPE 2 DIABETES MELLITUS WITHOUT COMPLICATION, WITHOUT LONG-TERM CURRENT USE OF INSULIN (H): Primary | ICD-10-CM

## 2020-09-04 DIAGNOSIS — I10 HYPERTENSION GOAL BP (BLOOD PRESSURE) < 140/90: ICD-10-CM

## 2020-09-04 DIAGNOSIS — E11.42 DIABETIC POLYNEUROPATHY ASSOCIATED WITH TYPE 2 DIABETES MELLITUS (H): ICD-10-CM

## 2020-09-04 DIAGNOSIS — N39.46 MIXED INCONTINENCE URGE AND STRESS (MALE)(FEMALE): ICD-10-CM

## 2020-09-04 LAB
ALBUMIN SERPL-MCNC: 3.7 G/DL (ref 3.2–4.5)
ALP SERPL-CCNC: 88 U/L (ref 40–150)
ALT SERPL-CCNC: 24 U/L (ref 0–50)
AST SERPL-CCNC: 29 U/L (ref 0–45)
BILIRUB SERPL-MCNC: 1.1 MG/DL (ref 0.2–1.3)
BUN SERPL-MCNC: 24 MG/DL (ref 7–30)
CALCIUM SERPL-MCNC: 9.9 MG/DL (ref 8.5–10.4)
CHLORIDE SERPLBLD-SCNC: 106 MMOL/L (ref 94–109)
CO2 SERPL-SCNC: 26 MMOL/L (ref 20–32)
CREAT SERPL-MCNC: 1.4 MG/DL (ref 0.6–1.3)
CREAT UR-MCNC: 183 MG/DL
EGFR CALCULATED (BLACK REFERENCE): 48.5
EGFR CALCULATED (NON BLACK REFERENCE): 40.1
ERYTHROCYTE [DISTWIDTH] IN BLOOD BY AUTOMATED COUNT: 13.8 %
GLUCOSE SERPL-MCNC: 69 MG/DL (ref 60–99)
HBA1C MFR BLD: 6.2 % (ref 4.1–5.7)
HCT VFR BLD AUTO: 32.8 % (ref 35–47)
HEMOGLOBIN: 9.7 G/DL (ref 11.7–15.7)
MCH RBC QN AUTO: 25.1 PG (ref 26.5–35)
MCHC RBC AUTO-ENTMCNC: 29.6 G/DL (ref 32–36)
MCV RBC AUTO: 84.8 FL (ref 78–100)
MICROALBUMIN UR-MCNC: 40 MG/L
MICROALBUMIN/CREAT UR: 21.97 MG/G CR (ref 0–25)
PLATELET # BLD AUTO: 232 K/UL (ref 150–450)
POTASSIUM SERPL-SCNC: 4.6 MMOL/L (ref 3.4–5.3)
PROT SERPL-MCNC: 8.2 G/DL (ref 6.8–8.8)
RBC # BLD AUTO: 3.87 M/UL (ref 3.8–5.2)
SODIUM SERPL-SCNC: 147 MMOL/L (ref 137.3–146.3)
WBC # BLD AUTO: 11.4 K/UL (ref 4–11)

## 2020-09-04 RX ORDER — TOLTERODINE 4 MG/1
4 CAPSULE, EXTENDED RELEASE ORAL DAILY
Qty: 30 CAPSULE | Refills: 1 | Status: SHIPPED | OUTPATIENT
Start: 2020-09-04 | End: 2020-09-18

## 2020-09-04 ASSESSMENT — PATIENT HEALTH QUESTIONNAIRE - PHQ9
5. POOR APPETITE OR OVEREATING: SEVERAL DAYS
SUM OF ALL RESPONSES TO PHQ QUESTIONS 1-9: 3

## 2020-09-04 ASSESSMENT — ANXIETY QUESTIONNAIRES
7. FEELING AFRAID AS IF SOMETHING AWFUL MIGHT HAPPEN: NOT AT ALL
5. BEING SO RESTLESS THAT IT IS HARD TO SIT STILL: MORE THAN HALF THE DAYS
1. FEELING NERVOUS, ANXIOUS, OR ON EDGE: SEVERAL DAYS
GAD7 TOTAL SCORE: 6
IF YOU CHECKED OFF ANY PROBLEMS ON THIS QUESTIONNAIRE, HOW DIFFICULT HAVE THESE PROBLEMS MADE IT FOR YOU TO DO YOUR WORK, TAKE CARE OF THINGS AT HOME, OR GET ALONG WITH OTHER PEOPLE: SOMEWHAT DIFFICULT
3. WORRYING TOO MUCH ABOUT DIFFERENT THINGS: SEVERAL DAYS
2. NOT BEING ABLE TO STOP OR CONTROL WORRYING: SEVERAL DAYS
6. BECOMING EASILY ANNOYED OR IRRITABLE: NOT AT ALL

## 2020-09-04 ASSESSMENT — MIFFLIN-ST. JEOR: SCORE: 1279.57

## 2020-09-04 NOTE — PROGRESS NOTES
Karine Moss is a 65 year old female who presents to the clinic today for a recheck of    DIABETES  Here for Type II diabetes.  Last eye exam was last month.  Retinopathy: none  Peripheral neuropathy: bilateral to level of ankles, mainly on bottom of both feet  Weight: up 8 pounds  Wt Readings from Last 3 Encounters:   09/04/20 76.5 kg (168 lb 12 oz)   08/14/20 72.6 kg (160 lb)   06/18/20 77.8 kg (171 lb 9.6 oz)     Candidiasis/skin issues: none  UTI/ yeast infections: none.    Frequency of FBS in am before meals   General range of FBS: 140, occasional 300s  Hypoglycemia: none    Diet: balanced diet, veggies, meats    Lab Results   Component Value Date    A1C 7.0 05/06/2020    A1C 7.9 02/06/2020     History of significant microalbuminuria    DM Meds:   Metformin XR 500mg, take 4 daily  Glipizide 5mg daily    Compliance: good    Aspirin Use: 81mg daily    PMH, PSH, FH, medications, allergies and immunizations are updated this visit.      HYPERLIPIDEMIA  Recent Labs   Lab Test 02/06/20  1523 04/12/19  1443 06/27/18  1439   CHOL 111.0 173 178.0   HDL 47.0* 49* 46.0*   LDL 29.0 97 107.0   TRIG 179.0* 135 120.0   CHOLHDLRATIO 2.4  --  3.8     LDL is in target range. Currently taking atorvastatin, 20mg daily. Compliant with med(s). No reported myalgias or other side effects.     HYPERTENSION  BP Readings from Last 3 Encounters:   09/04/20 122/65   06/18/20 118/74   05/07/20 127/81     Here for blood pressure check. She is currently on amlodipine 5mg daily, and losartan 100mg daily. No current chest pain.  No THAPA. She is compliant with meds, no reported side effects. Blood pressure readings have been in target range.     Urinary incontinence  Karine reports 3-4 yr hx of urinary incontinence. She wears a pad during the day and night. Has urine leakage with position change, standing up. Also reported nocturia x 2 at her last visit and urinary frequency. She was started on Detrol LA 2mg daily and that is helping. She  "wishes to try the 4mg dose. Denies dysuria or hematuria.  We had also discussed referral to the pelvic floor clinic but she is living in [facility] until October and wishes to defer at this time.     Fecal incontinence  At last visit, Karine divulged a 2 yr hx of fecal incontinence, worsening in the past 6 months. 2-4 stools per day, she is wearing a pad. Reports passing \"thick paste\" and sometimes not aware of when she is having a BM. No paresthesias. No hx of back pain or injury.  She has hx of adenomatous polyps. Last colonoscopy in 2018 with very poor prep. They recommended repeating with doing double prep prior. She is in need of a referral . Denies bloody stool. No weight loss or abdominal discomfort.    EXAM  /65 (BP Location: Right arm, Patient Position: Sitting, Cuff Size: Adult Regular)   Pulse 100   Temp 98.1  F (36.7  C) (Temporal)   Ht 1.6 m (5' 3\")   Wt 76.5 kg (168 lb 12 oz)   SpO2 98%   BMI 29.89 kg/m    Gen: Alert, NAD  Cor: S1S2, soft 2/6  Murmur at RUSB  Lungs: CTA bilaterally  Ext: no edema, pulses full palpable  Skin: no lesion   Feet: no ulcerations, Callouses absent, microfilament testing shows dampened sensation on plantar surfaces of both feet and dampened sensation over dorsum of left foot and toes of the right foot.       Assessment:  (E11.9) Type 2 diabetes mellitus without complication, without long-term current use of insulin (H)  (primary encounter diagnosis)  Comment: A1c is much better and no evidence for microalbumin in urine today, Cr coming down but still not in normal range.  1/67 to 1.4 today.  Lab Results   Component Value Date    A1C 6.2 09/04/2020    A1C 7.0 05/06/2020    A1C 7.9 02/06/2020    A1C 6.7 10/22/2019    A1C 6.5 04/25/2019     Plan: Hemoglobin A1c ([place]), Comprehensive         Metabolic Panel ([place]), Albumin Random         Urine Quantitative with Creat Ratio        Continue taking the Metformin XR 2000mg  and Glipizide, 5mg daily. I'd like to " see you again for a diabetic check in 6 months    (N39.46) Mixed incontinence urge and stress (male)(female)  Comment: you reported some improvement with using Detrol LA 2mg dose  Plan: tolterodine ER (DETROL LA) 4 MG 24 hr capsule        I have increased your dose to 4mg daily. Lets see how this works. When you return from living up [place], then I think referral to the pelvic floor clinic would be helpful    (E11.42) Diabetic peripheral neuropathy (H)  Comment: bilateral, L>R  Plan: I recommend you look at the bottoms of your feet daily for any sores or skin changes    (I10) Hypertension goal BP (blood pressure) < 140/90  Comment: your blood pressure is in target range  Plan: I will refill your medications for the next 6 months as I would like you to return to clinic then.    (R19.7) Diarrhea, unspecified type  Comment: worsening in the past 6 months, fecal incontinence, new noted anemia with CBC today  Plan: GASTROENTEROLOGY ADULT REF PROCEDURE ONLY, CBC         with Plt ([facility])        I am concerned that blood loss is from your colon and there may be an underlying polyp or cancer that is bleeding . I am recommending colonoscopy as soon as you are able. We can also address the fecal incontinence at the pelvic floor clinic when you return in October.    Anay Martinez MD  Internal Medicine/Pediatrics

## 2020-09-04 NOTE — NURSING NOTE
"65 year old  Chief Complaint   Patient presents with     Diabetes     f/u diabetes        Blood pressure 122/65, pulse 100, temperature 98.1  F (36.7  C), temperature source Temporal, height 1.6 m (5' 3\"), weight 76.5 kg (168 lb 12 oz), SpO2 98 %. Body mass index is 29.89 kg/m .  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)     Keratoconus     Hyperlipidemia LDL goal <100     Obstructive sleep apnea     Vitamin D insufficiency     Major depression in partial remission (H)     Plantar warts     Low back pain     DJD (degenerative joint disease) of knee     Pancreatitis     Panic     Chest pain     IBS (irritable bowel syndrome)     Heart murmur     Hypertension goal BP (blood pressure) < 140/90     Type 2 diabetes mellitus without complication, with long-term current use of insulin (H)     Osteopenia of hip     Acute conjunctivitis of left eye     PTSD (post-traumatic stress disorder)     Diabetic peripheral neuropathy (H)     Bilateral sciatica     Bilateral low back pain with bilateral sciatica       Wt Readings from Last 2 Encounters:   09/04/20 76.5 kg (168 lb 12 oz)   08/14/20 72.6 kg (160 lb)     BP Readings from Last 3 Encounters:   09/04/20 122/65   06/18/20 118/74   05/07/20 127/81         Current Outpatient Medications   Medication     amLODIPine (NORVASC) 5 MG tablet     amphetamine-dextroamphetamine (ADDERALL) 30 MG tablet     ASPIRIN LOW DOSE 81 MG EC tablet     atorvastatin (LIPITOR) 20 MG tablet     blood glucose (FREESTYLE LITE) test strip     blood glucose (NO BRAND SPECIFIED) lancets standard     blood glucose (NO BRAND SPECIFIED) test strip     blood glucose calibration (NO BRAND SPECIFIED) solution     blood glucose calibration (NO BRAND SPECIFIED) solution     blood glucose monitoring (NO BRAND SPECIFIED) meter device kit     calcium carbonate (OS-KINJAL) 1500 (600 Ca) MG tablet     cevimeline " (EVOXAC) 30 MG capsule     cholecalciferol (VITAMIN D3) 1000 UNIT tablet     Continuous Blood Gluc  (FREESTYLE JORDIN 14 DAY READER) EBEN     Continuous Blood Gluc Sensor (FREESTYLE JORDIN 14 DAY SENSOR) MISC     cyanocobalamin (VITAMIN B-12) 1000 MCG tablet     DULoxetine (CYMBALTA) 30 MG capsule     glipiZIDE (GLUCOTROL XL) 5 MG 24 hr tablet     losartan (COZAAR) 100 MG tablet     metFORMIN (GLUCOPHAGE-XR) 500 MG 24 hr tablet     tolterodine ER (DETROL LA) 2 MG 24 hr capsule     Alpha Lipoic Acid 200 MG CAPS     melatonin 1 MG CAPS     zinc sulfate (ZINCATE) 220 (50 Zn) MG capsule     No current facility-administered medications for this visit.        Social History     Tobacco Use     Smoking status: Never Smoker     Smokeless tobacco: Never Used   Substance Use Topics     Alcohol use: Yes     Comment: rare     Drug use: No       Health Maintenance Due   Topic Date Due     HEPATITIS C SCREENING  1955     ADVANCE CARE PLANNING  1955     HIV SCREENING  03/11/1970     HEPATITIS B IMMUNIZATION (1 of 3 - Risk 3-dose series) 03/11/1974     ZOSTER IMMUNIZATION (1 of 2) 03/11/2005     DIABETIC FOOT EXAM  10/31/2019     MEDICARE ANNUAL WELLNESS VISIT  04/12/2020     PHQ-9  05/04/2020     INFLUENZA VACCINE (1) 09/01/2020     PNEUMOCOCCAL IMMUNIZATION 65+ LOW/MEDIUM RISK (1 of 2 - PCV13) 03/11/2020       Lab Results   Component Value Date    PAP NIL 04/12/2019 September 4, 2020 3:10 PM

## 2020-09-05 RX ORDER — DULOXETIN HYDROCHLORIDE 30 MG/1
30 CAPSULE, DELAYED RELEASE ORAL 2 TIMES DAILY
Qty: 180 CAPSULE | Refills: 1 | Status: SHIPPED | OUTPATIENT
Start: 2020-09-05 | End: 2021-04-09

## 2020-09-05 RX ORDER — ATORVASTATIN CALCIUM 20 MG/1
20 TABLET, FILM COATED ORAL DAILY
Qty: 90 TABLET | Refills: 1 | Status: SHIPPED | OUTPATIENT
Start: 2020-09-05 | End: 2021-03-08

## 2020-09-05 ASSESSMENT — ANXIETY QUESTIONNAIRES: GAD7 TOTAL SCORE: 6

## 2020-09-09 LAB — FERRITIN SERPL-MCNC: 8 NG/ML (ref 8–252)

## 2020-09-11 DIAGNOSIS — E55.9 VITAMIN D DEFICIENCY: Primary | ICD-10-CM

## 2020-09-11 NOTE — TELEPHONE ENCOUNTER
Last office visit was on 09/04/2020, no future visits scheduled.    Prescription approved per [facility] Refill Protocol.      Jessica Chandler RN  September 12, 2020 11:13 AM

## 2020-09-12 RX ORDER — VITAMIN B COMPLEX
1 TABLET ORAL DAILY
Qty: 90 TABLET | Refills: 3 | Status: SHIPPED | OUTPATIENT
Start: 2020-09-12 | End: 2021-08-09

## 2020-09-17 DIAGNOSIS — N39.46 MIXED INCONTINENCE URGE AND STRESS (MALE)(FEMALE): ICD-10-CM

## 2020-09-17 NOTE — TELEPHONE ENCOUNTER
Last office visit was on 09/04/2020, no future visits scheduled.   Called pt , she states the increased dose of 4 mg is working well.   Would like a 90 day supply if can get.     She will Mychart Dr. Martinez an update on meds effectiveness in a few weeks., as requested.     Prescription approved per [facility] Refill Protocol.    Jessica Chandler RN  September 18, 2020 1:42 PM

## 2020-09-18 RX ORDER — TOLTERODINE 4 MG/1
4 CAPSULE, EXTENDED RELEASE ORAL DAILY
Qty: 90 CAPSULE | Refills: 0 | Status: SHIPPED | OUTPATIENT
Start: 2020-09-18 | End: 2020-12-16

## 2020-09-30 ENCOUNTER — NURSE TRIAGE (OUTPATIENT)
Dept: FAMILY MEDICINE | Facility: CLINIC | Age: 65
End: 2020-09-30

## 2020-09-30 NOTE — TELEPHONE ENCOUNTER
Called daughter back - relayed message from Dr. Martinez below and she will be with patient today to discuss and assist. All questions answered. Call back if questions/concerns.     Karen Sosa RN  09/30/20  11:53 AM    +++++++++++++++++++++++++++++  Do not take glipizide or metformin today or on the day of the procedure.   May resume medications the day after the procedure, when she is eating a regular diet again.     Anay Martinez MD   Internal Medicine/Pediatrics

## 2020-09-30 NOTE — TELEPHONE ENCOUNTER
Patient having colonoscopy tomorrow 1:30pm at [facility]. Colonoscopy prep started and yesterday began with clear liquid diet and she is to continue with this until 3 hours prior to the appointment. She is eating popsicles, broth, clear juice. She has not taken medication yet today and will hold off until she hears otherwise.    Michelle Vaughn - what is advice for diabetes medications during today and tomorrow for colonoscopy prep and day of surgery.    Karen Sosa RN  09/30/20  9:48 AM

## 2020-09-30 NOTE — TELEPHONE ENCOUNTER
[facility] Call Center    Phone Message    May a detailed message be left on voicemail: yes     Reason for Call: Symptoms or Concerns     If patient has red-flag symptoms, warm transfer to triage line    Current symptom or concern: blood sugar issues    Symptoms have been present for:  1 day(s)    Has patient previously been seen for this? No      Are there any new or worsening symptoms? Yes: daughter called stating patient is prepping for a colonoscopy and is having issues with her blood sugar, Please call to discuss thank you.       Action Taken: Message routed to:  [facility]: [place]    Travel Screening: Not Applicable

## 2020-10-01 ENCOUNTER — TRANSFERRED RECORDS (OUTPATIENT)
Dept: HEALTH INFORMATION MANAGEMENT | Facility: CLINIC | Age: 65
End: 2020-10-01

## 2020-10-07 ENCOUNTER — OFFICE VISIT (OUTPATIENT)
Dept: FAMILY MEDICINE | Facility: CLINIC | Age: 65
End: 2020-10-07
Payer: COMMERCIAL

## 2020-10-07 VITALS
HEART RATE: 113 BPM | OXYGEN SATURATION: 98 % | DIASTOLIC BLOOD PRESSURE: 65 MMHG | HEIGHT: 64 IN | TEMPERATURE: 98 F | BODY MASS INDEX: 28.85 KG/M2 | SYSTOLIC BLOOD PRESSURE: 122 MMHG | WEIGHT: 169 LBS

## 2020-10-07 DIAGNOSIS — R15.9 INCONTINENCE OF FECES, UNSPECIFIED FECAL INCONTINENCE TYPE: ICD-10-CM

## 2020-10-07 DIAGNOSIS — D64.9 ANEMIA, UNSPECIFIED TYPE: ICD-10-CM

## 2020-10-07 DIAGNOSIS — Z23 NEED FOR PROPHYLACTIC VACCINATION AND INOCULATION AGAINST INFLUENZA: ICD-10-CM

## 2020-10-07 DIAGNOSIS — L72.3 SEBACEOUS CYST: ICD-10-CM

## 2020-10-07 DIAGNOSIS — R60.0 LOWER EXTREMITY EDEMA: Primary | ICD-10-CM

## 2020-10-07 ASSESSMENT — MIFFLIN-ST. JEOR: SCORE: 1293.09

## 2020-10-07 NOTE — PROGRESS NOTES
"Karine Moss is a 65 year old female here for the following issues:       Lower extremity edema  Karine is a 66 yo woman who presents with bilateral lower extremity edema, L>R. This has been worsening over the past 6 month. She reports tight shiny skin and associated pain and tingling over her feet. She is applying ice for comfort. She reports remote history of left sided ankle fracture. No recent injury. Symptoms were worse in the summer months. She is not wearing support hose. No orthopnea or PND. No limping, overlying redness or warmth.    Incontinence of feces  Karine reports a 2 yr hx of fecal incontinence, worsening in the past 6 months. 2-4 stools per day, she is wearing a pad. Reports passing \"thick paste\" and sometimes not aware of when she is having a BM. No paresthesias. No hx of back pain or injury.  She has hx of adenomatous polyps. Last colonoscopy in 2018 with very poor prep. They recommended repeating with doing double prep prior. She recently underwent colonoscopy on Oct 1. Prep was fair. No obvious polyps or abnormalities. They recommended repeating in one year.     Anemia  Karine has new onset of iron deficiency anemia since June. She denies bleeding gums, no epistaxis, no blood in urine or stool. Colonoscopy on 10/1/20 did not find obvious source. Prep was fair. Karine is s/p Rai en Y and has had to take iron supplements since her surgery in 2015. She is not currently on iron supplements. We discussed proceeding with an upper endoscopy to complete the workup. As she has had previous gastric bypass surgery, will want to check with GI to ensure they are comfortable with doing the procedure at [facility]. She denies shortness of breath or fatigue.     Component      Latest Ref Rng & Units 6/18/2020 9/4/2020 9/9/2020   WBC      4.0 - 11.0 10e9/L 11.0     RBC Count      3.8 - 5.2 10e12/L 4.34     Hemoglobin      11.7 - 15.7 g/dL 11.9 9.7 (L)    Hematocrit      35.0 - 47.0 % 37.6 32.8 " (L)    MCV      78.0 - 100.0 fL 87 84.8    MCH      26.5 - 35.0 pg 27.4 25.1 (L)    MCHC      32.0 - 36.0 g/dL 31.6 29.6 (L)    RDW      % 14.6 13.8    Platelet Count      150 - 450 10e9/L 238     WBC      4.0 - 11.0 K/uL  11.4 (H)    RBC      3.80 - 5.20 M/uL  3.87    Platelets      150.0 - 450.0 K/uL  232.0    Ferritin      8 - 252 ng/mL   8     Lesion behind right ear  Karine reports nodule behind her ear, that sometimes enlarges and becomes painful. She would like it removed. Not inflamed today    Need for prophylactic vaccination and inoculation against influenza  Karine requests flu vaccine today    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)     Keratoconus     Hyperlipidemia LDL goal <100     Obstructive sleep apnea     Vitamin D insufficiency     Major depression in partial remission (H)     Plantar warts     Low back pain     DJD (degenerative joint disease) of knee     Pancreatitis     Panic     Chest pain     IBS (irritable bowel syndrome)     Heart murmur     Hypertension goal BP (blood pressure) < 140/90     Type 2 diabetes mellitus without complication, with long-term current use of insulin (H)     Osteopenia of hip     Acute conjunctivitis of left eye     PTSD (post-traumatic stress disorder)     Diabetic peripheral neuropathy (H)     Bilateral sciatica     Bilateral low back pain with bilateral sciatica       Current Outpatient Medications   Medication Sig Dispense Refill     Alpha Lipoic Acid 200 MG CAPS Take 200 mg by mouth 3 times daily 90 capsule 3     amLODIPine (NORVASC) 5 MG tablet Take 1 tablet (5 mg) by mouth daily Needs clinic visit for further refills 90 tablet 3     amphetamine-dextroamphetamine (ADDERALL) 30 MG tablet Take 1.5 tablets bid, Rx from Dr. Sam, [facility] 90 tablet 0     ASPIRIN LOW DOSE 81 MG EC tablet TAKE 1 TABLET BY MOUTH DAILY 90 tablet 2     atorvastatin (LIPITOR)  20 MG tablet Take 1 tablet (20 mg) by mouth daily 90 tablet 1     blood glucose (FREESTYLE LITE) test strip Use to test blood sugar 2 times daily or as directed. 50 each 1     blood glucose (NO BRAND SPECIFIED) lancets standard Use to test blood sugar 4 times daily or as directed. Dispense appropriate lancets for meter. 360 each 3     blood glucose (NO BRAND SPECIFIED) test strip Use to test blood sugar 4 times daily or as directed. To accompany: Blood Glucose Monitor Brands: per insurance. 120 strip 11     blood glucose calibration (NO BRAND SPECIFIED) solution Use to calibrate blood glucose monitor as needed as directed. 1 each 0     blood glucose calibration (NO BRAND SPECIFIED) solution Use to calibrate blood glucose monitor as needed as directed. To accompany: Blood Glucose Monitor Brands: per insurance. 1 Bottle 3     blood glucose monitoring (NO BRAND SPECIFIED) meter device kit Use to test blood sugar 4 times daily or as directed. Dispense One-Touch Verio IQ Kit - per patient request. 1 kit 0     calcium carbonate (OS-KINJAL) 1500 (600 Ca) MG tablet Take 2 tablets (1,200 mg) by mouth daily       cevimeline (EVOXAC) 30 MG capsule take 1 cap twice daily 180 capsule 3     Continuous Blood Gluc  (FREESTYLE JORDIN 14 DAY READER) EBEN Use to read blood sugars as per 's instructions. 1 each 0     Continuous Blood Gluc Sensor (FREESTYLE JORDIN 14 DAY SENSOR) MISC Change every 14 days. 2 each 11     cyanocobalamin (VITAMIN B-12) 1000 MCG tablet Take 1 tablet (1,000 mcg) by mouth daily 90 tablet 3     DULoxetine (CYMBALTA) 30 MG capsule Take 1 capsule (30 mg) by mouth 2 times daily 180 capsule 1     glipiZIDE (GLUCOTROL XL) 5 MG 24 hr tablet Take 1 tablet (5 mg) by mouth daily 90 tablet 1     losartan (COZAAR) 100 MG tablet Take 1 tablet (100 mg) by mouth daily 90 tablet 3     melatonin 1 MG CAPS Take 1 mg by mouth At Bedtime 30 capsule 3     metFORMIN (GLUCOPHAGE-XR) 500 MG 24 hr tablet Take 4 po q am  "with breakfast 360 tablet 3     tolterodine ER (DETROL LA) 4 MG 24 hr capsule Take 1 capsule (4 mg) by mouth daily 90 capsule 0     Vitamin D3 (CHOLECALCIFEROL) 25 mcg (1000 units) tablet Take 1 tablet (25 mcg) by mouth daily 90 tablet 3       Allergies   Allergen Reactions     Pravastatin Other (See Comments)     woozy     Codeine Nausea and Vomiting     Nsaids GI Disturbance and Other (See Comments)     Pt had bariatric surgery, should not ever take oral NSAIDS due to risk of gastric ulcers.  Pt had bariatric surgery, should not ever take oral NSAIDS due to risk of gastric ulcers.        EXAM  /65 (BP Location: Right arm, Patient Position: Sitting, Cuff Size: Adult Regular)   Pulse 113   Temp 98  F (36.7  C) (Temporal)   Ht 1.62 m (5' 3.78\")   Wt 76.7 kg (169 lb)   SpO2 98%   BMI 29.21 kg/m    Gen: Alert, pleasant, NAD  COR: S1,S2, no murmur  Lungs: CTA bilaterally, no rhonchi, wheezes or rales  Ext: +1-2 bilateral pedal edema, with tenderness over dorsum of feet. Subjective tingling sensation over plantar surfaces of feet. No redness or warmth.    Assessment:  (R60.0) Lower extremity edema  (primary encounter diagnosis)  Comment: suspect chronic lymphedema, lungs clear, do not suspect underlying heart failure.  Plan: CARDIOLOGY EVAL ADULT REFERRAL        Recommend referral to vascular team for further evaluation. Recommend support hose or simply wrap with ACE bandage if unable to use support hose. Gave instruction on how to wrap, keep feet elevated during the day.     (R15.9) Incontinence of feces, unspecified fecal incontinence type  Comment: discussed option to refer to the Pelvic floor clinic at [facility] for further evaluation. Colonoscopy was unremarkable.   Plan: hold at this time on referral, pending workup below. Will address next visit.    (D64.9) Anemia, unspecified type  Comment: significant drop in hemoglobin in the past 6 months. No obvious clinical symptoms  Plan: refer to [facility] for EGD. Will notify them regarding her previous jimmy en y surgery.      (Z23) Need for prophylactic vaccination and inoculation against influenza  Comment: per pt request  Plan: FluBlok preserve-free/prefilled, 18+ years         [27063], ADMIN INFLUENZA VIRUS VACCINE        given    (L72.3) Sebaceous cyst  Comment: recurrent sebaceous cyst at her right ear  Plan: DERMATOLOGY ADULT REFERRAL        Refer to dermatology for evaluation and treatment.     Anay Martinez MD  Internal Medicine/Pediatrics

## 2020-10-07 NOTE — NURSING NOTE
"65 year old  Chief Complaint   Patient presents with     Musculoskeletal Problem      bilateral pain, swelling x 6 mons        Blood pressure 122/65, pulse 113, temperature 98  F (36.7  C), temperature source Temporal, height 1.62 m (5' 3.78\"), weight 76.7 kg (169 lb), SpO2 98 %. Body mass index is 29.21 kg/m .  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)     Keratoconus     Hyperlipidemia LDL goal <100     Obstructive sleep apnea     Vitamin D insufficiency     Major depression in partial remission (H)     Plantar warts     Low back pain     DJD (degenerative joint disease) of knee     Pancreatitis     Panic     Chest pain     IBS (irritable bowel syndrome)     Heart murmur     Hypertension goal BP (blood pressure) < 140/90     Type 2 diabetes mellitus without complication, with long-term current use of insulin (H)     Osteopenia of hip     Acute conjunctivitis of left eye     PTSD (post-traumatic stress disorder)     Diabetic peripheral neuropathy (H)     Bilateral sciatica     Bilateral low back pain with bilateral sciatica       Wt Readings from Last 2 Encounters:   10/07/20 76.7 kg (169 lb)   09/04/20 76.5 kg (168 lb 12 oz)     BP Readings from Last 3 Encounters:   10/07/20 122/65   09/04/20 122/65   06/18/20 118/74         Current Outpatient Medications   Medication     Alpha Lipoic Acid 200 MG CAPS     amLODIPine (NORVASC) 5 MG tablet     amphetamine-dextroamphetamine (ADDERALL) 30 MG tablet     ASPIRIN LOW DOSE 81 MG EC tablet     atorvastatin (LIPITOR) 20 MG tablet     blood glucose (FREESTYLE LITE) test strip     blood glucose (NO BRAND SPECIFIED) lancets standard     blood glucose (NO BRAND SPECIFIED) test strip     blood glucose calibration (NO BRAND SPECIFIED) solution     blood glucose calibration (NO BRAND SPECIFIED) solution     blood glucose monitoring (NO BRAND SPECIFIED) meter device kit "     calcium carbonate (OS-KINJAL) 1500 (600 Ca) MG tablet     cevimeline (EVOXAC) 30 MG capsule     Continuous Blood Gluc  (FREESTYLE JORDIN 14 DAY READER) EBEN     Continuous Blood Gluc Sensor (FREESTYLE JORDIN 14 DAY SENSOR) MISC     cyanocobalamin (VITAMIN B-12) 1000 MCG tablet     DULoxetine (CYMBALTA) 30 MG capsule     glipiZIDE (GLUCOTROL XL) 5 MG 24 hr tablet     losartan (COZAAR) 100 MG tablet     melatonin 1 MG CAPS     metFORMIN (GLUCOPHAGE-XR) 500 MG 24 hr tablet     tolterodine ER (DETROL LA) 4 MG 24 hr capsule     Vitamin D3 (CHOLECALCIFEROL) 25 mcg (1000 units) tablet     No current facility-administered medications for this visit.        Social History     Tobacco Use     Smoking status: Never Smoker     Smokeless tobacco: Never Used   Substance Use Topics     Alcohol use: Yes     Comment: rare     Drug use: No       Health Maintenance Due   Topic Date Due     HEPATITIS C SCREENING  1955     ADVANCE CARE PLANNING  1955     HIV SCREENING  03/11/1970     HEPATITIS B IMMUNIZATION (1 of 3 - Risk 3-dose series) 03/11/1974     ZOSTER IMMUNIZATION (1 of 2) 03/11/2005     DIABETIC FOOT EXAM  10/31/2019     Pneumococcal Vaccine: 65+ Years (1 of 1 - PPSV23) 03/11/2020     MEDICARE ANNUAL WELLNESS VISIT  04/12/2020     INFLUENZA VACCINE (1) 09/01/2020       Lab Results   Component Value Date    PAP NIL 04/12/2019 October 7, 2020 3:41 PM    Prior to immunization administration, verified patients identity using patient s name and date of birth. Please see Immunization Activity for additional information.     Screening Questionnaire for Adult Immunization    Are you sick today?   No   Do you have allergies to medications, food, a vaccine component or latex?   No   Have you ever had a serious reaction after receiving a vaccination?   No   Do you have a long-term health problem with heart, lung, kidney, or metabolic disease (e.g., diabetes), asthma, a blood disorder, no spleen, complement  component deficiency, a cochlear implant, or a spinal fluid leak?  Are you on long-term aspirin therapy?   No   Do you have cancer, leukemia, HIV/AIDS, or any other immune system problem?   No   Do you have a parent, brother, or sister with an immune system problem?   No   In the past 3 months, have you taken medications that affect  your immune system, such as prednisone, other steroids, or anticancer drugs; drugs for the treatment of rheumatoid arthritis, Crohn s disease, or psoriasis; or have you had radiation treatments?   No   Have you had a seizure, or a brain or other nervous system problem?   No   During the past year, have you received a transfusion of blood or blood    products, or been given immune (gamma) globulin or antiviral drug?   No   For women: Are you pregnant or is there a chance you could become       pregnant during the next month?   No   Have you received any vaccinations in the past 4 weeks?   No     Immunization questionnaire answers were all negative.        Per orders of Dr. Martinez , injection of Flu shot  given by Sammie Chew. Patient instructed to remain in clinic for 15 minutes afterwards, and to report any adverse reaction to me immediately.       Screening performed by Sammie Chew on 10/7/2020 at 3:42 PM.

## 2020-10-19 ENCOUNTER — OFFICE VISIT (OUTPATIENT)
Dept: DERMATOLOGY | Facility: CLINIC | Age: 65
End: 2020-10-19
Payer: COMMERCIAL

## 2020-10-19 VITALS — SYSTOLIC BLOOD PRESSURE: 135 MMHG | DIASTOLIC BLOOD PRESSURE: 80 MMHG

## 2020-10-19 DIAGNOSIS — L65.8 FEMALE PATTERN HAIR LOSS: Primary | ICD-10-CM

## 2020-10-19 DIAGNOSIS — L72.0 EPIDERMAL CYST: ICD-10-CM

## 2020-10-19 LAB
IRON SERPL-MCNC: 19 UG/DL (ref 35–180)
PROT SERPL-MCNC: 8.6 G/DL (ref 6.8–8.8)
TIBC SERPL-MCNC: 519 UG/DL (ref 240–430)

## 2020-10-19 PROCEDURE — 84270 ASSAY OF SEX HORMONE GLOBUL: CPT | Performed by: PHYSICIAN ASSISTANT

## 2020-10-19 PROCEDURE — 99203 OFFICE O/P NEW LOW 30 MIN: CPT | Performed by: PHYSICIAN ASSISTANT

## 2020-10-19 PROCEDURE — 84134 ASSAY OF PREALBUMIN: CPT | Performed by: PHYSICIAN ASSISTANT

## 2020-10-19 PROCEDURE — 84403 ASSAY OF TOTAL TESTOSTERONE: CPT | Performed by: PHYSICIAN ASSISTANT

## 2020-10-19 PROCEDURE — 99000 SPECIMEN HANDLING OFFICE-LAB: CPT | Performed by: PHYSICIAN ASSISTANT

## 2020-10-19 PROCEDURE — 82627 DEHYDROEPIANDROSTERONE: CPT | Performed by: PHYSICIAN ASSISTANT

## 2020-10-19 PROCEDURE — 83550 IRON BINDING TEST: CPT | Performed by: PHYSICIAN ASSISTANT

## 2020-10-19 PROCEDURE — 86038 ANTINUCLEAR ANTIBODIES: CPT | Performed by: PHYSICIAN ASSISTANT

## 2020-10-19 PROCEDURE — 86431 RHEUMATOID FACTOR QUANT: CPT | Performed by: PHYSICIAN ASSISTANT

## 2020-10-19 PROCEDURE — 86376 MICROSOMAL ANTIBODY EACH: CPT | Performed by: PHYSICIAN ASSISTANT

## 2020-10-19 PROCEDURE — 83540 ASSAY OF IRON: CPT | Performed by: PHYSICIAN ASSISTANT

## 2020-10-19 PROCEDURE — 84425 ASSAY OF VITAMIN B-1: CPT | Mod: 90 | Performed by: PHYSICIAN ASSISTANT

## 2020-10-19 PROCEDURE — 84155 ASSAY OF PROTEIN SERUM: CPT | Performed by: PHYSICIAN ASSISTANT

## 2020-10-19 PROCEDURE — 36415 COLL VENOUS BLD VENIPUNCTURE: CPT | Performed by: PHYSICIAN ASSISTANT

## 2020-10-19 NOTE — PATIENT INSTRUCTIONS
Female pattern alopecia or androgenetic alopecia is mediated by dihydrotestosterone causing miniaturization of the hair follicles. Treatment options to prevent further hair loss are Finasteride, Spironolactone, and Minoxidil. Treatment will not cause hair to regrow. Once treatment is discontinued hair loss will progress.    Side effects of oral Finasteride include and are not limited to impotence, decreased libido, abnormal ejaculation, sexual dysfunction and gynecomastia.    Side effects of Minoxidil (Rogaine) include contact dermatitis, pruritis (itch), and skin irritation. Recommend using men's Rogaine once a day.     Side effects of Spironolactone: This oral medication blocks androgens (hormones that can aggravate acne).  Since this medication is also used as a diuretic, baseline blood work is needed to check your electrolytes and kidney function. Do not get pregnant while on this medication. Drink plenty of water to avoid dehydration. May feel lightheaded from medication and may increase urination.     Check RUDI, DHEA-S, ,Iron and iron binding capacity, total protein, total and free T,  vit B1,zinc, thyroid peroxidase antibody, RF

## 2020-10-19 NOTE — LETTER
10/19/2020         RE: Karine Moss  [address]  [facility] [address]        Dear Colleague,    Thank you for referring your patient, Karine Moss, to the [facility]. Please see a copy of my visit note below.    HPI:  Karine Moss is a 65 year old female patient here today for growth on right neck .  Patient states this has been present for years.  Patient reports the following symptoms: bothersom .  Patient reports the following previous treatments: none.  Patient reports the following modifying factors: none.  Associated symptoms: none. Also has had loss of hair since she was 30 years old. States it has been gradual. No treatment tried.  Patient has no other skin complaints today.  Remainder of the HPI, Meds, PMH, Allergies, FH, and SH was reviewed in chart.    Pertinent Hx:   No personal or family history of skin cancer    Past Medical History:   Diagnosis Date     Arthritis      Chest pain     seeing Cardiology     Depression 1991    after 's death     Diabetes mellitus (H)      Diabetic renal disease (H)     microalbuminuria     DJD (degenerative joint disease) of knee      H/O vitamin D deficiency      Hypertension      IBS (irritable bowel syndrome)     diarrhea      Keratoconus      Low back pain      Narcolepsy 2007    Dx'd by Sleep specialist 347.00, pt discontinued Adderal mid Nov. 13 due to tacycardia concerns     Obesity      Obstructive sleep apnea     327.23, 3 sleep studies,Dr. Flaco [facility]     Pancreatitis     related to Victoza, also on Xyrem     Panic      RLS (restless legs syndrome)      Sinus tachycardia        Past Surgical History:   Procedure Laterality Date     CHOLECYSTECTOMY  2015     ear surgery  2002 and 01/2004     GASTRIC BYPASS  2013    [place]     HEMORRHOIDECTOMY  09/14/2000     LASIK BILATERAL       UVULOPALATOPHARYNGOPLASTY       UVVP          Family History   Problem Relation Age of Onset     Diabetes Father       Cancer - colorectal Maternal Grandmother      Cancer Daughter      Obesity Daughter         s/p lap band     Cancer Brother         lung     Hypertension Sister        Social History     Socioeconomic History     Marital status: Single     Spouse name: Not on file     Number of children: 1     Years of education: Not on file     Highest education level: Not on file   Occupational History     Employer: [employer]     Comment: on disability   Social Needs     Financial resource strain: Not on file     Food insecurity     Worry: Not on file     Inability: Not on file     Transportation needs     Medical: Not on file     Non-medical: Not on file   Tobacco Use     Smoking status: Never Smoker     Smokeless tobacco: Never Used   Substance and Sexual Activity     Alcohol use: Yes     Comment: rare     Drug use: No     Sexual activity: Never   Lifestyle     Physical activity     Days per week: Not on file     Minutes per session: Not on file     Stress: Not on file   Relationships     Social connections     Talks on phone: Not on file     Gets together: Not on file     Attends Latter day service: Not on file     Active member of club or organization: Not on file     Attends meetings of clubs or organizations: Not on file     Relationship status: Not on file     Intimate partner violence     Fear of current or ex partner: Not on file     Emotionally abused: Not on file     Physically abused: Not on file     Forced sexual activity: Not on file   Other Topics Concern     Parent/sibling w/ CABG, MI or angioplasty before 65F 55M? No   Social History Narrative    Daughter- 42,  since 1991    Drives only short distances due to narcolepsy            --------------------------------------------------------------------------------    Surgical History  Return To Top     Status Surgery Time Frame Comment Record Date     Inactive  ear surgery  1/04 5/27/2008      Inactive  eye surgery  2002 5/27/2008      Inactive   Hemorrhoidectomy  9/14/00 5/27/2008          --------------------------------------------------------------------------------    Food Allergy  Return To Top     Allergen Reaction Comment Record Date     * No known food allergies      10/28/2008          --------------------------------------------------------------------------------    Drug Allergy  Return To Top     Allergen Reaction Comment Record Date     Codeine  nausea    6/21/2007          --------------------------------------------------------------------------------    Environment Allergy  Return To Top     Allergen Reaction Comment Record Date     * No known environmental allergies      10/28/2008          --------------------------------------------------------------------------------    Social History  Return To Top     Question Answer Comment Record Date     Marital status      5/27/2008      Advance Directive or Living Will  Form Given to Patient    1/18/2010      Emotional Abuse  Yes    1/18/2010      Exercise  No    1/18/2010      Caffeine  Yes    5/27/2008      Physical Abuse  No    1/18/2010      Sealtbelts  Yes    1/18/2010      Sexual Abuse  No    1/18/2010      Breast/Testicle Self Check  No    1/18/2010      Number of children  1    1/18/2010      Living arrangements  House    1/18/2010      Number of adults in household  2    1/18/2010      Education level  High School Graduate    1/18/2010      Employment  Currently employed    1/18/2010      Tobacco history  Has never smoked or chewed tobacco    5/27/2008      Alcohol history  Currently drinks alcohol    5/27/2008      Has the patient ever used illegal drugs?  Has never used illegal drugs    5/27/2008          --------------------------------------------------------------------------------    History - Overall Remark: 3/21/2012 Return To Top         --------------------------------------------------------------------------------    Medical History Return To Top     Status Diagnosis  Time Frame Comment Record Date     Active (250.00) - C - Diabetes mellitus, type II controlled      5/15/2012      Active (788.41) - C - Urinary frequency      4/17/2012      Active (250.02) - C - Diabetes mellitus, type II uncontrolled      3/6/2012      Active (278.00) - C - Obesity      2/14/2011      Active (250.00) - C - Diabetes mellitus, type II controlled      2/14/2011      Active (739.3) - C - Somatic dysfunction, lumbar region      8/16/2010      Active (739.5) - C - Somatic dysfunction, pelvic region      8/16/2010      Active (401.9) - C - Hypertension      2/8/2010      Active 268.9 UNSP VITAMIN D DEFICIENCY      1/18/2010      Active (695.3) - C - Rosacea      1/18/2010      Active 272.1 Hypertriglyceridemia      1/18/2010      Active 300.4 Depression with Anxiety (Dysthymic Disorder)      10/28/2008      Active 739.6 Somatic dysfunction, lower extremities      10/28/2008      Active 780.79 Fatigue      6/23/2008      Active 278.01 Obesity, morbid      6/19/2008      Active 347.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, 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      6/19/2008      Inactive  (462) - C - Pharyngitis, acute      1/15/2011      Inactive  250.02 Diabetes mellitus, type II uncontrolled      1/18/2010      Inactive  726.71 Tendinitis, achilles      10/28/2008      Inactive  (250.00) - C - Diabetes mellitus, type II controlled      10/28/2008      Inactive  (250.00) - C - Diabetes mellitus, type II controlled      6/23/2008      Inactive  V77.91 Screening, lipids      6/23/2008      Inactive  599.0 Urinary tract infection, unspec./pyuria (UTI)      6/23/2008      Inactive  V70.0 Routine Medical Exam      6/19/2008      Active Constipation      5/27/2008      Active Hemorrhoids      5/27/2008      Pancreatitis pancreatitis 2013 April     --------------------------------------------------------------------------------    M        --------------------------------------------------------------------------------    Family History  Return To Top      Status Relationship Disease Comment Record Date     Alive Sister  *Denies any medical problems  3 sisters  2008      Alive Brother  *Denies any medical problems  2 brothers  2008         Father    Age of death 56  2010         Mother  Dementia  Age of death 82  2008          --------------------------------------------------------------------------------                           Outpatient Encounter Medications as of 10/19/2020   Medication Sig Dispense Refill     Alpha Lipoic Acid 200 MG CAPS Take 200 mg by mouth 3 times daily 90 capsule 3     amLODIPine (NORVASC) 5 MG tablet Take 1 tablet (5 mg) by mouth daily Needs clinic visit for further refills 90 tablet 3     amphetamine-dextroamphetamine (ADDERALL) 30 MG tablet Take 1.5 tablets bid, Rx from Dr. Sam, [place] LUNG 90 tablet 0     ASPIRIN LOW DOSE 81 MG EC tablet TAKE 1 TABLET BY MOUTH DAILY 90 tablet 2     atorvastatin (LIPITOR) 20 MG tablet Take 1 tablet (20 mg) by mouth daily 90 tablet 1     blood glucose (FREESTYLE LITE) test strip Use to test blood sugar 2 times daily or as directed. 50 each 1     blood glucose (NO BRAND SPECIFIED) lancets standard Use to test blood sugar 4 times daily or as directed. Dispense appropriate lancets for meter. 360 each 3     blood glucose (NO BRAND SPECIFIED) test strip Use to test blood sugar 4 times daily or as directed. To accompany: Blood Glucose Monitor Brands: per insurance. 120 strip 11     blood glucose calibration (NO BRAND SPECIFIED) solution Use to calibrate blood glucose monitor as needed as directed. 1 each 0     blood glucose calibration (NO BRAND SPECIFIED) solution Use to calibrate blood glucose monitor as needed as directed. To accompany: Blood Glucose Monitor Brands: per insurance. 1 Bottle 3     blood glucose monitoring (NO BRAND SPECIFIED) meter device kit Use to test blood sugar 4 times daily or as directed. Dispense One-Touch Verio IQ Kit - per patient request. 1 kit 0      calcium carbonate (OS-KINJAL) 1500 (600 Ca) MG tablet Take 2 tablets (1,200 mg) by mouth daily       cevimeline (EVOXAC) 30 MG capsule take 1 cap twice daily 180 capsule 3     Continuous Blood Gluc  (FREESTYLE JORDIN 14 DAY READER) EBEN Use to read blood sugars as per 's instructions. 1 each 0     Continuous Blood Gluc Sensor (FREESTYLE JORDIN 14 DAY SENSOR) MISC Change every 14 days. 2 each 11     cyanocobalamin (VITAMIN B-12) 1000 MCG tablet Take 1 tablet (1,000 mcg) by mouth daily 90 tablet 3     DULoxetine (CYMBALTA) 30 MG capsule Take 1 capsule (30 mg) by mouth 2 times daily 180 capsule 1     glipiZIDE (GLUCOTROL XL) 5 MG 24 hr tablet Take 1 tablet (5 mg) by mouth daily 90 tablet 1     losartan (COZAAR) 100 MG tablet Take 1 tablet (100 mg) by mouth daily 90 tablet 3     melatonin 1 MG CAPS Take 1 mg by mouth At Bedtime 30 capsule 3     metFORMIN (GLUCOPHAGE-XR) 500 MG 24 hr tablet Take 4 po q am with breakfast 360 tablet 3     tolterodine ER (DETROL LA) 4 MG 24 hr capsule Take 1 capsule (4 mg) by mouth daily 90 capsule 0     Vitamin D3 (CHOLECALCIFEROL) 25 mcg (1000 units) tablet Take 1 tablet (25 mcg) by mouth daily 90 tablet 3     No facility-administered encounter medications on file as of 10/19/2020.        Review Of Systems:  Skin: spot and hair loss  Eyes: negative  Ears/Nose/Throat: negative  Respiratory: No shortness of breath, dyspnea on exertion, cough, or hemoptysis  Cardiovascular: negative  Gastrointestinal: negative  Genitourinary: negative  Musculoskeletal: negative  Neurologic: negative  Psychiatric: negative  Hematologic/Lymphatic/Immunologic: negative  Endocrine: negative      Objective:     /80   Eyes: Conjunctivae/lids: Normal   ENT: Lips:  Normal  MSK: Normal  Cardiovascular: Peripheral edema none  Pulm: Breathing Normal  Neuro/Psych: Orientation: A/O x 3 Normal; Mood/Affect: Normal, NAD, WDWN  Pt accompanied by: self  Following areas examined: face ( wearing mask),  neck, hands, scalp  [place] skin type:i   Findings:  Thinning of hair at frontal, mid and crown. nml appearing scalp. No erythema, scaling, scarring.  Soft mobile smooth nodule with punctum on right PA neck 1.0cm  Assessment and Plan:  1) epidermal cyst  Disc excision with Dr. Hollingsworth, scarring, bandage change    2) FPHL  Disc etiology and treatment. Pt defers tx. Would like labs.   Female pattern alopecia or androgenetic alopecia is mediated by dihydrotestosterone causing miniaturization of the hair follicles. Treatment options to prevent further hair loss are Finasteride, Spironolactone, and Minoxidil. Treatment will not cause hair to regrow. Once treatment is discontinued hair loss will progress.    Side effects of oral Finasteride include and are not limited to impotence, decreased libido, abnormal ejaculation, sexual dysfunction and gynecomastia.    Side effects of Minoxidil (Rogaine) include contact dermatitis, pruritis (itch), and skin irritation. Recommend using men's Rogaine once a day.     Side effects of Spironolactone: This oral medication blocks androgens (hormones that can aggravate acne).  Since this medication is also used as a diuretic, baseline blood work is needed to check your electrolytes and kidney function. Do not get pregnant while on this medication. Drink plenty of water to avoid dehydration. May feel lightheaded from medication and may increase urination.     Check RUDI, DHEA-S, Iron and iron binding capacity, total protein, total and free T,  vit B1,zinc, thyroid peroxidase antibody, RF. Pt defers zinc. Insurance does not cover.       Follow up in based on labs        Again, thank you for allowing me to participate in the care of your patient.        Sincerely,        Roberta Borden PA-C

## 2020-10-19 NOTE — PROGRESS NOTES
HPI:  Karine Moss is a 65 year old female patient here today for growth on right neck .  Patient states this has been present for years.  Patient reports the following symptoms: bothersom .  Patient reports the following previous treatments: none.  Patient reports the following modifying factors: none.  Associated symptoms: none. Also has had loss of hair since she was 30 years old. States it has been gradual. No treatment tried.  Patient has no other skin complaints today.  Remainder of the HPI, Meds, PMH, Allergies, FH, and SH was reviewed in chart.    Pertinent Hx:   No personal or family history of skin cancer    Past Medical History:   Diagnosis Date     Arthritis      Chest pain     seeing Cardiology     Depression 1991    after 's death     Diabetes mellitus (H)      Diabetic renal disease (H)     microalbuminuria     DJD (degenerative joint disease) of knee      H/O vitamin D deficiency      Hypertension      IBS (irritable bowel syndrome)     diarrhea      Keratoconus      Low back pain      Narcolepsy 2007    Dx'd by Sleep specialist 347.00, pt discontinued Adderal mid Nov. 13 due to tacycardia concerns     Obesity      Obstructive sleep apnea     327.23, 3 sleep studies,Dr. Sam [facility]     Pancreatitis     related to Victoza, also on Xyrem     Panic      RLS (restless legs syndrome)      Sinus tachycardia        Past Surgical History:   Procedure Laterality Date     CHOLECYSTECTOMY  2015     ear surgery  2002 and 01/2004     GASTRIC BYPASS  2013    [place]     HEMORRHOIDECTOMY  09/14/2000     LASIK BILATERAL       UVULOPALATOPHARYNGOPLASTY       UVVP          Family History   Problem Relation Age of Onset     Diabetes Father      Cancer - colorectal Maternal Grandmother      Cancer Daughter      Obesity Daughter         s/p lap band     Cancer Brother         lung     Hypertension Sister        Social History     Socioeconomic History     Marital status: Single     Spouse name: Not on file      Number of children: 1     Years of education: Not on file     Highest education level: Not on file   Occupational History     Employer: [employer]     Comment: on disability   Social Needs     Financial resource strain: Not on file     Food insecurity     Worry: Not on file     Inability: Not on file     Transportation needs     Medical: Not on file     Non-medical: Not on file   Tobacco Use     Smoking status: Never Smoker     Smokeless tobacco: Never Used   Substance and Sexual Activity     Alcohol use: Yes     Comment: rare     Drug use: No     Sexual activity: Never   Lifestyle     Physical activity     Days per week: Not on file     Minutes per session: Not on file     Stress: Not on file   Relationships     Social connections     Talks on phone: Not on file     Gets together: Not on file     Attends Bahai service: Not on file     Active member of club or organization: Not on file     Attends meetings of clubs or organizations: Not on file     Relationship status: Not on file     Intimate partner violence     Fear of current or ex partner: Not on file     Emotionally abused: Not on file     Physically abused: Not on file     Forced sexual activity: Not on file   Other Topics Concern     Parent/sibling w/ CABG, MI or angioplasty before 65F 55M? No   Social History Narrative    Daughter- 42,  since 1991    Drives only short distances due to narcolepsy            --------------------------------------------------------------------------------    Surgical History  Return To Top     Status Surgery Time Frame Comment Record Date     Inactive  ear surgery  1/04 5/27/2008      Inactive  eye surgery  2002    5/27/2008      Inactive  Hemorrhoidectomy  9/14/00 5/27/2008          --------------------------------------------------------------------------------    Food Allergy  Return To Top     Allergen Reaction Comment Record Date     * No known food allergies      10/28/2008           --------------------------------------------------------------------------------    Drug Allergy  Return To Top     Allergen Reaction Comment Record Date     Codeine  nausea    6/21/2007          --------------------------------------------------------------------------------    Environment Allergy  Return To Top     Allergen Reaction Comment Record Date     * No known environmental allergies      10/28/2008          --------------------------------------------------------------------------------    Social History  Return To Top     Question Answer Comment Record Date     Marital status      5/27/2008      Advance Directive or Living Will  Form Given to Patient    1/18/2010      Emotional Abuse  Yes    1/18/2010      Exercise  No    1/18/2010      Caffeine  Yes    5/27/2008      Physical Abuse  No    1/18/2010      Sealtbelts  Yes    1/18/2010      Sexual Abuse  No    1/18/2010      Breast/Testicle Self Check  No    1/18/2010      Number of children  1    1/18/2010      Living arrangements  House    1/18/2010      Number of adults in household  2    1/18/2010      Education level  High School Graduate    1/18/2010      Employment  Currently employed    1/18/2010      Tobacco history  Has never smoked or chewed tobacco    5/27/2008      Alcohol history  Currently drinks alcohol    5/27/2008      Has the patient ever used illegal drugs?  Has never used illegal drugs    5/27/2008          --------------------------------------------------------------------------------    History - Overall Remark: 3/21/2012 Return To Top         --------------------------------------------------------------------------------    Medical History Return To Top     Status Diagnosis Time Frame Comment Record Date     Active (250.00) - C - Diabetes mellitus, type II controlled      5/15/2012      Active (788.41) - C - Urinary frequency      4/17/2012      Active (250.02) - C - Diabetes mellitus, type II uncontrolled      3/6/2012       Active (278.00) - C - Obesity      2011      Active (250.00) - C - Diabetes mellitus, type II controlled      2011      Active (739.3) - C - Somatic dysfunction, lumbar region      2010      Active (739.5) - C - Somatic dysfunction, pelvic region      2010      Active (401.9) - C - Hypertension      2010      Active 268.9 UNSP VITAMIN D DEFICIENCY      2010      Active (695.3) - C - Rosacea      2010      Active 272.1 Hypertriglyceridemia      2010      Active 300.4 Depression with Anxiety (Dysthymic Disorder)      10/28/2008      Active 739.6 Somatic dysfunction, lower extremities      10/28/2008      Active 780.79 Fatigue      2008      Active 278.01 Obesity, morbid      2008      Active 347.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, 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      2008      Inactive  (462) - C - Pharyngitis, acute      1/15/2011      Inactive  250.02 Diabetes mellitus, type II uncontrolled      2010      Inactive  726.71 Tendinitis, achilles      10/28/2008      Inactive  (250.00) - C - Diabetes mellitus, type II controlled      10/28/2008      Inactive  (250.00) - C - Diabetes mellitus, type II controlled      2008      Inactive  V77.91 Screening, lipids      2008      Inactive  599.0 Urinary tract infection, unspec./pyuria (UTI)      2008      Inactive  V70.0 Routine Medical Exam      2008      Active Constipation      2008      Active Hemorrhoids      2008      Pancreatitis pancreatitis 2013-------------------------------------------------------------------------------    M        --------------------------------------------------------------------------------    Family History  Return To Top     Status Relationship Disease Comment Record Date     Alive Sister  *Denies any medical problems  3 sisters  2008      Alive Brother  *Denies any medical problems  2 brothers  2008         Father    Age of death 56  2010          Mother  Dementia  Age of death 82  5/27/2008          --------------------------------------------------------------------------------                           Outpatient Encounter Medications as of 10/19/2020   Medication Sig Dispense Refill     Alpha Lipoic Acid 200 MG CAPS Take 200 mg by mouth 3 times daily 90 capsule 3     amLODIPine (NORVASC) 5 MG tablet Take 1 tablet (5 mg) by mouth daily Needs clinic visit for further refills 90 tablet 3     amphetamine-dextroamphetamine (ADDERALL) 30 MG tablet Take 1.5 tablets bid, Rx from Dr. Sam, [facility] 90 tablet 0     ASPIRIN LOW DOSE 81 MG EC tablet TAKE 1 TABLET BY MOUTH DAILY 90 tablet 2     atorvastatin (LIPITOR) 20 MG tablet Take 1 tablet (20 mg) by mouth daily 90 tablet 1     blood glucose (FREESTYLE LITE) test strip Use to test blood sugar 2 times daily or as directed. 50 each 1     blood glucose (NO BRAND SPECIFIED) lancets standard Use to test blood sugar 4 times daily or as directed. Dispense appropriate lancets for meter. 360 each 3     blood glucose (NO BRAND SPECIFIED) test strip Use to test blood sugar 4 times daily or as directed. To accompany: Blood Glucose Monitor Brands: per insurance. 120 strip 11     blood glucose calibration (NO BRAND SPECIFIED) solution Use to calibrate blood glucose monitor as needed as directed. 1 each 0     blood glucose calibration (NO BRAND SPECIFIED) solution Use to calibrate blood glucose monitor as needed as directed. To accompany: Blood Glucose Monitor Brands: per insurance. 1 Bottle 3     blood glucose monitoring (NO BRAND SPECIFIED) meter device kit Use to test blood sugar 4 times daily or as directed. Dispense One-Touch Verio IQ Kit - per patient request. 1 kit 0     calcium carbonate (OS-KINJAL) 1500 (600 Ca) MG tablet Take 2 tablets (1,200 mg) by mouth daily       cevimeline (EVOXAC) 30 MG capsule take 1 cap twice daily 180 capsule 3     Continuous Blood Gluc  (FREESTYLE JORDIN 14 DAY READER) EBEN Use to read  blood sugars as per 's instructions. 1 each 0     Continuous Blood Gluc Sensor (FREESTYLE JORDIN 14 DAY SENSOR) MISC Change every 14 days. 2 each 11     cyanocobalamin (VITAMIN B-12) 1000 MCG tablet Take 1 tablet (1,000 mcg) by mouth daily 90 tablet 3     DULoxetine (CYMBALTA) 30 MG capsule Take 1 capsule (30 mg) by mouth 2 times daily 180 capsule 1     glipiZIDE (GLUCOTROL XL) 5 MG 24 hr tablet Take 1 tablet (5 mg) by mouth daily 90 tablet 1     losartan (COZAAR) 100 MG tablet Take 1 tablet (100 mg) by mouth daily 90 tablet 3     melatonin 1 MG CAPS Take 1 mg by mouth At Bedtime 30 capsule 3     metFORMIN (GLUCOPHAGE-XR) 500 MG 24 hr tablet Take 4 po q am with breakfast 360 tablet 3     tolterodine ER (DETROL LA) 4 MG 24 hr capsule Take 1 capsule (4 mg) by mouth daily 90 capsule 0     Vitamin D3 (CHOLECALCIFEROL) 25 mcg (1000 units) tablet Take 1 tablet (25 mcg) by mouth daily 90 tablet 3     No facility-administered encounter medications on file as of 10/19/2020.        Review Of Systems:  Skin: spot and hair loss  Eyes: negative  Ears/Nose/Throat: negative  Respiratory: No shortness of breath, dyspnea on exertion, cough, or hemoptysis  Cardiovascular: negative  Gastrointestinal: negative  Genitourinary: negative  Musculoskeletal: negative  Neurologic: negative  Psychiatric: negative  Hematologic/Lymphatic/Immunologic: negative  Endocrine: negative      Objective:     /80   Eyes: Conjunctivae/lids: Normal   ENT: Lips:  Normal  MSK: Normal  Cardiovascular: Peripheral edema none  Pulm: Breathing Normal  Neuro/Psych: Orientation: A/O x 3 Normal; Mood/Affect: Normal, NAD, WDWN  Pt accompanied by: self  Following areas examined: face ( wearing mask), neck, hands, scalp  [place] skin type:i   Findings:  Thinning of hair at frontal, mid and crown. nml appearing scalp. No erythema, scaling, scarring.  Soft mobile smooth nodule with punctum on right PA neck 1.0cm  Assessment and Plan:  1) epidermal  cyst  Disc excision with Dr. Hollingsworth, scarring, bandage change    2) FPHL  Disc etiology and treatment. Pt defers tx. Would like labs.   Female pattern alopecia or androgenetic alopecia is mediated by dihydrotestosterone causing miniaturization of the hair follicles. Treatment options to prevent further hair loss are Finasteride, Spironolactone, and Minoxidil. Treatment will not cause hair to regrow. Once treatment is discontinued hair loss will progress.    Side effects of oral Finasteride include and are not limited to impotence, decreased libido, abnormal ejaculation, sexual dysfunction and gynecomastia.    Side effects of Minoxidil (Rogaine) include contact dermatitis, pruritis (itch), and skin irritation. Recommend using men's Rogaine once a day.     Side effects of Spironolactone: This oral medication blocks androgens (hormones that can aggravate acne).  Since this medication is also used as a diuretic, baseline blood work is needed to check your electrolytes and kidney function. Do not get pregnant while on this medication. Drink plenty of water to avoid dehydration. May feel lightheaded from medication and may increase urination.     Check RUDI, DHEA-S, Iron and iron binding capacity, total protein, total and free T,  vit B1,zinc, thyroid peroxidase antibody, RF. Pt defers zinc. Insurance does not cover.       Follow up in based on labs

## 2020-10-20 LAB
ANA SER QL IF: NEGATIVE
DHEA-S SERPL-MCNC: 72 UG/DL (ref 35–430)
PREALB SERPL IA-MCNC: 20 MG/DL (ref 15–45)
RHEUMATOID FACT SER NEPH-ACNC: <7 IU/ML (ref 0–20)
THYROPEROXIDASE AB SERPL-ACNC: 22 IU/ML

## 2020-10-21 LAB
SHBG SERPL-SCNC: 60 NMOL/L (ref 30–135)
TESTOST FREE SERPL-MCNC: 0.16 NG/DL (ref 0.06–0.38)
TESTOST SERPL-MCNC: 14 NG/DL (ref 8–60)

## 2020-10-22 LAB — VIT B1 BLD-MCNC: 119 NMOL/L (ref 70–180)

## 2020-10-28 ENCOUNTER — TELEPHONE (OUTPATIENT)
Dept: FAMILY MEDICINE | Facility: CLINIC | Age: 65
End: 2020-10-28

## 2020-10-28 NOTE — TELEPHONE ENCOUNTER
[facility] Call Center    Phone Message    May a detailed message be left on voicemail: yes     Reason for Call: Other: Request: Patient's daughter reports that she contacted [facility] to see if the Upper Endoscopy has been sent yet and the clinic states they have not received any referral for the patient form Dr. Martinez. Patient's daughter states that [facility] would like a call for verbal orders for this patient. The phone number given by the patient's daughter is [phone]. Please review and advise.       Action Taken: Message routed to:  [facility]: [facility]    Travel Screening: Not Applicable

## 2020-11-02 ENCOUNTER — TRANSFERRED RECORDS (OUTPATIENT)
Dept: HEALTH INFORMATION MANAGEMENT | Facility: CLINIC | Age: 65
End: 2020-11-02

## 2020-11-03 ENCOUNTER — TRANSFERRED RECORDS (OUTPATIENT)
Dept: HEALTH INFORMATION MANAGEMENT | Facility: CLINIC | Age: 65
End: 2020-11-03

## 2020-11-05 ENCOUNTER — OFFICE VISIT (OUTPATIENT)
Dept: CARDIOLOGY | Facility: CLINIC | Age: 65
End: 2020-11-05
Attending: INTERNAL MEDICINE
Payer: COMMERCIAL

## 2020-11-05 VITALS
SYSTOLIC BLOOD PRESSURE: 130 MMHG | BODY MASS INDEX: 28.7 KG/M2 | DIASTOLIC BLOOD PRESSURE: 70 MMHG | HEART RATE: 84 BPM | HEIGHT: 64 IN | WEIGHT: 168.1 LBS

## 2020-11-05 DIAGNOSIS — K25.4 GASTROINTESTINAL HEMORRHAGE ASSOCIATED WITH GASTRIC ULCER: Primary | ICD-10-CM

## 2020-11-05 DIAGNOSIS — R60.0 LOWER EXTREMITY EDEMA: ICD-10-CM

## 2020-11-05 PROCEDURE — 99203 OFFICE O/P NEW LOW 30 MIN: CPT | Performed by: INTERNAL MEDICINE

## 2020-11-05 PROCEDURE — 93005 ELECTROCARDIOGRAM TRACING: CPT | Performed by: INTERNAL MEDICINE

## 2020-11-05 RX ORDER — OMEPRAZOLE 40 MG/1
CAPSULE, DELAYED RELEASE ORAL
Qty: 90 CAPSULE | Refills: 1 | COMMUNITY
Start: 2020-11-05 | End: 2021-03-08

## 2020-11-05 ASSESSMENT — MIFFLIN-ST. JEOR: SCORE: 1289.01

## 2020-11-05 NOTE — LETTER
11/5/2020    Anay Martinez MD  [address]  [place] [address]    RE: Karine Moss       Dear Colleague,    I had the pleasure of seeing Karine CARINA Moss in the [facility].        Cardiology Consultation     Assessment & Plan     1.  Pedal edema  2.  Diabetes mellitus  3.  Chronic arthritis  4.  Hyperlipidemia  5.  Diabetic neuropathy  6.  Obstructive sleep apnea  7.  Chronic renal insufficiency    Recommendations    1.  Pedal edema: Multifactorial.  Possibly related to her longstanding diabetes mellitus with neuropathy.  It does appear to go in that distribution and does not progress above the ankles.  She has a picture of what her feet look like at that time and possibly it is related to this.  She does have prominent varicosities on physical exam.  Today she states that her feet are normal.  No edema is noted at present.  We will also given her history exclude congestive heart failure as a possible etiology although I have a low suspicion for this.  We will get an echocardiogram.  Also local factors such as venous insufficiency could be contributing.  We will order a venous insufficiency competency study to look into this.    2.  I will have her follow-up in the vein clinic with the JOHANA is to go over the findings of her the above ordered studies.    Thank you kindly for allowing us participate in her care    Jerrica Fierro MD      History of present illness    Patient is a very pleasant 65-year-old female who is here to establish care in the cardiovascular clinic.  She has a notable history of a negative dobutamine stress echocardiogram performed in 2013.  She has longstanding diabetes mellitus with manifestations of neuropathy and renal insufficiency.  This past summer she had a period of time where she felt the tops of her foot and extending slightly above the ankles which had developed and edema.  It was painful to touch.  She has had loss of  sensation due to neuropathy longstanding.  This occurred without any warning or any injury.  Reports of prominent varicosities in her legs however no open wounds or sores.  She states that her symptoms resolved on their own with conservative therapy.  Periodically since that timeframe she has had flareups where she has noticed this edema.  She reports of no calf or thigh edema.  She watches her salt intake as best as she can.  She also due to loss of the hair has had an extensive rheumatologic work-up by her dermatologist.  This is included testing for lupus due to her nonspecific symptoms as well as rheumatoid factors which have all been negative for any inflammatory rheumatologic condition.  Remotely she has had a TSH checked within the past year and this has also been within normal limits.  Due to the findings of her pedal edema she was referred to the cardiology clinic for evaluation.          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)     Keratoconus     Hyperlipidemia LDL goal <100     Obstructive sleep apnea     Vitamin D insufficiency     Major depression in partial remission (H)     Plantar warts     Low back pain     DJD (degenerative joint disease) of knee     Pancreatitis     Panic     Chest pain     IBS (irritable bowel syndrome)     Heart murmur     Hypertension goal BP (blood pressure) < 140/90     Type 2 diabetes mellitus without complication, with long-term current use of insulin (H)     Osteopenia of hip     Acute conjunctivitis of left eye     PTSD (post-traumatic stress disorder)     Diabetic peripheral neuropathy (H)     Bilateral sciatica     Bilateral low back pain with bilateral sciatica       Past Medical History   I have reviewed this patient's medical history and updated it with pertinent information if needed.   Past Medical  History:   Diagnosis Date     Arthritis      Chest pain     seeing Cardiology     Depression 1991    after 's death     Diabetes mellitus (H)      Diabetic renal disease (H)     microalbuminuria     DJD (degenerative joint disease) of knee      H/O vitamin D deficiency      Hypertension      IBS (irritable bowel syndrome)     diarrhea      Keratoconus      Low back pain      Narcolepsy 2007    Dx'd by Sleep specialist 347.00, pt discontinued Adderal mid Nov. 13 due to tacycardia concerns     Obesity      Obstructive sleep apnea     327.23, 3 sleep studies,Dr. [facility]     Pancreatitis     related to Victoza, also on Xyrem     Panic      RLS (restless legs syndrome)      Sinus tachycardia        Past Surgical History   I have reviewed this patient's surgical history and updated it with pertinent information if needed.  Past Surgical History:   Procedure Laterality Date     COLONOSCOPY  10/01/2020    poor quality prep     ear surgery  2002 and 01/2004     GASTRIC BYPASS  2013    laparoscopic jimmy en y c ometopexy     HEMORRHOIDECTOMY  09/14/2000     LAPAROSCOPIC CHOLECYSTECTOMY  2015     LASIK BILATERAL       UVULOPALATOPHARYNGOPLASTY       UVVP         Prior to Admission Medications   Cannot display prior to admission medications because the patient has not been admitted in this contact.     [unfilled]  [unfilled]  Allergies   Allergies   Allergen Reactions     Pravastatin Other (See Comments)     woozy     Codeine Nausea and Vomiting     Nsaids GI Disturbance and Other (See Comments)     Pt had bariatric surgery, should not ever take oral NSAIDS due to risk of gastric ulcers.  Pt had bariatric surgery, should not ever take oral NSAIDS due to risk of gastric ulcers.       Social History    reports that she has never smoked. She has never used smokeless tobacco. She reports current alcohol use. She reports that she does not use drugs.    Family History   Family History   Problem Relation Age of Onset  "    Diabetes Father      Cancer - colorectal Maternal Grandmother      Cancer Daughter      Obesity Daughter         s/p lap band     Cancer Brother         lung     Hypertension Sister        Review of Systems   The comprehensive 10 point Review of Systems is negative other than noted in the HPI or here.     Physical Exam   Vital Signs with Ranges  Pulse:  [84] 84  BP: (130)/(70) 130/70  Wt Readings from Last 4 Encounters:   11/05/20 76.2 kg (168 lb 1.6 oz)   10/07/20 76.7 kg (169 lb)   09/04/20 76.5 kg (168 lb 12 oz)   08/14/20 72.6 kg (160 lb)     [unfilled]      Vitals: /70   Pulse 84   Ht 1.62 m (5' 3.78\")   Wt 76.2 kg (168 lb 1.6 oz)   BMI 29.05 kg/m      GENERAL: Healthy, alert and no distress  EYES: Eyes grossly normal to inspection.  No discharge or erythema, or obvious scleral/conjunctival abnormalities.  RESP: No audible wheeze, cough, or visible cyanosis.  No visible retractions or increased work of breathing.    SKIN: Visible skin clear. No significant rash, abnormal pigmentation or lesions.  NEURO: Cranial nerves grossly intact.  Mentation and speech appropriate for age.  PSYCH: Mentation appears normal, affect normal/bright, judgement and insight intact, normal speech and appearance well-groomed.    @LABRCNTIPR(tropi:5,troponinies:5)@    @LABRCNTIPR(wbc:3,hgb:3,mcv:3,plt:3,inr:3,na:3,potassium:3,chloride:3,co2:3,bun:3,cr:3,gfrestimated:3,gfrestblack:3,aniongap:3,neo:3,glc:3,albumin:2,prottotal:2,bilitotal:2,alkphos:2,alt:2,ast:2,lipase:2,tropi:3)@  Recent Labs   Lab Test 02/06/20  1523 04/12/19  1443 06/27/18  1439   CHOL 111.0 173 178.0   HDL 47.0* 49* 46.0*   LDL 29.0 97 107.0   TRIG 179.0* 135 120.0   CHOLHDLRATIO 2.4  --  3.8 "     @LABRCNTIP(wbc:3,hgb:3,hct:3,mcv:3,plt:3,iron:3,ironsat:3,reticabsct:3,retp:3,feb:3,shantelle:3,b12:3,folic:3,epoe:3,morph:3)@  @LABRCNTIP(PH:3,PHV:3,PO2:3,PO2V:3,sat:3,PCO2:3,PCO2V:3,HCO3:3,HCO3V:3)@  @LABRCNTIP(NTBNPI:3,NTBNP:3)@  @LABRCNTIP(DD:1)@  @LABRCNTIP(sed:3,crp:3)@  @LABRCNTIP(PLT:3)@  @LABRCNTIP(TSH:3)@  @LABRCNTIP(color:1,appearance:1,urineg,urinebili:1,urineketone:1,s,ubld:1,urineph:1,protein:1,urobilinogen:1,nitrite:1,leukest:1,rbcu:1,wbcu:1)@    Imaging:  No results found for this or any previous visit (from the past 48 hour(s)).    Echo:  No results found for this or any previous visit (from the past 4320 hour(s)).               Thank you for allowing me to participate in the care of your patient.      Sincerely,     Jerrica Fierro MD     [facility]    cc:   Anay Martinez MD  [address] [place] [id number]

## 2020-11-05 NOTE — PROGRESS NOTES
Cardiology Consultation     Assessment & Plan     1.  Pedal edema  2.  Diabetes mellitus  3.  Chronic arthritis  4.  Hyperlipidemia  5.  Diabetic neuropathy  6.  Obstructive sleep apnea  7.  Chronic renal insufficiency    Recommendations    1.  Pedal edema: Multifactorial.  Possibly related to her longstanding diabetes mellitus with neuropathy.  It does appear to go in that distribution and does not progress above the ankles.  She has a picture of what her feet look like at that time and possibly it is related to this.  She does have prominent varicosities on physical exam.  Today she states that her feet are normal.  No edema is noted at present.  We will also given her history exclude congestive heart failure as a possible etiology although I have a low suspicion for this.  We will get an echocardiogram.  Also local factors such as venous insufficiency could be contributing.  We will order a venous insufficiency competency study to look into this.    2.  I will have her follow-up in the vein clinic with the JOHANA is to go over the findings of her the above ordered studies.    Thank you kindly for allowing us participate in her care    Jerrica Fierro MD      History of present illness    Patient is a very pleasant 65-year-old female who is here to establish care in the cardiovascular clinic.  She has a notable history of a negative dobutamine stress echocardiogram performed in 2013.  She has longstanding diabetes mellitus with manifestations of neuropathy and renal insufficiency.  This past summer she had a period of time where she felt the tops of her foot and extending slightly above the ankles which had developed and edema.  It was painful to touch.  She has had loss of sensation due to neuropathy longstanding.  This occurred without any warning or any injury.  Reports of prominent varicosities in her legs however no open wounds or sores.  She states that her symptoms resolved on their own with  conservative therapy.  Periodically since that timeframe she has had flareups where she has noticed this edema.  She reports of no calf or thigh edema.  She watches her salt intake as best as she can.  She also due to loss of the hair has had an extensive rheumatologic work-up by her dermatologist.  This is included testing for lupus due to her nonspecific symptoms as well as rheumatoid factors which have all been negative for any inflammatory rheumatologic condition.  Remotely she has had a TSH checked within the past year and this has also been within normal limits.  Due to the findings of her pedal edema she was referred to the cardiology clinic for evaluation.          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)     Keratoconus     Hyperlipidemia LDL goal <100     Obstructive sleep apnea     Vitamin D insufficiency     Major depression in partial remission (H)     Plantar warts     Low back pain     DJD (degenerative joint disease) of knee     Pancreatitis     Panic     Chest pain     IBS (irritable bowel syndrome)     Heart murmur     Hypertension goal BP (blood pressure) < 140/90     Type 2 diabetes mellitus without complication, with long-term current use of insulin (H)     Osteopenia of hip     Acute conjunctivitis of left eye     PTSD (post-traumatic stress disorder)     Diabetic peripheral neuropathy (H)     Bilateral sciatica     Bilateral low back pain with bilateral sciatica       Past Medical History   I have reviewed this patient's medical history and updated it with pertinent information if needed.   Past Medical History:   Diagnosis Date     Arthritis      Chest pain     seeing Cardiology     Depression 1991    after 's death     Diabetes mellitus (H)      Diabetic renal disease (H)     microalbuminuria     DJD (degenerative joint  disease) of knee      H/O vitamin D deficiency      Hypertension      IBS (irritable bowel syndrome)     diarrhea      Keratoconus      Low back pain      Narcolepsy 2007    Dx'd by Sleep specialist 347.00, pt discontinued Adderal mid Nov. 13 due to tacycardia concerns     Obesity      Obstructive sleep apnea     327.23, 3 sleep studies,Dr. [facility]     Pancreatitis     related to Victoza, also on Xyrem     Panic      RLS (restless legs syndrome)      Sinus tachycardia        Past Surgical History   I have reviewed this patient's surgical history and updated it with pertinent information if needed.  Past Surgical History:   Procedure Laterality Date     COLONOSCOPY  10/01/2020    poor quality prep     ear surgery  2002 and 01/2004     GASTRIC BYPASS  2013    laparoscopic jimmy en y c ometopexy     HEMORRHOIDECTOMY  09/14/2000     LAPAROSCOPIC CHOLECYSTECTOMY  2015     LASIK BILATERAL       UVULOPALATOPHARYNGOPLASTY       UVVP         Prior to Admission Medications   Cannot display prior to admission medications because the patient has not been admitted in this contact.     [unfilled]  [unfilled]  Allergies   Allergies   Allergen Reactions     Pravastatin Other (See Comments)     woozy     Codeine Nausea and Vomiting     Nsaids GI Disturbance and Other (See Comments)     Pt had bariatric surgery, should not ever take oral NSAIDS due to risk of gastric ulcers.  Pt had bariatric surgery, should not ever take oral NSAIDS due to risk of gastric ulcers.       Social History    reports that she has never smoked. She has never used smokeless tobacco. She reports current alcohol use. She reports that she does not use drugs.    Family History   Family History   Problem Relation Age of Onset     Diabetes Father      Cancer - colorectal Maternal Grandmother      Cancer Daughter      Obesity Daughter         s/p lap band     Cancer Brother         lung     Hypertension Sister        Review of Systems   The comprehensive  "10 point Review of Systems is negative other than noted in the HPI or here.     Physical Exam   Vital Signs with Ranges  Pulse:  [84] 84  BP: (130)/(70) 130/70  Wt Readings from Last 4 Encounters:   20 76.2 kg (168 lb 1.6 oz)   10/07/20 76.7 kg (169 lb)   20 76.5 kg (168 lb 12 oz)   20 72.6 kg (160 lb)     [unfilled]      Vitals: /70   Pulse 84   Ht 1.62 m (5' 3.78\")   Wt 76.2 kg (168 lb 1.6 oz)   BMI 29.05 kg/m      GENERAL: Healthy, alert and no distress  EYES: Eyes grossly normal to inspection.  No discharge or erythema, or obvious scleral/conjunctival abnormalities.  RESP: No audible wheeze, cough, or visible cyanosis.  No visible retractions or increased work of breathing.    SKIN: Visible skin clear. No significant rash, abnormal pigmentation or lesions.  NEURO: Cranial nerves grossly intact.  Mentation and speech appropriate for age.  PSYCH: Mentation appears normal, affect normal/bright, judgement and insight intact, normal speech and appearance well-groomed.    @LABRCNTIPR(tropi:5,troponinies:5)@    @LABRCNTIPR(wbc:3,hgb:3,mcv:3,plt:3,inr:3,na:3,potassium:3,chloride:3,co2:3,bun:3,cr:3,gfrestimated:3,gfrestblack:3,aniongap:3,neo:3,glc:3,albumin:2,prottotal:2,bilitotal:2,alkphos:2,alt:2,ast:2,lipase:2,tropi:3)@  Recent Labs   Lab Test 20  1523 19  1443 18  1439   CHOL 111.0 173 178.0   HDL 47.0* 49* 46.0*   LDL 29.0 97 107.0   TRIG 179.0* 135 120.0   CHOLHDLRATIO 2.4  --  3.8     @LABRCNTIP(wbc:3,hgb:3,hct:3,mcv:3,plt:3,iron:3,ironsat:3,reticabsct:3,retp:3,feb:3,shantelle:3,b12:3,folic:3,epoe:3,morph:3)@  @LABRCNTIP(PH:3,PHV:3,PO2:3,PO2V:3,sat:3,PCO2:3,PCO2V:3,HCO3:3,HCO3V:3)@  @LABRCNTIP(NTBNPI:3,NTBNP:3)@  @LABRCNTIP(DD:1)@  @LABRCNTIP(sed:3,crp:3)@  @LABRCNTIP(PLT:3)@  @LABRCNTIP(TSH:3)@  @LABRCNTIP(color:1,appearance:1,urineg,urinebili:1,urineketone:1,s,ubld:1,urineph:1,protein:1,urobilinogen:1,nitrite:1,leukest:1,rbcu:1,wbcu:1)@    Imaging:  No results " found for this or any previous visit (from the past 48 hour(s)).    Echo:  No results found for this or any previous visit (from the past 4320 hour(s)).

## 2020-11-05 NOTE — LETTER
11/5/2020    Anay Martinez MD  [address] [facility] [address]  [place] [address]    RE: Karine Moss       Dear Colleague,    I had the pleasure of seeing Karine CARINA Moss in the [facility].      Cardiology Consultation     Assessment & Plan     1.  Pedal edema  2.  Diabetes mellitus  3.  Chronic arthritis  4.  Hyperlipidemia  5.  Diabetic neuropathy  6.  Obstructive sleep apnea  7.  Chronic renal insufficiency    Recommendations    1.  Pedal edema: Multifactorial.  Possibly related to her longstanding diabetes mellitus with neuropathy.  It does appear to go in that distribution and does not progress above the ankles.  She has a picture of what her feet look like at that time and possibly it is related to this.  She does have prominent varicosities on physical exam.  Today she states that her feet are normal.  No edema is noted at present.  We will also given her history exclude congestive heart failure as a possible etiology although I have a low suspicion for this.  We will get an echocardiogram.  Also local factors such as venous insufficiency could be contributing.  We will order a venous insufficiency competency study to look into this.    2.  I will have her follow-up in the vein clinic with the JOHANA is to go over the findings of her the above ordered studies.    Thank you kindly for allowing us participate in her care    Jerrica Fierro MD      History of present illness    Patient is a very pleasant 65-year-old female who is here to establish care in the cardiovascular clinic.  She has a notable history of a negative dobutamine stress echocardiogram performed in 2013.  She has longstanding diabetes mellitus with manifestations of neuropathy and renal insufficiency.  This past summer she had a period of time where she felt the tops of her foot and extending slightly above the ankles which had developed and edema.  It was painful to touch.  She has had loss of  sensation due to neuropathy longstanding.  This occurred without any warning or any injury.  Reports of prominent varicosities in her legs however no open wounds or sores.  She states that her symptoms resolved on their own with conservative therapy.  Periodically since that timeframe she has had flareups where she has noticed this edema.  She reports of no calf or thigh edema.  She watches her salt intake as best as she can.  She also due to loss of the hair has had an extensive rheumatologic work-up by her dermatologist.  This is included testing for lupus due to her nonspecific symptoms as well as rheumatoid factors which have all been negative for any inflammatory rheumatologic condition.  Remotely she has had a TSH checked within the past year and this has also been within normal limits.  Due to the findings of her pedal edema she was referred to the cardiology clinic for evaluation.          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)     Keratoconus     Hyperlipidemia LDL goal <100     Obstructive sleep apnea     Vitamin D insufficiency     Major depression in partial remission (H)     Plantar warts     Low back pain     DJD (degenerative joint disease) of knee     Pancreatitis     Panic     Chest pain     IBS (irritable bowel syndrome)     Heart murmur     Hypertension goal BP (blood pressure) < 140/90     Type 2 diabetes mellitus without complication, with long-term current use of insulin (H)     Osteopenia of hip     Acute conjunctivitis of left eye     PTSD (post-traumatic stress disorder)     Diabetic peripheral neuropathy (H)     Bilateral sciatica     Bilateral low back pain with bilateral sciatica       Past Medical History   I have reviewed this patient's medical history and updated it with pertinent information if needed.   Past Medical  History:   Diagnosis Date     Arthritis      Chest pain     seeing Cardiology     Depression 1991    after 's death     Diabetes mellitus (H)      Diabetic renal disease (H)     microalbuminuria     DJD (degenerative joint disease) of knee      H/O vitamin D deficiency      Hypertension      IBS (irritable bowel syndrome)     diarrhea      Keratoconus      Low back pain      Narcolepsy 2007    Dx'd by Sleep specialist 347.00, pt discontinued Adderal mid Nov. 13 due to tacycardia concerns     Obesity      Obstructive sleep apnea     327.23, 3 sleep studies,Dr. Flaco [facility]     Pancreatitis     related to Victoza, also on Xyrem     Panic      RLS (restless legs syndrome)      Sinus tachycardia        Past Surgical History   I have reviewed this patient's surgical history and updated it with pertinent information if needed.  Past Surgical History:   Procedure Laterality Date     COLONOSCOPY  10/01/2020    poor quality prep     ear surgery  2002 and 01/2004     GASTRIC BYPASS  2013    laparoscopic jimmy en y c ometopexy     HEMORRHOIDECTOMY  09/14/2000     LAPAROSCOPIC CHOLECYSTECTOMY  2015     LASIK BILATERAL       UVULOPALATOPHARYNGOPLASTY       UVVP         Prior to Admission Medications   Cannot display prior to admission medications because the patient has not been admitted in this contact.     [unfilled]  [unfilled]  Allergies   Allergies   Allergen Reactions     Pravastatin Other (See Comments)     woozy     Codeine Nausea and Vomiting     Nsaids GI Disturbance and Other (See Comments)     Pt had bariatric surgery, should not ever take oral NSAIDS due to risk of gastric ulcers.  Pt had bariatric surgery, should not ever take oral NSAIDS due to risk of gastric ulcers.       Social History    reports that she has never smoked. She has never used smokeless tobacco. She reports current alcohol use. She reports that she does not use drugs.    Family History   Family History   Problem Relation Age of Onset  "    Diabetes Father      Cancer - colorectal Maternal Grandmother      Cancer Daughter      Obesity Daughter         s/p lap band     Cancer Brother         lung     Hypertension Sister        Review of Systems   The comprehensive 10 point Review of Systems is negative other than noted in the HPI or here.     Physical Exam   Vital Signs with Ranges  Pulse:  [84] 84  BP: (130)/(70) 130/70  Wt Readings from Last 4 Encounters:   11/05/20 76.2 kg (168 lb 1.6 oz)   10/07/20 76.7 kg (169 lb)   09/04/20 76.5 kg (168 lb 12 oz)   08/14/20 72.6 kg (160 lb)     [unfilled]      Vitals: /70   Pulse 84   Ht 1.62 m (5' 3.78\")   Wt 76.2 kg (168 lb 1.6 oz)   BMI 29.05 kg/m      GENERAL: Healthy, alert and no distress  EYES: Eyes grossly normal to inspection.  No discharge or erythema, or obvious scleral/conjunctival abnormalities.  RESP: No audible wheeze, cough, or visible cyanosis.  No visible retractions or increased work of breathing.    SKIN: Visible skin clear. No significant rash, abnormal pigmentation or lesions.  NEURO: Cranial nerves grossly intact.  Mentation and speech appropriate for age.  PSYCH: Mentation appears normal, affect normal/bright, judgement and insight intact, normal speech and appearance well-groomed.    @LABRCNTIPR(tropi:5,troponinies:5)@    @LABRCNTIPR(wbc:3,hgb:3,mcv:3,plt:3,inr:3,na:3,potassium:3,chloride:3,co2:3,bun:3,cr:3,gfrestimated:3,gfrestblack:3,aniongap:3,neo:3,glc:3,albumin:2,prottotal:2,bilitotal:2,alkphos:2,alt:2,ast:2,lipase:2,tropi:3)@  Recent Labs   Lab Test 02/06/20  1523 04/12/19  1443 06/27/18  1439   CHOL 111.0 173 178.0   HDL 47.0* 49* 46.0*   LDL 29.0 97 107.0   TRIG 179.0* 135 120.0   CHOLHDLRATIO 2.4  --  3.8 "     @LABRCNTIP(wbc:3,hgb:3,hct:3,mcv:3,plt:3,iron:3,ironsat:3,reticabsct:3,retp:3,feb:3,shantelle:3,b12:3,folic:3,epoe:3,morph:3)@  @LABRCNTIP(PH:3,PHV:3,PO2:3,PO2V:3,sat:3,PCO2:3,PCO2V:3,HCO3:3,HCO3V:3)@  @LABRCNTIP(NTBNPI:3,NTBNP:3)@  @LABRCNTIP(DD:1)@  @LABRCNTIP(sed:3,crp:3)@  @LABRCNTIP(PLT:3)@  @LABRCNTIP(TSH:3)@  @LABRCNTIP(color:1,appearance:1,urineg,urinebili:1,urineketone:1,s,ubld:1,urineph:1,protein:1,urobilinogen:1,nitrite:1,leukest:1,rbcu:1,wbcu:1)@    Imaging:  No results found for this or any previous visit (from the past 48 hour(s)).    Echo:  No results found for this or any previous visit (from the past 4320 hour(s)).      Thank you for allowing me to participate in the care of your patient.    Sincerely,     Jerrica Fierro MD     [facility]

## 2020-11-09 ENCOUNTER — ANCILLARY PROCEDURE (OUTPATIENT)
Dept: VASCULAR ULTRASOUND | Facility: CLINIC | Age: 65
End: 2020-11-09
Attending: INTERNAL MEDICINE
Payer: COMMERCIAL

## 2020-11-09 DIAGNOSIS — R60.0 LOWER EXTREMITY EDEMA: ICD-10-CM

## 2020-11-09 PROCEDURE — 93970 EXTREMITY STUDY: CPT | Performed by: INTERNAL MEDICINE

## 2020-11-13 ENCOUNTER — HOSPITAL ENCOUNTER (OUTPATIENT)
Dept: CARDIOLOGY | Facility: CLINIC | Age: 65
Discharge: HOME | End: 2020-11-13
Attending: INTERNAL MEDICINE | Admitting: INTERNAL MEDICINE
Payer: COMMERCIAL

## 2020-11-13 DIAGNOSIS — R60.0 LOWER EXTREMITY EDEMA: ICD-10-CM

## 2020-11-13 PROCEDURE — 93306 TTE W/DOPPLER COMPLETE: CPT | Mod: 26 | Performed by: INTERNAL MEDICINE

## 2020-11-13 PROCEDURE — 93306 TTE W/DOPPLER COMPLETE: CPT

## 2020-12-11 ENCOUNTER — VIRTUAL VISIT (OUTPATIENT)
Dept: CARDIOLOGY | Facility: CLINIC | Age: 65
End: 2020-12-11
Attending: INTERNAL MEDICINE
Payer: COMMERCIAL

## 2020-12-11 DIAGNOSIS — R60.0 LOWER EXTREMITY EDEMA: ICD-10-CM

## 2020-12-11 PROCEDURE — 99213 OFFICE O/P EST LOW 20 MIN: CPT | Mod: 95 | Performed by: NURSE PRACTITIONER

## 2020-12-11 NOTE — LETTER
"12/11/2020    Anay Martinez MD  [address] [place] [id number]    RE: Karine Moss       Dear Colleague,    I had the pleasure of seeing Karine Moss in the [facility].    Karine Moss is a 65 year old female who is being evaluated via a billable video visit.      The patient has been notified of following:     \"This video visit will be conducted via a call between you and your physician/provider. We have found that certain health care needs can be provided without the need for an in-person physical exam.  This service lets us provide the care you need with a video conversation.  If a prescription is necessary we can send it directly to your pharmacy.  If lab work is needed we can place an order for that and you can then stop by our lab to have the test done at a later time.    Video visits are billed at different rates depending on your insurance coverage.  Please reach out to your insurance provider with any questions.    If during the course of the call the physician/provider feels a video visit is not appropriate, you will not be charged for this service.\"    Patient has given verbal consent for Video visit? Yes  How would you like to obtain your AVS? MyChart  If you are dropped from the video visit, the video invite should be resent to: Text to cell phone: [phone]  Will anyone else be joining your video visit? No      Vitals - Patient Reported  Weight (Patient Reported): 74.8 kg (165 lb)  Height (Patient Reported): 162.6 cm (5' 4\")  BMI (Based on Pt Reported Ht/Wt): 28.32      Review Of Systems  Skin: NEGATIVE  Eyes:Ears/Nose/Throat: NEGATIVE  Respiratory: NEGATIVE  Cardiovascular: edema  Gastrointestinal: NEGATIVE  Genitourinary:NEGATIVE   Musculoskeletal: joint pain, arthritis  Neurologic: NEGATIVE  Psychiatric: NEGATIVE  Hematologic/Lymphatic/Immunologic: NEGATIVE  Endocrine:  diabetic    Telephone of patient- [phone]    Video-Visit " Details    Type of service:  Video Visit    Video Start Time: 1:38 pm  Video End Time: 1:50 pm    Originating Location (pt. Location): Home    Distant Location (provider location):  [facility]     Platform used for Video Visit: [facility]       Cardiology Clinic Progress Note  Karine Moss MRN# [id number]   YOB: 1955 Age: 65 year old     Primary cardiologist: Dr. Fierro    Reason for visit: Follow-up echocardiogram insufficiency study    History of presenting illness:    Karine Moss, a pleasant 65 year old patient who diabetes, arthritis, hyperlipidemia, diabetic neuropathy, obstructive sleep apnea, chronic kidney disease and bilateral lower extremity edema.    Given her ongoing and progressive lower extremity edema was referred to cardiology and was evaluated by Dr. Fierro.  Her echocardiogram noted LVEF estimated at 65 to 70% with grade 2 early diastolic dysfunction.  There were no significant wall motion or valvular abnormalities.  The venous competency study noted no significant venous incompetency bilaterally.    Today she presents for a video visit per her request to discuss testing.  Discussed imaging studies with her in detail.  She states that she was recently started on Cymbalta with significant improvement in her leg discomfort.  She is now able to sleep without significant leg pain.  Although, she does report ongoing pins and needle sensations in the outer of her feet and a pain that feels like a block of the work on the top of her feet.  She denies any significant pedal edema at this point.         Assessment and Plan:     ASSESSMENT:    1. Lower extremity discomfort and swelling    Echocardiogram revealed LVEF of 65-70 and venous competency study showed no significant insufficiency.    She utilizes compression stockings as needed and has had improvement in her swelling and pain of her legs since starting Cymbalta    PLAN:      1. Follow up with cardiology as needed.             Review of Systems:     Negative unless noted in HPI          Physical Exam:     General Appearance:  No distress, normal body habitus, upright.    ENT/Mouth:  Membranes moist, no nasal discharge or bleeding gums. Normal head shape, no evidence of injury or laceration.    EYES:  No scleral icterus, normal conjunctivae    Neck:  No evidence of thyromegaly. Supple    Chest/Lungs:  No audible wheezing equal chest wall expansion. Non labored breathing. No cough.    Cardiovascular:  No evidence of elevated jugular venous pressure. No evidence of pitting edema bilaterally    Abdomen:  No evidence of abdominal distention. No observed jaundice.    Extremities:  No cyanosis or clubbing noted.    Skin:  No xanthelasma, normal skin collar. No evidence of facial lacerations.    Neurologic:  Normal arm motion bilateral, no tremors. No evidence of focal defect.    Psychiatric:  Alert and oriented x3, calm         Medications:     Current Outpatient Medications   Medication Sig Dispense Refill     amLODIPine (NORVASC) 5 MG tablet Take 1 tablet (5 mg) by mouth daily Needs clinic visit for further refills 90 tablet 3     amphetamine-dextroamphetamine (ADDERALL) 30 MG tablet Take 1.5 tablets bid, Rx from Dr. Sam, [place] LUNG 90 tablet 0     ASPIRIN LOW DOSE 81 MG EC tablet TAKE 1 TABLET BY MOUTH DAILY 90 tablet 2     atorvastatin (LIPITOR) 20 MG tablet Take 1 tablet (20 mg) by mouth daily 90 tablet 1     calcium carbonate (OS-KINJAL) 1500 (600 Ca) MG tablet Take 2 tablets (1,200 mg) by mouth daily       cevimeline (EVOXAC) 30 MG capsule take 1 cap twice daily 180 capsule 3     cyanocobalamin (VITAMIN B-12) 1000 MCG tablet Take 1 tablet (1,000 mcg) by mouth daily 90 tablet 3     DULoxetine (CYMBALTA) 30 MG capsule Take 1 capsule (30 mg) by mouth 2 times daily 180 capsule 1     glipiZIDE (GLUCOTROL XL) 5 MG 24 hr tablet Take 1 tablet (5 mg) by mouth daily 90 tablet 1     losartan (COZAAR)  100 MG tablet Take 1 tablet (100 mg) by mouth daily 90 tablet 3     metFORMIN (GLUCOPHAGE-XR) 500 MG 24 hr tablet Take 4 po q am with breakfast 360 tablet 3     omeprazole (PRILOSEC) 40 MG DR capsule Take one daily, follow up EGD in Jan 2021 90 capsule 1     tolterodine ER (DETROL LA) 4 MG 24 hr capsule Take 1 capsule (4 mg) by mouth daily 90 capsule 0     Vitamin D3 (CHOLECALCIFEROL) 25 mcg (1000 units) tablet Take 1 tablet (25 mcg) by mouth daily 90 tablet 3     blood glucose (NO BRAND SPECIFIED) lancets standard Use to test blood sugar 4 times daily or as directed. Dispense appropriate lancets for meter. 360 each 3     blood glucose (NO BRAND SPECIFIED) test strip Use to test blood sugar 4 times daily or as directed. To accompany: Blood Glucose Monitor Brands: per insurance. 120 strip 11     blood glucose calibration (NO BRAND SPECIFIED) solution Use to calibrate blood glucose monitor as needed as directed. 1 each 0     blood glucose monitoring (NO BRAND SPECIFIED) meter device kit Use to test blood sugar 4 times daily or as directed. Dispense One-Touch Verio IQ Kit - per patient request. 1 kit 0       Family History   Problem Relation Age of Onset     Diabetes Father      Cancer - colorectal Maternal Grandmother      Cancer Daughter      Obesity Daughter         s/p lap band     Cancer Brother         lung     Hypertension Sister        Social History     Socioeconomic History     Marital status: Single     Spouse name: Not on file     Number of children: 1     Years of education: Not on file     Highest education level: Not on file   Occupational History     Employer: [employer]     Comment: on disability   Social Needs     Financial resource strain: Not on file     Food insecurity     Worry: Not on file     Inability: Not on file     Transportation needs     Medical: Not on file     Non-medical: Not on file   Tobacco Use     Smoking status: Never Smoker     Smokeless tobacco: Never Used   Substance and Sexual  Activity     Alcohol use: Yes     Comment: rare     Drug use: No     Sexual activity: Never   Lifestyle     Physical activity     Days per week: Not on file     Minutes per session: Not on file     Stress: Not on file   Relationships     Social connections     Talks on phone: Not on file     Gets together: Not on file     Attends Christianity service: Not on file     Active member of club or organization: Not on file     Attends meetings of clubs or organizations: Not on file     Relationship status: Not on file     Intimate partner violence     Fear of current or ex partner: Not on file     Emotionally abused: Not on file     Physically abused: Not on file     Forced sexual activity: Not on file   Other Topics Concern     Parent/sibling w/ CABG, MI or angioplasty before 65F 55M? No   Social History Narrative    Daughter- 42,  since 1991    Drives only short distances due to narcolepsy            --------------------------------------------------------------------------------    Surgical History  Return To Top     Status Surgery Time Frame Comment Record Date     Inactive  ear surgery  1/04 5/27/2008      Inactive  eye surgery  2002    5/27/2008      Inactive  Hemorrhoidectomy  9/14/00 5/27/2008          --------------------------------------------------------------------------------    Food Allergy  Return To Top     Allergen Reaction Comment Record Date     * No known food allergies      10/28/2008          --------------------------------------------------------------------------------    Drug Allergy  Return To Top     Allergen Reaction Comment Record Date     Codeine  nausea    6/21/2007          --------------------------------------------------------------------------------    Environment Allergy  Return To Top     Allergen Reaction Comment Record Date     * No known environmental allergies      10/28/2008          --------------------------------------------------------------------------------     Social History  Return To Top     Question Answer Comment Record Date     Marital status      5/27/2008      Advance Directive or Living Will  Form Given to Patient    1/18/2010      Emotional Abuse  Yes    1/18/2010      Exercise  No    1/18/2010      Caffeine  Yes    5/27/2008      Physical Abuse  No    1/18/2010      Sealtbelts  Yes    1/18/2010      Sexual Abuse  No    1/18/2010      Breast/Testicle Self Check  No    1/18/2010      Number of children  1    1/18/2010      Living arrangements  House    1/18/2010      Number of adults in household  2    1/18/2010      Education level  High School Graduate    1/18/2010      Employment  Currently employed    1/18/2010      Tobacco history  Has never smoked or chewed tobacco    5/27/2008      Alcohol history  Currently drinks alcohol    5/27/2008      Has the patient ever used illegal drugs?  Has never used illegal drugs    5/27/2008          --------------------------------------------------------------------------------    History - Overall Remark: 3/21/2012 Return To Top         --------------------------------------------------------------------------------    Medical History Return To Top     Status Diagnosis Time Frame Comment Record Date     Active (250.00) - C - Diabetes mellitus, type II controlled      5/15/2012      Active (788.41) - C - Urinary frequency      4/17/2012      Active (250.02) - C - Diabetes mellitus, type II uncontrolled      3/6/2012      Active (278.00) - C - Obesity      2/14/2011      Active (250.00) - C - Diabetes mellitus, type II controlled      2/14/2011      Active (739.3) - C - Somatic dysfunction, lumbar region      8/16/2010      Active (739.5) - C - Somatic dysfunction, pelvic region      8/16/2010      Active (401.9) - C - Hypertension      2/8/2010      Active 268.9 UNSP VITAMIN D DEFICIENCY      1/18/2010      Active (695.3) - C - Rosacea      1/18/2010      Active 272.1 Hypertriglyceridemia      1/18/2010      Active  300.4 Depression with Anxiety (Dysthymic Disorder)      10/28/2008      Active 739.6 Somatic dysfunction, lower extremities      10/28/2008      Active 780.79 Fatigue      2008      Active 278.01 Obesity, morbid      2008      Active 347.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, 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      2008      Inactive  (462) - C - Pharyngitis, acute      1/15/2011      Inactive  250.02 Diabetes mellitus, type II uncontrolled      2010      Inactive  726.71 Tendinitis, achilles      10/28/2008      Inactive  (250.00) - C - Diabetes mellitus, type II controlled      10/28/2008      Inactive  (250.00) - C - Diabetes mellitus, type II controlled      2008      Inactive  V77.91 Screening, lipids      2008      Inactive  599.0 Urinary tract infection, unspec./pyuria (UTI)      2008      Inactive  V70.0 Routine Medical Exam      2008      Active Constipation      2008      Active Hemorrhoids      2008      Pancreatitis pancreatitis 2013-------------------------------------------------------------------------------    M        --------------------------------------------------------------------------------    Family History  Return To Top     Status Relationship Disease Comment Record Date     Alive Sister  *Denies any medical problems  3 sisters  2008      Alive Brother  *Denies any medical problems  2 brothers  2008         Father    Age of death 56  2010         Mother  Dementia  Age of death 82  2008          --------------------------------------------------------------------------------                                Past Medical History:     Past Medical History:   Diagnosis Date     Arthritis      Chest pain     seeing Cardiology     Depression     after 's death     Diabetes mellitus (H)      Diabetic renal disease (H)     microalbuminuria     DJD (degenerative joint disease) of knee      H/O vitamin D deficiency      Hypertension       IBS (irritable bowel syndrome)     diarrhea      Keratoconus      Low back pain      Narcolepsy 2007    Dx'd by Sleep specialist 347.00, pt discontinued Adderal mid Nov. 13 due to tacycardia concerns     Obesity      Obstructive sleep apnea     327.23, 3 sleep studies,Dr. Flaco [facility]     Pancreatitis     related to Victoza, also on Xyrem     Panic      RLS (restless legs syndrome)      Sinus tachycardia               Past Surgical History:     Past Surgical History:   Procedure Laterality Date     COLONOSCOPY  10/01/2020    poor quality prep     ear surgery  2002 and 01/2004     GASTRIC BYPASS  2013    laparoscopic jimmy en y c ometopexy     HEMORRHOIDECTOMY  09/14/2000     LAPAROSCOPIC CHOLECYSTECTOMY  2015     LASIK BILATERAL       UVULOPALATOPHARYNGOPLASTY       UVVP                Allergies:   Pravastatin, Codeine, and Nsaids       Data:   All laboratory data reviewed:    Recent Labs   Lab Test 10/19/20  1038 09/09/20  0827 02/06/20  1523 10/22/19  1113 04/12/19  1443 10/31/18  1225 06/27/18  1454 06/27/18  1439 06/27/18  1439   LDL  --   --  29.0  --  97  --   --   --  107.0   HDL  --   --  47.0*  --  49*  --   --   --  46.0*   NHDL  --   --   --   --  124  --   --   --   --    CHOL  --   --  111.0  --  173  --   --   --  178.0   TRIG  --   --  179.0*  --  135  --   --   --  120.0   TSH  --   --   --  1.69  --  1.47 0.89  --   --    IRON 19*  --   --   --   --   --  94   < >  --    *  --   --   --   --   --  364   < >  --    IRONSAT  --   --   --   --   --   --  26  --   --    WESTLEY  --  8  --   --   --   --  63   < >  --     < > = values in this interval not displayed.       Lab Results   Component Value Date    WBC 11.0 06/18/2020    RBC 4.34 06/18/2020    HGB 9.7 (L) 09/04/2020    HCT 32.8 (L) 09/04/2020    MCV 84.8 09/04/2020    MCH 25.1 (L) 09/04/2020    MCHC 29.6 (L) 09/04/2020    RDW 13.8 09/04/2020     06/18/2020       Lab Results   Component Value Date    .0 (H) 09/04/2020    POTASSIUM  4.6 09/04/2020    CHLORIDE 106.0 09/04/2020    CO2 26.0 09/04/2020    ANIONGAP 5 06/18/2020    GLC 69.0 09/04/2020    BUN 24.0 09/04/2020    CR 1.4 (H) 09/04/2020    GFRESTIMATED 32 (L) 06/18/2020    GFRESTBLACK 37 (L) 06/18/2020    KINJAL 9.9 09/04/2020      Lab Results   Component Value Date    AST 29.0 09/04/2020    ALT 24.0 09/04/2020       Lab Results   Component Value Date    A1C 6.2 (H) 09/04/2020       No results found for: INR    PARIS CAMPOS CNP  [facility]  Pager: [phone]  RN phone: [phone]      Thank you for allowing me to participate in the care of your patient.    Sincerely,     PARIS CAMPOS CNP     [facility]

## 2020-12-11 NOTE — PROGRESS NOTES
"Karine Moss is a 65 year old female who is being evaluated via a billable video visit.      The patient has been notified of following:     \"This video visit will be conducted via a call between you and your physician/provider. We have found that certain health care needs can be provided without the need for an in-person physical exam.  This service lets us provide the care you need with a video conversation.  If a prescription is necessary we can send it directly to your pharmacy.  If lab work is needed we can place an order for that and you can then stop by our lab to have the test done at a later time.    Video visits are billed at different rates depending on your insurance coverage.  Please reach out to your insurance provider with any questions.    If during the course of the call the physician/provider feels a video visit is not appropriate, you will not be charged for this service.\"    Patient has given verbal consent for Video visit? Yes  How would you like to obtain your AVS? MyChart  If you are dropped from the video visit, the video invite should be resent to: Text to cell phone: [phone]  Will anyone else be joining your video visit? No      Vitals - Patient Reported  Weight (Patient Reported): 74.8 kg (165 lb)  Height (Patient Reported): 162.6 cm (5' 4\")  BMI (Based on Pt Reported Ht/Wt): 28.32      Review Of Systems  Skin: NEGATIVE  Eyes:Ears/Nose/Throat: NEGATIVE  Respiratory: NEGATIVE  Cardiovascular: edema  Gastrointestinal: NEGATIVE  Genitourinary:NEGATIVE   Musculoskeletal: joint pain, arthritis  Neurologic: NEGATIVE  Psychiatric: NEGATIVE  Hematologic/Lymphatic/Immunologic: NEGATIVE  Endocrine:  diabetic    Telephone of patient- [phone]    Video-Visit Details    Type of service:  Video Visit    Video Start Time: 1:38 pm  Video End Time: 1:50 pm    Originating Location (pt. Location): Home    Distant Location (provider location):  [facility]     Platform " used for Video Visit: [facility]         Cardiology Clinic Progress Note  Karine Moss MRN# [id number]   YOB: 1955 Age: 65 year old     Primary cardiologist: Dr. Fierro    Reason for visit: Follow-up echocardiogram insufficiency study    History of presenting illness:    Karine Moss, a pleasant 65 year old patient who diabetes, arthritis, hyperlipidemia, diabetic neuropathy, obstructive sleep apnea, chronic kidney disease and bilateral lower extremity edema.    Given her ongoing and progressive lower extremity edema was referred to cardiology and was evaluated by Dr. Fierro.  Her echocardiogram noted LVEF estimated at 65 to 70% with grade 2 early diastolic dysfunction.  There were no significant wall motion or valvular abnormalities.  The venous competency study noted no significant venous incompetency bilaterally.    Today she presents for a video visit per her request to discuss testing.  Discussed imaging studies with her in detail.  She states that she was recently started on Cymbalta with significant improvement in her leg discomfort.  She is now able to sleep without significant leg pain.  Although, she does report ongoing pins and needle sensations in the outer of her feet and a pain that feels like a block of the work on the top of her feet.  She denies any significant pedal edema at this point.         Assessment and Plan:     ASSESSMENT:    1. Lower extremity discomfort and swelling    Echocardiogram revealed LVEF of 65-70 and venous competency study showed no significant insufficiency.    She utilizes compression stockings as needed and has had improvement in her swelling and pain of her legs since starting Cymbalta    PLAN:     1. Follow up with cardiology as needed.             Review of Systems:     Negative unless noted in HPI          Physical Exam:     General Appearance:  No distress, normal body habitus, upright.    ENT/Mouth:  Membranes moist, no  nasal discharge or bleeding gums. Normal head shape, no evidence of injury or laceration.    EYES:  No scleral icterus, normal conjunctivae    Neck:  No evidence of thyromegaly. Supple    Chest/Lungs:  No audible wheezing equal chest wall expansion. Non labored breathing. No cough.    Cardiovascular:  No evidence of elevated jugular venous pressure. No evidence of pitting edema bilaterally    Abdomen:  No evidence of abdominal distention. No observed jaundice.    Extremities:  No cyanosis or clubbing noted.    Skin:  No xanthelasma, normal skin collar. No evidence of facial lacerations.    Neurologic:  Normal arm motion bilateral, no tremors. No evidence of focal defect.    Psychiatric:  Alert and oriented x3, calm         Medications:     Current Outpatient Medications   Medication Sig Dispense Refill     amLODIPine (NORVASC) 5 MG tablet Take 1 tablet (5 mg) by mouth daily Needs clinic visit for further refills 90 tablet 3     amphetamine-dextroamphetamine (ADDERALL) 30 MG tablet Take 1.5 tablets bid, Rx from Dr. Sam, [place] LUNG 90 tablet 0     ASPIRIN LOW DOSE 81 MG EC tablet TAKE 1 TABLET BY MOUTH DAILY 90 tablet 2     atorvastatin (LIPITOR) 20 MG tablet Take 1 tablet (20 mg) by mouth daily 90 tablet 1     calcium carbonate (OS-KINJAL) 1500 (600 Ca) MG tablet Take 2 tablets (1,200 mg) by mouth daily       cevimeline (EVOXAC) 30 MG capsule take 1 cap twice daily 180 capsule 3     cyanocobalamin (VITAMIN B-12) 1000 MCG tablet Take 1 tablet (1,000 mcg) by mouth daily 90 tablet 3     DULoxetine (CYMBALTA) 30 MG capsule Take 1 capsule (30 mg) by mouth 2 times daily 180 capsule 1     glipiZIDE (GLUCOTROL XL) 5 MG 24 hr tablet Take 1 tablet (5 mg) by mouth daily 90 tablet 1     losartan (COZAAR) 100 MG tablet Take 1 tablet (100 mg) by mouth daily 90 tablet 3     metFORMIN (GLUCOPHAGE-XR) 500 MG 24 hr tablet Take 4 po q am with breakfast 360 tablet 3     omeprazole (PRILOSEC) 40 MG DR capsule Take one daily, follow up EGD in  Jan 2021 90 capsule 1     tolterodine ER (DETROL LA) 4 MG 24 hr capsule Take 1 capsule (4 mg) by mouth daily 90 capsule 0     Vitamin D3 (CHOLECALCIFEROL) 25 mcg (1000 units) tablet Take 1 tablet (25 mcg) by mouth daily 90 tablet 3     blood glucose (NO BRAND SPECIFIED) lancets standard Use to test blood sugar 4 times daily or as directed. Dispense appropriate lancets for meter. 360 each 3     blood glucose (NO BRAND SPECIFIED) test strip Use to test blood sugar 4 times daily or as directed. To accompany: Blood Glucose Monitor Brands: per insurance. 120 strip 11     blood glucose calibration (NO BRAND SPECIFIED) solution Use to calibrate blood glucose monitor as needed as directed. 1 each 0     blood glucose monitoring (NO BRAND SPECIFIED) meter device kit Use to test blood sugar 4 times daily or as directed. Dispense One-Touch Verio IQ Kit - per patient request. 1 kit 0       Family History   Problem Relation Age of Onset     Diabetes Father      Cancer - colorectal Maternal Grandmother      Cancer Daughter      Obesity Daughter         s/p lap band     Cancer Brother         lung     Hypertension Sister        Social History     Socioeconomic History     Marital status: Single     Spouse name: Not on file     Number of children: 1     Years of education: Not on file     Highest education level: Not on file   Occupational History     Employer: [employer]     Comment: on disability   Social Needs     Financial resource strain: Not on file     Food insecurity     Worry: Not on file     Inability: Not on file     Transportation needs     Medical: Not on file     Non-medical: Not on file   Tobacco Use     Smoking status: Never Smoker     Smokeless tobacco: Never Used   Substance and Sexual Activity     Alcohol use: Yes     Comment: rare     Drug use: No     Sexual activity: Never   Lifestyle     Physical activity     Days per week: Not on file     Minutes per session: Not on file     Stress: Not on file   Relationships      Social connections     Talks on phone: Not on file     Gets together: Not on file     Attends Sabianism service: Not on file     Active member of club or organization: Not on file     Attends meetings of clubs or organizations: Not on file     Relationship status: Not on file     Intimate partner violence     Fear of current or ex partner: Not on file     Emotionally abused: Not on file     Physically abused: Not on file     Forced sexual activity: Not on file   Other Topics Concern     Parent/sibling w/ CABG, MI or angioplasty before 65F 55M? No   Social History Narrative    Daughter- 42,  since 1991    Drives only short distances due to narcolepsy            --------------------------------------------------------------------------------    Surgical History  Return To Top     Status Surgery Time Frame Comment Record Date     Inactive  ear surgery  1/04 5/27/2008      Inactive  eye surgery  2002 5/27/2008      Inactive  Hemorrhoidectomy  9/14/00 5/27/2008          --------------------------------------------------------------------------------    Food Allergy  Return To Top     Allergen Reaction Comment Record Date     * No known food allergies      10/28/2008          --------------------------------------------------------------------------------    Drug Allergy  Return To Top     Allergen Reaction Comment Record Date     Codeine  nausea    6/21/2007          --------------------------------------------------------------------------------    Environment Allergy  Return To Top     Allergen Reaction Comment Record Date     * No known environmental allergies      10/28/2008          --------------------------------------------------------------------------------    Social History  Return To Top     Question Answer Comment Record Date     Marital status      5/27/2008      Advance Directive or Living Will  Form Given to Patient    1/18/2010      Emotional Abuse  Yes    1/18/2010      Exercise   No    1/18/2010      Caffeine  Yes    5/27/2008      Physical Abuse  No    1/18/2010      Sealtbelts  Yes    1/18/2010      Sexual Abuse  No    1/18/2010      Breast/Testicle Self Check  No    1/18/2010      Number of children  1    1/18/2010      Living arrangements  House    1/18/2010      Number of adults in household  2    1/18/2010      Education level  High School Graduate    1/18/2010      Employment  Currently employed    1/18/2010      Tobacco history  Has never smoked or chewed tobacco    5/27/2008      Alcohol history  Currently drinks alcohol    5/27/2008      Has the patient ever used illegal drugs?  Has never used illegal drugs    5/27/2008          --------------------------------------------------------------------------------    History - Overall Remark: 3/21/2012 Return To Top         --------------------------------------------------------------------------------    Medical History Return To Top     Status Diagnosis Time Frame Comment Record Date     Active (250.00) - C - Diabetes mellitus, type II controlled      5/15/2012      Active (788.41) - C - Urinary frequency      4/17/2012      Active (250.02) - C - Diabetes mellitus, type II uncontrolled      3/6/2012      Active (278.00) - C - Obesity      2/14/2011      Active (250.00) - C - Diabetes mellitus, type II controlled      2/14/2011      Active (739.3) - C - Somatic dysfunction, lumbar region      8/16/2010      Active (739.5) - C - Somatic dysfunction, pelvic region      8/16/2010      Active (401.9) - C - Hypertension      2/8/2010      Active 268.9 UNSP VITAMIN D DEFICIENCY      1/18/2010      Active (695.3) - C - Rosacea      1/18/2010      Active 272.1 Hypertriglyceridemia      1/18/2010      Active 300.4 Depression with Anxiety (Dysthymic Disorder)      10/28/2008      Active 739.6 Somatic dysfunction, lower extremities      10/28/2008      Active 780.79 Fatigue      6/23/2008      Active 278.01 Obesity, morbid      6/19/2008       Active 347.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, 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      2008      Inactive  (462) - C - Pharyngitis, acute      1/15/2011      Inactive  250.02 Diabetes mellitus, type II uncontrolled      2010      Inactive  726.71 Tendinitis, achilles      10/28/2008      Inactive  (250.00) - C - Diabetes mellitus, type II controlled      10/28/2008      Inactive  (250.00) - C - Diabetes mellitus, type II controlled      2008      Inactive  V77.91 Screening, lipids      2008      Inactive  599.0 Urinary tract infection, unspec./pyuria (UTI)      2008      Inactive  V70.0 Routine Medical Exam      2008      Active Constipation      2008      Active Hemorrhoids      2008      Pancreatitis pancreatitis 2013-------------------------------------------------------------------------------    M        --------------------------------------------------------------------------------    Family History  Return To Top     Status Relationship Disease Comment Record Date     Alive Sister  *Denies any medical problems  3 sisters  2008      Alive Brother  *Denies any medical problems  2 brothers  2008         Father    Age of death 56  2010         Mother  Dementia  Age of death 82  2008          --------------------------------------------------------------------------------                                Past Medical History:     Past Medical History:   Diagnosis Date     Arthritis      Chest pain     seeing Cardiology     Depression     after 's death     Diabetes mellitus (H)      Diabetic renal disease (H)     microalbuminuria     DJD (degenerative joint disease) of knee      H/O vitamin D deficiency      Hypertension      IBS (irritable bowel syndrome)     diarrhea      Keratoconus      Low back pain      Narcolepsy     Dx'd by Sleep specialist 347.00, pt discontinued Adderal mid  due to tacycardia concerns     Obesity      Obstructive sleep  apnea     327.23, 3 sleep studies,Dr. Flaco [facility]     Pancreatitis     related to Victoza, also on Xyrem     Panic      RLS (restless legs syndrome)      Sinus tachycardia               Past Surgical History:     Past Surgical History:   Procedure Laterality Date     COLONOSCOPY  10/01/2020    poor quality prep     ear surgery  2002 and 01/2004     GASTRIC BYPASS  2013    laparoscopic jimmy en y c ometopexy     HEMORRHOIDECTOMY  09/14/2000     LAPAROSCOPIC CHOLECYSTECTOMY  2015     LASIK BILATERAL       UVULOPALATOPHARYNGOPLASTY       UVVP                Allergies:   Pravastatin, Codeine, and Nsaids       Data:   All laboratory data reviewed:    Recent Labs   Lab Test 10/19/20  1038 09/09/20  0827 02/06/20  1523 10/22/19  1113 04/12/19  1443 10/31/18  1225 06/27/18  1454 06/27/18  1439 06/27/18  1439   LDL  --   --  29.0  --  97  --   --   --  107.0   HDL  --   --  47.0*  --  49*  --   --   --  46.0*   NHDL  --   --   --   --  124  --   --   --   --    CHOL  --   --  111.0  --  173  --   --   --  178.0   TRIG  --   --  179.0*  --  135  --   --   --  120.0   TSH  --   --   --  1.69  --  1.47 0.89  --   --    IRON 19*  --   --   --   --   --  94   < >  --    *  --   --   --   --   --  364   < >  --    IRONSAT  --   --   --   --   --   --  26  --   --    WESTLEY  --  8  --   --   --   --  63   < >  --     < > = values in this interval not displayed.       Lab Results   Component Value Date    WBC 11.0 06/18/2020    RBC 4.34 06/18/2020    HGB 9.7 (L) 09/04/2020    HCT 32.8 (L) 09/04/2020    MCV 84.8 09/04/2020    MCH 25.1 (L) 09/04/2020    MCHC 29.6 (L) 09/04/2020    RDW 13.8 09/04/2020     06/18/2020       Lab Results   Component Value Date    .0 (H) 09/04/2020    POTASSIUM 4.6 09/04/2020    CHLORIDE 106.0 09/04/2020    CO2 26.0 09/04/2020    ANIONGAP 5 06/18/2020    GLC 69.0 09/04/2020    BUN 24.0 09/04/2020    CR 1.4 (H) 09/04/2020    GFRESTIMATED 32 (L) 06/18/2020    GFRESTBLACK 37 (L) 06/18/2020     KINJAL 9.9 09/04/2020      Lab Results   Component Value Date    AST 29.0 09/04/2020    ALT 24.0 09/04/2020       Lab Results   Component Value Date    A1C 6.2 (H) 09/04/2020       No results found for: INR    [facility]  Pager: [phone]  RN phone: [phone]

## 2020-12-15 DIAGNOSIS — N39.46 MIXED INCONTINENCE URGE AND STRESS (MALE)(FEMALE): ICD-10-CM

## 2020-12-16 RX ORDER — TOLTERODINE 4 MG/1
4 CAPSULE, EXTENDED RELEASE ORAL DAILY
Qty: 90 CAPSULE | Refills: 1 | Status: SHIPPED | OUTPATIENT
Start: 2020-12-16 | End: 2021-06-09

## 2020-12-16 NOTE — TELEPHONE ENCOUNTER
Last Office Visit: 10/7/20  Future [facility] Appointments: none  Medication last refilled: 9/18/20 #90 + 0 refills    Prescription approved per [facility] Refill Protocol.  Karen Sosa RN  12/16/20  11:03 AM

## 2021-01-11 ENCOUNTER — TRANSFERRED RECORDS (OUTPATIENT)
Dept: HEALTH INFORMATION MANAGEMENT | Facility: CLINIC | Age: 66
End: 2021-01-11

## 2021-01-11 DIAGNOSIS — E11.9 TYPE 2 DIABETES MELLITUS WITHOUT COMPLICATION, WITH LONG-TERM CURRENT USE OF INSULIN (H): ICD-10-CM

## 2021-01-11 DIAGNOSIS — Z79.4 TYPE 2 DIABETES MELLITUS WITHOUT COMPLICATION, WITH LONG-TERM CURRENT USE OF INSULIN (H): ICD-10-CM

## 2021-01-11 NOTE — TELEPHONE ENCOUNTER
Last time prescribed: 3/20/20 , 120 tabs/caps x 11 refills  Last office visit: 10/7/20  Next appointment: No Future Appointment Scheduled    Routing refill request to provider for review/approval because:    Do you want a diabetes management appointment soon?    Is there a reason for testing blood sugars 4 times daily, for Medicare they only want once daily if not on insulin. The rx is changed to once daily    Karen Sosa RN  01/13/21  10:05 AM

## 2021-01-14 ENCOUNTER — HEALTH MAINTENANCE LETTER (OUTPATIENT)
Age: 66
End: 2021-01-14

## 2021-01-28 ENCOUNTER — TRANSFERRED RECORDS (OUTPATIENT)
Dept: HEALTH INFORMATION MANAGEMENT | Facility: CLINIC | Age: 66
End: 2021-01-28

## 2021-02-09 PROBLEM — M54.41 BILATERAL LOW BACK PAIN WITH BILATERAL SCIATICA: Status: RESOLVED | Noted: 2020-02-25 | Resolved: 2021-02-09

## 2021-02-09 PROBLEM — M54.42 BILATERAL LOW BACK PAIN WITH BILATERAL SCIATICA: Status: RESOLVED | Noted: 2020-02-25 | Resolved: 2021-02-09

## 2021-02-11 DIAGNOSIS — R68.2 DRY MOUTH, UNSPECIFIED: ICD-10-CM

## 2021-02-11 DIAGNOSIS — Z00.00 PREVENTATIVE HEALTH CARE: ICD-10-CM

## 2021-02-11 DIAGNOSIS — Z79.4 TYPE 2 DIABETES MELLITUS WITHOUT COMPLICATION, WITH LONG-TERM CURRENT USE OF INSULIN (H): ICD-10-CM

## 2021-02-11 DIAGNOSIS — E11.9 TYPE 2 DIABETES MELLITUS WITHOUT COMPLICATION, WITHOUT LONG-TERM CURRENT USE OF INSULIN (H): ICD-10-CM

## 2021-02-11 DIAGNOSIS — E11.9 TYPE 2 DIABETES MELLITUS WITHOUT COMPLICATION, WITH LONG-TERM CURRENT USE OF INSULIN (H): ICD-10-CM

## 2021-02-11 DIAGNOSIS — I10 HYPERTENSION GOAL BP (BLOOD PRESSURE) < 140/90: ICD-10-CM

## 2021-02-11 NOTE — TELEPHONE ENCOUNTER
Last office visit was on 10/07/2020, no future visits scheduled.     Medication is being filled for 1 time refill only due to:  Patient needs labs cmp. Future labs ordered yes. for creatinine/GFR.  BP and DM meds    Prescription approved per [employer] Refill Protocol.  Vit B12    Routing refill request to provider for review/approval because:  Drug not on the [facility] refill protocol -Evoxac    Jessica Chandler RN  February 12, 2021 11:13 AM

## 2021-02-12 RX ORDER — CEVIMELINE HYDROCHLORIDE 30 MG/1
CAPSULE ORAL
Qty: 180 CAPSULE | Refills: 0 | Status: SHIPPED | OUTPATIENT
Start: 2021-02-12 | End: 2021-03-08

## 2021-02-12 RX ORDER — METFORMIN HCL 500 MG
TABLET, EXTENDED RELEASE 24 HR ORAL
Qty: 360 TABLET | Refills: 0 | Status: SHIPPED | OUTPATIENT
Start: 2021-02-12 | End: 2021-03-08

## 2021-02-12 RX ORDER — AMLODIPINE BESYLATE 5 MG/1
5 TABLET ORAL DAILY
Qty: 90 TABLET | Refills: 0 | Status: SHIPPED | OUTPATIENT
Start: 2021-02-12 | End: 2021-05-10

## 2021-02-12 RX ORDER — LANOLIN ALCOHOL/MO/W.PET/CERES
1000 CREAM (GRAM) TOPICAL DAILY
Qty: 90 TABLET | Refills: 2 | Status: SHIPPED | OUTPATIENT
Start: 2021-02-12 | End: 2021-11-09

## 2021-02-12 RX ORDER — GLIPIZIDE 5 MG/1
5 TABLET, FILM COATED, EXTENDED RELEASE ORAL DAILY
Qty: 90 TABLET | Refills: 0 | Status: SHIPPED | OUTPATIENT
Start: 2021-02-12 | End: 2021-03-08

## 2021-02-12 RX ORDER — LOSARTAN POTASSIUM 100 MG/1
100 TABLET ORAL DAILY
Qty: 90 TABLET | Refills: 0 | Status: SHIPPED | OUTPATIENT
Start: 2021-02-12 | End: 2021-03-08

## 2021-03-01 ENCOUNTER — TELEPHONE (OUTPATIENT)
Dept: FAMILY MEDICINE | Facility: CLINIC | Age: 66
End: 2021-03-01

## 2021-03-01 NOTE — TELEPHONE ENCOUNTER
----- Message from Karen Sosa RN sent at 3/1/2021 10:58 AM CST -----  Please contact patient and assist with scheduling appointment - in-clinic visit, needs labs    Patient is due for a Diabetes Management follow up appointment with DR. JOSEPH  Due: MARCH, 2021     Thank you!  Karen

## 2021-03-08 ENCOUNTER — OFFICE VISIT (OUTPATIENT)
Dept: FAMILY MEDICINE | Facility: CLINIC | Age: 66
End: 2021-03-08
Payer: COMMERCIAL

## 2021-03-08 VITALS
WEIGHT: 164 LBS | BODY MASS INDEX: 28 KG/M2 | TEMPERATURE: 97.7 F | HEART RATE: 107 BPM | RESPIRATION RATE: 14 BRPM | OXYGEN SATURATION: 96 % | DIASTOLIC BLOOD PRESSURE: 74 MMHG | SYSTOLIC BLOOD PRESSURE: 110 MMHG | HEIGHT: 64 IN

## 2021-03-08 DIAGNOSIS — I10 HYPERTENSION GOAL BP (BLOOD PRESSURE) < 140/90: ICD-10-CM

## 2021-03-08 DIAGNOSIS — E78.5 HYPERLIPIDEMIA LDL GOAL <100: ICD-10-CM

## 2021-03-08 DIAGNOSIS — R68.2 DRY MOUTH, UNSPECIFIED: ICD-10-CM

## 2021-03-08 DIAGNOSIS — Z23 NEED FOR PNEUMOCOCCAL VACCINE: ICD-10-CM

## 2021-03-08 DIAGNOSIS — E11.9 TYPE 2 DIABETES MELLITUS WITHOUT COMPLICATION, WITHOUT LONG-TERM CURRENT USE OF INSULIN (H): Primary | ICD-10-CM

## 2021-03-08 DIAGNOSIS — N18.32 STAGE 3B CHRONIC KIDNEY DISEASE (H): ICD-10-CM

## 2021-03-08 DIAGNOSIS — G60.9 IDIOPATHIC PERIPHERAL NEUROPATHY: ICD-10-CM

## 2021-03-08 DIAGNOSIS — K25.4 GASTROINTESTINAL HEMORRHAGE ASSOCIATED WITH GASTRIC ULCER: ICD-10-CM

## 2021-03-08 DIAGNOSIS — G47.00 INSOMNIA, UNSPECIFIED TYPE: ICD-10-CM

## 2021-03-08 LAB
ALBUMIN SERPL-MCNC: 3.6 G/DL (ref 3.2–4.5)
ALP SERPL-CCNC: 88 U/L (ref 40–150)
ALT SERPL-CCNC: 23 U/L (ref 0–50)
AST SERPL-CCNC: 23 U/L (ref 0–45)
BILIRUB SERPL-MCNC: 1.1 MG/DL (ref 0.2–1.3)
BUN SERPL-MCNC: 32 MG/DL (ref 7–30)
CALCIUM SERPL-MCNC: 9.7 MG/DL (ref 8.5–10.4)
CHLORIDE SERPLBLD-SCNC: 102 MMOL/L (ref 94–109)
CHOLEST SERPL-MCNC: 113 MG/DL (ref 0–200)
CHOLEST/HDLC SERPL: 3.2 {RATIO} (ref 0–5)
CO2 SERPL-SCNC: 27 MMOL/L (ref 20–32)
CREAT SERPL-MCNC: 1.4 MG/DL (ref 0.6–1.3)
EGFR CALCULATED (BLACK REFERENCE): 48.5
EGFR CALCULATED (NON BLACK REFERENCE): 40.1
ERYTHROCYTE [DISTWIDTH] IN BLOOD BY AUTOMATED COUNT: 14 % (ref 10–15)
FASTING SPECIMEN: NO
GLUCOSE SERPL-MCNC: 163 MG/DL (ref 60–99)
HBA1C MFR BLD: 7 % (ref 0–5.6)
HCT VFR BLD AUTO: 41.5 % (ref 35–47)
HDLC SERPL-MCNC: 35 MG/DL
HGB BLD-MCNC: 12.6 G/DL (ref 11.7–15.7)
LDLC SERPL CALC-MCNC: 46 MG/DL (ref 0–129)
MCH RBC QN AUTO: 26.5 PG (ref 26.5–33)
MCHC RBC AUTO-ENTMCNC: 30.4 G/DL (ref 31.5–36.5)
MCV RBC AUTO: 87 FL (ref 78–100)
PLATELET # BLD AUTO: 253 10E9/L (ref 150–450)
POTASSIUM SERPL-SCNC: 5.4 MMOL/L (ref 3.4–5.3)
PROT SERPL-MCNC: 9 G/DL (ref 6.8–8.8)
RBC # BLD AUTO: 4.76 10E12/L (ref 3.8–5.2)
SODIUM SERPL-SCNC: 147 MMOL/L (ref 137.3–146.3)
TRIGL SERPL-MCNC: 157 MG/DL (ref 0–150)
VLDL-CHOLESTEROL: 31 (ref 7–32)
WBC # BLD AUTO: 11.6 10E9/L (ref 4–11)

## 2021-03-08 RX ORDER — CEVIMELINE HYDROCHLORIDE 30 MG/1
CAPSULE ORAL
Qty: 270 CAPSULE | Refills: 3 | Status: SHIPPED | OUTPATIENT
Start: 2021-03-08 | End: 2022-02-24

## 2021-03-08 RX ORDER — ATORVASTATIN CALCIUM 20 MG/1
20 TABLET, FILM COATED ORAL DAILY
Qty: 90 TABLET | Refills: 3 | Status: SHIPPED | OUTPATIENT
Start: 2021-03-08 | End: 2022-02-17

## 2021-03-08 RX ORDER — LOSARTAN POTASSIUM 100 MG/1
100 TABLET ORAL DAILY
Qty: 90 TABLET | Refills: 3 | Status: SHIPPED | OUTPATIENT
Start: 2021-03-08 | End: 2022-02-17

## 2021-03-08 RX ORDER — OMEPRAZOLE 40 MG/1
CAPSULE, DELAYED RELEASE ORAL
Qty: 90 CAPSULE | Refills: 3 | Status: ON HOLD | OUTPATIENT
Start: 2021-03-08 | End: 2021-11-18

## 2021-03-08 RX ORDER — GLIPIZIDE 5 MG/1
5 TABLET, FILM COATED, EXTENDED RELEASE ORAL DAILY
Qty: 90 TABLET | Refills: 3 | Status: SHIPPED | OUTPATIENT
Start: 2021-03-08 | End: 2021-08-17 | Stop reason: DRUGHIGH

## 2021-03-08 RX ORDER — METFORMIN HCL 500 MG
TABLET, EXTENDED RELEASE 24 HR ORAL
Qty: 360 TABLET | Refills: 3 | Status: SHIPPED | OUTPATIENT
Start: 2021-03-08 | End: 2022-02-24

## 2021-03-08 ASSESSMENT — MIFFLIN-ST. JEOR: SCORE: 1270.41

## 2021-03-08 NOTE — PROGRESS NOTES
"Karine Moss is a 65 year old female who presents to the clinic today for a recheck of    Insomnia  Karine is a 66 yo woman who has hx of sleep apnea. She follows with [facility]. She had asked for a medication to help with insomnia and was given Rx for Lunesta. She was told to use it sparingly as it could cause memory loss. She does not wish to take it. She brinks in some THC gummies and indicates a friend shared them and they were helpful. This looks like a fruit chew, unclear if this is simply CBD or other.   She reports difficulty in falling asleep, takes 30 min then awakens again after 15-30 min of sleep. She has a CPAP mask but pulls it off in the middle of the night. Her sleep doctor has suggested a dental device.     Bedtime Midnight  When she wakes up she \"tries to eva off my brain\". watches TV, falls asleep there  Up by 10 am,   Rarely naps.     Reports hx of narcolepsy.    Was not familiar with sleep hygiene, so I shared a handout from [employer].     Symptoms ongoing x 1 year.  She asked me about medical cannabis program for insomnia.       Dry mouth  Karine takes Evoxac,  2 per day, an no longer thinks it is working. Wondering if TID dosing would be better. Initially TID dosing was \"too much\". She reports issues with her teeth due to dry mouth. Reports she would like to have her teeth pulled so she could have dentures.     DIABETES  Here for Type II diabetes.  Last eye exam was <1 yr ago.  Retinopathy: none  Peripheral neuropathy:  YES  Feet feel cold inside, cannot get them warm  Prickly sensation  Weight: down 4 pounds  Diet: cooks at home \"not great\"  Wt Readings from Last 3 Encounters:   03/08/21 74.4 kg (164 lb)   11/05/20 76.2 kg (168 lb 1.6 oz)   10/07/20 76.7 kg (169 lb)     Candidiasis/skin issues: none  UTI/ yeast infections: none.    Frequency of FBS   3-4 x per day  AM , 130s  220s on occasion after eating.  Hypoglycemia: occasional 60s, it occurred when she had skipped a " "meal    Lab Results   Component Value Date    A1C 6.2 09/04/2020    A1C 7.0 05/06/2020     No results found for: MICROALBUMIN    DM Meds  Metformin XR 500mg , take 4 po q am  Glucotrol XL 5mg daily  Compliance: good    Aspirin Use: 81 mg daily    PMH, PSH, FH, medications, allergies and immunizations are updated this visit.    Peripheral neuropathy  She reports bilateral foot discomfort, prickly sensation. I had referred her on to neurologist last year but due to COVID pandemic, she did not follow up on recommendation to pursue EMG. She is interested in returning for work up. She reports that she took gabapentin many years ago and it \"did nothing\". Cannot recall if she tried Lyrica or Depakote.     HYPERLIPIDEMIA  Recent Labs   Lab Test 02/06/20  1523 04/12/19  1443 06/27/18  1439   CHOL 111.0 173 178.0   HDL 47.0* 49* 46.0*   LDL 29.0 97 107.0   TRIG 179.0* 135 120.0   CHOLHDLRATIO 2.4  --  3.8     LDL is in target range. Currently taking atorvastatin 20mg daily. Compliant with med(s). No reported myalgias or other side effects.     HYPERTENSION  BP Readings from Last 3 Encounters:   11/05/20 130/70   10/19/20 135/80   10/07/20 122/65     Here for blood pressure check. She is currently on amlodipine 5mg daily and losartan 100mg daily.   No current chest pain.  No THAPA. She is compliant with meds, no reported side effects. Blood pressure readings have been in target range.     Gastric ulcer  Karine had anemia secondary to gastric ulcer, diagnosed in 2020. She was placed on omeprazole 40mg daily and had follow up endoscopy in Jan 2021. It looked like the stomach lining was healing. GI doctor recommended she continue taking omeprazole 40mg daily. No black stools, consistent on omeprazole use    Caffeine: 1/day  Etoh: none  NSAIDs none    EXAM  /74   Pulse 107   Temp 97.7  F (36.5  C)   Resp 14   Ht 1.62 m (5' 3.78\")   Wt 74.4 kg (164 lb)   SpO2 96%   BMI 28.35 kg/m    Gen: Alert, NAD  Neck: no LAD or " TM  Cor: S1S2, no murmur  Lungs: CTA bilaterally  Abd: soft nontender +BS   Ext: no edema, pulses palpable  Skin: no rash   Feet: no ulcerations, Callouses present at heels, sensation dampened on toes, metatarsal pads. Sensation improves but she has stocking distribution paresthesias  Thigh and calves ache like growing pains      Assessment:  (E11.9) Type 2 diabetes mellitus without complication, without long-term current use of insulin (H)  (primary encounter diagnosis)  Comment:  A1c has risen to 7%, still at goal  Lab Results   Component Value Date    A1C 7.0 03/08/2021    A1C 6.2 09/04/2020    A1C 7.0 05/06/2020    A1C 7.9 02/06/2020    A1C 6.7 10/22/2019       Plan: Hemoglobin A1c, glipiZIDE (GLUCOTROL XL) 5 MG         24 hr tablet, metFORMIN (GLUCOPHAGE-XR) 500 MG         24 hr tablet        Recommend continuing current medications. Continue checking sugars. Recommend she meet with diabetic educator to discuss nutritional goals.   RTC 3 months    (I10) Hypertension goal BP (blood pressure) < 140/90  Comment: blood pressure in target range  Plan: Comprehensive Metabolic Panel ([place]),         losartan (COZAAR) 100 MG tablet            (E78.5) Hyperlipidemia LDL goal <100  Comment:  LDL in target range, HDL is low  Recent Labs   Lab Test 03/08/21  1615 02/06/20  1523   CHOL 113.0 111.0   HDL 35.0* 47.0*   LDL 46.0 29.0   TRIG 157.0* 179.0*   CHOLHDLRATIO 3.2 2.4     Plan: Lipid Panel ([place]), Comprehensive         Metabolic Panel ([place]), atorvastatin         (LIPITOR) 20 MG tablet        Continue atorvasatin. Recheck lipids in one year.    (G47.00) Insomnia, unspecified type  Comment: currently working with sleep clinic for IZABEL and for narcolepsy. Asking about us of THC gummies or enrolling in cannabis program  Plan: discussed qualifying conditions for medical cannibis. Insomnia does not meet criteria. OK to try CBD gummies. Discussed short term of lunesta to see if that would help. Discussed good  sleep hygiene and handout given.     (K25.4) Gastrointestinal hemorrhage associated with gastric ulcer  Comment: hx of ulcer, anemia last year. Ulcer finally healing on EGD in Jan 2021 Hemoglobin level looks very good, no anemia.   Plan: omeprazole (PRILOSEC) 40 MG DR capsule, CBC         with Platelets  Refilled meds.           (R68.2) Dry mouth, unspecified  Comment: needing rx for cevimeline  Plan: cevimeline (EVOXAC) 30 MG capsule        May increase to 30mg po TID     (Z23) Need for pneumococcal vaccine  Comment: due for she may be getting COVID vaccine soon  Plan: will defer dose until next visit.    (G60.9) Idiopathic peripheral neuropathy  Comment: burning and prickling sensation in feet, stocking distribution, to 1/3 way up her calves.  Plan: Vitamin B12        Refer back to neurology clinic for evaluation and treatment. Failed previous trial of gabapentin.     78 minutes spend on the date of this encounter doing chart review, history and exam, documentation and further activities as noted above.       Anay Martinez MD  Internal Medicine/Pediatrics

## 2021-03-09 LAB — VIT B12 SERPL-MCNC: 1816 PG/ML (ref 193–986)

## 2021-03-12 ENCOUNTER — IMMUNIZATION (OUTPATIENT)
Dept: NURSING | Facility: CLINIC | Age: 66
End: 2021-03-12
Payer: COMMERCIAL

## 2021-03-12 PROCEDURE — 91300 PR COVID VAC PFIZER DIL RECON 30 MCG/0.3 ML IM: CPT

## 2021-03-12 PROCEDURE — 0001A PR COVID VAC PFIZER DIL RECON 30 MCG/0.3 ML IM: CPT

## 2021-03-18 NOTE — TELEPHONE ENCOUNTER
RECORDS RECEIVED FROM: Internal   DATE RECEIVED: 05.21.2021   NOTES STATUS DETAILS   OFFICE NOTE from referring provider Internal 03.08.2021 Anay Martinez MD   OFFICE NOTE from other specialist  N/A    *Only VASCULITIS or LUPUS gather office notes for the following N/A    *PULMONARY   N/A    *ENT N/A    *DERMATOLOGY N/A    *RHEUMATOLOGY N/A    DISCHARGE SUMMARY from hospital N/A    DISCHARGE REPORT from the ER N/A    MEDICATION LIST Internal / CE    IMAGING  (NEED IMAGES AND REPORTS)     KIDNEY CT SCAN N/A    KIDNEY ULTRASOUND N/A    MR ABDOMEN N/A    NUCLEAR MEDICINE RENAL N/A    LABS     CBC Internal 03.08.2021   CMP Internal 03.08.2021   BMP N/A    UA N/A    URINE PROTEIN N/A    RENAL PANEL N/A    BIOPSY     KIDNEY BIOPSY  N/A

## 2021-04-02 ENCOUNTER — IMMUNIZATION (OUTPATIENT)
Dept: NURSING | Facility: CLINIC | Age: 66
End: 2021-04-02
Attending: INTERNAL MEDICINE
Payer: COMMERCIAL

## 2021-04-02 PROCEDURE — 0002A PR COVID VAC PFIZER DIL RECON 30 MCG/0.3 ML IM: CPT

## 2021-04-02 PROCEDURE — 91300 PR COVID VAC PFIZER DIL RECON 30 MCG/0.3 ML IM: CPT

## 2021-04-09 DIAGNOSIS — E11.42 DIABETIC POLYNEUROPATHY ASSOCIATED WITH TYPE 2 DIABETES MELLITUS (H): ICD-10-CM

## 2021-04-09 RX ORDER — DULOXETIN HYDROCHLORIDE 30 MG/1
30 CAPSULE, DELAYED RELEASE ORAL 2 TIMES DAILY
Qty: 180 CAPSULE | Refills: 1 | Status: SHIPPED | OUTPATIENT
Start: 2021-04-09 | End: 2021-10-06

## 2021-04-09 NOTE — TELEPHONE ENCOUNTER
Last time prescribed: 9/5/20 , 180 tabs/caps x 1 refills  Last office visit: 3/8/21  Next appointment: No Future Appointment Scheduled    Prescription approved per [facility] Refill Protocol.  Davina Chawla RN  [facility]

## 2021-05-10 DIAGNOSIS — I10 HYPERTENSION GOAL BP (BLOOD PRESSURE) < 140/90: ICD-10-CM

## 2021-05-10 RX ORDER — AMLODIPINE BESYLATE 5 MG/1
5 TABLET ORAL DAILY
Qty: 90 TABLET | Refills: 0 | Status: SHIPPED | OUTPATIENT
Start: 2021-05-10 | End: 2021-08-09

## 2021-05-10 NOTE — TELEPHONE ENCOUNTER
Last time prescribed: 2/12/21 , 90 tabs/caps x 0 refills  Last office visit: 3/8/21  Next appointment: No Future Appointment Scheduled  Last CMP: 3/8/21      Routing refill request to provider for review/approval because:  You were concerned about kidney function with pt's last CMP on 3/8/21    Has video visit with Nephrology for 5/21/21    How would you like to proceed with this refill ?    Jessica Chandler RN  May 10, 2021 2:42 PM

## 2021-05-21 ENCOUNTER — PRE VISIT (OUTPATIENT)
Dept: NEPHROLOGY | Facility: CLINIC | Age: 66
End: 2021-05-21

## 2021-05-21 ENCOUNTER — VIRTUAL VISIT (OUTPATIENT)
Dept: NEPHROLOGY | Facility: CLINIC | Age: 66
End: 2021-05-21
Attending: INTERNAL MEDICINE
Payer: COMMERCIAL

## 2021-05-21 DIAGNOSIS — I10 ESSENTIAL HYPERTENSION: Primary | ICD-10-CM

## 2021-05-21 DIAGNOSIS — R77.9 ELEVATED BLOOD PROTEIN: ICD-10-CM

## 2021-05-21 DIAGNOSIS — N18.31 STAGE 3A CHRONIC KIDNEY DISEASE (H): ICD-10-CM

## 2021-05-21 PROCEDURE — 99204 OFFICE O/P NEW MOD 45 MIN: CPT | Mod: 95 | Performed by: INTERNAL MEDICINE

## 2021-05-21 RX ORDER — TRAZODONE HYDROCHLORIDE 50 MG/1
50 TABLET, FILM COATED ORAL AT BEDTIME
COMMUNITY
End: 2022-02-04

## 2021-05-21 NOTE — PROGRESS NOTES
Karine is a 66 year old who is being evaluated via a billable video visit.      How would you like to obtain your AVS? MyChart  If the video visit is dropped, the invitation should be resent by: Text to cell phone: [phone]  Will anyone else be joining your video visit? No    Video Start Time: 11:12 AM  Video-Visit Details    Type of service:  Video Visit    Video End Time:12:10    Originating Location (pt. Location): Home    Distant Location (provider location):  [facility] [place]     Platform used for Video Visit: [employer]     May 21, 2021    I was asked to see this patient by Anay Forrester    CC: CKD    HPI: Karine Moss is a 66 year old female with PMH significant for DM, HTN, obesity s/p gastric bypass surgery 5 years, IZABEL, not on CPAP any more, who presents for evaluation of CKD.     Pt endorses continue to have neuropathy symptoms in her legs but improving with duloxetine. Up on questioning have had dry mouth for which she takes Cevimeline and does had overflow incontinence and take Tolterodine which helps her with symptoms. Other wise review of systemic symptoms were negative including fever/chills, nausea/vomting/diarrhea, urgency or frequency of urination, chest pain or SOB,    Does have long history of IZABEL,continue to snore a lot. Tried CPAP for a while, but was unable to go to sleep, so she discontinued, she have her sleep apt coming up.    Known diabetic from 2010 and had intermittently high A1c >10, but in past couple years her A1c been around 7. Have had gastric Bypass done few years ago for obesity. She was initially on insulin, but now on oral hypoglycemics only.    Drinks Water about 32 oz/day    BG :100-113 in the morning prior to breakfast, some times go low to 73 and was symptomatic   BP at home : no BP monitor at home, at [facility] controlled well on losartan and amlo    - History of Hematuria: no  - Swelling: no  - Hx of UTIs: no  - Hx of stones: no  -  Rashes/Joint pain: no  - Family hx of kidney disease: no  - NSAID use: no       Allergies   Allergen Reactions     Pravastatin Other (See Comments)     woozy     Codeine Nausea and Vomiting     Nsaids GI Disturbance and Other (See Comments)     Pt had bariatric surgery, should not ever take oral NSAIDS due to risk of gastric ulcers.  Pt had bariatric surgery, should not ever take oral NSAIDS due to risk of gastric ulcers.       amLODIPine (NORVASC) 5 MG tablet, Take 1 tablet (5 mg) by mouth daily  amphetamine-dextroamphetamine (ADDERALL) 30 MG tablet, Take 1.5 tablets bid, Rx from Dr. Sam, [facility]  ASPIRIN LOW DOSE 81 MG EC tablet, TAKE 1 TABLET BY MOUTH DAILY  atorvastatin (LIPITOR) 20 MG tablet, Take 1 tablet (20 mg) by mouth daily  blood glucose (NO BRAND SPECIFIED) lancets standard, Use to test blood sugar 4 times daily or as directed. Dispense appropriate lancets for meter.  blood glucose (NO BRAND SPECIFIED) test strip, 1 strip by In Vitro route daily To accompany: Blood Glucose Monitor Brands: per insurance.  blood glucose calibration (NO BRAND SPECIFIED) solution, Use to calibrate blood glucose monitor as needed as directed.  blood glucose monitoring (NO BRAND SPECIFIED) meter device kit, Use to test blood sugar 4 times daily or as directed. Dispense One-Touch Verio IQ Kit - per patient request.  calcium carbonate (OS-KINJAL) 1500 (600 Ca) MG tablet, Take 2 tablets (1,200 mg) by mouth daily  cevimeline (EVOXAC) 30 MG capsule, take 1 cap TID  cyanocobalamin (VITAMIN B-12) 1000 MCG tablet, Take 1 tablet (1,000 mcg) by mouth daily  DULoxetine (CYMBALTA) 30 MG capsule, Take 1 capsule (30 mg) by mouth 2 times daily  glipiZIDE (GLUCOTROL XL) 5 MG 24 hr tablet, Take 1 tablet (5 mg) by mouth daily  losartan (COZAAR) 100 MG tablet, Take 1 tablet (100 mg) by mouth daily  metFORMIN (GLUCOPHAGE-XR) 500 MG 24 hr tablet, Take 4 po q am with breakfast.  omeprazole (PRILOSEC) 40 MG DR capsule, Take one daily  tolterodine ER  (DETROL LA) 4 MG 24 hr capsule, Take 1 capsule (4 mg) by mouth daily  traZODone (DESYREL) 50 MG tablet, Take 50 mg by mouth At Bedtime  Vitamin D3 (CHOLECALCIFEROL) 25 mcg (1000 units) tablet, Take 1 tablet (25 mcg) by mouth daily    No current facility-administered medications on file prior to visit.       Past Medical History:   Diagnosis Date     Arthritis      Chest pain     seeing Cardiology     Depression 1991    after 's death     Diabetes mellitus (H)      Diabetic renal disease (H)     microalbuminuria     DJD (degenerative joint disease) of knee      H/O vitamin D deficiency      Hypertension      IBS (irritable bowel syndrome)     diarrhea      Keratoconus      Low back pain      Narcolepsy 2007    Dx'd by Sleep specialist 347.00, pt discontinued Adderal mid Nov. 13 due to tacycardia concerns     Obesity      Obstructive sleep apnea     327.23, 3 sleep studies,Dr. Sam [facility]     Pancreatitis     related to Victoza, also on Xyrem     Panic      RLS (restless legs syndrome)      Sinus tachycardia        Past Surgical History:   Procedure Laterality Date     COLONOSCOPY  10/01/2020    poor quality prep     ear surgery  2002 and 01/2004     GASTRIC BYPASS  2013    laparoscopic jimmy en y c ometopexy     HEMORRHOIDECTOMY  09/14/2000     LAPAROSCOPIC CHOLECYSTECTOMY  2015     LASIK BILATERAL       UVULOPALATOPHARYNGOPLASTY       UVVP         Social History     Tobacco Use     Smoking status: Never Smoker     Smokeless tobacco: Never Used   Substance Use Topics     Alcohol use: Yes     Comment: rare     Drug use: No       Family History   Problem Relation Age of Onset     Diabetes Father      Cancer - colorectal Maternal Grandmother      Cancer Daughter      Obesity Daughter         s/p lap band     Cancer Brother         lung     Hypertension Sister        ROS: A 12 system review of systems was negative other than noted here or above.     Exam:  There were no vitals taken for this visit.    GENERAL  APPEARANCE: alert and no distress  EYES: no scleral icterus  RESP: no respiratory distress, able to speak in full sentences    Extremities: no edema per history  SKIN: no visible face rash  NEURO: mentation intact and speech normal  PSYCH: affect normal/bright    Results:    No visits with results within 1 Day(s) from this visit.   Latest known visit with results is:   Office Visit on 03/08/2021   Component Date Value Ref Range Status     Hemoglobin A1C 03/08/2021 7.0* 0 - 5.6 % Final    Comment: Normal <5.7% Prediabetes 5.7-6.4%  Diabetes 6.5% or higher - adopted from ADA   consensus guidelines.       FASTING SPECIMEN 03/08/2021 No   Final     Cholesterol 03/08/2021 113.0  0.0 - 200.0 Final     HDL Cholesterol 03/08/2021 35.0* >50.0 Final     Triglycerides 03/08/2021 157.0* 0.0 - 150.0 Final     Cholesterol/HDL Ratio 03/08/2021 3.2  0.0 - 5.0 Final     LDL Cholesterol Direct 03/08/2021 46.0  0.0 - 129.0 Final     VLDL-Cholesterol 03/08/2021 31.0  7.0 - 32.0 Final     Glucose 03/08/2021 163.0* 60.0 - 99.0 mg/dL Final     Urea Nitrogen 03/08/2021 32.0* 7.0 - 30.0 mg/dL Final     Calcium 03/08/2021 9.7  8.5 - 10.4 mg/dL Final     Creatinine 03/08/2021 1.4* 0.6 - 1.3 mg/dL Final     eGFR Calculated (Non Black Referen* 03/08/2021 40.1* >60.0 Final     eGFR Calculated (Black Reference) 03/08/2021 48.5* >60.0 Final     Sodium 03/08/2021 147.0* 137.3 - 146.3 mmol/L Final     Potassium 03/08/2021 5.4* 3.4 - 5.3 mmol/L Final     Chloride 03/08/2021 102.0  94.0 - 109.0 mmol/L Final     Carbon Dioxide 03/08/2021 27.0  20.0 - 32.0 mmol/L Final     Albumin 03/08/2021 3.6  3.2 - 4.5 g/dL Final     Alkaline Phosphatase 03/08/2021 88.0  40.0 - 150.0 U/L Final     ALT 03/08/2021 23.0  0.0 - 50.0 U/L Final     AST 03/08/2021 23.0  0.0 - 45.0 U/L Final     Bilirubin Total 03/08/2021 1.1  0.2 - 1.3 mg/dL Final     Protein Total 03/08/2021 9.0* 6.8 - 8.8 g/dL Final     WBC 03/08/2021 11.6* 4.0 - 11.0 10e9/L Final     RBC Count  03/08/2021 4.76  3.8 - 5.2 10e12/L Final     Hemoglobin 03/08/2021 12.6  11.7 - 15.7 g/dL Final     Hematocrit 03/08/2021 41.5  35.0 - 47.0 % Final     MCV 03/08/2021 87  78 - 100 fl Final     MCH 03/08/2021 26.5  26.5 - 33.0 pg Final     MCHC 03/08/2021 30.4* 31.5 - 36.5 g/dL Final     RDW 03/08/2021 14.0  10.0 - 15.0 % Final     Platelet Count 03/08/2021 253  150 - 450 10e9/L Final     Vitamin B12 03/08/2021 1,816* 193 - 986 pg/mL Final       Assessment/Plan:   CKD stage III  Pt with baseline creatinine of 0.7-1 up until 2019 and had ceratinine elevated to 1.6 in 2020 and again was at 1.4 in 2021. So she is been referred to nephrology for CKD. Pt with very long h/o diabetes with associated diabetic neuropathy (but never been diagnosed with diabetic retinopathy). CKD probably because of long standing diabetes and HTN. That being said, other diseases can not be ruled out with out other labs.   - UA with reflux microscopic evaluation   - repeat BMP and CBC  - UPCR and UACR  -  Will consider renal imaging moving forward at some point.    Protein gap : pt noted to have albumin of 3.6 and total protein of 9. Asymptomatic. No anemia or hypercalcemia. But renal disease is present. Will get labs to rule out MM.  - serum light chains   - immunofixation    Hypernatremia : likely 2/2 poor free water intake. Drinks only 33 oz of water per day.  - encouarged to drink more water   - repeat labs next week.    Hypertension : on losartan and amlodipine. Controlled well in clinics. Does not have home monitor.  - DME blood pressure monitor   - instructed to call us if SBP > 180 or < 105.    Low Iron   Noted to have low iron and high binding cap in the past. Will recheck the labs.    Of note, primary or pharmacy to evaluate any medication interactions between Cevimeline and Tolterodine (Detrol).    D/w Dr. Thomas West  Nephrology fellow

## 2021-05-21 NOTE — PATIENT INSTRUCTIONS
Drink about 2-3L of water per day   Labs next week   Please call us if your top number (systolic BP) goes greater than 180 and less than 105.  We are testing you for a type of cancer called Multiple Myeloma, but need tests to be sure

## 2021-05-26 DIAGNOSIS — Z23 NEED FOR PNEUMOCOCCAL VACCINE: Primary | ICD-10-CM

## 2021-05-27 ENCOUNTER — ALLIED HEALTH/NURSE VISIT (OUTPATIENT)
Dept: FAMILY MEDICINE | Facility: CLINIC | Age: 66
End: 2021-05-27
Payer: COMMERCIAL

## 2021-05-27 DIAGNOSIS — R77.9 ELEVATED BLOOD PROTEIN: ICD-10-CM

## 2021-05-27 DIAGNOSIS — N18.31 STAGE 3A CHRONIC KIDNEY DISEASE (H): ICD-10-CM

## 2021-05-27 DIAGNOSIS — Z23 NEED FOR PNEUMOCOCCAL VACCINE: ICD-10-CM

## 2021-05-27 DIAGNOSIS — I10 ESSENTIAL HYPERTENSION: ICD-10-CM

## 2021-05-27 LAB
ALBUMIN SERPL-MCNC: 4.1 G/DL (ref 3.4–5)
ALBUMIN UR-MCNC: 20 MG/DL
ALBUMIN UR-MCNC: NORMAL MG/DL
ALP SERPL-CCNC: 102 U/L (ref 40–150)
ALT SERPL W P-5'-P-CCNC: 25 U/L (ref 0–50)
ANION GAP SERPL CALCULATED.3IONS-SCNC: 5 MMOL/L (ref 3–14)
APPEARANCE UR: CLEAR
APPEARANCE UR: NORMAL
AST SERPL W P-5'-P-CCNC: 21 U/L (ref 0–45)
BILIRUB DIRECT SERPL-MCNC: 0.3 MG/DL (ref 0–0.2)
BILIRUB SERPL-MCNC: 0.8 MG/DL (ref 0.2–1.3)
BILIRUB UR QL STRIP: NEGATIVE
BILIRUB UR QL STRIP: NORMAL
BUN SERPL-MCNC: 18 MG/DL (ref 7–30)
CALCIUM SERPL-MCNC: 9.5 MG/DL (ref 8.5–10.1)
CHLORIDE SERPL-SCNC: 110 MMOL/L (ref 94–109)
CO2 SERPL-SCNC: 26 MMOL/L (ref 20–32)
COLOR UR AUTO: NORMAL
COLOR UR AUTO: YELLOW
CREAT SERPL-MCNC: 1.27 MG/DL (ref 0.52–1.04)
ERYTHROCYTE [DISTWIDTH] IN BLOOD BY AUTOMATED COUNT: 14.6 % (ref 10–15)
GFR SERPL CREATININE-BSD FRML MDRD: 44 ML/MIN/{1.73_M2}
GLUCOSE SERPL-MCNC: 110 MG/DL (ref 70–99)
GLUCOSE UR STRIP-MCNC: NEGATIVE MG/DL
GLUCOSE UR STRIP-MCNC: NORMAL MG/DL
HCT VFR BLD AUTO: 40 % (ref 35–47)
HGB BLD-MCNC: 12.4 G/DL (ref 11.7–15.7)
HGB UR QL STRIP: NEGATIVE
HGB UR QL STRIP: NORMAL
HYALINE CASTS #/AREA URNS LPF: 8 /LPF (ref 0–2)
IRON SATN MFR SERPL: 14 % (ref 15–46)
IRON SERPL-MCNC: 61 UG/DL (ref 35–180)
KETONES UR STRIP-MCNC: NEGATIVE MG/DL
KETONES UR STRIP-MCNC: NORMAL MG/DL
LEUKOCYTE ESTERASE UR QL STRIP: NEGATIVE
LEUKOCYTE ESTERASE UR QL STRIP: NORMAL
MCH RBC QN AUTO: 27.9 PG (ref 26.5–33)
MCHC RBC AUTO-ENTMCNC: 31 G/DL (ref 31.5–36.5)
MCV RBC AUTO: 90 FL (ref 78–100)
MUCOUS THREADS #/AREA URNS LPF: PRESENT /LPF
NITRATE UR QL: NEGATIVE
NITRATE UR QL: NORMAL
PH UR STRIP: 5.5 PH (ref 5–7)
PH UR STRIP: NORMAL PH (ref 5–7)
PLATELET # BLD AUTO: 187 10E9/L (ref 150–450)
POTASSIUM SERPL-SCNC: 4.5 MMOL/L (ref 3.4–5.3)
PROT SERPL-MCNC: 8.4 G/DL (ref 6.8–8.8)
PTH-INTACT SERPL-MCNC: 76 PG/ML (ref 18–80)
RBC # BLD AUTO: 4.45 10E12/L (ref 3.8–5.2)
RBC #/AREA URNS AUTO: 2 /HPF (ref 0–2)
RBC #/AREA URNS AUTO: NORMAL /HPF (ref 0–2)
SODIUM SERPL-SCNC: 141 MMOL/L (ref 133–144)
SOURCE: ABNORMAL
SOURCE: NORMAL
SP GR UR STRIP: 1.02 (ref 1–1.03)
SP GR UR STRIP: NORMAL (ref 1–1.03)
SQUAMOUS #/AREA URNS AUTO: 0 /HPF (ref 0–1)
TIBC SERPL-MCNC: 439 UG/DL (ref 240–430)
UROBILINOGEN UR STRIP-ACNC: NORMAL EU/DL (ref 0.2–1)
UROBILINOGEN UR STRIP-MCNC: 2 MG/DL (ref 0–2)
WBC # BLD AUTO: 7.2 10E9/L (ref 4–11)
WBC #/AREA URNS AUTO: 0 /HPF (ref 0–5)
WBC #/AREA URNS AUTO: NORMAL /HPF

## 2021-05-27 NOTE — NURSING NOTE
Prior to immunization administration, verified patients identity using patient s name and date of birth. Please see Immunization Activity for additional information.     Screening Questionnaire for Adult Immunization    Are you sick today?   No   Do you have allergies to medications, food, a vaccine component or latex?   No   Have you ever had a serious reaction after receiving a vaccination?   No   Do you have a long-term health problem with heart, lung, kidney, or metabolic disease (e.g., diabetes), asthma, a blood disorder, no spleen, complement component deficiency, a cochlear implant, or a spinal fluid leak?  Are you on long-term aspirin therapy?   No   Do you have cancer, leukemia, HIV/AIDS, or any other immune system problem?   No   Do you have a parent, brother, or sister with an immune system problem?   No   In the past 3 months, have you taken medications that affect  your immune system, such as prednisone, other steroids, or anticancer drugs; drugs for the treatment of rheumatoid arthritis, Crohn s disease, or psoriasis; or have you had radiation treatments?   No   Have you had a seizure, or a brain or other nervous system problem?   No   During the past year, have you received a transfusion of blood or blood    products, or been given immune (gamma) globulin or antiviral drug?   No   For women: Are you pregnant or is there a chance you could become       pregnant during the next month?   No   Have you received any vaccinations in the past 4 weeks?   No     Immunization questionnaire answers were all negative.        Per orders of Dr. Martinez, injection of PPSV 23 given by Birgit Beck CMA. Patient instructed to remain in clinic for 15 minutes afterwards, and to report any adverse reaction to me immediately.       Screening performed by Birgit Beck CMA on 5/27/2021 at 3:44 PM.

## 2021-05-28 LAB
DEPRECATED CALCIDIOL+CALCIFEROL SERPL-MC: 39 UG/L (ref 20–75)
IGA SERPL-MCNC: 493 MG/DL (ref 84–499)
IGG SERPL-MCNC: 1464 MG/DL (ref 610–1616)
IGM SERPL-MCNC: 116 MG/DL (ref 35–242)
KAPPA LC UR-MCNC: 7.15 MG/DL (ref 0.33–1.94)
KAPPA LC/LAMBDA SER: 1.48 {RATIO} (ref 0.26–1.65)
LAMBDA LC SERPL-MCNC: 4.82 MG/DL (ref 0.57–2.63)
PROT PATTERN SERPL IFE-IMP: NORMAL

## 2021-06-08 DIAGNOSIS — N39.46 MIXED INCONTINENCE URGE AND STRESS (MALE)(FEMALE): ICD-10-CM

## 2021-06-08 NOTE — TELEPHONE ENCOUNTER
Last time prescribed: 12/16/21 , 90 tabs/caps x 1 refills  Last office visit: 3/8/21  Next appointment: No Future Appointment Scheduled    Prescription approved per [facility] Refill Protocol.  Davina Chawla RN  [facility]

## 2021-06-09 RX ORDER — TOLTERODINE 4 MG/1
4 CAPSULE, EXTENDED RELEASE ORAL DAILY
Qty: 90 CAPSULE | Refills: 1 | Status: SHIPPED | OUTPATIENT
Start: 2021-06-09 | End: 2021-12-06

## 2021-06-15 NOTE — TELEPHONE ENCOUNTER
Metformin Last time prescribed: 4/12/19 , 360 tabs/caps x 3 refills  Amlodipine Last time prescribed: 12/26/19 , 90 tabs/caps x 0 refills  Onetouch Strips Last time prescribed: 4/18/19 , 120 tabs/caps x 11 refills  Losartan Last time prescribed: 4/12/19 , 90 tabs/caps x 3 refills  Last office visit: 2/6/2020  Next appointment: 4/30/2020     Quality 137: Melanoma: Continuity Of Care - Recall System: Patient information entered into a recall system that includes: target date for the next exam specified AND a process to follow up with patients regarding missed or unscheduled appointments Quality 138: Melanoma: Coordination Of Care: A treatment plan was communicated to the physicians providing continuing care within one month of diagnosis outlining: diagnosis, tumor thickness and a plan for surgery or alternate care. Show Follow-Up Variable: Yes Detail Level: Detailed When Should The Patient Follow-Up For Their Next Full-Body Skin Exam?: 3 Months

## 2021-06-30 ENCOUNTER — VIRTUAL VISIT (OUTPATIENT)
Dept: NEPHROLOGY | Facility: CLINIC | Age: 66
End: 2021-06-30
Attending: INTERNAL MEDICINE
Payer: COMMERCIAL

## 2021-06-30 VITALS — WEIGHT: 164 LBS | BODY MASS INDEX: 28.35 KG/M2

## 2021-06-30 DIAGNOSIS — I50.32 CHRONIC DIASTOLIC CONGESTIVE HEART FAILURE (H): ICD-10-CM

## 2021-06-30 DIAGNOSIS — I10 HYPERTENSION GOAL BP (BLOOD PRESSURE) < 140/90: ICD-10-CM

## 2021-06-30 DIAGNOSIS — F32.4 MAJOR DEPRESSIVE DISORDER IN PARTIAL REMISSION, UNSPECIFIED WHETHER RECURRENT (H): ICD-10-CM

## 2021-06-30 DIAGNOSIS — N18.31 STAGE 3A CHRONIC KIDNEY DISEASE (H): Primary | ICD-10-CM

## 2021-06-30 PROCEDURE — 99213 OFFICE O/P EST LOW 20 MIN: CPT | Mod: 95 | Performed by: INTERNAL MEDICINE

## 2021-06-30 NOTE — PROGRESS NOTES
Chief Complaint   Patient presents with     RECHECK     4-6 weeks     Weight 74.4 kg (164 lb), not currently breastfeeding.    Karine is a 66 year old who is being evaluated via a billable video visit.      How would you like to obtain your AVS? MyChart  If the video visit is dropped, the invitation should be resent by: Other e-mail: use [employer]  Will anyone else be joining your video visit? No      Video Start Time: 1:30 PM  Video-Visit Details    Type of service:  Video Visit    Video End Time:2:10    Originating Location (pt. Location): Home    Distant Location (provider location):  [facility] [place]     Platform used for Video Visit: [employer]

## 2021-06-30 NOTE — PROGRESS NOTES
June 30, 2021    I was asked to see this patient by Anay Forrester     CC: CKD    HPI: Karine Moss is a 66 year old female with PMH significant for DM, HTN, obesity s/p gastric bypass surgery 5 years, IZABEL, not on CPAP any more, who presents for evaluation of CKD.      Pt endorses continue to have neuropathy symptoms in her legs but improving with duloxetine. Up on questioning have had dry mouth for which she takes Cevimeline and does had overflow incontinence and take Tolterodine which helps her with symptoms. Other wise review of systemic symptoms were negative including fever/chills, nausea/vomting/diarrhea, urgency or frequency of urination, chest pain or SOB,     Does have long history of IZABEL,continue to snore a lot. Tried CPAP for a while, but was unable to go to sleep, so she discontinued, she have her sleep apt coming up.     Known diabetic from 2010 and had intermittently high A1c >10, but in past couple years her A1c been around 7. Have had gastric Bypass done few years ago for obesity. She was initially on insulin, but now on oral hypoglycemics only.     Drinks Water about 32 oz/day     BG :100-113 in the morning prior to breakfast, some times go low to 73 and was symptomatic   BP at home : no BP monitor at home, at [facility] controlled well on losartan and amlo       6/30/2021   Patient endorses doing well.  No new symptoms since her last visit.  Have had her Covid shots, continue to follow precautions.  In the recent past noted to have few low blood glucose levels in the low 70s with symptoms.  Did not check blood pressure during the time.  Her symptoms include shakiness and sweating which improves with taking some food.  Mentioned she do not actually feel hungry, usually eats when she can.  Since her gastric bypass surgery, her appetite has gone down.  Otherwise no recent fevers, chills, nausea, vomiting, diarrhea, abdominal pain, chest pain or shortness of breath.    BP sugar levels : 70's and  130's in the morning  Not checking home blood pressure as she did not receive blood pressure monitor    - History of Hematuria: no  - Swelling: no  - Hx of UTIs: no  - Hx of stones: no  - Rashes/Joint pain: no  - Family hx of kidney disease: no  - NSAID use: no       Allergies   Allergen Reactions     Pravastatin Other (See Comments)     woozy     Codeine Nausea and Vomiting     Nsaids GI Disturbance and Other (See Comments)     Pt had bariatric surgery, should not ever take oral NSAIDS due to risk of gastric ulcers.  Pt had bariatric surgery, should not ever take oral NSAIDS due to risk of gastric ulcers.       amLODIPine (NORVASC) 5 MG tablet, Take 1 tablet (5 mg) by mouth daily  amphetamine-dextroamphetamine (ADDERALL) 30 MG tablet, Take 1.5 tablets bid, Rx from Dr. Sam, [place] [facility]  ASPIRIN LOW DOSE 81 MG EC tablet, TAKE 1 TABLET BY MOUTH DAILY  atorvastatin (LIPITOR) 20 MG tablet, Take 1 tablet (20 mg) by mouth daily  blood glucose (NO BRAND SPECIFIED) lancets standard, Use to test blood sugar 4 times daily or as directed. Dispense appropriate lancets for meter.  blood glucose (NO BRAND SPECIFIED) test strip, 1 strip by In Vitro route daily To accompany: Blood Glucose Monitor Brands: per insurance.  blood glucose calibration (NO BRAND SPECIFIED) solution, Use to calibrate blood glucose monitor as needed as directed.  blood glucose monitoring (NO BRAND SPECIFIED) meter device kit, Use to test blood sugar 4 times daily or as directed. Dispense One-Touch Verio IQ Kit - per patient request.  calcium carbonate (OS-KINJAL) 1500 (600 Ca) MG tablet, Take 2 tablets (1,200 mg) by mouth daily  cevimeline (EVOXAC) 30 MG capsule, take 1 cap TID  cyanocobalamin (VITAMIN B-12) 1000 MCG tablet, Take 1 tablet (1,000 mcg) by mouth daily  DULoxetine (CYMBALTA) 30 MG capsule, Take 1 capsule (30 mg) by mouth 2 times daily  glipiZIDE (GLUCOTROL XL) 5 MG 24 hr tablet, Take 1 tablet (5 mg) by mouth daily  losartan (COZAAR) 100 MG tablet,  Take 1 tablet (100 mg) by mouth daily  metFORMIN (GLUCOPHAGE-XR) 500 MG 24 hr tablet, Take 4 po q am with breakfast.  omeprazole (PRILOSEC) 40 MG DR capsule, Take one daily  tolterodine ER (DETROL LA) 4 MG 24 hr capsule, Take 1 capsule (4 mg) by mouth daily  traZODone (DESYREL) 50 MG tablet, Take 50 mg by mouth At Bedtime  Vitamin D3 (CHOLECALCIFEROL) 25 mcg (1000 units) tablet, Take 1 tablet (25 mcg) by mouth daily    No current facility-administered medications on file prior to visit.       Past Medical History:   Diagnosis Date     Arthritis      Chest pain     seeing Cardiology     Depression 1991    after 's death     Diabetes mellitus (H)      Diabetic renal disease (H)     microalbuminuria     DJD (degenerative joint disease) of knee      H/O vitamin D deficiency      Hypertension      IBS (irritable bowel syndrome)     diarrhea      Keratoconus      Low back pain      Narcolepsy 2007    Dx'd by Sleep specialist 347.00, pt discontinued Adderal mid Nov. 13 due to tacycardia concerns     Obesity      Obstructive sleep apnea     327.23, 3 sleep studies,Dr. Sam [facility]     Pancreatitis     related to Victoza, also on Xyrem     Panic      RLS (restless legs syndrome)      Sinus tachycardia        Past Surgical History:   Procedure Laterality Date     COLONOSCOPY  10/01/2020    poor quality prep     ear surgery  2002 and 01/2004     GASTRIC BYPASS  2013    laparoscopic jimmy en y c ometopexy     HEMORRHOIDECTOMY  09/14/2000     LAPAROSCOPIC CHOLECYSTECTOMY  2015     LASIK BILATERAL       UVULOPALATOPHARYNGOPLASTY       UVVP         Social History     Tobacco Use     Smoking status: Never Smoker     Smokeless tobacco: Never Used   Substance Use Topics     Alcohol use: Yes     Comment: rare     Drug use: No       Family History   Problem Relation Age of Onset     Diabetes Father      Cancer - colorectal Maternal Grandmother      Cancer Daughter      Obesity Daughter         s/p lap band     Cancer Brother          lung     Hypertension Sister        ROS: A 12 system review of systems was negative other than noted here or above.     Exam:  Wt 74.4 kg (164 lb)   Breastfeeding No   BMI 28.35 kg/m      GENERAL APPEARANCE: alert and no distress  EYES:  no scleral icterus  RESP: No respiratory distress noted, not using accessory muscles  SKIN: no visible facial rash  NEURO: mentation intact and speech normal  PSYCH: affect normal/bright    Results:    No visits with results within 1 Day(s) from this visit.   Latest known visit with results is:   Orders Only on 05/27/2021   Component Date Value Ref Range Status     Color Urine 05/27/2021 Canceled, Test credited   Final    Test reordered as correct code     Appearance Urine 05/27/2021 Canceled, Test credited   Final    Test reordered as correct code     Glucose Urine 05/27/2021 Canceled, Test credited  NEG^Negative mg/dL Final    Test reordered as correct code     Bilirubin Urine 05/27/2021 Canceled, Test credited  NEG^Negative Final    Test reordered as correct code     Ketones Urine 05/27/2021 Canceled, Test credited  NEG^Negative mg/dL Final    Test reordered as correct code     Specific Gravity Urine 05/27/2021 Canceled, Test credited  1.003 - 1.035 Final    Test reordered as correct code     pH Urine 05/27/2021 Canceled, Test credited  5.0 - 7.0 pH Final    Test reordered as correct code     Protein Albumin Urine 05/27/2021 Canceled, Test credited  NEG^Negative mg/dL Final    Test reordered as correct code     Urobilinogen Urine 05/27/2021 Canceled, Test credited  0.2 - 1.0 EU/dL Final    Test reordered as correct code     Nitrite Urine 05/27/2021 Canceled, Test credited  NEG^Negative Final    Test reordered as correct code     Blood Urine 05/27/2021 Canceled, Test credited  NEG^Negative Final    Test reordered as correct code     Leukocyte Esterase Urine 05/27/2021 Canceled, Test credited  NEG^Negative Final    Test reordered as correct code     Source 05/27/2021 Canceled,  Test credited   Corrected    Comment: Test reordered as correct code  CORRECTED ON 05/27 AT 1907: PREVIOUSLY REPORTED AS Midstream Urine       WBC Urine 05/27/2021 Canceled, Test credited  OTO5^0 - 5 /HPF Final    Test reordered as correct code     RBC Urine 05/27/2021 Canceled, Test credited  0 - 2 /HPF Final    Test reordered as correct code     Leonidas Free Lt Chain 05/27/2021 7.15* 0.33 - 1.94 mg/dL Final     Lambda Free Lt Chain 05/27/2021 4.82* 0.57 - 2.63 mg/dL Final     Kappa Lambda Ratio 05/27/2021 1.48  0.26 - 1.65 Final     Iron 05/27/2021 61  35 - 180 ug/dL Final     Iron Binding Cap 05/27/2021 439* 240 - 430 ug/dL Final     Iron Saturation Index 05/27/2021 14* 15 - 46 % Final     Vitamin D Deficiency screening 05/27/2021 39  20 - 75 ug/L Final    Comment: Season, race, dietary intake, and treatment affect the concentration of   25-hydroxy-Vitamin D. Values may decrease during winter months and increase   during summer months. Values 20-29 ug/L may indicate Vitamin D insufficiency   and values <20 ug/L may indicate Vitamin D deficiency.  Vitamin D determination is routinely performed by an immunoassay specific for   25 hydroxyvitamin D3.  If an individual is on vitamin D2 (ergocalciferol)   supplementation, please specify 25 OH vitamin D2 and D3 level determination by   LCMSMS test [id number].       Parathyroid Hormone Intact 05/27/2021 76  18 - 80 pg/mL Final     WBC 05/27/2021 7.2  4.0 - 11.0 10e9/L Final     RBC Count 05/27/2021 4.45  3.8 - 5.2 10e12/L Final     Hemoglobin 05/27/2021 12.4  11.7 - 15.7 g/dL Final     Hematocrit 05/27/2021 40.0  35.0 - 47.0 % Final     MCV 05/27/2021 90  78 - 100 fl Final     MCH 05/27/2021 27.9  26.5 - 33.0 pg Final     MCHC 05/27/2021 31.0* 31.5 - 36.5 g/dL Final     RDW 05/27/2021 14.6  10.0 - 15.0 % Final     Platelet Count 05/27/2021 187  150 - 450 10e9/L Final     Sodium 05/27/2021 141  133 - 144 mmol/L Final     Potassium 05/27/2021 4.5  3.4 - 5.3 mmol/L Final      Chloride 05/27/2021 110* 94 - 109 mmol/L Final     Carbon Dioxide 05/27/2021 26  20 - 32 mmol/L Final     Anion Gap 05/27/2021 5  3 - 14 mmol/L Final     Glucose 05/27/2021 110* 70 - 99 mg/dL Final     Urea Nitrogen 05/27/2021 18  7 - 30 mg/dL Final     Creatinine 05/27/2021 1.27* 0.52 - 1.04 mg/dL Final     GFR Estimate 05/27/2021 44* >60 mL/min/[1.73_m2] Final    Comment: Non  GFR Calc  Starting 12/18/2018, serum creatinine based estimated GFR (eGFR) will be   calculated using the Chronic Kidney Disease Epidemiology Collaboration   (CKD-EPI) equation.       GFR Estimate If Black 05/27/2021 51* >60 mL/min/[1.73_m2] Final    Comment:  GFR Calc  Starting 12/18/2018, serum creatinine based estimated GFR (eGFR) will be   calculated using the Chronic Kidney Disease Epidemiology Collaboration   (CKD-EPI) equation.       Calcium 05/27/2021 9.5  8.5 - 10.1 mg/dL Final     Bilirubin Total 05/27/2021 0.8  0.2 - 1.3 mg/dL Final     Albumin 05/27/2021 4.1  3.4 - 5.0 g/dL Final     Protein Total 05/27/2021 8.4  6.8 - 8.8 g/dL Final     Alkaline Phosphatase 05/27/2021 102  40 - 150 U/L Final     ALT 05/27/2021 25  0 - 50 U/L Final     AST 05/27/2021 21  0 - 45 U/L Final     Immunofixation ELP 05/27/2021    Final                    Value:No monoclonal protein seen on immunofixation.  Pathological significance requires clinical   correlation.      Comment: CARMEN Villalba M.D., Ph.D  Pathologist ([phone])       IGG 05/27/2021 1,464  610 - 1,616 mg/dL Final     IGA 05/27/2021 493  84 - 499 mg/dL Final     IGM 05/27/2021 116  35 - 242 mg/dL Final     Bilirubin Direct 05/27/2021 0.3* 0.0 - 0.2 mg/dL Final     Color Urine 05/27/2021 Yellow   Final     Appearance Urine 05/27/2021 Clear   Final     Glucose Urine 05/27/2021 Negative  NEG^Negative mg/dL Final     Bilirubin Urine 05/27/2021 Negative  NEG^Negative Final     Ketones Urine 05/27/2021 Negative  NEG^Negative mg/dL Final     Specific Gravity  Urine 05/27/2021 1.019  1.003 - 1.035 Final     Blood Urine 05/27/2021 Negative  NEG^Negative Final     pH Urine 05/27/2021 5.5  5.0 - 7.0 pH Final     Protein Albumin Urine 05/27/2021 20* NEG^Negative mg/dL Final     Urobilinogen mg/dL 05/27/2021 2.0  0.0 - 2.0 mg/dL Final     Nitrite Urine 05/27/2021 Negative  NEG^Negative Final     Leukocyte Esterase Urine 05/27/2021 Negative  NEG^Negative Final     Source 05/27/2021 Urine   Final     WBC Urine 05/27/2021 0  0 - 5 /HPF Final     RBC Urine 05/27/2021 2  0 - 2 /HPF Final     Squamous Epithelial /HPF Urine 05/27/2021 0  0 - 1 /HPF Final     Mucous Urine 05/27/2021 Present* NEG^Negative /LPF Final     Hyaline Casts 05/27/2021 8* 0 - 2 /LPF Final       Assessment/Plan:   CKD stage III  Pt with baseline creatinine of 0.7-1 up until 2019 and had ceratinine elevated to 1.6 in 2020 and again was at 1.4 in 2021. So she is been referred to nephrology for CKD.  Have had albuminuria, improved significantly with improvement in diabetes control.  No recent albumin quantification noted.  UA with no sediment. Pt with very long h/o diabetes with associated diabetic neuropathy (but never been diagnosed with diabetic retinopathy). CKD probably because of long standing diabetes and HTN.  As there is no sediment, less likely other intrinsic GN's but will follow closely.  -We will obtain labs with the next visit including UA and pertinent blood work     Protein gap : Immunofixation negative for monoclonal protein     Hypernatremia :  Resolved.  Encouraged continued to drink water.     Hypertension : on losartan and amlodipine. Controlled well in clinics. Does not have home monitor.  - DME blood pressure monitor again prescribed.  - instructed to call us if SBP > 1 45 or < 105.     Anemia: Normal hemoglobin noted.     Discussed with Dr. Thomas West  Nephrology fellow

## 2021-06-30 NOTE — LETTER
6/30/2021      RE: Karine Moss  [address]  [facility] [address]       Chief Complaint   Patient presents with     RECHECK     4-6 weeks     Weight 74.4 kg (164 lb), not currently breastfeeding.    Karine is a 66 year old who is being evaluated via a billable video visit.      How would you like to obtain your AVS? MyChart  If the video visit is dropped, the invitation should be resent by: Other e-mail: use [email or web address]  Will anyone else be joining your video visit? No  {If patient encounters technical issues they should call [phone] :[id number]}    Video Start Time: 1:30 PM  Video-Visit Details    Type of service:  Video Visit    Video End Time:2:10    Originating Location (pt. Location): Home    Distant Location (provider location):  [facility] [place]     Platform used for Video Visit: Proenza Schouer      June 30, 2021    I was asked to see this patient by Anay Forrester     CC: CKD    HPI: Karine Moss is a 66 year old female with PMH significant for DM, HTN, obesity s/p gastric bypass surgery 5 years, IZABEL, not on CPAP any more, who presents for evaluation of CKD.      Pt endorses continue to have neuropathy symptoms in her legs but improving with duloxetine. Up on questioning have had dry mouth for which she takes Cevimeline and does had overflow incontinence and take Tolterodine which helps her with symptoms. Other wise review of systemic symptoms were negative including fever/chills, nausea/vomting/diarrhea, urgency or frequency of urination, chest pain or SOB,     Does have long history of IZABEL,continue to snore a lot. Tried CPAP for a while, but was unable to go to sleep, so she discontinued, she have her sleep apt coming up.     Known diabetic from 2010 and had intermittently high A1c >10, but in past couple years her A1c been around 7. Have had gastric Bypass done few years ago for obesity. She was initially on insulin, but now on oral hypoglycemics  only.     Drinks Water about 32 oz/day     BG :100-113 in the morning prior to breakfast, some times go low to 73 and was symptomatic   BP at home : no BP monitor at home, at [facility] controlled well on losartan and amlo       6/30/2021   Patient endorses doing well.  No new symptoms since her last visit.  Have had her Covid shots, continue to follow precautions.  In the recent past noted to have few low blood glucose levels in the low 70s with symptoms.  Did not check blood pressure during the time.  Her symptoms include shakiness and sweating which improves with taking some food.  Mentioned she do not actually feel hungry, usually eats when she can.  Since her gastric bypass surgery, her appetite has gone down.  Otherwise no recent fevers, chills, nausea, vomiting, diarrhea, abdominal pain, chest pain or shortness of breath.    BP sugar levels : 70's and 130's in the morning  Not checking home blood pressure as she did not receive blood pressure monitor    - History of Hematuria: no  - Swelling: no  - Hx of UTIs: no  - Hx of stones: no  - Rashes/Joint pain: no  - Family hx of kidney disease: no  - NSAID use: no       Allergies   Allergen Reactions     Pravastatin Other (See Comments)     woozy     Codeine Nausea and Vomiting     Nsaids GI Disturbance and Other (See Comments)     Pt had bariatric surgery, should not ever take oral NSAIDS due to risk of gastric ulcers.  Pt had bariatric surgery, should not ever take oral NSAIDS due to risk of gastric ulcers.       amLODIPine (NORVASC) 5 MG tablet, Take 1 tablet (5 mg) by mouth daily  amphetamine-dextroamphetamine (ADDERALL) 30 MG tablet, Take 1.5 tablets bid, Rx from Dr. Sam, [place] [facility]  ASPIRIN LOW DOSE 81 MG EC tablet, TAKE 1 TABLET BY MOUTH DAILY  atorvastatin (LIPITOR) 20 MG tablet, Take 1 tablet (20 mg) by mouth daily  blood glucose (NO BRAND SPECIFIED) lancets standard, Use to test blood sugar 4 times daily or as directed. Dispense appropriate lancets for  meter.  blood glucose (NO BRAND SPECIFIED) test strip, 1 strip by In Vitro route daily To accompany: Blood Glucose Monitor Brands: per insurance.  blood glucose calibration (NO BRAND SPECIFIED) solution, Use to calibrate blood glucose monitor as needed as directed.  blood glucose monitoring (NO BRAND SPECIFIED) meter device kit, Use to test blood sugar 4 times daily or as directed. Dispense One-Touch Verio IQ Kit - per patient request.  calcium carbonate (OS-KINJAL) 1500 (600 Ca) MG tablet, Take 2 tablets (1,200 mg) by mouth daily  cevimeline (EVOXAC) 30 MG capsule, take 1 cap TID  cyanocobalamin (VITAMIN B-12) 1000 MCG tablet, Take 1 tablet (1,000 mcg) by mouth daily  DULoxetine (CYMBALTA) 30 MG capsule, Take 1 capsule (30 mg) by mouth 2 times daily  glipiZIDE (GLUCOTROL XL) 5 MG 24 hr tablet, Take 1 tablet (5 mg) by mouth daily  losartan (COZAAR) 100 MG tablet, Take 1 tablet (100 mg) by mouth daily  metFORMIN (GLUCOPHAGE-XR) 500 MG 24 hr tablet, Take 4 po q am with breakfast.  omeprazole (PRILOSEC) 40 MG DR capsule, Take one daily  tolterodine ER (DETROL LA) 4 MG 24 hr capsule, Take 1 capsule (4 mg) by mouth daily  traZODone (DESYREL) 50 MG tablet, Take 50 mg by mouth At Bedtime  Vitamin D3 (CHOLECALCIFEROL) 25 mcg (1000 units) tablet, Take 1 tablet (25 mcg) by mouth daily    No current facility-administered medications on file prior to visit.       Past Medical History:   Diagnosis Date     Arthritis      Chest pain     seeing Cardiology     Depression 1991    after 's death     Diabetes mellitus (H)      Diabetic renal disease (H)     microalbuminuria     DJD (degenerative joint disease) of knee      H/O vitamin D deficiency      Hypertension      IBS (irritable bowel syndrome)     diarrhea      Keratoconus      Low back pain      Narcolepsy 2007    Dx'd by Sleep specialist 347.00, pt discontinued Adderal mid Nov. 13 due to tacycardia concerns     Obesity      Obstructive sleep apnea     327.23, 3 sleep  studies,Dr. Sam [facility]     Pancreatitis     related to Victoza, also on Xyrem     Panic      RLS (restless legs syndrome)      Sinus tachycardia        Past Surgical History:   Procedure Laterality Date     COLONOSCOPY  10/01/2020    poor quality prep     ear surgery  2002 and 01/2004     GASTRIC BYPASS  2013    laparoscopic jimmy en y c ometopexy     HEMORRHOIDECTOMY  09/14/2000     LAPAROSCOPIC CHOLECYSTECTOMY  2015     LASIK BILATERAL       UVULOPALATOPHARYNGOPLASTY       UVVP         Social History     Tobacco Use     Smoking status: Never Smoker     Smokeless tobacco: Never Used   Substance Use Topics     Alcohol use: Yes     Comment: rare     Drug use: No       Family History   Problem Relation Age of Onset     Diabetes Father      Cancer - colorectal Maternal Grandmother      Cancer Daughter      Obesity Daughter         s/p lap band     Cancer Brother         lung     Hypertension Sister        ROS: A 12 system review of systems was negative other than noted here or above.     Exam:  Wt 74.4 kg (164 lb)   Breastfeeding No   BMI 28.35 kg/m      GENERAL APPEARANCE: alert and no distress  EYES:  no scleral icterus  RESP: No respiratory distress noted, not using accessory muscles  SKIN: no visible facial rash  NEURO: mentation intact and speech normal  PSYCH: affect normal/bright    Results:    No visits with results within 1 Day(s) from this visit.   Latest known visit with results is:   Orders Only on 05/27/2021   Component Date Value Ref Range Status     Color Urine 05/27/2021 Canceled, Test credited   Final    Test reordered as correct code     Appearance Urine 05/27/2021 Canceled, Test credited   Final    Test reordered as correct code     Glucose Urine 05/27/2021 Canceled, Test credited  NEG^Negative mg/dL Final    Test reordered as correct code     Bilirubin Urine 05/27/2021 Canceled, Test credited  NEG^Negative Final    Test reordered as correct code     Ketones Urine 05/27/2021 Canceled, Test credited   NEG^Negative mg/dL Final    Test reordered as correct code     Specific Gravity Urine 05/27/2021 Canceled, Test credited  1.003 - 1.035 Final    Test reordered as correct code     pH Urine 05/27/2021 Canceled, Test credited  5.0 - 7.0 pH Final    Test reordered as correct code     Protein Albumin Urine 05/27/2021 Canceled, Test credited  NEG^Negative mg/dL Final    Test reordered as correct code     Urobilinogen Urine 05/27/2021 Canceled, Test credited  0.2 - 1.0 EU/dL Final    Test reordered as correct code     Nitrite Urine 05/27/2021 Canceled, Test credited  NEG^Negative Final    Test reordered as correct code     Blood Urine 05/27/2021 Canceled, Test credited  NEG^Negative Final    Test reordered as correct code     Leukocyte Esterase Urine 05/27/2021 Canceled, Test credited  NEG^Negative Final    Test reordered as correct code     Source 05/27/2021 Canceled, Test credited   Corrected    Comment: Test reordered as correct code  CORRECTED ON 05/27 AT 1907: PREVIOUSLY REPORTED AS Midstream Urine       WBC Urine 05/27/2021 Canceled, Test credited  OTO5^0 - 5 /HPF Final    Test reordered as correct code     RBC Urine 05/27/2021 Canceled, Test credited  0 - 2 /HPF Final    Test reordered as correct code     [place] Free Lt Chain 05/27/2021 7.15* 0.33 - 1.94 mg/dL Final     Lambda Free Lt Chain 05/27/2021 4.82* 0.57 - 2.63 mg/dL Final     Kappa Lambda Ratio 05/27/2021 1.48  0.26 - 1.65 Final     Iron 05/27/2021 61  35 - 180 ug/dL Final     Iron Binding Cap 05/27/2021 439* 240 - 430 ug/dL Final     Iron Saturation Index 05/27/2021 14* 15 - 46 % Final     Vitamin D Deficiency screening 05/27/2021 39  20 - 75 ug/L Final    Comment: Season, race, dietary intake, and treatment affect the concentration of   25-hydroxy-Vitamin D. Values may decrease during winter months and increase   during summer months. Values 20-29 ug/L may indicate Vitamin D insufficiency   and values <20 ug/L may indicate Vitamin D deficiency.  Vitamin  D determination is routinely performed by an immunoassay specific for   25 hydroxyvitamin D3.  If an individual is on vitamin D2 (ergocalciferol)   supplementation, please specify 25 OH vitamin D2 and D3 level determination by   LCMSMS test [id number].       Parathyroid Hormone Intact 05/27/2021 76  18 - 80 pg/mL Final     WBC 05/27/2021 7.2  4.0 - 11.0 10e9/L Final     RBC Count 05/27/2021 4.45  3.8 - 5.2 10e12/L Final     Hemoglobin 05/27/2021 12.4  11.7 - 15.7 g/dL Final     Hematocrit 05/27/2021 40.0  35.0 - 47.0 % Final     MCV 05/27/2021 90  78 - 100 fl Final     MCH 05/27/2021 27.9  26.5 - 33.0 pg Final     MCHC 05/27/2021 31.0* 31.5 - 36.5 g/dL Final     RDW 05/27/2021 14.6  10.0 - 15.0 % Final     Platelet Count 05/27/2021 187  150 - 450 10e9/L Final     Sodium 05/27/2021 141  133 - 144 mmol/L Final     Potassium 05/27/2021 4.5  3.4 - 5.3 mmol/L Final     Chloride 05/27/2021 110* 94 - 109 mmol/L Final     Carbon Dioxide 05/27/2021 26  20 - 32 mmol/L Final     Anion Gap 05/27/2021 5  3 - 14 mmol/L Final     Glucose 05/27/2021 110* 70 - 99 mg/dL Final     Urea Nitrogen 05/27/2021 18  7 - 30 mg/dL Final     Creatinine 05/27/2021 1.27* 0.52 - 1.04 mg/dL Final     GFR Estimate 05/27/2021 44* >60 mL/min/[1.73_m2] Final    Comment: Non  GFR Calc  Starting 12/18/2018, serum creatinine based estimated GFR (eGFR) will be   calculated using the Chronic Kidney Disease Epidemiology Collaboration   (CKD-EPI) equation.       GFR Estimate If Black 05/27/2021 51* >60 mL/min/[1.73_m2] Final    Comment:  GFR Calc  Starting 12/18/2018, serum creatinine based estimated GFR (eGFR) will be   calculated using the Chronic Kidney Disease Epidemiology Collaboration   (CKD-EPI) equation.       Calcium 05/27/2021 9.5  8.5 - 10.1 mg/dL Final     Bilirubin Total 05/27/2021 0.8  0.2 - 1.3 mg/dL Final     Albumin 05/27/2021 4.1  3.4 - 5.0 g/dL Final     Protein Total 05/27/2021 8.4  6.8 - 8.8 g/dL Final      Alkaline Phosphatase 05/27/2021 102  40 - 150 U/L Final     ALT 05/27/2021 25  0 - 50 U/L Final     AST 05/27/2021 21  0 - 45 U/L Final     Immunofixation ELP 05/27/2021    Final                    Value:No monoclonal protein seen on immunofixation.  Pathological significance requires clinical   correlation.      Comment: CARMEN Villalba M.D., Ph.D  Pathologist ([phone])       IGG 05/27/2021 1,464  610 - 1,616 mg/dL Final     IGA 05/27/2021 493  84 - 499 mg/dL Final     IGM 05/27/2021 116  35 - 242 mg/dL Final     Bilirubin Direct 05/27/2021 0.3* 0.0 - 0.2 mg/dL Final     Color Urine 05/27/2021 Yellow   Final     Appearance Urine 05/27/2021 Clear   Final     Glucose Urine 05/27/2021 Negative  NEG^Negative mg/dL Final     Bilirubin Urine 05/27/2021 Negative  NEG^Negative Final     Ketones Urine 05/27/2021 Negative  NEG^Negative mg/dL Final     Specific Gravity Urine 05/27/2021 1.019  1.003 - 1.035 Final     Blood Urine 05/27/2021 Negative  NEG^Negative Final     pH Urine 05/27/2021 5.5  5.0 - 7.0 pH Final     Protein Albumin Urine 05/27/2021 20* NEG^Negative mg/dL Final     Urobilinogen mg/dL 05/27/2021 2.0  0.0 - 2.0 mg/dL Final     Nitrite Urine 05/27/2021 Negative  NEG^Negative Final     Leukocyte Esterase Urine 05/27/2021 Negative  NEG^Negative Final     Source 05/27/2021 Urine   Final     WBC Urine 05/27/2021 0  0 - 5 /HPF Final     RBC Urine 05/27/2021 2  0 - 2 /HPF Final     Squamous Epithelial /HPF Urine 05/27/2021 0  0 - 1 /HPF Final     Mucous Urine 05/27/2021 Present* NEG^Negative /LPF Final     Hyaline Casts 05/27/2021 8* 0 - 2 /LPF Final       Assessment/Plan:   CKD stage III  Pt with baseline creatinine of 0.7-1 up until 2019 and had ceratinine elevated to 1.6 in 2020 and again was at 1.4 in 2021. So she is been referred to nephrology for CKD.  Have had albuminuria, improved significantly with improvement in diabetes control.  No recent albumin quantification noted.  UA with no sediment. Pt with  very long h/o diabetes with associated diabetic neuropathy (but never been diagnosed with diabetic retinopathy). CKD probably because of long standing diabetes and HTN.  As there is no sediment, less likely other intrinsic GN's but will follow closely.  -We will obtain labs with the next visit including UA and pertinent blood work     Protein gap : Immunofixation negative for monoclonal protein     Hypernatremia :  Resolved.  Encouraged continued to drink water.     Hypertension : on losartan and amlodipine. Controlled well in clinics. Does not have home monitor.  - DME blood pressure monitor again prescribed.  - instructed to call us if SBP > 1 45 or < 105.     Anemia: Normal hemoglobin noted.     Discussed with Dr. Thomas West  Nephrology fellow      Attestation signed by Soledad Guzman MD at 7/29/2021  5:01 PM:  Attestation:  This patient has been evaluated by me, Soledad Guzman MD.  Discussed with the fellow or resident and agree with the findings and plan in this note.  I have reviewed Medications, Vital Signs, and Labs as well as provider notes.    Date of Service (when I saw the patient): 6/30/2021    I was on video from 2:11 to 2:15 pm    Soledad Guzman MD  [facility]  Department of Medicine  Division of Renal Disease and Hypertension  Amcom  [email or web address]   Vocera Web Console      Amanda West MD

## 2021-06-30 NOTE — PATIENT INSTRUCTIONS
Continue good hydration   BP monitor been sent to pharmacy, take BP once a day   Call if systolic BP is <105 or > 145.  Discuss about your intermittent low BG levels with your primary.  Your Kidney function continue to improve slowly   Labs in a month   No new meds this visit

## 2021-06-30 NOTE — LETTER
6/30/2021       RE: Karine Moss  [address]  [facility] [address]     Dear Colleague,    Thank you for referring your patient, Karine Moss, to the [facility] [place] at [facility]. Please see a copy of my visit note below.    Chief Complaint   Patient presents with     RECHECK     4-6 weeks     Weight 74.4 kg (164 lb), not currently breastfeeding.    Karine is a 66 year old who is being evaluated via a billable video visit.      How would you like to obtain your AVS? MyChart  If the video visit is dropped, the invitation should be resent by: Other e-mail: use [employer]  Will anyone else be joining your video visit? No  {If patient encounters technical issues they should call [phone] :[id number]}    Video Start Time: 1:30 PM  Video-Visit Details    Type of service:  Video Visit    Video End Time:{video visit start/end time for provider to select:[id number]}    Originating Location (pt. Location): Home    Distant Location (provider location):  [facility] [place]     Platform used for Video Visit: Open Wager      June 30, 2021    I was asked to see this patient by Anay Forrester     CC: CKD    HPI: Karine Moss is a 66 year old female with PMH significant for DM, HTN, obesity s/p gastric bypass surgery 5 years, IZABEL, not on CPAP any more, who presents for evaluation of CKD.      Pt endorses continue to have neuropathy symptoms in her legs but improving with duloxetine. Up on questioning have had dry mouth for which she takes Cevimeline and does had overflow incontinence and take Tolterodine which helps her with symptoms. Other wise review of systemic symptoms were negative including fever/chills, nausea/vomting/diarrhea, urgency or frequency of urination, chest pain or SOB,     Does have long history of IZABEL,continue to snore a lot. Tried CPAP for a while, but was unable to go to sleep, so she  discontinued, she have her sleep apt coming up.     Known diabetic from 2010 and had intermittently high A1c >10, but in past couple years her A1c been around 7. Have had gastric Bypass done few years ago for obesity. She was initially on insulin, but now on oral hypoglycemics only.     Drinks Water about 32 oz/day     BG :100-113 in the morning prior to breakfast, some times go low to 73 and was symptomatic   BP at home : no BP monitor at home, at [facility] controlled well on losartan and amlo       6/30/2021       - History of Hematuria: no  - Swelling: no  - Hx of UTIs: no  - Hx of stones: no  - Rashes/Joint pain: no  - Family hx of kidney disease: no  - NSAID use: no       Allergies   Allergen Reactions     Pravastatin Other (See Comments)     woozy     Codeine Nausea and Vomiting     Nsaids GI Disturbance and Other (See Comments)     Pt had bariatric surgery, should not ever take oral NSAIDS due to risk of gastric ulcers.  Pt had bariatric surgery, should not ever take oral NSAIDS due to risk of gastric ulcers.       amLODIPine (NORVASC) 5 MG tablet, Take 1 tablet (5 mg) by mouth daily  amphetamine-dextroamphetamine (ADDERALL) 30 MG tablet, Take 1.5 tablets bid, Rx from Dr. Sam, [place] [facility]  ASPIRIN LOW DOSE 81 MG EC tablet, TAKE 1 TABLET BY MOUTH DAILY  atorvastatin (LIPITOR) 20 MG tablet, Take 1 tablet (20 mg) by mouth daily  blood glucose (NO BRAND SPECIFIED) lancets standard, Use to test blood sugar 4 times daily or as directed. Dispense appropriate lancets for meter.  blood glucose (NO BRAND SPECIFIED) test strip, 1 strip by In Vitro route daily To accompany: Blood Glucose Monitor Brands: per insurance.  blood glucose calibration (NO BRAND SPECIFIED) solution, Use to calibrate blood glucose monitor as needed as directed.  blood glucose monitoring (NO BRAND SPECIFIED) meter device kit, Use to test blood sugar 4 times daily or as directed. Dispense One-Touch Verio IQ Kit - per patient request.  calcium carbonate  (OS-KINJAL) 1500 (600 Ca) MG tablet, Take 2 tablets (1,200 mg) by mouth daily  cevimeline (EVOXAC) 30 MG capsule, take 1 cap TID  cyanocobalamin (VITAMIN B-12) 1000 MCG tablet, Take 1 tablet (1,000 mcg) by mouth daily  DULoxetine (CYMBALTA) 30 MG capsule, Take 1 capsule (30 mg) by mouth 2 times daily  glipiZIDE (GLUCOTROL XL) 5 MG 24 hr tablet, Take 1 tablet (5 mg) by mouth daily  losartan (COZAAR) 100 MG tablet, Take 1 tablet (100 mg) by mouth daily  metFORMIN (GLUCOPHAGE-XR) 500 MG 24 hr tablet, Take 4 po q am with breakfast.  omeprazole (PRILOSEC) 40 MG DR capsule, Take one daily  tolterodine ER (DETROL LA) 4 MG 24 hr capsule, Take 1 capsule (4 mg) by mouth daily  traZODone (DESYREL) 50 MG tablet, Take 50 mg by mouth At Bedtime  Vitamin D3 (CHOLECALCIFEROL) 25 mcg (1000 units) tablet, Take 1 tablet (25 mcg) by mouth daily    No current facility-administered medications on file prior to visit.       Past Medical History:   Diagnosis Date     Arthritis      Chest pain     seeing Cardiology     Depression 1991    after 's death     Diabetes mellitus (H)      Diabetic renal disease (H)     microalbuminuria     DJD (degenerative joint disease) of knee      H/O vitamin D deficiency      Hypertension      IBS (irritable bowel syndrome)     diarrhea      Keratoconus      Low back pain      Narcolepsy 2007    Dx'd by Sleep specialist 347.00, pt discontinued Adderal mid Nov. 13 due to tacycardia concerns     Obesity      Obstructive sleep apnea     327.23, 3 sleep studies,Dr. Sam [facility]     Pancreatitis     related to Victoza, also on Xyrem     Panic      RLS (restless legs syndrome)      Sinus tachycardia        Past Surgical History:   Procedure Laterality Date     COLONOSCOPY  10/01/2020    poor quality prep     ear surgery  2002 and 01/2004     GASTRIC BYPASS  2013    laparoscopic jimmy en y c ometopexy     HEMORRHOIDECTOMY  09/14/2000     LAPAROSCOPIC CHOLECYSTECTOMY  2015     LASIK BILATERAL        UVULOPALATOPHARYNGOPLASTY       UVVP         Social History     Tobacco Use     Smoking status: Never Smoker     Smokeless tobacco: Never Used   Substance Use Topics     Alcohol use: Yes     Comment: rare     Drug use: No       Family History   Problem Relation Age of Onset     Diabetes Father      Cancer - colorectal Maternal Grandmother      Cancer Daughter      Obesity Daughter         s/p lap band     Cancer Brother         lung     Hypertension Sister        ROS: A 12 system review of systems was negative other than noted here or above.     Exam:  Wt 74.4 kg (164 lb)   Breastfeeding No   BMI 28.35 kg/m      GENERAL APPEARANCE: alert and no distress  EYES: PERRL, no scleral icterus  HENT: mouth without ulcers or lesions  NECK: supple, no adenopathy  RESP: lungs clear to auscultation   CV: regular rhythm, normal rate, no rub  Extremities: no clubbing, cyanosis, or edema  SKIN: no rash  NEURO: mentation intact and speech normal  PSYCH: affect normal/bright    Results:    No visits with results within 1 Day(s) from this visit.   Latest known visit with results is:   Orders Only on 05/27/2021   Component Date Value Ref Range Status     Color Urine 05/27/2021 Canceled, Test credited   Final    Test reordered as correct code     Appearance Urine 05/27/2021 Canceled, Test credited   Final    Test reordered as correct code     Glucose Urine 05/27/2021 Canceled, Test credited  NEG^Negative mg/dL Final    Test reordered as correct code     Bilirubin Urine 05/27/2021 Canceled, Test credited  NEG^Negative Final    Test reordered as correct code     Ketones Urine 05/27/2021 Canceled, Test credited  NEG^Negative mg/dL Final    Test reordered as correct code     Specific Gravity Urine 05/27/2021 Canceled, Test credited  1.003 - 1.035 Final    Test reordered as correct code     pH Urine 05/27/2021 Canceled, Test credited  5.0 - 7.0 pH Final    Test reordered as correct code     Protein Albumin Urine 05/27/2021 Canceled, Test  credited  NEG^Negative mg/dL Final    Test reordered as correct code     Urobilinogen Urine 05/27/2021 Canceled, Test credited  0.2 - 1.0 EU/dL Final    Test reordered as correct code     Nitrite Urine 05/27/2021 Canceled, Test credited  NEG^Negative Final    Test reordered as correct code     Blood Urine 05/27/2021 Canceled, Test credited  NEG^Negative Final    Test reordered as correct code     Leukocyte Esterase Urine 05/27/2021 Canceled, Test credited  NEG^Negative Final    Test reordered as correct code     Source 05/27/2021 Canceled, Test credited   Corrected    Comment: Test reordered as correct code  CORRECTED ON 05/27 AT 1907: PREVIOUSLY REPORTED AS Midstream Urine       WBC Urine 05/27/2021 Canceled, Test credited  OTO5^0 - 5 /HPF Final    Test reordered as correct code     RBC Urine 05/27/2021 Canceled, Test credited  0 - 2 /HPF Final    Test reordered as correct code     [place] Free Lt Chain 05/27/2021 7.15* 0.33 - 1.94 mg/dL Final     Lambda Free Lt Chain 05/27/2021 4.82* 0.57 - 2.63 mg/dL Final     Kappa Lambda Ratio 05/27/2021 1.48  0.26 - 1.65 Final     Iron 05/27/2021 61  35 - 180 ug/dL Final     Iron Binding Cap 05/27/2021 439* 240 - 430 ug/dL Final     Iron Saturation Index 05/27/2021 14* 15 - 46 % Final     Vitamin D Deficiency screening 05/27/2021 39  20 - 75 ug/L Final    Comment: Season, race, dietary intake, and treatment affect the concentration of   25-hydroxy-Vitamin D. Values may decrease during winter months and increase   during summer months. Values 20-29 ug/L may indicate Vitamin D insufficiency   and values <20 ug/L may indicate Vitamin D deficiency.  Vitamin D determination is routinely performed by an immunoassay specific for   25 hydroxyvitamin D3.  If an individual is on vitamin D2 (ergocalciferol)   supplementation, please specify 25 OH vitamin D2 and D3 level determination by   LCMSMS test [id number].       Parathyroid Hormone Intact 05/27/2021 76  18 - 80 pg/mL Final     WBC  05/27/2021 7.2  4.0 - 11.0 10e9/L Final     RBC Count 05/27/2021 4.45  3.8 - 5.2 10e12/L Final     Hemoglobin 05/27/2021 12.4  11.7 - 15.7 g/dL Final     Hematocrit 05/27/2021 40.0  35.0 - 47.0 % Final     MCV 05/27/2021 90  78 - 100 fl Final     MCH 05/27/2021 27.9  26.5 - 33.0 pg Final     MCHC 05/27/2021 31.0* 31.5 - 36.5 g/dL Final     RDW 05/27/2021 14.6  10.0 - 15.0 % Final     Platelet Count 05/27/2021 187  150 - 450 10e9/L Final     Sodium 05/27/2021 141  133 - 144 mmol/L Final     Potassium 05/27/2021 4.5  3.4 - 5.3 mmol/L Final     Chloride 05/27/2021 110* 94 - 109 mmol/L Final     Carbon Dioxide 05/27/2021 26  20 - 32 mmol/L Final     Anion Gap 05/27/2021 5  3 - 14 mmol/L Final     Glucose 05/27/2021 110* 70 - 99 mg/dL Final     Urea Nitrogen 05/27/2021 18  7 - 30 mg/dL Final     Creatinine 05/27/2021 1.27* 0.52 - 1.04 mg/dL Final     GFR Estimate 05/27/2021 44* >60 mL/min/[1.73_m2] Final    Comment: Non  GFR Calc  Starting 12/18/2018, serum creatinine based estimated GFR (eGFR) will be   calculated using the Chronic Kidney Disease Epidemiology Collaboration   (CKD-EPI) equation.       GFR Estimate If Black 05/27/2021 51* >60 mL/min/[1.73_m2] Final    Comment:  GFR Calc  Starting 12/18/2018, serum creatinine based estimated GFR (eGFR) will be   calculated using the Chronic Kidney Disease Epidemiology Collaboration   (CKD-EPI) equation.       Calcium 05/27/2021 9.5  8.5 - 10.1 mg/dL Final     Bilirubin Total 05/27/2021 0.8  0.2 - 1.3 mg/dL Final     Albumin 05/27/2021 4.1  3.4 - 5.0 g/dL Final     Protein Total 05/27/2021 8.4  6.8 - 8.8 g/dL Final     Alkaline Phosphatase 05/27/2021 102  40 - 150 U/L Final     ALT 05/27/2021 25  0 - 50 U/L Final     AST 05/27/2021 21  0 - 45 U/L Final     Immunofixation ELP 05/27/2021    Final                    Value:No monoclonal protein seen on immunofixation.  Pathological significance requires clinical   correlation.      Comment: CARMEN  CSASIE Villalba, Ph.D  Pathologist ([phone])       IGG 05/27/2021 1,464  610 - 1,616 mg/dL Final     IGA 05/27/2021 493  84 - 499 mg/dL Final     IGM 05/27/2021 116  35 - 242 mg/dL Final     Bilirubin Direct 05/27/2021 0.3* 0.0 - 0.2 mg/dL Final     Color Urine 05/27/2021 Yellow   Final     Appearance Urine 05/27/2021 Clear   Final     Glucose Urine 05/27/2021 Negative  NEG^Negative mg/dL Final     Bilirubin Urine 05/27/2021 Negative  NEG^Negative Final     Ketones Urine 05/27/2021 Negative  NEG^Negative mg/dL Final     Specific Gravity Urine 05/27/2021 1.019  1.003 - 1.035 Final     Blood Urine 05/27/2021 Negative  NEG^Negative Final     pH Urine 05/27/2021 5.5  5.0 - 7.0 pH Final     Protein Albumin Urine 05/27/2021 20* NEG^Negative mg/dL Final     Urobilinogen mg/dL 05/27/2021 2.0  0.0 - 2.0 mg/dL Final     Nitrite Urine 05/27/2021 Negative  NEG^Negative Final     Leukocyte Esterase Urine 05/27/2021 Negative  NEG^Negative Final     Source 05/27/2021 Urine   Final     WBC Urine 05/27/2021 0  0 - 5 /HPF Final     RBC Urine 05/27/2021 2  0 - 2 /HPF Final     Squamous Epithelial /HPF Urine 05/27/2021 0  0 - 1 /HPF Final     Mucous Urine 05/27/2021 Present* NEG^Negative /LPF Final     Hyaline Casts 05/27/2021 8* 0 - 2 /LPF Final       Assessment/Plan:   ***  CKD stage III  Pt with baseline creatinine of 0.7-1 up until 2019 and had ceratinine elevated to 1.6 in 2020 and again was at 1.4 in 2021. So she is been referred to nephrology for CKD. Pt with very long h/o diabetes with associated diabetic neuropathy (but never been diagnosed with diabetic retinopathy). CKD probably because of long standing diabetes and HTN. That being said, other diseases can not be ruled out with out other labs.   - UA with reflux microscopic evaluation   - repeat BMP and CBC  - UPCR and UACR  -  Will consider renal imaging moving forward at some point.     Protein gap : pt noted to have albumin of 3.6 and total protein of 9. Asymptomatic.  No anemia or hypercalcemia. But renal disease is present. Will get labs to rule out MM.  - serum light chains   - immunofixation     Hypernatremia : likely 2/2 poor free water intake. Drinks only 33 oz of water per day.  - encouarged to drink more water   - repeat labs next week.     Hypertension : on losartan and amlodipine. Controlled well in clinics. Does not have home monitor.  - DME blood pressure monitor   - instructed to call us if SBP > 180 or < 105.     Low Iron   Noted to have low iron and high binding cap in the past. Will recheck the labs.     Of note, primary or pharmacy to evaluate any medication interactions between Cevimeline and Tolterodine (Detrol).    Amanda West  Nephrology fellow      June 30, 2021    I was asked to see this patient by Anay Forrester     CC: CKD    HPI: Karine Moss is a 66 year old female with PMH significant for DM, HTN, obesity s/p gastric bypass surgery 5 years, IZABEL, not on CPAP any more, who presents for evaluation of CKD.      Pt endorses continue to have neuropathy symptoms in her legs but improving with duloxetine. Up on questioning have had dry mouth for which she takes Cevimeline and does had overflow incontinence and take Tolterodine which helps her with symptoms. Other wise review of systemic symptoms were negative including fever/chills, nausea/vomting/diarrhea, urgency or frequency of urination, chest pain or SOB,     Does have long history of IZABEL,continue to snore a lot. Tried CPAP for a while, but was unable to go to sleep, so she discontinued, she have her sleep apt coming up.     Known diabetic from 2010 and had intermittently high A1c >10, but in past couple years her A1c been around 7. Have had gastric Bypass done few years ago for obesity. She was initially on insulin, but now on oral hypoglycemics only.     Drinks Water about 32 oz/day     BG :100-113 in the morning prior to breakfast, some times go low to 73 and was symptomatic   BP at home  : no BP monitor at home, at [facility] controlled well on losartan and amlo       6/30/2021     BP sugar levels : 70's and 130's in the morning  Home BP :   - History of Hematuria: no  - Swelling: no  - Hx of UTIs: no  - Hx of stones: no  - Rashes/Joint pain: no  - Family hx of kidney disease: no  - NSAID use: no       Allergies   Allergen Reactions     Pravastatin Other (See Comments)     woozy     Codeine Nausea and Vomiting     Nsaids GI Disturbance and Other (See Comments)     Pt had bariatric surgery, should not ever take oral NSAIDS due to risk of gastric ulcers.  Pt had bariatric surgery, should not ever take oral NSAIDS due to risk of gastric ulcers.       amLODIPine (NORVASC) 5 MG tablet, Take 1 tablet (5 mg) by mouth daily  amphetamine-dextroamphetamine (ADDERALL) 30 MG tablet, Take 1.5 tablets bid, Rx from Dr. Sam, [place] [facility]  ASPIRIN LOW DOSE 81 MG EC tablet, TAKE 1 TABLET BY MOUTH DAILY  atorvastatin (LIPITOR) 20 MG tablet, Take 1 tablet (20 mg) by mouth daily  blood glucose (NO BRAND SPECIFIED) lancets standard, Use to test blood sugar 4 times daily or as directed. Dispense appropriate lancets for meter.  blood glucose (NO BRAND SPECIFIED) test strip, 1 strip by In Vitro route daily To accompany: Blood Glucose Monitor Brands: per insurance.  blood glucose calibration (NO BRAND SPECIFIED) solution, Use to calibrate blood glucose monitor as needed as directed.  blood glucose monitoring (NO BRAND SPECIFIED) meter device kit, Use to test blood sugar 4 times daily or as directed. Dispense One-Touch Verio IQ Kit - per patient request.  calcium carbonate (OS-KINJAL) 1500 (600 Ca) MG tablet, Take 2 tablets (1,200 mg) by mouth daily  cevimeline (EVOXAC) 30 MG capsule, take 1 cap TID  cyanocobalamin (VITAMIN B-12) 1000 MCG tablet, Take 1 tablet (1,000 mcg) by mouth daily  DULoxetine (CYMBALTA) 30 MG capsule, Take 1 capsule (30 mg) by mouth 2 times daily  glipiZIDE (GLUCOTROL XL) 5 MG 24 hr tablet, Take 1 tablet (5 mg) by  mouth daily  losartan (COZAAR) 100 MG tablet, Take 1 tablet (100 mg) by mouth daily  metFORMIN (GLUCOPHAGE-XR) 500 MG 24 hr tablet, Take 4 po q am with breakfast.  omeprazole (PRILOSEC) 40 MG DR capsule, Take one daily  tolterodine ER (DETROL LA) 4 MG 24 hr capsule, Take 1 capsule (4 mg) by mouth daily  traZODone (DESYREL) 50 MG tablet, Take 50 mg by mouth At Bedtime  Vitamin D3 (CHOLECALCIFEROL) 25 mcg (1000 units) tablet, Take 1 tablet (25 mcg) by mouth daily    No current facility-administered medications on file prior to visit.       Past Medical History:   Diagnosis Date     Arthritis      Chest pain     seeing Cardiology     Depression 1991    after 's death     Diabetes mellitus (H)      Diabetic renal disease (H)     microalbuminuria     DJD (degenerative joint disease) of knee      H/O vitamin D deficiency      Hypertension      IBS (irritable bowel syndrome)     diarrhea      Keratoconus      Low back pain      Narcolepsy 2007    Dx'd by Sleep specialist 347.00, pt discontinued Adderal mid Nov. 13 due to tacycardia concerns     Obesity      Obstructive sleep apnea     327.23, 3 sleep studies,Dr. Sam [facility]     Pancreatitis     related to Victoza, also on Xyrem     Panic      RLS (restless legs syndrome)      Sinus tachycardia        Past Surgical History:   Procedure Laterality Date     COLONOSCOPY  10/01/2020    poor quality prep     ear surgery  2002 and 01/2004     GASTRIC BYPASS  2013    laparoscopic jimmy en y c ometopexy     HEMORRHOIDECTOMY  09/14/2000     LAPAROSCOPIC CHOLECYSTECTOMY  2015     LASIK BILATERAL       UVULOPALATOPHARYNGOPLASTY       UVVP         Social History     Tobacco Use     Smoking status: Never Smoker     Smokeless tobacco: Never Used   Substance Use Topics     Alcohol use: Yes     Comment: rare     Drug use: No       Family History   Problem Relation Age of Onset     Diabetes Father      Cancer - colorectal Maternal Grandmother      Cancer Daughter      Obesity Daughter          s/p lap band     Cancer Brother         lung     Hypertension Sister        ROS: A 12 system review of systems was negative other than noted here or above.     Exam:  Wt 74.4 kg (164 lb)   Breastfeeding No   BMI 28.35 kg/m      GENERAL APPEARANCE: alert and no distress  EYES:  no scleral icterus  RESP: ***  SKIN: no rash  NEURO: mentation intact and speech normal  PSYCH: affect normal/bright    Results:    No visits with results within 1 Day(s) from this visit.   Latest known visit with results is:   Orders Only on 05/27/2021   Component Date Value Ref Range Status     Color Urine 05/27/2021 Canceled, Test credited   Final    Test reordered as correct code     Appearance Urine 05/27/2021 Canceled, Test credited   Final    Test reordered as correct code     Glucose Urine 05/27/2021 Canceled, Test credited  NEG^Negative mg/dL Final    Test reordered as correct code     Bilirubin Urine 05/27/2021 Canceled, Test credited  NEG^Negative Final    Test reordered as correct code     Ketones Urine 05/27/2021 Canceled, Test credited  NEG^Negative mg/dL Final    Test reordered as correct code     Specific Gravity Urine 05/27/2021 Canceled, Test credited  1.003 - 1.035 Final    Test reordered as correct code     pH Urine 05/27/2021 Canceled, Test credited  5.0 - 7.0 pH Final    Test reordered as correct code     Protein Albumin Urine 05/27/2021 Canceled, Test credited  NEG^Negative mg/dL Final    Test reordered as correct code     Urobilinogen Urine 05/27/2021 Canceled, Test credited  0.2 - 1.0 EU/dL Final    Test reordered as correct code     Nitrite Urine 05/27/2021 Canceled, Test credited  NEG^Negative Final    Test reordered as correct code     Blood Urine 05/27/2021 Canceled, Test credited  NEG^Negative Final    Test reordered as correct code     Leukocyte Esterase Urine 05/27/2021 Canceled, Test credited  NEG^Negative Final    Test reordered as correct code     Source 05/27/2021 Canceled, Test credited    Corrected    Comment: Test reordered as correct code  CORRECTED ON 05/27 AT 1907: PREVIOUSLY REPORTED AS Midstream Urine       WBC Urine 05/27/2021 Canceled, Test credited  OTO5^0 - 5 /HPF Final    Test reordered as correct code     RBC Urine 05/27/2021 Canceled, Test credited  0 - 2 /HPF Final    Test reordered as correct code     [place] Free Lt Chain 05/27/2021 7.15* 0.33 - 1.94 mg/dL Final     Lambda Free Lt Chain 05/27/2021 4.82* 0.57 - 2.63 mg/dL Final     Kappa Lambda Ratio 05/27/2021 1.48  0.26 - 1.65 Final     Iron 05/27/2021 61  35 - 180 ug/dL Final     Iron Binding Cap 05/27/2021 439* 240 - 430 ug/dL Final     Iron Saturation Index 05/27/2021 14* 15 - 46 % Final     Vitamin D Deficiency screening 05/27/2021 39  20 - 75 ug/L Final    Comment: Season, race, dietary intake, and treatment affect the concentration of   25-hydroxy-Vitamin D. Values may decrease during winter months and increase   during summer months. Values 20-29 ug/L may indicate Vitamin D insufficiency   and values <20 ug/L may indicate Vitamin D deficiency.  Vitamin D determination is routinely performed by an immunoassay specific for   25 hydroxyvitamin D3.  If an individual is on vitamin D2 (ergocalciferol)   supplementation, please specify 25 OH vitamin D2 and D3 level determination by   LCMSMS test [id number].       Parathyroid Hormone Intact 05/27/2021 76  18 - 80 pg/mL Final     WBC 05/27/2021 7.2  4.0 - 11.0 10e9/L Final     RBC Count 05/27/2021 4.45  3.8 - 5.2 10e12/L Final     Hemoglobin 05/27/2021 12.4  11.7 - 15.7 g/dL Final     Hematocrit 05/27/2021 40.0  35.0 - 47.0 % Final     MCV 05/27/2021 90  78 - 100 fl Final     MCH 05/27/2021 27.9  26.5 - 33.0 pg Final     MCHC 05/27/2021 31.0* 31.5 - 36.5 g/dL Final     RDW 05/27/2021 14.6  10.0 - 15.0 % Final     Platelet Count 05/27/2021 187  150 - 450 10e9/L Final     Sodium 05/27/2021 141  133 - 144 mmol/L Final     Potassium 05/27/2021 4.5  3.4 - 5.3 mmol/L Final     Chloride 05/27/2021  110* 94 - 109 mmol/L Final     Carbon Dioxide 05/27/2021 26  20 - 32 mmol/L Final     Anion Gap 05/27/2021 5  3 - 14 mmol/L Final     Glucose 05/27/2021 110* 70 - 99 mg/dL Final     Urea Nitrogen 05/27/2021 18  7 - 30 mg/dL Final     Creatinine 05/27/2021 1.27* 0.52 - 1.04 mg/dL Final     GFR Estimate 05/27/2021 44* >60 mL/min/[1.73_m2] Final    Comment: Non  GFR Calc  Starting 12/18/2018, serum creatinine based estimated GFR (eGFR) will be   calculated using the Chronic Kidney Disease Epidemiology Collaboration   (CKD-EPI) equation.       GFR Estimate If Black 05/27/2021 51* >60 mL/min/[1.73_m2] Final    Comment:  GFR Calc  Starting 12/18/2018, serum creatinine based estimated GFR (eGFR) will be   calculated using the Chronic Kidney Disease Epidemiology Collaboration   (CKD-EPI) equation.       Calcium 05/27/2021 9.5  8.5 - 10.1 mg/dL Final     Bilirubin Total 05/27/2021 0.8  0.2 - 1.3 mg/dL Final     Albumin 05/27/2021 4.1  3.4 - 5.0 g/dL Final     Protein Total 05/27/2021 8.4  6.8 - 8.8 g/dL Final     Alkaline Phosphatase 05/27/2021 102  40 - 150 U/L Final     ALT 05/27/2021 25  0 - 50 U/L Final     AST 05/27/2021 21  0 - 45 U/L Final     Immunofixation ELP 05/27/2021    Final                    Value:No monoclonal protein seen on immunofixation.  Pathological significance requires clinical   correlation.      Comment: CARMEN Villalba M.D., Ph.D  Pathologist ([phone])       IGG 05/27/2021 1,464  610 - 1,616 mg/dL Final     IGA 05/27/2021 493  84 - 499 mg/dL Final     IGM 05/27/2021 116  35 - 242 mg/dL Final     Bilirubin Direct 05/27/2021 0.3* 0.0 - 0.2 mg/dL Final     Color Urine 05/27/2021 Yellow   Final     Appearance Urine 05/27/2021 Clear   Final     Glucose Urine 05/27/2021 Negative  NEG^Negative mg/dL Final     Bilirubin Urine 05/27/2021 Negative  NEG^Negative Final     Ketones Urine 05/27/2021 Negative  NEG^Negative mg/dL Final     Specific Gravity Urine 05/27/2021  1.019  1.003 - 1.035 Final     Blood Urine 05/27/2021 Negative  NEG^Negative Final     pH Urine 05/27/2021 5.5  5.0 - 7.0 pH Final     Protein Albumin Urine 05/27/2021 20* NEG^Negative mg/dL Final     Urobilinogen mg/dL 05/27/2021 2.0  0.0 - 2.0 mg/dL Final     Nitrite Urine 05/27/2021 Negative  NEG^Negative Final     Leukocyte Esterase Urine 05/27/2021 Negative  NEG^Negative Final     Source 05/27/2021 Urine   Final     WBC Urine 05/27/2021 0  0 - 5 /HPF Final     RBC Urine 05/27/2021 2  0 - 2 /HPF Final     Squamous Epithelial /HPF Urine 05/27/2021 0  0 - 1 /HPF Final     Mucous Urine 05/27/2021 Present* NEG^Negative /LPF Final     Hyaline Casts 05/27/2021 8* 0 - 2 /LPF Final       Assessment/Plan:   ***  CKD stage III  Pt with baseline creatinine of 0.7-1 up until 2019 and had ceratinine elevated to 1.6 in 2020 and again was at 1.4 in 2021. So she is been referred to nephrology for CKD. Pt with very long h/o diabetes with associated diabetic neuropathy (but never been diagnosed with diabetic retinopathy). CKD probably because of long standing diabetes and HTN. That being said, other diseases can not be ruled out with out other labs.   - UA with reflux microscopic evaluation   - repeat BMP and CBC  - UPCR and UACR  -  Will consider renal imaging moving forward at some point.     Protein gap : pt noted to have albumin of 3.6 and total protein of 9. Asymptomatic. No anemia or hypercalcemia. But renal disease is present. Will get labs to rule out MM.  - serum light chains   - immunofixation     Hypernatremia : likely 2/2 poor free water intake. Drinks only 33 oz of water per day.  - encouarged to drink more water   - repeat labs next week.     Hypertension : on losartan and amlodipine. Controlled well in clinics. Does not have home monitor.  - DME blood pressure monitor   - instructed to call us if SBP > 180 or < 105.     Low Iron   Noted to have low iron and high binding cap in the past. Will recheck the  labs.     Of note, primary or pharmacy to evaluate any medication interactions between Cevimeline and Tolterodine (Detrol).    Amanda West  Nephrology fellow        Again, thank you for allowing me to participate in the care of your patient.      Sincerely,    Amanda West MD

## 2021-07-04 ENCOUNTER — HEALTH MAINTENANCE LETTER (OUTPATIENT)
Age: 66
End: 2021-07-04

## 2021-07-05 ENCOUNTER — TELEPHONE (OUTPATIENT)
Dept: NEPHROLOGY | Facility: CLINIC | Age: 66
End: 2021-07-05

## 2021-07-29 PROBLEM — I50.32 CHRONIC DIASTOLIC CONGESTIVE HEART FAILURE (H): Status: ACTIVE | Noted: 2021-07-29

## 2021-07-29 PROBLEM — N18.30 CHRONIC KIDNEY DISEASE, STAGE 3 (H): Status: ACTIVE | Noted: 2021-07-29

## 2021-08-09 DIAGNOSIS — E55.9 VITAMIN D DEFICIENCY: ICD-10-CM

## 2021-08-09 DIAGNOSIS — I10 HYPERTENSION GOAL BP (BLOOD PRESSURE) < 140/90: ICD-10-CM

## 2021-08-09 RX ORDER — AMLODIPINE BESYLATE 5 MG/1
5 TABLET ORAL DAILY
Qty: 30 TABLET | Refills: 0 | Status: SHIPPED | OUTPATIENT
Start: 2021-08-09 | End: 2021-09-09

## 2021-08-09 RX ORDER — VITAMIN B COMPLEX
1 TABLET ORAL DAILY
Qty: 90 TABLET | Refills: 2 | Status: SHIPPED | OUTPATIENT
Start: 2021-08-09 | End: 2022-05-07

## 2021-08-17 ENCOUNTER — VIRTUAL VISIT (OUTPATIENT)
Dept: FAMILY MEDICINE | Facility: CLINIC | Age: 66
End: 2021-08-17
Payer: COMMERCIAL

## 2021-08-17 DIAGNOSIS — E11.9 TYPE 2 DIABETES MELLITUS WITHOUT COMPLICATION, WITHOUT LONG-TERM CURRENT USE OF INSULIN (H): Primary | ICD-10-CM

## 2021-08-17 RX ORDER — GLIPIZIDE 2.5 MG/1
TABLET, EXTENDED RELEASE ORAL
Qty: 90 TABLET | Refills: 1 | Status: SHIPPED | OUTPATIENT
Start: 2021-08-17 | End: 2021-12-13

## 2021-08-17 RX ORDER — FLASH GLUCOSE SCANNING READER
EACH MISCELLANEOUS
Qty: 1 EACH | Refills: 0 | Status: SHIPPED | OUTPATIENT
Start: 2021-08-17

## 2021-08-17 RX ORDER — FLASH GLUCOSE SENSOR
KIT MISCELLANEOUS
Qty: 2 EACH | Refills: 11 | Status: SHIPPED | OUTPATIENT
Start: 2021-08-17 | End: 2021-12-13

## 2021-08-17 RX ORDER — BLOOD-GLUCOSE CONTROL, NORMAL
EACH MISCELLANEOUS
Qty: 1 EACH | Refills: 0 | Status: SHIPPED | OUTPATIENT
Start: 2021-08-17 | End: 2022-01-03

## 2021-08-17 NOTE — PROGRESS NOTES
Karine is a 66 year old who is being evaluated via a billable telephone visit.      What phone number would you like to be contacted at? [phone]  How would you like to obtain your AVS? Yuval  Start time: 11:22pm  End time: 12:09 PM  Total : 47 minutes    Assessment & Plan   (E11.9) Type 2 diabetes mellitus without complication, without long-term current use of insulin (H)  (primary encounter diagnosis)  Comment: Karine is using Metformin 2000mg daily along with Glipizide XL 5mg daily. Having hypoglycemia. Weight is down 16 pounds from March 2021. Suspect she needs less glucose coverage.   She reports her insurance will now cover continuous glucose meter device.   Plan: glipiZIDE (GLUCOTROL XL) 2.5 MG 24 hr tablet,         Continuous Blood Gluc  (FREESTYLE JORDIN        14 DAY READER) EBEN, Continuous Blood Gluc         Sensor (FREESTYLE JORDIN 14 DAY SENSOR) MISC,         blood glucose monitoring (NO BRAND SPECIFIED)         meter device kit, blood glucose calibration (NO        BRAND SPECIFIED) solution, blood glucose (NO         BRAND SPECIFIED) test strip, blood glucose (NO         BRAND SPECIFIED) lancets standard        Continue metformin 2000mg daily        Drop glipizide XL dose to 2.5mg daily. If she continues to have hypoglycemia with this dose then she should stop the glipizide         Sent supplies for new glucose meter and CGM supplies  Schedule appt with our Pharm D Roopa and Nutritionist Santy at [facility] to help with plan to see her through dental extractions. She will need to closely monitor sugars and pay close attention to nutritional status, blood sugar readings.          Follow up with me in clinic October, for labs , diabetes, HTN, Lipid check    58 minutes spend on the date of this encounter doing chart review, history and exam, documentation and further activities as noted above.       Anay Martinez MD  [facility]    Subjective   Karine is a 66 year  old woman with type II DM. She presents for the following health issues and is accompanied by her daughter on a conference call.    HPI   Oral Surgery Concerns  Karine has type II DM. She is currently planning to have all teeth extracted by the [facility]. She will be fit for dentures.  Karine is concerned about her diabetes management after the procedure. Worried about nutrition during the transition period where she will not have any teeth, and is requesting guidance through this process today.    Extractions are planned Sept 1 2021 using nitrous oxide  Home after surgery to recover, she anticipates she will receive percocet for pain    Diabetes  Currently taking Metformin  XR 2000mg daily  Glipizide XL 5mg daily    Blood sugar readings: 3-4 times a days,   Lows: YES, before dinner at 6pm, felt shaky,lightheaded, sweaty, glucose= 43  Daytime sugar yesterday was 229 at 1 pm, ate lunch  Her last low sugar was 2 mos ago, 63    Glucose readings typically measure   106 fasting  150 range 2 hr after eating    Karine was interested in continuous glucose monitoring but when she last met with Pharm CHASTITY Blas, this was not covered. Karine now reports CGM is covered and is requesting prescription to go to [facility].     Lab Results   Component Value Date    A1C 7.0 03/08/2021    A1C 6.2 09/04/2020    A1C 7.0 05/06/2020    A1C 7.9 02/06/2020    A1C 6.7 10/22/2019       Wt Readings from Last 4 Encounters:   08/17/21 67.1 kg (148 lb)   06/30/21 74.4 kg (164 lb)   03/08/21 74.4 kg (164 lb)   11/05/20 76.2 kg (168 lb 1.6 oz)         Hypertension  Karine takes losartan 100mg daily and amlodipine 5mg daily for treatment of HTN. her BP reading at a recent dental visit was normal.   BP Readings from Last 3 Encounters:   03/08/21 110/74   11/05/20 130/70   10/19/20 135/80       Objective       Vitals:  Wt 67.1 kg (148 lb)   BMI 25.58 kg/m       Physical Exam   healthy, alert and no distress  PSYCH: Alert and  oriented times 3; coherent speech, normal   rate and volume, able to articulate logical thoughts, able   to abstract reason, no tangential thoughts, no hallucinations   or delusions  Her affect is normal  RESP: No cough, no audible wheezing, able to talk in full sentences  Remainder of exam unable to be completed due to telephone visits

## 2021-08-17 NOTE — Clinical Note
Karine will be scheduling with both of you to discuss strategy to monitor sugars and to maintain nutrition after tooth extractions.   Thanks  Anay

## 2021-08-23 ENCOUNTER — TELEPHONE (OUTPATIENT)
Dept: FAMILY MEDICINE | Facility: CLINIC | Age: 66
End: 2021-08-23

## 2021-08-23 VITALS — WEIGHT: 148 LBS | BODY MASS INDEX: 25.58 KG/M2

## 2021-08-23 NOTE — TELEPHONE ENCOUNTER
Who is calling? Leila   Reason for Call: Wanting update for glucose monitoring system, and call back when request is sent to pharmacy.

## 2021-08-23 NOTE — TELEPHONE ENCOUNTER
Leila is caller, she is asking for update on PA. Discussed the process and will update Leila as soon as possible when we can once we find out the PA.   Karen Sosa MS RN-BC  08/23/21  2:14 PM

## 2021-08-23 NOTE — TELEPHONE ENCOUNTER
PRIOR AUTHORIZATION DENIED    Medication: Continuous Blood Gluc Sensor (FREESTYLE JORDIN 14 DAY SENSOR) MISC-DENIED    Denial Date: 8/23/2021    Denial Rational:           Appeal Information:

## 2021-08-23 NOTE — TELEPHONE ENCOUNTER
Central Prior Authorization Team   Phone: [phone]      PA Initiation    Medication: Continuous Blood Gluc Sensor (FREESTYLE JORDIN 14 DAY SENSOR) [facility]  Insurance Company: [employer] - Phone [phone] Fax [phone]  Pharmacy Filling the Rx: [id number] - [place], [place] - [address]  Filling Pharmacy Phone: [phone]  Filling Pharmacy Fax:    Start Date: 8/23/2021

## 2021-08-23 NOTE — TELEPHONE ENCOUNTER
Central Prior Authorization Team   Phone: [phone]      PA Initiation    Medication: Continuous Blood Gluc  (FREESTYLE JORDIN 14 DAY READER) DEVICE   Insurance Company: [employer] - Phone [phone] Fax [phone]  Pharmacy Filling the Rx: [facility] #[id number] - [place], [place] - [address]  Filling Pharmacy Phone: [phone]  Filling Pharmacy Fax:    Start Date: 8/23/2021

## 2021-08-23 NOTE — TELEPHONE ENCOUNTER
Prior Authorization Retail Medication Request    Medication/Dose: Continuous Blood Gluc  (FREESTYLE JORDIN 14 DAY READER) DEVICE and Continuous Blood Gluc Sensor (FREESTYLE JORDIN 14 DAY SENSOR) MISC  ICD code (if different than what is on RX):    Previously Tried and Failed:    Rationale:      Insurance Name:    Insurance ID:        Pharmacy Information (if different than what is on RX)  Name:  [id number] - [place], [place] - [address]  Phone:  [phone]

## 2021-08-23 NOTE — TELEPHONE ENCOUNTER
No PA request received. Called [facility] where original Rx was sent. They sent it to a local [facility] per pt request. [facility] on file called, they did not receive it, it went to a [facility] in [place], phone number [phone]. That pharmacy was called, and they verified that a PA is requested for Freestyle Nirmal monitoring device. PA requested.   Karen Sosa MS RN-BC  08/23/21  10:26 AM

## 2021-08-23 NOTE — TELEPHONE ENCOUNTER
Prior Authorization Retail Medication Request    Medication/Dose: Continuous Blood Gluc Sensor (FREESTYLE JORDIN 14 DAY SENSOR) MISC  ICD code (if different than what is on RX):    Previously Tried and Failed:    Rationale:      Insurance Name:  [employer]  Insurance ID:  [id number]      Pharmacy Information (if different than what is on RX)  Name:  [facility] #[id number] - [place], [place] - [address] AT [address]  Phone:  [phone]

## 2021-08-23 NOTE — TELEPHONE ENCOUNTER
PRIOR AUTHORIZATION DENIED    Medication: Continuous Blood Gluc  (FREESTYLE JORDIN 14 DAY READER) DEVICE - DENIED    Denial Date: 8/23/2021    Denial Rational:           Appeal Information:

## 2021-08-25 ENCOUNTER — TELEPHONE (OUTPATIENT)
Dept: FAMILY MEDICINE | Facility: CLINIC | Age: 66
End: 2021-08-25

## 2021-08-25 ENCOUNTER — VIRTUAL VISIT (OUTPATIENT)
Dept: FAMILY MEDICINE | Facility: CLINIC | Age: 66
End: 2021-08-25
Payer: COMMERCIAL

## 2021-08-25 DIAGNOSIS — E11.9 TYPE 2 DIABETES MELLITUS WITHOUT COMPLICATION, WITH LONG-TERM CURRENT USE OF INSULIN (H): ICD-10-CM

## 2021-08-25 DIAGNOSIS — Z71.3 DIETARY COUNSELING AND SURVEILLANCE: Primary | ICD-10-CM

## 2021-08-25 DIAGNOSIS — Z79.4 TYPE 2 DIABETES MELLITUS WITHOUT COMPLICATION, WITH LONG-TERM CURRENT USE OF INSULIN (H): ICD-10-CM

## 2021-08-25 NOTE — PROGRESS NOTES
[place] Nutrition Assessment    Karine Moss is a 66 year old female who presents for nutrition counseling prior to dental extractions. This visit conducted on the phone, with daughter Leila on the call as well. Karine would like to make a nutrition plan to follow after she has all of her teeth removed. Her nutrition is slightly complicated by hx of gastric bypass 5 years ago (needing small volutes) and diabetes. She will not have replacement teeth until January.     Currently diabetes is well controlled, FBG is 104-120. Hoping for a new CGM within the next week.    Recent weight loss has been unintentional. Karine denies changes in eating pattern but thinks she has been more active lately.     Recent diet recall:  Breakfast: bowl of fresh fruit, coffee and a slice of toast. Sometimes hashbrowns, roast beef hash and an egg on top. Also likes yogurt. Never finishes the meal, and will finish later  Lunch: sloppy joes or sandwich, Maltese onion soup 3/4 cup, chicken nuggets or fish, very soft foods. Doesn't finish.    Does not tolerate peanut butter   Tolerates dairy - tired of protein powder flavors     Social: Lives alone now, in September will live with 2 grandsons in city who can help with meal/food prep  Physical activity: busy with projects and on the go  Medications: metformin, glipizide   Vitamins/Supplements: vitamin D and vitamin B    Recent weights:   Wt Readings from Last 6 Encounters:   08/17/21 67.1 kg (148 lb)   06/30/21 74.4 kg (164 lb)   03/08/21 74.4 kg (164 lb)   11/05/20 76.2 kg (168 lb 1.6 oz)   10/07/20 76.7 kg (169 lb)   09/04/20 76.5 kg (168 lb 12 oz)     Recent A1C values:   Hemoglobin A1C   Date Value Ref Range Status   03/08/2021 7.0 (H) 0 - 5.6 % Final     Comment:     Normal <5.7% Prediabetes 5.7-6.4%  Diabetes 6.5% or higher - adopted from ADA   consensus guidelines.     09/04/2020 6.2 (H) 4.1 - 5.7 % Final   05/06/2020 7.0 (H) 0 - 5.6 % Final     Comment:     Normal <5.7% Prediabetes  "5.7-6.4%  Diabetes 6.5% or higher - adopted from ADA   consensus guidelines.       Recent lipid values:   Cholesterol   Date Value Ref Range Status   03/08/2021 113.0 0.0 - 200.0 Final   02/06/2020 111.0 0.0 - 200.0 Final     HDL Cholesterol   Date Value Ref Range Status   03/08/2021 35.0 (L) >50.0 Final   02/06/2020 47.0 (L) >50.0 Final     LDL Cholesterol Calculated   Date Value Ref Range Status   04/12/2019 97 <100 mg/dL Final     Comment:     Desirable:       <100 mg/dl   10/09/2013 106 0 - 129 mg/dL Final     Comment:     LDL Cholesterol is the primary guide to therapy: LDL-cholesterol goal in high   risk patients is <100 mg/dL and in very high risk patients is <70 mg/dL.     LDL Cholesterol Direct   Date Value Ref Range Status   03/08/2021 46.0 0.0 - 129.0 Final   02/06/2020 29.0 0.0 - 129.0 Final     Triglycerides   Date Value Ref Range Status   03/08/2021 157.0 (H) 0.0 - 150.0 Final   02/06/2020 179.0 (H) 0.0 - 150.0 Final       Intervention(s):  Karine Moss is a 66 year old female who presents for nutrition counseling prior to dental extractions. We will plan on her following a mostly liquid diet for 4-6 weeks following surgery, progressing to a puree/soft diet as tolerated. Goal is to optimize protein intake, provide calories to prevent further weight loss, and maintain diabetes control with moderate carbohydrate intake.  Estimated needs: 1400 calories and 65 grams protein per day  1. Protein shakes as primary source of nutrition - recipe ideas provided via My Chart. Consume 4-6 times per day. Aiming for 10-15 grams of carbohydrate at each mealtime.   2. Monitor weight regularly 1-3 times per week  3. Once a day vitamin    Monitoring/Evaluation:  Monday August 30 at 3 pm via phone call     Patient referred by Anay Martinez MD   Total time spent with patient 31 minutes    Jessica Madera RD     The patient has been notified of following:     \"This telephone visit will be conducted via a " "call between you and your physician/provider. We have found that certain health care needs can be provided without the need for a physical exam.  This service lets us provide the care you need with a short phone conversation.  If a prescription is necessary we can send it directly to your pharmacy.  If lab work is needed we can place an order for that and you can then stop by our lab to have the test done at a later time.    Telephone visits are billed at different rates depending on your insurance coverage. During this emergency period, for some insurers they may be billed the same as an in-person visit.  Please reach out to your insurance provider with any questions.    If during the course of the call the physician/provider feels a telephone visit is not appropriate, you will not be charged for this service.\"    Patient has given verbal consent for Telephone visit?  Yes    What phone number would you like to be contacted at? In appointment notes    How would you like to obtain your AVS? Yuval    I spoke with: Karine Moss and daughter Leila  The reason for the telephone visit was: nutrition counseling    Phone call contact time  Call started at: 3:51 pm  Call ended at: 4:22 pm           "

## 2021-08-25 NOTE — TELEPHONE ENCOUNTER
Called pt x2 for scheduled nutrition visit, no answer. LVM with clinic number to reschedule.     Jessica Madera RD

## 2021-08-26 ENCOUNTER — TELEPHONE (OUTPATIENT)
Dept: FAMILY MEDICINE | Facility: CLINIC | Age: 66
End: 2021-08-26

## 2021-08-26 NOTE — PATIENT INSTRUCTIONS
1. Protein shakes as primary source of nutrition - recipe ideas provided via My Chart. Consume 4-6 times per day.   2. Monitor weight regularly 1-3 times per week  3. Once a day vitamin     Monitoring/Evaluation:  Monday August 30 at 3 pm via phone call    Jessica Madera RD

## 2021-08-26 NOTE — TELEPHONE ENCOUNTER
Spoke to daughter, Leila re; need for Freestyle Nirmal monitor and sensors. Pt still scheduled for surgery on Sept 1, to have all teeth removed. She will then require 4-6 weeks for gum healing, then molds to be made , and pt getting used to new dentures. This could take several months. There is concern that she may have nutritional deficiency with this procedure and recovery time.   Called PA team, as equipment had been denied by insurance.  They recommend appeal with [facility].  Call to [facility] - spoke to [place] there - did fast appeal, giving info over the phone, as to why pt needs this equipment . Appeal will be reviewed, and answer hopefully in next few days, but could take 30 days.  Sent Mychart to pt and daughter re: this - also gave them other options - [employer] website for possible free equipment. Also website for [employer] to check prices for Freestyle Nirmal equipment at various pharmacies.   Appt made with FELIZ Blas to assist with blood glucose management for next week.  They are to call back with questions.  Davina Chawla RN  [facility]

## 2021-08-27 ENCOUNTER — TELEPHONE (OUTPATIENT)
Dept: FAMILY MEDICINE | Facility: CLINIC | Age: 66
End: 2021-08-27

## 2021-08-27 NOTE — TELEPHONE ENCOUNTER
We received a call from Alexis at [facility] [employer] and [place]    He is needing more clinical information to process the appeal     He can be reached at [phone]

## 2021-08-27 NOTE — TELEPHONE ENCOUNTER
are appeals and grievances called behalf of Karine and this is regarding insulin and machine.She said its need ASAP or the appeal will be dropped and her care will be rejected.       Karine is applying top get the machine covered and needs certain information from her provider and/or nurses

## 2021-08-27 NOTE — TELEPHONE ENCOUNTER
Spoke to Alexis at [facility] - answered questions he had re; the appeal for the Freestyle Nirmal materials.  [facility] still processing appeal for this .  Davina Chawla RN  [facility]

## 2021-08-30 ENCOUNTER — VIRTUAL VISIT (OUTPATIENT)
Dept: FAMILY MEDICINE | Facility: CLINIC | Age: 66
End: 2021-08-30
Payer: COMMERCIAL

## 2021-08-30 DIAGNOSIS — Z71.3 DIETARY COUNSELING AND SURVEILLANCE: Primary | ICD-10-CM

## 2021-08-30 DIAGNOSIS — E11.9 TYPE 2 DIABETES MELLITUS WITHOUT COMPLICATION, WITH LONG-TERM CURRENT USE OF INSULIN (H): ICD-10-CM

## 2021-08-30 DIAGNOSIS — Z79.4 TYPE 2 DIABETES MELLITUS WITHOUT COMPLICATION, WITH LONG-TERM CURRENT USE OF INSULIN (H): ICD-10-CM

## 2021-09-08 DIAGNOSIS — I10 HYPERTENSION GOAL BP (BLOOD PRESSURE) < 140/90: ICD-10-CM

## 2021-09-08 NOTE — TELEPHONE ENCOUNTER
Last time prescribed: 8/9/2021 , 30 tabs x 0 refills  Last office visit: 8/17/2021  Next appointment: No future appointments

## 2021-09-09 ENCOUNTER — OFFICE VISIT (OUTPATIENT)
Dept: FAMILY MEDICINE | Facility: CLINIC | Age: 66
End: 2021-09-09
Payer: COMMERCIAL

## 2021-09-09 VITALS — DIASTOLIC BLOOD PRESSURE: 78 MMHG | SYSTOLIC BLOOD PRESSURE: 118 MMHG | HEART RATE: 97 BPM

## 2021-09-09 DIAGNOSIS — E11.9 TYPE 2 DIABETES MELLITUS WITHOUT COMPLICATION, WITH LONG-TERM CURRENT USE OF INSULIN (H): ICD-10-CM

## 2021-09-09 DIAGNOSIS — I10 HYPERTENSION GOAL BP (BLOOD PRESSURE) < 140/90: ICD-10-CM

## 2021-09-09 DIAGNOSIS — E11.9 TYPE 2 DIABETES MELLITUS WITHOUT COMPLICATION, WITHOUT LONG-TERM CURRENT USE OF INSULIN (H): ICD-10-CM

## 2021-09-09 DIAGNOSIS — E11.9 TYPE 2 DIABETES MELLITUS WITHOUT COMPLICATION, WITHOUT LONG-TERM CURRENT USE OF INSULIN (H): Primary | ICD-10-CM

## 2021-09-09 DIAGNOSIS — Z79.4 TYPE 2 DIABETES MELLITUS WITHOUT COMPLICATION, WITH LONG-TERM CURRENT USE OF INSULIN (H): ICD-10-CM

## 2021-09-09 LAB
ALBUMIN SERPL-MCNC: 3.7 G/DL (ref 3.4–5)
ALP SERPL-CCNC: 93 U/L (ref 40–150)
ALT SERPL W P-5'-P-CCNC: 23 U/L (ref 0–50)
ANION GAP SERPL CALCULATED.3IONS-SCNC: 9 MMOL/L (ref 3–14)
AST SERPL W P-5'-P-CCNC: 30 U/L (ref 0–45)
BILIRUB SERPL-MCNC: 1.1 MG/DL (ref 0.2–1.3)
BUN SERPL-MCNC: 28 MG/DL (ref 7–30)
CALCIUM SERPL-MCNC: 10 MG/DL (ref 8.5–10.1)
CHLORIDE BLD-SCNC: 106 MMOL/L (ref 94–109)
CO2 SERPL-SCNC: 28 MMOL/L (ref 20–32)
CREAT SERPL-MCNC: 1.5 MG/DL (ref 0.52–1.04)
GFR SERPL CREATININE-BSD FRML MDRD: 36 ML/MIN/1.73M2
GLUCOSE BLD-MCNC: 73 MG/DL (ref 70–99)
HBA1C MFR BLD: 5.8 % (ref 0–5.6)
POTASSIUM BLD-SCNC: 5.2 MMOL/L (ref 3.4–5.3)
PROT SERPL-MCNC: 8.3 G/DL (ref 6.8–8.8)
SODIUM SERPL-SCNC: 143 MMOL/L (ref 133–144)

## 2021-09-09 RX ORDER — AMLODIPINE BESYLATE 5 MG/1
5 TABLET ORAL DAILY
Qty: 30 TABLET | Refills: 0 | Status: SHIPPED | OUTPATIENT
Start: 2021-09-09 | End: 2021-10-06

## 2021-09-09 NOTE — PROGRESS NOTES
ASSESSMENT:    1. Condition - DM: Safety - Adverse medication event: Unsafe medication for the patient; Safety - Needs additional monitoring: Medication requires monitoring    Continued use of glipizide with patient's current A1c of <6% is likely contributing to symptoms of hypoglycemia. Given inconsistent meal intake, concern for safety with ongoing use and therapy is likely not needed at this time.   Ongoing adjustment and change of meals warrants close monitoring of blood sugars. Continuous glucose monitoring would be effective and beneficial.     HgbA1C goal: <8%: Pt is at goal.  BP goal: <140/90 mmHg: Pt is at goal.  Aspirin: Pt is at goal.  Statin therapy: Pt is at goal.  No tobacco use: Pt is at goal.    2. Condition - HTN: No drug therapy problem  BP is below goal of <130/80 per AHA/ACC guidelines.         PLAN:  Medications comments/ concerns are:   1. Stop glipizde 2.5 mg BID. Communicated to patient via [address]. -completed   2. Placed Freestyle Nirmal sensor. -completed   3. Educated patient on proper placement technique and use of Nirmal reader. -completed     Follow up: 1 month to review Freestyle Nirmal results      - - - - - - - - - - - - - - -  SUBJECTIVE:  Karine is a 66 year old female coming in for an initial medication therapy management visit. Primary care provider is Anay Martinez. Was referred by her provider for   Chief Complaint   Patient presents with     Medication Therapy Management   . Karine brought medications to the visit: no.     Main concern today is blood sugars + Freestyle Nirmal device.    Allergies/ADRs: Reviewed in chart  Pt reports using the following medical devices: SMBG meter  Pt reports the following barriers to care: cost  Adverse reactions to medications: none  Concerns with medications: none    Medication Experience: comfortable with medications  Reports of cognitive concerns: no       Type 2 Diabetes:  Currently taking metformin + glipizide 2.5mg BID. Patient is  not experiencing side effects.  Blood sugar monitoring: rarely. Ranges (unknown)  Symptoms of low blood sugar? shaky, dizzy, weak, Frequency of lows- 1-2 times per week; mostly correlated with times of not eating, feeling sleepy throughout the day.   Symptoms of high blood sugar? none  Diet/Exercise: Currently undergoing teeth extraction, diet consists of smoothies and shakes - working with RD to manage.    Notes working on checking BG with SMBG meter but often forgets to check. Has also had issues with meters not reading consistently/correctly. Picked up Freestyle Nirmal but this wasn't covered by insurance, wants to know if there is any way to help with cost.      Lab Results   Component Value Date    A1C 5.8 09/09/2021    A1C 7.0 03/08/2021    A1C 6.2 09/04/2020    A1C 7.0 05/06/2020    A1C 7.9 02/06/2020    A1C 6.7 10/22/2019       Hypertension: Current medications include amlodipine, losartan.  Patient does not self-monitor blood pressure.  Patient reports no current medication side effects.  BP Readings from Last 3 Encounters:   09/09/21 118/78   03/08/21 110/74   11/05/20 130/70         OBJECTIVE:   Patient Care Team:  Anay Martinez MD as PCP - General (Internal Medicine)  Patrizia Garces LICSW as Lead Care Coordinator  Gabrielle Blas [facility] as Pharmacist (Pharmacist)  Brandon Paul MD as Assigned Neuroscience Provider  Amanda West MD as Fellow (Student in organized health care education/training program)  Anay Martinez MD as Assigned PCP  Nancy Griffin APRN CNP as Assigned Heart and Vascular Provider  Amanda West MD as Assigned Nephrology Provider  Current Outpatient Medications   Medication Sig Dispense Refill     amLODIPine (NORVASC) 5 MG tablet Take 1 tablet (5 mg) by mouth daily 30 tablet 0     amphetamine-dextroamphetamine (ADDERALL) 30 MG tablet Take 1.5 tablets bid, Rx from Dr. Sam, [place] LUNG 90 tablet 0     ASPIRIN LOW DOSE 81 MG EC tablet TAKE 1 TABLET BY  MOUTH DAILY 90 tablet 2     atorvastatin (LIPITOR) 20 MG tablet Take 1 tablet (20 mg) by mouth daily 90 tablet 3     blood glucose (NO BRAND SPECIFIED) lancets standard Use to test blood sugar 4 times daily or as directed. Dispense appropriate lancets for meter. 360 each 3     blood glucose (NO BRAND SPECIFIED) test strip Check blood glucose TID To accompany: Blood Glucose Monitor Brands: per insurance. 300 strip 3     blood glucose calibration (NO BRAND SPECIFIED) solution Use to calibrate blood glucose monitor as needed as directed. 1 each 0     blood glucose monitoring (NO BRAND SPECIFIED) meter device kit Use to test blood sugar 4 times daily or as directed. Dispense One-Touch Verio IQ Kit - per patient request. 1 kit 0     calcium carbonate (OS-KINJAL) 1500 (600 Ca) MG tablet Take 2 tablets (1,200 mg) by mouth daily       cevimeline (EVOXAC) 30 MG capsule take 1 cap  capsule 3     Continuous Blood Gluc  (FREESTYLE JORDIN 14 DAY READER) EBEN Use to read blood sugars as per 's instructions. 1 each 0     Continuous Blood Gluc Sensor (FREESTYLE JORDIN 14 DAY SENSOR) MISC Change every 14 days. 2 each 11     cyanocobalamin (VITAMIN B-12) 1000 MCG tablet Take 1 tablet (1,000 mcg) by mouth daily 90 tablet 2     DULoxetine (CYMBALTA) 30 MG capsule Take 1 capsule (30 mg) by mouth 2 times daily 180 capsule 1     glipiZIDE (GLUCOTROL XL) 2.5 MG 24 hr tablet Take one daily 90 tablet 1     losartan (COZAAR) 100 MG tablet Take 1 tablet (100 mg) by mouth daily 90 tablet 3     metFORMIN (GLUCOPHAGE-XR) 500 MG 24 hr tablet Take 4 po q am with breakfast. 360 tablet 3     omeprazole (PRILOSEC) 40 MG DR capsule Take one daily 90 capsule 3     tolterodine ER (DETROL LA) 4 MG 24 hr capsule Take 1 capsule (4 mg) by mouth daily 90 capsule 1     traZODone (DESYREL) 50 MG tablet Take 50 mg by mouth At Bedtime       Vitamin D3 (CHOLECALCIFEROL) 25 mcg (1000 units) tablet Take 1 tablet (25 mcg) by mouth daily 90  "tablet 2       Allergies   Allergen Reactions     Pravastatin Other (See Comments)     woozy     Codeine Nausea and Vomiting     Nsaids GI Disturbance and Other (See Comments)     Pt had bariatric surgery, should not ever take oral NSAIDS due to risk of gastric ulcers.  Pt had bariatric surgery, should not ever take oral NSAIDS due to risk of gastric ulcers.     Past Medical History:   Diagnosis Date     Arthritis      Chest pain     seeing Cardiology     Depression 1991    after 's death     Diabetes mellitus (H)      Diabetic renal disease (H)     microalbuminuria     DJD (degenerative joint disease) of knee      H/O vitamin D deficiency      Hypertension      IBS (irritable bowel syndrome)     diarrhea      Keratoconus      Low back pain      Narcolepsy 2007    Dx'd by Sleep specialist 347.00, pt discontinued Adderal mid Nov. 13 due to tacycardia concerns     Obesity      Obstructive sleep apnea     327.23, 3 sleep studies,Dr. Flaco SELBY Lung     Pancreatitis     related to Victoza, also on Xyrem     Panic      RLS (restless legs syndrome)      Sinus tachycardia         Vitals:    09/09/21 1458   BP: 118/78   BP Location: Left arm   Patient Position: Sitting   Cuff Size: Adult Regular   Pulse: 97     Estimated body mass index is 25.58 kg/m  as calculated from the following:    Height as of 3/8/21: 1.62 m (5' 3.78\").    Weight as of 8/17/21: 67.1 kg (148 lb).    Last Comprehensive Metabolic Panel:  Sodium   Date Value Ref Range Status   09/09/2021 143 133 - 144 mmol/L Final   05/27/2021 141 133 - 144 mmol/L Final     Potassium   Date Value Ref Range Status   09/09/2021 5.2 3.4 - 5.3 mmol/L Final   05/27/2021 4.5 3.4 - 5.3 mmol/L Final     Chloride   Date Value Ref Range Status   09/09/2021 106 94 - 109 mmol/L Final   05/27/2021 110 (H) 94 - 109 mmol/L Final     Carbon Dioxide   Date Value Ref Range Status   05/27/2021 26 20 - 32 mmol/L Final     Carbon Dioxide (CO2)   Date Value Ref Range Status   09/09/2021 28 " 20 - 32 mmol/L Final     Anion Gap   Date Value Ref Range Status   09/09/2021 9 3 - 14 mmol/L Final   05/27/2021 5 3 - 14 mmol/L Final     Glucose   Date Value Ref Range Status   09/09/2021 73 70 - 99 mg/dL Final   05/27/2021 110 (H) 70 - 99 mg/dL Final     Urea Nitrogen   Date Value Ref Range Status   09/09/2021 28 7 - 30 mg/dL Final   05/27/2021 18 7 - 30 mg/dL Final     Creatinine   Date Value Ref Range Status   09/09/2021 1.50 (H) 0.52 - 1.04 mg/dL Final   05/27/2021 1.27 (H) 0.52 - 1.04 mg/dL Final     GFR Estimate   Date Value Ref Range Status   09/09/2021 36 (L) >60 mL/min/1.73m2 Final     Comment:     As of July 11, 2021, eGFR is calculated by the CKD-EPI creatinine equation, without race adjustment. eGFR can be influenced by muscle mass, exercise, and diet. The reported eGFR is an estimation only and is only applicable if the renal function is stable.   05/27/2021 44 (L) >60 mL/min/[1.73_m2] Final     Comment:     Non  GFR Calc  Starting 12/18/2018, serum creatinine based estimated GFR (eGFR) will be   calculated using the Chronic Kidney Disease Epidemiology Collaboration   (CKD-EPI) equation.       Calcium   Date Value Ref Range Status   09/09/2021 10.0 8.5 - 10.1 mg/dL Final   05/27/2021 9.5 8.5 - 10.1 mg/dL Final      BP Readings from Last 3 Encounters:   03/08/21 110/74   11/05/20 130/70   10/19/20 135/80       Cholesterol   Date Value Ref Range Status   03/08/2021 113.0 0.0 - 200.0 Final   02/06/2020 111.0 0.0 - 200.0 Final     HDL Cholesterol   Date Value Ref Range Status   03/08/2021 35.0 (L) >50.0 Final   02/06/2020 47.0 (L) >50.0 Final     LDL Cholesterol Calculated   Date Value Ref Range Status   04/12/2019 97 <100 mg/dL Final     Comment:     Desirable:       <100 mg/dl   10/09/2013 106 0 - 129 mg/dL Final     Comment:     LDL Cholesterol is the primary guide to therapy: LDL-cholesterol goal in high   risk patients is <100 mg/dL and in very high risk patients is <70 mg/dL.     LDL  Cholesterol Direct   Date Value Ref Range Status   03/08/2021 46.0 0.0 - 129.0 Final   02/06/2020 29.0 0.0 - 129.0 Final     Triglycerides   Date Value Ref Range Status   03/08/2021 157.0 (H) 0.0 - 150.0 Final   02/06/2020 179.0 (H) 0.0 - 150.0 Final     Cholesterol/HDL Ratio   Date Value Ref Range Status   03/08/2021 3.2 0.0 - 5.0 Final   02/06/2020 2.4 0.0 - 5.0 Final        CrCl is around 39 mL/min calculated using Cockcroft-Gault and Actual body weight.        - - - - - - - - - - - - - - -  Options for treatment and/or follow-up care were reviewed with the patient. Karine was engaged and actively involved in the decision making process. Karine verbalized understanding of the options discussed and was satisfied with the final plan. Karine was given a copy of these recommendations and an up to date medication list in an after visit summary. This report was sent to Anay Martniez.      Gabrielle Blas, PharmD, BCACP  Medication Management (MTM) Pharmacist  [facility]      - - - - - - - - - - - - - - -  Flowsheet completed.  # of medical conditions reviewed: 2  # of medications reviewed: 5  # of DTP identified: 2  Time spent: 45 minutes  Level of service:3

## 2021-09-09 NOTE — PATIENT INSTRUCTIONS
Things discussed today:    - Your goal blood pressure is to be under 130/80.     - We may consider stopping glipizide depending on your A1c results. Don't make this change until you talk with myself or Dr. Martinez.           Follow up: 2-4 weeks to review CGM results         If you have any questions or concerns for me, please call me at [phone].    Gabrielle Blas, PharmD, BCACP  Medication Management (MTM) Pharmacist  [facility]

## 2021-09-09 NOTE — TELEPHONE ENCOUNTER
Amlodipine (Norvasc) 5mg    Last Office Visit: 8/17//21 (Virtual)  [facility] [place], [place] Appointments: None  Medication last refilled: 8/9/21 #30 with 0 refill(s)    Vital Signs 10/19/2020 11/5/2020 3/8/2021   Systolic 135 130 110   Diastolic 80 70 74     Required labs per protocol:     Ref Range & Units 3/8/21 5/27/21   Creatinine 0.52 - 1.04 mg/dL 1.4 High 1.27 High       Medication is being filled for 1 time refill only due to:  Patient needs labs CMP, Hgb A1C. Future labs ordered no. Patient needs to be seen because due for visit in October per Dr. Martinez on 8/17/21.    [employer] message sent to patient to call and schedule appointment.    Joi Taveras, RN, BSN

## 2021-09-15 NOTE — PROGRESS NOTES
"Nutrition Reassessment      Karine Moss is a 66 year old female who presents for follow up nutrition counseling for diabetes management. Karine will be on a liquid diet following a dental procedure later this week. She was provided a smoothie recipe to optimize calories while keeping carbohydrates to a manageable level. Karine, her daughter Leila and LEV spoke on the phone to ensure they understand the plan and to answer any questions.     Social: Lives alone now, in September will live with 2 grandsons in city who can help with meal/food prep  Physical activity: busy with projects and on the go  Medications: metformin, glipizide   Vitamins/Supplements: vitamin D and vitamin B    Recent weights:   Wt Readings from Last 6 Encounters:   08/17/21 67.1 kg (148 lb)   06/30/21 74.4 kg (164 lb)   03/08/21 74.4 kg (164 lb)   11/05/20 76.2 kg (168 lb 1.6 oz)   10/07/20 76.7 kg (169 lb)   09/04/20 76.5 kg (168 lb 12 oz)       Intervention(s):  1. Smoothie recipe provided on [employer] to provide approx 1250 kcal, 70 g protein and 70 g carbohydrate. It is approx 45 oz, and can be split into 4 servings per day.   2. Can also use Glucerna shake in the smoothie, or as alternative to smoothie  3. Monitor weight regularly - contact RD if trending down  4. Monitor BG regularly - working with Gabrielle Blas PharmD to adjust meds, obtain new BG monitor.    Monitoring/Evaluation:  Follow up:  Pt to schedule as needed    Patient referred by Anay Martinez MD   Total time spent with patient 20 minutes    Jessica Madera RD     The patient has been notified of following:     \"This telephone visit will be conducted via a call between you and your physician/provider. We have found that certain health care needs can be provided without the need for a physical exam.  This service lets us provide the care you need with a short phone conversation.  If a prescription is necessary we can send it directly to your pharmacy.  If lab " "work is needed we can place an order for that and you can then stop by our lab to have the test done at a later time.    Telephone visits are billed at different rates depending on your insurance coverage. During this emergency period, for some insurers they may be billed the same as an in-person visit.  Please reach out to your insurance provider with any questions.    If during the course of the call the physician/provider feels a telephone visit is not appropriate, you will not be charged for this service.\"    Patient has given verbal consent for Telephone visit?  Yes    What phone number would you like to be contacted at? [phone]    How would you like to obtain your AVS? Yuval    I spoke with: Karine Moss  The reason for the telephone visit was: nutrition counseling    Phone call contact time  Call started at: 3:10 pm  Call ended at: 3:32 pm          "

## 2021-10-06 DIAGNOSIS — E11.42 DIABETIC POLYNEUROPATHY ASSOCIATED WITH TYPE 2 DIABETES MELLITUS (H): ICD-10-CM

## 2021-10-06 DIAGNOSIS — I10 HYPERTENSION GOAL BP (BLOOD PRESSURE) < 140/90: ICD-10-CM

## 2021-10-06 RX ORDER — AMLODIPINE BESYLATE 5 MG/1
5 TABLET ORAL DAILY
Qty: 30 TABLET | Refills: 0 | Status: SHIPPED | OUTPATIENT
Start: 2021-10-06 | End: 2021-10-13

## 2021-10-06 NOTE — TELEPHONE ENCOUNTER
Duloxetine Last time prescribed: 4/9/21 , 180 tabs/caps x 1 refills  Amlodipine Last time prescribed: 9/9/21 , 30 tabs/caps x 0 refills  Last office visit: 8/17/21  Next appointment: No Future Appointment Scheduled     to contact patient to assist with scheduling. She is due back in October for appointment with Dr. Martinez for labs , diabetes, HTN, Lipid check.     Routing refill request to provider for review/approval because: Drug interaction warning DULOXETINE:TRAZODONE    Karen Sosa MS RN-BC  10/06/21  3:13 PM

## 2021-10-07 ENCOUNTER — TRANSFERRED RECORDS (OUTPATIENT)
Dept: HEALTH INFORMATION MANAGEMENT | Facility: CLINIC | Age: 66
End: 2021-10-07

## 2021-10-07 RX ORDER — DULOXETIN HYDROCHLORIDE 30 MG/1
30 CAPSULE, DELAYED RELEASE ORAL 2 TIMES DAILY
Qty: 60 CAPSULE | Refills: 0 | Status: SHIPPED | OUTPATIENT
Start: 2021-10-07 | End: 2021-10-13

## 2021-10-12 NOTE — PROGRESS NOTES
"Karine Moss is a 66 year old female who presents to the clinic today for a recheck of    DIABETES  Here for Type II diabetes.  Due for an eye exam.  Retinopathy: none    Weight:  down almost 20 pounds since summer  Diet: recent pulling of all lower teeth, preparing for dentures.   Met with Jessica Madera dietitian. Recommended protein shakes once daily  yogurt, tuna and egg salad sandwich, Ensure shakes twice daily  Wt Readings from Last 3 Encounters:   10/13/21 66 kg (145 lb 8 oz)   08/17/21 67.1 kg (148 lb)   06/30/21 74.4 kg (164 lb)     Candidiasis/skin issues: none  UTI/ yeast infections: none  Foot exam: due today    Frequency of FBS: multiple times daily, CGM, does not have compatible test strips  General range of FBS: 105-130s. Average have been 131 over the past week, 115 for past two weeks, 109 for past month.   Hypoglycemia: none    Lab Results   Component Value Date    A1C 5.8 09/09/2021    A1C 7.0 03/08/2021    A1C 6.2 09/04/2020     No results found for: MICROALBUMIN    DM Meds: metformin 2000 mg (four 500 mg tablets) with breakfast. No side effects.   Discontinued glipizide 2.5 mg at the recommendation of Gabrielle Blas PharmD  Compliance: good    Aspirin Use: 81 mg daily  Wondering if aspirin was the cause of her stomach ulcers, has not stopped aspirin in the past. No dysphagia.     Peripheral neuropathy  Karine has some numbness in toes and bottom of feet, but can sense if she is stepping on something. She was started on duloxetine 30 mg BID and reports symptoms have dampened by \"25% , enough to notice\". Some burning on the top of the feet that comes and goes. Plantar paresthesia is persistent. She recently wore a new pair of shoes and now has a painful blister on the posterior left heel.     HYPERLIPIDEMIA  Recent Labs   Lab Test 03/08/21  1615 02/06/20  1523   CHOL 113.0 111.0   HDL 35.0* 47.0*   LDL 46.0 29.0   TRIG 157.0* 179.0*   CHOLHDLRATIO 3.2 2.4     LDL is in target range. " "Currently taking atorvastatin 20 mg daily. Compliant with med(s). No reported myalgias or other side effects.       HYPERTENSION  BP Readings from Last 3 Encounters:   10/13/21 137/82   09/09/21 118/78   03/08/21 110/74     Karine is currently on amlodipine 5 mg daily and losartan 100 mg daily.  No current chest pain or palpitations.  No THAPA. She is compliant with meds, no reported side effects. Blood pressure readings have been in target range.  Usually takes a walk in the evenings, around 6 blocks.       Skin spot on face  Karine reports that the lesion on the right side of her face was examined by dermatology and not removed. However, it has grown since her last visit and she would like to see them again to have it removed.     Vaginal odor, dryness  Karine reports a vaginal odor that \"smells like tuna fish\" that has been there \"a long time\". She has not been treated for this in the past. She reports that her mother had a similar odor, and Karine is wondering if this could be related to menopause. No partner at this time, is sexually active but \"not often\" due to vaginal discomfort and dryness. She would like to try something to help with this. Some vaginal bleeding at times with intercourse.      HCM  Karine would like a referral for mammogram      EXAM  /82 (BP Location: Right arm, Patient Position: Sitting, Cuff Size: Adult Regular)   Pulse 98   Temp 97.5  F (36.4  C) (Skin)   Resp 15   Ht 1.626 m (5' 4\")   Wt 66 kg (145 lb 8 oz)   SpO2 98%   BMI 24.98 kg/m    Gen: Alert, NAD  Neck: no lymphadenopathy or thyromegaly,  Mouth:  Teeth absent on lower jaw.  Wears partial on upper but other teeth intact.   Cor: S1S2, no murmur  Lungs: CTA bilaterally  Ext: no edema, pulses palpable  Skin: 1 cm round pearly firm lesion on right side of face, central dimple   Feet: no ulcerations, no callouses. Sensation dampened over toes on right foot, greatly reduced on metatarsal pads of both feet. Sensation reduced " "to mid left calf, to 3/4 up right calf.    1 cm deroofed blister on left posterior heel    Assessment:  (E11.9) Type 2 diabetes mellitus without complication, without long-term current use of insulin (H)  (primary encounter diagnosis)  Comment: doing well with current medication. A1C is low, Glipizide has been stopped  Plan: Reduce metformin dose from 2000 to 1000 mg daily. Continue checking sugars.   Follow up in 3 months.    (I10) Hypertension goal BP (blood pressure) < 140/90  Comment: BP in target range  Plan: amLODIPine (NORVASC) 5 MG tablet        No change in BP meds    (E78.5) Hyperlipidemia LDL goal <100  Comment: LDL in target range  Plan: no change in medication, atorvastatin 20mg daily    (E11.42) Diabetic polyneuropathy associated with type 2 diabetes mellitus (H)  Comment: experiencing persistent burning on bottom of feet,  intermittent burning on dorsum of feet and shins. Symptoms improved by \"25% better\" with duloxetine 30 mg BID  Plan: DULoxetine (CYMBALTA) 30 MG capsule        Continue with current medication     (K25.4) Gastrointestinal hemorrhage associated with gastric ulcer  Comment: reports concerns that this could be related to aspirin use. Per review of GI endoscopy notes, they felt that prolonged iron supplement after gastric bypass surgery was the trigger.   Plan: I will send notice to Karine recommending continued use of chewable baby aspirin with food daily. This is in alignment with the new [place] Task Force recommendations.     (Z23) Influenza vaccine needed  Comment: Routine booster  Plan: INFLUENZA, QUAD, HIGH DOSE, PF, 65YR + (FLUZONE        HD)        Given today.    (N76.0,  B96.89) Bacterial vaginosis  Comment: experiencing persistent vaginal odor like \"tuna fish\"  Plan: metroNIDAZOLE (FLAGYL) 500 MG tablet        Take one tablet by mouth BID for one week. Karine will reach out to me via [employer] if her vaginal odor doesn't clear.     (N95.2) Atrophic vaginitis  Comment: experiencing " vaginal dryness, discomfort with intercourse, occasional bleeding.   Plan: conjugated estrogens (PREMARIN) 0.625 MG/GM         vaginal cream        Recommend starting with 1 gram of vaginal cream nightly for 2 weeks, then use 1 gram 2-3 times a week, may also use externally. If cost is an issue with prescription, then contact me for Rx to go to compounding pharmacy.     (Z12.31) Visit for screening mammogram  Comment: Routine screening due  Plan: Mammogram - routine screening        Ordered today.     RTC after 12/9/21 for a diabetes follow-up.     Anay Martinez MD  Internal Medicine/Pediatrics      I, Ofe Barajas, am serving as a scribe to document services personally performed by Dr. Anay Martinez, based on data collection and the provider's statements to me. Dr. Martinez has reviewed, edited, and approved the above note.

## 2021-10-13 ENCOUNTER — OFFICE VISIT (OUTPATIENT)
Dept: FAMILY MEDICINE | Facility: CLINIC | Age: 66
End: 2021-10-13
Payer: COMMERCIAL

## 2021-10-13 VITALS
HEART RATE: 98 BPM | TEMPERATURE: 97.5 F | HEIGHT: 64 IN | SYSTOLIC BLOOD PRESSURE: 137 MMHG | OXYGEN SATURATION: 98 % | RESPIRATION RATE: 15 BRPM | BODY MASS INDEX: 24.84 KG/M2 | WEIGHT: 145.5 LBS | DIASTOLIC BLOOD PRESSURE: 82 MMHG

## 2021-10-13 DIAGNOSIS — N95.2 ATROPHIC VAGINITIS: ICD-10-CM

## 2021-10-13 DIAGNOSIS — Z23 INFLUENZA VACCINE NEEDED: ICD-10-CM

## 2021-10-13 DIAGNOSIS — K25.4 GASTROINTESTINAL HEMORRHAGE ASSOCIATED WITH GASTRIC ULCER: ICD-10-CM

## 2021-10-13 DIAGNOSIS — B96.89 BACTERIAL VAGINOSIS: ICD-10-CM

## 2021-10-13 DIAGNOSIS — E78.5 HYPERLIPIDEMIA LDL GOAL <100: ICD-10-CM

## 2021-10-13 DIAGNOSIS — Z12.31 VISIT FOR SCREENING MAMMOGRAM: ICD-10-CM

## 2021-10-13 DIAGNOSIS — E11.9 TYPE 2 DIABETES MELLITUS WITHOUT COMPLICATION, WITHOUT LONG-TERM CURRENT USE OF INSULIN (H): Primary | ICD-10-CM

## 2021-10-13 DIAGNOSIS — I10 HYPERTENSION GOAL BP (BLOOD PRESSURE) < 140/90: ICD-10-CM

## 2021-10-13 DIAGNOSIS — N76.0 BACTERIAL VAGINOSIS: ICD-10-CM

## 2021-10-13 DIAGNOSIS — E11.42 DIABETIC POLYNEUROPATHY ASSOCIATED WITH TYPE 2 DIABETES MELLITUS (H): ICD-10-CM

## 2021-10-13 RX ORDER — DULOXETIN HYDROCHLORIDE 30 MG/1
30 CAPSULE, DELAYED RELEASE ORAL 2 TIMES DAILY
Qty: 180 CAPSULE | Refills: 3 | Status: SHIPPED | OUTPATIENT
Start: 2021-10-13 | End: 2022-03-29

## 2021-10-13 RX ORDER — METRONIDAZOLE 500 MG/1
500 TABLET ORAL 2 TIMES DAILY
Qty: 14 TABLET | Refills: 3 | Status: SHIPPED | OUTPATIENT
Start: 2021-10-13 | End: 2024-06-05

## 2021-10-13 RX ORDER — AMLODIPINE BESYLATE 5 MG/1
5 TABLET ORAL DAILY
Qty: 90 TABLET | Refills: 3 | Status: SHIPPED | OUTPATIENT
Start: 2021-10-13 | End: 2022-09-06

## 2021-10-13 ASSESSMENT — PATIENT HEALTH QUESTIONNAIRE - PHQ9: SUM OF ALL RESPONSES TO PHQ QUESTIONS 1-9: 2

## 2021-10-13 ASSESSMENT — MIFFLIN-ST. JEOR: SCORE: 1184.98

## 2021-10-13 NOTE — NURSING NOTE
"66 year old  Chief Complaint   Patient presents with     Diabetes     Hypertension       Blood pressure 137/82, pulse 98, temperature 97.5  F (36.4  C), temperature source Skin, resp. rate 15, height 1.626 m (5' 4\"), weight 66 kg (145 lb 8 oz), SpO2 98 %, not currently breastfeeding. Body mass index is 24.98 kg/m .  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)     Keratoconus     Hyperlipidemia LDL goal <100     Obstructive sleep apnea     Vitamin D insufficiency     Major depression in partial remission (H)     Plantar warts     Low back pain     DJD (degenerative joint disease) of knee     Pancreatitis     Panic     Chest pain     IBS (irritable bowel syndrome)     Heart murmur     Hypertension goal BP (blood pressure) < 140/90     Type 2 diabetes mellitus without complication, without long-term current use of insulin (H)     Osteopenia of hip     Acute conjunctivitis of left eye     PTSD (post-traumatic stress disorder)     Diabetic peripheral neuropathy (H)     Bilateral sciatica     Gastrointestinal hemorrhage associated with gastric ulcer     Chronic kidney disease, stage 3 (H)     Chronic diastolic congestive heart failure (H)       Wt Readings from Last 2 Encounters:   10/13/21 66 kg (145 lb 8 oz)   08/17/21 67.1 kg (148 lb)     BP Readings from Last 3 Encounters:   10/13/21 137/82   09/09/21 118/78   03/08/21 110/74         Current Outpatient Medications   Medication     amLODIPine (NORVASC) 5 MG tablet     amphetamine-dextroamphetamine (ADDERALL) 30 MG tablet     ASPIRIN LOW DOSE 81 MG EC tablet     atorvastatin (LIPITOR) 20 MG tablet     blood glucose (NO BRAND SPECIFIED) lancets standard     blood glucose (NO BRAND SPECIFIED) test strip     blood glucose calibration (NO BRAND SPECIFIED) solution     blood glucose monitoring (NO BRAND SPECIFIED) meter device kit     calcium carbonate (OS-KINJAL) 1500 " (600 Ca) MG tablet     cevimeline (EVOXAC) 30 MG capsule     Continuous Blood Gluc  (FREESTYLE JORDIN 14 DAY READER) EBEN     Continuous Blood Gluc Sensor (FREESTYLE JORDIN 14 DAY SENSOR) MISC     cyanocobalamin (VITAMIN B-12) 1000 MCG tablet     DULoxetine (CYMBALTA) 30 MG capsule     losartan (COZAAR) 100 MG tablet     metFORMIN (GLUCOPHAGE-XR) 500 MG 24 hr tablet     omeprazole (PRILOSEC) 40 MG DR capsule     tolterodine ER (DETROL LA) 4 MG 24 hr capsule     traZODone (DESYREL) 50 MG tablet     Vitamin D3 (CHOLECALCIFEROL) 25 mcg (1000 units) tablet     glipiZIDE (GLUCOTROL XL) 2.5 MG 24 hr tablet     No current facility-administered medications for this visit.       Social History     Tobacco Use     Smoking status: Never Smoker     Smokeless tobacco: Never Used   Substance Use Topics     Alcohol use: Yes     Comment: rare     Drug use: No       Health Maintenance Due   Topic Date Due     HF ACTION PLAN  Never done     ADVANCE CARE PLANNING  Never done     HEPATITIS C SCREENING  Never done     ZOSTER IMMUNIZATION (1 of 2) Never done     DIABETIC FOOT EXAM  10/31/2019     MEDICARE ANNUAL WELLNESS VISIT  04/12/2020     EYE EXAM  03/10/2021     DEXA  04/26/2021     MAMMO SCREENING  04/26/2021     INFLUENZA VACCINE (1) 09/01/2021     MICROALBUMIN  09/04/2021     FALL RISK ASSESSMENT  10/07/2021     COLORECTAL CANCER SCREENING  10/01/2021       Lab Results   Component Value Date    PAP NIL 04/12/2019 October 13, 2021 2:44 PM

## 2021-10-13 NOTE — PATIENT INSTRUCTIONS
Metformin   Try taking only 2 pills daily and monitor sugars  No Glipizide    REcheck in December for Diabetes 12/9 or later    Refilled Cymbalta (duloxetine) for burning feet    Fishy vaginal discharge  Metronidazole pill twice daily x 7 days , refills given    Vaginal dryness  Estrogen cream as directed  Contact me if cost is high, I can send to compound pharmacy through [employer] [facility]  
Near syncope

## 2021-10-13 NOTE — NURSING NOTE
"Injectable Influenza Immunization Documentation    1.  Has the patient received the information for the injectable influenza vaccine? YES     2. Is the patient 6 months of age or older? YES     3. Does the patient have any of the following contraindications?         Severe allergy to eggs? No     Severe allergic reaction to previous influenza vaccines? No   Severe allergy to latex? No       History of Guillain-Wendover syndrome? No     Currently have a temperature greater than 100.4F? No        4.  Severely egg allergic patients should have flu vaccine eligibility assessed by an MD, RN, or pharmacist, and those who received flu vaccine should be observed for 15 min by an MD, RN, Pharmacist, Medical Technician, or member of clinic staff.\": YES    5. Latex-allergic patients should be given latex-free influenza vaccine Yes. Please reference the Vaccine latex table to determine if your clinic s product is latex-containing.       Vaccination given by Arianna Mitchell CMA,[facility]  October 13, 2021 3:26 PM                "

## 2021-11-02 DIAGNOSIS — N95.2 ATROPHIC VAGINITIS: Primary | ICD-10-CM

## 2021-11-02 NOTE — TELEPHONE ENCOUNTER
Change to compounded estradiol vaginal cream per Dr Michelle Trinh message.  Davina Chawla RN  [facility]

## 2021-11-08 DIAGNOSIS — Z00.00 PREVENTATIVE HEALTH CARE: ICD-10-CM

## 2021-11-09 RX ORDER — LANOLIN ALCOHOL/MO/W.PET/CERES
1000 CREAM (GRAM) TOPICAL DAILY
Qty: 90 TABLET | Refills: 0 | Status: SHIPPED | OUTPATIENT
Start: 2021-11-09 | End: 2021-12-13

## 2021-11-09 NOTE — TELEPHONE ENCOUNTER
Medication requested: cyanocobalamin (VITAMIN B-12) 1000 MCG tablet  Last office visit: 10/13/21  [facility] appointments: 12/13/21  Medication last refilled: 2/12/21 #90 + 2 refills  Last qualifying labs: n/a    Prescription approved per Refill Protocol.  Karen Sosa MS RN-BC  11/09/21  12:00 PM

## 2021-11-15 ENCOUNTER — MYC MEDICAL ADVICE (OUTPATIENT)
Dept: FAMILY MEDICINE | Facility: CLINIC | Age: 66
End: 2021-11-15
Payer: COMMERCIAL

## 2021-11-15 LAB
ABO/RH(D): NORMAL
ALBUMIN SERPL-MCNC: 3.7 G/DL (ref 3.4–5)
ALP SERPL-CCNC: 97 U/L (ref 40–150)
ALT SERPL W P-5'-P-CCNC: 22 U/L (ref 0–50)
ANION GAP SERPL CALCULATED.3IONS-SCNC: 8 MMOL/L (ref 3–14)
ANTIBODY SCREEN: NEGATIVE
AST SERPL W P-5'-P-CCNC: 16 U/L (ref 0–45)
BASOPHILS # BLD AUTO: 0.1 10E3/UL (ref 0–0.2)
BASOPHILS NFR BLD AUTO: 0 %
BILIRUB SERPL-MCNC: 0.7 MG/DL (ref 0.2–1.3)
BUN SERPL-MCNC: 53 MG/DL (ref 7–30)
CALCIUM SERPL-MCNC: 9.7 MG/DL (ref 8.5–10.1)
CHLORIDE BLD-SCNC: 105 MMOL/L (ref 94–109)
CO2 SERPL-SCNC: 25 MMOL/L (ref 20–32)
CREAT SERPL-MCNC: 1.24 MG/DL (ref 0.52–1.04)
EOSINOPHIL # BLD AUTO: 0.2 10E3/UL (ref 0–0.7)
EOSINOPHIL NFR BLD AUTO: 1 %
ERYTHROCYTE [DISTWIDTH] IN BLOOD BY AUTOMATED COUNT: 13.6 % (ref 10–15)
GFR SERPL CREATININE-BSD FRML MDRD: 45 ML/MIN/1.73M2
GLUCOSE BLD-MCNC: 141 MG/DL (ref 70–99)
HCT VFR BLD AUTO: 33.3 % (ref 35–47)
HGB BLD-MCNC: 10.3 G/DL (ref 11.7–15.7)
IMM GRANULOCYTES # BLD: 0.1 10E3/UL
IMM GRANULOCYTES NFR BLD: 1 %
INR PPP: 1.1 (ref 0.85–1.15)
LYMPHOCYTES # BLD AUTO: 3.9 10E3/UL (ref 0.8–5.3)
LYMPHOCYTES NFR BLD AUTO: 28 %
MCH RBC QN AUTO: 27 PG (ref 26.5–33)
MCHC RBC AUTO-ENTMCNC: 30.9 G/DL (ref 31.5–36.5)
MCV RBC AUTO: 87 FL (ref 78–100)
MONOCYTES # BLD AUTO: 1 10E3/UL (ref 0–1.3)
MONOCYTES NFR BLD AUTO: 7 %
NEUTROPHILS # BLD AUTO: 8.9 10E3/UL (ref 1.6–8.3)
NEUTROPHILS NFR BLD AUTO: 63 %
NRBC # BLD AUTO: 0 10E3/UL
NRBC BLD AUTO-RTO: 0 /100
PLATELET # BLD AUTO: 242 10E3/UL (ref 150–450)
POTASSIUM BLD-SCNC: 5 MMOL/L (ref 3.4–5.3)
PROT SERPL-MCNC: 8.6 G/DL (ref 6.8–8.8)
RBC # BLD AUTO: 3.81 10E6/UL (ref 3.8–5.2)
SODIUM SERPL-SCNC: 138 MMOL/L (ref 133–144)
SPECIMEN EXPIRATION DATE: NORMAL
WBC # BLD AUTO: 14.1 10E3/UL (ref 4–11)

## 2021-11-15 PROCEDURE — 82040 ASSAY OF SERUM ALBUMIN: CPT | Performed by: EMERGENCY MEDICINE

## 2021-11-15 PROCEDURE — 99285 EMERGENCY DEPT VISIT HI MDM: CPT | Mod: 25

## 2021-11-15 PROCEDURE — 85025 COMPLETE CBC W/AUTO DIFF WBC: CPT | Performed by: EMERGENCY MEDICINE

## 2021-11-15 PROCEDURE — 85610 PROTHROMBIN TIME: CPT | Performed by: EMERGENCY MEDICINE

## 2021-11-15 PROCEDURE — 80053 COMPREHEN METABOLIC PANEL: CPT | Performed by: EMERGENCY MEDICINE

## 2021-11-15 PROCEDURE — 36415 COLL VENOUS BLD VENIPUNCTURE: CPT | Performed by: EMERGENCY MEDICINE

## 2021-11-15 PROCEDURE — C9803 HOPD COVID-19 SPEC COLLECT: HCPCS

## 2021-11-15 PROCEDURE — 258N000003 HC RX IP 258 OP 636: Performed by: EMERGENCY MEDICINE

## 2021-11-15 PROCEDURE — 96361 HYDRATE IV INFUSION ADD-ON: CPT

## 2021-11-15 PROCEDURE — 86900 BLOOD TYPING SEROLOGIC ABO: CPT | Performed by: EMERGENCY MEDICINE

## 2021-11-15 RX ADMIN — SODIUM CHLORIDE 500 ML: 9 INJECTION, SOLUTION INTRAVENOUS at 17:44

## 2021-11-15 ASSESSMENT — MIFFLIN-ST. JEOR: SCORE: 1132.82

## 2021-11-15 NOTE — TELEPHONE ENCOUNTER
Called patient, she reports dark and black stools, constant abdominal pain, weight loss, dizziness. Advised pt to go to ED now. She will arrange for a ride to [facility] ED.   Karen Sosa MS RN-BC  11/15/21  4:14 PM

## 2021-11-15 NOTE — ED TRIAGE NOTES
"Pt reports had teeth extracted and is eating pureed foods. Pt had gastric bypass 5 years ago. Pt reports it felt like she had a \"blockage in throat and was drinking water and trying to throw up repeatedly to clear blockage. Pt states has burning in stomach and cold things make the burning in stomach go away for a bit. Pt reports black stools. Pt reports loss of 20 pounds in 3-4 weeks time because she can't eat. Pt report high level of stress at home.  "

## 2021-11-16 ENCOUNTER — ANESTHESIA (OUTPATIENT)
Dept: GASTROENTEROLOGY | Facility: CLINIC | Age: 66
DRG: 378 | End: 2021-11-16
Payer: COMMERCIAL

## 2021-11-16 ENCOUNTER — ANESTHESIA EVENT (OUTPATIENT)
Dept: GASTROENTEROLOGY | Facility: CLINIC | Age: 66
DRG: 378 | End: 2021-11-16
Payer: COMMERCIAL

## 2021-11-16 ENCOUNTER — TRANSFERRED RECORDS (OUTPATIENT)
Dept: HEALTH INFORMATION MANAGEMENT | Facility: CLINIC | Age: 66
End: 2021-11-16

## 2021-11-16 ENCOUNTER — HOSPITAL ENCOUNTER (INPATIENT)
Facility: CLINIC | Age: 66
LOS: 2 days | Discharge: HOME | DRG: 378 | End: 2021-11-18
Attending: EMERGENCY MEDICINE | Admitting: INTERNAL MEDICINE
Payer: COMMERCIAL

## 2021-11-16 DIAGNOSIS — D50.0 NORMOCYTIC ANEMIA DUE TO BLOOD LOSS: ICD-10-CM

## 2021-11-16 DIAGNOSIS — K25.4 GASTROINTESTINAL HEMORRHAGE ASSOCIATED WITH GASTRIC ULCER: ICD-10-CM

## 2021-11-16 DIAGNOSIS — E83.42 HYPOMAGNESEMIA: Primary | ICD-10-CM

## 2021-11-16 DIAGNOSIS — E87.20 LACTIC ACIDOSIS: ICD-10-CM

## 2021-11-16 DIAGNOSIS — K92.2 UPPER GI BLEED: ICD-10-CM

## 2021-11-16 LAB
GLUCOSE BLDC GLUCOMTR-MCNC: 79 MG/DL (ref 70–99)
GLUCOSE BLDC GLUCOMTR-MCNC: 91 MG/DL (ref 70–99)
GLUCOSE BLDC GLUCOMTR-MCNC: 96 MG/DL (ref 70–99)
HEMOCCULT STL QL: POSITIVE
HGB BLD-MCNC: 8.8 G/DL (ref 11.7–15.7)
HGB BLD-MCNC: 8.8 G/DL (ref 11.7–15.7)
HGB BLD-MCNC: 9.3 G/DL (ref 11.7–15.7)
LACTATE SERPL-SCNC: 2.2 MMOL/L (ref 0.7–2)
MAGNESIUM SERPL-MCNC: 1.4 MG/DL (ref 1.6–2.3)
POTASSIUM BLD-SCNC: 5.1 MMOL/L (ref 3.4–5.3)
SARS-COV-2 RNA RESP QL NAA+PROBE: NEGATIVE
UPPER GI ENDOSCOPY: NORMAL

## 2021-11-16 PROCEDURE — 258N000003 HC RX IP 258 OP 636: Performed by: EMERGENCY MEDICINE

## 2021-11-16 PROCEDURE — 96366 THER/PROPH/DIAG IV INF ADDON: CPT

## 2021-11-16 PROCEDURE — 250N000011 HC RX IP 250 OP 636: Performed by: NURSE ANESTHETIST, CERTIFIED REGISTERED

## 2021-11-16 PROCEDURE — 250N000009 HC RX 250: Performed by: NURSE ANESTHETIST, CERTIFIED REGISTERED

## 2021-11-16 PROCEDURE — 258N000003 HC RX IP 258 OP 636: Performed by: INTERNAL MEDICINE

## 2021-11-16 PROCEDURE — 99222 1ST HOSP IP/OBS MODERATE 55: CPT | Mod: AI | Performed by: INTERNAL MEDICINE

## 2021-11-16 PROCEDURE — 96375 TX/PRO/DX INJ NEW DRUG ADDON: CPT

## 2021-11-16 PROCEDURE — C9113 INJ PANTOPRAZOLE SODIUM, VIA: HCPCS | Performed by: EMERGENCY MEDICINE

## 2021-11-16 PROCEDURE — 43235 EGD DIAGNOSTIC BRUSH WASH: CPT | Performed by: INTERNAL MEDICINE

## 2021-11-16 PROCEDURE — 258N000003 HC RX IP 258 OP 636: Performed by: NURSE ANESTHETIST, CERTIFIED REGISTERED

## 2021-11-16 PROCEDURE — 87635 SARS-COV-2 COVID-19 AMP PRB: CPT | Performed by: EMERGENCY MEDICINE

## 2021-11-16 PROCEDURE — 36415 COLL VENOUS BLD VENIPUNCTURE: CPT | Performed by: EMERGENCY MEDICINE

## 2021-11-16 PROCEDURE — 370N000017 HC ANESTHESIA TECHNICAL FEE, PER MIN: Performed by: INTERNAL MEDICINE

## 2021-11-16 PROCEDURE — 85018 HEMOGLOBIN: CPT | Performed by: INTERNAL MEDICINE

## 2021-11-16 PROCEDURE — 36415 COLL VENOUS BLD VENIPUNCTURE: CPT | Performed by: INTERNAL MEDICINE

## 2021-11-16 PROCEDURE — C9113 INJ PANTOPRAZOLE SODIUM, VIA: HCPCS | Performed by: INTERNAL MEDICINE

## 2021-11-16 PROCEDURE — 0DJ08ZZ INSPECTION OF UPPER INTESTINAL TRACT, VIA NATURAL OR ARTIFICIAL OPENING ENDOSCOPIC: ICD-10-PCS | Performed by: INTERNAL MEDICINE

## 2021-11-16 PROCEDURE — 83605 ASSAY OF LACTIC ACID: CPT | Performed by: EMERGENCY MEDICINE

## 2021-11-16 PROCEDURE — 83735 ASSAY OF MAGNESIUM: CPT | Performed by: INTERNAL MEDICINE

## 2021-11-16 PROCEDURE — 82272 OCCULT BLD FECES 1-3 TESTS: CPT | Performed by: EMERGENCY MEDICINE

## 2021-11-16 PROCEDURE — 99233 SBSQ HOSP IP/OBS HIGH 50: CPT | Performed by: INTERNAL MEDICINE

## 2021-11-16 PROCEDURE — 84132 ASSAY OF SERUM POTASSIUM: CPT | Performed by: INTERNAL MEDICINE

## 2021-11-16 PROCEDURE — 250N000011 HC RX IP 250 OP 636: Performed by: INTERNAL MEDICINE

## 2021-11-16 PROCEDURE — 999N000010 HC STATISTIC ANES STAT CODE-CRNA PER MINUTE: Performed by: INTERNAL MEDICINE

## 2021-11-16 PROCEDURE — 96365 THER/PROPH/DIAG IV INF INIT: CPT

## 2021-11-16 PROCEDURE — 120N000001 HC R&B MED SURG/OB

## 2021-11-16 PROCEDURE — 250N000011 HC RX IP 250 OP 636: Performed by: EMERGENCY MEDICINE

## 2021-11-16 PROCEDURE — 96361 HYDRATE IV INFUSION ADD-ON: CPT

## 2021-11-16 RX ORDER — PROCHLORPERAZINE 25 MG
12.5 SUPPOSITORY, RECTAL RECTAL EVERY 12 HOURS PRN
Status: DISCONTINUED | OUTPATIENT
Start: 2021-11-16 | End: 2021-11-18 | Stop reason: HOSPADM

## 2021-11-16 RX ORDER — NALOXONE HYDROCHLORIDE 0.4 MG/ML
0.4 INJECTION, SOLUTION INTRAMUSCULAR; INTRAVENOUS; SUBCUTANEOUS
Status: CANCELLED | OUTPATIENT
Start: 2021-11-16

## 2021-11-16 RX ORDER — NICOTINE POLACRILEX 4 MG
15-30 LOZENGE BUCCAL
Status: DISCONTINUED | OUTPATIENT
Start: 2021-11-16 | End: 2021-11-18 | Stop reason: HOSPADM

## 2021-11-16 RX ORDER — DEXTROSE MONOHYDRATE 25 G/50ML
25-50 INJECTION, SOLUTION INTRAVENOUS
Status: DISCONTINUED | OUTPATIENT
Start: 2021-11-16 | End: 2021-11-18 | Stop reason: HOSPADM

## 2021-11-16 RX ORDER — DEXTROAMPHETAMINE SACCHARATE, AMPHETAMINE ASPARTATE, DEXTROAMPHETAMINE SULFATE AND AMPHETAMINE SULFATE 7.5; 7.5; 7.5; 7.5 MG/1; MG/1; MG/1; MG/1
30 TABLET ORAL 3 TIMES DAILY PRN
COMMUNITY
End: 2022-02-04

## 2021-11-16 RX ORDER — ONDANSETRON 2 MG/ML
INJECTION INTRAMUSCULAR; INTRAVENOUS PRN
Status: DISCONTINUED | OUTPATIENT
Start: 2021-11-16 | End: 2021-11-16

## 2021-11-16 RX ORDER — PROPOFOL 10 MG/ML
INJECTION, EMULSION INTRAVENOUS CONTINUOUS PRN
Status: DISCONTINUED | OUTPATIENT
Start: 2021-11-16 | End: 2021-11-16

## 2021-11-16 RX ORDER — LIDOCAINE 40 MG/G
CREAM TOPICAL
Status: CANCELLED | OUTPATIENT
Start: 2021-11-16

## 2021-11-16 RX ORDER — FLUMAZENIL 0.1 MG/ML
0.2 INJECTION, SOLUTION INTRAVENOUS
Status: CANCELLED | OUTPATIENT
Start: 2021-11-16 | End: 2021-11-16

## 2021-11-16 RX ORDER — DEXTROAMPHETAMINE SACCHARATE, AMPHETAMINE ASPARTATE, DEXTROAMPHETAMINE SULFATE AND AMPHETAMINE SULFATE 2.5; 2.5; 2.5; 2.5 MG/1; MG/1; MG/1; MG/1
30 TABLET ORAL 2 TIMES DAILY
Status: DISCONTINUED | OUTPATIENT
Start: 2021-11-16 | End: 2021-11-18 | Stop reason: HOSPADM

## 2021-11-16 RX ORDER — MAGNESIUM SULFATE HEPTAHYDRATE 40 MG/ML
2 INJECTION, SOLUTION INTRAVENOUS ONCE
Status: COMPLETED | OUTPATIENT
Start: 2021-11-16 | End: 2021-11-16

## 2021-11-16 RX ORDER — LIDOCAINE HYDROCHLORIDE 20 MG/ML
INJECTION, SOLUTION INFILTRATION; PERINEURAL PRN
Status: DISCONTINUED | OUTPATIENT
Start: 2021-11-16 | End: 2021-11-16

## 2021-11-16 RX ORDER — ONDANSETRON 2 MG/ML
4 INJECTION INTRAMUSCULAR; INTRAVENOUS EVERY 6 HOURS PRN
Status: DISCONTINUED | OUTPATIENT
Start: 2021-11-16 | End: 2021-11-18 | Stop reason: HOSPADM

## 2021-11-16 RX ORDER — ACETAMINOPHEN 325 MG/1
650 TABLET ORAL EVERY 6 HOURS PRN
Status: DISCONTINUED | OUTPATIENT
Start: 2021-11-16 | End: 2021-11-18 | Stop reason: HOSPADM

## 2021-11-16 RX ORDER — EPHEDRINE SULFATE 50 MG/ML
INJECTION, SOLUTION INTRAMUSCULAR; INTRAVENOUS; SUBCUTANEOUS PRN
Status: DISCONTINUED | OUTPATIENT
Start: 2021-11-16 | End: 2021-11-16

## 2021-11-16 RX ORDER — NALOXONE HYDROCHLORIDE 0.4 MG/ML
0.2 INJECTION, SOLUTION INTRAMUSCULAR; INTRAVENOUS; SUBCUTANEOUS
Status: CANCELLED | OUTPATIENT
Start: 2021-11-16

## 2021-11-16 RX ORDER — ACETAMINOPHEN 650 MG/1
650 SUPPOSITORY RECTAL EVERY 6 HOURS PRN
Status: DISCONTINUED | OUTPATIENT
Start: 2021-11-16 | End: 2021-11-18 | Stop reason: HOSPADM

## 2021-11-16 RX ORDER — PROCHLORPERAZINE MALEATE 5 MG
5 TABLET ORAL EVERY 6 HOURS PRN
Status: DISCONTINUED | OUTPATIENT
Start: 2021-11-16 | End: 2021-11-18 | Stop reason: HOSPADM

## 2021-11-16 RX ORDER — SODIUM CHLORIDE, SODIUM LACTATE, POTASSIUM CHLORIDE, CALCIUM CHLORIDE 600; 310; 30; 20 MG/100ML; MG/100ML; MG/100ML; MG/100ML
INJECTION, SOLUTION INTRAVENOUS CONTINUOUS PRN
Status: DISCONTINUED | OUTPATIENT
Start: 2021-11-16 | End: 2021-11-16

## 2021-11-16 RX ORDER — LIDOCAINE 40 MG/G
CREAM TOPICAL
Status: DISCONTINUED | OUTPATIENT
Start: 2021-11-16 | End: 2021-11-18 | Stop reason: HOSPADM

## 2021-11-16 RX ORDER — ONDANSETRON 4 MG/1
4 TABLET, ORALLY DISINTEGRATING ORAL EVERY 6 HOURS PRN
Status: DISCONTINUED | OUTPATIENT
Start: 2021-11-16 | End: 2021-11-18 | Stop reason: HOSPADM

## 2021-11-16 RX ORDER — ASPIRIN 81 MG/1
81 TABLET ORAL DAILY
Status: ON HOLD | COMMUNITY
End: 2021-11-18

## 2021-11-16 RX ADMIN — PROPOFOL 10 MG: 10 INJECTION, EMULSION INTRAVENOUS at 10:24

## 2021-11-16 RX ADMIN — SODIUM CHLORIDE 1000 ML: 9 INJECTION, SOLUTION INTRAVENOUS at 03:35

## 2021-11-16 RX ADMIN — Medication 8 MCG: at 10:23

## 2021-11-16 RX ADMIN — DEXTROSE AND SODIUM CHLORIDE: 5; 900 INJECTION, SOLUTION INTRAVENOUS at 14:55

## 2021-11-16 RX ADMIN — Medication 5 MG: at 10:42

## 2021-11-16 RX ADMIN — MAGNESIUM SULFATE HEPTAHYDRATE 2 G: 40 INJECTION, SOLUTION INTRAVENOUS at 15:23

## 2021-11-16 RX ADMIN — PANTOPRAZOLE SODIUM 40 MG: 40 INJECTION, POWDER, FOR SOLUTION INTRAVENOUS at 14:54

## 2021-11-16 RX ADMIN — SODIUM CHLORIDE, POTASSIUM CHLORIDE, SODIUM LACTATE AND CALCIUM CHLORIDE: 600; 310; 30; 20 INJECTION, SOLUTION INTRAVENOUS at 10:20

## 2021-11-16 RX ADMIN — ONDANSETRON 4 MG: 2 INJECTION INTRAMUSCULAR; INTRAVENOUS at 10:20

## 2021-11-16 RX ADMIN — DEXTROSE AND SODIUM CHLORIDE: 5; 900 INJECTION, SOLUTION INTRAVENOUS at 08:31

## 2021-11-16 RX ADMIN — PANTOPRAZOLE SODIUM 40 MG: 40 INJECTION, POWDER, FOR SOLUTION INTRAVENOUS at 19:41

## 2021-11-16 RX ADMIN — LIDOCAINE HYDROCHLORIDE 80 MG: 20 INJECTION, SOLUTION INFILTRATION; PERINEURAL at 10:20

## 2021-11-16 RX ADMIN — PHENYLEPHRINE HYDROCHLORIDE 100 MCG: 10 INJECTION INTRAVENOUS at 10:39

## 2021-11-16 RX ADMIN — PROPOFOL 10 MG: 10 INJECTION, EMULSION INTRAVENOUS at 10:21

## 2021-11-16 RX ADMIN — PROPOFOL 150 MCG/KG/MIN: 10 INJECTION, EMULSION INTRAVENOUS at 10:20

## 2021-11-16 RX ADMIN — PANTOPRAZOLE SODIUM 40 MG: 40 INJECTION, POWDER, FOR SOLUTION INTRAVENOUS at 00:42

## 2021-11-16 ASSESSMENT — ACTIVITIES OF DAILY LIVING (ADL)
ADLS_ACUITY_SCORE: 7
ADLS_ACUITY_SCORE: 13
ADLS_ACUITY_SCORE: 7
ADLS_ACUITY_SCORE: 11
ADLS_ACUITY_SCORE: 7
ADLS_ACUITY_SCORE: 7
ADLS_ACUITY_SCORE: 13
ADLS_ACUITY_SCORE: 7
ADLS_ACUITY_SCORE: 11
ADLS_ACUITY_SCORE: 7
ADLS_ACUITY_SCORE: 11
ADLS_ACUITY_SCORE: 11
ADLS_ACUITY_SCORE: 13
ADLS_ACUITY_SCORE: 13
ADLS_ACUITY_SCORE: 7
ADLS_ACUITY_SCORE: 11
ADLS_ACUITY_SCORE: 11
ADLS_ACUITY_SCORE: 13
ADLS_ACUITY_SCORE: 13
ADLS_ACUITY_SCORE: 7
ADLS_ACUITY_SCORE: 13
ADLS_ACUITY_SCORE: 11

## 2021-11-16 ASSESSMENT — ENCOUNTER SYMPTOMS
VOMITING: 0
DIARRHEA: 0
CONSTIPATION: 0
APPETITE CHANGE: 1
LIGHT-HEADEDNESS: 1
ABDOMINAL PAIN: 1
SHORTNESS OF BREATH: 0
UNEXPECTED WEIGHT CHANGE: 1
NAUSEA: 0

## 2021-11-16 NOTE — PROGRESS NOTES
[facility]    Medicine Progress Note - Hospitalist Service        Date of Admission:  11/16/2021 12:03 AM    Assessment & Plan:   Karine Moss is a 66 year old female admitted on 11/16/2021. She presents to the emergency department with her daughter after recommended to seek evaluation by her primary care team in the setting of approximately 1 month of melenic stool with 3 or so bowel movements per day, 20 pound weight loss over the past few months.  History of cratered gastric ulcers as well as gastric bypass surgery     Upper GI bleed  Gastrojejunal anastomosis ulcer  Acute blood loss anemia due to #1 above  -Presented with melenic stools and 20 pound weight loss over the past few months.  -Hemoglobin at presentation was 10.3 with recent hemoglobin of around 12  -Hemoglobin dropped to 8.8  -[facility] consulted, she is s/p EGD which revealed 2 ulcers at the gastrojejunal anastomosis.  Ulcers were 12 mm and 12 mm with a clot on a single ulcer.  No active bleeding or bleeding.  Unable to dislodge clot.  -Continue n.p.o.  -Protonix 40 mg IV twice a day  -Discontinue aspirin and NSAID use indefinitely  -Serial hemoglobin q. 6 hours, monitor for any clinical evidence of bleeding.  If rebleed then will need interventional radiology.  Please see GI notes for details.  -Repeat endoscopy in 2 months to check healing.     Type 2 diabetes  -Most recent hemoglobin A1c 5.8 9/9/2021.  Has been off of insulin, now also off of glipizide.    -Medium dose sliding scale insulin available as needed  -Hypoglycemia protocol  -Prior to admission Metformin on hold    Nonsevere protein calorie malnutrition: Patient with approximately 20 pound weight loss over the past 2 months.  Might be multifactorial.  Patient with pain with oral intake in the setting of suspected upper GI ulcer reducing oral intake, though also with recent dental extraction (September) still prior to denture fitting  -Continue follow-up with dentistry  "for dentures  -Continue to follow weight closely.       Hyperlipidemia:  -Continue prior to admission atorvastatin 20 mg daily      Hypertension:  -Holding prior to admission amlodipine     Major depression history:   -Continue prior to admission Cymbalta     Narcolepsy:  -Continue prior to admission Adderall 30 mg twice daily      Diet: NPO per Anesthesia Guidelines for Procedure/Surgery Except for: Meds     DVT Prophylaxis: Pneumatic Compression Devices   Fitzpatrick Catheter: Not present  Code Status: Full Code     Disposition Plan    Expected discharge: 1-2 days pending clinical course  Entered: Errol Phillips MD 11/16/2021, 12:30 PM        The patient's care was discussed with the Bedside Nurse and Patient.    Errol Phillips MD  Hospitalist Service  [facility]  Text Page 7AM-6PM  Securely message with the Vocera Web Console (learn more here)  Text page via [employer] Paging/Directory    ______________________________________________________________________    Interval History   Hemoglobin dropped to 8.8.  Underwent EGD today which showed 2 large anastomotic ulcers.  No ongoing evidence of GI bleed.  Endorses mild epigastric pain.    Data reviewed today: I reviewed all medications, new labs and imaging results over the last 24 hours. I personally reviewed no images or EKG's today.    Physical Exam   Vital signs:  Temp: 98.8  F (37.1  C) Temp src: Oral BP: 102/61 Pulse: 66   Resp: 12 SpO2: 91 % O2 Device: None (Room air)   Height: 162.6 cm (5' 4\") Weight: 60.8 kg (134 lb)  Estimated body mass index is 23 kg/m  as calculated from the following:    Height as of this encounter: 1.626 m (5' 4\").    Weight as of this encounter: 60.8 kg (134 lb).      Wt Readings from Last 2 Encounters:   11/15/21 60.8 kg (134 lb)   10/13/21 66 kg (145 lb 8 oz)       Gen: AAOX3, NAD, comfortable  HEENT: Supple neck, moist oral mucosa, no pallor  Resp: CTA B/L, normal WOB, no crackles, no wheezes  CVS: RRR, no " murmur  Abd/GI: Soft, non-tender. BS- normoactive.  No G/R/R  Skin: Warm, dry no rashes  MSK: No joint deformities, no pedal edema  Neuro- CN- intact. No focal deficits.        Data   Recent Labs   Lab 11/16/21  0625 11/15/21  1737   WBC  --  14.1*   HGB 8.8* 10.3*   MCV  --  87   PLT  --  242   INR  --  1.10   NA  --  138   POTASSIUM  --  5.0   CHLORIDE  --  105   CO2  --  25   BUN  --  53*   CR  --  1.24*   ANIONGAP  --  8   KINJAL  --  9.7   GLC  --  141*   ALBUMIN  --  3.7   PROTTOTAL  --  8.6   BILITOTAL  --  0.7   ALKPHOS  --  97   ALT  --  22   AST  --  16       No results found for this or any previous visit (from the past 24 hour(s)).  Medications     dextrose 5% and 0.9% NaCl 100 mL/hr at 11/16/21 0831       amphetamine-dextroamphetamine  30 mg Oral BID     pantoprazole (PROTONIX) IV  40 mg Intravenous BID     sodium chloride (PF)  3 mL Intracatheter Q8H

## 2021-11-16 NOTE — CONSULTS
Consult Date: 11/16/2021    CHIEF COMPLAINT:  Dizziness, lightheadedness, abdominal pain and dark stools.    HISTORY OF PRESENT ILLNESS:  The patient is a 66-year-old female with a history of ulcers at the anastomosis of her gastric bypass.  She also has a history of stage III chronic kidney disease, chronic congestive diastolic heart failure, type 2 diabetes, and hypertension, who presents with 2 months of burning in her stomach and dark stools.    The patient reports the pain is in the epigastric area as a burning pain, made worse with food, improved somewhat with cold liquids.  She has not had any vomiting, but has reported that she has had some dark stools.  She had some fairly severe constipation and she reports now the stools are dark.  She has also had a weight loss, which she thinks might be related to her stomach.  It has been 10 pounds over the past 2 months.  Of note, she also had her teeth removed and is being fitted for dentures, which may have also contributed to her weight loss.    The patient has a history of ulcers, diagnosed last year and it was an anastomotic ulcer.  She then had a repeat endoscopy in 01/2021 and the anastomotic ulcer had completely healed.  She has been maintained on a proton pump inhibitor and reports she takes it every day.  She denies recent use of aspirin or nonsteroidal anti-inflammatory agents.  She has not had any significant change to her diet.    REVIEW OF SYSTEMS:    CONSTITUTIONAL:  She has had weight loss, but no fever or chills.  CARDIAC:  She denies any chest pain or palpitations.  PULMONARY:  No shortness of breath.  NEUROLOGIC:  She has had dizziness, but no focal deficits.  GASTROINTESTINAL:  She has had a burning abdominal pain and blackish firm stools.  All other systems were negative.    ALLERGIES:  SHE HAS ALLERGIES TO PRAVASTATIN, CODEINE, AND NSAIDS.    MEDICATIONS:  Include amlodipine, amphetamine, dextroamphetamine, aspirin 81 mg, atorvastatin, calcium  carbonate, cevimeline, conjugated estrogens vitamin B12, duloxetine, glipizide, losartan, metformin, omeprazole, tolterodine ER, trazodone, and vitamin D3.    PAST MEDICAL HISTORY:  Significant for arthritis, chest pain, depression, chronic kidney disease, vitamin D deficiency, hypertension, constipation, narcolepsy, obstructive sleep apnea, restless legs, hyperlipidemia, heart murmur, type 2 diabetes mellitus without complication, PTSD, bilateral sciatica, gastric ulcer at the gastric bypass anastomosis, chronic kidney disease stage III and chronic diastolic congestive heart failure.    PAST SURGICAL HISTORY:  Includes a colonoscopy 10/2020 which was normal, ear surgery, laparoscopic gastric bypass 5 years ago, hemorrhoidectomy, laparoscopic cholecystectomy, LASIK bilaterally, and uvulopharyngoplasty.    FAMILY HISTORY:  Significant for diabetes, colorectal cancer, obesity, lung cancer and hypertension.    SOCIAL HISTORY:  The patient lives with her significant other and 2 grandchildren.  Denies tobacco.  Denies alcohol.  Denies frequent use of nonsteroidal anti-inflammatory agents.    PHYSICAL EXAMINATION:    GENERAL:  She is a well-nourished woman, alert and oriented, in no apparent distress.  VITAL SIGNS:  Blood pressure is 101/57, pulse 67, temperature is 98.8.  HEENT:  Atraumatic, normocephalic head.  Sclerae are nonicteric.  CARDIOVASCULAR:  S1, S2 without a murmur.  LUNGS:  Clear to auscultation.  ABDOMEN:  Soft, nondistended.  No hepatosplenomegaly.  She did have tenderness in the mid epigastric area without radiation.  EXTREMITIES:  Right and left lower extremity without edema.  NEUROLOGIC:  She is alert and oriented, grossly is nonfocal.  PSYCHIATRIC:  No evidence of pressured speech or flattened affect.    LABORATORY DATA:  Hemoglobin slightly decreased at 10.3 with hydration fell to 8.8.  Electrolytes are within normal limits.  BUN is slightly elevated at 53 with a creatinine of 1.24.  SARS is  negative.    ASSESSMENT AND PLAN:  Abdominal pain and anemia with a history of gastric ulcers.  Gastric ulcer is the most likely etiology for her decrease in hemoglobin and her abdominal pain.  She is not on nonsteroidal anti-inflammatory agents, but she does take an aspirin a day for her heart, which may be irritating her anastomosis.  At this time, we would suggest upper endoscopy be performed this morning.  In the meantime, she will be kept on a proton inhibitor and n.p.o. with serial hemoglobins.    Thank you for asking us to participate in her care.  We will continue to monitor her here in the hospital.    Jayla Calvo MD        D: 2021   T: 2021   MT: APPLE    Name:     JIMMY KELLER  MRN:      [id number]        Account:      [id number]   :      1955           Consult Date: 2021     Document: [id number]

## 2021-11-16 NOTE — PLAN OF CARE
A/o x4.  VSS, denies pain.  Mag 1.4 replaced recheck in am.  PIV D5NS at 100/hr.  NPO had EGD today two ulcers found clot unable to be removed.  Voiding adequately.  Up IND in room.  Possible advance diet and discharge to home tomorrow.

## 2021-11-16 NOTE — PHARMACY-ADMISSION MEDICATION HISTORY
Pharmacy Medication History  Admission medication history interview status for the 11/16/2021  admission is complete. See EPIC admission navigator for prior to admission medications     Location of Interview: Patient room  Medication history sources: Patient, Surescripts and Care Everywhere    In the past week, patient estimated taking medication this percent of the time: 50-90% due to illness    Additional medication history information:   Patient reports Glipizide is currently on hold.    Medication reconciliation completed by provider prior to medication history? No    Time spent in this activity: 45 minutes    Prior to Admission medications    Medication Sig Last Dose Taking? Auth Provider   amLODIPine (NORVASC) 5 MG tablet Take 1 tablet (5 mg) by mouth daily 11/14/2021 at 1400 Yes Anay Martinez MD   amphetamine-dextroamphetamine (ADDERALL) 30 MG tablet Take 30 mg by mouth 3 times daily as needed Past Week at Unknown time Yes Unknown, Entered By History   aspirin 81 MG EC tablet Take 81 mg by mouth daily 11/14/2021 at 1400 Yes Unknown, Entered By History   atorvastatin (LIPITOR) 20 MG tablet Take 1 tablet (20 mg) by mouth daily 11/14/2021 at 2000 Yes Anay Martinez MD   calcium carbonate (OS-KINJAL) 1500 (600 Ca) MG tablet Take 2 tablets (1,200 mg) by mouth daily 11/14/2021 at 2000 Yes    cevimeline (EVOXAC) 30 MG capsule take 1 cap TID 11/14/2021 Yes Anay Martinez MD   COMPOUNDED NON-CONTROLLED SUBSTANCE (CMPD RX) - PHARMACY TO MIX COMPOUNDED MEDICATION Estradiol 0.2 mg/gm in HRT cream. Take one-quarter gm vaginally daily x 2 weeks then twice weekly Past Week at Unknown time Yes Anay Martinez MD   cyanocobalamin (VITAMIN B-12) 1000 MCG tablet Take 1 tablet (1,000 mcg) by mouth daily 11/14/2021 at 1400 Yes Anay Martinez MD   DULoxetine (CYMBALTA) 30 MG capsule Take 1 capsule (30 mg) by mouth 2 times daily 11/14/2021 Yes Anay Martinez MD   losartan (COZAAR)  100 MG tablet Take 1 tablet (100 mg) by mouth daily 11/14/2021 at 1400 Yes Anay Martinez MD   metFORMIN (GLUCOPHAGE-XR) 500 MG 24 hr tablet Take 4 po q am with breakfast.  Patient taking differently: Take 2,000 mg by mouth daily (with breakfast)  11/14/2021 Yes Anay Martinez MD   omeprazole (PRILOSEC) 40 MG DR capsule Take one daily  Patient taking differently: Take 40 mg by mouth daily  11/14/2021 at 0800 Yes Anay Martinez MD   tolterodine ER (DETROL LA) 4 MG 24 hr capsule Take 1 capsule (4 mg) by mouth daily 11/14/2021 at 0800 Yes Anay Martinez MD   traZODone (DESYREL) 50 MG tablet Take 50 mg by mouth At Bedtime 11/14/2021 at 0800 Yes Reported, Patient   Vitamin D3 (CHOLECALCIFEROL) 25 mcg (1000 units) tablet Take 1 tablet (25 mcg) by mouth daily 11/14/2021 at 1400 Yes Anay Martinez MD   blood glucose (NO BRAND SPECIFIED) lancets standard Use to test blood sugar 4 times daily or as directed. Dispense appropriate lancets for meter.   Anay Martinez MD   blood glucose (NO BRAND SPECIFIED) test strip Check blood glucose TID To accompany: Blood Glucose Monitor Brands: per insurance.   Anay Martinez MD   blood glucose calibration (NO BRAND SPECIFIED) solution Use to calibrate blood glucose monitor as needed as directed.   Anay Martinez MD   blood glucose monitoring (NO BRAND SPECIFIED) meter device kit Use to test blood sugar 4 times daily or as directed. Dispense One-Touch Verio IQ Kit - per patient request.   Anay Martinez MD   Continuous Blood Gluc  (FREESTYLE JORDIN 14 DAY READER) EBEN Use to read blood sugars as per 's instructions.   Anay Martinez MD   Continuous Blood Gluc Sensor (FREESTYLE JORDIN 14 DAY SENSOR) MISC Change every 14 days.   Anay Martinez MD   glipiZIDE (GLUCOTROL XL) 2.5 MG 24 hr tablet Take one daily  Patient taking differently: Take 2.5 mg by mouth daily  ON HOLD   Anay Martinez MD       The information provided in this note is only as accurate as the sources available at the time of update(s)

## 2021-11-16 NOTE — H&P
[facility]    History and Physical - Hospitalist Service       Date of Admission:  11/16/2021    Assessment & Plan      Karine Moss is a 66 year old female admitted on 11/16/2021. She presents to the emergency department with her daughter after recommended to seek evaluation by her primary care team in the setting of approximately 1 month of melenic stool with 3 or so bowel movements per day, 20 pound weight loss over the past few months.  History of cratered gastric ulcers as well as gastric bypass surgery    Melenic stool: With patient's epigastric discomfort with oral intake as well as melenic stool, history of gastric ulcer and gastric bypass, suspect upper GI bleed.  Iron deficiency anemia:  -Consented for blood products in the emergency department  -Trend CBC  -[facility] consulted  -N.p.o. anticipating EGD later today  -IV Protonix 40 mg twice daily  -Prior to admission aspirin held.  She is on this for prophylaxis in the setting of type 2 diabetes    Type 2 diabetes: Most recent hemoglobin A1c 5.8 9/9/2021.  Has been off of insulin, now also off of glipizide.  Blood glucose in the emergency department has been decreasing, currently in the 80s  -Medium dose sliding scale insulin available as needed  -Hypoglycemia protocol  -Prior to admission Metformin on hold  -Initiating dextrose containing fluids at low rate for decreasing blood glucose and known n.p.o. status.  Sliding scale insulin is ordered, though also with a patient care order to page if blood glucose over 150 for decrease in dextrose fluids prior to insulin administration.    Nonsevere protein calorie malnutrition: Patient with approximately 20 pound weight loss over the past 2 months.  Might be multifactorial.  Patient with pain with oral intake in the setting of suspected upper GI ulcer reducing oral intake, though also with recent dental extraction (September) still prior to denture  fitting  -Gastroenterology evaluation as above  -Continue follow-up with dentistry for dentures  -Continue to follow weight closely.  Discussed with patient as well as daughter at bedside.  If patient continues to lose weight despite GI evaluation and treatment as well as denture fitting, should follow with nutrition and might need additional evaluation.    Hyperlipidemia:  -Resume prior to admission atorvastatin 20 mg daily when reconciled by pharmacy    Hypertension:  -Holding prior to admission amlodipine    Major depression history:   -Resume prior to admission Cymbalta when reconciled by pharmacy    Narcolepsy:  -Adderall 30 mg twice daily ordered; unavailable to administer 45 mg twice daily dose per home regimen       Diet: NPO per Anesthesia Guidelines for Procedure/Surgery Except for: Meds    DVT Prophylaxis: Pneumatic compression devices  Fitzpatrick Catheter: Not present  Central Lines: None  Code Status:  Full code.  Confirmed with patient on admission    Clinically Significant Risk Factors Present on Admission              # Platelet Defect: home medication list includes an antiplatelet medication      Disposition Plan   Expected discharge:  likely 11/17 versus 11/18.  Anticipate home following gastroenterology work-up and endoscopy findings.  If negative EGD, may need to consider colonoscopy prep and evaluation for melena.     The patient's care was discussed with the Patient and Patient's daughter at bedside, Dr. Cuba in the emergency department..    Fantasma Gavin MD  [facility]  Securely message with the Vocera Web Console (learn more here)  Text page via Beam. Paging/Directory        ______________________________________________________________________    Chief Complaint   Black stools, epigastric pain    History is obtained from the patient, discussion with Dr. Mckeon in the emergency department, chart review, patient's daughter at bedside    History of Present Illness    Karine Moss is a 66 year old female who presents to the emergency department with approximately 1 month of black stools, epigastric discomfort with oral intake.  Patient has a history of gastric bypass on omeprazole daily.  She also has type 2 diabetes and takes a daily 81 mg aspirin.  Patient has a history of melenic stool in the past.  In November 2020 had cratered ulcerations, resolved by late January 2021 with decrease in PPI therapy at that time.    Patient also has a history of poor dentition.  She underwent full dental extractions early September.  Since this time, patient has lost approximately 20 pounds.  This appears to be multifactorial from both dental extractions as well as epigastric discomfort over the past months.  Patient describes pain in her epigastrium when she eats, this is often relieved with cold foods such as milk or ice cream.  History concerning for peptic ulcer disease, especially given prior ulcer.  Patient has not had any nausea, no vomiting or hematemesis.     Patient reports approximately 3 black bowel movements per day.  No change in frequency and this.    Possibly some lightheadedness, though not a significant complaint.  Patient presents today as her daughter was concerned, urged her to discuss with her primary care physician.  Patient tells me that she texted her primary care team, and team responded quickly to encourage her to seek care in the emergency department.    Discussed plan of care with patient as well as daughter at bedside including n.p.o. status, plan for upper endoscopy with increase in PPI therapy.  Possible colonoscopy if no source of melena identified on EGD.  For now holding aspirin and anticipate discontinuation of this prophylactic dose with well-controlled type 2 diabetes and now suspected recurrent upper GI bleeding      Review of Systems    The 10 point Review of Systems is negative other than noted in the HPI or here.  No lower extremity swelling  No  fevers or chills  No shortness of breath    Past Medical History    I have reviewed this patient's medical history and updated it with pertinent information if needed.   Past Medical History:   Diagnosis Date     Arthritis      Chest pain     seeing Cardiology     Depression 1991    after 's death     Diabetes mellitus (H)      Diabetic renal disease (H)     microalbuminuria     DJD (degenerative joint disease) of knee      H/O vitamin D deficiency      Hypertension      IBS (irritable bowel syndrome)     diarrhea      Keratoconus      Low back pain      Narcolepsy 2007    Dx'd by Sleep specialist 347.00, pt discontinued Adderal mid Nov. 13 due to tacycardia concerns     Obesity      Obstructive sleep apnea     327.23, 3 sleep studies,Dr. Flaco [facility]     Pancreatitis     related to Victoza, also on Xyrem     Panic      RLS (restless legs syndrome)      Sinus tachycardia        Past Surgical History   I have reviewed this patient's surgical history and updated it with pertinent information if needed.  Past Surgical History:   Procedure Laterality Date     COLONOSCOPY  10/01/2020    poor quality prep     ear surgery  2002 and 01/2004     GASTRIC BYPASS  2013    laparoscopic jimmy en y c ometopexy     HEMORRHOIDECTOMY  09/14/2000     LAPAROSCOPIC CHOLECYSTECTOMY  2015     LASIK BILATERAL       UVULOPALATOPHARYNGOPLASTY       UVVP         Social History   I have reviewed this patient's social history and updated it with pertinent information if needed.  Social History     Tobacco Use     Smoking status: Never Smoker     Smokeless tobacco: Never Used   Substance Use Topics     Alcohol use: Not Currently     Comment: rare     Drug use: No       Family History   I have reviewed this patient's family history and updated it with pertinent information if needed.  Family History   Problem Relation Age of Onset     Diabetes Father      Cancer - colorectal Maternal Grandmother      Cancer Daughter      Obesity Daughter          s/p lap band     Cancer Brother         lung     Hypertension Sister        Prior to Admission Medications   Prior to Admission Medications   Prescriptions Last Dose Informant Patient Reported? Taking?   ASPIRIN LOW DOSE 81 MG EC tablet   No No   Sig: TAKE 1 TABLET BY MOUTH DAILY   COMPOUNDED NON-CONTROLLED SUBSTANCE (CMPD RX) - PHARMACY TO MIX COMPOUNDED MEDICATION   No No   Sig: Estradiol 0.2 mg/gm in HRT cream. Take one-quarter gm vaginally daily x 2 weeks then twice weekly   Continuous Blood Gluc  (FREESTYLE JORDIN 14 DAY READER) EBEN   No No   Sig: Use to read blood sugars as per 's instructions.   Continuous Blood Gluc Sensor (FREESTYLE JORDIN 14 DAY SENSOR) [facility]   No No   Sig: Change every 14 days.   DULoxetine (CYMBALTA) 30 MG capsule   No No   Sig: Take 1 capsule (30 mg) by mouth 2 times daily   Vitamin D3 (CHOLECALCIFEROL) 25 mcg (1000 units) tablet   No No   Sig: Take 1 tablet (25 mcg) by mouth daily   amLODIPine (NORVASC) 5 MG tablet   No No   Sig: Take 1 tablet (5 mg) by mouth daily   amphetamine-dextroamphetamine (ADDERALL) 30 MG tablet   Yes No   Sig: Take 1.5 tablets bid, Rx from Dr. Sam, [place] [facility]   atorvastatin (LIPITOR) 20 MG tablet   No No   Sig: Take 1 tablet (20 mg) by mouth daily   blood glucose (NO BRAND SPECIFIED) lancets standard   No No   Sig: Use to test blood sugar 4 times daily or as directed. Dispense appropriate lancets for meter.   blood glucose (NO BRAND SPECIFIED) test strip   No No   Sig: Check blood glucose TID To accompany: Blood Glucose Monitor Brands: per insurance.   blood glucose calibration (NO BRAND SPECIFIED) solution   No No   Sig: Use to calibrate blood glucose monitor as needed as directed.   blood glucose monitoring (NO BRAND SPECIFIED) meter device kit   No No   Sig: Use to test blood sugar 4 times daily or as directed. Dispense One-Touch Verio IQ Kit - per patient request.   calcium carbonate (OS-KINJAL) 1500 (600 Ca) MG tablet   Yes No   Sig: Take  2 tablets (1,200 mg) by mouth daily   cevimeline (EVOXAC) 30 MG capsule   No No   Sig: take 1 cap TID   conjugated estrogens (PREMARIN) 0.625 MG/GM vaginal cream   No No   Sig: Apply 1 gram of cream vaginally daily x 2 wk, then twice weekly   cyanocobalamin (VITAMIN B-12) 1000 MCG tablet   No No   Sig: Take 1 tablet (1,000 mcg) by mouth daily   glipiZIDE (GLUCOTROL XL) 2.5 MG 24 hr tablet   No No   Sig: Take one daily   Patient not taking: Reported on 10/13/2021   losartan (COZAAR) 100 MG tablet   No No   Sig: Take 1 tablet (100 mg) by mouth daily   metFORMIN (GLUCOPHAGE-XR) 500 MG 24 hr tablet   No No   Sig: Take 4 po q am with breakfast.   omeprazole (PRILOSEC) 40 MG DR capsule   No No   Sig: Take one daily   tolterodine ER (DETROL LA) 4 MG 24 hr capsule   No No   Sig: Take 1 capsule (4 mg) by mouth daily   traZODone (DESYREL) 50 MG tablet   Yes No   Sig: Take 50 mg by mouth At Bedtime      Facility-Administered Medications: None     Allergies   Allergies   Allergen Reactions     Pravastatin Other (See Comments)     woozy     Codeine Nausea and Vomiting     Nsaids GI Disturbance and Other (See Comments)     Pt had bariatric surgery, should not ever take oral NSAIDS due to risk of gastric ulcers.  Pt had bariatric surgery, should not ever take oral NSAIDS due to risk of gastric ulcers.       Physical Exam   Vital Signs: Temp: 97.8  F (36.6  C) Temp src: Temporal BP: 106/83 Pulse: 74   Resp: 14 SpO2: 97 %      Weight: 134 lbs 0 oz    General Appearance: Generally well-appearing 66-year-old female resting comfortably on \[facility] [place] [facility]\"".  Appears older than stated age.  Eyes: No scleral icterus or injection  HEENT: Edentulous.  Normocephalic  Respiratory: Breath sounds are clear bilaterally to auscultation, no wheezes or crackles.  Cardiovascular: Regular rate and rhythm.  Soft early systolic murmur at apex without radiation.  GI: Abdomen soft, generally nontender to palpation.  The exception would be in her  epigastrium, patient does have some mild discomfort.  No guarding.  No palpable mass.  Lymph/Hematologic: No lower extremity swelling  Genitourinary: Not examined  Skin: No jaundice, no rash, no significant pallor appreciated  Musculoskeletal: Muscular tone and bulk intact in all extremities without subcutaneous fat wasting.  Neurologic: Alert, conversant, appropriate in conversation.  Mental status grossly intact.  Psychiatric: Normal affect, very pleasant    Data   Data reviewed today: I reviewed all medications, new labs and imaging results over the last 24 hours. I personally reviewed no images or EKG's today.    Recent Labs   Lab 11/16/21  0625 11/15/21  1737   WBC  --  14.1*   HGB 8.8* 10.3*   MCV  --  87   PLT  --  242   INR  --  1.10   NA  --  138   POTASSIUM  --  5.0   CHLORIDE  --  105   CO2  --  25   BUN  --  53*   CR  --  1.24*   ANIONGAP  --  8   KINJAL  --  9.7   GLC  --  141*   ALBUMIN  --  3.7   PROTTOTAL  --  8.6   BILITOTAL  --  0.7   ALKPHOS  --  97   ALT  --  22   AST  --  16

## 2021-11-16 NOTE — ANESTHESIA POSTPROCEDURE EVALUATION
Patient: Karine Moss    Procedure: Procedure(s):  ESOPHAGOGASTRODUODENOSCOPY (EGD)       Diagnosis:Abdominal pain [R10.9]  Diagnosis Additional Information: No value filed.    Anesthesia Type:  MAC    Note:  Disposition: Inpatient   Postop Pain Control: Uneventful            Sign Out: Well controlled pain   PONV: No   Neuro/Psych: Uneventful            Sign Out: Acceptable/Baseline neuro status   Airway/Respiratory: Uneventful            Sign Out: Acceptable/Baseline resp. status   CV/Hemodynamics: Uneventful            Sign Out: Acceptable CV status   Other NRE: NONE   DID A NON-ROUTINE EVENT OCCUR? No           Last vitals:  Vitals Value Taken Time   /61 11/16/21 1100   Temp     Pulse 66 11/16/21 1102   Resp 12 11/16/21 1103   SpO2 97 % 11/16/21 1103   Vitals shown include unvalidated device data.    Electronically Signed By: Brendan Ortiz MD  November 16, 2021  11:04 AM

## 2021-11-16 NOTE — ED NOTES
Bed: ED28  Expected date:   Expected time:   Means of arrival:   Comments:  Pt coming back from ENDO

## 2021-11-16 NOTE — ANESTHESIA CARE TRANSFER NOTE
Patient: Karine Moss    Procedure: Procedure(s):  ESOPHAGOGASTRODUODENOSCOPY (EGD)       Diagnosis: Abdominal pain [R10.9]  Diagnosis Additional Information: No value filed.    Anesthesia Type:   MAC     Note:    Oropharynx: oropharynx clear of all foreign objects and spontaneously breathing  Level of Consciousness: awake  Oxygen Supplementation: room air    Independent Airway: airway patency satisfactory and stable  Dentition: dentition unchanged  Vital Signs Stable: post-procedure vital signs reviewed and stable  Report to RN Given: handoff report given  Patient transferred to: PACU  Comments: Pt to ENDO Phase 2 on room air, airway patent, VSS. Report to RN.  Handoff Report: Identifed the Patient, Identified the Reponsible Provider, Reviewed the pertinent medical history, Discussed the surgical course, Reviewed Intra-OP anesthesia mangement and issues during anesthesia, Set expectations for post-procedure period and Allowed opportunity for questions and acknowledgement of understanding      Vitals:  Vitals Value Taken Time   BP     Temp     Pulse     Resp     SpO2         Electronically Signed By: PARIS Khan CRNA  November 16, 2021  10:33 AM

## 2021-11-16 NOTE — PROGRESS NOTES
RECEIVING UNIT ED HANDOFF REVIEW    ED Nurse Handoff Report was reviewed by: Neda Bowens RN on November 16, 2021 at 8:31 AM

## 2021-11-16 NOTE — UTILIZATION REVIEW
Admission Status; Secondary Review Determination         Under the authority of the Utilization Management Committee, the utilization review process indicated a secondary review on the above patient.  The review outcome is based on review of the medical records, discussions with staff, and applying clinical experience noted on the date of the review.        (x)      Inpatient Status Appropriate - This patient's medical care is consistent with medical management for inpatient care and reasonable inpatient medical practice.      RATIONALE FOR DETERMINATION   The patient is a 66-year-old female admitted today on 11/16/2021.  She has had 1 month of melanotic stools with 3 or so bowel movements per day.  She has also had a 20 pound weight loss over the past few months.  She is status post gastric bypass.  Hemoglobin was in the 12 range last May and yesterday was 10.3 and today is 8.8.  Because of the significant history and dropping hemoglobin, GI was consulted and an EGD was done showing 2 ulcers at the gastro jejunal anastomosis.  These were large.  Patient had aspirin discontinued on was started on Protonix IV twice a day.  If the patient is discharged tomorrow, recommend conditionally that the patient be changed to observation status.  If the patient is not discharged tomorrow because of instability of her hemoglobin and a significant history, recommend maintaining inpatient status.  Patient also has diabetes mellitus type 2.  Patient has an elevated creatinine of 1.24.      The severity of illness, intensity of service provided, expected LOS and risk for adverse outcome make the care complex, high risk and appropriate for hospital admission.        The information on this document is developed by the utilization review team in order for the business office to ensure compliance.  This only denotes the appropriateness of proper admission status and does not reflect the quality of care rendered.         The  definitions of Inpatient Status and Observation Status used in making the determination above are those provided in the CMS Coverage Manual, Chapter 1 and Chapter 6, section 70.4.      Sincerely,     Dirk Yin MD  Physician Advisor  Utilization Review/ Case Management  [facility].

## 2021-11-16 NOTE — PROGRESS NOTES
[facility] NOTE    See EGD report    A/  2 large anastomotic ulcers   One with clot unable to remove  No active bleeding    P/  PPI  BID IV and BID lifelong as outpt  NPO overnight   Must discontinue ASA  If rebleed then will need IR- ulcer very fibrotic and difficult to reach  Repeat EGD in 2 months to check healing    Will continue to follow.    Jayla Calvo MD  [facility]  Cell  [phone] 8 AM-5 PM  Office

## 2021-11-16 NOTE — ED NOTES
"[facility]  ED Nurse Handoff Report    ED Chief complaint: Rectal Bleeding      ED Diagnosis:   Final diagnoses:   Upper GI bleed   Lactic acidosis   Normocytic anemia due to blood loss       Code Status: Full Code    Allergies:   Allergies   Allergen Reactions     Pravastatin Other (See Comments)     woozy     Codeine Nausea and Vomiting     Nsaids GI Disturbance and Other (See Comments)     Pt had bariatric surgery, should not ever take oral NSAIDS due to risk of gastric ulcers.  Pt had bariatric surgery, should not ever take oral NSAIDS due to risk of gastric ulcers.       Patient Story: Pt reports had teeth extracted and is eating pureed foods. Pt had gastric bypass 5 years ago. Pt reports it felt like she had a \"blockage in throat and was drinking water and trying to throw up repeatedly to clear blockage. Pt states has burning in stomach and cold things make the burning in stomach go away for a bit. Pt reports black stools. Pt reports loss of 20 pounds in 3-4 weeks time because she can't eat. Pt report high level of stress at home.  Focused Assessment:  See flowsheets    Treatments and/or interventions provided: see MAR  Patient's response to treatments and/or interventions: see flowsheets/labs    To be done/followed up on inpatient unit:      Does this patient have any cognitive concerns?: none    Activity level - Baseline/Home:  Stand with Assist  Activity Level - Current:   Stand with Assist    Patient's Preferred language: English   Needed?: No    Isolation: None  Infection: Not Applicable  Patient tested for COVID 19 prior to admission: YES  Bariatric?: No    Vital Signs:   Vitals:    11/16/21 1103 11/16/21 1104 11/16/21 1105 11/16/21 1230   BP:    107/68   BP Location:       Pulse:    63   Resp: 12   21   Temp:       TempSrc:       SpO2:  96% 91% 99%   Weight:       Height:           Cardiac Rhythm:     Was the PSS-3 completed:   Yes  What interventions are required if any?     "           Family Comments:   OBS brochure/video discussed/provided to patient/family: Yes              Name of person given brochure if not patient:               Relationship to patient:     For the majority of the shift this patient's behavior was Green.   Behavioral interventions performed were none.    ED NURSE PHONE NUMBER: [phone]

## 2021-11-16 NOTE — ANESTHESIA PREPROCEDURE EVALUATION
Anesthesia Pre-Procedure Evaluation    Patient: Karine Moss   MRN: [id number] : 1955        Preoperative Diagnosis: Abdominal pain [R10.9]    Procedure : Procedure(s):  ESOPHAGOGASTRODUODENOSCOPY (EGD)          Past Medical History:   Diagnosis Date     Arthritis      Chest pain     seeing Cardiology     Depression     after 's death     Diabetes mellitus (H)      Diabetic renal disease (H)     microalbuminuria     DJD (degenerative joint disease) of knee      H/O vitamin D deficiency      Hypertension      IBS (irritable bowel syndrome)     diarrhea      Keratoconus      Low back pain      Narcolepsy 2007    Dx'd by Sleep specialist 347.00, pt discontinued Adderal mid  due to tacycardia concerns     Obesity      Obstructive sleep apnea     327.23, 3 sleep studies,Dr. Flaco SELBY [facility]     Pancreatitis     related to Victoza, also on Xyrem     Panic      RLS (restless legs syndrome)      Sinus tachycardia       Past Surgical History:   Procedure Laterality Date     COLONOSCOPY  10/01/2020    poor quality prep     ear surgery   and 2004     GASTRIC BYPASS      laparoscopic jimmy en y c ometopexy     HEMORRHOIDECTOMY  2000     LAPAROSCOPIC CHOLECYSTECTOMY  2015     LASIK BILATERAL       UVULOPALATOPHARYNGOPLASTY       UVVP        Allergies   Allergen Reactions     Pravastatin Other (See Comments)     woozy     Codeine Nausea and Vomiting     Nsaids GI Disturbance and Other (See Comments)     Pt had bariatric surgery, should not ever take oral NSAIDS due to risk of gastric ulcers.  Pt had bariatric surgery, should not ever take oral NSAIDS due to risk of gastric ulcers.      Social History     Tobacco Use     Smoking status: Never Smoker     Smokeless tobacco: Never Used   Substance Use Topics     Alcohol use: Not Currently     Comment: rare      Wt Readings from Last 1 Encounters:   11/15/21 60.8 kg (134 lb)        Anesthesia Evaluation   Pt has had prior anesthetic.     No  history of anesthetic complications       ROS/MED HX  ENT/Pulmonary:     (+) sleep apnea, doesn't use CPAP,     Neurologic: Comment: Low back pain. Narcolepsy without cataplexy(347.00).    (+) migraines,     Cardiovascular: Comment: Sinus tachycardia.  Echo:     Interpretation Summary     Left ventricular systolic function is normal.  The visual ejection fraction is estimated at 65-70%.  Grade I or early diastolic dysfunction.  Proximal septal thickening is noted without outflow obstruction.  The right ventricle is normal in structure, function and size.  No significant valve dysfunction.  The inferior vena cava is normal.  There is no pericardial effusion.     No prior for comparison.    (+) hypertension-----CHF     METS/Exercise Tolerance:     Hematologic:     (+) anemia,     Musculoskeletal:  - neg musculoskeletal ROS     GI/Hepatic: Comment: Pancreatitis. IBS (irritable bowel syndrome). Gastrointestinal hemorrhage associated with gastric ulcer.      Renal/Genitourinary:     (+) renal disease, type: CRI,     Endo: Comment: H/O vitamin D deficiency.    (+) type II DM,     Psychiatric/Substance Use:     (+) psychiatric history depression     Infectious Disease:  - neg infectious disease ROS     Malignancy:       Other:  - neg other ROS          Physical Exam    Airway        Mallampati: II   TM distance: > 3 FB   Neck ROM: full   Mouth opening: > 3 cm    Respiratory Devices and Support         Dental  no notable dental history         Cardiovascular   cardiovascular exam normal          Pulmonary   pulmonary exam normal                OUTSIDE LABS:  CBC:   Lab Results   Component Value Date    WBC 14.1 (H) 11/15/2021    WBC 7.2 05/27/2021    HGB 8.8 (L) 11/16/2021    HGB 10.3 (L) 11/15/2021    HCT 33.3 (L) 11/15/2021    HCT 40.0 05/27/2021     11/15/2021     05/27/2021     BMP:   Lab Results   Component Value Date     11/15/2021     09/09/2021    POTASSIUM 5.0 11/15/2021    POTASSIUM 5.2  09/09/2021    CHLORIDE 105 11/15/2021    CHLORIDE 106 09/09/2021    CO2 25 11/15/2021    CO2 28 09/09/2021    BUN 53 (H) 11/15/2021    BUN 28 09/09/2021    CR 1.24 (H) 11/15/2021    CR 1.50 (H) 09/09/2021     (H) 11/15/2021    GLC 73 09/09/2021     COAGS:   Lab Results   Component Value Date    INR 1.10 11/15/2021     POC: No results found for: BGM, HCG, HCGS  HEPATIC:   Lab Results   Component Value Date    ALBUMIN 3.7 11/15/2021    PROTTOTAL 8.6 11/15/2021    ALT 22 11/15/2021    AST 16 11/15/2021    ALKPHOS 97 11/15/2021    BILITOTAL 0.7 11/15/2021     OTHER:   Lab Results   Component Value Date    LACT 2.2 (H) 11/16/2021    A1C 5.8 (H) 09/09/2021    KINJAL 9.7 11/15/2021    PHOS 3.7 06/06/2013    MAG 1.4 (L) 11/16/2021    LIPASE 249 12/03/2013    TSH 1.69 10/22/2019       Anesthesia Plan    ASA Status:  3      Anesthesia Type: MAC.     - Reason for MAC: straight local not clinically adequate              Consents    Anesthesia Plan(s) and associated risks, benefits, and realistic alternatives discussed. Questions answered and patient/representative(s) expressed understanding.     - Discussed with:  Patient         Postoperative Care    Pain management: IV analgesics, Multi-modal analgesia.   PONV prophylaxis: Ondansetron (or other 5HT-3), Dexamethasone or Solumedrol     Comments:                Brendan Ortiz MD

## 2021-11-16 NOTE — ED PROVIDER NOTES
History   Chief Complaint:  Epigastric pain     The history is provided by the patient and a relative.      Karine Moss is a 66 year old female with history of gastric bypass, NIDDM, and HTN who presents with epigastric pain. Karine has had burning epigastric pain for the last couple of months. This is worse with eating so she has had decreased intake and a 10 pound weight loss. The pain is transiently improved with ice cream or milk. She has had dark, almost black, stools for about a month. She started to feel lightheaded so she reached out to primary care who recommended ER visit. Notably, Karine mentions episodes where it feels like food is stuck in her esophagus and she self-induces vomiting to resolve this. She has otherwise not had emesis. She denies syncope or dyspnea.    Karine had an EGD November 2020 with an ulcer. She was started on omeprazole - which she continues to take - with repeat EGD in January 2021 showing resolution.    Review of Systems   Constitutional: Positive for appetite change and unexpected weight change.   Respiratory: Negative for shortness of breath.    Gastrointestinal: Positive for abdominal pain. Negative for constipation, diarrhea, nausea and vomiting (self-induced only).   Neurological: Positive for light-headedness. Negative for syncope.   All other systems reviewed and are negative.    Allergies:  Pravastatin  Codeine  Nsaids    Medications:  amlodipine    amphetamine-dextroamphetamine  aspirin 81 MG EC tablet   atorvastatin   calcium carbonate   cevimeline    conjugated estrogens  cyanocobalamin   duloxetine  glipizide    losartan  metformin   omeprazole   tolterodine ER   trazodone   vgitamin D3     Past Medical History:     Arthritis   Chest pain   Depression   Diabetic renal disease (H)   DJD (degenerative joint disease) of knee   H/O vitamin D deficiency   Hypertension    Keratoconus   Low back pain    Obesity   Obstructive sleep apnea   Pancreatitis   Panic   RLS  "(restless legs syndrome)  Sinus tachycardia   Vitamin D deficiency  Dysthymic disorder  Narcolepsy without cataplexy(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.00)  Hyperlipidemia LDL goal <100  Plantar warts  IBS (irritable bowel syndrome)  Heart murmur  Type 2 diabetes mellitus without complication, without long-term current use of insulin (H)  Osteopenia of hip  Acute conjunctivitis of left eye  PTSD (post-traumatic stress disorder)  Bilateral sciatica  Gastrointestinal hemorrhage associated with gastric ulcer  Chronic kidney disease, stage 3 (H)  Chronic diastolic congestive heart failure (H)      Past Surgical History:    Colonoscopy   Ear surgery  Laparoscopic gastric bypass  hemorrhoidectomy  Laparoscopic cholecystectomy  Lasik bilateral  Uvulopalatopharyngoplasty  UVVP     Family History:    Diabetes  Colorectal cancer  Obesity  Lung cancer  HTN    Social History:  Patient accompanied by daughter  Lives with significant other and two grandchildren.  PCP: Anay Martinez  Tobacco: Negative  Alcohol: Negative    Physical Exam     Patient Vitals for the past 24 hrs:   BP Temp Temp src Pulse Resp SpO2 Height Weight   11/16/21 0400 117/69 -- -- 77 -- -- -- --   11/16/21 0330 -- -- -- -- -- 99 % -- --   11/16/21 0315 -- -- -- -- -- 99 % -- --   11/16/21 0300 -- -- -- -- -- 93 % -- --   11/16/21 0245 -- -- -- -- -- 99 % -- --   11/16/21 0230 -- -- -- -- -- 97 % -- --   11/16/21 0154 -- -- -- -- -- 97 % -- --   11/16/21 0153 106/83 -- -- 74 -- -- -- --   11/15/21 1722 (!) 145/75 97.8  F (36.6  C) Temporal 99 14 100 % 1.626 m (5' 4\") 60.8 kg (134 lb)       Physical Exam  General: Well-developed and well-nourished. Well appearing middle aged  woman. Cooperative.  Head:  Atraumatic.  Eyes:  Conjunctivae, lids, and sclerae are normal.  Neck:  Supple. Normal range of motion.  CV:  Regular rate and rhythm. Normal heart sounds with no murmurs, rubs, or gallops detected.  Resp:  No respiratory distress. Clear to auscultation bilaterally without " decreased breath sounds, wheezing, rales, or rhonchi.  GI:  Soft. Non-distended.  Mild epigastric tenderness.   Rectal: Return of small amount of black stool.  No rik blood.   MS:  Normal ROM.   Skin:  Warm. Non-diaphoretic. No pallor.  Neuro:  Awake. A&Ox3. Normal strength.  Psych: Normal mood and affect. Normal speech.  Vitals reviewed.    Emergency Department Course   Laboratory:  Labs Ordered and Resulted from Time of ED Arrival to Time of ED Departure   COMPREHENSIVE METABOLIC PANEL - Abnormal       Result Value    Sodium 138      Potassium 5.0      Chloride 105      Carbon Dioxide (CO2) 25      Anion Gap 8      Urea Nitrogen 53 (*)     Creatinine 1.24 (*)     Calcium 9.7      Glucose 141 (*)     Alkaline Phosphatase 97      AST 16      ALT 22      Protein Total 8.6      Albumin 3.7      Bilirubin Total 0.7      GFR Estimate 45 (*)    CBC WITH PLATELETS AND DIFFERENTIAL - Abnormal    WBC Count 14.1 (*)     RBC Count 3.81      Hemoglobin 10.3 (*)     Hematocrit 33.3 (*)     MCV 87      MCH 27.0      MCHC 30.9 (*)     RDW 13.6      Platelet Count 242      % Neutrophils 63      % Lymphocytes 28      % Monocytes 7      % Eosinophils 1      % Basophils 0      % Immature Granulocytes 1      NRBCs per 100 WBC 0      Absolute Neutrophils 8.9 (*)     Absolute Lymphocytes 3.9      Absolute Monocytes 1.0      Absolute Eosinophils 0.2      Absolute Basophils 0.1      Absolute Immature Granulocytes 0.1 (*)     Absolute NRBCs 0.0     OCCULT BLOOD STOOL - Abnormal    Occult Blood Positive (*)    LACTIC ACID WHOLE BLOOD - Abnormal    Lactic Acid 2.2 (*)    INR - Normal    INR 1.10     COVID-19 VIRUS (CORONAVIRUS) BY PCR - Normal    SARS CoV2 PCR Negative     TYPE AND SCREEN, ADULT    ABO/RH(D) O POS      Antibody Screen Negative      SPECIMEN EXPIRATION DATE [id number]     ABO/RH TYPE AND SCREEN      Emergency Department Course:  Reviewed:  I reviewed nursing notes, vitals, and past medical  history.    Assessments/Consults:  ED Course as of 11/16/21 0502   Tue Nov 16, 2021   0008 Obtained history and examined the patient as noted above   0148 Consulted with Dr. Gavin regarding the patients history and presentation in the emergency department.     0221 Updated the patient and obtained blood consent      Interventions:  1744 0.9% sodium chloride BOLUS 1000 mL, IV  0042 Pantoprazole IV push injection 40 mg, IV  0335 0.9% sodium chloride BOLUS 1000 mL, IV    Disposition:  The patient was admitted to the hospital under the care of Dr. Gavin.     Impression & Plan     CMS Diagnoses: The Lactic acid level is elevated due to GI bleed, at this time there is no sign of severe sepsis or septic shock. and None    Medical Decision Making:  Karine is a 66-year-old woman with a history of gastric ulcer in November 2020 which resolved on follow-up EGD in January 2021 and for which she is still taking omeprazole.  She presents with epigastric burning pain, worse postprandial, and weight loss.  She is also had dark, nearly black stools, and this ultimately prompted her presentation as she has had some lightheadedness.  Fortunately, she appears well on exam.  As expected, Hemoccult is positive.  There are secondary signs of upper GI bleed with lactate of 2.2 after 1L IV fluids for which she was given a second liter of IV fluids, uremia of 53 from baseline of 28, and normocytic anemia to 10.3 from baseline of 12.4.  INR is normal and there is no kidney injury, biliary obstruction, hepatitis, or other electrolyte changes.  Leukocytosis of 14.1 is felt to be stressed demarginalization and is unlikely to represent infection.  She was given Protonix but clearly requires admission for gastroenterology consultation of presumed upper GI bleed, possibly related to gastric ulcers as she has had these in the past.  I updated her and her daughter on findings and plan and answered all their questions.  They verbalized understanding and  are amenable.  I discussed the patient's case with Dr. Gavin, hospitalist, who accepts admission and has no further orders.      Diagnosis:    ICD-10-CM    1. Upper GI bleed  K92.2    2. Lactic acidosis  E87.2    3. Normocytic anemia due to blood loss  D50.0          Scribe Disclosure:  I, Sarwat Castaneda, am serving as a scribe at 12:05 AM on 11/16/2021 to document services personally performed by Kamla Cuba MD based on my observations and the provider's statements to me.            Kamla Cuba MD  11/16/21 2709

## 2021-11-16 NOTE — PROGRESS NOTES
[facility] NOTE    Patient seen and examined.  Full consult dictated    A/  Abd pain and decreased hemoglobin. Possible melena  H/o anastomotic ulcer    P/ EGD scheduled this morning  Keep npo  IV PPI    Jayla Calvo MD  [facility]  Cell  [phone] 8 AM-5 PM  Office

## 2021-11-17 LAB
ALBUMIN SERPL-MCNC: 2.8 G/DL (ref 3.4–5)
ALP SERPL-CCNC: 75 U/L (ref 40–150)
ALT SERPL W P-5'-P-CCNC: 18 U/L (ref 0–50)
ANION GAP SERPL CALCULATED.3IONS-SCNC: 5 MMOL/L (ref 3–14)
AST SERPL W P-5'-P-CCNC: 16 U/L (ref 0–45)
BASOPHILS # BLD AUTO: 0 10E3/UL (ref 0–0.2)
BASOPHILS NFR BLD AUTO: 1 %
BILIRUB SERPL-MCNC: 0.6 MG/DL (ref 0.2–1.3)
BUN SERPL-MCNC: 19 MG/DL (ref 7–30)
CALCIUM SERPL-MCNC: 8.6 MG/DL (ref 8.5–10.1)
CHLORIDE BLD-SCNC: 111 MMOL/L (ref 94–109)
CO2 SERPL-SCNC: 24 MMOL/L (ref 20–32)
CREAT SERPL-MCNC: 0.96 MG/DL (ref 0.52–1.04)
EOSINOPHIL # BLD AUTO: 0.1 10E3/UL (ref 0–0.7)
EOSINOPHIL NFR BLD AUTO: 2 %
ERYTHROCYTE [DISTWIDTH] IN BLOOD BY AUTOMATED COUNT: 13.2 % (ref 10–15)
GFR SERPL CREATININE-BSD FRML MDRD: 62 ML/MIN/1.73M2
GLUCOSE BLD-MCNC: 119 MG/DL (ref 70–99)
GLUCOSE BLDC GLUCOMTR-MCNC: 107 MG/DL (ref 70–99)
GLUCOSE BLDC GLUCOMTR-MCNC: 108 MG/DL (ref 70–99)
GLUCOSE BLDC GLUCOMTR-MCNC: 116 MG/DL (ref 70–99)
GLUCOSE BLDC GLUCOMTR-MCNC: 119 MG/DL (ref 70–99)
GLUCOSE BLDC GLUCOMTR-MCNC: 80 MG/DL (ref 70–99)
GLUCOSE BLDC GLUCOMTR-MCNC: 90 MG/DL (ref 70–99)
HCT VFR BLD AUTO: 29.9 % (ref 35–47)
HGB BLD-MCNC: 8.5 G/DL (ref 11.7–15.7)
HGB BLD-MCNC: 9.3 G/DL (ref 11.7–15.7)
HGB BLD-MCNC: 9.7 G/DL (ref 11.7–15.7)
IMM GRANULOCYTES # BLD: 0 10E3/UL
IMM GRANULOCYTES NFR BLD: 1 %
LYMPHOCYTES # BLD AUTO: 2.3 10E3/UL (ref 0.8–5.3)
LYMPHOCYTES NFR BLD AUTO: 35 %
MAGNESIUM SERPL-MCNC: 1.7 MG/DL (ref 1.6–2.3)
MCH RBC QN AUTO: 27.8 PG (ref 26.5–33)
MCHC RBC AUTO-ENTMCNC: 31.1 G/DL (ref 31.5–36.5)
MCV RBC AUTO: 89 FL (ref 78–100)
MONOCYTES # BLD AUTO: 0.5 10E3/UL (ref 0–1.3)
MONOCYTES NFR BLD AUTO: 7 %
NEUTROPHILS # BLD AUTO: 3.7 10E3/UL (ref 1.6–8.3)
NEUTROPHILS NFR BLD AUTO: 54 %
NRBC # BLD AUTO: 0 10E3/UL
NRBC BLD AUTO-RTO: 0 /100
PLATELET # BLD AUTO: 169 10E3/UL (ref 150–450)
POTASSIUM BLD-SCNC: 4.1 MMOL/L (ref 3.4–5.3)
PROT SERPL-MCNC: 7.5 G/DL (ref 6.8–8.8)
RBC # BLD AUTO: 3.35 10E6/UL (ref 3.8–5.2)
SODIUM SERPL-SCNC: 140 MMOL/L (ref 133–144)
WBC # BLD AUTO: 6.7 10E3/UL (ref 4–11)

## 2021-11-17 PROCEDURE — 85018 HEMOGLOBIN: CPT | Performed by: INTERNAL MEDICINE

## 2021-11-17 PROCEDURE — 36415 COLL VENOUS BLD VENIPUNCTURE: CPT | Performed by: INTERNAL MEDICINE

## 2021-11-17 PROCEDURE — 83735 ASSAY OF MAGNESIUM: CPT | Performed by: INTERNAL MEDICINE

## 2021-11-17 PROCEDURE — 250N000013 HC RX MED GY IP 250 OP 250 PS 637: Performed by: INTERNAL MEDICINE

## 2021-11-17 PROCEDURE — 85025 COMPLETE CBC W/AUTO DIFF WBC: CPT | Performed by: INTERNAL MEDICINE

## 2021-11-17 PROCEDURE — 250N000011 HC RX IP 250 OP 636: Performed by: INTERNAL MEDICINE

## 2021-11-17 PROCEDURE — 80053 COMPREHEN METABOLIC PANEL: CPT | Performed by: INTERNAL MEDICINE

## 2021-11-17 PROCEDURE — 99232 SBSQ HOSP IP/OBS MODERATE 35: CPT | Performed by: INTERNAL MEDICINE

## 2021-11-17 PROCEDURE — 258N000003 HC RX IP 258 OP 636: Performed by: INTERNAL MEDICINE

## 2021-11-17 PROCEDURE — C9113 INJ PANTOPRAZOLE SODIUM, VIA: HCPCS | Performed by: INTERNAL MEDICINE

## 2021-11-17 PROCEDURE — 120N000001 HC R&B MED SURG/OB

## 2021-11-17 RX ORDER — DEXTROSE MONOHYDRATE 25 G/50ML
25-50 INJECTION, SOLUTION INTRAVENOUS
Status: DISCONTINUED | OUTPATIENT
Start: 2021-11-17 | End: 2021-11-18 | Stop reason: HOSPADM

## 2021-11-17 RX ORDER — DULOXETIN HYDROCHLORIDE 30 MG/1
30 CAPSULE, DELAYED RELEASE ORAL 2 TIMES DAILY
Status: DISCONTINUED | OUTPATIENT
Start: 2021-11-17 | End: 2021-11-18 | Stop reason: HOSPADM

## 2021-11-17 RX ORDER — CEVIMELINE HYDROCHLORIDE 30 MG/1
30 CAPSULE ORAL 3 TIMES DAILY
Status: DISCONTINUED | OUTPATIENT
Start: 2021-11-17 | End: 2021-11-18 | Stop reason: HOSPADM

## 2021-11-17 RX ORDER — TRAZODONE HYDROCHLORIDE 50 MG/1
50 TABLET, FILM COATED ORAL AT BEDTIME
Status: DISCONTINUED | OUTPATIENT
Start: 2021-11-17 | End: 2021-11-18 | Stop reason: HOSPADM

## 2021-11-17 RX ORDER — TOLTERODINE 4 MG/1
4 CAPSULE, EXTENDED RELEASE ORAL DAILY
Status: DISCONTINUED | OUTPATIENT
Start: 2021-11-17 | End: 2021-11-18 | Stop reason: HOSPADM

## 2021-11-17 RX ORDER — NICOTINE POLACRILEX 4 MG
15-30 LOZENGE BUCCAL
Status: DISCONTINUED | OUTPATIENT
Start: 2021-11-17 | End: 2021-11-18 | Stop reason: HOSPADM

## 2021-11-17 RX ADMIN — TOLTERODINE TARTRATE 4 MG: 4 CAPSULE, EXTENDED RELEASE ORAL at 08:24

## 2021-11-17 RX ADMIN — PANTOPRAZOLE SODIUM 40 MG: 40 INJECTION, POWDER, FOR SOLUTION INTRAVENOUS at 21:13

## 2021-11-17 RX ADMIN — CEVIMELINE 30 MG: 30 CAPSULE ORAL at 16:15

## 2021-11-17 RX ADMIN — DULOXETINE 30 MG: 30 CAPSULE, DELAYED RELEASE ORAL at 21:12

## 2021-11-17 RX ADMIN — CEVIMELINE 30 MG: 30 CAPSULE ORAL at 21:12

## 2021-11-17 RX ADMIN — TRAZODONE HYDROCHLORIDE 50 MG: 50 TABLET ORAL at 21:12

## 2021-11-17 RX ADMIN — PANTOPRAZOLE SODIUM 40 MG: 40 INJECTION, POWDER, FOR SOLUTION INTRAVENOUS at 08:24

## 2021-11-17 RX ADMIN — DULOXETINE 30 MG: 30 CAPSULE, DELAYED RELEASE ORAL at 08:24

## 2021-11-17 RX ADMIN — DEXTROSE AND SODIUM CHLORIDE: 5; 900 INJECTION, SOLUTION INTRAVENOUS at 02:48

## 2021-11-17 ASSESSMENT — ACTIVITIES OF DAILY LIVING (ADL)
ADLS_ACUITY_SCORE: 7

## 2021-11-17 NOTE — PROGRESS NOTES
[facility]    Medicine Progress Note - Hospitalist Service        Date of Admission:  11/16/2021 12:03 AM    Assessment & Plan:   Karine Moss is a 66 year old female admitted on 11/16/2021. She presents to the emergency department with her daughter after recommended to seek evaluation by her primary care team in the setting of approximately 1 month of melenic stool with 3 or so bowel movements per day, 20 pound weight loss over the past few months.  History of cratered gastric ulcers as well as gastric bypass surgery.     Upper GI bleed  Gastrojejunal anastomosis ulcer  Acute blood loss anemia due to #1 above  -Presented with melenic stools and 20 pound weight loss over the past few months.  -Hemoglobin at presentation was 10.3 with recent hemoglobin of around 12  -Hemoglobin nehemiah of 8.8  -[facility] consulted, she is s/p EGD which revealed 2 ulcers at the gastrojejunal anastomosis.  Ulcers were 12 mm and 12 mm with a clot on a single ulcer.  No active bleeding or bleeding.  Unable to dislodge clot.  -Clear liquid diet; ADAT  -Protonix 40 mg IV twice a day  -Discontinue aspirin and NSAID use indefinitely  -Hb later today  -Repeat endoscopy in 2 months to check healing.     Type 2 diabetes  -Most recent hemoglobin A1c 5.8 9/9/2021.  Has been off of insulin, now also off of glipizide.    -Medium dose sliding scale insulin available as needed  -Hypoglycemia protocol  -Prior to admission Metformin on hold    Nonsevere protein calorie malnutrition: Patient with approximately 20 pound weight loss over the past 2 months.  Might be multifactorial.  Patient with pain with oral intake in the setting of suspected upper GI ulcer reducing oral intake, though also with recent dental extraction (September) still prior to denture fitting  -Continue follow-up with dentistry for dentures  -Continue to follow weight closely.       Hyperlipidemia:  -Continue prior to admission atorvastatin 20 mg daily  "     Hypertension:  -Holding prior to admission amlodipine     Major depression history:   -Continue prior to admission Cymbalta     Narcolepsy:  -Continue prior to admission Adderall 30 mg twice daily      Diet: Clear Liquid Diet     DVT Prophylaxis: Pneumatic Compression Devices   Fitzpatrick Catheter: Not present  Code Status: Full Code     Disposition Plan    Expected discharge: 11/18 if Hb stable and no evidence of re-bleeding  Entered: Errol Phillips MD 11/17/2021, 11:41 AM        The patient's care was discussed with the Bedside Nurse and Patient.    Errol Phillips MD  Hospitalist Service  [facility]  Text Page 7AM-6PM  Securely message with the Vocera Web Console (learn more here)  Text page via [employer] Paging/Directory    ______________________________________________________________________    Interval History   Hemoglobin stable. No hematochezia or melena. Mild epigastric pain. No n/v    Data reviewed today: I reviewed all medications, new labs and imaging results over the last 24 hours. I personally reviewed no images or EKG's today.    Physical Exam   Vital signs:  Temp: 97.5  F (36.4  C) Temp src: Oral BP: 106/58 Pulse: 60   Resp: 18 SpO2: 97 % O2 Device: None (Room air)   Height: 162.6 cm (5' 4\") Weight: 60.8 kg (134 lb)  Estimated body mass index is 23 kg/m  as calculated from the following:    Height as of this encounter: 1.626 m (5' 4\").    Weight as of this encounter: 60.8 kg (134 lb).      Wt Readings from Last 2 Encounters:   11/15/21 60.8 kg (134 lb)   10/13/21 66 kg (145 lb 8 oz)       Gen: AAOX3, NAD  HEENT:  no pallor  Resp: CTA B/L, normal WOB  CVS: RRR, no murmur  Abd/GI: Soft, mild epigastric tenderness. BS- normoactive.    Skin: Warm, dry no rashes  MSK: no pedal edema  Neuro- CN- intact. No focal deficits.        Data   Recent Labs   Lab 11/17/21  0822 11/17/21  0604 11/17/21  0550 11/17/21  0211 11/16/21  2359 11/16/21  2239 11/16/21  1955/21  1834 " 11/16/21  1543 11/16/21  0625 11/15/21  1737   WBC  --  6.7  --   --   --   --   --   --   --   --  14.1*   HGB  --  9.3*  --   --  8.5*  --  9.3*  --   --    < > 10.3*   MCV  --  89  --   --   --   --   --   --   --   --  87   PLT  --  169  --   --   --   --   --   --   --   --  242   INR  --   --   --   --   --   --   --   --   --   --  1.10   NA  --  140  --   --   --   --   --   --   --   --  138   POTASSIUM  --  4.1  --   --   --   --   --   --  5.1  --  5.0   CHLORIDE  --  111*  --   --   --   --   --   --   --   --  105   CO2  --  24  --   --   --   --   --   --   --   --  25   BUN  --  19  --   --   --   --   --   --   --   --  53*   CR  --  0.96  --   --   --   --   --   --   --   --  1.24*   ANIONGAP  --  5  --   --   --   --   --   --   --   --  8   KINJAL  --  8.6  --   --   --   --   --   --   --   --  9.7   * 119* 107*   < >  --    < >  --    < >  --    < > 141*   ALBUMIN  --  2.8*  --   --   --   --   --   --   --   --  3.7   PROTTOTAL  --  7.5  --   --   --   --   --   --   --   --  8.6   BILITOTAL  --  0.6  --   --   --   --   --   --   --   --  0.7   ALKPHOS  --  75  --   --   --   --   --   --   --   --  97   ALT  --  18  --   --   --   --   --   --   --   --  22   AST  --  16  --   --   --   --   --   --   --   --  16    < > = values in this interval not displayed.       No results found for this or any previous visit (from the past 24 hour(s)).  Medications       amphetamine-dextroamphetamine  30 mg Oral BID     cevimeline  30 mg Oral TID     DULoxetine  30 mg Oral BID     insulin aspart  1-3 Units Subcutaneous TID AC     insulin aspart  1-3 Units Subcutaneous At Bedtime     pantoprazole (PROTONIX) IV  40 mg Intravenous BID     sodium chloride (PF)  3 mL Intracatheter Q8H     tolterodine ER  4 mg Oral Daily     traZODone  50 mg Oral At Bedtime

## 2021-11-17 NOTE — PROGRESS NOTES
"GASTROENTEROLOGY PROGRESS NOTE    CC: Anastomotic ulcer bleed    SUBJECTIVE:  Patient feels better but still with burning with liquids    OBJECTIVE:  General Appearance:  Well nourished in NAD  /58   Pulse 60   Temp 97.5  F (36.4  C) (Oral)   Resp 18   Ht 1.626 m (5' 4\")   Wt 60.8 kg (134 lb)   SpO2 97%   BMI 23.00 kg/m    Temp (24hrs), Av.9  F (36.6  C), Min:97.3  F (36.3  C), Max:99  F (37.2  C)    Patient Vitals for the past 72 hrs:   Weight   11/15/21 1722 60.8 kg (134 lb)       Intake/Output Summary (Last 24 hours) at 2021 1216  Last data filed at 2021 2200  Gross per 24 hour   Intake 494 ml   Output --   Net 494 ml       PHYSICAL EXAM  Abdomen: Abdomen soft, tender. BS normal. No masses, organomegaly      Additional Comments:  ROS, FH, SH: See initial GI consult for details.    I have reviewed the patient's new clinical lab results:    Recent Labs   Lab Test 21  0604 21  2359 215 21  0625 11/15/21  1737 05/27/21  1502   WBC 6.7  --   --   --  14.1* 7.2   HGB 9.3* 8.5* 9.3*   < > 10.3* 12.4   MCV 89  --   --   --  87 90     --   --   --  242 187   INR  --   --   --   --  1.10  --     < > = values in this interval not displayed.     Recent Labs   Lab Test 21  0604 21  1543 11/15/21  17321  1509   POTASSIUM 4.1 5.1 5.0 5.2   CHLORIDE 111*  --  105 106   CO2 24  --  25 28   BUN 19  --  53* 28   ANIONGAP 5  --  8 9     Recent Labs   Lab Test 21  0604 11/15/21  1737 21  1509 21  1511 21  1502 21  1454 18  1439 13  1513   ALBUMIN 2.8* 3.7 3.7  --    < >  --    < > 4.5   BILITOTAL 0.6 0.7 1.1  --    < >  --    < > 0.9   ALT 18 22 23  --    < >  --    < > 49   AST 16 16 30  --    < >  --    < > 36   PROTEIN  --   --   --  Canceled, Test credited  --  20*  --   --    LIPASE  --   --   --   --   --   --   --  249    < > = values in this interval not displayed.     EGD 2021  2 large " anastomotic ulcers   One with clot unable to remove  No active bleeding    ASSESSMENT/PLAN    Anastomotic ulcers with recent bleed  No melena . Hemoglobin stable  Consider IV iron before discharge  Continue twice a day PPI lifelong. NO ASA or NSAIDs  Repeat EGD 2 months      Jayla Calvo MD  [facility]  Cell [phone]  Office

## 2021-11-17 NOTE — PLAN OF CARE
A/o x4.  VSS, denies pain.  Mag 1.4 replaced yesterday, checked in AM.  PIV D5NS at 100/hr. NPO after EGD yesterday - two ulcers found clot unable to be removed.  Voiding adequately. No BM during shift. Up IND in room. Possible advance diet and discharge to home today.

## 2021-11-17 NOTE — PLAN OF CARE
Pt A/O, VSS on RA. C/o headache, managed w/ cold pack. C/o burning to upper abd when eating, MD aware. Up ind in room, ambulated in halls.  Tolerating full liquid diet. BG checks w/ meals, , 119. 1 BM, no blood noted. Hgb 9.3, recheck at 1500. Mag 1.7, K+ 4.1, recheck tomorrow morning. PIV SL. Plan to discharge home tomorrow pending diet toleration, hgb, and no re-bleed.

## 2021-11-17 NOTE — PLAN OF CARE
VSS. A/O. Up indep in room. Mild upper abdominal pain; declines medication. No stools. Voiding adequately. Hgb 9.3. IVF infusing. NPO. BS every 4 hours. Discharge tomorrow pending ability to tolerate a diet.

## 2021-11-18 VITALS
RESPIRATION RATE: 16 BRPM | DIASTOLIC BLOOD PRESSURE: 73 MMHG | HEART RATE: 71 BPM | OXYGEN SATURATION: 95 % | WEIGHT: 134 LBS | SYSTOLIC BLOOD PRESSURE: 123 MMHG | TEMPERATURE: 98.1 F | HEIGHT: 64 IN | BODY MASS INDEX: 22.88 KG/M2

## 2021-11-18 LAB
GLUCOSE BLDC GLUCOMTR-MCNC: 126 MG/DL (ref 70–99)
GLUCOSE BLDC GLUCOMTR-MCNC: 163 MG/DL (ref 70–99)
GLUCOSE BLDC GLUCOMTR-MCNC: 76 MG/DL (ref 70–99)
GLUCOSE BLDC GLUCOMTR-MCNC: 92 MG/DL (ref 70–99)
MAGNESIUM SERPL-MCNC: 1.5 MG/DL (ref 1.6–2.3)
POTASSIUM BLD-SCNC: 4.3 MMOL/L (ref 3.4–5.3)

## 2021-11-18 PROCEDURE — 99239 HOSP IP/OBS DSCHRG MGMT >30: CPT | Performed by: INTERNAL MEDICINE

## 2021-11-18 PROCEDURE — 250N000013 HC RX MED GY IP 250 OP 250 PS 637: Performed by: INTERNAL MEDICINE

## 2021-11-18 PROCEDURE — 83735 ASSAY OF MAGNESIUM: CPT | Performed by: INTERNAL MEDICINE

## 2021-11-18 PROCEDURE — C9113 INJ PANTOPRAZOLE SODIUM, VIA: HCPCS | Performed by: INTERNAL MEDICINE

## 2021-11-18 PROCEDURE — 36415 COLL VENOUS BLD VENIPUNCTURE: CPT | Performed by: INTERNAL MEDICINE

## 2021-11-18 PROCEDURE — 250N000011 HC RX IP 250 OP 636: Performed by: INTERNAL MEDICINE

## 2021-11-18 PROCEDURE — 84132 ASSAY OF SERUM POTASSIUM: CPT | Performed by: INTERNAL MEDICINE

## 2021-11-18 RX ORDER — PANTOPRAZOLE SODIUM 40 MG/1
40 TABLET, DELAYED RELEASE ORAL 2 TIMES DAILY
Qty: 60 TABLET | Refills: 1 | Status: SHIPPED | OUTPATIENT
Start: 2021-11-18 | End: 2022-01-17

## 2021-11-18 RX ORDER — SUCRALFATE 1 G/1
1 TABLET ORAL
Qty: 60 TABLET | Refills: 0 | Status: SHIPPED | OUTPATIENT
Start: 2021-11-18 | End: 2022-02-04

## 2021-11-18 RX ORDER — SUCRALFATE 1 G/1
1 TABLET ORAL
Status: DISCONTINUED | OUTPATIENT
Start: 2021-11-18 | End: 2021-11-18 | Stop reason: HOSPADM

## 2021-11-18 RX ORDER — MAGNESIUM OXIDE 400 MG/1
400 TABLET ORAL 2 TIMES DAILY
Status: DISCONTINUED | OUTPATIENT
Start: 2021-11-18 | End: 2021-11-18 | Stop reason: HOSPADM

## 2021-11-18 RX ADMIN — PANTOPRAZOLE SODIUM 40 MG: 40 INJECTION, POWDER, FOR SOLUTION INTRAVENOUS at 09:18

## 2021-11-18 RX ADMIN — SUCRALFATE 1 G: 1 TABLET ORAL at 13:31

## 2021-11-18 RX ADMIN — Medication 400 MG: at 09:18

## 2021-11-18 RX ADMIN — DULOXETINE 30 MG: 30 CAPSULE, DELAYED RELEASE ORAL at 09:18

## 2021-11-18 RX ADMIN — TOLTERODINE TARTRATE 4 MG: 4 CAPSULE, EXTENDED RELEASE ORAL at 09:17

## 2021-11-18 RX ADMIN — DEXTROAMPHETAMINE SACCHARATE, AMPHETAMINE ASPARTATE, DEXTROAMPHETAMINE SULFATE AND AMPHETAMINE SULFATE 30 MG: 2.5; 2.5; 2.5; 2.5 TABLET ORAL at 09:17

## 2021-11-18 RX ADMIN — CEVIMELINE 30 MG: 30 CAPSULE ORAL at 16:51

## 2021-11-18 RX ADMIN — CEVIMELINE 30 MG: 30 CAPSULE ORAL at 09:18

## 2021-11-18 RX ADMIN — DEXTROAMPHETAMINE SACCHARATE, AMPHETAMINE ASPARTATE, DEXTROAMPHETAMINE SULFATE AND AMPHETAMINE SULFATE 30 MG: 2.5; 2.5; 2.5; 2.5 TABLET ORAL at 12:38

## 2021-11-18 ASSESSMENT — ACTIVITIES OF DAILY LIVING (ADL)
ADLS_ACUITY_SCORE: 8
ADLS_ACUITY_SCORE: 7
ADLS_ACUITY_SCORE: 8
ADLS_ACUITY_SCORE: 7
ADLS_ACUITY_SCORE: 8
ADLS_ACUITY_SCORE: 7
ADLS_ACUITY_SCORE: 8
ADLS_ACUITY_SCORE: 7
ADLS_ACUITY_SCORE: 7
ADLS_ACUITY_SCORE: 8
ADLS_ACUITY_SCORE: 7

## 2021-11-18 NOTE — DISCHARGE SUMMARY
[facility]    Discharge Summary  Hospitalist    Date of Admission:  11/16/2021  Date of Discharge:  11/18/2021  Discharging Provider: Errol Phillips MD    Discharge Diagnoses        Upper GI bleed  Gastrojejunal anastomosis ulcer  Acute blood loss anemia due to #2 above  Type 2 diabetes  Nonsevere protein calorie malnutrition   Hyperlipidemia  Hypertension  Major depression history  Narcolepsy    Hospital Course:    Karine Moss is a 66 year old female admitted on 11/16/2021. She presents to the emergency department with her daughter after recommended to seek evaluation by her primary care team in the setting of approximately 1 month of melenic stool with 3 or so bowel movements per day, 20 pound weight loss over the past few months.  History of cratered gastric ulcers as well as gastric bypass surgery.     Upper GI bleed  Gastrojejunal anastomosis ulcer  Acute blood loss anemia due to #1 above  -Presented with melenic stools and 20 pound weight loss over the past few months.  -Hemoglobin at presentation was 10.3 with recent hemoglobin of around 12  -Hemoglobin nehemiah of 8.8  -[facility] consulted, she is s/p EGD which revealed 2 ulcers at the gastrojejunal anastomosis.  Ulcers were 12 mm and 12 mm with a clot on a single ulcer.  No active bleeding or bleeding.  Unable to dislodge clot.  -Continue Protonix 40 mg  twice a day  -Discontinue aspirin and NSAID use indefinitely  -Hb post procedure stable at 9.7  -She will discharge on Protonix and Carafate  -Repeat endoscopy in 2 months to check healing.  -Tolerated low fiber diet on the day of discharge.     Type 2 diabetes  -Most recent hemoglobin A1c 5.8 9/9/2021.  Has been off of insulin, now also off of glipizide.    -Medium dose sliding scale insulin available as needed  -Resume prior to admission regimen     Nonsevere protein calorie malnutrition: Patient with approximately 20 pound weight loss over the past 2 months.  Might be  multifactorial.  Patient with pain with oral intake in the setting of suspected upper GI ulcer reducing oral intake, though also with recent dental extraction (September) still prior to denture fitting  -Continue follow-up with dentistry for dentures  -Continue to follow weight closely.  More than likely the ulcer was contributing to her decreased oral intake, weight loss should improve with treatment of #1 above.     Hyperlipidemia:  -Continue prior to admission atorvastatin 20 mg daily      Hypertension:  -Resume prior to admission amlodipine     Major depression history:   -Continue prior to admission Cymbalta     Narcolepsy:  -Continue prior to admission Adderall 30 mg twice daily    Errol Phillips MD    Significant Results and Procedures   See below    Pending Results     Unresulted Labs Ordered in the Past 30 Days of this Admission     No orders found from 10/17/2021 to 11/17/2021.          Code Status   Full Code       Primary Care Physician   Anay Martinez    Physical Exam   Temp: 98.1  F (36.7  C) Temp src: Oral BP: 123/73 Pulse: 71   Resp: 16 SpO2: 95 % O2 Device: None (Room air)      Constitutional: AAOX3, NAD  Respiratory: CTA B/L, Normal WOB  Cardiovascular: RRR, No murmur  GI: Soft, Non- tender, BS- normoactive  Neuro: CN- grossly intact, Motor strength 5/5 on all 4 extremities.     Discharge Disposition   Discharged to home  Condition at discharge: Stable    Consultations This Hospital Stay   GASTROENTEROLOGY IP CONSULT    Time Spent on this Encounter   I, Errol Phillips MD, personally saw the patient today and spent greater than 30 minutes discharging this patient.    Discharge Orders      Reason for your hospital stay    GI bleed     Follow-up and recommended labs and tests    Follow up with primary care provider, Anay Martinez, within 7 days for hospital follow- up.    F/U with [facility] in 2 months for repeat EGD     Activity    Your activity upon discharge: activity as tolerated      Diet    Follow this diet upon discharge: Orders Placed This Encounter      Low Fiber Diet     Discharge Medications   Current Discharge Medication List      START taking these medications    Details   magnesium oxide (MAG-OX) 400 (241.3 Mg) MG tablet Take 1 tablet (400 mg) by mouth 2 times daily  Qty: 4 tablet, Refills: 0    Associated Diagnoses: Hypomagnesemia      pantoprazole (PROTONIX) 40 MG EC tablet Take 1 tablet (40 mg) by mouth 2 times daily  Qty: 60 tablet, Refills: 1    Comments: Future refills by PCP Dr. Anay Martinez with phone number [phone].  Associated Diagnoses: Upper GI bleed         CONTINUE these medications which have NOT CHANGED    Details   amLODIPine (NORVASC) 5 MG tablet Take 1 tablet (5 mg) by mouth daily  Qty: 90 tablet, Refills: 3    Associated Diagnoses: Hypertension goal BP (blood pressure) < 140/90      amphetamine-dextroamphetamine (ADDERALL) 30 MG tablet Take 30 mg by mouth 3 times daily as needed      atorvastatin (LIPITOR) 20 MG tablet Take 1 tablet (20 mg) by mouth daily  Qty: 90 tablet, Refills: 3    Associated Diagnoses: Hyperlipidemia LDL goal <100      calcium carbonate (OS-KINJAL) 1500 (600 Ca) MG tablet Take 2 tablets (1,200 mg) by mouth daily      cevimeline (EVOXAC) 30 MG capsule take 1 cap TID  Qty: 270 capsule, Refills: 3    Comments: Note change in dose  Associated Diagnoses: Dry mouth, unspecified      COMPOUNDED NON-CONTROLLED SUBSTANCE (CMPD RX) - PHARMACY TO MIX COMPOUNDED MEDICATION Estradiol 0.2 mg/gm in HRT cream. Take one-quarter gm vaginally daily x 2 weeks then twice weekly  Qty: 30 g, Refills: 3    Associated Diagnoses: Atrophic vaginitis      cyanocobalamin (VITAMIN B-12) 1000 MCG tablet Take 1 tablet (1,000 mcg) by mouth daily  Qty: 90 tablet, Refills: 0    Associated Diagnoses: Preventative health care      DULoxetine (CYMBALTA) 30 MG capsule Take 1 capsule (30 mg) by mouth 2 times daily  Qty: 180 capsule, Refills: 3    Associated Diagnoses:  Diabetic polyneuropathy associated with type 2 diabetes mellitus (H)      losartan (COZAAR) 100 MG tablet Take 1 tablet (100 mg) by mouth daily  Qty: 90 tablet, Refills: 3    Associated Diagnoses: Hypertension goal BP (blood pressure) < 140/90      metFORMIN (GLUCOPHAGE-XR) 500 MG 24 hr tablet Take 4 po q am with breakfast.  Qty: 360 tablet, Refills: 3    Associated Diagnoses: Type 2 diabetes mellitus without complication, without long-term current use of insulin (H)      omeprazole (PRILOSEC) 40 MG DR capsule Take one daily  Qty: 90 capsule, Refills: 3    Associated Diagnoses: Gastrointestinal hemorrhage associated with gastric ulcer      tolterodine ER (DETROL LA) 4 MG 24 hr capsule Take 1 capsule (4 mg) by mouth daily  Qty: 90 capsule, Refills: 1    Associated Diagnoses: Mixed incontinence urge and stress (male)(female)      traZODone (DESYREL) 50 MG tablet Take 50 mg by mouth At Bedtime      Vitamin D3 (CHOLECALCIFEROL) 25 mcg (1000 units) tablet Take 1 tablet (25 mcg) by mouth daily  Qty: 90 tablet, Refills: 2    Associated Diagnoses: Vitamin D deficiency      blood glucose (NO BRAND SPECIFIED) lancets standard Use to test blood sugar 4 times daily or as directed. Dispense appropriate lancets for meter.  Qty: 360 each, Refills: 3    Associated Diagnoses: Type 2 diabetes mellitus without complication, without long-term current use of insulin (H)      blood glucose (NO BRAND SPECIFIED) test strip Check blood glucose TID To accompany: Blood Glucose Monitor Brands: per insurance.  Qty: 300 strip, Refills: 3    Associated Diagnoses: Type 2 diabetes mellitus without complication, without long-term current use of insulin (H)      blood glucose calibration (NO BRAND SPECIFIED) solution Use to calibrate blood glucose monitor as needed as directed.  Qty: 1 each, Refills: 0    Associated Diagnoses: Type 2 diabetes mellitus without complication, without long-term current use of insulin (H)      blood glucose monitoring (NO  BRAND SPECIFIED) meter device kit Use to test blood sugar 4 times daily or as directed. Dispense One-Touch Verio IQ Kit - per patient request.  Qty: 1 kit, Refills: 0    Associated Diagnoses: Type 2 diabetes mellitus without complication, without long-term current use of insulin (H)      Continuous Blood Gluc  (FREESTYLE JORDIN 14 DAY READER) EBEN Use to read blood sugars as per 's instructions.  Qty: 1 each, Refills: 0    Associated Diagnoses: Type 2 diabetes mellitus without complication, without long-term current use of insulin (H)      Continuous Blood Gluc Sensor (FREESTYLE JORDIN 14 DAY SENSOR) MISC Change every 14 days.  Qty: 2 each, Refills: 11    Associated Diagnoses: Type 2 diabetes mellitus without complication, without long-term current use of insulin (H)      glipiZIDE (GLUCOTROL XL) 2.5 MG 24 hr tablet Take one daily  Qty: 90 tablet, Refills: 1    Comments: Note decrease in dose  Associated Diagnoses: Type 2 diabetes mellitus without complication, without long-term current use of insulin (H)         STOP taking these medications       aspirin 81 MG EC tablet Comments:   Reason for Stopping:             Allergies   Allergies   Allergen Reactions     Pravastatin Other (See Comments)     woozy     Codeine Nausea and Vomiting     Nsaids GI Disturbance and Other (See Comments)     Pt had bariatric surgery, should not ever take oral NSAIDS due to risk of gastric ulcers.  Pt had bariatric surgery, should not ever take oral NSAIDS due to risk of gastric ulcers.     Data   Most Recent 3 CBC's:  Recent Labs   Lab Test 11/17/21  1517 11/17/21  0604 11/16/21  2359 11/16/21  0625 11/15/21  1737 05/27/21  1502   WBC  --  6.7  --   --  14.1* 7.2   HGB 9.7* 9.3* 8.5*   < > 10.3* 12.4   MCV  --  89  --   --  87 90   PLT  --  169  --   --  242 187    < > = values in this interval not displayed.      Most Recent 3 BMP's:  Recent Labs   Lab Test 11/18/21  0919 11/18/21  0715 11/18/21  0155 11/17/21  2117  21  0822 21  0604 21  1834 21  1543 21  1432 11/15/21  1737 21  1509   NA  --   --   --   --   --  140  --   --   --  138 143   POTASSIUM  --  4.3  --   --   --  4.1  --  5.1  --  5.0 5.2   CHLORIDE  --   --   --   --   --  111*  --   --   --  105 106   CO2  --   --   --   --   --  24  --   --   --  25 28   BUN  --   --   --   --   --  19  --   --   --  53* 28   CR  --   --   --   --   --  0.96  --   --   --  1.24* 1.50*   ANIONGAP  --   --   --   --   --  5  --   --   --  8 9   KINJAL  --   --   --   --   --  8.6  --   --   --  9.7 10.0   GLC 92  --  76 80   < > 119*   < >  --    < > 141* 73    < > = values in this interval not displayed.     Most Recent 2 LFT's:  Recent Labs   Lab Test 21  0604 11/15/21  1737   AST 16 16   ALT 18 22   ALKPHOS 75 97   BILITOTAL 0.6 0.7     Most Recent INR's and Anticoagulation Dosing History:  Anticoagulation Dose History     Recent Dosing and Labs Latest Ref Rng & Units 11/15/2021    INR 0.85 - 1.15 1.10        Most Recent 3 Troponin's:No lab results found.  Most Recent Cholesterol Panel:  Recent Labs   Lab Test 21  1615   CHOL 113.0   LDL 46.0   HDL 35.0*   TRIG 157.0*     Most Recent 6 Bacteria Isolates From Any Culture (See EPIC Reports for Culture Details):No lab results found.  Most Recent TSH, T4 and A1c Labs:  Recent Labs   Lab Test 21  1509 20  1505 10/22/19  1113   TSH  --   --  1.69   A1C 5.8*   < >  --     < > = values in this interval not displayed.       Results for orders placed or performed during the hospital encounter of 20   Echocardiogram Complete    Narrative    [id number]  [id number]  [id number]  [id number]^ALYSSA^SARA^SONDRA           [facility]  Echocardiography Laboratory  [facility] [place] [address]        Name: JIMMY KELLER  MRN: [id number]  : 1955  Study Date: 2020 02:54 PM  Age: 65 yrs  Gender: Female  Patient Location: [facility]  Reason For Study:  Lower extremity edema  Ordering Physician: SARA SHAH  Referring Physician: TANG JOSEPH  Performed By: Melissa Yin     BSA: 1.8 m2  Height: 64 in  Weight: 168 lb  HR: 92  BP: 125/74 mmHg  _____________________________________________________________________________  __        Procedure  Complete Echo Adult.  _____________________________________________________________________________  __        Interpretation Summary     Left ventricular systolic function is normal.  The visual ejection fraction is estimated at 65-70%.  Grade I or early diastolic dysfunction.  Proximal septal thickening is noted without outflow obstruction.  The right ventricle is normal in structure, function and size.  No significant valve dysfunction.  The inferior vena cava is normal.  There is no pericardial effusion.     No prior for comparison.  _____________________________________________________________________________  __        Left Ventricle  The left ventricle is normal in size. There is mild concentric left  ventricular hypertrophy. Proximal septal thickening is noted. Echo findings  are not consistent with left ventricular outflow obstruction. Left ventricular  systolic function is normal. The visual ejection fraction is estimated at 65-  70%. Grade I or early diastolic dysfunction.     Right Ventricle  The right ventricle is normal in structure, function and size.     Atria  Normal left atrial size. Right atrial size is normal.     Mitral Valve  There is mild mitral annular calcification. There is trace mitral  regurgitation.        Tricuspid Valve  The tricuspid valve is normal in structure and function. Right ventricular  systolic pressure could not be approximated due to inadequate tricuspid  regurgitation.     Aortic Valve  There is mild trileaflet aortic sclerosis.     Pulmonic Valve  The pulmonic valve is normal in structure and function.     Vessels  The aortic root is normal size. Normal size ascending aorta. The  inferior vena  cava is normal.     Pericardium  There is no pericardial effusion.     _____________________________________________________________________________  __  MMode/2D Measurements & Calculations  IVSd: 1.2 cm  LVIDd: 4.4 cm  LVIDs: 2.4 cm  LVPWd: 1.1 cm  FS: 46.5 %  LV mass(C)d: 180.5 grams  LV mass(C)dI: 99.4 grams/m2     Ao root diam: 3.0 cm  LA dimension: 4.0 cm  asc Aorta Diam: 3.4 cm  LA/Ao: 1.3  LVOT diam: 2.4 cm  LVOT area: 4.6 cm2  LA Volume (BP): 43.8 ml  LA Volume Index (BP): 24.1 ml/m2  RWT: 0.51        Doppler Measurements & Calculations  MV E max ac: 59.7 cm/sec  MV A max ac: 103.3 cm/sec  MV E/A: 0.58  MV dec time: 0.25 sec     PA V2 max: 106.4 cm/sec  PA max P.5 mmHg  PA acc time: 0.12 sec  E/E' avg: 10.2  Lateral E/e': 10.3  Medial E/e': 10.1           _____________________________________________________________________________  __           Report approved by: Christian Mac 2020 03:24 PM

## 2021-11-18 NOTE — PLAN OF CARE
Pt A&Ox4, VSS on RA. Sleeping well. Independent in room per report. Mg 1.7, K+ 4.1. Redraw at 0600. Hgb 9.7. BG  2100 80, 0200 76. Discharge anticipated today pending tolerating diet, hgb continuing to trend upwards and no signs of rebleed.

## 2021-11-18 NOTE — DISCHARGE SUMMARY
Discharge Note    Patient discharged to home via private vehicle  accompanied by daughter.  IV: Discontinued  Prescriptions filled and given to patient/family.   Belongings reviewed and sent with patient.   Home medications returned to patient: NA  Equipment sent with: patient.   patient and family verbalizes understanding of discharge instructions. AVS given to patient and family.    Pt tolerated low fiber diet, burning sensation to upper abd improved.

## 2021-11-18 NOTE — PROVIDER NOTIFICATION
MD Notification    Notified Person: MD    Notified Person Name: ALEJANDRA Calvo    Notification Date/Time: 11/18/21, 11:54 AM    Notification Interaction: [employer] messaging    Purpose of Notification: FYI pt trying the low fiber diet, still c/o burning in the upper abd, is that normal, any suggestions for the pt, and will she still be ok to discharge?    Orders Received: Written order from MD to order Carafate tablets twice a day, ok to discharge if able to eat    Comments:

## 2021-11-18 NOTE — PROGRESS NOTES
"GASTROENTEROLOGY PROGRESS NOTE    CC: Anastomotic ulcers with bleed    SUBJECTIVE:  Feels better  No melena    OBJECTIVE:  General Appearance:  Well nourished in NAD  /73 (BP Location: Right arm)   Pulse 71   Temp 98.1  F (36.7  C) (Oral)   Resp 16   Ht 1.626 m (5' 4\")   Wt 60.8 kg (134 lb)   SpO2 95%   BMI 23.00 kg/m    Temp (24hrs), Av.1  F (36.7  C), Min:97.9  F (36.6  C), Max:98.3  F (36.8  C)    Patient Vitals for the past 72 hrs:   Weight   11/15/21 1722 60.8 kg (134 lb)       Intake/Output Summary (Last 24 hours) at 2021 0920  Last data filed at 2021 2220  Gross per 24 hour   Intake 330 ml   Output --   Net 330 ml       PHYSICAL EXAM  Abdomen: Abdomen soft, mildly tender      Additional Comments:  ROS, FH, SH: See initial GI consult for details.    I have reviewed the patient's new clinical lab results:    Recent Labs   Lab Test 21  1517 21  0604 21  2359 21  0625 11/15/21  1737 05/27/21  1502   WBC  --  6.7  --   --  14.1* 7.2   HGB 9.7* 9.3* 8.5*   < > 10.3* 12.4   MCV  --  89  --   --  87 90   PLT  --  169  --   --  242 187   INR  --   --   --   --  1.10  --     < > = values in this interval not displayed.     Recent Labs   Lab Test 21  0715 21  0604 21  1543 11/15/21  1737 21  1509   POTASSIUM 4.3 4.1 5.1 5.0 5.2   CHLORIDE  --  111*  --  105 106   CO2  --  24  --  25 28   BUN  --  19  --  53* 28   ANIONGAP  --  5  --  8 9     Recent Labs   Lab Test 21  0604 11/15/21  1737 21  1509 21  1511 21  1502 21  1454 18  1439 13  1513   ALBUMIN 2.8* 3.7 3.7  --    < >  --    < > 4.5   BILITOTAL 0.6 0.7 1.1  --    < >  --    < > 0.9   ALT 18 22 23  --    < >  --    < > 49   AST 16 16 30  --    < >  --    < > 36   PROTEIN  --   --   --  Canceled, Test credited  --  20*  --   --    LIPASE  --   --   --   --   --   --   --  249    < > = values in this interval not displayed.       EGD 2021  2 " large anastomotic ulcers   One with clot unable to remove  No active bleeding     ASSESSMENT/PLAN     Anastomotic ulcers with recent bleed  No melena . Hemoglobin stable.   Advance diet   Consider IV iron before discharge  Continue twice a day PPI lifelong. NO ASA or NSAIDs  Repeat EGD 2 months  Okay to discharge if stable    Jayla Calvo MD  [facility]  Cell [phone]  Office

## 2021-11-18 NOTE — PLAN OF CARE
Pt A&Ox4, VSS on RA. Sleeping well. Independent in room. Denied pain. Advanced low fiber diet, c/o burning sensation upper abd. MD notified and ordered sucralfate. BG checked with meals. No BM this shift. Mg 1.5 , replacement given, K= 4.3 .  PIV SL. Discharge anticipated today pending tolerating diet.

## 2021-11-18 NOTE — PLAN OF CARE
Pt A/O. Pt here with GI bleed. No blood in stool this shift. Complains of some upper gastric pain upon eating. Pt given popsicle with effectiveness. Pt independent in room. BG at [phone] non coverage needed. potassium and mag protocol. Redraw in the morning

## 2021-11-24 ENCOUNTER — HOSPITAL ENCOUNTER (OUTPATIENT)
Dept: MAMMOGRAPHY | Facility: CLINIC | Age: 66
Discharge: HOME | End: 2021-11-24
Attending: INTERNAL MEDICINE | Admitting: INTERNAL MEDICINE
Payer: COMMERCIAL

## 2021-11-24 DIAGNOSIS — Z12.31 VISIT FOR SCREENING MAMMOGRAM: ICD-10-CM

## 2021-11-24 PROCEDURE — 77067 SCR MAMMO BI INCL CAD: CPT

## 2021-12-06 DIAGNOSIS — N39.46 MIXED INCONTINENCE URGE AND STRESS (MALE)(FEMALE): ICD-10-CM

## 2021-12-06 RX ORDER — TOLTERODINE 4 MG/1
4 CAPSULE, EXTENDED RELEASE ORAL DAILY
Qty: 90 CAPSULE | Refills: 0 | Status: SHIPPED | OUTPATIENT
Start: 2021-12-06 | End: 2021-12-13

## 2021-12-06 NOTE — TELEPHONE ENCOUNTER
Tolterodine ER (Detrol LA) 4 mg    Last Office Visit: 10/13/21   [place] [facility] [place] 12[place]13/21  Medication last refilled: 6/9/21 #90 with 1 refill(s)    Prescription approved per [facility] Refill Protocol.    Joi Taveras RN, BSN

## 2021-12-13 ENCOUNTER — OFFICE VISIT (OUTPATIENT)
Dept: FAMILY MEDICINE | Facility: CLINIC | Age: 66
End: 2021-12-13
Payer: COMMERCIAL

## 2021-12-13 VITALS
TEMPERATURE: 97.9 F | SYSTOLIC BLOOD PRESSURE: 110 MMHG | BODY MASS INDEX: 24.59 KG/M2 | HEIGHT: 64 IN | OXYGEN SATURATION: 100 % | HEART RATE: 99 BPM | WEIGHT: 144.04 LBS | DIASTOLIC BLOOD PRESSURE: 73 MMHG

## 2021-12-13 DIAGNOSIS — N39.46 MIXED INCONTINENCE URGE AND STRESS (MALE)(FEMALE): ICD-10-CM

## 2021-12-13 DIAGNOSIS — E11.42 DIABETIC POLYNEUROPATHY ASSOCIATED WITH TYPE 2 DIABETES MELLITUS (H): ICD-10-CM

## 2021-12-13 DIAGNOSIS — E11.9 TYPE 2 DIABETES MELLITUS WITHOUT COMPLICATION, WITHOUT LONG-TERM CURRENT USE OF INSULIN (H): Primary | ICD-10-CM

## 2021-12-13 DIAGNOSIS — E53.8 VITAMIN B12 DEFICIENCY (NON ANEMIC): ICD-10-CM

## 2021-12-13 DIAGNOSIS — B07.0 PLANTAR WARTS: ICD-10-CM

## 2021-12-13 DIAGNOSIS — K25.4 GASTROINTESTINAL HEMORRHAGE ASSOCIATED WITH GASTRIC ULCER: ICD-10-CM

## 2021-12-13 LAB
ERYTHROCYTE [DISTWIDTH] IN BLOOD BY AUTOMATED COUNT: 13.2 % (ref 10–15)
HBA1C MFR BLD: 6 % (ref 0–5.6)
HCT VFR BLD AUTO: 32.9 % (ref 35–47)
HGB BLD-MCNC: 10.1 G/DL (ref 11.7–15.7)
MCH RBC QN AUTO: 26.4 PG (ref 26.5–33)
MCHC RBC AUTO-ENTMCNC: 30.7 G/DL (ref 31.5–36.5)
MCV RBC AUTO: 86 FL (ref 78–100)
PLATELET # BLD AUTO: 204 10E3/UL (ref 150–450)
RBC # BLD AUTO: 3.82 10E6/UL (ref 3.8–5.2)
VIT B12 SERPL-MCNC: 660 PG/ML (ref 193–986)
WBC # BLD AUTO: 7 10E3/UL (ref 4–11)

## 2021-12-13 PROCEDURE — 82607 VITAMIN B-12: CPT | Performed by: INTERNAL MEDICINE

## 2021-12-13 RX ORDER — GLIPIZIDE 5 MG/1
2.5 TABLET ORAL EVERY 24 HOURS
COMMUNITY
Start: 2020-10-01 | End: 2021-12-13 | Stop reason: DRUGHIGH

## 2021-12-13 RX ORDER — LANOLIN ALCOHOL/MO/W.PET/CERES
CREAM (GRAM) TOPICAL
Qty: 90 TABLET | Refills: 0 | COMMUNITY
Start: 2021-12-13 | End: 2022-02-24

## 2021-12-13 RX ORDER — FLASH GLUCOSE SENSOR
KIT MISCELLANEOUS
Qty: 2 EACH | Refills: 11 | Status: SHIPPED | OUTPATIENT
Start: 2021-12-13 | End: 2022-10-05

## 2021-12-13 RX ORDER — GLIPIZIDE 2.5 MG/1
TABLET, EXTENDED RELEASE ORAL
Qty: 90 TABLET | Refills: 0 | COMMUNITY
Start: 2021-12-13 | End: 2022-01-20

## 2021-12-13 RX ORDER — TOLTERODINE 4 MG/1
4 CAPSULE, EXTENDED RELEASE ORAL DAILY
Qty: 90 CAPSULE | Refills: 3 | Status: SHIPPED | OUTPATIENT
Start: 2021-12-13 | End: 2022-11-22

## 2021-12-13 ASSESSMENT — ANXIETY QUESTIONNAIRES
IF YOU CHECKED OFF ANY PROBLEMS ON THIS QUESTIONNAIRE, HOW DIFFICULT HAVE THESE PROBLEMS MADE IT FOR YOU TO DO YOUR WORK, TAKE CARE OF THINGS AT HOME, OR GET ALONG WITH OTHER PEOPLE: NOT DIFFICULT AT ALL
6. BECOMING EASILY ANNOYED OR IRRITABLE: SEVERAL DAYS
2. NOT BEING ABLE TO STOP OR CONTROL WORRYING: SEVERAL DAYS
1. FEELING NERVOUS, ANXIOUS, OR ON EDGE: SEVERAL DAYS
3. WORRYING TOO MUCH ABOUT DIFFERENT THINGS: SEVERAL DAYS
GAD7 TOTAL SCORE: 7
7. FEELING AFRAID AS IF SOMETHING AWFUL MIGHT HAPPEN: SEVERAL DAYS
5. BEING SO RESTLESS THAT IT IS HARD TO SIT STILL: MORE THAN HALF THE DAYS

## 2021-12-13 ASSESSMENT — MIFFLIN-ST. JEOR: SCORE: 1178.61

## 2021-12-13 ASSESSMENT — PATIENT HEALTH QUESTIONNAIRE - PHQ9
SUM OF ALL RESPONSES TO PHQ QUESTIONS 1-9: 4
5. POOR APPETITE OR OVEREATING: NOT AT ALL

## 2021-12-13 NOTE — NURSING NOTE
"66 year old  Chief Complaint   Patient presents with     Diabetes     follow up      Hospital F/U       Blood pressure 110/73, pulse 99, temperature 97.9  F (36.6  C), height 1.626 m (5' 4.02\"), weight 65.3 kg (144 lb 0.6 oz), SpO2 100 %, not currently breastfeeding. Body mass index is 24.71 kg/m .  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)     Keratoconus     Hyperlipidemia LDL goal <100     Obstructive sleep apnea     Vitamin D insufficiency     Major depression in partial remission (H)     Plantar warts     Low back pain     DJD (degenerative joint disease) of knee     Pancreatitis     Panic     Chest pain     IBS (irritable bowel syndrome)     Heart murmur     Hypertension goal BP (blood pressure) < 140/90     Type 2 diabetes mellitus without complication, without long-term current use of insulin (H)     Osteopenia of hip     Acute conjunctivitis of left eye     PTSD (post-traumatic stress disorder)     Diabetic peripheral neuropathy (H)     Bilateral sciatica     Gastrointestinal hemorrhage associated with gastric ulcer     Chronic kidney disease, stage 3 (H)     Chronic diastolic congestive heart failure (H)     Upper GI bleed       Wt Readings from Last 2 Encounters:   12/13/21 65.3 kg (144 lb 0.6 oz)   11/15/21 60.8 kg (134 lb)     BP Readings from Last 3 Encounters:   12/13/21 110/73   11/18/21 123/73   10/13/21 137/82         Current Outpatient Medications   Medication     glipiZIDE (GLUCOTROL) 5 MG tablet     amLODIPine (NORVASC) 5 MG tablet     amphetamine-dextroamphetamine (ADDERALL) 30 MG tablet     atorvastatin (LIPITOR) 20 MG tablet     blood glucose (NO BRAND SPECIFIED) lancets standard     blood glucose (NO BRAND SPECIFIED) test strip     blood glucose calibration (NO BRAND SPECIFIED) solution     blood glucose monitoring (NO BRAND SPECIFIED) meter device kit     calcium carbonate (OS-KINJAL) " 1500 (600 Ca) MG tablet     cevimeline (EVOXAC) 30 MG capsule     COMPOUNDED NON-CONTROLLED SUBSTANCE (CMPD RX) - PHARMACY TO MIX COMPOUNDED MEDICATION     Continuous Blood Gluc  (FREESTYLE JORDIN 14 DAY READER) EBEN     Continuous Blood Gluc Sensor (FREESTYLE JORDIN 14 DAY SENSOR) MISC     cyanocobalamin (VITAMIN B-12) 1000 MCG tablet     DULoxetine (CYMBALTA) 30 MG capsule     losartan (COZAAR) 100 MG tablet     magnesium oxide (MAG-OX) 400 (241.3 Mg) MG tablet     metFORMIN (GLUCOPHAGE-XR) 500 MG 24 hr tablet     pantoprazole (PROTONIX) 40 MG EC tablet     sucralfate (CARAFATE) 1 GM tablet     tolterodine ER (DETROL LA) 4 MG 24 hr capsule     traZODone (DESYREL) 50 MG tablet     Vitamin D3 (CHOLECALCIFEROL) 25 mcg (1000 units) tablet     No current facility-administered medications for this visit.       Social History     Tobacco Use     Smoking status: Never Smoker     Smokeless tobacco: Never Used   Substance Use Topics     Alcohol use: Not Currently     Comment: rare     Drug use: No       Health Maintenance Due   Topic Date Due     HF ACTION PLAN  Never done     ADVANCE CARE PLANNING  Never done     HEPATITIS C SCREENING  Never done     ZOSTER IMMUNIZATION (1 of 2) Never done     DIABETIC FOOT EXAM  10/31/2019     MEDICARE ANNUAL WELLNESS VISIT  04/12/2020     EYE EXAM  03/10/2021     DEXA  04/26/2021     MICROALBUMIN  09/04/2021     COLORECTAL CANCER SCREENING  10/01/2021     COVID-19 Vaccine (3 - Booster for Pfizer series) 10/02/2021     FALL RISK ASSESSMENT  10/07/2021       Lab Results   Component Value Date    PAP NIL 04/12/2019 December 13, 2021 2:23 PM

## 2021-12-13 NOTE — PATIENT INSTRUCTIONS
[facility]   Call to change time of appointment  [phone]      VACCINES  COVID 19 BOOSTER--GO TO PHARMACY FOR THESE  SHINGLES VACCINES, SERIES OF 2

## 2021-12-13 NOTE — PROGRESS NOTES
"Karine Moss is a 66 year old female who presents to the clinic today for a recheck of    HFU 11/16-11/18, Gastric ulcer and Upper GI bleed  Karine was admitted to the ED from 11/16/2021 to 11/18/21 for evaluation of melena. She reported one month hx of dark stools and 20 pound weight loss.  She has previous hx of cratered gastric ulcers as well as gastric bypass surgery. Hemoglobin at presentation was 10.3 with recent hemoglobin of around 12. Hemoglobin nehemiah of 8.8. [facility] consulted, EGD revealed 2 ulcers at the gastrojejunal anastomosis. Ulcers were 12 mm and 12 mm with a clot on a single ulcer. No active bleeding. Unable to dislodge clot. Hb post procedure stable at 9.7. She was to discontinue aspirin and NSAID use indefinitely. Was switched from omeprazole to Protonix 40 mg BID. Started on Carafate before meals. Was instructed to repeat EGD in 2 months.    Today, Karine reports that she is doing better. No melena. She has gained about 10 pounds since 11/15/21. She is taking her medications regularly, Carafate and Protonix before meals. She does not have much of an appetite but reports that this is not unusual for her. Will be getting dentures and two dental implants soon. Currently eating soft/pureed diet.       DIABETES  Here for Type II diabetes.  Due for an eye exam, no vision changes.  Retinopathy: none  Peripheral neuropathy: yes, see below  Weight: +10 pounds since Nov 2021  Wt Readings from Last 3 Encounters:   12/13/21 65.3 kg (144 lb 0.6 oz)   11/15/21 60.8 kg (134 lb)   10/13/21 66 kg (145 lb 8 oz)   Diet: eats periodically throughout the day, not necessarily eating set meals    Foot exam: completed today  Reports wart on left foot that has been there for \"years\"   previously treated with acid solution and liquid nitrogen but has never resolved.     Frequency of FBS: 4-6x per day with CGM (Freestyle meter, though not covered by insurance is Karine's preference).  General range of FBS: 200s while " "not on glipizide, 60s-170 since restarting medications. Most readings in the 100s-110s  Hypoglycemia: had reading as low as 60 and treated with eating. This happens occasionally, cannot identify trigger.     Lab Results   Component Value Date    A1C 5.8 09/09/2021    A1C 7.0 03/08/2021    A1C 6.2 09/04/2020     No results found for: MICROALBUMIN    DM Meds: metformin 2000 mg daily, glipizide XL 2.5 mg daily  Had been off of glipizide at last visit in Oct 2021, resumed when sugars increased >200  Compliance: good    Aspirin Use: none - 81 mg dose was discontinued after ED visit as above    Peripheral neuropathy  Karine has numbness in toes and bottom of feet, but can sense if she is stepping on something. She was started on duloxetine 30 mg BID and reported at our last visit on 10/13/21 that her symptoms had dampened by \"25%, enough to notice\". She had some intermittent burning on the top of of both feet and persistent plantar paresthesia. Today, Karine reports that she has continued to take the duloxetine and has found this helpful.        EXAM  /73   Pulse 99   Temp 97.9  F (36.6  C)   Ht 1.626 m (5' 4.02\")   Wt 65.3 kg (144 lb 0.6 oz)   SpO2 100%   BMI 24.71 kg/m    Gen: Alert, NAD  Neck: No LAD  Cor: S1S2, no murmur  Lungs: CTA bilaterally  Abd: soft, nontender  Ext: no edema  Skin: warm and dry  Feet: no ulcerations, Callouses absent, sensation dampened to the level of the ankle bilaterally  Four warts noted on the plantar surface of the left heel    Procedure: after obtaining verbal consent, applied liquid nitrogen to 4 verrucous lesions on plantar surface of the left foot. Multiple freeze thaw cycles. She tolerated the procedure well. Bandaid applied over lesions at end of procedure.      Assessment:  (E11.9) Type 2 diabetes mellitus without complication, without long-term current use of insulin (H)  (primary encounter diagnosis)  Comment: A1c in target range, using glipizide XL 2.5 mg daily and " metformin 2000 mg daily.  Monitoring FBS with usual range of 100s-110s (wider range 60s-170s), no hypoglycemia. Good control with current medications.  Lab Results   Component Value Date    A1C 6.0 12/13/2021    A1C 5.8 09/09/2021    A1C 7.0 03/08/2021    A1C 6.2 09/04/2020    A1C 7.0 05/06/2020    A1C 7.9 02/06/2020    A1C 6.7 10/22/2019   Plan: glipiZIDE (GLIPIZIDE XL) 2.5 MG 24 hr tablet,         Hemoglobin A1c, blood glucose (NO BRAND         SPECIFIED) test strip, Continuous Blood Gluc         Sensor (FREESTYLE JORDIN 14 DAY SENSOR) MISC,         DISCONTINUED: blood glucose (NO BRAND         SPECIFIED) test strip        Medications refilled as needed. Discussed monitoring for episodic hypoglycemia, may be able to take metformin alone. Recheck in 3 months.     (E11.42) Diabetic polyneuropathy associated with type 2 diabetes mellitus (H)  Comment:bilateral foot numbness to level of ankles bilaterally. Symptoms improved with duloxetine 30 mg BID  Plan: Continue medication as prescribed.    (N39.46) Mixed incontinence urge and stress (male)(female)  Comment: doing well with medication  Plan: tolterodine ER (DETROL LA) 4 MG 24 hr capsule        Refilled x 1 year.     (E53.8) Vitamin B12 deficiency (non anemic)  Comment: hx of gastric bypass, she is currently taking B12 every other day  Plan: cyanocobalamin (VITAMIN B-12) 1000 MCG tablet,         Vitamin B12        Recheck level.    (K25.4) Gastrointestinal hemorrhage associated with gastric ulcer  Comment: admitted from 11/16-11/18 w/ 20 pound weight loss and 1 month hx of melena, found to have 2 gastric ulcers on EGD. Doing well since discharge and consistent with medications, no symptom recurrence  Plan: CBC with Platelets        Labs pending. Continue with pantoprazole 40mg bid and carafate 1 gram twice daily before meals.  Repeat EGD in January 2022    (B07.0) Plantar warts  Comment: total of 4 warts treated on plantar surface of left foot with liquid nitrogen  today  Plan: DESTRUCT BENIGN LESION, UP TO 14        Return in 2 weeks for repeat treatment, keep covered, no use of topical acid solutions before next visit.     44 minutes spend on the date of this encounter doing chart review, history and exam, documentation and further activities as noted above.       Anay Martinez MD  Internal Medicine/Pediatrics      I, Ofe Barajas, am serving as a scribe to document services personally performed by Dr. Anay Martinez, based on data collection and the provider's statements to me. Dr. Martinez has reviewed, edited, and approved the above note.

## 2021-12-14 ASSESSMENT — ANXIETY QUESTIONNAIRES: GAD7 TOTAL SCORE: 7

## 2021-12-27 ENCOUNTER — OFFICE VISIT (OUTPATIENT)
Dept: FAMILY MEDICINE | Facility: CLINIC | Age: 66
End: 2021-12-27
Payer: COMMERCIAL

## 2021-12-27 VITALS
HEIGHT: 64 IN | TEMPERATURE: 97.2 F | SYSTOLIC BLOOD PRESSURE: 152 MMHG | DIASTOLIC BLOOD PRESSURE: 88 MMHG | OXYGEN SATURATION: 99 % | HEART RATE: 99 BPM | WEIGHT: 148 LBS | BODY MASS INDEX: 25.27 KG/M2

## 2021-12-27 DIAGNOSIS — B07.0 PLANTAR WARTS: Primary | ICD-10-CM

## 2021-12-27 ASSESSMENT — MIFFLIN-ST. JEOR: SCORE: 1196.57

## 2021-12-27 NOTE — NURSING NOTE
"66 year old  Chief Complaint   Patient presents with     Derm Problem     follow up for warts on left heel        Blood pressure (!) 152/88, pulse 99, temperature 97.2  F (36.2  C), height 1.626 m (5' 4.02\"), weight 67.1 kg (148 lb), SpO2 99 %, not currently breastfeeding. Body mass index is 25.39 kg/m .  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)     Keratoconus     Hyperlipidemia LDL goal <100     Obstructive sleep apnea     Vitamin D insufficiency     Major depression in partial remission (H)     Plantar warts     Low back pain     DJD (degenerative joint disease) of knee     Pancreatitis     Panic     Chest pain     IBS (irritable bowel syndrome)     Heart murmur     Hypertension goal BP (blood pressure) < 140/90     Type 2 diabetes mellitus without complication, without long-term current use of insulin (H)     Osteopenia of hip     Acute conjunctivitis of left eye     PTSD (post-traumatic stress disorder)     Diabetic peripheral neuropathy (H)     Bilateral sciatica     Gastrointestinal hemorrhage associated with gastric ulcer     Chronic kidney disease, stage 3 (H)     Chronic diastolic congestive heart failure (H)     Upper GI bleed       Wt Readings from Last 2 Encounters:   12/27/21 67.1 kg (148 lb)   12/13/21 65.3 kg (144 lb 0.6 oz)     BP Readings from Last 3 Encounters:   12/27/21 (!) 152/88   12/13/21 110/73   11/18/21 123/73         Current Outpatient Medications   Medication     amLODIPine (NORVASC) 5 MG tablet     amphetamine-dextroamphetamine (ADDERALL) 30 MG tablet     atorvastatin (LIPITOR) 20 MG tablet     blood glucose (NO BRAND SPECIFIED) lancets standard     blood glucose (NO BRAND SPECIFIED) test strip     blood glucose (NO BRAND SPECIFIED) test strip     blood glucose calibration (NO BRAND SPECIFIED) solution     blood glucose monitoring (NO BRAND SPECIFIED) meter device kit     " calcium carbonate (OS-KINJAL) 1500 (600 Ca) MG tablet     cevimeline (EVOXAC) 30 MG capsule     COMPOUNDED NON-CONTROLLED SUBSTANCE (CMPD RX) - PHARMACY TO MIX COMPOUNDED MEDICATION     Continuous Blood Gluc  (FREESTYLE JORDIN 14 DAY READER) EBEN     Continuous Blood Gluc Sensor (FREESTYLE JORDIN 14 DAY SENSOR) MISC     cyanocobalamin (VITAMIN B-12) 1000 MCG tablet     DULoxetine (CYMBALTA) 30 MG capsule     glipiZIDE (GLIPIZIDE XL) 2.5 MG 24 hr tablet     losartan (COZAAR) 100 MG tablet     metFORMIN (GLUCOPHAGE-XR) 500 MG 24 hr tablet     pantoprazole (PROTONIX) 40 MG EC tablet     sucralfate (CARAFATE) 1 GM tablet     tolterodine ER (DETROL LA) 4 MG 24 hr capsule     traZODone (DESYREL) 50 MG tablet     Vitamin D3 (CHOLECALCIFEROL) 25 mcg (1000 units) tablet     No current facility-administered medications for this visit.       Social History     Tobacco Use     Smoking status: Never Smoker     Smokeless tobacco: Never Used   Substance Use Topics     Alcohol use: Not Currently     Comment: rare     Drug use: No       Health Maintenance Due   Topic Date Due     HF ACTION PLAN  Never done     ADVANCE CARE PLANNING  Never done     HEPATITIS C SCREENING  Never done     ZOSTER IMMUNIZATION (1 of 2) Never done     DIABETIC FOOT EXAM  10/31/2019     MEDICARE ANNUAL WELLNESS VISIT  04/12/2020     EYE EXAM  03/10/2021     DEXA  04/26/2021     MICROALBUMIN  09/04/2021     COLORECTAL CANCER SCREENING  10/01/2021     FALL RISK ASSESSMENT  10/07/2021       Lab Results   Component Value Date    PAP NIL 04/12/2019 December 27, 2021 3:21 PM

## 2021-12-27 NOTE — PROGRESS NOTES
Karine Moss is a 66 year old female here for the following issues:    Plantar warts  Karine has plantar warts on her left foot. These were last treated 12/13/21 with liquid nitrogen.  Four plantar warts were treated on her left heel. No blistering after the procedure. She would like to have them treated again today.        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)     Keratoconus     Hyperlipidemia LDL goal <100     Obstructive sleep apnea     Vitamin D insufficiency     Major depression in partial remission (H)     Plantar warts     Low back pain     DJD (degenerative joint disease) of knee     Pancreatitis     Panic     Chest pain     IBS (irritable bowel syndrome)     Heart murmur     Hypertension goal BP (blood pressure) < 140/90     Type 2 diabetes mellitus without complication, without long-term current use of insulin (H)     Osteopenia of hip     Acute conjunctivitis of left eye     PTSD (post-traumatic stress disorder)     Diabetic peripheral neuropathy (H)     Bilateral sciatica     Gastrointestinal hemorrhage associated with gastric ulcer     Chronic kidney disease, stage 3 (H)     Chronic diastolic congestive heart failure (H)     Upper GI bleed       Current Outpatient Medications   Medication Sig Dispense Refill     amLODIPine (NORVASC) 5 MG tablet Take 1 tablet (5 mg) by mouth daily 90 tablet 3     amphetamine-dextroamphetamine (ADDERALL) 30 MG tablet Take 30 mg by mouth 3 times daily as needed       atorvastatin (LIPITOR) 20 MG tablet Take 1 tablet (20 mg) by mouth daily 90 tablet 3     blood glucose (NO BRAND SPECIFIED) lancets standard Use to test blood sugar 4 times daily or as directed. Dispense appropriate lancets for meter. 360 each 3     blood glucose (NO BRAND SPECIFIED) test strip DO NOT FILL 300 strip 3     blood glucose (NO BRAND SPECIFIED) test strip Check blood glucose TID  To accompany: Blood Glucose Monitor Brands: per insurance. 300 strip 3     blood glucose calibration (NO BRAND SPECIFIED) solution Use to calibrate blood glucose monitor as needed as directed. 1 each 0     blood glucose monitoring (NO BRAND SPECIFIED) meter device kit Use to test blood sugar 4 times daily or as directed. Dispense One-Touch Verio IQ Kit - per patient request. 1 kit 0     calcium carbonate (OS-KINJAL) 1500 (600 Ca) MG tablet Take 2 tablets (1,200 mg) by mouth daily       cevimeline (EVOXAC) 30 MG capsule take 1 cap  capsule 3     COMPOUNDED NON-CONTROLLED SUBSTANCE (CMPD RX) - PHARMACY TO MIX COMPOUNDED MEDICATION Estradiol 0.2 mg/gm in HRT cream. Take one-quarter gm vaginally daily x 2 weeks then twice weekly 30 g 3     Continuous Blood Gluc  (FREESTYLE JORDIN 14 DAY READER) EBEN Use to read blood sugars as per 's instructions. 1 each 0     Continuous Blood Gluc Sensor (FREESTYLE JORDIN 14 DAY SENSOR) MISC Change every 14 days. 2 each 11     cyanocobalamin (VITAMIN B-12) 1000 MCG tablet Take one every other day 90 tablet 0     DULoxetine (CYMBALTA) 30 MG capsule Take 1 capsule (30 mg) by mouth 2 times daily 180 capsule 3     glipiZIDE (GLIPIZIDE XL) 2.5 MG 24 hr tablet Take one daily 90 tablet 0     losartan (COZAAR) 100 MG tablet Take 1 tablet (100 mg) by mouth daily 90 tablet 3     metFORMIN (GLUCOPHAGE-XR) 500 MG 24 hr tablet Take 4 po q am with breakfast. (Patient taking differently: Take 2,000 mg by mouth daily (with breakfast) ) 360 tablet 3     pantoprazole (PROTONIX) 40 MG EC tablet Take 1 tablet (40 mg) by mouth 2 times daily 60 tablet 1     sucralfate (CARAFATE) 1 GM tablet Take 1 tablet (1 g) by mouth 2 times daily (before meals) 60 tablet 0     tolterodine ER (DETROL LA) 4 MG 24 hr capsule Take 1 capsule (4 mg) by mouth daily 90 capsule 3     traZODone (DESYREL) 50 MG tablet Take 50 mg by mouth At Bedtime       Vitamin D3 (CHOLECALCIFEROL) 25 mcg (1000 units) tablet  "Take 1 tablet (25 mcg) by mouth daily 90 tablet 2       Allergies   Allergen Reactions     Pravastatin Other (See Comments)     woozy     Codeine Nausea and Vomiting     Nsaids GI Disturbance and Other (See Comments)     Pt had bariatric surgery, should not ever take oral NSAIDS due to risk of gastric ulcers.  Pt had bariatric surgery, should not ever take oral NSAIDS due to risk of gastric ulcers.        EXAM  BP (!) 152/88   Pulse 99   Temp 97.2  F (36.2  C)   Ht 1.626 m (5' 4.02\")   Wt 67.1 kg (148 lb)   SpO2 99%   BMI 25.39 kg/m    Gen: Alert, pleasant, NAD  Left foot: callous at the left heel, 4 verrucous lesions noted    Procedure: after obtaining verbal consent, pared callous with 15 blade scalpel. Applied liquid nitrogen to 4 verrucous lesions on plantar surface of the left foot. Multiple freeze thaw cycles. She tolerated the procedure well. Bandaid applied over lesions at end of procedure.      Assessment:  (B07.0) Plantar warts  (primary encounter diagnosis)  Comment: 4 warts treated today with liquid nitrogen  Plan: DESTRUCT BENIGN LESION, UP TO 14        Keep clean, dry. Return in 2 wks for next treatment.    Anay Martinez MD  Internal Medicine/Pediatrics    "

## 2022-01-03 NOTE — PROGRESS NOTES
[facility] pharmacy asking why glucose test strips were not filled. Verified with the pharmacy that patient has Freestyle glucose sensory/reader on Med List, and that is what patient uses. Freestyle continuous sensor reads blood glucose sugar continually, so does not need to use test strips.   Karen Sosa MS RN-BC  01/03/22  9:14 AM

## 2022-01-07 ENCOUNTER — TELEPHONE (OUTPATIENT)
Dept: FAMILY MEDICINE | Facility: CLINIC | Age: 67
End: 2022-01-07
Payer: COMMERCIAL

## 2022-01-07 NOTE — TELEPHONE ENCOUNTER
Called and spoke with Leila, daughter, regarding what Karine can take for the cough and congestion being she is on many other medications.  Asked Leila to have Karine stay away from Sudafed as it can raise her blood pressure and instead use an over the counter sinus decongestant with guaifenesin.  Karine can use Robitussin and cough drops as needed.  Vicks to the chest also helps at night to sleep.  Discussed staying well hydrated, especially warm tea to keep the throat soothed.  Using a humidifier in the bedroom during sleep also helps.  Rest as much as possible and should her chest become congested to where she becomes short of breath or cannot take a deep breath in, she should be seen in an urgency room right away.  Leila verbalizes understanding and will stay in touch with the clinic on Karine's status.  Joi Taveras RN, BSN  [facility]  01/07/22  11:07 AM

## 2022-01-07 NOTE — TELEPHONE ENCOUNTER
Spoke with Karine regarding her symptoms and repeated everything that I discussed with Leila.  Staying well hydrated, resting and taking a congestion medication with guaifenesin in it, Robitussin cough syrup and cough drops.  Also Vicks to the chest helps too.  Discussed worsening shortness of breath or cannot take a deep breath would be indicators she should be seen in an urgency room.  Karine verbalized understanding and will call with further questions or concerns or if her symptoms worsen.   Joi Taveras RN, BSN  [facility]  01/07/22  11:29 AM

## 2022-01-07 NOTE — TELEPHONE ENCOUNTER
Who is calling? Daughter, Leila  Reason for Call:Pt tested positive for covid. Daughter wondering what mediations she can take?    Leila said we can call her or Karine back to discuss.    Martha

## 2022-01-17 ENCOUNTER — OFFICE VISIT (OUTPATIENT)
Dept: FAMILY MEDICINE | Facility: CLINIC | Age: 67
End: 2022-01-17
Payer: COMMERCIAL

## 2022-01-17 VITALS
WEIGHT: 153 LBS | DIASTOLIC BLOOD PRESSURE: 73 MMHG | BODY MASS INDEX: 26.12 KG/M2 | TEMPERATURE: 98.1 F | HEIGHT: 64 IN | SYSTOLIC BLOOD PRESSURE: 133 MMHG | HEART RATE: 106 BPM | OXYGEN SATURATION: 100 %

## 2022-01-17 DIAGNOSIS — K92.2 UPPER GI BLEED: ICD-10-CM

## 2022-01-17 DIAGNOSIS — B07.0 PLANTAR WARTS: Primary | ICD-10-CM

## 2022-01-17 RX ORDER — PANTOPRAZOLE SODIUM 40 MG/1
40 TABLET, DELAYED RELEASE ORAL 2 TIMES DAILY
Qty: 180 TABLET | Refills: 3 | Status: SHIPPED | OUTPATIENT
Start: 2022-01-17 | End: 2022-11-20

## 2022-01-17 ASSESSMENT — MIFFLIN-ST. JEOR: SCORE: 1219.25

## 2022-01-17 NOTE — PROGRESS NOTES
Karine Moss is a 66 year old female here for the following issues:    Plantar warts  Karine has plantar warts on the heel of her left foot. First treatment was on 12/13/21 with liquid nitrogen, four plantar warts on left heel. No blistering after the procedure. Second treatment was on 12/27/21, tolerated this well. She presents today for a repeat treatment. Feels as if the skin is smoother. Has purple area under one previously treated patch of plantar warts. Nontender. She has peripheral neuropathy.      Recent COVID-19 infection  Karine tested positive for COVID-19 with a home rapid antigen test on 1/6/22.   Had been exposed to individuals + for COVID, took a test 2 wks after exposture  She had nasal congestion and cough, like a regular cold. No fever or chills or body aches.  Lives with significant other and grandsons age 21, 18.   21 did not get , remained negative on test, no symptoms.  Vaccinated, boosted      Upper GI bleed  Karine has hx of ulcers and was hospitalized for upper GI bleed in November 2020. She had bleeding ulcers was placed on pantoprazole 40mg twice daily. Aspirin and NSAIDs were stopped. She is asking if she should continue this medication as she has no refills left. EGD last done Jan 2021 showed healing of ulcers. She was informed to continue with a PPI. She has an upcoming visit with GI provider at [facility].     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)     Keratoconus     Hyperlipidemia LDL goal <100     Obstructive sleep apnea     Vitamin D insufficiency     Major depression in partial remission (H)     Plantar warts     Low back pain     DJD (degenerative joint disease) of knee     Pancreatitis     Panic     Chest pain     IBS (irritable bowel syndrome)     Heart murmur     Hypertension goal BP (blood pressure) < 140/90     Type 2 diabetes mellitus without  complication, without long-term current use of insulin (H)     Osteopenia of hip     Acute conjunctivitis of left eye     PTSD (post-traumatic stress disorder)     Diabetic peripheral neuropathy (H)     Bilateral sciatica     Gastrointestinal hemorrhage associated with gastric ulcer     Chronic kidney disease, stage 3 (H)     Chronic diastolic congestive heart failure (H)     Upper GI bleed       Current Outpatient Medications   Medication Sig Dispense Refill     amLODIPine (NORVASC) 5 MG tablet Take 1 tablet (5 mg) by mouth daily 90 tablet 3     amphetamine-dextroamphetamine (ADDERALL) 30 MG tablet Take 30 mg by mouth 3 times daily as needed       atorvastatin (LIPITOR) 20 MG tablet Take 1 tablet (20 mg) by mouth daily 90 tablet 3     calcium carbonate (OS-KINJAL) 1500 (600 Ca) MG tablet Take 2 tablets (1,200 mg) by mouth daily       cevimeline (EVOXAC) 30 MG capsule take 1 cap  capsule 3     COMPOUNDED NON-CONTROLLED SUBSTANCE (CMPD RX) - PHARMACY TO MIX COMPOUNDED MEDICATION Estradiol 0.2 mg/gm in HRT cream. Take one-quarter gm vaginally daily x 2 weeks then twice weekly 30 g 3     Continuous Blood Gluc  ([employer] Kh[employer]YLE JORDIN 14 DAY READER) EBEN Use to read blood sugars as per 's instructions. 1 each 0     Continuous Blood Gluc Sensor (FREESTYLE JORDIN 14 DAY SENSOR) MISC Change every 14 days. 2 each 11     cyanocobalamin (VITAMIN B-12) 1000 MCG tablet Take one every other day 90 tablet 0     DULoxetine (CYMBALTA) 30 MG capsule Take 1 capsule (30 mg) by mouth 2 times daily 180 capsule 3     glipiZIDE (GLIPIZIDE XL) 2.5 MG 24 hr tablet Take one daily 90 tablet 0     losartan (COZAAR) 100 MG tablet Take 1 tablet (100 mg) by mouth daily 90 tablet 3     metFORMIN (GLUCOPHAGE-XR) 500 MG 24 hr tablet Take 4 po q am with breakfast. (Patient taking differently: Take 2,000 mg by mouth daily (with breakfast) ) 360 tablet 3     pantoprazole (PROTONIX) 40 MG EC tablet Take 1 tablet (40 mg) by mouth 2 times  "daily 60 tablet 1     sucralfate (CARAFATE) 1 GM tablet Take 1 tablet (1 g) by mouth 2 times daily (before meals) 60 tablet 0     tolterodine ER (DETROL LA) 4 MG 24 hr capsule Take 1 capsule (4 mg) by mouth daily 90 capsule 3     traZODone (DESYREL) 50 MG tablet Take 50 mg by mouth At Bedtime       Vitamin D3 (CHOLECALCIFEROL) 25 mcg (1000 units) tablet Take 1 tablet (25 mcg) by mouth daily 90 tablet 2       Allergies   Allergen Reactions     Pravastatin Other (See Comments)     woozy     Codeine Nausea and Vomiting     Nsaids GI Disturbance and Other (See Comments)     Pt had bariatric surgery, should not ever take oral NSAIDS due to risk of gastric ulcers.  Pt had bariatric surgery, should not ever take oral NSAIDS due to risk of gastric ulcers.        EXAM  /73   Pulse 106   Temp 98.1  F (36.7  C)   Ht 1.626 m (5' 4.02\")   Wt 69.4 kg (153 lb)   SpO2 100%   BMI 26.25 kg/m    Gen: Alert, pleasant, NAD  Left foot: thickened callous over lateral aspect of left heel with verrucous patch. Another smooth patch on left heel with tiny area of verrucous lesions. Base of patch is purple. She has peripheral neuropathy, dampened sensation.    Procedure: Pared callous at lateral heel with 15 blade scalpel. Applied liquid nitrogen with cotton swab, multiple freeze, thaw cycles at both lesions. Areas bandaged. She tolerated procedure well.      Assessment:  (B07.0) Plantar warts  (primary encounter diagnosis)  Comment: 2 patches of verrucous lesions are treated today, areas of concern smaller compared to initial burden, liquid nitrogen treatment is effective  Plan: DESTRUCT BENIGN LESION, UP TO 14        Recommend returning in one month for reassessment, this will allow skin to heal completely and determine if future liq N2 is indicated.    (K92.2) Upper GI bleed  Comment: hx of UGI bleed, hx of gastric bypass surgery, gastric ulcers, placed on PPI by GI team ([facility])  Plan: pantoprazole (PROTONIX) 40 MG EC " tablet        Recommend she continue pantoprazole twice daily, she will meet with GI team to discuss strategy for long term care.    Anay Martinez MD  Internal Medicine/Pediatrics

## 2022-01-17 NOTE — NURSING NOTE
"66 year old  Chief Complaint   Patient presents with     Derm Problem     follow up on warts that is located on pt's left heel       Blood pressure 133/73, pulse 106, temperature 98.1  F (36.7  C), height 1.626 m (5' 4.02\"), weight 69.4 kg (153 lb), SpO2 100 %, not currently breastfeeding. Body mass index is 26.25 kg/m .  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)     Keratoconus     Hyperlipidemia LDL goal <100     Obstructive sleep apnea     Vitamin D insufficiency     Major depression in partial remission (H)     Plantar warts     Low back pain     DJD (degenerative joint disease) of knee     Pancreatitis     Panic     Chest pain     IBS (irritable bowel syndrome)     Heart murmur     Hypertension goal BP (blood pressure) < 140/90     Type 2 diabetes mellitus without complication, without long-term current use of insulin (H)     Osteopenia of hip     Acute conjunctivitis of left eye     PTSD (post-traumatic stress disorder)     Diabetic peripheral neuropathy (H)     Bilateral sciatica     Gastrointestinal hemorrhage associated with gastric ulcer     Chronic kidney disease, stage 3 (H)     Chronic diastolic congestive heart failure (H)     Upper GI bleed       Wt Readings from Last 2 Encounters:   01/17/22 69.4 kg (153 lb)   12/27/21 67.1 kg (148 lb)     BP Readings from Last 3 Encounters:   01/17/22 133/73   12/27/21 (!) 152/88   12/13/21 110/73         Current Outpatient Medications   Medication     amLODIPine (NORVASC) 5 MG tablet     amphetamine-dextroamphetamine (ADDERALL) 30 MG tablet     atorvastatin (LIPITOR) 20 MG tablet     calcium carbonate (OS-KINJAL) 1500 (600 Ca) MG tablet     cevimeline (EVOXAC) 30 MG capsule     COMPOUNDED NON-CONTROLLED SUBSTANCE (CMPD RX) - PHARMACY TO MIX COMPOUNDED MEDICATION     Continuous Blood Gluc  (FREESTYLE JORDIN 14 DAY READER) EBEN     Continuous Blood Gluc " Sensor (FREESTYLE JORDIN 14 DAY SENSOR) MISC     cyanocobalamin (VITAMIN B-12) 1000 MCG tablet     DULoxetine (CYMBALTA) 30 MG capsule     glipiZIDE (GLIPIZIDE XL) 2.5 MG 24 hr tablet     losartan (COZAAR) 100 MG tablet     metFORMIN (GLUCOPHAGE-XR) 500 MG 24 hr tablet     pantoprazole (PROTONIX) 40 MG EC tablet     sucralfate (CARAFATE) 1 GM tablet     tolterodine ER (DETROL LA) 4 MG 24 hr capsule     traZODone (DESYREL) 50 MG tablet     Vitamin D3 (CHOLECALCIFEROL) 25 mcg (1000 units) tablet     No current facility-administered medications for this visit.       Social History     Tobacco Use     Smoking status: Never Smoker     Smokeless tobacco: Never Used   Substance Use Topics     Alcohol use: Not Currently     Comment: rare     Drug use: No       Health Maintenance Due   Topic Date Due     HF ACTION PLAN  Never done     ADVANCE CARE PLANNING  Never done     HEPATITIS C SCREENING  Never done     ZOSTER IMMUNIZATION (1 of 2) Never done     DIABETIC FOOT EXAM  10/31/2019     MEDICARE ANNUAL WELLNESS VISIT  04/12/2020     EYE EXAM  03/10/2021     DEXA  04/26/2021     MICROALBUMIN  09/04/2021     COLORECTAL CANCER SCREENING  10/01/2021     FALL RISK ASSESSMENT  10/07/2021       Lab Results   Component Value Date    PAP NIL 04/12/2019 January 17, 2022 3:33 PM

## 2022-01-19 DIAGNOSIS — E11.9 TYPE 2 DIABETES MELLITUS WITHOUT COMPLICATION, WITHOUT LONG-TERM CURRENT USE OF INSULIN (H): ICD-10-CM

## 2022-01-19 RX ORDER — GLIPIZIDE 2.5 MG/1
TABLET, EXTENDED RELEASE ORAL
Qty: 90 TABLET | Refills: 0 | OUTPATIENT
Start: 2022-01-19

## 2022-01-19 NOTE — TELEPHONE ENCOUNTER
Who is calling? Patient  Medication name: glipiZIDE (GLIPIZIDE XL) 2.5 MG 24 hr tablet  Is this a refill request? Yes  Have they contacted the pharmacy?  Yes  Pharmacy:   [facility] [address]  [facility] [address]  Phone: [phone] Fax: [phone]    Question/Concern: NA  Would patient like a call back? No

## 2022-01-19 NOTE — TELEPHONE ENCOUNTER
Glipizide (Glipizide XL) 25 mg    Last Office Visit: 1/17/22  Future [facility] Appointments: 2/17/22  Medication last refilled: 12/13/21 #90 with 0 refill(s)    Denied.  Too soon to request.  Just received a 90-day supply a month ago.    Joi Taveras, RN, BSN

## 2022-01-20 RX ORDER — GLIPIZIDE 2.5 MG/1
2.5 TABLET, EXTENDED RELEASE ORAL DAILY
Qty: 90 TABLET | Refills: 0 | Status: SHIPPED | OUTPATIENT
Start: 2022-01-20 | End: 2022-02-17 | Stop reason: ALTCHOICE

## 2022-01-20 NOTE — TELEPHONE ENCOUNTER
Who is calling? [facility]  Reason for Call: Need a new Rx for Glipizide (Glipizide XL) 25 mg because the previous Rx was denied. They haven't received the refill on 12/13/2021 request

## 2022-01-20 NOTE — TELEPHONE ENCOUNTER
The order listed for glipizide on 12/13/21 was listed in the chart as HISTORICAL.  New order sent now.   Karen Sosa MS RN-BC  01/20/22  9:57 AM

## 2022-01-27 ENCOUNTER — OFFICE VISIT (OUTPATIENT)
Dept: FAMILY MEDICINE | Facility: CLINIC | Age: 67
End: 2022-01-27
Payer: COMMERCIAL

## 2022-01-27 DIAGNOSIS — E11.9 TYPE 2 DIABETES MELLITUS WITHOUT COMPLICATION, WITHOUT LONG-TERM CURRENT USE OF INSULIN (H): Primary | ICD-10-CM

## 2022-01-27 RX ORDER — BLOOD-GLUCOSE METER
KIT MISCELLANEOUS
Qty: 50 STRIP | Refills: 3 | Status: SHIPPED | OUTPATIENT
Start: 2022-01-27 | End: 2022-03-29 | Stop reason: ALTCHOICE

## 2022-01-27 NOTE — PROGRESS NOTES
ASSESSMENT:    1. Condition - T2DM: Safety - Dose too high: Dose too high  Concerned for frequent episodes of hypoglycemia. Given fasting readings and A1c both at goal, it would be appropriate to try decreasing glipizide dose to prevent hypoglycemia. Reasonable to reassess and possibly switch glipizide to a different agent with lower hypoglycemia risk after patient receives dentures in March and her diet has stabilized.  Inconsistency between meters and strips is expected given variation in manufacturing.  It is appropriate to refill strips for her Freestyle Nirmal.     PLAN:  Medications comments/ concerns are:   1. Decrease glipizide XL to 2.5 mg daily. If SMBG rises again and is consistently above 200, you can contact the clinic to assess whether glipizide dose needs to change.  2. Monitor using Freestyle Nirmal ONLY. (Stop using other meters.) We discussed why there are differences between meters and the importance of tracking trends rather than absolute 100% accuracy. Having multiple meters can muddy the rand and make it difficult to assess trends.  3. Refilled test strips.  4. Continue all other medications.    Follow up: Contact clinic if BG raises consistently above 200 for a week to discuss glipizide dose. Follow up in clinic with Dr. Martinez on 2/17/22.      - - - - - - - - - - - - - - -  SUBJECTIVE:  Karine is a 66 year old female coming in for a follow up medication therapy management visit. Primary care provider is Anay Martinez. Was referred by her provider for   Chief Complaint   Patient presents with     Medication Therapy Management   Karine brought medications to the visit: no.     ----  Karine's MAIN CONCERN TODAY is diabetes management and monitoring.  Allergies/ADRs: Reviewed in chart  Pt reports using the following medical devices: Freestyle Nirmal  Pt reports the following barriers to care: none  Adverse reactions to medications: yes, hypoglycemia from glipizide XL  Concerns with  medications: none    Social History     Tobacco Use     Smoking status: Never Smoker     Smokeless tobacco: Never Used   Substance Use Topics     Alcohol use: Not Currently     Comment: rare     Drug use: No     Reports of cognitive concerns: no     T2DM - She reports a few problems with monitoring today. She noted that she likes her Freestyle Nirmal the best, but sometimes compares readings between different meters and strips. When results differ, it is difficult for her to assess her own blood sugars. She also requested a refill on her Freestyle Nirmal test strips. She notes that her fasting SMBG runs from  regularly and that she experiences hypoglycemia 1-2x weekly with the lowest recent reading being in the 40s.  She is currently taking metformin ER 2,000 mg daily and glipizide ER 5 mg daily as two 2.5 mg tablets. She stopped taking glipizide in September 2021, but restarted and titrated up to 5 mg daily on her own due to having some BG readings in the 200s.  Her diet currently is pureed and soft foods due to waiting for dentures. She reports that she should have dentures by mid-March 2022. Favorite foods currently are pureed hot dogs, chicken, corn, and green beans.     Lab Results   Component Value Date    A1C 6.0 12/13/2021    A1C 5.8 09/09/2021    A1C 7.0 03/08/2021    A1C 6.2 09/04/2020    A1C 7.0 05/06/2020    A1C 7.9 02/06/2020    A1C 6.7 10/22/2019       OBJECTIVE:   Patient Care Team:  Anay Martinez MD as PCP - General (Internal Medicine)  Patrizia Garces LICSW as Lead Care Coordinator  Gabrielle Blas [facility] as Pharmacist (Pharmacist)  Amanda West MD as Fellow (Student in organized health care education/training program)  Anay Martinez MD as Assigned PCP  Nancy Griffin APRN CNP as Assigned Heart and Vascular Provider  Amanda West MD as Assigned Nephrology Provider  Current Outpatient Medications   Medication Sig Dispense Refill     blood glucose (FREESTYLE  LITE) test strip Use to test blood sugar as directed for use with Freestyle Nirmal. 50 strip 3     amLODIPine (NORVASC) 5 MG tablet Take 1 tablet (5 mg) by mouth daily 90 tablet 3     amphetamine-dextroamphetamine (ADDERALL) 30 MG tablet Take 30 mg by mouth 3 times daily as needed       atorvastatin (LIPITOR) 20 MG tablet Take 1 tablet (20 mg) by mouth daily 90 tablet 3     calcium carbonate (OS-KINJAL) 1500 (600 Ca) MG tablet Take 2 tablets (1,200 mg) by mouth daily       cevimeline (EVOXAC) 30 MG capsule take 1 cap  capsule 3     COMPOUNDED NON-CONTROLLED SUBSTANCE (CMPD RX) - PHARMACY TO MIX COMPOUNDED MEDICATION Estradiol 0.2 mg/gm in HRT cream. Take one-quarter gm vaginally daily x 2 weeks then twice weekly 30 g 3     Continuous Blood Gluc  (FREESTYLE NIRMAL 14 DAY READER) EBEN Use to read blood sugars as per 's instructions. 1 each 0     Continuous Blood Gluc Sensor ([employer] CommunicationSTYLE NIRMAL 14 DAY SENSOR) MISC Change every 14 days. 2 each 11     cyanocobalamin (VITAMIN B-12) 1000 MCG tablet Take one every other day 90 tablet 0     DULoxetine (CYMBALTA) 30 MG capsule Take 1 capsule (30 mg) by mouth 2 times daily 180 capsule 3     glipiZIDE (GLIPIZIDE XL) 2.5 MG 24 hr tablet Take 1 tablet (2.5 mg) by mouth daily 90 tablet 0     losartan (COZAAR) 100 MG tablet Take 1 tablet (100 mg) by mouth daily 90 tablet 3     metFORMIN (GLUCOPHAGE-XR) 500 MG 24 hr tablet Take 4 po q am with breakfast. (Patient taking differently: Take 2,000 mg by mouth daily (with breakfast) ) 360 tablet 3     pantoprazole (PROTONIX) 40 MG EC tablet Take 1 tablet (40 mg) by mouth 2 times daily 180 tablet 3     sucralfate (CARAFATE) 1 GM tablet Take 1 tablet (1 g) by mouth 2 times daily (before meals) 60 tablet 0     tolterodine ER (DETROL LA) 4 MG 24 hr capsule Take 1 capsule (4 mg) by mouth daily 90 capsule 3     traZODone (DESYREL) 50 MG tablet Take 50 mg by mouth At Bedtime       Vitamin D3 (CHOLECALCIFEROL) 25 mcg (1000  "units) tablet Take 1 tablet (25 mcg) by mouth daily 90 tablet 2     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)     Keratoconus     Hyperlipidemia LDL goal <100     Obstructive sleep apnea     Vitamin D insufficiency     Major depression in partial remission (H)     Plantar warts     Low back pain     DJD (degenerative joint disease) of knee     Pancreatitis     Panic     Chest pain     IBS (irritable bowel syndrome)     Heart murmur     Hypertension goal BP (blood pressure) < 140/90     Type 2 diabetes mellitus without complication, without long-term current use of insulin (H)     Osteopenia of hip     Acute conjunctivitis of left eye     PTSD (post-traumatic stress disorder)     Diabetic peripheral neuropathy (H)     Bilateral sciatica     Gastrointestinal hemorrhage associated with gastric ulcer     Chronic kidney disease, stage 3 (H)     Chronic diastolic congestive heart failure (H)     Upper GI bleed       There were no vitals filed for this visit.  Estimated body mass index is 26.25 kg/m  as calculated from the following:    Height as of 1/17/22: 1.626 m (5' 4.02\").    Weight as of 1/17/22: 69.4 kg (153 lb).    BP Readings from Last 3 Encounters:   01/17/22 133/73   12/27/21 (!) 152/88   12/13/21 110/73       Last Comprehensive Metabolic Panel:  Sodium   Date Value Ref Range Status   11/17/2021 140 133 - 144 mmol/L Final   05/27/2021 141 133 - 144 mmol/L Final     Potassium   Date Value Ref Range Status   11/18/2021 4.3 3.4 - 5.3 mmol/L Final   05/27/2021 4.5 3.4 - 5.3 mmol/L Final     Chloride   Date Value Ref Range Status   11/17/2021 111 (H) 94 - 109 mmol/L Final   05/27/2021 110 (H) 94 - 109 mmol/L Final     Carbon Dioxide   Date Value Ref Range Status   05/27/2021 26 20 - 32 mmol/L Final     Carbon Dioxide (CO2)   Date Value Ref Range Status   11/17/2021 24 20 - 32 mmol/L Final     Anion Gap "   Date Value Ref Range Status   11/17/2021 5 3 - 14 mmol/L Final   05/27/2021 5 3 - 14 mmol/L Final     Glucose   Date Value Ref Range Status   11/17/2021 119 (H) 70 - 99 mg/dL Final   05/27/2021 110 (H) 70 - 99 mg/dL Final     GLUCOSE BY METER POCT   Date Value Ref Range Status   11/18/2021 126 (H) 70 - 99 mg/dL Final     Urea Nitrogen   Date Value Ref Range Status   11/17/2021 19 7 - 30 mg/dL Final   05/27/2021 18 7 - 30 mg/dL Final     Creatinine   Date Value Ref Range Status   11/17/2021 0.96 0.52 - 1.04 mg/dL Final   05/27/2021 1.27 (H) 0.52 - 1.04 mg/dL Final     GFR Estimate   Date Value Ref Range Status   11/17/2021 62 >60 mL/min/1.73m2 Final     Comment:     As of July 11, 2021, eGFR is calculated by the CKD-EPI creatinine equation, without race adjustment. eGFR can be influenced by muscle mass, exercise, and diet. The reported eGFR is an estimation only and is only applicable if the renal function is stable.   05/27/2021 44 (L) >60 mL/min/[1.73_m2] Final     Comment:     Non  GFR Calc  Starting 12/18/2018, serum creatinine based estimated GFR (eGFR) will be   calculated using the Chronic Kidney Disease Epidemiology Collaboration   (CKD-EPI) equation.       Calcium   Date Value Ref Range Status   11/17/2021 8.6 8.5 - 10.1 mg/dL Final   05/27/2021 9.5 8.5 - 10.1 mg/dL Final        - - - - - - - - - - - - - - -  Options for treatment and/or follow-up care were reviewed with the patient. Karine was engaged and actively involved in the decision making process. Karine verbalized understanding of the options discussed and was satisfied with the final plan. Karine was given a copy of these recommendations and an up to date medication list in an after visit summary. This report was sent to Anay Martinez.     Mauricio Whitmore, PD4  [employer] [employer] [facility] [facility] [employer]    ____________________________  Preceptor Use Only:  In supervising the student, I have reviewed and verified the  student's documentation and found it to be correct and complete.  Preceptor Signature: Gabrielle Blas PharmD - January 27, 2022    Gabrielle Blas PharmD, [facility]  Medication Management (MTM) Pharmacist  [facility]      - - - - - - - - - - - - - - -  Flowsheet completed.  # of medical conditions reviewed: 1  # of medications reviewed: 2  # of DTP identified: 1  Time spent: 60 minutes  Level of service:2

## 2022-02-04 ENCOUNTER — TELEPHONE (OUTPATIENT)
Dept: FAMILY MEDICINE | Facility: CLINIC | Age: 67
End: 2022-02-04
Payer: COMMERCIAL

## 2022-02-04 NOTE — TELEPHONE ENCOUNTER
Prior Authorization Specialty Medication Request    Medication/Dose: Freestyle Nirmal 14-day sensor kit  ICD code:  E11.9   Previously Tried and Failed:  One Touch Blood Glucose Kit, Verio blood glucose kit - Karine was less compliant with these due to the pain in her fingers with frequent poking of her fingers.      Important Lab Values: Hemoglobin A1C improved   Rationale: Previously 7.9 (High) and now down to 6.2 (still high but normal is 5.7)    Insurance Name: [facility]/Medicare  Insurance ID: [id number]  Insurance Phone Number: [phone]    Pharmacy Information (if different than what is on RX)  Name:  [facility]  Phone:  [phone]  Fax:  [phone]    Please fax approval/denial to [facility].    Joi Taveras RN, BSN  [facility]  02/04/22  10:59 AM

## 2022-02-08 DIAGNOSIS — E78.5 HYPERLIPIDEMIA LDL GOAL <100: ICD-10-CM

## 2022-02-08 DIAGNOSIS — E11.9 TYPE 2 DIABETES MELLITUS WITHOUT COMPLICATION, WITHOUT LONG-TERM CURRENT USE OF INSULIN (H): ICD-10-CM

## 2022-02-08 DIAGNOSIS — R68.2 DRY MOUTH, UNSPECIFIED: ICD-10-CM

## 2022-02-08 DIAGNOSIS — E53.8 VITAMIN B12 DEFICIENCY (NON ANEMIC): ICD-10-CM

## 2022-02-08 RX ORDER — LANOLIN ALCOHOL/MO/W.PET/CERES
CREAM (GRAM) TOPICAL
Qty: 90 TABLET | Refills: 0 | Status: CANCELLED | OUTPATIENT
Start: 2022-02-08

## 2022-02-08 RX ORDER — CEVIMELINE HYDROCHLORIDE 30 MG/1
CAPSULE ORAL
Qty: 270 CAPSULE | Refills: 3 | Status: CANCELLED | OUTPATIENT
Start: 2022-02-08

## 2022-02-08 RX ORDER — METFORMIN HCL 500 MG
TABLET, EXTENDED RELEASE 24 HR ORAL
Qty: 360 TABLET | Refills: 3 | Status: CANCELLED | OUTPATIENT
Start: 2022-02-08

## 2022-02-08 RX ORDER — ATORVASTATIN CALCIUM 20 MG/1
20 TABLET, FILM COATED ORAL DAILY
Qty: 90 TABLET | Refills: 3 | Status: CANCELLED | OUTPATIENT
Start: 2022-02-08

## 2022-02-08 NOTE — TELEPHONE ENCOUNTER
Atorvastatin: Last time prescribed: 3/8/21 , 90 tabs x 3 refills  metformin: Last time prescribed: 3/8/21 , 360 tabs x 3 refills  cevimeline: Last time prescribed: 3/8/21 , 270 caps x 3 refills  Vit b12: Last time prescribed: 12/13/2021 , 90 tabs x 0 refills     Last office visit: 1/17/22  Next appointment: 2/17/22

## 2022-02-09 NOTE — TELEPHONE ENCOUNTER
Pt has sufficient supply of medications through 3/18/22. Pt has upcoming appt with Dr. Martinez on 2/17 for plantar wart reassessment. Medication review and refills to be addressed at that time. Pt also due for lipid panel.    Navin PHILIP, RN  02/09/22 10:34 AM

## 2022-02-09 NOTE — TELEPHONE ENCOUNTER
Central Prior Authorization Team   Phone: [phone]      PA Initiation    Medication: Freestyle Nirmal 14-day sensor kit  Insurance Company: [employer] - Phone [phone] Fax [phone]  Pharmacy Filling the Rx: [facility] [place] [address]  Filling Pharmacy Phone: [phone]  Filling Pharmacy Fax:    Start Date: 2/9/2022

## 2022-02-10 NOTE — TELEPHONE ENCOUNTER
PRIOR AUTHORIZATION DENIED    Medication: Freestyle Nirmal 14-day sensor kit-DENIED    Denial Date: 2/9/2022    Denial Rational:             Appeal Information:

## 2022-02-11 ENCOUNTER — TRANSFERRED RECORDS (OUTPATIENT)
Dept: HEALTH INFORMATION MANAGEMENT | Facility: CLINIC | Age: 67
End: 2022-02-11
Payer: COMMERCIAL

## 2022-02-13 ENCOUNTER — HEALTH MAINTENANCE LETTER (OUTPATIENT)
Age: 67
End: 2022-02-13

## 2022-02-14 ENCOUNTER — TRANSFERRED RECORDS (OUTPATIENT)
Dept: HEALTH INFORMATION MANAGEMENT | Facility: CLINIC | Age: 67
End: 2022-02-14

## 2022-02-16 NOTE — PROGRESS NOTES
"Karine Moss is a 66 year old female here for the following issues:    Plantar warts  Karine has plantar warts on the heel of her left foot. First treatment was on 12/13/21 with liquid nitrogen, four plantar warts on left heel. No blistering after the procedure. Repeat treatments on 12/27/21 and 1/17/22 were also tolerated well, felt as though the skin was smoother after her last treatment. Had a purple area under one previously treated patch of plantar warts, nontender.    She presents for a follow-up treatment today. Notes that the treatments seem to be helping. Area of thickening is getting smaller.      Type 2 Diabetes  Karine has a hx of T2D. At our visit on 12/13/21, she was taking metformin 2000 mg daily and glipizide XL 2.5 mg daily, medications taken in the morning with breakfast. Resumed glipizide after Oct 2021 due to FBS >200. Did not take glipizide yesterday as most recent pill pack came without it, last Rx sent Jan 2022 for a 90 day supply.     Karine met with our PharmD, Gabrielle Blas, on 1/27/22 for medication management due to frequent episodes of hypoglycemia. Getting hypoglycemic episodes a couple of times per week, often later in the day. Typically sees low FBS (40s-60s) prior to developing symptoms. Eats \"all day long\" rather than set meals. Has meeting with Gabrielle following our visit today.     FBS during our visit today is 79, feels \"fine\". Recent FBS readings include: 123, 96, 81, 115, 198. Uses CGM.    A1c has been in target range.    Lab Results   Component Value Date    A1C 6.0 12/13/2021    A1C 5.8 09/09/2021    A1C 7.0 03/08/2021     Wt Readings from Last 4 Encounters:   02/17/22 69.4 kg (153 lb)   01/17/22 69.4 kg (153 lb)   12/27/21 67.1 kg (148 lb)   12/13/21 65.3 kg (144 lb 0.6 oz)       Peripheral neuropathy  Karine has a hx of peripheral neuropathy. Currently has burning, tingling in her toes and feet to the level of the ankle bilaterally. Bedsheets touching her feet " are very bothersome. She has taken varying doses of gabapentin in the past for the treatment of her neuropathy and restless leg symptoms, but discontinued as she felt like the medication stopped working. I have never prescribed this medication for her. She is interested in trying medication to treat.     Mass on neck  Karine would like an updated referral to dermatology for a mass on the right side of her neck, behind her right ear. She feels like it has gotten bigger. No bleeding or oozing. Nontender.      Hearing concern  Karine feels like her hearing is worse, left ear worse than right. Has had bilateral hearing aids since 2015, last exam was about 6 months ago. She has used ear drops for 2 weeks but has not felt like she was able to get anything out of her ears. Would like her ears examined today. Interested in hearing aid adjustment or discussion on implants.       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)     Keratoconus     Hyperlipidemia LDL goal <100     Obstructive sleep apnea     Vitamin D insufficiency     Major depression in partial remission (H)     Plantar warts     Low back pain     DJD (degenerative joint disease) of knee     Pancreatitis     Panic     Chest pain     IBS (irritable bowel syndrome)     Heart murmur     Hypertension goal BP (blood pressure) < 140/90     Type 2 diabetes mellitus without complication, without long-term current use of insulin (H)     Osteopenia of hip     Acute conjunctivitis of left eye     PTSD (post-traumatic stress disorder)     Diabetic peripheral neuropathy (H)     Bilateral sciatica     Gastrointestinal hemorrhage associated with gastric ulcer     Chronic kidney disease, stage 3 (H)     Chronic diastolic congestive heart failure (H)     Upper GI bleed       Current Outpatient Medications   Medication Sig Dispense Refill     amLODIPine (NORVASC) 5 MG  tablet Take 1 tablet (5 mg) by mouth daily 90 tablet 3     amphetamine-dextroamphetamine (ADDERALL) 30 MG tablet Take 1 tablet by mouth every 24 hours 1.5 mg a total of 45 mg  Daily       atorvastatin (LIPITOR) 20 MG tablet Take 1 tablet (20 mg) by mouth daily 90 tablet 3     blood glucose (FREESTYLE LITE) test strip Use to test blood sugar as directed for use with Freestyle Nirmal. 50 strip 3     calcium carbonate (OS-KINJAL) 1500 (600 Ca) MG tablet Take 2 tablets (1,200 mg) by mouth daily       cevimeline (EVOXAC) 30 MG capsule take 1 cap  capsule 3     COMPOUNDED NON-CONTROLLED SUBSTANCE (CMPD RX) - PHARMACY TO MIX COMPOUNDED MEDICATION Estradiol 0.2 mg/gm in HRT cream. Take one-quarter gm vaginally daily x 2 weeks then twice weekly 30 g 3     Continuous Blood Gluc  (FREESTYLE NIRMAL 14 DAY READER) EBEN Use to read blood sugars as per 's instructions. 1 each 0     Continuous Blood Gluc Sensor (FREESTYLE NIRMAL 14 DAY SENSOR) MISC Change every 14 days. 2 each 11     cyanocobalamin (VITAMIN B-12) 1000 MCG tablet Take one every other day 90 tablet 0     DULoxetine (CYMBALTA) 30 MG capsule Take 1 capsule (30 mg) by mouth 2 times daily 180 capsule 3     losartan (COZAAR) 100 MG tablet Take 1 tablet (100 mg) by mouth daily 90 tablet 3     metFORMIN (GLUCOPHAGE-XR) 500 MG 24 hr tablet Take 4 po q am with breakfast. 360 tablet 3     pantoprazole (PROTONIX) 40 MG EC tablet Take 1 tablet (40 mg) by mouth 2 times daily 180 tablet 3     tolterodine ER (DETROL LA) 4 MG 24 hr capsule Take 1 capsule (4 mg) by mouth daily 90 capsule 3     traZODone (DESYREL) 50 MG tablet Take 1 tablet by mouth every 24 hours       Vitamin D3 (CHOLECALCIFEROL) 25 mcg (1000 units) tablet Take 1 tablet (25 mcg) by mouth daily 90 tablet 2     glipiZIDE (GLIPIZIDE XL) 2.5 MG 24 hr tablet Take 1 tablet (2.5 mg) by mouth daily 90 tablet 0       Allergies   Allergen Reactions     Pravastatin Other (See Comments)     woozy      "Codeine Nausea and Vomiting     Nsaids GI Disturbance and Other (See Comments)     Pt had bariatric surgery, should not ever take oral NSAIDS due to risk of gastric ulcers.  Pt had bariatric surgery, should not ever take oral NSAIDS due to risk of gastric ulcers.        EXAM  /82 (BP Location: Right arm, Patient Position: Sitting, Cuff Size: Adult Regular)   Pulse 96   Temp 97  F (36.1  C) (Temporal)   Ht 1.613 m (5' 3.5\")   Wt 69.4 kg (153 lb)   SpO2 99%   BMI 26.68 kg/m    Gen: Alert, pleasant, NAD  Skin: dime sized raised normal pigmented papule at right side of neck, slightly thickened, cannot appreciate telangectasia.  Ears: ear canals free of cerumen bilaterally, TMs normal  Feet: left heel with verrucous lesion at medial heel. On bottom of heel, noted flat, previously treated verrucous lesion, now resolved    Procedure: 15 blade scalpel used to pare the verrucous lesion. I was able to extract a \"plug\" of central tissue, then I pared down margins.  Liquid nitrogen applied to base of this lesion multiple times with good freeze/thaw. Karine tolerated the procedure well.      Assessment:  (E11.9) Type 2 diabetes mellitus without complication, without long-term current use of insulin (H)  (primary encounter diagnosis)  Comment: taking metformin 2000 mg and glipizide 2.5 mg daily with breakfast, but did not get glipizide in most recent pill pack (none starting today). Experiencing symptomatic episodes of hypoglycemia 2-3x/week with FBS in the 40s-60s, meeting with PharmD for medication management later today  Plan: Discuss hypoglycemia with Gabrielle Blas  Recommend she continue metformin but STOP glipizide. A1c is at target range  Follow up in one month for next A1c.    (I10) Hypertension goal BP (blood pressure) < 140/90  Comment: BP in target range, due for medication refills  Plan: losartan (COZAAR) 100 MG tablet        Refilled x 1 year.     (E78.5) Hyperlipidemia LDL goal <100  Comment: due for " medication refills  Plan: atorvastatin (LIPITOR) 20 MG tablet        Refilled x 1 year. Will complete lipid profile next month when she returns for DM visit.    (B07.0) Plantar warts  Comment: returns today for fourth treatment, warts effectively pared, frozen, central verrucous lesion easily extracted.  Plan: recheck in one month     (H90.3) Sensorineural hearing loss (SNHL) of both ears  Comment: bilateral hearing aids since 2015, feels like hearing is worsening (L>R), ears free of cerumen. Interested in hearing aid adjustment, discussing implants.   Plan: Adult Audiology Referral        Referred to audiology for an evaluation.     (L98.9) Skin lesion  Comment: mass on right side of neck behind right ear, feels like it has gotten larger, no bleeding or drainage  Plan: Adult Dermatology Referral        Referred to dermatology for further evaluation and treatment.       Anay Martinez MD  Internal Medicine/Pediatrics      I, Ofe Barajas, am serving as a scribe to document services personally performed by Dr. Anay Martinez, based on data collection and the provider's statements to me. Dr. Martinez has reviewed, edited, and approved the above note.

## 2022-02-17 ENCOUNTER — OFFICE VISIT (OUTPATIENT)
Dept: FAMILY MEDICINE | Facility: CLINIC | Age: 67
End: 2022-02-17
Payer: COMMERCIAL

## 2022-02-17 VITALS
SYSTOLIC BLOOD PRESSURE: 139 MMHG | DIASTOLIC BLOOD PRESSURE: 82 MMHG | OXYGEN SATURATION: 99 % | TEMPERATURE: 97 F | HEART RATE: 96 BPM | BODY MASS INDEX: 26.12 KG/M2 | WEIGHT: 153 LBS | HEIGHT: 64 IN

## 2022-02-17 DIAGNOSIS — E11.9 TYPE 2 DIABETES MELLITUS WITHOUT COMPLICATION, WITHOUT LONG-TERM CURRENT USE OF INSULIN (H): Primary | ICD-10-CM

## 2022-02-17 DIAGNOSIS — E78.5 HYPERLIPIDEMIA LDL GOAL <100: ICD-10-CM

## 2022-02-17 DIAGNOSIS — H90.3 SENSORINEURAL HEARING LOSS (SNHL) OF BOTH EARS: ICD-10-CM

## 2022-02-17 DIAGNOSIS — L98.9 SKIN LESION: ICD-10-CM

## 2022-02-17 DIAGNOSIS — B07.0 PLANTAR WARTS: ICD-10-CM

## 2022-02-17 DIAGNOSIS — E11.42 DIABETIC POLYNEUROPATHY ASSOCIATED WITH TYPE 2 DIABETES MELLITUS (H): Primary | ICD-10-CM

## 2022-02-17 DIAGNOSIS — I10 HYPERTENSION GOAL BP (BLOOD PRESSURE) < 140/90: ICD-10-CM

## 2022-02-17 RX ORDER — TRAZODONE HYDROCHLORIDE 50 MG/1
1 TABLET, FILM COATED ORAL EVERY 24 HOURS
COMMUNITY
Start: 2022-02-11

## 2022-02-17 RX ORDER — DEXTROAMPHETAMINE SACCHARATE, AMPHETAMINE ASPARTATE, DEXTROAMPHETAMINE SULFATE AND AMPHETAMINE SULFATE 7.5; 7.5; 7.5; 7.5 MG/1; MG/1; MG/1; MG/1
1 TABLET ORAL EVERY 24 HOURS
Status: ON HOLD | COMMUNITY
Start: 2020-11-02 | End: 2022-10-18

## 2022-02-17 RX ORDER — ATORVASTATIN CALCIUM 20 MG/1
20 TABLET, FILM COATED ORAL DAILY
Qty: 90 TABLET | Refills: 3 | Status: SHIPPED | OUTPATIENT
Start: 2022-02-17 | End: 2023-01-24

## 2022-02-17 RX ORDER — LOSARTAN POTASSIUM 100 MG/1
100 TABLET ORAL DAILY
Qty: 90 TABLET | Refills: 3 | Status: SHIPPED | OUTPATIENT
Start: 2022-02-17 | End: 2023-01-24

## 2022-02-17 NOTE — NURSING NOTE
"66 year old  Chief Complaint   Patient presents with     Derm Problem     left heel wart ,     Recheck Medication     medication and lipid panel        Blood pressure 139/82, pulse 96, temperature 97  F (36.1  C), temperature source Temporal, height 1.613 m (5' 3.5\"), weight 69.4 kg (153 lb), SpO2 99 %, not currently breastfeeding. Body mass index is 26.68 kg/m .  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)     Keratoconus     Hyperlipidemia LDL goal <100     Obstructive sleep apnea     Vitamin D insufficiency     Major depression in partial remission (H)     Plantar warts     Low back pain     DJD (degenerative joint disease) of knee     Pancreatitis     Panic     Chest pain     IBS (irritable bowel syndrome)     Heart murmur     Hypertension goal BP (blood pressure) < 140/90     Type 2 diabetes mellitus without complication, without long-term current use of insulin (H)     Osteopenia of hip     Acute conjunctivitis of left eye     PTSD (post-traumatic stress disorder)     Diabetic peripheral neuropathy (H)     Bilateral sciatica     Gastrointestinal hemorrhage associated with gastric ulcer     Chronic kidney disease, stage 3 (H)     Chronic diastolic congestive heart failure (H)     Upper GI bleed       Wt Readings from Last 2 Encounters:   02/17/22 69.4 kg (153 lb)   01/17/22 69.4 kg (153 lb)     BP Readings from Last 3 Encounters:   02/17/22 139/82   01/17/22 133/73   12/27/21 (!) 152/88         Current Outpatient Medications   Medication     amLODIPine (NORVASC) 5 MG tablet     atorvastatin (LIPITOR) 20 MG tablet     blood glucose (FREESTYLE LITE) test strip     calcium carbonate (OS-KINJAL) 1500 (600 Ca) MG tablet     cevimeline (EVOXAC) 30 MG capsule     COMPOUNDED NON-CONTROLLED SUBSTANCE (CMPD RX) - PHARMACY TO MIX COMPOUNDED MEDICATION     Continuous Blood Gluc  (FREESTYLE JORDIN 14 DAY READER) " EBEN     Continuous Blood Gluc Sensor (FREESTYLE JORDIN 14 DAY SENSOR) MISC     cyanocobalamin (VITAMIN B-12) 1000 MCG tablet     DULoxetine (CYMBALTA) 30 MG capsule     losartan (COZAAR) 100 MG tablet     metFORMIN (GLUCOPHAGE-XR) 500 MG 24 hr tablet     pantoprazole (PROTONIX) 40 MG EC tablet     tolterodine ER (DETROL LA) 4 MG 24 hr capsule     Vitamin D3 (CHOLECALCIFEROL) 25 mcg (1000 units) tablet     glipiZIDE (GLIPIZIDE XL) 2.5 MG 24 hr tablet     No current facility-administered medications for this visit.       Social History     Tobacco Use     Smoking status: Never Smoker     Smokeless tobacco: Never Used   Substance Use Topics     Alcohol use: Not Currently     Comment: rare     Drug use: No       Health Maintenance Due   Topic Date Due     HF ACTION PLAN  Never done     ADVANCE CARE PLANNING  Never done     HEPATITIS C SCREENING  Never done     ZOSTER IMMUNIZATION (1 of 2) Never done     DIABETIC FOOT EXAM  10/31/2019     MEDICARE ANNUAL WELLNESS VISIT  04/12/2020     EYE EXAM  03/10/2021     DEXA  04/26/2021     MICROALBUMIN  09/04/2021     COLORECTAL CANCER SCREENING  10/01/2021     FALL RISK ASSESSMENT  10/07/2021     LIPID  03/08/2022       Lab Results   Component Value Date    PAP NIL 04/12/2019 February 17, 2022 2:55 PM

## 2022-02-17 NOTE — PROGRESS NOTES
ASSESSMENT:    1. Condition - DM: Safety - Dose too high: Dose too high  Continued concerned for frequent episodes of hypoglycemia with ongoing sulfonylurea use. Given fasting readings and A1c both at goal, it would be appropriate to try decreasing glipizide dose to prevent hypoglycemia. Reasonable to reassess and possibly switch glipizide to a different agent with lower hypoglycemia risk after patient receives dentures in March and her diet has stabilized.    2. Condition - Neuropathy: Efficacy - Dosage too low: Dose too low  Duloxetine is not at maximized dose which could provide further effectiveness for managing neuropathy. Given ineffectiveness of gabapentin, next likely step is pregabalin which may have barriers with medicare insurance coverage.     PLAN:  Medications comments/ concerns are:   1. Stop glipizide.   2. Add duloxetine 60 mg capsule at bedtime in addition to current 30 mg dose, total of 90 mg at bedtime. New rx sent. -completed     Follow up: 1 month with PCP and PharmD.      - - - - - - - - - - - - - - -  SUBJECTIVE:  Karine is a 66 year old female coming in for a follow up medication therapy management visit. Primary care provider is Anay Martinez. Was referred by her provider for   Chief Complaint   Patient presents with     Medication Therapy Management   . Karine brought medications to the visit: no.     PLAN FROM LAST VISIT:   1. Decrease glipizide XL to 2.5 mg daily. If SMBG rises again and is consistently above 200, you can contact the clinic to assess whether glipizide dose needs to change.  2. Monitor using Freestyle Nirmal ONLY. (Stop using other meters.) We discussed why there are differences between meters and the importance of tracking trends rather than absolute 100% accuracy. Having multiple meters can muddy the rand and make it difficult to assess trends.  3. Refilled test strips.  4. Continue all other medications.    ----  Karine's MAIN CONCERN TODAY is blood sugars,  Freestyle Nirmal coverage.  Allergies/ADRs: Reviewed in chart  Pt reports using the following medical devices: Freestyle Nirmal CGM  Pt reports the following barriers to care: insurance coverage, cost  Adverse reactions to medications: none  Concerns with medications: none    Medication Experience: comfortable with medications  Reports of cognitive concerns: no     DM - Karine has continued to take glipizide 2.5 mg once daily. Is running out of current supply and pill pack did not send more in next shipment. Willing to stop and continue monitoring BG.     Notes increased neuropathy in feet that occurs at night, inhibiting sleep. Currently taking duloxetine which provided some relief when initially started but does not seem to be helping currently. History of trying high doses of gabapentin which were not effective.       OBJECTIVE:   Patient Care Team:  Anay Martinez MD as PCP - General (Internal Medicine)  Patrizia Garces LICSW as Lead Care Coordinator  Gabrielle Blas [facility] as Pharmacist (Pharmacist)  Amanda West MD as Fellow (Student in organized health care education/training program)  Anay Martinez MD as Assigned PCP  Nancy Griffin APRN CNP as Assigned Heart and Vascular Provider  Amanda West MD as Assigned Nephrology Provider  Soledad Lee PA-C as Physician Assistant (Pulmonary Disease)  Current Outpatient Medications   Medication Sig Dispense Refill     amLODIPine (NORVASC) 5 MG tablet Take 1 tablet (5 mg) by mouth daily 90 tablet 3     atorvastatin (LIPITOR) 20 MG tablet Take 1 tablet (20 mg) by mouth daily 90 tablet 3     blood glucose (FREESTYLE LITE) test strip Use to test blood sugar as directed for use with Freestyle Nirmal. 50 strip 3     calcium carbonate (OS-KINJAL) 1500 (600 Ca) MG tablet Take 2 tablets (1,200 mg) by mouth daily       cevimeline (EVOXAC) 30 MG capsule take 1 cap  capsule 3     COMPOUNDED NON-CONTROLLED SUBSTANCE (CMPD RX) - PHARMACY  TO MIX COMPOUNDED MEDICATION Estradiol 0.2 mg/gm in HRT cream. Take one-quarter gm vaginally daily x 2 weeks then twice weekly 30 g 3     Continuous Blood Gluc  (FREESTYLE JORDIN 14 DAY READER) EBEN Use to read blood sugars as per 's instructions. 1 each 0     Continuous Blood Gluc Sensor (FREESTYLE JORDIN 14 DAY SENSOR) MISC Change every 14 days. 2 each 11     cyanocobalamin (VITAMIN B-12) 1000 MCG tablet Take one every other day 90 tablet 0     DULoxetine (CYMBALTA) 30 MG capsule Take 1 capsule (30 mg) by mouth 2 times daily 180 capsule 3     glipiZIDE (GLIPIZIDE XL) 2.5 MG 24 hr tablet Take 1 tablet (2.5 mg) by mouth daily 90 tablet 0     losartan (COZAAR) 100 MG tablet Take 1 tablet (100 mg) by mouth daily 90 tablet 3     metFORMIN (GLUCOPHAGE-XR) 500 MG 24 hr tablet Take 4 po q am with breakfast. 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)     Keratoconus     Hyperlipidemia LDL goal <100     Obstructive sleep apnea     Vitamin D insufficiency     Major depression in partial remission (H)     Plantar warts     Low back pain     DJD (degenerative joint disease) of knee     Pancreatitis     Panic     Chest pain     IBS (irritable bowel syndrome)     Heart murmur     Hypertension goal BP (blood pressure) < 140/90     Type 2 diabetes mellitus without complication, without long-term current use of insulin (H)     Osteopenia of hip     Acute conjunctivitis of left eye     PTSD (post-traumatic  "stress disorder)     Diabetic peripheral neuropathy (H)     Bilateral sciatica     Gastrointestinal hemorrhage associated with gastric ulcer     Chronic kidney disease, stage 3 (H)     Chronic diastolic congestive heart failure (H)     Upper GI bleed       There were no vitals filed for this visit.  Estimated body mass index is 26.25 kg/m  as calculated from the following:    Height as of 1/17/22: 1.626 m (5' 4.02\").    Weight as of 1/17/22: 69.4 kg (153 lb).    BP Readings from Last 3 Encounters:   01/17/22 133/73   12/27/21 (!) 152/88   12/13/21 110/73     - - - - - - - - - - - - - - -  Options for treatment and/or follow-up care were reviewed with the patient. Karine was engaged and actively involved in the decision making process. Karine verbalized understanding of the options discussed and was satisfied with the final plan. Karine was given a copy of these recommendations and an up to date medication list in an after visit summary. This report was sent to Anay Martinez.     Gabrielle Blas, PharmD, BCACP  Medication Management (MTM) Pharmacist  [facility]      - - - - - - - - - - - - - - -  Flowsheet completed.  # of medical conditions reviewed: 2  # of medications reviewed: 2  # of DTP identified: 2  Time spent: 20 minutes  Level of service:2  "

## 2022-02-17 NOTE — PATIENT INSTRUCTIONS
Things discussed today:   - Stop glipizide.   - Continue to check fasting blood sugar in the morning, if numbers are more than 150 for 3+ days in a row let Gabrielle know.   - Increase evening Cymbalta dose, new prescription will be sent to [facility] for sending ASAP.       Follow up: 1 month with Dr. Martinez and Gabrielle for A1c recheck       If you have any questions or concerns for me, please call me at [phone].    Gabrielle Blas, PharmD, BCACP  Medication Management (MTM) Pharmacist  [facility]

## 2022-02-19 ENCOUNTER — ANCILLARY PROCEDURE (OUTPATIENT)
Dept: CT IMAGING | Facility: CLINIC | Age: 67
End: 2022-02-19
Attending: INTERNAL MEDICINE
Payer: COMMERCIAL

## 2022-02-19 DIAGNOSIS — K28.9: ICD-10-CM

## 2022-02-19 LAB
CREAT BLD-MCNC: 1.3 MG/DL (ref 0.5–1)
GFR SERPL CREATININE-BSD FRML MDRD: 45 ML/MIN/1.73M2

## 2022-02-19 PROCEDURE — 74177 CT ABD & PELVIS W/CONTRAST: CPT | Performed by: RADIOLOGY

## 2022-02-19 RX ORDER — IOPAMIDOL 755 MG/ML
93 INJECTION, SOLUTION INTRAVASCULAR ONCE
Status: COMPLETED | OUTPATIENT
Start: 2022-02-19 | End: 2022-02-19

## 2022-02-19 RX ADMIN — IOPAMIDOL 93 ML: 755 INJECTION, SOLUTION INTRAVASCULAR at 12:26

## 2022-02-22 NOTE — PROGRESS NOTES
AUDIOLOGY REPORT    SUBJECTIVE:  Karine Moss is a 66 year old female who was seen on 2/24/2022 in the Audiology Clinic at the [facility] for audiologic evaluation, referred by Anay Martinez M.D.  The patient reports that hearing in the left ear is worse than the right and hearing has been worsening bilaterally.  Patient reports she knows someone with a cochlear implant and wants to know if she is a candidate for this.  She arrives with Hearing Assist hearing aids that she reports she bought online.  She does not feel they help much and is aware that the clinic cannot program these.  She also reportedly has another set of hearing aids at home that were fit by an outside clinic in approximately 2015.  She is unsure of the  of those but is wondering if our clinic can program them because she does not feel she can afford new hearing aids at this time. She has right tinnitus that comes and goes.  The patient denies ear pain, drainage from the ears, dizziness or history of ear surgery.  Noise exposure history includes exposure to loud music.      OBJECTIVE:    Otoscopic exam indicates ears are clear of cerumen bilaterally.     Pure Tone Thresholds assessed using conventional audiometry with fair to good reliability from 250-8000 Hz bilaterally using insert earphones and circumaural headphones.      RIGHT: Borderline normal sloping to mild to moderately severe rising to moderate sensorineural hearing loss.       LEFT: Moderate to severe mixed hearing loss (primarily sensorineural with 15 dB air bone gap at 250 Hz)     Negative Alesha - Left ear is 20-30 dB poorer than right from 250-1500 Hz and at 8000 Hz.      Tympanogram:    RIGHT: normal eardrum mobility    LEFT:   normal eardrum mobility    Reflexes (reported by stimulus ear):  RIGHT: Ipsilateral is present at normal levels  RIGHT: Contralateral is absent at frequencies tested  LEFT:    Ipsilateral is absent at frequencies tested  LEFT:   Contralateral is present at normal levels    Speech Reception Threshold:    RIGHT: 30 dB HL    LEFT:   55 dB HL  Word Recognition Score:     RIGHT: 92% at 80 dB HL using NU-6 recorded word list.    LEFT:   88% at 100 dB HL using NU-6 recorded word list.    ASSESSMENT:   Asymmetric hearing loss - right sensorineural, left mixed    Today s results were discussed with the patient in detail.     Patient arrived asking if she was a candidate for a cochlear implant. Advised the patient that she is not a candidate for a cochlear implant in either ear based on the good word recognition in both ears.     PLAN:    1. ENT consultation is recommended to assess the asymmetrical hearing loss (left ear worse than right) and unilateral right tinnitus.  Patient is unsure if she has ever seen ENT in past.  2. Patient will call the clinic once home to provide the name of the  of her hearing aids so we can determine if we can program the devices for her.   3. Follow up with Dr. Martinez.    The patient expressed understanding and agreement with this plan.    Shilpa Miner, CCC-A, [facility]  Licensed Audiologist  [place] #[id number]    Enclosure: audiogram    Cc Anay Martinez M.D.

## 2022-02-24 ENCOUNTER — TELEPHONE (OUTPATIENT)
Dept: FAMILY MEDICINE | Facility: CLINIC | Age: 67
End: 2022-02-24
Payer: COMMERCIAL

## 2022-02-24 ENCOUNTER — OFFICE VISIT (OUTPATIENT)
Dept: AUDIOLOGY | Facility: CLINIC | Age: 67
End: 2022-02-24
Attending: INTERNAL MEDICINE
Payer: COMMERCIAL

## 2022-02-24 DIAGNOSIS — E11.9 TYPE 2 DIABETES MELLITUS WITHOUT COMPLICATION, WITHOUT LONG-TERM CURRENT USE OF INSULIN (H): ICD-10-CM

## 2022-02-24 DIAGNOSIS — H90.A21 SENSORINEURAL HEARING LOSS (SNHL) OF RIGHT EAR WITH RESTRICTED HEARING OF LEFT EAR: Primary | ICD-10-CM

## 2022-02-24 DIAGNOSIS — H90.3 SENSORINEURAL HEARING LOSS (SNHL) OF BOTH EARS: ICD-10-CM

## 2022-02-24 DIAGNOSIS — E53.8 VITAMIN B12 DEFICIENCY (NON ANEMIC): ICD-10-CM

## 2022-02-24 DIAGNOSIS — R68.2 DRY MOUTH, UNSPECIFIED: ICD-10-CM

## 2022-02-24 DIAGNOSIS — H90.A32 MIXED CONDUCTIVE AND SENSORINEURAL HEARING LOSS OF LEFT EAR WITH RESTRICTED HEARING OF RIGHT EAR: ICD-10-CM

## 2022-02-24 PROCEDURE — 92565 STENGER TEST PURE TONE: CPT | Performed by: AUDIOLOGIST-HEARING AID FITTER

## 2022-02-24 PROCEDURE — 92550 TYMPANOMETRY & REFLEX THRESH: CPT | Performed by: AUDIOLOGIST-HEARING AID FITTER

## 2022-02-24 PROCEDURE — 92557 COMPREHENSIVE HEARING TEST: CPT | Performed by: AUDIOLOGIST-HEARING AID FITTER

## 2022-02-24 RX ORDER — LANOLIN ALCOHOL/MO/W.PET/CERES
CREAM (GRAM) TOPICAL
Qty: 90 TABLET | Refills: 0 | Status: SHIPPED | OUTPATIENT
Start: 2022-02-24 | End: 2022-07-22

## 2022-02-24 RX ORDER — METFORMIN HCL 500 MG
TABLET, EXTENDED RELEASE 24 HR ORAL
Qty: 360 TABLET | Refills: 3 | Status: SHIPPED | OUTPATIENT
Start: 2022-02-24 | End: 2023-01-24

## 2022-02-24 RX ORDER — CEVIMELINE HYDROCHLORIDE 30 MG/1
CAPSULE ORAL
Qty: 270 CAPSULE | Refills: 3 | Status: SHIPPED | OUTPATIENT
Start: 2022-02-24 | End: 2023-01-24

## 2022-02-24 RX ORDER — DULOXETIN HYDROCHLORIDE 60 MG/1
60 CAPSULE, DELAYED RELEASE ORAL AT BEDTIME
Qty: 30 CAPSULE | Refills: 1 | Status: SHIPPED | OUTPATIENT
Start: 2022-02-24 | End: 2022-03-29

## 2022-02-24 NOTE — TELEPHONE ENCOUNTER
FUTURE VISIT INFORMATION      FUTURE VISIT INFORMATION:    Date: 4/7/22    Time: 2PM     Location: [facility]  REFERRAL INFORMATION:    Referring provider:      Referring providers clinic:      Reason for visit/diagnosis  ENT consult for asymmetric hearing loss referred by Dr. Aman Loving    RECORDS REQUESTED FROM:       Clinic name Comments Records Status Imaging Status   [facility] [place] 2/24/22 audio and note from Lila Loving [facility]

## 2022-02-24 NOTE — TELEPHONE ENCOUNTER
Who is calling? Patient  Medication name:   cevimeline (EVOXAC) 30 MG capsule  metFORMIN (GLUCOPHAGE-XR) 500 MG 24 hr tablet  cyanocobalamin (VITAMIN B-12) 1000 MCG tablet  Is this a refill request? Yes  Have they contacted the pharmacy?  Yes  Pharmacy:   [facility] [address]  [facility] [address]  Phone: [phone] Fax: [phone]  Question/Concern: Refill request  Would patient like a call back? No

## 2022-02-24 NOTE — TELEPHONE ENCOUNTER
Medication requested: cevimeline (EVOXAC) 30 MG capsule  Last office visit: 2/17/22  [facility] appointments: 3/17/22  Medication last refilled: 3/8/21; 270 + 3 refills  Last qualifying labs: None    Prescription approved per [facility] Refill Protocol.    Medication requested: cyanocobalamin (VITAMIN B-12) 1000 MCG tablet  Last office visit: 2/17/22  [facility] appointments: 3/17/22  Medication last refilled: 12/13/21; 90 + 0  Last qualifying labs:   Component      Latest Ref Rng & Units 12/13/2021   Vitamin B12      193 - 986 pg/mL 660     Prescription approved per [facility] Refill Protocol.    Medication requested: metFORMIN (GLUCOPHAGE-XR) 500 MG 24 hr tablet  Last office visit: 2/17/22  [facility] appointments: 3/17/22  Medication last refilled: 3/8/21; 360 + 3 refills  Last qualifying labs:   Component      Latest Ref Rng & Units 12/13/2021   Hemoglobin A1C      0.0 - 5.6 % 6.0 (H)     Prescription approved per [facility] Refill Protocol.    Navin PHILIP, RN  02/24/22 4:35 PM
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
## 2022-03-08 ENCOUNTER — OFFICE VISIT (OUTPATIENT)
Dept: BEHAVIORAL HEALTH | Facility: CLINIC | Age: 67
End: 2022-03-08
Payer: COMMERCIAL

## 2022-03-08 DIAGNOSIS — F43.10 PTSD (POST-TRAUMATIC STRESS DISORDER): Primary | ICD-10-CM

## 2022-03-08 ASSESSMENT — ANXIETY QUESTIONNAIRES
GAD7 TOTAL SCORE: 13
5. BEING SO RESTLESS THAT IT IS HARD TO SIT STILL: MORE THAN HALF THE DAYS
1. FEELING NERVOUS, ANXIOUS, OR ON EDGE: MORE THAN HALF THE DAYS
6. BECOMING EASILY ANNOYED OR IRRITABLE: SEVERAL DAYS
2. NOT BEING ABLE TO STOP OR CONTROL WORRYING: NEARLY EVERY DAY
4. TROUBLE RELAXING: SEVERAL DAYS
7. FEELING AFRAID AS IF SOMETHING AWFUL MIGHT HAPPEN: MORE THAN HALF THE DAYS
3. WORRYING TOO MUCH ABOUT DIFFERENT THINGS: MORE THAN HALF THE DAYS
7. FEELING AFRAID AS IF SOMETHING AWFUL MIGHT HAPPEN: MORE THAN HALF THE DAYS

## 2022-03-08 ASSESSMENT — PATIENT HEALTH QUESTIONNAIRE - PHQ9
10. IF YOU CHECKED OFF ANY PROBLEMS, HOW DIFFICULT HAVE THESE PROBLEMS MADE IT FOR YOU TO DO YOUR WORK, TAKE CARE OF THINGS AT HOME, OR GET ALONG WITH OTHER PEOPLE: SOMEWHAT DIFFICULT
SUM OF ALL RESPONSES TO PHQ QUESTIONS 1-9: 9
SUM OF ALL RESPONSES TO PHQ QUESTIONS 1-9: 9

## 2022-03-08 ASSESSMENT — COLUMBIA-SUICIDE SEVERITY RATING SCALE - C-SSRS
6. HAVE YOU EVER DONE ANYTHING, STARTED TO DO ANYTHING, OR PREPARED TO DO ANYTHING TO END YOUR LIFE?: NO
ATTEMPT LIFETIME: NO
TOTAL  NUMBER OF ABORTED OR SELF INTERRUPTED ATTEMPTS LIFETIME: NO
TOTAL  NUMBER OF INTERRUPTED ATTEMPTS LIFETIME: NO
1. HAVE YOU WISHED YOU WERE DEAD OR WISHED YOU COULD GO TO SLEEP AND NOT WAKE UP?: NO
2. HAVE YOU ACTUALLY HAD ANY THOUGHTS OF KILLING YOURSELF?: NO

## 2022-03-09 ASSESSMENT — ANXIETY QUESTIONNAIRES: GAD7 TOTAL SCORE: 13

## 2022-03-09 ASSESSMENT — PATIENT HEALTH QUESTIONNAIRE - PHQ9: SUM OF ALL RESPONSES TO PHQ QUESTIONS 1-9: 9

## 2022-03-22 ENCOUNTER — TRANSFERRED RECORDS (OUTPATIENT)
Dept: HEALTH INFORMATION MANAGEMENT | Facility: CLINIC | Age: 67
End: 2022-03-22
Payer: COMMERCIAL

## 2022-03-28 NOTE — PROGRESS NOTES
Karine Moss is a 67 year old female who presents to the clinic today for a recheck of    DIABETES  Here for Type II diabetes.  Due for eye exam, no vision changes.  Peripheral neuropathy: yes  Has burning, tingling in toes and feet to the level of the ankle bilaterally. Taking Duloxetine, 90 mg at night. States that she is also taking gabapentin but this has not been prescribed by me since 2019, unsure who is prescribing.   Taking vitamin B12 1000 mcg every other day and is interested in updated labs  Weight: +5 pounds  Wt Readings from Last 3 Encounters:   03/29/22 71.7 kg (158 lb 0.6 oz)   02/17/22 69.4 kg (153 lb)   01/17/22 69.4 kg (153 lb)     Diet: wide variety including meat, enjoys fish, mostly eating at home  Will finally be getting her dentures today.  Exercise: walking outside or in the mall, evening walk at home    Candidiasis/skin issues: none  UTI/ yeast infections: none  Foot exam: today    Frequency of FBS: daily in the mornings  General range of FBS: logging with Gabrielle Blas, has had a couple of readings in the 200s  Hypoglycemia: none    Lab Results   Component Value Date    A1C 6.0 12/13/2021    A1C 5.8 09/09/2021    A1C 7.0 03/08/2021    A1C 6.2 09/04/2020     No results found for: MICROALBUMIN    DM Meds: metformin 2000 mg with breakfast  Compliance: good  Stopped glipizide XL 2.5  2/17/22 due to frequent hypoglycemia.     Aspirin Use: none    HYPERLIPIDEMIA  Recent Labs   Lab Test 03/08/21  1615 02/06/20  1523   CHOL 113.0 111.0   HDL 35.0* 47.0*   LDL 46.0 29.0   TRIG 157.0* 179.0*   CHOLHDLRATIO 3.2 2.4     LDL goal is <100. Last LDL was in target range. Currently taking atorvastatin 40 mg daily. Compliant with med(s). No reported myalgias or other side effects. Due for labs, fasting today.      HYPERTENSION  BP Readings from Last 3 Encounters:   03/29/22 (!) 162/92   02/17/22 139/82   01/17/22 133/73     Here for blood pressure check. Karine is currently on losartan 100 mg daily  "and amlodipine 5 mg daily. No current chest pain. No THAPA. Uses pill pack and is compliant with meds, no reported side effects. Historically BP have been in good range. Will recheck this visit.      Plantar warts  Karine has plantar warts on the heel of her left foot. First treatment was on 12/13/21 with liquid nitrogen, four plantar warts on left heel. No blistering after the procedure. Repeat treatments on 12/27/21, 1/17/22, and 2/17/22 were also tolerated well, felt that these were helping. She has some remaining thickening on the heel of her left foot today and would like a repeat treatment.      EXAM  BP (!) 162/92   Pulse 89   Temp 97.5  F (36.4  C)   Ht 1.613 m (5' 3.5\")   Wt 71.7 kg (158 lb 0.6 oz)   SpO2 98%   BMI 27.55 kg/m     Repeat /82    Gen: Alert, NAD, edentulous  Cor: S1S2, no murmur, no carotid bruits  Lungs: CTA bilaterally  Ext: no edema, pulses palpable  Skin: warm and dry  Feet: thickened callus with verrucous lesion at the medial left heel  Microfilament testing indicates dampened sensation over ball of feet and over all, sensation returns at dorsum of foot.     Assessment:  (E11.9) Type 2 diabetes mellitus without complication, without long-term current use of insulin (H)  (primary encounter diagnosis)  Comment: taking metformin 2000 mg daily with breakfast, monitoring FBS with CGM, also following with Gabrielle Blas PharmD today  A1c slightly higher but in very good range  Lab Results   Component Value Date    A1C 6.3 03/29/2022    A1C 6.0 12/13/2021   Plan: Hemoglobin A1c, Comprehensive metabolic panel,         Albumin Random Urine Quantitative with Creat         Ratio  Continue metformin, glipizide not needed    (E11.42) Diabetic polyneuropathy associated with type 2 diabetes mellitus (H)  Comment: tingling and burning in feet bilaterally, sensation dampened over toes of both feet on exam. Taking duloxetine and Vitamin B12. She reports taking gabapentin but no current record " in chart  Plan: Vitamin B12, DULoxetine (CYMBALTA) 60 MG         capsule, DULoxetine (CYMBALTA) 30 MG capsule        Refilled duloxetine x 1 year. She will follow up for full med check with Gabrielle Blas, pharm D, in the next month    (E78.5) Hyperlipidemia LDL goal <100  Comment: on atorvastatin 40 mg daily, fasting. LDL in target range  Recent Labs   Lab Test 03/29/22  1033 03/08/21  1615 02/06/20  1523   CHOL 126 113.0 111.0   HDL 52 35.0* 47.0*   LDL 49 46.0 29.0   TRIG 123 157.0* 179.0*   CHOLHDLRATIO  --  3.2 2.4   Plan: Lipid Profile      (B07.0) Plantar warts  Comment: left heel, area pared with 15 blade scalpel and 2 areas frozen with liquid nitrogen today  Plan: DESTRUCT BENIGN LESION  keep covered, dry x 24 h.   Return to clinic in 4-6 wk prn recurrence.    63 minutes spend on the date of this encounter doing chart review, history and exam, documentation and further activities as noted above.     RTC 6 months    Anay Martinez MD  Internal Medicine/Pediatrics      I, Ofe Barajas, am serving as a scribe to document services personally performed by Dr. Anay Martinez, based on data collection and the provider's statements to me. Dr. Martinez has reviewed, edited, and approved the above note.

## 2022-03-29 ENCOUNTER — OFFICE VISIT (OUTPATIENT)
Dept: FAMILY MEDICINE | Facility: CLINIC | Age: 67
End: 2022-03-29
Payer: COMMERCIAL

## 2022-03-29 VITALS
SYSTOLIC BLOOD PRESSURE: 133 MMHG | WEIGHT: 158.04 LBS | HEART RATE: 89 BPM | HEIGHT: 64 IN | BODY MASS INDEX: 26.98 KG/M2 | OXYGEN SATURATION: 98 % | DIASTOLIC BLOOD PRESSURE: 82 MMHG | TEMPERATURE: 97.5 F

## 2022-03-29 DIAGNOSIS — E78.5 HYPERLIPIDEMIA LDL GOAL <100: ICD-10-CM

## 2022-03-29 DIAGNOSIS — E11.42 DIABETIC POLYNEUROPATHY ASSOCIATED WITH TYPE 2 DIABETES MELLITUS (H): ICD-10-CM

## 2022-03-29 DIAGNOSIS — E11.9 TYPE 2 DIABETES MELLITUS WITHOUT COMPLICATION, WITHOUT LONG-TERM CURRENT USE OF INSULIN (H): Primary | ICD-10-CM

## 2022-03-29 DIAGNOSIS — B07.0 PLANTAR WARTS: ICD-10-CM

## 2022-03-29 LAB
ALBUMIN SERPL-MCNC: 3.8 G/DL (ref 3.4–5)
ALP SERPL-CCNC: 84 U/L (ref 40–150)
ALT SERPL W P-5'-P-CCNC: 19 U/L (ref 0–50)
ANION GAP SERPL CALCULATED.3IONS-SCNC: 5 MMOL/L (ref 3–14)
AST SERPL W P-5'-P-CCNC: 18 U/L (ref 0–45)
BILIRUB SERPL-MCNC: 0.8 MG/DL (ref 0.2–1.3)
BUN SERPL-MCNC: 18 MG/DL (ref 7–30)
CALCIUM SERPL-MCNC: 9.5 MG/DL (ref 8.5–10.1)
CHLORIDE BLD-SCNC: 108 MMOL/L (ref 94–109)
CHOLEST SERPL-MCNC: 126 MG/DL
CO2 SERPL-SCNC: 28 MMOL/L (ref 20–32)
CREAT SERPL-MCNC: 1.22 MG/DL (ref 0.52–1.04)
CREAT UR-MCNC: 119 MG/DL
FASTING STATUS PATIENT QL REPORTED: NORMAL
GFR SERPL CREATININE-BSD FRML MDRD: 48 ML/MIN/1.73M2
GLUCOSE BLD-MCNC: 150 MG/DL (ref 70–99)
HBA1C MFR BLD: 6.3 % (ref 0–5.6)
HDLC SERPL-MCNC: 52 MG/DL
LDLC SERPL CALC-MCNC: 49 MG/DL
MICROALBUMIN UR-MCNC: 239 MG/L
MICROALBUMIN/CREAT UR: 200.84 MG/G CR (ref 0–25)
NONHDLC SERPL-MCNC: 74 MG/DL
POTASSIUM BLD-SCNC: 4.6 MMOL/L (ref 3.4–5.3)
PROT SERPL-MCNC: 8.1 G/DL (ref 6.8–8.8)
SODIUM SERPL-SCNC: 141 MMOL/L (ref 133–144)
TRIGL SERPL-MCNC: 123 MG/DL
VIT B12 SERPL-MCNC: 726 PG/ML (ref 193–986)

## 2022-03-29 PROCEDURE — 82607 VITAMIN B-12: CPT | Performed by: INTERNAL MEDICINE

## 2022-03-29 PROCEDURE — 82043 UR ALBUMIN QUANTITATIVE: CPT | Performed by: INTERNAL MEDICINE

## 2022-03-29 PROCEDURE — 80053 COMPREHEN METABOLIC PANEL: CPT | Performed by: INTERNAL MEDICINE

## 2022-03-29 PROCEDURE — 80061 LIPID PANEL: CPT | Performed by: INTERNAL MEDICINE

## 2022-03-29 RX ORDER — DULOXETIN HYDROCHLORIDE 60 MG/1
60 CAPSULE, DELAYED RELEASE ORAL AT BEDTIME
Qty: 90 CAPSULE | Refills: 3 | Status: SHIPPED | OUTPATIENT
Start: 2022-03-29 | End: 2023-02-20

## 2022-03-29 RX ORDER — DULOXETIN HYDROCHLORIDE 30 MG/1
30 CAPSULE, DELAYED RELEASE ORAL 2 TIMES DAILY
Qty: 180 CAPSULE | Refills: 3 | Status: SHIPPED | OUTPATIENT
Start: 2022-03-29 | End: 2023-02-20

## 2022-03-29 NOTE — NURSING NOTE
"67 year old  Chief Complaint   Patient presents with     Diabetes     Blood Draw     vitamin b12       Blood pressure (!) 162/92, pulse 89, temperature 97.5  F (36.4  C), height 1.613 m (5' 3.5\"), weight 71.7 kg (158 lb 0.6 oz), SpO2 98 %, not currently breastfeeding. Body mass index is 27.55 kg/m .  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)     Keratoconus     Hyperlipidemia LDL goal <100     Obstructive sleep apnea     Vitamin D insufficiency     Major depression in partial remission (H)     Plantar warts     Low back pain     DJD (degenerative joint disease) of knee     Pancreatitis     Panic     Chest pain     IBS (irritable bowel syndrome)     Heart murmur     Hypertension goal BP (blood pressure) < 140/90     Type 2 diabetes mellitus without complication, without long-term current use of insulin (H)     Osteopenia of hip     Acute conjunctivitis of left eye     PTSD (post-traumatic stress disorder)     Diabetic peripheral neuropathy (H)     Bilateral sciatica     Gastrointestinal hemorrhage associated with gastric ulcer     Chronic kidney disease, stage 3 (H)     Chronic diastolic congestive heart failure (H)     Upper GI bleed       Wt Readings from Last 2 Encounters:   03/29/22 71.7 kg (158 lb 0.6 oz)   02/17/22 69.4 kg (153 lb)     BP Readings from Last 3 Encounters:   03/29/22 (!) 162/92   02/17/22 139/82   01/17/22 133/73         Current Outpatient Medications   Medication     amLODIPine (NORVASC) 5 MG tablet     amphetamine-dextroamphetamine (ADDERALL) 30 MG tablet     atorvastatin (LIPITOR) 20 MG tablet     blood glucose (FREESTYLE LITE) test strip     calcium carbonate (OS-KINJAL) 1500 (600 Ca) MG tablet     cevimeline (EVOXAC) 30 MG capsule     COMPOUNDED NON-CONTROLLED SUBSTANCE (CMPD RX) - PHARMACY TO MIX COMPOUNDED MEDICATION     Continuous Blood Gluc  (FREESTYLE JORDIN 14 DAY READER) " EBEN     Continuous Blood Gluc Sensor (FREESTYLE JORDIN 14 DAY SENSOR) MISC     cyanocobalamin (VITAMIN B-12) 1000 MCG tablet     DULoxetine (CYMBALTA) 30 MG capsule     DULoxetine (CYMBALTA) 60 MG capsule     losartan (COZAAR) 100 MG tablet     metFORMIN (GLUCOPHAGE-XR) 500 MG 24 hr tablet     pantoprazole (PROTONIX) 40 MG EC tablet     tolterodine ER (DETROL LA) 4 MG 24 hr capsule     traZODone (DESYREL) 50 MG tablet     Vitamin D3 (CHOLECALCIFEROL) 25 mcg (1000 units) tablet     No current facility-administered medications for this visit.       Social History     Tobacco Use     Smoking status: Never Smoker     Smokeless tobacco: Never Used   Substance Use Topics     Alcohol use: Not Currently     Comment: rare     Drug use: No       Health Maintenance Due   Topic Date Due     HF ACTION PLAN  Never done     ADVANCE CARE PLANNING  Never done     HEPATITIS C SCREENING  Never done     ZOSTER IMMUNIZATION (1 of 2) Never done     MEDICARE ANNUAL WELLNESS VISIT  04/12/2020     EYE EXAM  03/10/2021     DEXA  04/26/2021     MICROALBUMIN  09/04/2021     COLORECTAL CANCER SCREENING  10/01/2021     LIPID  03/08/2022       Lab Results   Component Value Date    PAP NIL 04/12/2019 March 29, 2022 9:45 AM   Griseofulvin Counseling:  I discussed with the patient the risks of griseofulvin including but not limited to photosensitivity, cytopenia, liver damage, nausea/vomiting and severe allergy.  The patient understands that this medication is best absorbed when taken with a fatty meal (e.g., ice cream or french fries).

## 2022-03-29 NOTE — PROGRESS NOTES
ASSESSMENT:    1. Condition - DM: No drug therapy problem  BG remain below goal without glipizide therapy. Continued close monitoring will be beneficial to determine need for     2. Condition - Pain: Adherence - Adherence: Does not understand instructions  Unclear use of medication for neuropathy needs to be clarified in order to understand effectiveness of therapy and need for further changes.       PLAN:  Medications comments/ concerns are:   1. Continue checking blood sugars daily. Report FBG that are above 130 for at least 1 week to PharmD. -completed   2. Patient to check on neuropathy and pain management regimen. -completed       Follow up: 1 month to review BG      - - - - - - - - - - - - - - -  SUBJECTIVE:  Karine is a 67 year old female coming in for a follow up medication therapy management visit. Primary care provider is Anay Martinez. Was referred by her provider for   Chief Complaint   Patient presents with     Medication Therapy Management   . Karine brought medications to the visit: no.     PLAN FROM LAST VISIT:   1. Stop glipizide.   2. Add duloxetine 60 mg capsule at bedtime in addition to current 30 mg dose, total of 90 mg at bedtime. New rx sent. -completed     ----  Karine's MAIN CONCERN TODAY is blood sugars, Freestyle Nirmal coverage.  Allergies/ADRs: Reviewed in chart  Pt reports using the following medical devices: Freestyle Nirmal CGM  Pt reports the following barriers to care: insurance coverage, cost  Adverse reactions to medications: none  Concerns with medications: none     Medication Experience: comfortable with medications  Reports of cognitive concerns: no     Type 2 Diabetes:  Currently taking metformin. Patient is not experiencing side effects.  Blood sugar monitoring: Continuous Glucose Monitor. Ranges (patient reported): See below  Symptoms of low blood sugar? none  Symptoms of high blood sugar? none    Lab Results   Component Value Date    UMALCR 21.97 09/04/2020      Lab  "Results   Component Value Date    A1C 6.0 12/13/2021    A1C 5.8 09/09/2021    A1C 7.0 03/08/2021    A1C 6.2 09/04/2020    A1C 7.0 05/06/2020    A1C 7.9 02/06/2020    A1C 6.7 10/22/2019     FBG readings:   132  112  142  141  139  136  95  98  109  109  109  115  118  122  115  94  110  75  95  101  131  114  99  104  131  86    Pain/firbromyalgia -   No change in management - not sure how she's taking the duloxetine exactly, thinks she is taking the extra tablet of duloxetine sent separately by [facility].   Last few nights, have taken earlier in the evening before going to bed - legs have felt more \"quiet\" has been able to get to sleep  Has been taking gabapentin - not sure who is prescribing - will check when she gets home to verify.     OBJECTIVE:   Patient Care Team:  Anay Martinez MD as PCP - General (Internal Medicine)  Patrizia Garces LICSW as Lead Care Coordinator  Gabrielle Blas [facility] as Pharmacist (Pharmacist)  Amanda West MD as Fellow (Student in organized health care education/training program)  Anay Martinez MD as Assigned PCP  Nancy Griffin APRN CNP as Assigned Heart and Vascular Provider  Amanda West MD as Assigned Nephrology Provider  Soledad Lee PA-C as Physician Assistant (Pulmonary Disease)  Anay Martinez MD as Referring Physician (Internal Medicine)  Tiffanie Burgos PA-C as Physician Assistant (Dermatology)  Anay Martinez MD as Referring Physician (Internal Medicine)  Lila Newton AuD as Audiologist (Audiology)  Current Outpatient Medications   Medication Sig Dispense Refill     amLODIPine (NORVASC) 5 MG tablet Take 1 tablet (5 mg) by mouth daily 90 tablet 3     amphetamine-dextroamphetamine (ADDERALL) 30 MG tablet Take 1 tablet by mouth every 24 hours 1.5 mg a total of 45 mg  Daily       atorvastatin (LIPITOR) 20 MG tablet Take 1 tablet (20 mg) by mouth daily 90 tablet 3     blood glucose (FREESTYLE LITE) test strip Use to " test blood sugar as directed for use with Freestyle Nirmal. 50 strip 3     calcium carbonate (OS-KINJAL) 1500 (600 Ca) MG tablet Take 2 tablets (1,200 mg) by mouth daily       cevimeline (EVOXAC) 30 MG capsule take 1 cap  capsule 3     COMPOUNDED NON-CONTROLLED SUBSTANCE (CMPD RX) - PHARMACY TO MIX COMPOUNDED MEDICATION Estradiol 0.2 mg/gm in HRT cream. Take one-quarter gm vaginally daily x 2 weeks then twice weekly 30 g 3     Continuous Blood Gluc  (FREESTYLE NIRMAL 14 DAY READER) EBEN Use to read blood sugars as per 's instructions. 1 each 0     Continuous Blood Gluc Sensor (FREESTYLE NIRMAL 14 DAY SENSOR) MISC Change every 14 days. 2 each 11     cyanocobalamin (VITAMIN B-12) 1000 MCG tablet Take one every other day 90 tablet 0     DULoxetine (CYMBALTA) 30 MG capsule Take 1 capsule (30 mg) by mouth 2 times daily 180 capsule 3     DULoxetine (CYMBALTA) 60 MG capsule Take 1 capsule (60 mg) by mouth At Bedtime Take in addition to current 30 mg evening dose for a total of 90 mg at bedtime. 30 capsule 1     losartan (COZAAR) 100 MG tablet Take 1 tablet (100 mg) by mouth daily 90 tablet 3     metFORMIN (GLUCOPHAGE-XR) 500 MG 24 hr tablet Take 4 po q am with breakfast. 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)     Keratoconus     Hyperlipidemia LDL goal <100     Obstructive sleep apnea     Vitamin D insufficiency     Major depression in partial remission (H)     Plantar warts     Low back pain  "    DJD (degenerative joint disease) of knee     Pancreatitis     Panic     Chest pain     IBS (irritable bowel syndrome)     Heart murmur     Hypertension goal BP (blood pressure) < 140/90     Type 2 diabetes mellitus without complication, without long-term current use of insulin (H)     Osteopenia of hip     Acute conjunctivitis of left eye     PTSD (post-traumatic stress disorder)     Diabetic peripheral neuropathy (H)     Bilateral sciatica     Gastrointestinal hemorrhage associated with gastric ulcer     Chronic kidney disease, stage 3 (H)     Chronic diastolic congestive heart failure (H)     Upper GI bleed       There were no vitals filed for this visit.  Estimated body mass index is 27.55 kg/m  as calculated from the following:    Height as of an earlier encounter on 3/29/22: 1.613 m (5' 3.5\").    Weight as of an earlier encounter on 3/29/22: 71.7 kg (158 lb 0.6 oz).    BP Readings from Last 3 Encounters:   02/17/22 139/82   01/17/22 133/73   12/27/21 (!) 152/88       - - - - - - - - - - - - - - -  Options for treatment and/or follow-up care were reviewed with the patient. Karine was engaged and actively involved in the decision making process. Karine verbalized understanding of the options discussed and was satisfied with the final plan. Karine was given a copy of these recommendations and an up to date medication list in an after visit summary. This report was sent to Anay Martinez.     Gabrielle Blas, PharmD, BCACP  Medication Management (MTM) Pharmacist  [facility]      - - - - - - - - - - - - - - -  Flowsheet completed.  # of medical conditions reviewed: 2  # of medications reviewed: 4  # of DTP identified: 1  Time spent: 20 minutes  Level of service:2  "

## 2022-04-07 ENCOUNTER — PRE VISIT (OUTPATIENT)
Dept: OTOLARYNGOLOGY | Facility: CLINIC | Age: 67
End: 2022-04-07

## 2022-04-21 ENCOUNTER — OFFICE VISIT (OUTPATIENT)
Dept: FAMILY MEDICINE | Facility: CLINIC | Age: 67
End: 2022-04-21
Payer: COMMERCIAL

## 2022-04-21 DIAGNOSIS — E11.9 TYPE 2 DIABETES MELLITUS WITHOUT COMPLICATION, WITHOUT LONG-TERM CURRENT USE OF INSULIN (H): Primary | ICD-10-CM

## 2022-04-21 NOTE — PROGRESS NOTES
ASSESSMENT:    1. Condition - DM: Indication - Needs additional medication: Synergistic therapy  BG are consistently above goal of <130 mg/dL for fasting and adding back additional therapy would be beneficial to keep BG at goal. Risk of hypoglycemia with glipizide is concerning for restarting medication, although cost is low which is a concern for the patient. More effective and safe options would be an SGLT2 or GLP-1 with patient preference for oral therapy. No history of UTI and kidney function is safe for SGLT2 use.     2. Condition - DM: Adherence - Cost: Cannot afford medication product  Coverage of Freestyle Nirmal sensors has stopped. Medicare restricts CGM coverage for individuals on multiple daily dosing of insulin.     3. Condition - Neuropathy: No drug therapy problem  Controlled with combination use of duloxetine and gabapentin.        PLAN:  Medications comments/ concerns are:   1. Start empagliflozin 10 mg once daily. New rx sent to [facility] -completed   2. Call Medicare about Freestyle Nirmal coverage for sensors. -pending     Follow up: 1 month after starting empagliflozin     - - - - - - - - - - - - - - -  SUBJECTIVE:  Karine is a 67 year old female coming in for a follow up medication therapy management visit. Primary care provider is Anay Martinez. Was referred by her provider for   Chief Complaint   Patient presents with     Medication Therapy Management   . Karine brought medications to the visit: no.     PLAN FROM LAST VISIT:   1. Continue checking blood sugars daily. Report FBG that are above 130 for at least 1 week to PharmD. -completed   2. Patient to check on neuropathy and pain management regimen. -completed     ----  Karine's MAIN CONCERN TODAY is blood sugars, Freestyle Nirmal coverage.  Allergies/ADRs: Reviewed in chart  Pt reports using the following medical devices: Freestyle Nirmal CGM  Pt reports the following barriers to care: insurance coverage, cost  Adverse reactions to  medications: none  Concerns with medications: none     Medication Experience: comfortable with medications  Reports of cognitive concerns: no       DM - Has continued to not take glipizide, feels less comfortable with current BG numbers. Last sensor ran out yesterday, has been using finger sticks instead.     Reported FBG readings on log range from 130s-140s  Afternoon and evening BG readings range from 150s-250s depending on the time of day.   She has not been experiencing any symptoms of high or low BG.     Open to starting another medication, would prefer to stick with oral medication if possible. Recently received [employer] shipment, next shipment will be in mid May. Would prefer to add medication to 2pm dosing time if possible as this has the least number of medications at this point.     Discussed criteria for CGM coverage with Medicare which includes need for insulin administration. Given Medicare Advantage plan, [facility] pharmacist told patient to call and that CGM should be covered for her. (phone: [phone], [phone])    Neuropathy - checked on gabapentin use, has been using this in addition to duloxetine. Prescription from sleep provider. Neuropathy and RLS has been improved recently, has not been waking her up at night. Does not feel drowsy in the morning.       OBJECTIVE:   Patient Care Team:  Anay Martinez MD as PCP - General (Internal Medicine)  Patrizia Garces LICSW as Lead Care Coordinator  Gabrielle Blas [facility] as Pharmacist (Pharmacist)  Amanda West MD as Fellow (Student in organized health care education/training program)  Anay Martinez MD as Assigned PCP  Nancy Griffin APRN CNP as Assigned Heart and Vascular Provider  Amanda West MD as Assigned Nephrology Provider  Soledad Lee PA-C as Physician Assistant (Pulmonary Disease)  Anay Martinez MD as Referring Physician (Internal Medicine)  Tiffanie Burgos PA-C as Physician  Assistant (Dermatology)  Anay Martinez MD as Referring Physician (Internal Medicine)  Lila Newton AuD as Audiologist (Audiology)  Ángela Orourke NP as Nurse Practitioner (ENT-Otolaryngology)  Current Outpatient Medications   Medication Sig Dispense Refill     amLODIPine (NORVASC) 5 MG tablet Take 1 tablet (5 mg) by mouth daily 90 tablet 3     amphetamine-dextroamphetamine (ADDERALL) 30 MG tablet Take 1 tablet by mouth every 24 hours 1.5 mg a total of 45 mg  Daily       atorvastatin (LIPITOR) 20 MG tablet Take 1 tablet (20 mg) by mouth daily 90 tablet 3     blood glucose (NO BRAND SPECIFIED) test strip Use to test blood sugar as directed with CGM sensor. 50 strip 1     calcium carbonate (OS-KINJAL) 1500 (600 Ca) MG tablet Take 2 tablets (1,200 mg) by mouth daily       cevimeline (EVOXAC) 30 MG capsule take 1 cap  capsule 3     COMPOUNDED NON-CONTROLLED SUBSTANCE (CMPD RX) - PHARMACY TO MIX COMPOUNDED MEDICATION Estradiol 0.2 mg/gm in HRT cream. Take one-quarter gm vaginally daily x 2 weeks then twice weekly 30 g 3     Continuous Blood Gluc  (FREESTYLE JORDIN 14 DAY READER) EBEN Use to read blood sugars as per 's instructions. 1 each 0     Continuous Blood Gluc Sensor (FREESTYLE JORDIN 14 DAY SENSOR) MISC Change every 14 days. 2 each 11     cyanocobalamin (VITAMIN B-12) 1000 MCG tablet Take one every other day 90 tablet 0     DULoxetine (CYMBALTA) 30 MG capsule Take 1 capsule (30 mg) by mouth 2 times daily 180 capsule 3     DULoxetine (CYMBALTA) 60 MG capsule Take 1 capsule (60 mg) by mouth At Bedtime Take in addition to current 30 mg evening dose for a total of 90 mg at bedtime. 90 capsule 3     losartan (COZAAR) 100 MG tablet Take 1 tablet (100 mg) by mouth daily 90 tablet 3     metFORMIN (GLUCOPHAGE-XR) 500 MG 24 hr tablet Take 4 po q am with breakfast. 360 tablet 3     pantoprazole (PROTONIX) 40 MG EC tablet Take 1 tablet (40 mg) by mouth 2 times daily 180 tablet 3      "tolterodine ER (DETROL LA) 4 MG 24 hr capsule Take 1 capsule (4 mg) by mouth daily 90 capsule 3     traZODone (DESYREL) 50 MG tablet Take 1 tablet by mouth every 24 hours       Vitamin D3 (CHOLECALCIFEROL) 25 mcg (1000 units) tablet Take 1 tablet (25 mcg) by mouth daily 90 tablet 2     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)     Keratoconus     Hyperlipidemia LDL goal <100     Obstructive sleep apnea     Vitamin D insufficiency     Major depression in partial remission (H)     Plantar warts     Low back pain     DJD (degenerative joint disease) of knee     Pancreatitis     Panic     Chest pain     IBS (irritable bowel syndrome)     Heart murmur     Hypertension goal BP (blood pressure) < 140/90     Type 2 diabetes mellitus without complication, without long-term current use of insulin (H)     Osteopenia of hip     Acute conjunctivitis of left eye     PTSD (post-traumatic stress disorder)     Diabetic peripheral neuropathy (H)     Bilateral sciatica     Gastrointestinal hemorrhage associated with gastric ulcer     Chronic kidney disease, stage 3 (H)     Chronic diastolic congestive heart failure (H)     Upper GI bleed       There were no vitals filed for this visit.  Estimated body mass index is 27.55 kg/m  as calculated from the following:    Height as of 3/29/22: 1.613 m (5' 3.5\").    Weight as of 3/29/22: 71.7 kg (158 lb 0.6 oz).    BP Readings from Last 3 Encounters:   03/29/22 133/82   02/17/22 139/82   01/17/22 133/73       Last Comprehensive Metabolic Panel:  Sodium   Date Value Ref Range Status   03/29/2022 141 133 - 144 mmol/L Final   05/27/2021 141 133 - 144 mmol/L Final     Potassium   Date Value Ref Range Status   03/29/2022 4.6 3.4 - 5.3 mmol/L Final   05/27/2021 4.5 3.4 - 5.3 mmol/L Final     Chloride   Date Value Ref Range Status   03/29/2022 108 94 - 109 mmol/L Final   05/27/2021 110 (H) 94 " - 109 mmol/L Final     Carbon Dioxide   Date Value Ref Range Status   05/27/2021 26 20 - 32 mmol/L Final     Carbon Dioxide (CO2)   Date Value Ref Range Status   03/29/2022 28 20 - 32 mmol/L Final     Anion Gap   Date Value Ref Range Status   03/29/2022 5 3 - 14 mmol/L Final   05/27/2021 5 3 - 14 mmol/L Final     Glucose   Date Value Ref Range Status   03/29/2022 150 (H) 70 - 99 mg/dL Final   05/27/2021 110 (H) 70 - 99 mg/dL Final     Urea Nitrogen   Date Value Ref Range Status   03/29/2022 18 7 - 30 mg/dL Final   05/27/2021 18 7 - 30 mg/dL Final     Creatinine   Date Value Ref Range Status   03/29/2022 1.22 (H) 0.52 - 1.04 mg/dL Final   05/27/2021 1.27 (H) 0.52 - 1.04 mg/dL Final     GFR Estimate   Date Value Ref Range Status   03/29/2022 48 (L) >60 mL/min/1.73m2 Final     Comment:     Effective December 21, 2021 eGFRcr in adults is calculated using the 2021 CKD-EPI creatinine equation which includes age and gender (Maya et al., NEJM, DOI: 10.1056/MAEHrh3428448)   05/27/2021 44 (L) >60 mL/min/[1.73_m2] Final     Comment:     Non  GFR Calc  Starting 12/18/2018, serum creatinine based estimated GFR (eGFR) will be   calculated using the Chronic Kidney Disease Epidemiology Collaboration   (CKD-EPI) equation.       GFR, ESTIMATED POCT   Date Value Ref Range Status   02/19/2022 45 (L) >60 mL/min/1.73m2 Final     Calcium   Date Value Ref Range Status   03/29/2022 9.5 8.5 - 10.1 mg/dL Final   05/27/2021 9.5 8.5 - 10.1 mg/dL Final        CrCl is around 49 mL/min/1.73 m  calculated using CKD-EPI Creatinine equation.    - - - - - - - - - - - - - - -  Options for treatment and/or follow-up care were reviewed with the patient. Karine was engaged and actively involved in the decision making process. Karine verbalized understanding of the options discussed and was satisfied with the final plan. Karine was given a copy of these recommendations and an up to date medication list in an after visit summary. This  report was sent to Anay Martinez.     Gabrielle Blas, PharmD, [facility]  Medication Management (MTM) Pharmacist  [facility]      - - - - - - - - - - - - - - -  Flowsheet completed.  # of medical conditions reviewed: 1  # of medications reviewed: 4  # of DTP identified: 2  Time spent: 30 minutes  Level of service:2
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
## 2022-04-28 ENCOUNTER — OFFICE VISIT (OUTPATIENT)
Dept: BEHAVIORAL HEALTH | Facility: CLINIC | Age: 67
End: 2022-04-28
Payer: COMMERCIAL

## 2022-04-28 DIAGNOSIS — F43.10 PTSD (POST-TRAUMATIC STRESS DISORDER): Primary | ICD-10-CM

## 2022-05-06 DIAGNOSIS — E11.9 TYPE 2 DIABETES MELLITUS WITHOUT COMPLICATION, WITHOUT LONG-TERM CURRENT USE OF INSULIN (H): ICD-10-CM

## 2022-05-06 DIAGNOSIS — E55.9 VITAMIN D DEFICIENCY: ICD-10-CM

## 2022-05-06 NOTE — TELEPHONE ENCOUNTER
Empagliflozin (Jardiance) 10 mg, Vitamin D3 (Cholecalciferol) 25 mcg    Last Office Visit: 4/21/22  Future [facility] Appointments: None  Medication last refilled:     4/28/22 #30 with 0 refill(s) - Empagliflozin  8/9/21 #90 with 2 refill(s) - Vitamin D3    Required labs per protocol:    LAB REF RANGE 9/9/21 12/13/21 3/29/22   Creatinine 0.8-1.25 mg/dL 1.5 High -- 1.22   Hgb A1C 4.1-5.7 % 5.8 High 6.0 High 6.3 High     Routing refill request to provider for review/approval because:  Labs out of range:  Elevated Hgb A1C    DELIA FernandesN, RN, CCM

## 2022-05-07 RX ORDER — VITAMIN B COMPLEX
1 TABLET ORAL DAILY
Qty: 90 TABLET | Refills: 2 | Status: SHIPPED | OUTPATIENT
Start: 2022-05-07 | End: 2022-12-09

## 2022-05-11 ENCOUNTER — OFFICE VISIT (OUTPATIENT)
Dept: BEHAVIORAL HEALTH | Facility: CLINIC | Age: 67
End: 2022-05-11
Payer: COMMERCIAL

## 2022-05-11 DIAGNOSIS — F43.10 PTSD (POST-TRAUMATIC STRESS DISORDER): Primary | ICD-10-CM
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
## 2022-05-12 ENCOUNTER — PRE VISIT (OUTPATIENT)
Dept: OTOLARYNGOLOGY | Facility: CLINIC | Age: 67
End: 2022-05-12

## 2022-05-16 ENCOUNTER — OFFICE VISIT (OUTPATIENT)
Dept: FAMILY MEDICINE | Facility: CLINIC | Age: 67
End: 2022-05-16
Attending: INTERNAL MEDICINE
Payer: COMMERCIAL

## 2022-05-16 VITALS — DIASTOLIC BLOOD PRESSURE: 64 MMHG | SYSTOLIC BLOOD PRESSURE: 130 MMHG

## 2022-05-16 DIAGNOSIS — D48.9 NEOPLASM OF UNCERTAIN BEHAVIOR: Primary | ICD-10-CM

## 2022-05-16 DIAGNOSIS — L98.9 SKIN LESION: ICD-10-CM

## 2022-05-16 PROCEDURE — 88305 TISSUE EXAM BY PATHOLOGIST: CPT | Performed by: PATHOLOGY

## 2022-05-16 PROCEDURE — 11102 TANGNTL BX SKIN SINGLE LES: CPT | Performed by: PHYSICIAN ASSISTANT

## 2022-05-16 NOTE — PATIENT INSTRUCTIONS
Wound Care After a Biopsy    What is a skin biopsy?  A skin biopsy allows the doctor to examine a very small piece of tissue under the microscope to determine the diagnosis and the best treatment for the skin condition. A local anesthetic (numbing medicine)  is injected with a very small needle into the skin area to be tested. A small piece of skin is taken from the area. Sometimes a suture (stitch) is used.     What are the risks of a skin biopsy?  I will experience scar, bleeding, swelling, pain, crusting and redness. I may experience incomplete removal or recurrence. Risks of this procedure are excessive bleeding, bruising, infection, nerve damage, numbness, thick (hypertrophic or keloidal) scar and non-diagnostic biopsy.    How should I care for my wound for the first 24 hours?  Keep the wound dry and covered for 24 hours  If it bleeds, hold direct pressure on the area for 15 minutes. If bleeding does not stop then go to the emergency room  Avoid strenuous exercise the first 1-2 days or as your doctor instructs you    How should I care for the wound after 24 hours?  After 24 hours, remove the bandage  You may bathe or shower as normal  If you had a scalp biopsy, you can shampoo as usual and can use shower water to clean the biopsy site daily  Clean the wound twice a day with gentle soap and water  Do not scrub, be gentle  Apply white petroleum/Vaseline after cleaning the wound with a cotton swab or a clean finger, and keep the site covered with a Bandaid /bandage. Bandages are not necessary with a scalp biopsy  If you are unable to cover the site with a Bandaid /bandage, re-apply ointment 2-3 times a day to keep the site moist. Moisture will help with healing  Avoid strenuous activity for first 1-2 days  Avoid lakes, rivers, pools, and oceans until the stitches are removed or the site is healed    How do I clean my wound?  Wash hands thoroughly with soap or use hand  before all wound care  Clean the  wound with gentle soap and water  Apply white petroleum/Vaseline  to wound after it is clean  Replace the Bandaid /bandage to keep the wound covered for the first few days or as instructed by your doctor  If you had a scalp biopsy, warm shower water to the area on a daily basis should suffice    What should I use to clean my wound?   Cotton-tipped applicators (Qtips )  White petroleum jelly (Vaseline ). Use a clean new container and use Q-tips to apply.  Bandaids   as needed  Gentle soap     How should I care for my wound long term?  Do not get your wound dirty  Keep up with wound care for one week or until the area is healed.  A small scab will form and fall off by itself when the area is completely healed. The area will be red and will become pink in color as it heals. Sun protection is very important for how your scar will turn out. Sunscreen with an SPF 30 or greater is recommended once the area is healed.  If you have stitches, stitches need to be removed in n/a days. You may return to our clinic for this or you may have it done locally at your doctor s office.  You should have some soreness but it should be mild and slowly go away over several days. Talk to your doctor about using tylenol for pain,    When should I call my doctor?  If you have increased:   Pain or swelling  Pus or drainage (clear or slightly yellow drainage is ok)  Temperature over 100F  Spreading redness or warmth around wound    When will I hear about my results?  The biopsy results can take 2 weeks to come back.  Your results will automatically release to [facility] before your provider has even reviewed them.  The clinic will call you with the results, send you a Queue-it message, or have you schedule a follow-up clinic or phone time to discuss the results.  Contact our clinics if you do not hear from us in 2 weeks.    Who should I call with questions?  [facility]: [phone]  [facility]  [place]: [phone]  For urgent needs outside of business hours call the [facility] at [phone] and ask for the dermatology resident on call

## 2022-05-16 NOTE — LETTER
5/16/2022         RE: Karine Moss  [address]  [place] [address]        Dear Colleague,    Thank you for referring your patient, Karine Moss, to the [facility]. Please see a copy of my visit note below.    [facility] Dermatology Note  Encounter Date: May 16, 2022  Office Visit     Dermatology Problem List:  1. Epidermal cyst  2. Androgenetic alopecia  # NUB, R neck, ddx scar r/o BCC s/p bx 5/16/22  ____________________________________________    Assessment & Plan:    # NUB, R neck, ddx scar r/o BCC  - Shave biopsy today, see procedure note below    # Hair loss  Recommended follow-up to discuss this more in depth.     Procedures Performed:   - Shave biopsy procedure note, location(s): See above. After discussion of benefits and risks including but not limited to bleeding, infection, scar, incomplete removal, recurrence, and non-diagnostic biopsy, written consent and photographs were obtained. The area was cleaned with isopropyl alcohol. 0.5mL of 1% lidocaine with epinephrine was injected to obtain adequate anesthesia of lesion(s). Shave biopsy at site(s) performed. Hemostasis was achieved with aluminium chloride. Petrolatum ointment and a sterile dressing were applied. The patient tolerated the procedure and no complications were noted. The patient was provided with verbal and written post care instructions.     Follow-up: pending path results and soonest available for hair loss.     Staff and Scribe:     Scribe Disclosure:  I, Mary Alice Hawkins, am serving as a scribe to document services personally performed by Tiffanie Burgos PA-C based on data collection and the provider's statements to me.     Provider Disclosure:   The documentation recorded by the scribe accurately reflects the services I personally performed and the decisions made by me.    All risks, benefits and alternatives were discussed with patient.  Patient is in agreement and  understands the assessment and plan.  All questions were answered.    Tiffanie Burgos PA-C, MPAS  [facility]: Phone: [phone], Fax: [phone]  [facility]: Phone: [phone],  Fax: [phone]  [facility]: Phone: [phone], Fax: [phone]  ____________________________________________    CC: Lesion (Rt side of neck-- gotten bigger. It's been there for about 3-4 years. Declined FSE.)    HPI:  Ms. Karine Moss is a(n) 67 year old female who presents today as a return patient for spot check. 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     Today, the patient points to a lesion on her R neck. She does occasionally notice some tenderness. She does report picking at the area. She also reports ongoing hair loss. Patient is otherwise feeling well, without additional skin concerns.    Labs Reviewed:  N/A    Physical Exam:  Vitals: /64   SKIN: Focused examination of the neck was performed.  - On the R neck, there is a pink shiny 1 cm papule.   - No other lesions of concern on areas examined.     Medications:  Current Outpatient Medications   Medication     amLODIPine (NORVASC) 5 MG tablet     amphetamine-dextroamphetamine (ADDERALL) 30 MG tablet     atorvastatin (LIPITOR) 20 MG tablet     blood glucose (NO BRAND SPECIFIED) test strip     calcium carbonate (OS-KINJAL) 1500 (600 Ca) MG tablet     cevimeline (EVOXAC) 30 MG capsule     COMPOUNDED NON-CONTROLLED SUBSTANCE (CMPD RX) - PHARMACY TO MIX COMPOUNDED MEDICATION     Continuous Blood Gluc  (FREESTYLE JORDIN 14 DAY READER) EBEN     Continuous Blood Gluc Sensor (FREESTYLE JORDIN 14 DAY SENSOR) MISC     cyanocobalamin (VITAMIN B-12) 1000 MCG tablet     DULoxetine (CYMBALTA) 30 MG capsule     DULoxetine (CYMBALTA) 60 MG capsule     empagliflozin (JARDIANCE) 10 MG  TABS tablet     losartan (COZAAR) 100 MG tablet     metFORMIN (GLUCOPHAGE-XR) 500 MG 24 hr tablet     pantoprazole (PROTONIX) 40 MG EC tablet     tolterodine ER (DETROL LA) 4 MG 24 hr capsule     traZODone (DESYREL) 50 MG tablet     Vitamin D3 (CHOLECALCIFEROL) 25 mcg (1000 units) tablet     No current facility-administered medications for this visit.      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)     Keratoconus     Hyperlipidemia LDL goal <100     Obstructive sleep apnea     Vitamin D insufficiency     Major depression in partial remission (H)     Plantar warts     Low back pain     DJD (degenerative joint disease) of knee     Pancreatitis     Panic     Chest pain     IBS (irritable bowel syndrome)     Heart murmur     Hypertension goal BP (blood pressure) < 140/90     Type 2 diabetes mellitus without complication, without long-term current use of insulin (H)     Osteopenia of hip     Acute conjunctivitis of left eye     PTSD (post-traumatic stress disorder)     Diabetic peripheral neuropathy (H)     Bilateral sciatica     Gastrointestinal hemorrhage associated with gastric ulcer     Chronic kidney disease, stage 3 (H)     Chronic diastolic congestive heart failure (H)     Upper GI bleed     Past Medical History:   Diagnosis Date     Arthritis      Chest pain     seeing Cardiology     Depression 1991    after 's death     Diabetes mellitus (H)      Diabetic renal disease (H)     microalbuminuria     DJD (degenerative joint disease) of knee      H/O vitamin D deficiency      Hypertension      IBS (irritable bowel syndrome)     diarrhea      Keratoconus      Low back pain      Narcolepsy 2007    Dx'd by Sleep specialist 347.00, pt discontinued Adderal mid Nov. 13 due to tacycardia concerns     Obesity      Obstructive sleep apnea     327.23, 3 sleep studies,Dr. Flaco [facility]      Pancreatitis     related to Victoza, also on Xyrem     Panic      RLS (restless legs syndrome)      Sinus tachycardia         CC Anay Martinez MD  [address] [place] [id number] on close of this encounter.        Again, thank you for allowing me to participate in the care of your patient.        Sincerely,        Tiffanie Burgos PA-C

## 2022-05-16 NOTE — PROGRESS NOTES
[facility] Dermatology Note  Encounter Date: May 16, 2022  Office Visit     Dermatology Problem List:  1. Epidermal cyst  2. Androgenetic alopecia  # NUB, R neck, ddx scar r/o BCC s/p bx 5/16/22  ____________________________________________    Assessment & Plan:    # NUB, R neck, ddx scar r/o BCC  - Shave biopsy today, see procedure note below    # Hair loss  Recommended follow-up to discuss this more in depth.     Procedures Performed:   - Shave biopsy procedure note, location(s): See above. After discussion of benefits and risks including but not limited to bleeding, infection, scar, incomplete removal, recurrence, and non-diagnostic biopsy, written consent and photographs were obtained. The area was cleaned with isopropyl alcohol. 0.5mL of 1% lidocaine with epinephrine was injected to obtain adequate anesthesia of lesion(s). Shave biopsy at site(s) performed. Hemostasis was achieved with aluminium chloride. Petrolatum ointment and a sterile dressing were applied. The patient tolerated the procedure and no complications were noted. The patient was provided with verbal and written post care instructions.     Follow-up: pending path results and soonest available for hair loss.     Staff and Scribe:     Scribe Disclosure:  I, Mary Alice Hawkins, am serving as a scribe to document services personally performed by Tiffanie Burgos PA-C based on data collection and the provider's statements to me.     Provider Disclosure:   The documentation recorded by the scribe accurately reflects the services I personally performed and the decisions made by me.    All risks, benefits and alternatives were discussed with patient.  Patient is in agreement and understands the assessment and plan.  All questions were answered.    Tiffanie Burgos PA-C, [facility]  [facility] [place]: Phone: [phone], Fax: [phone]  [facility]: Phone:  [phone],  Fax: [phone]  [facility]: Phone: [phone], Fax: [phone]  ____________________________________________    CC: Lesion (Rt side of neck-- gotten bigger. It's been there for about 3-4 years. Declined FSE.)    HPI:  Ms. Karine Moss is a(n) 67 year old female who presents today as a return patient for spot check. 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     Today, the patient points to a lesion on her R neck. She does occasionally notice some tenderness. She does report picking at the area. She also reports ongoing hair loss. Patient is otherwise feeling well, without additional skin concerns.    Labs Reviewed:  N/A    Physical Exam:  Vitals: /64   SKIN: Focused examination of the neck was performed.  - On the R neck, there is a pink shiny 1 cm papule.   - No other lesions of concern on areas examined.     Medications:  Current Outpatient Medications   Medication     amLODIPine (NORVASC) 5 MG tablet     amphetamine-dextroamphetamine (ADDERALL) 30 MG tablet     atorvastatin (LIPITOR) 20 MG tablet     blood glucose (NO BRAND SPECIFIED) test strip     calcium carbonate (OS-KINJAL) 1500 (600 Ca) MG tablet     cevimeline (EVOXAC) 30 MG capsule     COMPOUNDED NON-CONTROLLED SUBSTANCE (CMPD RX) - PHARMACY TO MIX COMPOUNDED MEDICATION     Continuous Blood Gluc  (FREESTYLE JORDIN 14 DAY READER) EBEN     Continuous Blood Gluc Sensor (FREESTYLE JORDIN 14 DAY SENSOR) MISC     cyanocobalamin (VITAMIN B-12) 1000 MCG tablet     DULoxetine (CYMBALTA) 30 MG capsule     DULoxetine (CYMBALTA) 60 MG capsule     empagliflozin (JARDIANCE) 10 MG TABS tablet     losartan (COZAAR) 100 MG tablet     metFORMIN (GLUCOPHAGE-XR) 500 MG 24 hr tablet     pantoprazole (PROTONIX) 40 MG EC tablet     tolterodine ER (DETROL LA) 4 MG 24 hr capsule     traZODone (DESYREL) 50 MG tablet     Vitamin D3  (CHOLECALCIFEROL) 25 mcg (1000 units) tablet     No current facility-administered medications for this visit.      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)     Keratoconus     Hyperlipidemia LDL goal <100     Obstructive sleep apnea     Vitamin D insufficiency     Major depression in partial remission (H)     Plantar warts     Low back pain     DJD (degenerative joint disease) of knee     Pancreatitis     Panic     Chest pain     IBS (irritable bowel syndrome)     Heart murmur     Hypertension goal BP (blood pressure) < 140/90     Type 2 diabetes mellitus without complication, without long-term current use of insulin (H)     Osteopenia of hip     Acute conjunctivitis of left eye     PTSD (post-traumatic stress disorder)     Diabetic peripheral neuropathy (H)     Bilateral sciatica     Gastrointestinal hemorrhage associated with gastric ulcer     Chronic kidney disease, stage 3 (H)     Chronic diastolic congestive heart failure (H)     Upper GI bleed     Past Medical History:   Diagnosis Date     Arthritis      Chest pain     seeing Cardiology     Depression 1991    after 's death     Diabetes mellitus (H)      Diabetic renal disease (H)     microalbuminuria     DJD (degenerative joint disease) of knee      H/O vitamin D deficiency      Hypertension      IBS (irritable bowel syndrome)     diarrhea      Keratoconus      Low back pain      Narcolepsy 2007    Dx'd by Sleep specialist 347.00, pt discontinued Adderal mid Nov. 13 due to tacycardia concerns     Obesity      Obstructive sleep apnea     327.23, 3 sleep studies,Dr. [facility]     Pancreatitis     related to Victoza, also on Xyrem     Panic      RLS (restless legs syndrome)      Sinus tachycardia         CC Anay Martinez MD  [address] [place] [address] on close of this encounter.

## 2022-05-18 ENCOUNTER — TELEPHONE (OUTPATIENT)
Dept: FAMILY MEDICINE | Facility: CLINIC | Age: 67
End: 2022-05-18
Payer: COMMERCIAL

## 2022-05-18 LAB
PATH REPORT.COMMENTS IMP SPEC: NORMAL
PATH REPORT.COMMENTS IMP SPEC: NORMAL
PATH REPORT.FINAL DX SPEC: NORMAL
PATH REPORT.GROSS SPEC: NORMAL
PATH REPORT.MICROSCOPIC SPEC OTHER STN: NORMAL
PATH REPORT.RELEVANT HX SPEC: NORMAL

## 2022-05-18 NOTE — LETTER
[facility]  [address]  [address]  [phone]        5/19/2022       Karine Moss  [address] [place] [id number]      Dear Karine:    You are scheduled for Mohs Surgery on: 2Thursday July 28, 2022 at 8 am.    Please check in at [facility], [address].     You don't need to arrive more than 5-10 minutes prior to your appointment time.     Be sure to eat a good breakfast and bathe and wash your hair prior to surgery.     If you are taking any anti-coagulants that are prescribed by your Doctor (such as Coumadin/Warfarin, Plavix, Aspirin, Ibuprofen), please continue taking them.     However, if you are taking anti-coagulants over the counter without a Doctor's order for a medical condition, please discontinue them 10 days prior to surgery.     Please wear loose comfortable clothing as it could possibly be 4-6 hours until your surgery is completed depending upon how many layers of tissue need to be removed.      Thank you,    Brendan Hollingsworth MD

## 2022-05-18 NOTE — TELEPHONE ENCOUNTER
Patient Contact    Attempt # 1    Was call answered?  No.  Left message on voicemail with information to call dermatology nurse back at [phone].  Advised Tiffanie sent a message via my chart and to call nurse back after reading to schedule appt.  Advised no vm so if no answer to call back at a later time.    On call back: Give Tiffanie's message and schedule Mohs.    Starr HOLDEN RN  [facility]  [phone]

## 2022-05-18 NOTE — TELEPHONE ENCOUNTER
----- Message from Tiffanie Burgos PA-C sent at 5/18/2022  8:49 AM CDT -----  Needs MMS, please refer to [facility] or [facility]

## 2022-05-19 NOTE — TELEPHONE ENCOUNTER
S/w pt who read Tiffanie's message on my chart.  Scheduled for Mohs on 7/28 at  at 8 am.    Mohs letter and information sent via my chart and mailed home.    Starr HOLDEN RN  [facility]  [phone]

## 2022-05-24 DIAGNOSIS — G25.81 RESTLESS LEGS SYNDROME: Primary | ICD-10-CM

## 2022-05-24 RX ORDER — GABAPENTIN 400 MG/1
800 CAPSULE ORAL EVERY EVENING
COMMUNITY
Start: 2022-05-24 | End: 2022-08-25

## 2022-06-10 DIAGNOSIS — E11.9 TYPE 2 DIABETES MELLITUS WITHOUT COMPLICATION, WITHOUT LONG-TERM CURRENT USE OF INSULIN (H): ICD-10-CM

## 2022-06-10 NOTE — TELEPHONE ENCOUNTER
Last time prescribed: 5/7/22 , 30 tabs/caps x 0 refills  Last office visit: 3/29/22  Next appointment: No Future Appointment Scheduled

## 2022-06-14 NOTE — TELEPHONE ENCOUNTER
Empagliflozin (Jardiance) 10 mg    Last Office Visit: 5/16/22  [facility] Appointments: None  Medication last refilled: 5/7/22 #30 with 0 refill(s)    Required labs per protocol:    LAB REF RANGE 11/17/21 12/13/21 3/29/22   Creatinine 0.8-1.25 mg/dL 0.96 -- 1.22 High   Hgb A1C 4.1-5.7 % -- 6.0 High 6.3 High     Routing refill request to provider for review/approval because:  Labs out of range:  Elevated Creatinine and Hgb A1C    DELIA FernandesN, RN, CCM

## 2022-07-22 DIAGNOSIS — E53.8 VITAMIN B12 DEFICIENCY (NON ANEMIC): ICD-10-CM

## 2022-07-22 RX ORDER — LANOLIN ALCOHOL/MO/W.PET/CERES
CREAM (GRAM) TOPICAL
Qty: 90 TABLET | Refills: 1 | Status: SHIPPED | OUTPATIENT
Start: 2022-07-22 | End: 2023-07-10

## 2022-07-22 NOTE — TELEPHONE ENCOUNTER
Cyanocobalamin (Vitamin B-12) 1000 mcg    Last Office Visit: 5/16/22  Future [facility] Appointments: None  Medication last refilled: 2/24/22 #90 with 0 refill(s)    Prescription approved per [facility] Refill Protocol.    Joi Taveras, DELIAN, RN, CCM

## 2022-07-22 NOTE — TELEPHONE ENCOUNTER
Last time prescribed: 2/24/22 , 90 tabs/caps x 0 refills  Last office visit: 3/29/22  Next appointment: No Future Appointment Scheduled

## 2022-07-25 ENCOUNTER — TELEPHONE (OUTPATIENT)
Dept: DERMATOLOGY | Facility: CLINIC | Age: 67
End: 2022-07-25

## 2022-07-25 NOTE — TELEPHONE ENCOUNTER
Left message for pt to call dermatology nurse at [phone].  Advised there is no vm at this number, if no answer to call back at a later time.    Next available mohs is 8/4.    Starr HOLDEN RN  [facility]  [phone]

## 2022-07-25 NOTE — TELEPHONE ENCOUNTER
[facility] Call Center    Phone Message    May a detailed message be left on voicemail: yes     Reason for Call: Other: Pt called and wanted to know when is the next opening for MOHS. Please call her back. Thanks     Action Taken: Message routed to:  Other: OX DERM    Travel Screening: Not Applicable

## 2022-07-26 NOTE — TELEPHONE ENCOUNTER
"Left message on answering machine for patient to call back.    We have no Mohs opens until October.     Tried calling \"summer number\" listed in chart, rings once and then disconnects. Left voicemail on other number.    Dorene PANDYA RN  Dermatology   [phone]    "

## 2022-07-27 NOTE — TELEPHONE ENCOUNTER
Call attempt #3.    Called summer number, rings once and then no answer.    Called mobile number, straight to voicemail. Left message to call back. Advised back line with no voicemail.    Closing this encounter as we have tried to contact pt 3 times.    Dorene PANDYA RN  Dermatology   [phone]

## 2022-08-08 NOTE — TELEPHONE ENCOUNTER
Letter sent via certified mail due to missed Mohs appointment.    Dorene PANDYA RN  Dermatology   [phone]

## 2022-08-15 DIAGNOSIS — E11.9 TYPE 2 DIABETES MELLITUS WITHOUT COMPLICATION, WITHOUT LONG-TERM CURRENT USE OF INSULIN (H): Primary | ICD-10-CM

## 2022-08-17 RX ORDER — FLASH GLUCOSE SENSOR
KIT MISCELLANEOUS
Qty: 2 EACH | Refills: 11 | OUTPATIENT
Start: 2022-08-17

## 2022-08-17 NOTE — TELEPHONE ENCOUNTER
Medication requested: empagliflozin (JARDIANCE) 10 MG TABS tablet  Last office visit: 3/29/22  [facility] appointments: none  Medication last refilled: 6/16/22  Last qualifying labs:   Component      Latest Ref Rng & Units 3/29/2022   Hemoglobin A1C      0.0 - 5.6 % 6.3 (H)     Pt due for HbA1c, order placed. Pt also due for follow-up appt with Gabrielle Blas for diabetes med management after starting empagliflozin in March. Gabrielle requested one month follow-up at that time. Messaged pt to schedule follow-up appt with Gabrielle and lab appointment for A1c.    Navin PHILIP, RN  08/17/22 12:15 PM

## 2022-08-25 ENCOUNTER — OFFICE VISIT (OUTPATIENT)
Dept: FAMILY MEDICINE | Facility: CLINIC | Age: 67
End: 2022-08-25
Payer: COMMERCIAL

## 2022-08-25 VITALS
SYSTOLIC BLOOD PRESSURE: 139 MMHG | TEMPERATURE: 98.3 F | HEART RATE: 97 BPM | BODY MASS INDEX: 24.59 KG/M2 | HEIGHT: 64 IN | WEIGHT: 144 LBS | DIASTOLIC BLOOD PRESSURE: 78 MMHG | OXYGEN SATURATION: 97 %

## 2022-08-25 DIAGNOSIS — I10 HYPERTENSION GOAL BP (BLOOD PRESSURE) < 140/90: ICD-10-CM

## 2022-08-25 DIAGNOSIS — R80.9 TYPE 2 DIABETES MELLITUS WITH MICROALBUMINURIA, WITHOUT LONG-TERM CURRENT USE OF INSULIN (H): Primary | ICD-10-CM

## 2022-08-25 DIAGNOSIS — E78.5 HYPERLIPIDEMIA LDL GOAL <100: ICD-10-CM

## 2022-08-25 DIAGNOSIS — E11.9 TYPE 2 DIABETES MELLITUS WITHOUT COMPLICATION, WITHOUT LONG-TERM CURRENT USE OF INSULIN (H): ICD-10-CM

## 2022-08-25 DIAGNOSIS — K92.2 UPPER GI BLEED: ICD-10-CM

## 2022-08-25 DIAGNOSIS — E11.29 TYPE 2 DIABETES MELLITUS WITH MICROALBUMINURIA, WITHOUT LONG-TERM CURRENT USE OF INSULIN (H): Primary | ICD-10-CM

## 2022-08-25 DIAGNOSIS — R41.3 MEMORY CHANGES: ICD-10-CM

## 2022-08-25 LAB
ALBUMIN SERPL BCG-MCNC: 4.3 G/DL (ref 3.5–5.2)
ALP SERPL-CCNC: 96 U/L (ref 35–104)
ALT SERPL W P-5'-P-CCNC: 11 U/L (ref 10–35)
ANION GAP SERPL CALCULATED.3IONS-SCNC: 12 MMOL/L (ref 7–15)
AST SERPL W P-5'-P-CCNC: 21 U/L (ref 10–35)
BILIRUB SERPL-MCNC: 0.6 MG/DL
BUN SERPL-MCNC: 20.8 MG/DL (ref 8–23)
CALCIUM SERPL-MCNC: 9.6 MG/DL (ref 8.8–10.2)
CHLORIDE SERPL-SCNC: 102 MMOL/L (ref 98–107)
CREAT SERPL-MCNC: 1.52 MG/DL (ref 0.51–0.95)
CREAT UR-MCNC: 116 MG/DL
DEPRECATED HCO3 PLAS-SCNC: 24 MMOL/L (ref 22–29)
ERYTHROCYTE [DISTWIDTH] IN BLOOD BY AUTOMATED COUNT: 14.5 % (ref 10–15)
FERRITIN SERPL-MCNC: 23 NG/ML (ref 11–328)
GFR SERPL CREATININE-BSD FRML MDRD: 37 ML/MIN/1.73M2
GLUCOSE SERPL-MCNC: 102 MG/DL (ref 70–99)
HBA1C MFR BLD: 6.6 % (ref 0–5.6)
HCT VFR BLD AUTO: 38.5 % (ref 35–47)
HGB BLD-MCNC: 11.7 G/DL (ref 11.7–15.7)
MCH RBC QN AUTO: 26.4 PG (ref 26.5–33)
MCHC RBC AUTO-ENTMCNC: 30.4 G/DL (ref 31.5–36.5)
MCV RBC AUTO: 87 FL (ref 78–100)
MICROALBUMIN UR-MCNC: 48.4 MG/L
MICROALBUMIN/CREAT UR: 41.72 MG/G CR (ref 0–25)
PLATELET # BLD AUTO: 238 10E3/UL (ref 150–450)
POTASSIUM SERPL-SCNC: 5.5 MMOL/L (ref 3.4–5.3)
PROT SERPL-MCNC: 7.6 G/DL (ref 6.4–8.3)
RBC # BLD AUTO: 4.44 10E6/UL (ref 3.8–5.2)
SODIUM SERPL-SCNC: 138 MMOL/L (ref 136–145)
WBC # BLD AUTO: 10.1 10E3/UL (ref 4–11)

## 2022-08-25 PROCEDURE — 82728 ASSAY OF FERRITIN: CPT | Performed by: INTERNAL MEDICINE

## 2022-08-25 PROCEDURE — 82040 ASSAY OF SERUM ALBUMIN: CPT | Performed by: INTERNAL MEDICINE

## 2022-08-25 PROCEDURE — 80053 COMPREHEN METABOLIC PANEL: CPT | Performed by: INTERNAL MEDICINE

## 2022-08-25 PROCEDURE — 82043 UR ALBUMIN QUANTITATIVE: CPT | Performed by: INTERNAL MEDICINE

## 2022-08-25 PROCEDURE — 85027 COMPLETE CBC AUTOMATED: CPT | Performed by: INTERNAL MEDICINE

## 2022-08-25 ASSESSMENT — ANXIETY QUESTIONNAIRES
6. BECOMING EASILY ANNOYED OR IRRITABLE: SEVERAL DAYS
1. FEELING NERVOUS, ANXIOUS, OR ON EDGE: MORE THAN HALF THE DAYS
IF YOU CHECKED OFF ANY PROBLEMS ON THIS QUESTIONNAIRE, HOW DIFFICULT HAVE THESE PROBLEMS MADE IT FOR YOU TO DO YOUR WORK, TAKE CARE OF THINGS AT HOME, OR GET ALONG WITH OTHER PEOPLE: SOMEWHAT DIFFICULT
GAD7 TOTAL SCORE: 12
GAD7 TOTAL SCORE: 12
5. BEING SO RESTLESS THAT IT IS HARD TO SIT STILL: NEARLY EVERY DAY
3. WORRYING TOO MUCH ABOUT DIFFERENT THINGS: SEVERAL DAYS
7. FEELING AFRAID AS IF SOMETHING AWFUL MIGHT HAPPEN: SEVERAL DAYS
2. NOT BEING ABLE TO STOP OR CONTROL WORRYING: MORE THAN HALF THE DAYS

## 2022-08-25 ASSESSMENT — PATIENT HEALTH QUESTIONNAIRE - PHQ9
SUM OF ALL RESPONSES TO PHQ QUESTIONS 1-9: 6
5. POOR APPETITE OR OVEREATING: MORE THAN HALF THE DAYS

## 2022-08-25 NOTE — PROGRESS NOTES
ASSESSMENT:    1. Condition - DM: Adherence- Adherence: medication product not available   Karine is not receiving the test strips compatible with her reader from her pharmacy, it would be reasonable to follow up with [facility] so they are sent with her next order. Diabetes is well controlled with A1c at goal of <7% and fasting blood glucose . Though A1c went up slightly since discontinuing glipizide and starting Jardiance, patient is well within goal parameters. Increasing Jardiance dose would not be appropriate for patient because she is at goal, endorses symptoms of postural hypotension, and has past concerns of hypoglycemia.     2. Condition - Pain: Safety- Adverse medication event: Undesirable effect    Patient's pain appears to be controlled with duloxetine since she stopped taking the gabapentin. It would be reasonable to discontinue gabapentin due to adequate pain control and concern for side effects.     3. Condition - CKD: Safety- needs additional monitoring   Labs completed at today's visit showed decrease in kidney function with elevated Scr and potassium and decreased eGFR. It would be beneficial to monitor closely to ensure safety of medications. If eGFR falls below 30 ml/min/1.73m2, discontinuation of metformin would be indicated and decrease dosages as tolterodine and duloxetine. Could consider decrease of current metformin dose. Losartan can increase potassium, but patient has been on stable dose for over 2 years now.        PLAN:  Medications comments/ concerns are:   1. Stop gabapentin & call [facility] to discontinue prescription.   2. Continue Jardiance 10 mg every day.   3. Call [facility] to check coverage of Free Style Nirmal test strips, will use GoodRx if not.     Follow up: 3 months with PCP and PharmD for lab recheck, sooner if needed.     - - - - - - - - - - - - - - -  SUBJECTIVE:  Karine is a 67 year old female coming in for a follow up medication therapy management visit. Primary  care provider is Anay Martinez. Was referred by her provider for   Chief Complaint   Patient presents with     Medication Therapy Management   . Karine brought medications to the visit: no.     PLAN FROM LAST VISIT:   1. Start empagliflozin 10 mg once daily. New rx sent to [facility] -completed   2. Call Medicare about Freestyle [employer]io coverage for sensors. -pending     ----  Karine's MAIN CONCERN TODAY is blood sugars, Freestyle Nirmal coverage.  Allergies/ADRs: Reviewed in chart  Pt reports using the following medical devices: Freestyle Nirmal CGM  Pt reports the following barriers to care: insurance coverage, cost  Adverse reactions to medications: none  Concerns with medications: none    Medication Experience: Uses [facility] and takes medications as directed.   Reports of cognitive concerns: no      DM- Patient started taking Jardiance 10 mg daily since last visit and reports it has been going well. She states she sometimes feels dizzy upon standing. It is unclear when the symptoms began and if the medication is a contributing factor. She denies falls and symptoms of dehydration. She does worry her blood sugars have been higher than in the past. She reports her blood sugar sometimes is over 200's after eating which concerns her. Did not bring her reader in today but reports her blood sugar was 97 this am and an average of around 140 over the past 90 days. She has not been able to get the test strips compatible with the Free Style Nirmal reader. Has continued taking metformin 2000 mg every day. She has gotten dentures since the last visit which has increased her ability to eat regularly. She sometimes drinks protein drinks for additional supplementation.     Pain- Karine stopped taking her gabapentin due to concerns of memory loss. She states that she has seen it in the news recently. She has not noticed a difference in pain control since stopping the gabapentin. She states her pain has not been any worse and  is limited to her feet. She has continued taking duloxetine 90 mg at bedtime and does not have any concerns.     CKD- Patient has been on stable dose of losartan 100 mg and recently started Jardiance as stated above.       OBJECTIVE:   Patient Care Team:  Anay Martinez MD as PCP - General (Internal Medicine)  Patrizia Garces LICSW as Lead Care Coordinator  Gabrielle Blas RP as Pharmacist (Pharmacist)  Amanda West MD as Fellow (Student in organized health care education/training program)  Anay Martinez MD as Assigned PCP  Amanda West MD as Assigned Nephrology Provider  Soledad Lee PA-C as Physician Assistant (Pulmonary Disease)  Anay Martinez MD as Referring Physician (Internal Medicine)  Tiffanie Burgos PA-C as Physician Assistant (Dermatology)  Anay Martinez MD as Referring Physician (Internal Medicine)  Lila Newton AuD as Audiologist (Audiology)  Ángela Orourke NP as Nurse Practitioner (ENT-Otolaryngology)  Tiffanie Burgos PA-C as Assigned Surgical Provider  Gabrielle Blas RPH as Assigned MTM Pharmacist  Current Outpatient Medications   Medication Sig Dispense Refill     blood glucose (NO BRAND SPECIFIED) test strip Use to test blood sugar as needed with [employer] Nirmal CGM. 100 strip 0     amLODIPine (NORVASC) 5 MG tablet Take 1 tablet (5 mg) by mouth daily 90 tablet 3     amphetamine-dextroamphetamine (ADDERALL) 30 MG tablet Take 1 tablet by mouth every 24 hours 1.5 mg a total of 45 mg  Daily       atorvastatin (LIPITOR) 20 MG tablet Take 1 tablet (20 mg) by mouth daily 90 tablet 3     blood glucose (NO BRAND SPECIFIED) test strip Use to test blood sugar as directed with CGM sensor. 50 strip 1     calcium carbonate (OS-KINJAL) 1500 (600 Ca) MG tablet Take 2 tablets (1,200 mg) by mouth daily       cevimeline (EVOXAC) 30 MG capsule take 1 cap  capsule 3     COMPOUNDED NON-CONTROLLED SUBSTANCE (CMPD RX) - PHARMACY TO MIX  COMPOUNDED MEDICATION Estradiol 0.2 mg/gm in HRT cream. Take one-quarter gm vaginally daily x 2 weeks then twice weekly 30 g 3     Continuous Blood Gluc  (FREESTYLE JORDIN 14 DAY READER) EBEN Use to read blood sugars as per 's instructions. 1 each 0     Continuous Blood Gluc Sensor (FREESTYLE JORDIN 14 DAY SENSOR) MISC Change every 14 days. 2 each 11     cyanocobalamin (VITAMIN B-12) 1000 MCG tablet Take one every other day 90 tablet 1     DULoxetine (CYMBALTA) 30 MG capsule Take 1 capsule (30 mg) by mouth 2 times daily 180 capsule 3     DULoxetine (CYMBALTA) 60 MG capsule Take 1 capsule (60 mg) by mouth At Bedtime Take in addition to current 30 mg evening dose for a total of 90 mg at bedtime. 90 capsule 3     empagliflozin (JARDIANCE) 10 MG TABS tablet Take 1 tablet (10 mg) by mouth daily [facility] refills 30 tablet 2     losartan (COZAAR) 100 MG tablet Take 1 tablet (100 mg) by mouth daily 90 tablet 3     metFORMIN (GLUCOPHAGE-XR) 500 MG 24 hr tablet Take 4 po q am with breakfast. 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)     Keratoconus     Hyperlipidemia LDL goal <100     Obstructive sleep apnea     Vitamin D insufficiency     Major depression in partial remission (H)     Plantar warts     Low back pain     DJD (degenerative joint disease) of knee     Pancreatitis     Panic     Chest pain     IBS (irritable bowel syndrome)     Heart  "murmur     Hypertension goal BP (blood pressure) < 140/90     Type 2 diabetes mellitus without complication, without long-term current use of insulin (H)     Osteopenia of hip     Acute conjunctivitis of left eye     PTSD (post-traumatic stress disorder)     Diabetic peripheral neuropathy (H)     Bilateral sciatica     Gastrointestinal hemorrhage associated with gastric ulcer     Chronic kidney disease, stage 3 (H)     Chronic diastolic congestive heart failure (H)     Upper GI bleed       There were no vitals filed for this visit.  Estimated body mass index is 25.11 kg/m  as calculated from the following:    Height as of an earlier encounter on 8/25/22: 1.613 m (5' 3.5\").    Weight as of an earlier encounter on 8/25/22: 65.3 kg (144 lb).    BP Readings from Last 3 Encounters:   08/25/22 139/78   05/16/22 130/64   03/29/22 133/82     Lab Results   Component Value Date    A1C 6.6 08/25/2022    A1C 6.3 03/29/2022    A1C 6.0 12/13/2021    A1C 5.8 09/09/2021    A1C 7.0 03/08/2021    A1C 6.2 09/04/2020    A1C 7.0 05/06/2020    A1C 7.9 02/06/2020    A1C 6.7 10/22/2019       Last Comprehensive Metabolic Panel:  Sodium   Date Value Ref Range Status   08/25/2022 138 136 - 145 mmol/L Final   05/27/2021 141 133 - 144 mmol/L Final     Potassium   Date Value Ref Range Status   08/25/2022 5.5 (H) 3.4 - 5.3 mmol/L Final   03/29/2022 4.6 3.4 - 5.3 mmol/L Final   05/27/2021 4.5 3.4 - 5.3 mmol/L Final     Chloride   Date Value Ref Range Status   08/25/2022 102 98 - 107 mmol/L Final   03/29/2022 108 94 - 109 mmol/L Final   05/27/2021 110 (H) 94 - 109 mmol/L Final     Carbon Dioxide   Date Value Ref Range Status   05/27/2021 26 20 - 32 mmol/L Final     Carbon Dioxide (CO2)   Date Value Ref Range Status   08/25/2022 24 22 - 29 mmol/L Final   03/29/2022 28 20 - 32 mmol/L Final     Anion Gap   Date Value Ref Range Status   08/25/2022 12 7 - 15 mmol/L Final   03/29/2022 5 3 - 14 mmol/L Final   05/27/2021 5 3 - 14 mmol/L Final     Glucose "   Date Value Ref Range Status   08/25/2022 102 (H) 70 - 99 mg/dL Final   03/29/2022 150 (H) 70 - 99 mg/dL Final   05/27/2021 110 (H) 70 - 99 mg/dL Final     Urea Nitrogen   Date Value Ref Range Status   08/25/2022 20.8 8.0 - 23.0 mg/dL Final   03/29/2022 18 7 - 30 mg/dL Final   05/27/2021 18 7 - 30 mg/dL Final     Creatinine   Date Value Ref Range Status   08/25/2022 1.52 (H) 0.51 - 0.95 mg/dL Final   05/27/2021 1.27 (H) 0.52 - 1.04 mg/dL Final     GFR Estimate   Date Value Ref Range Status   08/25/2022 37 (L) >60 mL/min/1.73m2 Final     Comment:     Effective December 21, 2021 eGFRcr in adults is calculated using the 2021 CKD-EPI creatinine equation which includes age and gender (Maya et al., [place], DOI: 10.1056/OPKQrh7651624)   05/27/2021 44 (L) >60 mL/min/[1.73_m2] Final     Comment:     Non  GFR Calc  Starting 12/18/2018, serum creatinine based estimated GFR (eGFR) will be   calculated using the Chronic Kidney Disease Epidemiology Collaboration   (CKD-EPI) equation.       GFR, ESTIMATED POCT   Date Value Ref Range Status   02/19/2022 45 (L) >60 mL/min/1.73m2 Final     Calcium   Date Value Ref Range Status   08/25/2022 9.6 8.8 - 10.2 mg/dL Final   05/27/2021 9.5 8.5 - 10.1 mg/dL Final        - - - - - - - - - - - - - - -  Options for treatment and/or follow-up care were reviewed with the patient. Karine was engaged and actively involved in the decision making process. Karine verbalized understanding of the options discussed and was satisfied with the final plan. Karine was given a copy of these recommendations and an up to date medication list in an after visit summary. This report was sent to Anay Martinez.     Nancy Ramsey   Pharmacy Student  [facility]     ____________________________  Preceptor Use Only:  In supervising the student, I have reviewed and verified the student's documentation and found it to be correct and complete.  Preceptor Signature: Gabrielle Blas,  PharmD - August 30, 2022    - - - - - - - - - - - - - - -  Flowsheet completed.  # of medical conditions reviewed: 3  # of medications reviewed: 20  # of DTP identified: 3  Time spent: 30 minutes  Level of service:4

## 2022-08-25 NOTE — PROGRESS NOTES
Karine Moss is a 67 year old female, accompanied by her daughter. She presents to the clinic today for a recheck of:    DIABETES  Here for Type II diabetes.  Eye exam is up to date.  Retinopathy: None  Peripheral neuropathy: yes, stable. Burning to the ankles. She tried taking gabapentin with minimal improvement. Karine was concerned gabapentin worsened memory so she stopped within the past month.   Weight: Up 5 pounds since last visit  Wt Readings from Last 3 Encounters:   03/29/22 71.7 kg (158 lb 0.6 oz)   02/17/22 69.4 kg (153 lb)   01/17/22 69.4 kg (153 lb)     Candidiasis/skin issues: none  UTI/ yeast infections: No  Foot exam: Completed 3/29/22    Frequency of FBS: daily in the mornings   General range of FBS: primarily 180-190 with a few in the 200's  Hypoglycemia: none    Lab Results   Component Value Date    A1C 6.3 03/29/2022    A1C 6.0 12/13/2021    A1C 7.0 03/08/2021    A1C 6.2 09/04/2020     No results found for: MICROALBUMIN    DM Meds: Metformin 2000 mg qAM, jardiance 10 mg daily   Compliance: good    Aspirin Use: none    PMH, PSH, FH, medications, allergies and immunizations are updated this visit.    HYPERLIPIDEMIA  Recent Labs   Lab Test 03/29/22  1033 03/08/21  1615 02/06/20  1523   CHOL 126 113.0 111.0   HDL 52 35.0* 47.0*   LDL 49 46.0 29.0   TRIG 123 157.0* 179.0*   CHOLHDLRATIO  --  3.2 2.4     LDL is in target range. She is not fasting today. Currently taking atorvastatin 20 mg daily. Compliant with med(s). No reported myalgias or other side effects.     HYPERTENSION  BP Readings from Last 3 Encounters:   08/25/22 139/78   05/16/22 130/64   03/29/22 133/82     Here for blood pressure check. She is currently on amlodipine 5 mg daily and losartan 100 mg daily. No current chest pain.  No THAPA. She is compliant with meds, no reported side effects. Blood pressure readings have been in target range.    Upper GI bleed   Karine has history of hospitalization for gastric ulcer 11/2021. On follow  "up EGD in 2/2022, she still had a 21-25mm gastric ulcer at the anastomosis of her previous gastric bypass. Ulcer was noted to be larger than it had been in November 2021. She was placed on protonix 40mg twice daily. She is followed by Dr. Calvo, [facility]. CT scan of abdomen and pelvis completed on 2/19/22. She has not followed up with GI since then. She is agreeable to scheduling with GI. She was sick recently and felt as though her ulcers were \"flared\". Her BMs at that time were soft. She does have occasional dizziness and lightheadedness. Denies hematochezia or black BMs.    Memory Loss  Karine notes she has been having \"horrible memory loss\". She believes this has been worsening over the past year, but her daughter believes her memory loss has been worsening for longer than that. She has had difficulty recalling things and remembering doing things. She notes she has forgotten why she entered a room in her home, things she was going to say and has forgotten things on the stove while cooking. She thinks her memory loss may be worsened with masking and hearing loss as well. This has been going on for \"a little while\". She is hopeful this would be related to a medication, specifically to gabapentin that she stopped in the past month.  Her balance is \"way off\". She has a family history of memory loss in her mother, brother and sister in their 70's. Denies headaches. Denies any falls.      EXAM  /78   Pulse 97   Temp 98.3  F (36.8  C)   Ht 1.613 m (5' 3.5\")   Wt 65.3 kg (144 lb)   SpO2 97%   BMI 25.11 kg/m    Gen: Alert, NAD  Neck: No lymphadenopathy, no bruits  Cor: S1S2, no murmur  Lungs: CTA bilaterally  Abd: soft nontender  Ext: no edema, pulses are palpable   Feet: sensation dampened across most of the foot, returning at the dorsum of the foot    Assessment:  (E11.29,  R80.9) Type 2 diabetes mellitus with microalbuminuria, without long-term current use of insulin (H)  (primary encounter " diagnosis)  Comment: A1c slightly elevated today  Lab Results   Component Value Date    A1C 6.6 08/25/2022    A1C 6.3 03/29/2022    A1C 6.0 12/13/2021   Plan: Hemoglobin A1c, Albumin Random Urine         Quantitative with Creat Ratio        Continue jardiance and metformin.     (I10) Hypertension goal BP (blood pressure) < 140/90  Comment: BP in target range  Plan: Comprehensive metabolic panel        Will continue current medication regiment    (E78.5) Hyperlipidemia LDL goal <100  Comment: not fasting, on atorvastatin  Plan: Comprehensive metabolic panel        Will continue current medication regiment.    (K92.2) Upper GI bleed  Comment: History of upper GI bleed, hospitalized 11/2021. Ulcer larger on most recent EGD 2/2022  Plan: CBC with platelets, Adult GI  Referral        - Consult Only, Ferritin        Encouraged follow up with GI. Patient requesting referral to [facility].   Addendum: CBC and ferritin levels are normal. Continue protonix    (R41.3) Memory changes  Comment: Worsening memory over the past year, noted by daughter  Plan: Adult Neuropsychology Referral        Referred to neuropsychology for formal evaluation. Discussed safety in home.       51 minutes spend on the date of this encounter doing chart review, history and exam, documentation and further activities as noted above.         Anay Martinez MD  Internal Medicine/Pediatrics      I, Robert Barber, am serving as a scribe to document services personally performed by Dr. Anay Martinez, based on data collection and the provider's statements to me. Dr. Martinez has reviewed, edited, and approved the above note.

## 2022-09-06 DIAGNOSIS — I10 HYPERTENSION GOAL BP (BLOOD PRESSURE) < 140/90: ICD-10-CM

## 2022-09-06 RX ORDER — AMLODIPINE BESYLATE 5 MG/1
5 TABLET ORAL DAILY
Qty: 90 TABLET | Refills: 0 | Status: SHIPPED | OUTPATIENT
Start: 2022-09-06 | End: 2022-12-11

## 2022-09-06 NOTE — TELEPHONE ENCOUNTER
Last visit 8/25/22, no future visit  Medication is being filled for 1 time refill only due to:  Patient needs to be seen because Dr Martinez requested visit in early Dec to recheck labs..   Davina Chawla RN  [facility]

## 2022-09-29 ENCOUNTER — TELEPHONE (OUTPATIENT)
Dept: GASTROENTEROLOGY | Facility: CLINIC | Age: 67
End: 2022-09-29

## 2022-09-29 NOTE — TELEPHONE ENCOUNTER
Health Call Center    Phone Message    May a detailed message be left on voicemail: yes     Reason for Call: Other: Patient has an 8/25/22 referral from Dr. Martinez.  Patient's daughter, Leila, called stating they want OUT of [facility] and patient has memory problems.  Therefore, please review referral for scheduling protocols.     Action Taken: Message routed to:  [facility]): GI Referral Triage    Travel Screening: Not Applicable                                                                         No Vaccines Administered.

## 2022-10-04 DIAGNOSIS — E11.9 TYPE 2 DIABETES MELLITUS WITHOUT COMPLICATION, WITHOUT LONG-TERM CURRENT USE OF INSULIN (H): ICD-10-CM

## 2022-10-04 NOTE — TELEPHONE ENCOUNTER
Letter sent to [facility] stating that we are unable to see second opinions in the GI for GI bleed at this time

## 2022-10-05 RX ORDER — FLASH GLUCOSE SENSOR
KIT MISCELLANEOUS
Qty: 2 EACH | Refills: 11 | Status: SHIPPED | OUTPATIENT
Start: 2022-10-05 | End: 2023-08-31

## 2022-10-05 NOTE — TELEPHONE ENCOUNTER
Medication requested: Continuous Blood Gluc Sensor (FREESTYLE JORDIN 14 DAY SENSOR) [facility]  [place] visit: 8/25/22  [facility] appointments: 12/6/22  Medication last refilled: 12/13/21; 2 + 11 refills  Last qualifying labs:   Component      Latest Ref Rng & Units 8/25/2022   Hemoglobin A1C      0.0 - 5.6 % 6.6 (H)     Prescription approved per [facility] Refill Protocol.    Navin PHILIP, RN  10/05/22 1:16 PM

## 2022-10-11 ENCOUNTER — NURSE TRIAGE (OUTPATIENT)
Dept: FAMILY MEDICINE | Facility: CLINIC | Age: 67
End: 2022-10-11
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
## 2022-10-11 NOTE — TELEPHONE ENCOUNTER
Who is calling? Daughter, Leila  Reason for Call:Daughter would like us to call pt. She slide out of bed this morning. Describes to daughter that different movements are causing her pain and that she might have fractured her pelvis      Martha

## 2022-10-12 ENCOUNTER — OFFICE VISIT (OUTPATIENT)
Dept: BEHAVIORAL HEALTH | Facility: CLINIC | Age: 67
End: 2022-10-12
Payer: COMMERCIAL

## 2022-10-12 DIAGNOSIS — F43.10 PTSD (POST-TRAUMATIC STRESS DISORDER): Primary | ICD-10-CM

## 2022-10-12 ASSESSMENT — COLUMBIA-SUICIDE SEVERITY RATING SCALE - C-SSRS
TOTAL  NUMBER OF INTERRUPTED ATTEMPTS LIFETIME: NO
TOTAL  NUMBER OF ABORTED OR SELF INTERRUPTED ATTEMPTS LIFETIME: NO
6. HAVE YOU EVER DONE ANYTHING, STARTED TO DO ANYTHING, OR PREPARED TO DO ANYTHING TO END YOUR LIFE?: NO
ATTEMPT LIFETIME: NO
1. HAVE YOU WISHED YOU WERE DEAD OR WISHED YOU COULD GO TO SLEEP AND NOT WAKE UP?: NO
2. HAVE YOU ACTUALLY HAD ANY THOUGHTS OF KILLING YOURSELF?: NO

## 2022-10-12 ASSESSMENT — ANXIETY QUESTIONNAIRES
6. BECOMING EASILY ANNOYED OR IRRITABLE: MORE THAN HALF THE DAYS
7. FEELING AFRAID AS IF SOMETHING AWFUL MIGHT HAPPEN: NEARLY EVERY DAY
GAD7 TOTAL SCORE: 17
5. BEING SO RESTLESS THAT IT IS HARD TO SIT STILL: MORE THAN HALF THE DAYS
2. NOT BEING ABLE TO STOP OR CONTROL WORRYING: NEARLY EVERY DAY
GAD7 TOTAL SCORE: 17
1. FEELING NERVOUS, ANXIOUS, OR ON EDGE: MORE THAN HALF THE DAYS
3. WORRYING TOO MUCH ABOUT DIFFERENT THINGS: MORE THAN HALF THE DAYS
IF YOU CHECKED OFF ANY PROBLEMS ON THIS QUESTIONNAIRE, HOW DIFFICULT HAVE THESE PROBLEMS MADE IT FOR YOU TO DO YOUR WORK, TAKE CARE OF THINGS AT HOME, OR GET ALONG WITH OTHER PEOPLE: SOMEWHAT DIFFICULT

## 2022-10-12 ASSESSMENT — PATIENT HEALTH QUESTIONNAIRE - PHQ9
SUM OF ALL RESPONSES TO PHQ QUESTIONS 1-9: 5
5. POOR APPETITE OR OVEREATING: NEARLY EVERY DAY

## 2022-10-12 NOTE — TELEPHONE ENCOUNTER
"Called pt to see how her left leg/thigh/hip is feeling today.  She reports \"no more shooting pain\", can bear weight, can go up and down stairs without difficulty, and feels much better today.     CEDRICK Juares, RN  10/12/22, 10:07 AM    "

## 2022-10-15 ENCOUNTER — APPOINTMENT (OUTPATIENT)
Dept: CT IMAGING | Facility: CLINIC | Age: 67
End: 2022-10-15
Attending: EMERGENCY MEDICINE
Payer: COMMERCIAL

## 2022-10-15 ENCOUNTER — HEALTH MAINTENANCE LETTER (OUTPATIENT)
Age: 67
End: 2022-10-15

## 2022-10-15 ENCOUNTER — APPOINTMENT (OUTPATIENT)
Dept: GENERAL RADIOLOGY | Facility: CLINIC | Age: 67
End: 2022-10-15
Attending: EMERGENCY MEDICINE
Payer: COMMERCIAL

## 2022-10-15 ENCOUNTER — HOSPITAL ENCOUNTER (OUTPATIENT)
Facility: CLINIC | Age: 67
Setting detail: OBSERVATION
Discharge: NURSING, REHAB OR LONG-TERM CARE | End: 2022-10-18
Attending: EMERGENCY MEDICINE | Admitting: SURGERY
Payer: COMMERCIAL

## 2022-10-15 DIAGNOSIS — M54.2 NECK PAIN: ICD-10-CM

## 2022-10-15 DIAGNOSIS — S09.90XA INJURY OF HEAD, INITIAL ENCOUNTER: ICD-10-CM

## 2022-10-15 DIAGNOSIS — S62.102A CLOSED FRACTURE OF LEFT WRIST, INITIAL ENCOUNTER: ICD-10-CM

## 2022-10-15 DIAGNOSIS — F43.10 PTSD (POST-TRAUMATIC STRESS DISORDER): Primary | ICD-10-CM

## 2022-10-15 DIAGNOSIS — N30.00 ACUTE CYSTITIS WITHOUT HEMATURIA: ICD-10-CM
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
## 2022-10-15 DIAGNOSIS — S52.124A CLOSED NONDISPLACED FRACTURE OF HEAD OF RIGHT RADIUS, INITIAL ENCOUNTER: ICD-10-CM

## 2022-10-15 DIAGNOSIS — Z20.822 LAB TEST NEGATIVE FOR COVID-19 VIRUS: ICD-10-CM

## 2022-10-15 DIAGNOSIS — S62.91XA CLOSED FRACTURE OF RIGHT HAND, INITIAL ENCOUNTER: ICD-10-CM

## 2022-10-15 LAB
ALBUMIN SERPL-MCNC: 3.9 G/DL (ref 3.4–5)
ALP SERPL-CCNC: 95 U/L (ref 40–150)
ALT SERPL W P-5'-P-CCNC: 21 U/L (ref 0–50)
ANION GAP SERPL CALCULATED.3IONS-SCNC: 5 MMOL/L (ref 3–14)
AST SERPL W P-5'-P-CCNC: 16 U/L (ref 0–45)
BASOPHILS # BLD AUTO: 0 10E3/UL (ref 0–0.2)
BASOPHILS NFR BLD AUTO: 0 %
BILIRUB SERPL-MCNC: 0.6 MG/DL (ref 0.2–1.3)
BUN SERPL-MCNC: 31 MG/DL (ref 7–30)
CALCIUM SERPL-MCNC: 9.2 MG/DL (ref 8.5–10.1)
CHLORIDE BLD-SCNC: 108 MMOL/L (ref 94–109)
CO2 SERPL-SCNC: 27 MMOL/L (ref 20–32)
CREAT SERPL-MCNC: 1.77 MG/DL (ref 0.52–1.04)
EOSINOPHIL # BLD AUTO: 0.1 10E3/UL (ref 0–0.7)
EOSINOPHIL NFR BLD AUTO: 1 %
ERYTHROCYTE [DISTWIDTH] IN BLOOD BY AUTOMATED COUNT: 15.8 % (ref 10–15)
GFR SERPL CREATININE-BSD FRML MDRD: 31 ML/MIN/1.73M2
GLUCOSE BLD-MCNC: 143 MG/DL (ref 70–99)
HCT VFR BLD AUTO: 39.2 % (ref 35–47)
HGB BLD-MCNC: 12.1 G/DL (ref 11.7–15.7)
IMM GRANULOCYTES # BLD: 0 10E3/UL
IMM GRANULOCYTES NFR BLD: 0 %
LYMPHOCYTES # BLD AUTO: 2.3 10E3/UL (ref 0.8–5.3)
LYMPHOCYTES NFR BLD AUTO: 20 %
MCH RBC QN AUTO: 26.5 PG (ref 26.5–33)
MCHC RBC AUTO-ENTMCNC: 30.9 G/DL (ref 31.5–36.5)
MCV RBC AUTO: 86 FL (ref 78–100)
MONOCYTES # BLD AUTO: 0.7 10E3/UL (ref 0–1.3)
MONOCYTES NFR BLD AUTO: 6 %
NEUTROPHILS # BLD AUTO: 8.5 10E3/UL (ref 1.6–8.3)
NEUTROPHILS NFR BLD AUTO: 73 %
NRBC # BLD AUTO: 0 10E3/UL
NRBC BLD AUTO-RTO: 0 /100
PLATELET # BLD AUTO: 194 10E3/UL (ref 150–450)
POTASSIUM BLD-SCNC: 5.3 MMOL/L (ref 3.4–5.3)
PROT SERPL-MCNC: 8.2 G/DL (ref 6.8–8.8)
RBC # BLD AUTO: 4.56 10E6/UL (ref 3.8–5.2)
SODIUM SERPL-SCNC: 140 MMOL/L (ref 133–144)
TROPONIN I SERPL HS-MCNC: 6 NG/L
WBC # BLD AUTO: 11.6 10E3/UL (ref 4–11)

## 2022-10-15 PROCEDURE — 93005 ELECTROCARDIOGRAM TRACING: CPT

## 2022-10-15 PROCEDURE — 250N000011 HC RX IP 250 OP 636: Performed by: EMERGENCY MEDICINE

## 2022-10-15 PROCEDURE — 120N000002 HC R&B MED SURG/OB UMMC

## 2022-10-15 PROCEDURE — 72125 CT NECK SPINE W/O DYE: CPT

## 2022-10-15 PROCEDURE — 73080 X-RAY EXAM OF ELBOW: CPT | Mod: RT

## 2022-10-15 PROCEDURE — 70486 CT MAXILLOFACIAL W/O DYE: CPT

## 2022-10-15 PROCEDURE — 84484 ASSAY OF TROPONIN QUANT: CPT | Performed by: EMERGENCY MEDICINE

## 2022-10-15 PROCEDURE — 80053 COMPREHEN METABOLIC PANEL: CPT | Performed by: EMERGENCY MEDICINE

## 2022-10-15 PROCEDURE — 70450 CT HEAD/BRAIN W/O DYE: CPT

## 2022-10-15 PROCEDURE — 73110 X-RAY EXAM OF WRIST: CPT | Mod: RT

## 2022-10-15 PROCEDURE — 36415 COLL VENOUS BLD VENIPUNCTURE: CPT | Performed by: EMERGENCY MEDICINE

## 2022-10-15 PROCEDURE — 85025 COMPLETE CBC W/AUTO DIFF WBC: CPT | Performed by: EMERGENCY MEDICINE

## 2022-10-15 PROCEDURE — 73060 X-RAY EXAM OF HUMERUS: CPT | Mod: RT

## 2022-10-15 PROCEDURE — 71046 X-RAY EXAM CHEST 2 VIEWS: CPT

## 2022-10-15 RX ORDER — FENTANYL CITRATE 50 UG/ML
50 INJECTION, SOLUTION INTRAMUSCULAR; INTRAVENOUS ONCE
Status: COMPLETED | OUTPATIENT
Start: 2022-10-15 | End: 2022-10-15

## 2022-10-15 RX ORDER — ACETAMINOPHEN 500 MG
1000 TABLET ORAL ONCE
Status: COMPLETED | OUTPATIENT
Start: 2022-10-15 | End: 2022-10-16

## 2022-10-15 RX ORDER — FENTANYL CITRATE 50 UG/ML
50 INJECTION, SOLUTION INTRAMUSCULAR; INTRAVENOUS ONCE
Status: DISCONTINUED | OUTPATIENT
Start: 2022-10-15 | End: 2022-10-15

## 2022-10-15 RX ADMIN — FENTANYL CITRATE 50 MCG: 0.05 INJECTION, SOLUTION INTRAMUSCULAR; INTRAVENOUS at 23:00

## 2022-10-15 ASSESSMENT — ENCOUNTER SYMPTOMS
BACK PAIN: 0
CARDIOVASCULAR NEGATIVE: 1
WEAKNESS: 0
HEADACHES: 0
NUMBNESS: 0
ARTHRALGIAS: 1
NECK PAIN: 1
LIGHT-HEADEDNESS: 0
SHORTNESS OF BREATH: 0
COUGH: 0
ABDOMINAL PAIN: 0

## 2022-10-15 ASSESSMENT — VISUAL ACUITY: OU: 1

## 2022-10-15 ASSESSMENT — ACTIVITIES OF DAILY LIVING (ADL)
ADLS_ACUITY_SCORE: 35
ADLS_ACUITY_SCORE: 35

## 2022-10-16 ENCOUNTER — HOSPITAL ENCOUNTER (EMERGENCY)
Facility: CLINIC | Age: 67
End: 2022-10-16
Payer: COMMERCIAL

## 2022-10-16 LAB
ALBUMIN UR-MCNC: 20 MG/DL
APPEARANCE UR: CLEAR
ATRIAL RATE - MUSE: 78 BPM
BILIRUB UR QL STRIP: NEGATIVE
COLOR UR AUTO: YELLOW
DIASTOLIC BLOOD PRESSURE - MUSE: NORMAL MMHG
GLUCOSE BLDC GLUCOMTR-MCNC: 101 MG/DL (ref 70–99)
GLUCOSE UR STRIP-MCNC: >=1000 MG/DL
HGB UR QL STRIP: NEGATIVE
HYALINE CASTS: 23 /LPF
INTERPRETATION ECG - MUSE: NORMAL
KETONES UR STRIP-MCNC: NEGATIVE MG/DL
LEUKOCYTE ESTERASE UR QL STRIP: ABNORMAL
MUCOUS THREADS #/AREA URNS LPF: PRESENT /LPF
NITRATE UR QL: NEGATIVE
P AXIS - MUSE: 42 DEGREES
PH UR STRIP: 5 [PH] (ref 5–7)
PR INTERVAL - MUSE: 136 MS
QRS DURATION - MUSE: 86 MS
QT - MUSE: 368 MS
QTC - MUSE: 419 MS
R AXIS - MUSE: 24 DEGREES
RBC URINE: 4 /HPF
SARS-COV-2 RNA RESP QL NAA+PROBE: NEGATIVE
SP GR UR STRIP: 1.02 (ref 1–1.03)
SQUAMOUS EPITHELIAL: 9 /HPF
SYSTOLIC BLOOD PRESSURE - MUSE: NORMAL MMHG
T AXIS - MUSE: 27 DEGREES
TRANSITIONAL EPI: 8 /HPF
UROBILINOGEN UR STRIP-MCNC: NORMAL MG/DL
VENTRICULAR RATE- MUSE: 78 BPM
WBC URINE: 15 /HPF

## 2022-10-16 PROCEDURE — 99219 PR INITIAL OBSERVATION CARE,LEVEL II: CPT | Performed by: PHYSICIAN ASSISTANT

## 2022-10-16 PROCEDURE — 250N000013 HC RX MED GY IP 250 OP 250 PS 637

## 2022-10-16 PROCEDURE — 99285 EMERGENCY DEPT VISIT HI MDM: CPT | Performed by: EMERGENCY MEDICINE

## 2022-10-16 PROCEDURE — 250N000013 HC RX MED GY IP 250 OP 250 PS 637: Performed by: PHYSICIAN ASSISTANT

## 2022-10-16 PROCEDURE — G0378 HOSPITAL OBSERVATION PER HR: HCPCS

## 2022-10-16 PROCEDURE — 250N000011 HC RX IP 250 OP 636: Performed by: EMERGENCY MEDICINE

## 2022-10-16 PROCEDURE — 258N000003 HC RX IP 258 OP 636: Performed by: PHYSICIAN ASSISTANT

## 2022-10-16 PROCEDURE — 82962 GLUCOSE BLOOD TEST: CPT

## 2022-10-16 PROCEDURE — 250N000013 HC RX MED GY IP 250 OP 250 PS 637: Performed by: SURGERY

## 2022-10-16 PROCEDURE — 96374 THER/PROPH/DIAG INJ IV PUSH: CPT | Performed by: EMERGENCY MEDICINE

## 2022-10-16 PROCEDURE — C9803 HOPD COVID-19 SPEC COLLECT: HCPCS | Performed by: EMERGENCY MEDICINE

## 2022-10-16 PROCEDURE — 250N000013 HC RX MED GY IP 250 OP 250 PS 637: Performed by: EMERGENCY MEDICINE

## 2022-10-16 PROCEDURE — 87086 URINE CULTURE/COLONY COUNT: CPT | Performed by: EMERGENCY MEDICINE

## 2022-10-16 PROCEDURE — 81001 URINALYSIS AUTO W/SCOPE: CPT | Performed by: EMERGENCY MEDICINE

## 2022-10-16 PROCEDURE — U0003 INFECTIOUS AGENT DETECTION BY NUCLEIC ACID (DNA OR RNA); SEVERE ACUTE RESPIRATORY SYNDROME CORONAVIRUS 2 (SARS-COV-2) (CORONAVIRUS DISEASE [COVID-19]), AMPLIFIED PROBE TECHNIQUE, MAKING USE OF HIGH THROUGHPUT TECHNOLOGIES AS DESCRIBED BY CMS-2020-01-R: HCPCS | Performed by: PHYSICIAN ASSISTANT

## 2022-10-16 PROCEDURE — 96361 HYDRATE IV INFUSION ADD-ON: CPT

## 2022-10-16 PROCEDURE — 99285 EMERGENCY DEPT VISIT HI MDM: CPT | Mod: 25 | Performed by: EMERGENCY MEDICINE

## 2022-10-16 RX ORDER — BISACODYL 10 MG
10 SUPPOSITORY, RECTAL RECTAL DAILY PRN
Status: CANCELLED | OUTPATIENT
Start: 2022-10-16

## 2022-10-16 RX ORDER — DULOXETIN HYDROCHLORIDE 30 MG/1
30 CAPSULE, DELAYED RELEASE ORAL DAILY
Status: DISCONTINUED | OUTPATIENT
Start: 2022-10-16 | End: 2022-10-18 | Stop reason: HOSPADM

## 2022-10-16 RX ORDER — PROCHLORPERAZINE MALEATE 5 MG
5 TABLET ORAL EVERY 6 HOURS PRN
Status: DISCONTINUED | OUTPATIENT
Start: 2022-10-16 | End: 2022-10-18 | Stop reason: HOSPADM

## 2022-10-16 RX ORDER — ONDANSETRON 4 MG/1
4 TABLET, ORALLY DISINTEGRATING ORAL EVERY 6 HOURS PRN
Status: DISCONTINUED | OUTPATIENT
Start: 2022-10-16 | End: 2022-10-18 | Stop reason: HOSPADM

## 2022-10-16 RX ORDER — NICOTINE POLACRILEX 4 MG
15-30 LOZENGE BUCCAL
Status: DISCONTINUED | OUTPATIENT
Start: 2022-10-16 | End: 2022-10-18 | Stop reason: HOSPADM

## 2022-10-16 RX ORDER — DEXTROAMPHETAMINE SACCHARATE, AMPHETAMINE ASPARTATE, DEXTROAMPHETAMINE SULFATE AND AMPHETAMINE SULFATE 3.75; 3.75; 3.75; 3.75 MG/1; MG/1; MG/1; MG/1
45 TABLET ORAL DAILY
Status: DISCONTINUED | OUTPATIENT
Start: 2022-10-16 | End: 2022-10-16

## 2022-10-16 RX ORDER — DULOXETIN HYDROCHLORIDE 30 MG/1
30 CAPSULE, DELAYED RELEASE ORAL DAILY
Status: DISCONTINUED | OUTPATIENT
Start: 2022-10-17 | End: 2022-10-16

## 2022-10-16 RX ORDER — ACETAMINOPHEN 325 MG/1
975 TABLET ORAL EVERY 8 HOURS
Status: CANCELLED | OUTPATIENT
Start: 2022-10-16 | End: 2022-10-19

## 2022-10-16 RX ORDER — NALOXONE HYDROCHLORIDE 0.4 MG/ML
0.4 INJECTION, SOLUTION INTRAMUSCULAR; INTRAVENOUS; SUBCUTANEOUS
Status: DISCONTINUED | OUTPATIENT
Start: 2022-10-16 | End: 2022-10-18 | Stop reason: HOSPADM

## 2022-10-16 RX ORDER — ACETAMINOPHEN 325 MG/1
650 TABLET ORAL EVERY 6 HOURS PRN
Status: DISCONTINUED | OUTPATIENT
Start: 2022-10-16 | End: 2022-10-16

## 2022-10-16 RX ORDER — NALOXONE HYDROCHLORIDE 0.4 MG/ML
0.2 INJECTION, SOLUTION INTRAMUSCULAR; INTRAVENOUS; SUBCUTANEOUS
Status: DISCONTINUED | OUTPATIENT
Start: 2022-10-16 | End: 2022-10-18 | Stop reason: HOSPADM

## 2022-10-16 RX ORDER — ACETAMINOPHEN 325 MG/1
650 TABLET ORAL EVERY 6 HOURS
Status: DISCONTINUED | OUTPATIENT
Start: 2022-10-16 | End: 2022-10-18 | Stop reason: HOSPADM

## 2022-10-16 RX ORDER — ATORVASTATIN CALCIUM 20 MG/1
20 TABLET, FILM COATED ORAL AT BEDTIME
Status: DISCONTINUED | OUTPATIENT
Start: 2022-10-16 | End: 2022-10-18 | Stop reason: HOSPADM

## 2022-10-16 RX ORDER — ACETAMINOPHEN 325 MG/1
650 TABLET ORAL EVERY 4 HOURS PRN
Status: CANCELLED | OUTPATIENT
Start: 2022-10-19

## 2022-10-16 RX ORDER — PROCHLORPERAZINE MALEATE 5 MG
5 TABLET ORAL EVERY 6 HOURS PRN
Status: CANCELLED | OUTPATIENT
Start: 2022-10-16

## 2022-10-16 RX ORDER — DEXTROAMPHETAMINE SACCHARATE, AMPHETAMINE ASPARTATE, DEXTROAMPHETAMINE SULFATE AND AMPHETAMINE SULFATE 1.25; 1.25; 1.25; 1.25 MG/1; MG/1; MG/1; MG/1
45 TABLET ORAL DAILY
Status: DISCONTINUED | OUTPATIENT
Start: 2022-10-16 | End: 2022-10-18 | Stop reason: HOSPADM

## 2022-10-16 RX ORDER — ACETAMINOPHEN 325 MG/1
650 TABLET ORAL ONCE
Status: COMPLETED | OUTPATIENT
Start: 2022-10-16 | End: 2022-10-16

## 2022-10-16 RX ORDER — AMOXICILLIN 250 MG
1 CAPSULE ORAL 2 TIMES DAILY
Status: CANCELLED | OUTPATIENT
Start: 2022-10-16

## 2022-10-16 RX ORDER — CEVIMELINE HYDROCHLORIDE 30 MG/1
30 CAPSULE ORAL 3 TIMES DAILY
Status: DISCONTINUED | OUTPATIENT
Start: 2022-10-16 | End: 2022-10-18 | Stop reason: HOSPADM

## 2022-10-16 RX ORDER — METFORMIN HCL 500 MG
2000 TABLET, EXTENDED RELEASE 24 HR ORAL DAILY
Status: DISCONTINUED | OUTPATIENT
Start: 2022-10-17 | End: 2022-10-18 | Stop reason: HOSPADM

## 2022-10-16 RX ORDER — ONDANSETRON 4 MG/1
4 TABLET, ORALLY DISINTEGRATING ORAL EVERY 6 HOURS PRN
Status: CANCELLED | OUTPATIENT
Start: 2022-10-16

## 2022-10-16 RX ORDER — ENOXAPARIN SODIUM 100 MG/ML
40 INJECTION SUBCUTANEOUS EVERY 24 HOURS
Status: DISCONTINUED | OUTPATIENT
Start: 2022-10-17 | End: 2022-10-18 | Stop reason: HOSPADM

## 2022-10-16 RX ORDER — ONDANSETRON 2 MG/ML
4 INJECTION INTRAMUSCULAR; INTRAVENOUS EVERY 6 HOURS PRN
Status: CANCELLED | OUTPATIENT
Start: 2022-10-16

## 2022-10-16 RX ORDER — METHOCARBAMOL 500 MG/1
500 TABLET, FILM COATED ORAL 4 TIMES DAILY
Status: DISCONTINUED | OUTPATIENT
Start: 2022-10-16 | End: 2022-10-18 | Stop reason: HOSPADM

## 2022-10-16 RX ORDER — CEPHALEXIN 500 MG/1
500 CAPSULE ORAL EVERY 12 HOURS SCHEDULED
Status: DISCONTINUED | OUTPATIENT
Start: 2022-10-16 | End: 2022-10-17

## 2022-10-16 RX ORDER — TOLTERODINE 2 MG/1
2 CAPSULE, EXTENDED RELEASE ORAL DAILY
Status: DISCONTINUED | OUTPATIENT
Start: 2022-10-16 | End: 2022-10-18 | Stop reason: HOSPADM

## 2022-10-16 RX ORDER — HYDROMORPHONE HCL IN WATER/PF 6 MG/30 ML
0.2 PATIENT CONTROLLED ANALGESIA SYRINGE INTRAVENOUS
Status: CANCELLED | OUTPATIENT
Start: 2022-10-16

## 2022-10-16 RX ORDER — DULOXETIN HYDROCHLORIDE 30 MG/1
30 CAPSULE, DELAYED RELEASE ORAL 2 TIMES DAILY
Status: DISCONTINUED | OUTPATIENT
Start: 2022-10-16 | End: 2022-10-16

## 2022-10-16 RX ORDER — VITAMIN B COMPLEX
25 TABLET ORAL DAILY
Status: DISCONTINUED | OUTPATIENT
Start: 2022-10-17 | End: 2022-10-18 | Stop reason: HOSPADM

## 2022-10-16 RX ORDER — OXYCODONE HYDROCHLORIDE 5 MG/1
5 TABLET ORAL EVERY 4 HOURS PRN
Status: DISCONTINUED | OUTPATIENT
Start: 2022-10-16 | End: 2022-10-16

## 2022-10-16 RX ORDER — ONDANSETRON 2 MG/ML
4 INJECTION INTRAMUSCULAR; INTRAVENOUS EVERY 6 HOURS PRN
Status: DISCONTINUED | OUTPATIENT
Start: 2022-10-16 | End: 2022-10-18 | Stop reason: HOSPADM

## 2022-10-16 RX ORDER — PANTOPRAZOLE SODIUM 40 MG/1
40 TABLET, DELAYED RELEASE ORAL 2 TIMES DAILY
Status: DISCONTINUED | OUTPATIENT
Start: 2022-10-16 | End: 2022-10-18 | Stop reason: HOSPADM

## 2022-10-16 RX ORDER — MORPHINE SULFATE 2 MG/ML
2 INJECTION, SOLUTION INTRAMUSCULAR; INTRAVENOUS ONCE
Status: COMPLETED | OUTPATIENT
Start: 2022-10-16 | End: 2022-10-16

## 2022-10-16 RX ORDER — DULOXETIN HYDROCHLORIDE 60 MG/1
60 CAPSULE, DELAYED RELEASE ORAL AT BEDTIME
Status: DISCONTINUED | OUTPATIENT
Start: 2022-10-16 | End: 2022-10-16

## 2022-10-16 RX ORDER — DEXTROSE MONOHYDRATE 25 G/50ML
25-50 INJECTION, SOLUTION INTRAVENOUS
Status: DISCONTINUED | OUTPATIENT
Start: 2022-10-16 | End: 2022-10-18 | Stop reason: HOSPADM

## 2022-10-16 RX ORDER — PROCHLORPERAZINE 25 MG
12.5 SUPPOSITORY, RECTAL RECTAL EVERY 12 HOURS PRN
Status: DISCONTINUED | OUTPATIENT
Start: 2022-10-16 | End: 2022-10-18 | Stop reason: HOSPADM

## 2022-10-16 RX ORDER — POLYETHYLENE GLYCOL 3350 17 G/17G
17 POWDER, FOR SOLUTION ORAL DAILY
Status: CANCELLED | OUTPATIENT
Start: 2022-10-17

## 2022-10-16 RX ORDER — AMLODIPINE BESYLATE 5 MG/1
5 TABLET ORAL DAILY
Status: DISCONTINUED | OUTPATIENT
Start: 2022-10-16 | End: 2022-10-18 | Stop reason: HOSPADM

## 2022-10-16 RX ADMIN — METHOCARBAMOL 500 MG: 500 TABLET, FILM COATED ORAL at 21:06

## 2022-10-16 RX ADMIN — DULOXETINE HYDROCHLORIDE 90 MG: 60 CAPSULE, DELAYED RELEASE ORAL at 21:06

## 2022-10-16 RX ADMIN — ATORVASTATIN CALCIUM 20 MG: 20 TABLET, FILM COATED ORAL at 21:06

## 2022-10-16 RX ADMIN — METHOCARBAMOL 500 MG: 500 TABLET, FILM COATED ORAL at 17:44

## 2022-10-16 RX ADMIN — ACETAMINOPHEN 650 MG: 325 TABLET, FILM COATED ORAL at 21:06

## 2022-10-16 RX ADMIN — OXYCODONE HYDROCHLORIDE 5 MG: 5 TABLET ORAL at 21:06

## 2022-10-16 RX ADMIN — OXYCODONE HYDROCHLORIDE 5 MG: 5 TABLET ORAL at 07:58

## 2022-10-16 RX ADMIN — Medication 1200 MG: at 13:16

## 2022-10-16 RX ADMIN — CEPHALEXIN 500 MG: 500 CAPSULE ORAL at 13:17

## 2022-10-16 RX ADMIN — OXYCODONE HYDROCHLORIDE 5 MG: 5 TABLET ORAL at 13:16

## 2022-10-16 RX ADMIN — ACETAMINOPHEN 650 MG: 325 TABLET, FILM COATED ORAL at 13:16

## 2022-10-16 RX ADMIN — PANTOPRAZOLE SODIUM 40 MG: 40 TABLET, DELAYED RELEASE ORAL at 21:06

## 2022-10-16 RX ADMIN — ACETAMINOPHEN 650 MG: 325 TABLET, FILM COATED ORAL at 17:44

## 2022-10-16 RX ADMIN — CEVIMELINE HYDROCHLORIDE 30 MG: 30 CAPSULE ORAL at 15:36

## 2022-10-16 RX ADMIN — DULOXETINE HYDROCHLORIDE 30 MG: 30 CAPSULE, DELAYED RELEASE ORAL at 13:17

## 2022-10-16 RX ADMIN — TOLTERODINE 2 MG: 2 CAPSULE, EXTENDED RELEASE ORAL at 13:17

## 2022-10-16 RX ADMIN — AMLODIPINE BESYLATE 5 MG: 5 TABLET ORAL at 13:17

## 2022-10-16 RX ADMIN — SODIUM CHLORIDE 500 ML: 9 INJECTION, SOLUTION INTRAVENOUS at 13:18

## 2022-10-16 RX ADMIN — MORPHINE SULFATE 2 MG: 2 INJECTION, SOLUTION INTRAMUSCULAR; INTRAVENOUS at 01:32

## 2022-10-16 RX ADMIN — ACETAMINOPHEN 1000 MG: 500 TABLET ORAL at 00:24

## 2022-10-16 RX ADMIN — CEVIMELINE HYDROCHLORIDE 30 MG: 30 CAPSULE ORAL at 22:46

## 2022-10-16 RX ADMIN — CEPHALEXIN 500 MG: 500 CAPSULE ORAL at 21:07

## 2022-10-16 ASSESSMENT — ACTIVITIES OF DAILY LIVING (ADL)
ADLS_ACUITY_SCORE: 32
ADLS_ACUITY_SCORE: 35
ADLS_ACUITY_SCORE: 28
ADLS_ACUITY_SCORE: 32
ADLS_ACUITY_SCORE: 32
ADLS_ACUITY_SCORE: 28
ADLS_ACUITY_SCORE: 32

## 2022-10-16 NOTE — PROGRESS NOTES
Care from 0700: A&)x4. Assisted ortho to place cast. 5mg oxycodone administered afterwards. Pt was picked up by [facility] at 0830. Left unit ~0915.

## 2022-10-16 NOTE — PROGRESS NOTES
Care Management Follow Up    Length of Stay (days): 0    Expected Discharge Date:  TBD     Concerns to be Addressed:     WALLS Document  Patient plan of care discussed at interdisciplinary rounds: Yes    Anticipated Discharge Disposition:  TBD     Anticipated Discharge Services:    Anticipated Discharge DME:      Patient/family educated on Medicare website which has current facility and service quality ratings:  N/A  Education Provided on the Discharge Plan:  N/A  Patient/Family in Agreement with the Plan:  N/A    Referrals Placed by CM/SW:  Not at this time  Private pay costs discussed: insurance costs out of pocket expenses, co-pays and deductibles    Additional Information:    1330 Due to Karine's Observation status, SW met with her at bedside to introduce self, explain SW role, review the Medicare Outpatient Observation Notice (MOON) and why pt was receiving it.Karine's daughter Leila was present and participated in the conversation SW explained the document, and addressed questions and concerns. Due to Karine's wrist fracture, SW asked Leila to sign document. Leila signed WALLS at 1340.      1500 SW faxed WALLS to [facility] ([phone]) then placed WALLS in Karine's chart.    SW will continue to follow as needed.    JT Viera, LGSW  ED/OBS   [facility]  Phone: [phone]  Pager: [phone]  Fax: [phone]     On-call pager, [phone], 4:00 pm to midnight

## 2022-10-16 NOTE — ED PROVIDER NOTES
"ED Provider Note  [facility]      History     Chief Complaint   Patient presents with     Fall     Per family member, pt called her at 1838 stating \"I think I fell down the steps. I think I broke my wrist.\" Patient recalls going down the steps with styrofoam pumpkins in her arms and then next thing she recalls is waking up at the bottom of the steps.  Daughter states she had to get patient dressed and both wrists and right shoulder are painful. Small contusion/ab over right lateral part of eye in eyebrow     Wrist Pain     Bilateral wrist pain     HPI  Karine Moss is a 67 year old female who arrives today to the emergency department for evaluation after a fall down the stairs.  This patient was in her normal state of health.  She believes she fell down the last several steps.  Patient notes ongoing severe bilateral wrist pain as well as right upper extremity pain.  Patient does not recall the event well.  She reports no preceding headache, chest pain, palpitations, recent medical illness.  The patient awoke on the floor.  Daughter did note a small right periorbital contusion.  The patient otherwise reports at this time no severe headache, nausea, vomiting, visual change.  She does have some cervical spine pain exacerbated by movement.  She reports no injury to her chest abdomen pelvis.  No lower extremity injury.  She does not want any for pain at this time.    Past Medical History  Past Medical History:   Diagnosis Date     Arthritis      Chest pain     seeing Cardiology     Depression 1991    after 's death     Diabetes mellitus (H)      Diabetic renal disease (H)     microalbuminuria     DJD (degenerative joint disease) of knee      H/O vitamin D deficiency      Hypertension      IBS (irritable bowel syndrome)     diarrhea      Keratoconus      Low back pain      Narcolepsy 2007    Dx'd by Sleep specialist 347.00, pt discontinued Adderal mid Nov. 13 due to tacycardia " concerns     Obesity      Obstructive sleep apnea     327.23, 3 sleep studies,Dr. Flaco SELBY Lung     Pancreatitis     related to Victoza, also on Xyrem     Panic      RLS (restless legs syndrome)      Sinus tachycardia      Past Surgical History:   Procedure Laterality Date     COLONOSCOPY  10/01/2020    poor quality prep     ear surgery  2002 and 01/2004     ESOPHAGOSCOPY, GASTROSCOPY, DUODENOSCOPY (EGD), COMBINED N/A 11/16/2021    Procedure: ESOPHAGOGASTRODUODENOSCOPY (EGD);  Surgeon: Jayla Calvo MD;  Location:  GI     GASTRIC BYPASS  2013    laparoscopic jimmy en y c ometopexy     HEMORRHOIDECTOMY  09/14/2000     LAPAROSCOPIC CHOLECYSTECTOMY  2015     LASIK BILATERAL       UVULOPALATOPHARYNGOPLASTY       UVVP       amLODIPine (NORVASC) 5 MG tablet  amphetamine-dextroamphetamine (ADDERALL) 30 MG tablet  atorvastatin (LIPITOR) 20 MG tablet  blood glucose (NO BRAND SPECIFIED) test strip  blood glucose (NO BRAND SPECIFIED) test strip  calcium carbonate (OS-KINJAL) 1500 (600 Ca) MG tablet  cevimeline (EVOXAC) 30 MG capsule  COMPOUNDED NON-CONTROLLED SUBSTANCE (CMPD RX) - PHARMACY TO MIX COMPOUNDED MEDICATION  Continuous Blood Gluc  (FREESTYLE JORDIN 14 DAY READER) EBEN  Continuous Blood Gluc Sensor (FREESTYLE JORDIN 14 DAY SENSOR) MISC  cyanocobalamin (VITAMIN B-12) 1000 MCG tablet  DULoxetine (CYMBALTA) 30 MG capsule  DULoxetine (CYMBALTA) 60 MG capsule  empagliflozin (JARDIANCE) 10 MG TABS tablet  losartan (COZAAR) 100 MG tablet  metFORMIN (GLUCOPHAGE-XR) 500 MG 24 hr tablet  pantoprazole (PROTONIX) 40 MG EC tablet  tolterodine ER (DETROL LA) 4 MG 24 hr capsule  traZODone (DESYREL) 50 MG tablet  Vitamin D3 (CHOLECALCIFEROL) 25 mcg (1000 units) tablet      Allergies   Allergen Reactions     Pravastatin Other (See Comments)     woozy     Codeine Nausea and Vomiting     Nsaids GI Disturbance and Other (See Comments)     Pt had bariatric surgery, should not ever take oral NSAIDS due to risk of gastric  ulcers.  Pt had bariatric surgery, should not ever take oral NSAIDS due to risk of gastric ulcers.     Family History  Family History   Problem Relation Age of Onset     Memory loss Mother      Diabetes Father      Chronic Obstructive Pulmonary Disease Sister      Anemia Sister         hx of ulcers     Other - See Comments Sister 65        MVA, followed by leg pain after a fall     Dementia Sister 68     Dementia Brother 70     Cancer Brother         lung     Cancer - colorectal Maternal Grandmother      Cancer Daughter      Obesity Daughter         s/p lap band     Social History   Social History     Tobacco Use     Smoking status: Never     Smokeless tobacco: Never   Substance Use Topics     Alcohol use: Not Currently     Comment: rare     Drug use: No      Past medical history, past surgical history, medications, allergies, family history, and social history were reviewed with the patient. No additional pertinent items.       Review of Systems   Respiratory: Negative for cough and shortness of breath.    Cardiovascular: Negative.  Negative for chest pain.   Gastrointestinal: Negative for abdominal pain.   Musculoskeletal: Positive for arthralgias and neck pain. Negative for back pain.   Neurological: Negative for weakness, light-headedness, numbness and headaches.   All other systems reviewed and are negative.    A complete review of systems was performed with pertinent positives and negatives noted in the HPI, and all other systems negative.    Physical Exam   BP: (!) 142/67  Pulse: 87  Temp: 98.1  F (36.7  C)  Resp: 16  SpO2: 97 %  Physical Exam  Vitals and nursing note reviewed.   Constitutional:       General: She is in acute distress.      Appearance: Normal appearance. She is not ill-appearing, toxic-appearing or diaphoretic.      Interventions: She is not intubated.  HENT:      Head: Normocephalic and atraumatic.      Right Ear: External ear normal. No hemotympanum.      Left Ear: External ear normal. No  hemotympanum.      Nose: Nose normal. No nasal tenderness.      Right Nostril: No epistaxis or septal hematoma.      Left Nostril: No septal hematoma.      Mouth/Throat:      Lips: No lesions.      Mouth: No injury or oral lesions.      Pharynx: Oropharynx is clear.   Eyes:      General: Lids are normal. Vision grossly intact.      Extraocular Movements: Extraocular movements intact.   Neck:      Trachea: Phonation normal.   Cardiovascular:      Rate and Rhythm: Normal rate and regular rhythm.      Pulses: Normal pulses. No decreased pulses.      Heart sounds: Normal heart sounds.   Pulmonary:      Effort: Pulmonary effort is normal. No tachypnea, accessory muscle usage, prolonged expiration or respiratory distress. She is not intubated.      Breath sounds: Normal breath sounds. No wheezing, rhonchi or rales.   Abdominal:      General: Abdomen is flat. There is no distension.      Palpations: Abdomen is soft.      Tenderness: There is no abdominal tenderness.   Musculoskeletal:         General: Signs of injury present.      Right shoulder: No deformity or tenderness. Normal range of motion.      Left shoulder: No deformity or tenderness. Normal range of motion.      Right upper arm: Tenderness present.      Left upper arm: No tenderness.      Right elbow: No deformity. Normal range of motion. No tenderness.      Left elbow: No deformity. Normal range of motion. No tenderness.      Right wrist: Tenderness and bony tenderness present. Normal range of motion.      Left wrist: Swelling, tenderness and bony tenderness present. Normal range of motion.      Right hand: No tenderness or bony tenderness.      Left hand: No tenderness or bony tenderness.      Cervical back: Full passive range of motion without pain and normal range of motion. No signs of trauma or crepitus. No pain with movement.      Thoracic back: No tenderness. Normal range of motion.      Lumbar back: No tenderness. Normal range of motion.      Right hip:  No tenderness. Normal range of motion.      Left hip: No tenderness. Normal range of motion.      Right knee: No bony tenderness. Normal range of motion. No tenderness.      Left knee: No bony tenderness. Normal range of motion. No tenderness.      Right ankle: No swelling, deformity or ecchymosis. No tenderness. Normal range of motion.      Left ankle: No swelling, deformity or ecchymosis. No tenderness. Normal range of motion.   Skin:     General: Skin is warm.      Capillary Refill: Capillary refill takes less than 2 seconds.      Findings: No abrasion, bruising or ecchymosis.   Neurological:      General: No focal deficit present.      Mental Status: She is alert and oriented to person, place, and time.      GCS: GCS eye subscore is 4. GCS verbal subscore is 5. GCS motor subscore is 6.      Cranial Nerves: Cranial nerves are intact. No cranial nerve deficit.      Sensory: Sensation is intact. No sensory deficit.      Motor: Motor function is intact. No weakness.      Coordination: Coordination is intact.   Psychiatric:         Attention and Perception: Attention normal.         Mood and Affect: Mood normal.         Speech: Speech normal.         Behavior: Behavior normal.         Cognition and Memory: Cognition and memory normal.       ED [facility]    Splint Application    Date/Time: 10/16/2022 12:31 AM  Performed by: Sammy Anthony MD  Authorized by: Sammy Anthony MD     Risks, benefits and alternatives discussed.      PRE-PROCEDURE DETAILS     Sensation:  Normal    PROCEDURE DETAILS     Laterality:  Left    Location:  Arm    Arm:  L lower arm    Splint type:  Sugar tong    Supplies:  [facility]    POST PROCEDURE DETAILS     Pain:  Unchanged    Sensation:  Normal      PROCEDURE    Patient Tolerance:  Patient tolerated the procedure well with no immediate complications               Results for orders placed or performed during the  hospital encounter of 10/15/22   Head CT w/o contrast     Status: None    Narrative    EXAM: CT HEAD W/O CONTRAST, CT CERVICAL SPINE W/O CONTRAST, CT FACIAL BONES WITHOUT CONTRAST  LOCATION: [facility]  DATE/TIME: 10/15/2022 10:23 PM    INDICATION: Trauma head face and neck injury  COMPARISON: None.  TECHNIQUE:   1) Routine CT Head without IV contrast. Multiplanar reformats. Dose reduction techniques were used.  2) Routine CT Facial Bones without IV contrast. Multiplanar reformats. Dose reduction techniques were used.  3) Routine CT Cervical Spine without IV contrast. Multiplanar reformats. Dose reduction techniques were used.    FINDINGS:  HEAD CT:   INTRACRANIAL CONTENTS: No intracranial hemorrhage, extraaxial collection, or mass effect.  No CT evidence of acute infarct. Mild presumed chronic small vessel ischemic changes. Mild generalized volume loss. No hydrocephalus. There is a partially empty   pituitary sella which is typically an incidental finding.    OSSEOUS STRUCTURES/SOFT TISSUES: No significant abnormality.     FACIAL BONE CT:  OSSEOUS STRUCTURES/SOFT TISSUES: No localized soft tissue swelling/inflammation. No facial bone fracture or malalignment. No evidence for dental trauma or periapical abscess.    ORBITAL CONTENTS: No acute abnormality.    SINUSES: 1.5 cm polyp or retention cyst in the right maxillary sinus.    CERVICAL SPINE CT:   VERTEBRA: Normal vertebral body heights and alignment. No fracture or posttraumatic subluxation.     CANAL/FORAMINA: Scattered degenerative changes. No canal or neural foraminal stenosis.    PARASPINAL: No extraspinal abnormality. Visualized lung fields are clear.      Impression    IMPRESSION:  HEAD CT:  1.  No acute intracranial hemorrhage or calvarial fracture.  2.  Mild volume loss and presumed chronic small vessel ischemic changes.    FACIAL BONE CT:  1.  No facial bone or mandibular fracture.    CERVICAL SPINE CT:  1.   No fracture or posttraumatic subluxation.  2.  No high-grade spinal canal or neural foraminal stenosis.   CT Facial Bones without Contrast     Status: None    Narrative    EXAM: CT HEAD W/O CONTRAST, CT CERVICAL SPINE W/O CONTRAST, CT FACIAL BONES WITHOUT CONTRAST  LOCATION: [facility]  DATE/TIME: 10/15/2022 10:23 PM    INDICATION: Trauma head face and neck injury  COMPARISON: None.  TECHNIQUE:   1) Routine CT Head without IV contrast. Multiplanar reformats. Dose reduction techniques were used.  2) Routine CT Facial Bones without IV contrast. Multiplanar reformats. Dose reduction techniques were used.  3) Routine CT Cervical Spine without IV contrast. Multiplanar reformats. Dose reduction techniques were used.    FINDINGS:  HEAD CT:   INTRACRANIAL CONTENTS: No intracranial hemorrhage, extraaxial collection, or mass effect.  No CT evidence of acute infarct. Mild presumed chronic small vessel ischemic changes. Mild generalized volume loss. No hydrocephalus. There is a partially empty   pituitary sella which is typically an incidental finding.    OSSEOUS STRUCTURES/SOFT TISSUES: No significant abnormality.     FACIAL BONE CT:  OSSEOUS STRUCTURES/SOFT TISSUES: No localized soft tissue swelling/inflammation. No facial bone fracture or malalignment. No evidence for dental trauma or periapical abscess.    ORBITAL CONTENTS: No acute abnormality.    SINUSES: 1.5 cm polyp or retention cyst in the right maxillary sinus.    CERVICAL SPINE CT:   VERTEBRA: Normal vertebral body heights and alignment. No fracture or posttraumatic subluxation.     CANAL/FORAMINA: Scattered degenerative changes. No canal or neural foraminal stenosis.    PARASPINAL: No extraspinal abnormality. Visualized lung fields are clear.      Impression    IMPRESSION:  HEAD CT:  1.  No acute intracranial hemorrhage or calvarial fracture.  2.  Mild volume loss and presumed chronic small vessel ischemic  changes.    FACIAL BONE CT:  1.  No facial bone or mandibular fracture.    CERVICAL SPINE CT:  1.  No fracture or posttraumatic subluxation.  2.  No high-grade spinal canal or neural foraminal stenosis.   Cervical spine CT w/o contrast     Status: None    Narrative    EXAM: CT HEAD W/O CONTRAST, CT CERVICAL SPINE W/O CONTRAST, CT FACIAL BONES WITHOUT CONTRAST  LOCATION: [facility]  DATE/TIME: 10/15/2022 10:23 PM    INDICATION: Trauma head face and neck injury  COMPARISON: None.  TECHNIQUE:   1) Routine CT Head without IV contrast. Multiplanar reformats. Dose reduction techniques were used.  2) Routine CT Facial Bones without IV contrast. Multiplanar reformats. Dose reduction techniques were used.  3) Routine CT Cervical Spine without IV contrast. Multiplanar reformats. Dose reduction techniques were used.    FINDINGS:  HEAD CT:   INTRACRANIAL CONTENTS: No intracranial hemorrhage, extraaxial collection, or mass effect.  No CT evidence of acute infarct. Mild presumed chronic small vessel ischemic changes. Mild generalized volume loss. No hydrocephalus. There is a partially empty   pituitary sella which is typically an incidental finding.    OSSEOUS STRUCTURES/SOFT TISSUES: No significant abnormality.     FACIAL BONE CT:  OSSEOUS STRUCTURES/SOFT TISSUES: No localized soft tissue swelling/inflammation. No facial bone fracture or malalignment. No evidence for dental trauma or periapical abscess.    ORBITAL CONTENTS: No acute abnormality.    SINUSES: 1.5 cm polyp or retention cyst in the right maxillary sinus.    CERVICAL SPINE CT:   VERTEBRA: Normal vertebral body heights and alignment. No fracture or posttraumatic subluxation.     CANAL/FORAMINA: Scattered degenerative changes. No canal or neural foraminal stenosis.    PARASPINAL: No extraspinal abnormality. Visualized lung fields are clear.      Impression    IMPRESSION:  HEAD CT:  1.  No acute intracranial hemorrhage or  calvarial fracture.  2.  Mild volume loss and presumed chronic small vessel ischemic changes.    FACIAL BONE CT:  1.  No facial bone or mandibular fracture.    CERVICAL SPINE CT:  1.  No fracture or posttraumatic subluxation.  2.  No high-grade spinal canal or neural foraminal stenosis.   XR Wrist Left G/E 3 Views     Status: None    Narrative    EXAM: XR WRIST LEFT G/E 3 VIEWS  LOCATION: [facility]  DATE/TIME: 10/15/2022 10:20 PM    INDICATION: Pain after fall.  COMPARISON: None.      Impression    IMPRESSION: Comminuted fracture of the distal radius with impaction and dorsal displacement. Minimally displaced ulnar styloid fracture.   XR Wrist Right G/E 3 Views     Status: None    Narrative    EXAM: XR WRIST RIGHT G/E 3 VIEWS  LOCATION: [facility]  DATE/TIME: 10/15/2022 10:19 PM    INDICATION: Pain after fall.  COMPARISON: None.      Impression    IMPRESSION: Small bone fragment at the dorsal aspect of the proximal carpal row may be an acute avulsion type fracture seen only on the lateral view. No other fracture or dislocation.   Humerus XR, G/E 2 views, right     Status: None    Narrative    EXAM: XR HUMERUS RIGHT G/E 2 VIEWS  LOCATION: [facility]  DATE/TIME: 10/15/2022 10:21 PM    INDICATION: Trauma with right arm pain.  COMPARISON: None.      Impression    IMPRESSION: Intra-articular fracture involving the right radial head with articular surface offset and depression of the lateral fracture fragment. Right elbow effusion. No evidence for acute fracture or dislocation involving the right humerus. Minimal   to moderate degenerative changes right glenohumeral joint and right acromioclavicular joint.       XR Chest 2 Views     Status: None    Narrative    EXAM: XR CHEST 2 VIEWS  LOCATION: [facility]  DATE/TIME: 10/15/2022 10:21  PM    INDICATION: Pain after trauma.  COMPARISON: None.      Impression    IMPRESSION:     No focal airspace disease. No pleural effusion or pneumothorax.    The cardiomediastinal silhouette is unremarkable. Aortic calcifications.    Multilevel degenerative changes of the spine.    Surgical clips project over the upper abdomen.   Comprehensive metabolic panel     Status: Abnormal   Result Value Ref Range    Sodium 140 133 - 144 mmol/L    Potassium 5.3 3.4 - 5.3 mmol/L    Chloride 108 94 - 109 mmol/L    Carbon Dioxide (CO2) 27 20 - 32 mmol/L    Anion Gap 5 3 - 14 mmol/L    Urea Nitrogen 31 (H) 7 - 30 mg/dL    Creatinine 1.77 (H) 0.52 - 1.04 mg/dL    Calcium 9.2 8.5 - 10.1 mg/dL    Glucose 143 (H) 70 - 99 mg/dL    Alkaline Phosphatase 95 40 - 150 U/L    AST 16 0 - 45 U/L    ALT 21 0 - 50 U/L    Protein Total 8.2 6.8 - 8.8 g/dL    Albumin 3.9 3.4 - 5.0 g/dL    Bilirubin Total 0.6 0.2 - 1.3 mg/dL    GFR Estimate 31 (L) >60 mL/min/1.73m2   Troponin I     Status: Normal   Result Value Ref Range    Troponin I High Sensitivity 6 <54 ng/L   CBC with platelets and differential     Status: Abnormal   Result Value Ref Range    WBC Count 11.6 (H) 4.0 - 11.0 10e3/uL    RBC Count 4.56 3.80 - 5.20 10e6/uL    Hemoglobin 12.1 11.7 - 15.7 g/dL    Hematocrit 39.2 35.0 - 47.0 %    MCV 86 78 - 100 fL    MCH 26.5 26.5 - 33.0 pg    MCHC 30.9 (L) 31.5 - 36.5 g/dL    RDW 15.8 (H) 10.0 - 15.0 %    Platelet Count 194 150 - 450 10e3/uL    % Neutrophils 73 %    % Lymphocytes 20 %    % Monocytes 6 %    % Eosinophils 1 %    % Basophils 0 %    % Immature Granulocytes 0 %    NRBCs per 100 WBC 0 <1 /100    Absolute Neutrophils 8.5 (H) 1.6 - 8.3 10e3/uL    Absolute Lymphocytes 2.3 0.8 - 5.3 10e3/uL    Absolute Monocytes 0.7 0.0 - 1.3 10e3/uL    Absolute Eosinophils 0.1 0.0 - 0.7 10e3/uL    Absolute Basophils 0.0 0.0 - 0.2 10e3/uL    Absolute Immature Granulocytes 0.0 <=0.4 10e3/uL    Absolute NRBCs 0.0 10e3/uL   CBC with platelets differential      Status: Abnormal    Narrative    The following orders were created for panel order CBC with platelets differential.  Procedure                               Abnormality         Status                     ---------                               -----------         ------                     CBC with platelets and d...[[id number]]  Abnormal            Final result                 Please view results for these tests on the individual orders.     Medications   fentaNYL (PF) (SUBLIMAZE) injection 50 mcg (has no administration in time range)        Assessments & Plan (with Medical Decision Making)     Karine Moss is a 67 year old female who arrives today to the emergency department for evaluation after a fall down the stairs.  Upon arrival the patient is noted to be alert and afebrile.  She is presently hemodynamically stable.  She is protecting her airway without signs of increased work of breathing.  Pulses full in the upper extremities.  GCS currently 15.  She does have some right superior lateral periorbital ecchymosis no open wound.  Tenderness underlying.  With age and fall and not currently recalling events well I would plan for CT imaging of the head albeit clinical suspicion for acute intracranial bleed would be lower.  She was placed in a cervical collar.  She has some right paraspinal and mild cervical spine tenderness to palpation.  Otherwise and secondary survey she is speaking full sentences without signs of increased work of breathing.  Lower suspicion for intrathoracic pathology such as pneumothorax, pulmonary or cardiac contusion.  I do not palpate tenderness by evaluation of the chest lower suspicion for rib fracture.  No abdominal pain.  Range of motion full and without limitation of the lower extremities.  She is quite tender with palpation of the wrist bilaterally significant ecchymosis over the left wrist.  She further has tenderness of the left upper extremity concerning for humerus fracture.   I do believe she benefit from IV access for screening laboratory studies and imaging.    CT imaging of the head and neck demonstrate no sign of acute pathology.  Chest x-ray reveals no sign of fracture, pneumothorax, or bony abnormality.  X-ray imaging of the extremities demonstrates a left comminuted radius fracture, and right carpal bone fracture along with radial head fracture.  Case discussed with orthopedic surgery with plan for sling of right upper extremity and sugar-tong splint to left.  Based on patient's age and mechanism with fall downstairs and multiple fractures involving bilateral upper extremities I do believe she benefit from overnight evaluation under the trauma service care and a.m. tertiary service with possible need for PT.    I have reviewed the nursing notes. I have reviewed the findings, diagnosis, plan and need for follow up with the patient.    New Prescriptions    No medications on file       Final diagnoses:   Fall, initial encounter   Injury of head, initial encounter   Neck pain   Closed fracture of left wrist, initial encounter   Closed fracture of right hand, initial encounter   Closed nondisplaced fracture of head of right radius, initial encounter     --  Sammy Anthony M.D.  [facility] EMERGENCY DEPARTMENT  10/15/2022     Sammy Anhtony MD  10/16/22 0033

## 2022-10-16 NOTE — PLAN OF CARE
Goal Outcome Evaluation:  Arrived on unit , room 532 from [facility] [place], [place] ED via wheelchair around 0220. Alert and oriented X 4 .VSS . Maintaining O2 sats in mid 90s on room air. Oriented to room, surroundings, call light, phone.  Able to make needs known. Call light in reach. Bilateral wrist/elbow pain 4/10.  Left arm in splint. Left arm/hand elevated on pillows, Ice applied.  Bruising above right eye. CMS/Neuros WNLs. Up to BR with assist of 1 and gait belt. Voiding spontaneously. No BM this shift. C/O headache but denies nausea, dizziness, lightheadedness, chest pain or SOB. Appears to be resting quietly since arriving on floor. Still waiting for Providers orders. Continue to monitor.

## 2022-10-16 NOTE — CONSULTS
[facility]  Orthopedic Surgery Consult    Name: Karine Moss  Age: 67 year old  MRN: [id number]  YOB: 1955    Reason for Consult: Bilateral upper extremity pain    Requesting Provider: Sammy Anthony MD    Assessment and Plan:     Assessment:  Karine is a right-hand-dominant 67-year-old female with a right radial head fracture, right triquetrum fracture, and left distal radius fracture. She was admitted to Trauma Surgery overnight for observation and tertiary. She currently has a left upper extremity splint and a right upper extremity sling. She was transitioned into a right removable wrist brace and a left well-padded well-molded upper extremity splint. She is NWB on the left and WBAT on the right for light ADLs only. She will follow-up in the orthopedic clinic next week.    Plan:  - NWB LUE  - WBAT RUE for light ADLs only  - Follow-up in the orthopedic clinic next week    The patient will be discussed with attending Dr. Jairo Parker.    Romulo Arroyo MD  Orthopedic Surgery PGY4    History of Present Illness:     Patient was seen and examined by me. History, PMH, Meds, SH, complete ROS (10 organ systems) and PE reviewed with patient and prior medical records.      Karine is a right-hand-dominant 67-year-old female who presented to the ED overnight for evaluation after a fall down the stairs. She was in her normal state of health but does not recall the event well. She thinks that she fell down the last several steps. She endorses loss of consciousness. On arrival to the ED, she complained of bilateral upper extremity pain. Radiographs were obtained which demonstrated a right radial head fracture, right triquetrum fracture, and left distal radius fracture. She denies injury to her lower extremities. She has a right periorbital contusion but denies severe headache, nausea, vomiting, and visual changes. No other concerns or complaints were expressed.      Past Medical History:     Past Medical History:   Diagnosis Date     Arthritis      Chest pain     seeing Cardiology     Depression 1991    after 's death     Diabetes mellitus (H)      Diabetic renal disease (H)     microalbuminuria     DJD (degenerative joint disease) of knee      H/O vitamin D deficiency      Hypertension      IBS (irritable bowel syndrome)     diarrhea      Keratoconus      Low back pain      Narcolepsy 2007    Dx'd by Sleep specialist 347.00, pt discontinued Adderal mid Nov. 13 due to tacycardia concerns     Obesity      Obstructive sleep apnea     327.23, 3 sleep studies,Dr. [facility]     Pancreatitis     related to Victoza, also on Xyrem     Panic      RLS (restless legs syndrome)      Sinus tachycardia       Past Surgical History:     Past Surgical History:   Procedure Laterality Date     COLONOSCOPY  10/01/2020    poor quality prep     ear surgery  2002 and 01/2004     ESOPHAGOSCOPY, GASTROSCOPY, DUODENOSCOPY (EGD), COMBINED N/A 11/16/2021    Procedure: ESOPHAGOGASTRODUODENOSCOPY (EGD);  Surgeon: Jayla Calvo MD;  Location:  GI     GASTRIC BYPASS  2013    laparoscopic jimmy en y c ometopexy     HEMORRHOIDECTOMY  09/14/2000     LAPAROSCOPIC CHOLECYSTECTOMY  2015     LASIK BILATERAL       UVULOPALATOPHARYNGOPLASTY       UVVP        Social History:     Social History     Socioeconomic History     Marital status: Single     Number of children: 1   Occupational History     Employer: [employer]     Comment: on disability   Tobacco Use     Smoking status: Never     Smokeless tobacco: Never   Substance and Sexual Activity     Alcohol use: Not Currently     Comment: rare     Drug use: No     Sexual activity: Never   Other Topics Concern     Parent/sibling w/ CABG, MI or angioplasty before 65F 55M? No   Social History Narrative    Daughter- 42,  since 1991    Drives only short distances due to narcolepsy             --------------------------------------------------------------------------------    Surgical History  Return To Top     Status Surgery Time Frame Comment Record Date     Inactive  ear surgery  1/04 5/27/2008      Inactive  eye surgery  2002 5/27/2008      Inactive  Hemorrhoidectomy  9/14/00 5/27/2008          --------------------------------------------------------------------------------    Food Allergy  Return To Top     Allergen Reaction Comment Record Date     * No known food allergies      10/28/2008          --------------------------------------------------------------------------------    Drug Allergy  Return To Top     Allergen Reaction Comment Record Date     Codeine  nausea    6/21/2007          --------------------------------------------------------------------------------    Environment Allergy  Return To Top     Allergen Reaction Comment Record Date     * No known environmental allergies      10/28/2008          --------------------------------------------------------------------------------    Social History  Return To Top     Question Answer Comment Record Date     Marital status      5/27/2008      Advance Directive or Living Will  Form Given to Patient    1/18/2010      Emotional Abuse  Yes    1/18/2010      Exercise  No    1/18/2010      Caffeine  Yes    5/27/2008      Physical Abuse  No    1/18/2010      Sealtbelts  Yes    1/18/2010      Sexual Abuse  No    1/18/2010      Breast/Testicle Self Check  No    1/18/2010      Number of children  1    1/18/2010      Living arrangements  House    1/18/2010      Number of adults in household  2    1/18/2010      Education level  High School Graduate    1/18/2010      Employment  Currently employed    1/18/2010      Tobacco history  Has never smoked or chewed tobacco    5/27/2008      Alcohol history  Currently drinks alcohol    5/27/2008      Has the patient ever used illegal drugs?  Has never used illegal drugs    5/27/2008           --------------------------------------------------------------------------------    History - Overall Remark: 3/21/2012 Return To Top         --------------------------------------------------------------------------------    Medical History Return To Top     Status Diagnosis Time Frame Comment Record Date     Active (250.00) - C - Diabetes mellitus, type II controlled      5/15/2012      Active (788.41) - C - Urinary frequency      4/17/2012      Active (250.02) - C - Diabetes mellitus, type II uncontrolled      3/6/2012      Active (278.00) - C - Obesity      2/14/2011      Active (250.00) - C - Diabetes mellitus, type II controlled      2/14/2011      Active (739.3) - C - Somatic dysfunction, lumbar region      8/16/2010      Active (739.5) - C - Somatic dysfunction, pelvic region      8/16/2010      Active (401.9) - C - Hypertension      2/8/2010      Active 268.9 UNSP VITAMIN D DEFICIENCY      1/18/2010      Active (695.3) - C - Rosacea      1/18/2010      Active 272.1 Hypertriglyceridemia      1/18/2010      Active 300.4 Depression with Anxiety (Dysthymic Disorder)      10/28/2008      Active 739.6 Somatic dysfunction, lower extremities      10/28/2008      Active 780.79 Fatigue      6/23/2008      Active 278.01 Obesity, morbid      6/19/2008      Active 347.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, 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      6/19/2008      Inactive  (462) - C - Pharyngitis, acute      1/15/2011      Inactive  250.02 Diabetes mellitus, type II uncontrolled      1/18/2010      Inactive  726.71 Tendinitis, achilles      10/28/2008      Inactive  (250.00) - C - Diabetes mellitus, type II controlled      10/28/2008      Inactive  (250.00) - C - Diabetes mellitus, type II controlled      6/23/2008      Inactive  V77.91 Screening, lipids      6/23/2008      Inactive  599.0 Urinary tract infection, unspec./pyuria (UTI)      6/23/2008      Inactive  V70.0 Routine Medical Exam      6/19/2008      Active Constipation      5/27/2008      Active  Hemorrhoids      2008      Pancreatitis pancreatitis 2013     --------------------------------------------------------------------------------    M        --------------------------------------------------------------------------------    Family History  Return To Top     Status Relationship Disease Comment Record Date     Alive Sister  *Denies any medical problems  3 sisters  2008      Alive Brother  *Denies any medical problems  2 brothers  2008         Father    Age of death 56  2010         Mother  Dementia  Age of death 82  2008          --------------------------------------------------------------------------------                         Family History:     Family History   Problem Relation Age of Onset     Memory loss Mother      Diabetes Father      Chronic Obstructive Pulmonary Disease Sister      Anemia Sister         hx of ulcers     Other - See Comments Sister 65        MVA, followed by leg pain after a fall     Dementia Sister 68     Dementia Brother 70     Cancer Brother         lung     Cancer - colorectal Maternal Grandmother      Cancer Daughter      Obesity Daughter         s/p lap band      Medications:     No current facility-administered medications for this encounter.      Allergies:     Allergies   Allergen Reactions     Pravastatin Other (See Comments)     woozy     Codeine Nausea and Vomiting     Nsaids GI Disturbance and Other (See Comments)     Pt had bariatric surgery, should not ever take oral NSAIDS due to risk of gastric ulcers.  Pt had bariatric surgery, should not ever take oral NSAIDS due to risk of gastric ulcers.      Review of Systems:     A comprehensive 10 point review of systems (constitutional, ENT, cardiac, peripheral vascular, respiratory, GI, , musculoskeletal, skin, neurological) was performed and found to be negative except as described in this note.      Physical Exam:     Vital Signs: /69 (Patient Position:  Supine)   Pulse 79   Temp 98.1  F (36.7  C) (Oral)   Resp 18   SpO2 97%   General: Resting comfortably in bed, awake, alert, cooperative, no apparent distress, appears stated age.  HEENT: Normocephalic, atraumatic, EOMI, no scleral icterus.  Cardiovascular: RRR assessed by peripheral pulse.  Respiratory: Breathing comfortably on room air, no wheezing.  Skin: No rashes or lesions.  Neurologic: A&Ox3, CN II-XII grossly intact  Musculoskeletal:  RUE: No gross deformity. Skin intact. TTP over radial head and dorsal triquetrum. NTTP over radial wrist. Fires FPL, EPL, and IOs. SILT M/R/U nerve distributions. Radial pulse palpable.  LUE: No gross deformity. Skin intact. TTP over dorsal wrist. Fires FPL, EPL, and IOs. SILT M/R/U nerve distributions. Radial pulse palpable.     Imaging:     Radiographs of the right humerus, elbow, and wrist obtained 10/15/2022 were reviewed. These demonstrate a right radial head fracture and suspected right triquetrum fracture.    Radiographs of the left wrist obtained 10/15/2022 were reviewed. These demonstrate a left intraarticular distal radius fracture.    Data:     CBC:  Lab Results   Component Value Date    WBC 11.6 (H) 10/15/2022    HGB 12.1 10/15/2022     10/15/2022     BMP:  Lab Results   Component Value Date     10/15/2022    POTASSIUM 5.3 10/15/2022    CHLORIDE 108 10/15/2022    CO2 27 10/15/2022    BUN 31 (H) 10/15/2022    CR 1.77 (H) 10/15/2022    ANIONGAP 5 10/15/2022    KINJAL 9.2 10/15/2022     (H) 10/15/2022     Inflammatory Markers:  Lab Results   Component Value Date    WBC 11.6 (H) 10/15/2022    WBC 10.1 08/25/2022    WBC 7.0 12/13/2021

## 2022-10-16 NOTE — H&P
[facility]    History and Physical: Trauma Service       Date of Admission:  10/15/2022    Time of Admission/Consult Request (page/call):  Patient was admitted to the [facility] [place] [place] 5th floor instead of [place]. After this was discovered this morning by Dr. Blankenship patient placement arranged to have patient transferred over. I discussed transfer with Ppm at 0710, at 0830 and with the floor charge after 0910. Per PPM the patient was ready for transport/ on her way each time. The charge nurse called me because she received a call from ED Obs, where the patient's room was, that they could not accept her without a COVID test. I had already discussed this with the charge and asked her to have the patient in the room and I would order everything once she arrives. Because EMS was there I did not want them to leave since this transfer was already delayed, I received approval by the ED for a temporary spot on one of the hallway beds so I could see the patient, have the COVID test preformed, and once completed moved to her assigned room in ED obs. We were able to do this.     Time of my evaluation: upon arrival to [place]    Assessment   Trauma mechanism:Fall down stairs  Time/date of injury: 10/15/22  Known Injuries:  1. Right radial head fracture  2. Right triquetrum fracture  3. Left Distal radius fracture    Neuro/Pain/Psych:  # Fall down steps, head strike, possible LOC, amnesia of event   # Hx of TIA's   # Concern for cognitive decline, memory problems  # possible mild TBI  - Pt has some confusion on arrival, could be 2/2 closed head injury, not sleeping d/t long night in ED, medications. Also, per conversation with pt's daughter she has noticed more forgetfulness in her mother. Pt has Neuropsych appt for 10/25 for cog eval   - Head/Facial/Cervical Spine CT negative for acute injury     # Acute on chronic pain   # Diabetic polyneuropathy associated with type 2 diabetes  "mellitus   - Scheduled: Tylenol, Robaxin   - Prn: oxycodone,   - Continue PTA: duloxetine 30 in am and 90 hs for neuropathy   - Scheduled certain medication because per daughter patient may not remember to ask or cannot remember if her pain has improved.       # PTSD  # Depression  # Anxiety   # Insomnia   - Sees Behavioral Health   - continue PTA: Adderall,   - Holding PTA: trazodone     EENT:  # Contusion right forehead   # xerostomia   - continue PTA: cevimeline     Pulmonary:  # IZABEL, does not   - Supplemental oxygen to keep saturation above 92 %.  - Incentive spirometer while awake   - CXR: No focal airspace disease. No pleural effusion or pneumothorax. The cardiomediastinal silhouette is unremarkable. Aortic calcifications. Multilevel degenerative changes of the spine. Surgical clips project over the upper abdomen.    Cardiovascular:    # Hypertension   - Monitor hemodynamic status.   - Continue PTA: amlodipine, atorvastatin,   - Holding losartan d/d QUIN    GI/Nutrition:    # GERD  - continue PPI  - Regular diet     Renal/ Fluids/Electrolytes:  # QUIN on CKD  - Creatinine: 1.77,  - repeat labs in AM  - bolus 500ml NS  - continue PTA: tolterodine ER  - electrolyte replacement protocol in place.     Endocrine:  # Diabetes Mellitus   - PTA medications: Metformin 2000 in am, Jardiance   - Medium Sliding scale for glucose management. Goal to keep BG < 180 for optimal wound healing     Infectious disease:   # Leukocytosis stress Vs infectious, mild  # UTI  - UA: Mod Leuko Esterase, WBC 15, RBC 4, Mucus present, Hyaline Casts 23. UC pending   - Discussed with pharmacy, d/t pt's low GFR and other medications the best antibiotic for empiric treatment would be Keflex 500mg bid x5 days    Hematology:    - Hgb 12.1. Monitor and trend.   - Threshold for transfusion if hgb <7.0 or signs/symptoms of hypoperfusion.       # DVT Prophylaxis: lovenox     Musculoskeletal:  # Multiple Falls  - \"Slipped off of bed\" 10/11  - Per " daughter, patient has been falling more lately     # Right radial head fracture  # Right triquetrum fracture  # Left distal radius fracture  - Right removable wrist brace   - Ortho consulted:     NEETA QUINTANILLA for light ADLs only    Follow-up in the orthopedic clinic next week  - continue PTA: [place]   - Physical and occupational therapy consults.    Skin:  - dilgent cares to prevent skin breakdown and wound formation.      Code status: Full Code    General Cares:  GI Prophylaxis: PPI  DVT Prophylaxis: lovenox   Date of last stool/Bowel Regimen:in place  Pulmonary toilet: IS    ETOH: This patient was asked if in the last 3-6 months there has been a time when she had 4 or more drinks in a single day/outing.. Patient answer to the screening question was in the negative. No intervention needed.  Primary Care Physician   Anay Martinez      Plan   1. Continue admission to Trauma Obs  2. Follow-up with PT/OT marie  3. SW consulted, already discussing dispo plans with daughter      Ashantiji Marino PA-C  Primary team: Trauma Services   Job code pager [phone] (24 hours a day)  Use [employer] to text page (not text page compatible with [email or web address]).    Dial * * * [phone] then  [phone], wait for prompt and then enter call back number.   Do NOT call numbers listed to right in Treatment Team section.       Chief Complaint   Fall    History is obtained from the patient, electronic health record, emergency department physician and patient's children    History of Present Illness   Karine Moss is a 67 year old female who arrived 10/15 to the  ED  for evaluation after a fall down the stairs. Per ED note:  She believes she fell down the last several steps.  Patient notes ongoing severe bilateral wrist pain as well as right upper extremity pain.  Patient does not recall the event well.  She reports no preceding headache, chest pain, palpitations, recent medical illness.  The patient awoke on the floor.  Daughter did note a small  right periorbital contusion.      On arrival the the  ED this morning for admission to Trauma Obs patient c/o a headache. She seemed to have some confusion, but this could be 2/2 lack of sleep overnight and recent head injury. On later exam once patient was in the room she was asleep.     Past Medical History    I have reviewed this patient's medical history and updated it with pertinent information if needed.   Past Medical History:   Diagnosis Date     Arthritis      Chest pain     seeing Cardiology     Depression 1991    after 's death     Diabetes mellitus (H)      Diabetic renal disease (H)     microalbuminuria     DJD (degenerative joint disease) of knee      H/O vitamin D deficiency      Hypertension      IBS (irritable bowel syndrome)     diarrhea      Keratoconus      Low back pain      Narcolepsy 2007    Dx'd by Sleep specialist 347.00, pt discontinued Adderal mid Nov. 13 due to tacycardia concerns     Obesity      Obstructive sleep apnea     327.23, 3 sleep studies,Dr. [facility]     Pancreatitis     related to Victoza, also on Xyrem     Panic      RLS (restless legs syndrome)      Sinus tachycardia        Past Surgical History   I have reviewed this patient's surgical history and updated it with pertinent information if needed.  Past Surgical History:   Procedure Laterality Date     COLONOSCOPY  10/01/2020    poor quality prep     ear surgery  2002 and 01/2004     ESOPHAGOSCOPY, GASTROSCOPY, DUODENOSCOPY (EGD), COMBINED N/A 11/16/2021    Procedure: ESOPHAGOGASTRODUODENOSCOPY (EGD);  Surgeon: Jayla Calvo MD;  Location:  GI     GASTRIC BYPASS  2013    laparoscopic jimmy en y c ometopexy     HEMORRHOIDECTOMY  09/14/2000     LAPAROSCOPIC CHOLECYSTECTOMY  2015     LASIK BILATERAL       UVULOPALATOPHARYNGOPLASTY       UVVP       Prior to Admission Medications   Prior to Admission Medications   Prescriptions Last Dose Informant Patient Reported? Taking?   COMPOUNDED NON-CONTROLLED SUBSTANCE  (CMPD RX) - PHARMACY TO MIX COMPOUNDED MEDICATION   No No   Sig: Estradiol 0.2 mg/gm in HRT cream. Take one-quarter gm vaginally daily x 2 weeks then twice weekly   Continuous Blood Gluc  (FREESTYLE NIRMAL 14 DAY READER) EBEN   No No   Sig: Use to read blood sugars as per 's instructions.   Continuous Blood Gluc Sensor (FREESTYLE NIRMAL 14 DAY SENSOR) [facility]   No No   Sig: Change every 14 days.   DULoxetine (CYMBALTA) 30 MG capsule   No No   Sig: Take 1 capsule (30 mg) by mouth 2 times daily   DULoxetine (CYMBALTA) 60 MG capsule   No No   Sig: Take 1 capsule (60 mg) by mouth At Bedtime Take in addition to current 30 mg evening dose for a total of 90 mg at bedtime.   Vitamin D3 (CHOLECALCIFEROL) 25 mcg (1000 units) tablet   No No   Sig: Take 1 tablet (25 mcg) by mouth daily   amLODIPine (NORVASC) 5 MG tablet   No No   Sig: Take 1 tablet (5 mg) by mouth daily   amphetamine-dextroamphetamine (ADDERALL) 30 MG tablet   Yes No   Sig: Take 1 tablet by mouth every 24 hours 1.5 mg a total of 45 mg  Daily   atorvastatin (LIPITOR) 20 MG tablet   No No   Sig: Take 1 tablet (20 mg) by mouth daily   blood glucose (NO BRAND SPECIFIED) test strip   No No   Sig: Use to test blood sugar as directed with CGM sensor.   blood glucose (NO BRAND SPECIFIED) test strip   No No   Sig: Use to test blood sugar as needed with Freestyle Nirmal CGM.   calcium carbonate (OS-KINJAL) 1500 (600 Ca) MG tablet   Yes No   Sig: Take 2 tablets (1,200 mg) by mouth daily   cevimeline (EVOXAC) 30 MG capsule   No No   Sig: take 1 cap TID   cyanocobalamin (VITAMIN B-12) 1000 MCG tablet   No No   Sig: Take one every other day   empagliflozin (JARDIANCE) 10 MG TABS tablet   No No   Sig: Take 1 tablet (10 mg) by mouth daily Schedule appt with ADEN Blas, [facility] to ensure future refills   losartan (COZAAR) 100 MG tablet   No No   Sig: Take 1 tablet (100 mg) by mouth daily   metFORMIN (GLUCOPHAGE-XR) 500 MG 24 hr tablet   No No   Sig: Take 4  po q am with breakfast.   pantoprazole (PROTONIX) 40 MG EC tablet   No No   Sig: Take 1 tablet (40 mg) by mouth 2 times daily   tolterodine ER (DETROL LA) 4 MG 24 hr capsule   No No   Sig: Take 1 capsule (4 mg) by mouth daily   traZODone (DESYREL) 50 MG tablet   Yes No   Sig: Take 1 tablet by mouth every 24 hours      Facility-Administered Medications: None     Allergies   Allergies   Allergen Reactions     Pravastatin Other (See Comments)     woozy     Codeine Nausea and Vomiting     Nsaids GI Disturbance and Other (See Comments)     Pt had bariatric surgery, should not ever take oral NSAIDS due to risk of gastric ulcers.  Pt had bariatric surgery, should not ever take oral NSAIDS due to risk of gastric ulcers.       Social History   Social History     Socioeconomic History     Marital status: Single     Spouse name: Not on file     Number of children: 1     Years of education: Not on file     Highest education level: Not on file   Occupational History     Employer: [employer]     Comment: on disability   Tobacco Use     Smoking status: Never     Smokeless tobacco: Never   Substance and Sexual Activity     Alcohol use: Not Currently     Comment: rare     Drug use: No     Sexual activity: Never   Other Topics Concern     Parent/sibling w/ CABG, MI or angioplasty before 65F 55M? No   Social History Narrative    Daughter- 42,  since 1991    Drives only short distances due to narcolepsy            --------------------------------------------------------------------------------    Surgical History  Return To Top     Status Surgery Time Frame Comment Record Date     Inactive  ear surgery  1/04 5/27/2008      Inactive  eye surgery  2002 5/27/2008      Inactive  Hemorrhoidectomy  9/14/00 5/27/2008          --------------------------------------------------------------------------------    Food Allergy  Return To Top     Allergen Reaction Comment Record Date     * No known food allergies      10/28/2008           --------------------------------------------------------------------------------    Drug Allergy  Return To Top     Allergen Reaction Comment Record Date     Codeine  nausea    6/21/2007          --------------------------------------------------------------------------------    Environment Allergy  Return To Top     Allergen Reaction Comment Record Date     * No known environmental allergies      10/28/2008          --------------------------------------------------------------------------------    Social History  Return To Top     Question Answer Comment Record Date     Marital status      5/27/2008      Advance Directive or Living Will  Form Given to Patient    1/18/2010      Emotional Abuse  Yes    1/18/2010      Exercise  No    1/18/2010      Caffeine  Yes    5/27/2008      Physical Abuse  No    1/18/2010      Sealtbelts  Yes    1/18/2010      Sexual Abuse  No    1/18/2010      Breast/Testicle Self Check  No    1/18/2010      Number of children  1    1/18/2010      Living arrangements  House    1/18/2010      Number of adults in household  2    1/18/2010      Education level  High School Graduate    1/18/2010      Employment  Currently employed    1/18/2010      Tobacco history  Has never smoked or chewed tobacco    5/27/2008      Alcohol history  Currently drinks alcohol    5/27/2008      Has the patient ever used illegal drugs?  Has never used illegal drugs    5/27/2008          --------------------------------------------------------------------------------    History - Overall Remark: 3/21/2012 Return To Top         --------------------------------------------------------------------------------    Medical History Return To Top     Status Diagnosis Time Frame Comment Record Date     Active (250.00) - C - Diabetes mellitus, type II controlled      5/15/2012      Active (788.41) - C - Urinary frequency      4/17/2012      Active (250.02) - C - Diabetes mellitus, type II uncontrolled      3/6/2012       Active (278.00) - C - Obesity      2011      Active (250.00) - C - Diabetes mellitus, type II controlled      2011      Active (739.3) - C - Somatic dysfunction, lumbar region      2010      Active (739.5) - C - Somatic dysfunction, pelvic region      2010      Active (401.9) - C - Hypertension      2010      Active 268.9 UNSP VITAMIN D DEFICIENCY      2010      Active (695.3) - C - Rosacea      2010      Active 272.1 Hypertriglyceridemia      2010      Active 300.4 Depression with Anxiety (Dysthymic Disorder)      10/28/2008      Active 739.6 Somatic dysfunction, lower extremities      10/28/2008      Active 780.79 Fatigue      2008      Active 278.01 Obesity, morbid      2008      Active 347.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, 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      2008      Inactive  (462) - C - Pharyngitis, acute      1/15/2011      Inactive  250.02 Diabetes mellitus, type II uncontrolled      2010      Inactive  726.71 Tendinitis, achilles      10/28/2008      Inactive  (250.00) - C - Diabetes mellitus, type II controlled      10/28/2008      Inactive  (250.00) - C - Diabetes mellitus, type II controlled      2008      Inactive  V77.91 Screening, lipids      2008      Inactive  599.0 Urinary tract infection, unspec./pyuria (UTI)      2008      Inactive  V70.0 Routine Medical Exam      2008      Active Constipation      2008      Active Hemorrhoids      2008      Pancreatitis pancreatitis 2013-------------------------------------------------------------------------------    M        --------------------------------------------------------------------------------    Family History  Return To Top     Status Relationship Disease Comment Record Date     Alive Sister  *Denies any medical problems  3 sisters  2008      Alive Brother  *Denies any medical problems  2 brothers  2008         Father    Age of death 56  2010          Mother  Dementia  Age of death 82  5/27/2008          --------------------------------------------------------------------------------                         Social Determinants of Health     Financial Resource Strain: Not on file   Food Insecurity: Not on file   Transportation Needs: Not on file   Physical Activity: Not on file   Stress: Not on file   Social Connections: Not on file   Intimate Partner Violence: Not on file   Housing Stability: Not on file       Family History   Family history reviewed with patient and is noncontributory.    Review of Systems   CONSTITUTIONAL:fatigue   EYES: no visual blurring, no double vision or visual loss  ENT: hearing loss   RESPIRATORY: no shortness of breath, no cough, no sputum   CARDIOVASCULAR: no palpitations, no chest  pain, no exertional chest pain or pressure  GASTROINTESTINAL: no nausea or vomiting, or abd pain  GENITOURINARY: no dysuria, no frequency or hesitancy, no hematuria  MUSCULOSKELETAL: positive weakness   SKIN: ecchymoses,   NEUROLOGIC: peripheral neuropathy, weakness  PSYCHIATRIC: sleep disturbances, anxiety and depression    Physical Exam   Temp: 98.1  F (36.7  C) Temp src: Oral BP: 115/69 Pulse: 79   Resp: 18 SpO2: 97 % O2 Device: None (Room air)    Vital Signs with Ranges  Temp:  [98.1  F (36.7  C)] 98.1  F (36.7  C)  Pulse:  [79-87] 79  Resp:  [16-18] 18  BP: (111-142)/(67-70) 115/69  SpO2:  [95 %-97 %] 97 % 0 lbs 0 oz    Primary Survey:   Airway: patient talking  Breathing: symmetric respiratory effort bilaterally  Circulation: central pulses present and peripheral pulses present  Disability: Pupils - left 4 mm and brisk, right 4 mm and brisk     Dia Coma Scale - Total 15/15  Eye Response (E): 4  4= spontaneous,  3= to verbal/voice, 2=  to pain, 1= No response   Verbal Response (V): 5   5= Orientated, converses,  4= Confused, converses, 3= Inappropriate words,  2= Incomprehensible sounds,  1=No response   Motor Response (M): 6   6= Obeys  commands, 5= Localizes to pain, 4= Withdrawal to pain, 3=Fexion to pain, 2= Extension to pain, 1= No response    Secondary Survey:  General: alert, oriented to person, place, time  Head: atraumatic, normocephalic, contusion to right forehead   Eyes: PERRLA, pupils 4mm, EOMI, corneas and conjunctivae clear,   Nose: nares patent, no drainage, nasal septum non-tender  Mouth/Throat: no exudates or erythema,  no dental tenderness or malocclusions, no tongue lacerations  Neck:  Trachea midline. No midline posterior tenderness, full AROM without pain or tenderness   Chest/Pulmonary: normal respiratory rate and rhythm,  bilateral clear breath sounds, no wheezes, rales or rhonchi, no chest wall tenderness or deformities,   Cardiovascular: S1, S2,  normal and regular rate and rhythm, no murmurs  Abdomen: soft, non-tender, no guarding, no rebound tenderness and no tenderness to palpation  : pelvis stable to lateral compression, no carrera, no urine assess/urine yellow and clear  Back/Spine: no deformity, no midline tenderness, no sacral tenderness,  no step-offs and no abrasions or contusions  Musculoskel/Extremities: left arm in splinted with cap refill < 2,   Skin: warm and dry.   Neuro: PERRLA, alert, oriented x 3. No focal deficits. Strength 4/5 x 4 extremities.  Sensation intact.  Psychiatric: affect/mood normal, cooperative, normal judgement/insight  # Pain Assessment:  Current Pain Score 10/16/2022   Patient currently in pain? yes   - Karine is experiencing pain due to fractures. Pain management was discussed with Karine and her family and the plan was created in a collaborative fashion.  Karine's response to the current recommendations: engaged  - Please see the plan for pain management as documented above      Data   Results for orders placed or performed during the hospital encounter of 10/15/22 (from the past 24 hour(s))   UA with Microscopic reflex to Culture    Specimen: Urine, Midstream   Result Value Ref Range     Color Urine Yellow Colorless, Straw, Light Yellow, Yellow    Appearance Urine Clear Clear    Glucose Urine >=1000 (A) Negative mg/dL    Bilirubin Urine Negative Negative    Ketones Urine Negative Negative mg/dL    Specific Gravity Urine 1.020 1.003 - 1.035    Blood Urine Negative Negative    pH Urine 5.0 5.0 - 7.0    Protein Albumin Urine 20 (A) Negative mg/dL    Urobilinogen Urine Normal Normal, 2.0 mg/dL    Nitrite Urine Negative Negative    Leukocyte Esterase Urine Moderate (A) Negative    Mucus Urine Present (A) None Seen /LPF    RBC Urine 4 (H) <=2 /HPF    WBC Urine 15 (H) <=5 /HPF    Squamous Epithelials Urine 9 (H) <=1 /HPF    Transitional Epithelials Urine 8 (H) <=1 /HPF    Hyaline Casts Urine 23 (H) <=2 /LPF    Narrative    Urine Culture ordered based on laboratory criteria   Splint Application    Narrative    Sammy Anthony MD     10/16/2022 12:33 AM  [facility]    Splint Application    Date/Time: 10/16/2022 12:31 AM  Performed by: Sammy Anthony MD  Authorized by: Sammy Anthony MD     Risks, benefits and alternatives discussed.      PRE-PROCEDURE DETAILS     Sensation:  Normal    PROCEDURE DETAILS     Laterality:  Left    Location:  Arm    Arm:  L lower arm    Splint type:  Sugar tong    Supplies:  Ortho-Glass    POST PROCEDURE DETAILS     Pain:  Unchanged    Sensation:  Normal      PROCEDURE    Patient Tolerance:  Patient tolerated the procedure well with no immediate   complications   Asymptomatic COVID-19 Virus (Coronavirus) by PCR Nasopharyngeal    Specimen: Nasopharyngeal; Swab   Result Value Ref Range    SARS CoV2 PCR Negative Negative    Narrative    Testing was performed using the Xpert Xpress SARS-CoV-2 Assay on the  Cepheid Gene-Xpert Instrument Systems. Additional information about  this Emergency Use Authorization (EUA) assay can be found via the Lab  Guide. This test should be ordered for the detection of SARS-CoV-2  in  individuals who meet SARS-CoV-2 clinical and/or epidemiological  criteria. Test performance is unknown in asymptomatic patients. This  test is for in vitro diagnostic use under the FDA EUA for  laboratories certified under CLIA to perform high complexity testing.  This test has not been FDA cleared or approved. A negative result  does not rule out the presence of PCR inhibitors in the specimen or  target RNA in concentration below the limit of detection for the  assay. The possibility of a false negative should be considered if  the patient's recent exposure or clinical presentation suggests  COVID-19. This test was validated by the [facility]. This laboratory is certified under  the Clinical Laboratory Improvement Amendments of 1988 (CLIA-88) as  qualified to perform high complexity laboratory testing.     Glucose by meter   Result Value Ref Range    GLUCOSE BY METER POCT 101 (H) 70 - 99 mg/dL       Studies:  XR Elbow Right G/E 3 Views   Final Result   IMPRESSION: Fracture of radial head measuring approximately 2 mm of displacement involving articular surface. Alignment otherwise normal. Large joint effusion.      Cervical spine CT w/o contrast   Final Result   IMPRESSION:   HEAD CT:   1.  No acute intracranial hemorrhage or calvarial fracture.   2.  Mild volume loss and presumed chronic small vessel ischemic changes.      FACIAL BONE CT:   1.  No facial bone or mandibular fracture.      CERVICAL SPINE CT:   1.  No fracture or posttraumatic subluxation.   2.  No high-grade spinal canal or neural foraminal stenosis.      CT Facial Bones without Contrast   Final Result   IMPRESSION:   HEAD CT:   1.  No acute intracranial hemorrhage or calvarial fracture.   2.  Mild volume loss and presumed chronic small vessel ischemic changes.      FACIAL BONE CT:   1.  No facial bone or mandibular fracture.      CERVICAL SPINE CT:   1.  No fracture or posttraumatic subluxation.    2.  No high-grade spinal canal or neural foraminal stenosis.      Head CT w/o contrast   Final Result   IMPRESSION:   HEAD CT:   1.  No acute intracranial hemorrhage or calvarial fracture.   2.  Mild volume loss and presumed chronic small vessel ischemic changes.      FACIAL BONE CT:   1.  No facial bone or mandibular fracture.      CERVICAL SPINE CT:   1.  No fracture or posttraumatic subluxation.   2.  No high-grade spinal canal or neural foraminal stenosis.      XR Chest 2 Views   Final Result   IMPRESSION:       No focal airspace disease. No pleural effusion or pneumothorax.      The cardiomediastinal silhouette is unremarkable. Aortic calcifications.      Multilevel degenerative changes of the spine.      Surgical clips project over the upper abdomen.      Humerus XR, G/E 2 views, right   Final Result   IMPRESSION: Intra-articular fracture involving the right radial head with articular surface offset and depression of the lateral fracture fragment. Right elbow effusion. No evidence for acute fracture or dislocation involving the right humerus. Minimal    to moderate degenerative changes right glenohumeral joint and right acromioclavicular joint.            XR Wrist Left G/E 3 Views   Final Result   IMPRESSION: Comminuted fracture of the distal radius with impaction and dorsal displacement. Minimally displaced ulnar styloid fracture.      XR Wrist Right G/E 3 Views   Final Result   IMPRESSION: Small bone fragment at the dorsal aspect of the proximal carpal row may be an acute avulsion type fracture seen only on the lateral view. No other fracture or dislocation.

## 2022-10-17 ENCOUNTER — APPOINTMENT (OUTPATIENT)
Dept: OCCUPATIONAL THERAPY | Facility: CLINIC | Age: 67
End: 2022-10-17
Attending: PHYSICIAN ASSISTANT
Payer: COMMERCIAL

## 2022-10-17 ENCOUNTER — APPOINTMENT (OUTPATIENT)
Dept: PHYSICAL THERAPY | Facility: CLINIC | Age: 67
End: 2022-10-17
Attending: PHYSICIAN ASSISTANT
Payer: COMMERCIAL

## 2022-10-17 LAB
ANION GAP SERPL CALCULATED.3IONS-SCNC: 10 MMOL/L (ref 7–15)
BACTERIA UR CULT: NORMAL
BUN SERPL-MCNC: 22.6 MG/DL (ref 8–23)
CALCIUM SERPL-MCNC: 9.5 MG/DL (ref 8.8–10.2)
CHLORIDE SERPL-SCNC: 104 MMOL/L (ref 98–107)
CREAT SERPL-MCNC: 1.04 MG/DL (ref 0.51–0.95)
DEPRECATED HCO3 PLAS-SCNC: 24 MMOL/L (ref 22–29)
ERYTHROCYTE [DISTWIDTH] IN BLOOD BY AUTOMATED COUNT: 15.5 % (ref 10–15)
GFR SERPL CREATININE-BSD FRML MDRD: 59 ML/MIN/1.73M2
GLUCOSE BLDC GLUCOMTR-MCNC: 101 MG/DL (ref 70–99)
GLUCOSE BLDC GLUCOMTR-MCNC: 114 MG/DL (ref 70–99)
GLUCOSE BLDC GLUCOMTR-MCNC: 115 MG/DL (ref 70–99)
GLUCOSE BLDC GLUCOMTR-MCNC: 140 MG/DL (ref 70–99)
GLUCOSE BLDC GLUCOMTR-MCNC: 192 MG/DL (ref 70–99)
GLUCOSE SERPL-MCNC: 133 MG/DL (ref 70–99)
HBA1C MFR BLD: 6.2 %
HCT VFR BLD AUTO: 39.1 % (ref 35–47)
HGB BLD-MCNC: 11.9 G/DL (ref 11.7–15.7)
MCH RBC QN AUTO: 26.4 PG (ref 26.5–33)
MCHC RBC AUTO-ENTMCNC: 30.4 G/DL (ref 31.5–36.5)
MCV RBC AUTO: 87 FL (ref 78–100)
PLATELET # BLD AUTO: 185 10E3/UL (ref 150–450)
POTASSIUM SERPL-SCNC: 5 MMOL/L (ref 3.4–5.3)
RBC # BLD AUTO: 4.5 10E6/UL (ref 3.8–5.2)
SODIUM SERPL-SCNC: 138 MMOL/L (ref 136–145)
WBC # BLD AUTO: 10 10E3/UL (ref 4–11)

## 2022-10-17 PROCEDURE — G0378 HOSPITAL OBSERVATION PER HR: HCPCS

## 2022-10-17 PROCEDURE — 97165 OT EVAL LOW COMPLEX 30 MIN: CPT | Mod: GO

## 2022-10-17 PROCEDURE — 82310 ASSAY OF CALCIUM: CPT | Performed by: PHYSICIAN ASSISTANT

## 2022-10-17 PROCEDURE — 97116 GAIT TRAINING THERAPY: CPT | Mod: GP

## 2022-10-17 PROCEDURE — 250N000013 HC RX MED GY IP 250 OP 250 PS 637

## 2022-10-17 PROCEDURE — 85027 COMPLETE CBC AUTOMATED: CPT | Performed by: PHYSICIAN ASSISTANT

## 2022-10-17 PROCEDURE — 97530 THERAPEUTIC ACTIVITIES: CPT | Mod: GP

## 2022-10-17 PROCEDURE — 97161 PT EVAL LOW COMPLEX 20 MIN: CPT | Mod: GP

## 2022-10-17 PROCEDURE — 250N000013 HC RX MED GY IP 250 OP 250 PS 637: Performed by: PHYSICIAN ASSISTANT

## 2022-10-17 PROCEDURE — 97535 SELF CARE MNGMENT TRAINING: CPT | Mod: GO

## 2022-10-17 PROCEDURE — 250N000013 HC RX MED GY IP 250 OP 250 PS 637: Performed by: SURGERY

## 2022-10-17 PROCEDURE — 82962 GLUCOSE BLOOD TEST: CPT

## 2022-10-17 PROCEDURE — 83036 HEMOGLOBIN GLYCOSYLATED A1C: CPT | Performed by: PHYSICIAN ASSISTANT

## 2022-10-17 PROCEDURE — 96372 THER/PROPH/DIAG INJ SC/IM: CPT | Performed by: PHYSICIAN ASSISTANT

## 2022-10-17 PROCEDURE — 36415 COLL VENOUS BLD VENIPUNCTURE: CPT | Performed by: PHYSICIAN ASSISTANT

## 2022-10-17 PROCEDURE — 99225 PR SUBSEQUENT OBSERVATION CARE,LEVEL II: CPT | Performed by: NURSE PRACTITIONER

## 2022-10-17 PROCEDURE — 250N000011 HC RX IP 250 OP 636: Performed by: PHYSICIAN ASSISTANT

## 2022-10-17 RX ORDER — CEPHALEXIN 500 MG/1
500 CAPSULE ORAL EVERY 6 HOURS SCHEDULED
Status: DISCONTINUED | OUTPATIENT
Start: 2022-10-17 | End: 2022-10-18 | Stop reason: HOSPADM

## 2022-10-17 RX ADMIN — Medication 25 MCG: at 08:45

## 2022-10-17 RX ADMIN — DULOXETINE HYDROCHLORIDE 90 MG: 60 CAPSULE, DELAYED RELEASE ORAL at 21:32

## 2022-10-17 RX ADMIN — OXYCODONE HYDROCHLORIDE 2.5 MG: 5 TABLET ORAL at 08:45

## 2022-10-17 RX ADMIN — ACETAMINOPHEN 650 MG: 325 TABLET, FILM COATED ORAL at 16:40

## 2022-10-17 RX ADMIN — PANTOPRAZOLE SODIUM 40 MG: 40 TABLET, DELAYED RELEASE ORAL at 08:45

## 2022-10-17 RX ADMIN — ACETAMINOPHEN 650 MG: 325 TABLET, FILM COATED ORAL at 05:47

## 2022-10-17 RX ADMIN — ACETAMINOPHEN 650 MG: 325 TABLET, FILM COATED ORAL at 21:32

## 2022-10-17 RX ADMIN — METHOCARBAMOL 500 MG: 500 TABLET, FILM COATED ORAL at 12:31

## 2022-10-17 RX ADMIN — TOLTERODINE 2 MG: 2 CAPSULE, EXTENDED RELEASE ORAL at 10:47

## 2022-10-17 RX ADMIN — CEVIMELINE HYDROCHLORIDE 30 MG: 30 CAPSULE ORAL at 10:47

## 2022-10-17 RX ADMIN — METHOCARBAMOL 500 MG: 500 TABLET, FILM COATED ORAL at 16:40

## 2022-10-17 RX ADMIN — AMLODIPINE BESYLATE 5 MG: 5 TABLET ORAL at 08:44

## 2022-10-17 RX ADMIN — DULOXETINE HYDROCHLORIDE 30 MG: 30 CAPSULE, DELAYED RELEASE ORAL at 08:45

## 2022-10-17 RX ADMIN — CEPHALEXIN 500 MG: 500 CAPSULE ORAL at 08:44

## 2022-10-17 RX ADMIN — ENOXAPARIN SODIUM 40 MG: 40 INJECTION SUBCUTANEOUS at 08:45

## 2022-10-17 RX ADMIN — ACETAMINOPHEN 650 MG: 325 TABLET, FILM COATED ORAL at 10:47

## 2022-10-17 RX ADMIN — CEPHALEXIN 500 MG: 500 CAPSULE ORAL at 18:28

## 2022-10-17 RX ADMIN — ATORVASTATIN CALCIUM 20 MG: 20 TABLET, FILM COATED ORAL at 21:32

## 2022-10-17 RX ADMIN — EMPAGLIFLOZIN 10 MG: 10 TABLET, FILM COATED ORAL at 10:47

## 2022-10-17 RX ADMIN — METHOCARBAMOL 500 MG: 500 TABLET, FILM COATED ORAL at 08:44

## 2022-10-17 RX ADMIN — CEVIMELINE HYDROCHLORIDE 30 MG: 30 CAPSULE ORAL at 20:00

## 2022-10-17 RX ADMIN — Medication 1200 MG: at 08:45

## 2022-10-17 RX ADMIN — CEVIMELINE HYDROCHLORIDE 30 MG: 30 CAPSULE ORAL at 14:48

## 2022-10-17 RX ADMIN — METHOCARBAMOL 500 MG: 500 TABLET, FILM COATED ORAL at 20:00

## 2022-10-17 RX ADMIN — METFORMIN ER 500 MG 2000 MG: 500 TABLET ORAL at 08:45

## 2022-10-17 RX ADMIN — PANTOPRAZOLE SODIUM 40 MG: 40 TABLET, DELAYED RELEASE ORAL at 20:00

## 2022-10-17 ASSESSMENT — ACTIVITIES OF DAILY LIVING (ADL)
ADLS_ACUITY_SCORE: 29
ADLS_ACUITY_SCORE: 29
ADLS_ACUITY_SCORE: 31
ADLS_ACUITY_SCORE: 32
ADLS_ACUITY_SCORE: 32
ADLS_ACUITY_SCORE: 29
ADLS_ACUITY_SCORE: 32
ADLS_ACUITY_SCORE: 32
PREVIOUS_RESPONSIBILITIES: MEAL PREP;HOUSEKEEPING;LAUNDRY;SHOPPING;YARDWORK;MEDICATION MANAGEMENT;DRIVING;FINANCES
ADLS_ACUITY_SCORE: 29
ADLS_ACUITY_SCORE: 32

## 2022-10-17 NOTE — PROVIDER NOTIFICATION
"Patient wanted to know what her future meds were. Read medication list and she stated she does not use insulin and will not take it. The order is still pending pharmacy review    Paged trauma NP: \"Pt. does not have an insulin pen and says she will refuse insulin if offered. Is it possible to discontinue the order?\"  "
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
## 2022-10-17 NOTE — PLAN OF CARE
Goal Outcome Evaluation:       Adequate pain control on oral analgesia- Met  - Vital Signs normal or at patient baseline-Met  - Tolerating oral intake to maintain hydration-Met  - Diagnostic tests and consults completed and resulted - Not met  - Cleared for discharge from consultants' standpoint if involved in care -Not met  - Safe disposition plan has been identified - Not met  - Return to baseline functional status -Not met

## 2022-10-17 NOTE — PLAN OF CARE
Goal Outcome Evaluation:        Adequate pain control on oral analgesia- Met  - Vital Signs normal or at patient baseline-Met  - Tolerating oral intake to maintain hydration-Met  - Diagnostic tests and consults completed and resulted - Not met  - Cleared for discharge from consultants' standpoint if involved in care -Not met  - Safe disposition plan has been identified - Not met  - Return to baseline functional status -Not met   /71 (BP Location: Left arm)   Pulse 80   Temp 98.3  F (36.8  C) (Oral)   Resp 16   SpO2 97%

## 2022-10-17 NOTE — PROGRESS NOTES
[facility]  Trauma Service Progress Note    Date of Service (when I saw the patient): 10/17/2022     Assessment & Plan   Trauma mechanism:Fall down stairs  Time/date of injury: 10/15/22  Known Injuries:  1. Right radial head fracture  2. Right triquetrum fracture  3. Left Distal radius fracture     Neuro/Pain/Psych:  # Hx of TIA's   # Concern for cognitive decline, memory problems  # possible mild TBI  Family reports some confusion prior to admission, may be slightly increased due to closed head injury and hospitalization. Previously planned Neuropsych appt for 10/25 for cog eval   - Head/Facial/Cervical Spine CT negative for acute injury      # Acute on chronic pain   # Diabetic polyneuropathy associated with type 2 diabetes mellitus   - Scheduled: Tylenol, Robaxin   - Prn: oxycodone,   - Continue PTA: duloxetine 30 in am and 90 hs for neuropathy       # PTSD  # Depression  # Anxiety   # Insomnia   - Sees Behavioral Health   - continue PTA: Adderall,   - Holding PTA: trazodone      EENT:  # xerostomia   - continue PTA: cevimeline      Pulmonary:  # IZABEL, no CPAP use  - Supplemental oxygen to keep saturation above 92 %.  - Incentive spirometer while awake   - CXR: unremarkable     Cardiovascular:    # Hypertension   - Monitor hemodynamic status.   - Continue PTA: amlodipine, atorvastatin,   - Holding losartan d/d QUIN     GI/Nutrition:    # GERD  - continue PPI  - Regular diet      Renal/ Fluids/Electrolytes:  # QUIN on CKD, improved with IVF from 1.7 to 1.0  - continue PTA: tolterodine ER  - electrolyte replacement protocol in place.      Endocrine:  # Diabetes Mellitus   - PTA medications: Metformin 2000 in am, Jardiance   - Medium Sliding scale for glucose management. Goal to keep BG < 180 for optimal wound healing      Infectious disease:   # Leukocytosis stress, improved  # UTI  - UA: Mod Leuko Esterase, WBC 15, RBC 4, Mucus present, Hyaline Casts 23. UC pending   -  "due to multiple allergies and low GFR Keflex 500mg bid x5 days     Hematology:    - Hgb 12.1. Monitor and trend.   - Threshold for transfusion if hgb <7.0 or signs/symptoms of hypoperfusion.        # DVT Prophylaxis: lovenox      Musculoskeletal:  # Multiple Falls  - \"Slipped off of bed\" 10/11  - Per daughter, patient has been falling more lately      # Right radial head fracture  # Right triquetrum fracture  # Left distal radius fracture  - Right removable wrist brace   - Ortho consulted:     NWB LUE    WBAT RUE for light ADLs only    Follow-up in the orthopedic clinic next week  - continue PTA: [place]   - Physical and occupational therapy consults.  - Social work consults     Code status: Full Code     General Cares:  GI Prophylaxis: PPI  DVT Prophylaxis: lovenox   Date of last stool/Bowel Regimen:in place  Pulmonary toilet: IS    Discharge planning  Pain: Well controlled  Consults: PT eval completed  No additional imaging or labs indicated  Disposition: medically ready for discharge pending TCU bed availability    Interval History    Feels well, pain is well controlled. Was eating at the time.   ROS x 8 negative with exception of those things listed in interval hx    Physical Exam   Temp: 98.3  F (36.8  C) Temp src: Oral BP: 113/68 Pulse: 80   Resp: 16 SpO2: 94 % O2 Device: None (Room air)    There were no vitals filed for this visit.  Vital Signs with Ranges  Temp:  [98.2  F (36.8  C)-98.3  F (36.8  C)] 98.3  F (36.8  C)  Pulse:  [73-80] 80  Resp:  [14-16] 16  BP: (104-134)/(68-86) 113/68  SpO2:  [94 %-98 %] 94 %  I/O last 3 completed shifts:  In: 660 [P.O.:660]  Out: 2 [Urine:2]    Arnold Coma Scale - Total 15/15  Eye Response (E): 4   4= spontaneous, 3= to verbal/voice, 2= to pain, 1= No response   Verbal Response (V): 4 forgetful  5= Orientated, converses, 4= Confused, converses, 3= Inappropriate words, 2= Incomprehensible sounds, 1=No response   Motor Response (M): 6   6= Obeys commands, 5= Localizes to " pain, 4= Withdrawal to pain, 3=Fexion to pain, 2= Extension to pain, 1= No response   Constitutional: Awake, alert, cooperative, no apparent distress.  ENT: Normocephalic, atraumatic  Respiratory: No increased work of breathing, good air exchange, clear to auscultation bilaterally, no crackles or wheezing.  Cardiovascular:  regular rate and rhythm, normal S1 and S2,  GI: Normal bowel sounds, abdomen soft, non-distended, non-tender, no guarding  Genitourinary: voids spont  Skin:  Normal skin color, no redness, warmth, or swelling, no ecchymosis, no abrasions, and no jaundice.  Musculoskeletal: +CMS left upper extremity, splint CDI, mild edema left fingers  Neurologic: Awake, alert, oriented.  Strength and sensory is intact. No focal deficits.  Neuropsychiatric: Calm, normal eye contact, alert    PARIS Miller CNP  To contact the trauma service use job code pager [phone],   Numeric texts or alpha text through [facility]

## 2022-10-17 NOTE — PROGRESS NOTES
Observation goals:  List all goals to be met before discharge home:   - Adequate pain control on oral analgesia- Met  - Vital Signs normal or at patient baseline-Met  - Tolerating oral intake to maintain hydration-Met  - Diagnostic tests and consults completed and resulted - Not met  - Cleared for discharge from consultants' standpoint if involved in care -Not met  - Safe disposition plan has been identified - Not met  - Return to baseline functional status -Not met  - Nurse to notify provider when observation goals have been met and patient is ready for discharge.    /81 (BP Location: Left arm)   Pulse 70   Temp 97.5  F (36.4  C) (Axillary)   Resp 18   SpO2 98%

## 2022-10-17 NOTE — PLAN OF CARE
Goal Outcome Evaluation:       Adequate pain control on oral analgesia- Met  - Vital Signs normal or at patient baseline-Met  - Tolerating oral intake to maintain hydration-Met  - Diagnostic tests and consults completed and resulted - Not met  - Cleared for discharge from consultants' standpoint if involved in care -Not met  - Safe disposition plan has been identified - Not met  - Return to baseline functional status -Not met     /78 (BP Location: Left arm)   Pulse 88   Temp 98.4  F (36.9  C) (Oral)   Resp 16   SpO2 94%

## 2022-10-17 NOTE — PLAN OF CARE
Goal Outcome Evaluation:/80 (BP Location: Right arm)   Pulse 80   Temp 98.2  F (36.8  C) (Oral)   Resp 14   SpO2 97%      Adequate pain control on oral analgesia- Met  - Vital Signs normal or at patient baseline-Met  - Tolerating oral intake to maintain hydration-Met  - Diagnostic tests and consults completed and resulted - Not met  - Cleared for discharge from consultants' standpoint if involved in care -Not met  - Safe disposition plan has been identified - Not met  - Return to baseline functional status -Not met

## 2022-10-17 NOTE — UTILIZATION REVIEW
"  [facility] Utilization Review  Admission Status; Secondary Review Determination     Admission Date: 10/15/2022  8:16 PM      Under the authority of the Utilization Management Committee, the utilization review process indicated a secondary review on the above patient.  The review outcome is based on review of the medical records, discussions with staff, and applying clinical experience noted on the date of the review.        (X) Observation Status Appropriate - This patient does not meet hospital inpatient criteria and is placed in observation status. If this patient's primary payer is Medicare and was admitted as an inpatient, Condition Code 44 should be used and patient status changed to \"observation\".   () Observation Status concurrent Review           RATIONALE FOR DETERMINATION   68 yo F with h/o chronic pain, IZABEL, PTSD, anxiety, depression, insomnia, diabetes mellitus with polyneuropathy, admitted after fall down the steps with possible loss of consciousness amnesia.  Head/facial/cervical spine CT negative for acute injury.  Patient started on scheduled Tylenol and Robaxin and oxycodone as needed.  Patient found to have right radial head fracture with triquetrium fracture and left distal radius fracture.  Orthopedic surgery recommended nonweightbearing to left upper extremity, weightbearing right upper extremity for light ADLs with orthopedic clinic follow-up.  Patient working with physical and Occupational Therapy, and recommend 24-hour assist versus TCU, does not meet criteria for inpatient stay, recommend continue observation status        The severity of illness, intensity of service provided, expected LOS make the care appropriate for observation status at this time.        The information on this document is developed by the utilization review team in order for the business office to ensure compliance.  This only denotes the appropriateness of proper admission status and does not reflect the " quality of care rendered.         The definitions of Inpatient Status and Observation Status used in making the determination above are those provided in the CMS Coverage Manual, Chapter 1 and Chapter 6, section 70.4.      Sincerely,       Julieta Gillette MD  Physician Advisor  Utilization Review-[place]    Phone: [phone]

## 2022-10-17 NOTE — PROGRESS NOTES
"   10/17/22 1041   Appointment Info   Signing Clinician's Name / Credentials (OT) Kelly Lira OTR/L   Living Environment   People in Home other (see comments)   Current Living Arrangements house   Home Accessibility stairs to enter home;other (see comments)  (tub/shower)   Number of Stairs, Main Entrance 2   Transportation Anticipated family or friend will provide   Living Environment Comments Per chart review, pt lives with another person, but when asked about support pt reports \"I'd rather not\". Pt reports living in a single level home with basement and 2 GLO.   Self-Care   Usual Activity Tolerance good   Current Activity Tolerance moderate   Fall history within last six months yes   Activity/Exercise/Self-Care Comment Pt reports doing yardwork and climbing up on ladders week prior to admission.   Instrumental Activities of Daily Living (IADL)   Previous Responsibilities meal prep;housekeeping;laundry;shopping;yardwork;medication management;driving;finances   IADL Comments Pt reports being IND with IADLat baseline   General Information   Onset of Illness/Injury or Date of Surgery 10/15/22   Referring Physician Ashanti Marino PA-C   Patient/Family Therapy Goal Statement (OT) open to TCU prior to home   Additional Occupational Profile Info/Pertinent History of Current Problem Karine Moss is a 67 year old female who arrived 10/15 to the  ED  for evaluation after a fall down the stairs. Per ED note:  She believes she fell down the last several steps.  Patient notes ongoing severe bilateral wrist pain as well as right upper extremity pain.  Patient does not recall the event well.  She reports no preceding headache, chest pain, palpitations, recent medical illness.  The patient awoke on the floor.  Daughter did note a small right periorbital contusion.       On arrival the the  ED this morning for admission to Trauma Obs patient c/o a headache. She seemed to have some confusion, but this could be 2/2 " lack of sleep overnight and recent head injury. On later exam once patient was in the room she was asleep.   Existing Precautions/Restrictions fall   Left Upper Extremity (Weight-bearing Status) non weight-bearing (NWB)   Right Upper Extremity (Weight-bearing Status) weight-bearing as tolerated (WBAT)   General Observations and Info Pt supine in bed upon arrival, daughter present.   Cognitive Status Examination   Orientation Status orientation to person, place and time   Cognitive Status Comments Per pt and daughter, pt at baseline cognition. Per chart review, neuropsych testing scheduled for 10/25.   Visual Perception   Visual Impairment/Limitations corrective lenses full-time   Impact of Vision Impairment on Function (Vision) Wears contact lenses   Sensory   Sensory Comments BLE numbness at baseline   Range of Motion Comprehensive   Comment, General Range of Motion BUE ROM limited due to pain   Strength Comprehensive (MMT)   Comment, General Manual Muscle Testing (MMT) Assessment Not formally tested due to NWB status of LUE   Clinical Impression   Criteria for Skilled Therapeutic Interventions Met (OT) Yes, treatment indicated   OT Diagnosis OT for fall, possible TBI   Influenced by the following impairments pain, strength, weight bearing status   OT Problem List-Impairments impacting ADL problems related to;activity tolerance impaired;cognition;balance;strength;pain;post-surgical precautions;mobility;vision   Assessment of Occupational Performance 3-5 Performance Deficits   Identified Performance Deficits home mgmt, dressing, bathing   Planned Therapy Interventions (OT) ADL retraining;IADL retraining;cognition;bed mobility training;ROM;strengthening;transfer training;home program guidelines;progressive activity/exercise;risk factor education   Clinical Decision Making Complexity (OT) low complexity   Anticipated Equipment Needs Upon Discharge (OT)   (TBD with further therapy)   Risk & Benefits of therapy have  been explained evaluation/treatment results reviewed;patient;daughter   OT Total Evaluation Time   OT Eval, Low Complexity Minutes (94390) 9   Therapy Certification   Start of Care Date 10/17/22   Certification date from 10/17/22   Certification date to 10/28/22   Medical Diagnosis nondisplaced fracture of R radius   OT Goals   Therapy Frequency (OT) 4 times/wk   OT Predicted Duration/Target Date for Goal Attainment 10/28/22   OT Goals Lower Body Bathing;Hygiene/Grooming;Toilet Transfer/Toileting;OT Goal 1   OT: Hygiene/Grooming supervision/stand-by assist;while standing   OT: Lower Body Bathing Modified independent;with precautions   OT: Toilet Transfer/Toileting within precautions;Supervision/stand-by assist   OT: Goal 1 Pt will demo safety during tub/shower transfer with SBA within precautions.        [facility]  OUTPATIENT OCCUPATIONAL THERAPY  EVALUATION  PLAN OF TREATMENT FOR OUTPATIENT REHABILITATION  (COMPLETE FOR INITIAL CLAIMS ONLY)  Patient's Last Name, First Name, M.I.  YOB: 1955  Karine Moss                          Provider's Name  [facility] Medical Record No.  [id number]                             Onset Date:  10/15/22   Start of Care Date:  (P) 10/17/22   Type:     ___PT   _X_OT   ___SLP Medical Diagnosis:  (P) nondisplaced fracture of R radius                    OT Diagnosis:  (P) OT for fall, possible TBI Visits from SOC:  1     See note for plan of treatment, functional goals and certification details    I CERTIFY THE NEED FOR THESE SERVICES FURNISHED UNDER        THIS PLAN OF TREATMENT AND WHILE UNDER MY CARE     (Physician co-signature of this document indicates review and certification of the therapy plan).

## 2022-10-17 NOTE — PROGRESS NOTES
"OBS Unit PT Eval     10/17/22 1044   Appointment Info   Signing Clinician's Name / Credentials (PT) Dennis Paige, PT, DPT   Rehab Comments (PT) UE NWB, R UE WBAT for light ADL's, see ortho note   Quick Adds   Quick Adds Certification   Living Environment   People in Home grandparent(s);significant other   Current Living Arrangements house   Home Accessibility stairs to enter home;stairs within home   Number of Stairs, Main Entrance 2   Transportation Anticipated family or friend will provide   Self-Care   Usual Activity Tolerance good   Current Activity Tolerance moderate   Regular Exercise No   Equipment Currently Used at Home none   Fall history within last six months yes   Number of times patient has fallen within last six months 1   Activity/Exercise/Self-Care Comment Patient IND at baseline but daughter endorses existing concern regarding cognition. Patient was just up on her roof cleaning leaves from the gutter 1 week ago.   General Information   Onset of Illness/Injury or Date of Surgery 10/15/22   Referring Physician Ashanti Marino PA-C   Patient/Family Therapy Goals Statement (PT) To regain full IND   Pertinent History of Current Problem (include personal factors and/or comorbidities that impact the POC) Per EMR \"Karine Moss is a 67 year old female who arrived 10/15 to the  ED  for evaluation after a fall down the stairs. Per ED note:  She believes she fell down the last several steps.  Patient notes ongoing severe bilateral wrist pain as well as right upper extremity pain.  Patient does not recall the event well.  She reports no preceding headache, chest pain, palpitations, recent medical illness.  The patient awoke on the floor.  Daughter did note a small right periorbital contusion.\"   Existing Precautions/Restrictions fall   Weight-Bearing Status - LUE nonweight-bearing   Weight-Bearing Status - RUE nonweight-bearing  (able to use for \"light ADLs\")   General Observations activity: up with assist "   Cognition   Affect/Mental Status (Cognition) WFL   Cognitive Status Comments questionable cog deficits at baseline, slightly forgetful in session   Range of Motion (ROM)   Range of Motion ROM is WFL   Strength (Manual Muscle Testing)   Strength (Manual Muscle Testing) strength is WF   Bed Mobility   Bed Mobility supine-sit;sit-supine   Supine-Sit [place] (Bed Mobility) minimum assist (75% patient effort)   Sit-Supine [place] (Bed Mobility) minimum assist (75% patient effort)   Transfers   Transfers sit-stand transfer   Sit-Stand Transfer   Sit-Stand [place] (Transfers) contact guard   Gait/Stairs (Locomotion)   [place] Level (Gait) minimum assist (75% patient effort)   Comment, (Gait/Stairs) CGA throughout with bouts of min A for balance support due to LE crossover and lateral LOB   Balance   Balance Comments IND static sitting, CGA static stance   Sensory Examination   Sensory Perception Comments baseline LE neuropathy   Clinical Impression   Criteria for Skilled Therapeutic Intervention Yes, treatment indicated   PT Diagnosis (PT) impaired functional mobility   Influenced by the following impairments decreased activity tolerance, impaired balance, new UE precautions   Functional limitations due to impairments bed mobility, transfers, gait, stairs   Clinical Presentation (PT Evaluation Complexity) Stable/Uncomplicated   Clinical Presentation Rationale clinical judgement   Clinical Decision Making (Complexity) low complexity   Planned Therapy Interventions (PT) bed mobility training;balance training;gait training;strengthening;stretching;stair training;transfer training   Risk & Benefits of therapy have been explained evaluation/treatment results reviewed;care plan/treatment goals reviewed;risks/benefits reviewed;current/potential barriers reviewed;participants voiced agreement with care plan;participants included;patient   PT Total Evaluation Time   PT Eval, Low Complexity Minutes (42421) 15    Therapy Certification   Start of care date 10/15/22   Certification date from 10/15/22   Certification date to 10/31/22   Medical Diagnosis nondisplaced fracture of R radial head   Physical Therapy Goals   PT Frequency 2x/week   PT Predicted Duration/Target Date for Goal Attainment 10/31/22   PT Goals Bed Mobility;Transfers;Gait;Stairs   PT: Bed Mobility Independent;Within precautions;Supine to/from sit   PT: Transfers Independent;Sit to/from stand;Within precautions   PT: Gait Independent;Greater than 200 feet;Within precautions   PT: Stairs Independent;Greater than 10 stairs;Within precautions   PT Discharge Planning   PT Plan unsupported gait, stairs   PT Discharge Recommendation (DC Rec) Transitional Care Facility;home with assist   PT Rationale for DC Rec Recomend TCU at this time to maximize functional IND in setting of B UE NWB precautions. If patient were to return home she would require 24 hour assist for all self cares and mobility for fall prevention.   PT Brief overview of current status Ax1 with gait belt for hallway ambulation   Total Session Time   Total Session Time (sum of timed and untimed services) 15       Dennis Paige, PT, DPT  Pager #[phone]          [facility]  OUTPATIENT PHYSICAL THERAPY EVALUATION  PLAN OF TREATMENT FOR OUTPATIENT REHABILITATION  (COMPLETE FOR INITIAL CLAIMS ONLY)  Patient's Last Name, First Name, M.I.  YOB: 1955  Karine Moss                        Provider's Name  [facility] Medical Record No.  [id number]                             Onset Date:  10/15/22   Start of Care Date:  10/15/22   Type:     _X_PT   ___OT   ___SLP Medical Diagnosis:  nondisplaced fracture of R radial head              PT Diagnosis:  impaired functional mobility Visits from SOC:  1     See note for plan of treatment, functional goals and certification details    I CERTIFY THE NEED FOR THESE SERVICES FURNISHED UNDER         THIS PLAN OF TREATMENT AND WHILE UNDER MY CARE     (Physician co-signature of this document indicates review and certification of the therapy plan).

## 2022-10-18 VITALS
TEMPERATURE: 98.4 F | OXYGEN SATURATION: 100 % | SYSTOLIC BLOOD PRESSURE: 126 MMHG | HEART RATE: 97 BPM | DIASTOLIC BLOOD PRESSURE: 76 MMHG | RESPIRATION RATE: 18 BRPM

## 2022-10-18 LAB — GLUCOSE BLDC GLUCOMTR-MCNC: 113 MG/DL (ref 70–99)

## 2022-10-18 PROCEDURE — 82962 GLUCOSE BLOOD TEST: CPT

## 2022-10-18 PROCEDURE — 250N000013 HC RX MED GY IP 250 OP 250 PS 637

## 2022-10-18 PROCEDURE — 99217 PR OBSERVATION CARE DISCHARGE: CPT | Performed by: NURSE PRACTITIONER

## 2022-10-18 PROCEDURE — 250N000013 HC RX MED GY IP 250 OP 250 PS 637: Performed by: PHYSICIAN ASSISTANT

## 2022-10-18 PROCEDURE — G0378 HOSPITAL OBSERVATION PER HR: HCPCS

## 2022-10-18 PROCEDURE — 250N000013 HC RX MED GY IP 250 OP 250 PS 637: Performed by: SURGERY

## 2022-10-18 PROCEDURE — 250N000011 HC RX IP 250 OP 636: Performed by: PHYSICIAN ASSISTANT

## 2022-10-18 PROCEDURE — 96372 THER/PROPH/DIAG INJ SC/IM: CPT | Performed by: PHYSICIAN ASSISTANT

## 2022-10-18 RX ORDER — POLYETHYLENE GLYCOL 3350 17 G/17G
17 POWDER, FOR SOLUTION ORAL 2 TIMES DAILY
Qty: 510 G | Refills: 0 | DISCHARGE
Start: 2022-10-18 | End: 2022-11-17

## 2022-10-18 RX ORDER — SENNA AND DOCUSATE SODIUM 50; 8.6 MG/1; MG/1
1 TABLET, FILM COATED ORAL AT BEDTIME
DISCHARGE
Start: 2022-10-18 | End: 2023-06-20

## 2022-10-18 RX ORDER — METHOCARBAMOL 500 MG/1
500 TABLET, FILM COATED ORAL 4 TIMES DAILY
Qty: 20 TABLET | Refills: 0 | DISCHARGE
Start: 2022-10-18 | End: 2022-10-23

## 2022-10-18 RX ORDER — DEXTROAMPHETAMINE SACCHARATE, AMPHETAMINE ASPARTATE, DEXTROAMPHETAMINE SULFATE AND AMPHETAMINE SULFATE 7.5; 7.5; 7.5; 7.5 MG/1; MG/1; MG/1; MG/1
30 TABLET ORAL EVERY 24 HOURS
Qty: 30 TABLET | Refills: 0 | Status: SHIPPED | OUTPATIENT
Start: 2022-10-18

## 2022-10-18 RX ORDER — ACETAMINOPHEN 325 MG/1
650 TABLET ORAL EVERY 6 HOURS
DISCHARGE
Start: 2022-10-18 | End: 2022-11-17

## 2022-10-18 RX ORDER — CEPHALEXIN 500 MG/1
500 CAPSULE ORAL EVERY 6 HOURS
Qty: 16 CAPSULE | Refills: 0 | DISCHARGE
Start: 2022-10-18 | End: 2022-10-22

## 2022-10-18 RX ORDER — OXYCODONE HYDROCHLORIDE 5 MG/1
2.5 TABLET ORAL EVERY 6 HOURS PRN
Qty: 16 TABLET | Refills: 0 | Status: SHIPPED | OUTPATIENT
Start: 2022-10-18 | End: 2023-06-20

## 2022-10-18 RX ADMIN — Medication 1200 MG: at 07:46

## 2022-10-18 RX ADMIN — METHOCARBAMOL 500 MG: 500 TABLET, FILM COATED ORAL at 07:47

## 2022-10-18 RX ADMIN — ENOXAPARIN SODIUM 40 MG: 40 INJECTION SUBCUTANEOUS at 07:45

## 2022-10-18 RX ADMIN — AMLODIPINE BESYLATE 5 MG: 5 TABLET ORAL at 07:46

## 2022-10-18 RX ADMIN — CEPHALEXIN 500 MG: 500 CAPSULE ORAL at 06:14

## 2022-10-18 RX ADMIN — METFORMIN ER 500 MG 2000 MG: 500 TABLET ORAL at 07:47

## 2022-10-18 RX ADMIN — CEPHALEXIN 500 MG: 500 CAPSULE ORAL at 00:22

## 2022-10-18 RX ADMIN — ACETAMINOPHEN 650 MG: 325 TABLET, FILM COATED ORAL at 12:15

## 2022-10-18 RX ADMIN — CEVIMELINE HYDROCHLORIDE 30 MG: 30 CAPSULE ORAL at 09:58

## 2022-10-18 RX ADMIN — DULOXETINE HYDROCHLORIDE 30 MG: 30 CAPSULE, DELAYED RELEASE ORAL at 07:47

## 2022-10-18 RX ADMIN — CEPHALEXIN 500 MG: 500 CAPSULE ORAL at 12:15

## 2022-10-18 RX ADMIN — METHOCARBAMOL 500 MG: 500 TABLET, FILM COATED ORAL at 12:15

## 2022-10-18 RX ADMIN — OXYCODONE HYDROCHLORIDE 2.5 MG: 5 TABLET ORAL at 09:58

## 2022-10-18 RX ADMIN — ACETAMINOPHEN 650 MG: 325 TABLET, FILM COATED ORAL at 06:14

## 2022-10-18 RX ADMIN — PANTOPRAZOLE SODIUM 40 MG: 40 TABLET, DELAYED RELEASE ORAL at 07:47

## 2022-10-18 RX ADMIN — EMPAGLIFLOZIN 10 MG: 10 TABLET, FILM COATED ORAL at 07:50

## 2022-10-18 RX ADMIN — Medication 25 MCG: at 07:47

## 2022-10-18 RX ADMIN — DEXTROAMPHETAMINE SACCHARATE, AMPHETAMINE ASPARTATE, DEXTROAMPHETAMINE SULFATE AND AMPHETAMINE SULFATE 45 MG: 1.25; 1.25; 1.25; 1.25 TABLET ORAL at 07:46

## 2022-10-18 RX ADMIN — TOLTERODINE 2 MG: 2 CAPSULE, EXTENDED RELEASE ORAL at 07:51

## 2022-10-18 ASSESSMENT — ACTIVITIES OF DAILY LIVING (ADL)
ADLS_ACUITY_SCORE: 31

## 2022-10-18 NOTE — PLAN OF CARE
Goal Outcome Evaluation:    - Adequate pain control on oral analgesia: Met  - Vital Signs normal or at patient baseline: Met  - Tolerating oral intake to maintain hydration: Met  - Diagnostic tests and consults completed and resulted: Met  - Cleared for discharge from consultants' standpoint if involved in care: Met  - Safe disposition plan has been identified: Met  - Return to baseline functional status: Not met

## 2022-10-18 NOTE — PLAN OF CARE
Goal Outcome Evaluation:BP (!) 142/90 (BP Location: Right arm)   Pulse 87   Temp 98.1  F (36.7  C) (Oral)   Resp 18   SpO2 97%         Adequate pain control on oral analgesia- Met  - Vital Signs normal or at patient baseline-Met  - Tolerating oral intake to maintain hydration-Met  - Diagnostic tests and consults completed and resulted - Not met  - Cleared for discharge from consultants' standpoint if involved in care -Not met  - Safe disposition plan has been identified - Not met  - Return to baseline functional status -Not met

## 2022-10-18 NOTE — DISCHARGE SUMMARY
[facility]    Discharge Summary  Trauma Surgery Service    Date of Admission:  10/15/2022  Date of Discharge:  10/18/2022  2:42 PM  Attending Physician: Dr. Azeem Shahid  Discharging Provider: Blanca Alcantar CNP  Date of Service (when I saw the patient): 10/19/22    Primary Provider: Anay Martinez  Primary Care clinic: [address] [place] [id number]  Phone: [phone]  Fax number: [phone]     Discharge Diagnoses   Closed fracture of left wrist, initial encounter  Closed fracture of right hand, initial encounter  Closed nondisplaced fracture of head of right radius, initial encounter  Acute cystitis without hematuria    Hospital Course   Karine Moss is a 67 year old female who arrived 10/15 to the Emergency for evaluation after a fall down the stairs. Per ED note:  She believes she fell down the last several steps.  Patient notes ongoing severe bilateral wrist pain as well as right upper extremity pain.  Patient does not recall details of the event. She reports no preceding headache, chest pain, palpitations, recent medical illness. The patient awoke on the floor.  Daughter did note a small right periorbital contusion.    Trauma work up notable for a left radial fracture. Her hospital course and discharge plan below:    # Hx of TIA's   # Concern for cognitive decline, memory problems  Family reports some confusion prior to admission, may be slightly increased due to closed head injury and hospitalization. Previously planned Neuropsych appt for 10/25 for cog eval   - Head/Facial/Cervical Spine CT negative for acute injury      # Acute on chronic pain   # Diabetic polyneuropathy associated with type 2 diabetes mellitus   - Scheduled: Tylenol, Robaxin   - Prn: oxycodone,   - Continue PTA: Duloxetine 30 in am and 90 hs for neuropathy       # PTSD  # Depression  # Anxiety   # Insomnia   - continue PTA: Adderall,   - Holding PTA: trazodone     # QUIN  on CKD, improved with IVF from 1.7 to 1.0  - continue PTA: tolterodine ER    # Diabetes Mellitus   - PTA medications: Metformin 2000 in am, Jardiance      # Uncomplicated UTI  - UA obtained notable for Mod Leuko Esterase, WBC 15, RBC 4, Mucus present, Hyaline Casts 23. UC pending   - due to multiple allergies and low GFR, she was started on Keflex 500mg bid to complete a 5 day course.    # Right radial head fracture  # Right triquetrum fracture  # Left distal radius fracture  S/P closed reduction and splinting of the left upper extremity  She was seen and evaluated by Orthopedic surgery with the following recommendations at discharge:    Non weight bearing to the left upper extremity. Keep cast clean and dry.    WBAT RUE for light ADLs only    Follow-up in the orthopedic clinic next week  Therapy Recommendations:   Current status of physical therapies on discharge:   TCU  Current status of occupational therapies on discharge:  TCU    Code Status   Full Code    SUBJECTIVE: Prior to discharge Karine was able to tolerate a diet and reported good pain control.    Physical Exam                      There were no vitals filed for this visit.  Vital Signs with Ranges     I/O last 3 completed shifts:  In: 660 [P.O.:660]  Out: 1 [Urine:1]    Constitutional: Awake, alert, cooperative, no apparent distress, and appears stated age.  ENT: Normocephalic,  Respiratory: No increased work of breathing, good air exchange, clear to auscultation bilaterally, no crackles or wheezing.  Cardiovascular: Regular rate and rhythm, normal S1 and S2, no S3 or S4, and no murmur noted.  GI: Soft and non tender  Genitourinary:  Voids spont  Skin: Warm and dry  Musculoskeletal: Mild edema to the left fingers, LUE cast clean and dry. +CMS bilateral upper extremities  Neurologic: Awake, alert, oriented to name, place and time.   Neuropsychiatric: Calm, normal eye contact, alert, normal affect, oriented to self, place, time and situation    Discharge  Disposition   Discharged to short term rehab  Condition at discharge: Stable  Discharge VS: Blood pressure 126/76, pulse 97, temperature 98.4  F (36.9  C), temperature source Oral, resp. rate 18, SpO2 100 %, not currently breastfeeding.    Consultations This Hospital Stay   PHYSICAL THERAPY ADULT IP CONSULT  OCCUPATIONAL THERAPY ADULT IP CONSULT  CARE MANAGEMENT / SOCIAL WORK IP CONSULT  OCCUPATIONAL THERAPY ADULT IP CONSULT  PHYSICAL THERAPY ADULT IP CONSULT  OCCUPATIONAL THERAPY ADULT IP CONSULT    Discharge Orders      General info for SNF    Length of Stay Estimate: Short Term Care: Estimated # of Days <30  Condition at Discharge: Stable  Level of care:skilled   Rehabilitation Potential: Good  Admission H&P remains valid and up-to-date: Yes  Recent Chemotherapy: N/A  Use Nursing Home Standing Orders: Yes     Mantoux instructions    Give two-step Mantoux (PPD) Per Facility Policy Yes     Follow Up and recommended labs and tests    Follow up with your primary care provider for continued medical care and hospital follow up in 5-10 days.    Orthopaedic Clinic in 1 week  [facility]  [address]   [address] [place] [address]   Appointments: [phone]     You have been involved in a recent trauma incident resulting in an injury.  Studies show us that people affected by trauma have higher levels of post-traumatic stress disorder (PTSD) and/or depressive symptoms during the year following an injury.     Please consider the following.  Have you:  Had migraines about the event(s) or thought about the event(s) when you didn't want to?  Tried hard not to think about the event(s) or went out of your way to avoid situations that reminded you of the event(s)?  Been constantly on guard, watchful, or easily startled?  Felt numb or detached from people, activities, or your surroundings?   Felt guilty or unable to stop blaming yourself or others for the event(s) or any problems the event (s) may  "have caused?    If you answered \"yes\" to 3 or more of these questions, or if you simply want to discuss any of your feelings further, we recommend that you talk with your Primary Care Provider or a mental health professional.     Reason for your hospital stay    Fall down steps  1. Right radial head fracture  2. Right triquetrum fracture  3. Left Distal radius fracture     Glucose monitor nursing POCT    Before meals and at bedtime     Additional Discharge Instructions    Left arm cares    Elevate extremity above level of heart as much as possible, this helps to decrease swelling.   Apply ice ontop of cast as needed for comfort   Keep splint clean and dry. Do not get wet and do not remove cast     Activity - Up with nursing assistance     Weight bearing status      Non weight bearing left upper extremity, keep cast clean and dry    Weight bearing as tolerated right upper extremity, OK for light ADL's     Full Code     Physical Therapy Adult Consult    Evaluate and treat as clinically indicated.    Reason:  Fall, deconditioned  Left radial fracture     Occupational Therapy Adult Consult    Evaluate and treat as clinically indicated.    Reason:  Reason:  Fall, deconditioned  Left radial fracture     Fall precautions     Diet    Follow this diet upon discharge: Regular     Discharge Medications   Discharge Medication List as of 10/18/2022  1:16 PM      START taking these medications    Details   acetaminophen (TYLENOL) 325 MG tablet Take 2 tablets (650 mg) by mouth every 6 hours, Transitional      cephALEXin (KEFLEX) 500 MG capsule Take 1 capsule (500 mg) by mouth every 6 hours for 4 days, Disp-16 capsule, R-0, Transitional      methocarbamol (ROBAXIN) 500 MG tablet Take 1 tablet (500 mg) by mouth 4 times daily for 5 days, Disp-20 tablet, R-0, Transitional      oxyCODONE (ROXICODONE) 5 MG tablet Take 0.5 tablets (2.5 mg) by mouth every 6 hours as needed for moderate to severe pain, Disp-16 tablet, R-0, Local Print    "   polyethylene glycol (MIRALAX) 17 GM/Dose powder Take 17 g by mouth 2 times daily, Disp-510 g, R-0, Transitional      SENNA-docusate sodium (SENNA S) 8.6-50 MG tablet Take 1 tablet by mouth At Bedtime, Transitional         CONTINUE these medications which have CHANGED    Details   amphetamine-dextroamphetamine (ADDERALL) 30 MG tablet Take 1 tablet (30 mg) by mouth every 24 hours 1.5 mg a total of 45 mg  Daily, Disp-30 tablet, R-0, Local Print         CONTINUE these medications which have NOT CHANGED    Details   amLODIPine (NORVASC) 5 MG tablet Take 1 tablet (5 mg) by mouth daily, Disp-90 tablet, R-0, E-Prescribe      atorvastatin (LIPITOR) 20 MG tablet Take 1 tablet (20 mg) by mouth daily, Disp-90 tablet, R-3, E-Prescribe      !! blood glucose (NO BRAND SPECIFIED) test strip Use to test blood sugar as needed with Freestyle Nirmal CGM., Disp-100 strip, R-0, E-PrescribeTo use with Freestyle Nirmal.      !! blood glucose (NO BRAND SPECIFIED) test strip Use to test blood sugar as directed with CGM sensor., Disp-50 strip, R-1, E-PrescribePlease dispense test strips to be used with Freestyle Nirmal 14 day reader.      calcium carbonate (OS-KINJAL) 1500 (600 Ca) MG tablet Take 2 tablets (1,200 mg) by mouth daily, Historical      cevimeline (EVOXAC) 30 MG capsule take 1 cap TID, Disp-270 capsule, R-3, E-Prescribe      Continuous Blood Gluc  (FREESTYLE NIRMAL 14 DAY READER) EBEN Use to read blood sugars as per 's instructions., Disp-1 each, R-0, E-Prescribe      Continuous Blood Gluc Sensor (FREESTYLE NIRMAL 14 DAY SENSOR) MISC Change every 14 days., Disp-2 each, R-11, E-Prescribe      cyanocobalamin (VITAMIN B-12) 1000 MCG tablet Take one every other day, Disp-90 tablet, R-1, E-Prescribe      !! DULoxetine (CYMBALTA) 30 MG capsule Take 1 capsule (30 mg) by mouth 2 times daily, Disp-180 capsule, R-3, E-Prescribe      !! DULoxetine (CYMBALTA) 60 MG capsule Take 1 capsule (60 mg) by mouth At Bedtime Take in  addition to current 30 mg evening dose for a total of 90 mg at bedtime., Disp-90 capsule, R-3, E-Prescribe      empagliflozin (JARDIANCE) 10 MG TABS tablet Take 1 tablet (10 mg) by mouth daily Schedule appt with ADEN Blas, [facility] to ensure future refills, Disp-30 tablet, R-2, E-Prescribe      losartan (COZAAR) 100 MG tablet Take 1 tablet (100 mg) by mouth daily, Disp-90 tablet, R-3, E-Prescribe      metFORMIN (GLUCOPHAGE-XR) 500 MG 24 hr tablet Take 4 po q am with breakfast., Disp-360 tablet, R-3, E-Prescribe      pantoprazole (PROTONIX) 40 MG EC tablet Take 1 tablet (40 mg) by mouth 2 times daily, Disp-180 tablet, R-3, E-Prescribe      tolterodine ER (DETROL LA) 4 MG 24 hr capsule Take 1 capsule (4 mg) by mouth daily, Disp-90 capsule, R-3, E-Prescribe      traZODone (DESYREL) 50 MG tablet Take 1 tablet by mouth every 24 hours, Historical      Vitamin D3 (CHOLECALCIFEROL) 25 mcg (1000 units) tablet Take 1 tablet (25 mcg) by mouth daily, Disp-90 tablet, R-2, E-Prescribe       !! - Potential duplicate medications found. Please discuss with provider.      STOP taking these medications       COMPOUNDED NON-CONTROLLED SUBSTANCE (CMPD RX) - PHARMACY TO MIX COMPOUNDED MEDICATION Comments:   Reason for Stopping:             Allergies   Allergies   Allergen Reactions     Pravastatin Other (See Comments)     woozy     Codeine Nausea and Vomiting     Nsaids GI Disturbance and Other (See Comments)     Pt had bariatric surgery, should not ever take oral NSAIDS due to risk of gastric ulcers.  Pt had bariatric surgery, should not ever take oral NSAIDS due to risk of gastric ulcers.     Data   Most Recent 3 CBC's:  Recent Labs   Lab Test 10/17/22  0630 10/15/22  2122 08/25/22  1605   WBC 10.0 11.6* 10.1   HGB 11.9 12.1 11.7   MCV 87 86 87    194 238      Most Recent 3 BMP's:  Recent Labs   Lab Test 10/18/22  0838 10/17/22  2137 10/17/22  1815 10/17/22  1046 10/17/22  0630 10/16/22  2336 10/15/22  2122  08/25/22  1605   NA  --   --   --   --  138  --  140 138   POTASSIUM  --   --   --   --  5.0  --  5.3 5.5*   CHLORIDE  --   --   --   --  104  --  108 102   CO2  --   --   --   --  24  --  27 24   BUN  --   --   --   --  22.6  --  31* 20.8   CR  --   --   --   --  1.04*  --  1.77* 1.52*   ANIONGAP  --   --   --   --  10  --  5 12   KINJAL  --   --   --   --  9.5  --  9.2 9.6   * 192* 115*   < > 133*   < > 143* 102*    < > = values in this interval not displayed.     Most Recent 2 LFT's:  Recent Labs   Lab Test 10/15/22  2122 08/25/22  1605   AST 16 21   ALT 21 11   ALKPHOS 95 96   BILITOTAL 0.6 0.6     Most Recent INR's and Anticoagulation Dosing History:  Anticoagulation Dose History     Recent Dosing and Labs Latest Ref Rng & Units 11/15/2021    INR 0.85 - 1.15 1.10        Most Recent 3 Troponin's:No lab results found.  Most Recent 6 Bacteria Isolates From Any Culture (See EPIC Reports for Culture Details):No lab results found.  Most Recent TSH, T4 and A1c Labs:  Recent Labs   Lab Test 10/17/22  0630 02/06/20  1505 10/22/19  1113   TSH  --   --  1.69   A1C 6.2*   < >  --     < > = values in this interval not displayed.     Results for orders placed or performed during the hospital encounter of 10/15/22   Head CT w/o contrast    Narrative    EXAM: CT HEAD W/O CONTRAST, CT CERVICAL SPINE W/O CONTRAST, CT FACIAL BONES WITHOUT CONTRAST  LOCATION: [facility]  DATE/TIME: 10/15/2022 10:23 PM    INDICATION: Trauma head face and neck injury  COMPARISON: None.  TECHNIQUE:   1) Routine CT Head without IV contrast. Multiplanar reformats. Dose reduction techniques were used.  2) Routine CT Facial Bones without IV contrast. Multiplanar reformats. Dose reduction techniques were used.  3) Routine CT Cervical Spine without IV contrast. Multiplanar reformats. Dose reduction techniques were used.    FINDINGS:  HEAD CT:   INTRACRANIAL CONTENTS: No intracranial hemorrhage, extraaxial  collection, or mass effect.  No CT evidence of acute infarct. Mild presumed chronic small vessel ischemic changes. Mild generalized volume loss. No hydrocephalus. There is a partially empty   pituitary sella which is typically an incidental finding.    OSSEOUS STRUCTURES/SOFT TISSUES: No significant abnormality.     FACIAL BONE CT:  OSSEOUS STRUCTURES/SOFT TISSUES: No localized soft tissue swelling/inflammation. No facial bone fracture or malalignment. No evidence for dental trauma or periapical abscess.    ORBITAL CONTENTS: No acute abnormality.    SINUSES: 1.5 cm polyp or retention cyst in the right maxillary sinus.    CERVICAL SPINE CT:   VERTEBRA: Normal vertebral body heights and alignment. No fracture or posttraumatic subluxation.     CANAL/FORAMINA: Scattered degenerative changes. No canal or neural foraminal stenosis.    PARASPINAL: No extraspinal abnormality. Visualized lung fields are clear.      Impression    IMPRESSION:  HEAD CT:  1.  No acute intracranial hemorrhage or calvarial fracture.  2.  Mild volume loss and presumed chronic small vessel ischemic changes.    FACIAL BONE CT:  1.  No facial bone or mandibular fracture.    CERVICAL SPINE CT:  1.  No fracture or posttraumatic subluxation.  2.  No high-grade spinal canal or neural foraminal stenosis.   CT Facial Bones without Contrast    Narrative    EXAM: CT HEAD W/O CONTRAST, CT CERVICAL SPINE W/O CONTRAST, CT FACIAL BONES WITHOUT CONTRAST  LOCATION: [facility]  DATE/TIME: 10/15/2022 10:23 PM    INDICATION: Trauma head face and neck injury  COMPARISON: None.  TECHNIQUE:   1) Routine CT Head without IV contrast. Multiplanar reformats. Dose reduction techniques were used.  2) Routine CT Facial Bones without IV contrast. Multiplanar reformats. Dose reduction techniques were used.  3) Routine CT Cervical Spine without IV contrast. Multiplanar reformats. Dose reduction techniques were used.    FINDINGS:  HEAD  CT:   INTRACRANIAL CONTENTS: No intracranial hemorrhage, extraaxial collection, or mass effect.  No CT evidence of acute infarct. Mild presumed chronic small vessel ischemic changes. Mild generalized volume loss. No hydrocephalus. There is a partially empty   pituitary sella which is typically an incidental finding.    OSSEOUS STRUCTURES/SOFT TISSUES: No significant abnormality.     FACIAL BONE CT:  OSSEOUS STRUCTURES/SOFT TISSUES: No localized soft tissue swelling/inflammation. No facial bone fracture or malalignment. No evidence for dental trauma or periapical abscess.    ORBITAL CONTENTS: No acute abnormality.    SINUSES: 1.5 cm polyp or retention cyst in the right maxillary sinus.    CERVICAL SPINE CT:   VERTEBRA: Normal vertebral body heights and alignment. No fracture or posttraumatic subluxation.     CANAL/FORAMINA: Scattered degenerative changes. No canal or neural foraminal stenosis.    PARASPINAL: No extraspinal abnormality. Visualized lung fields are clear.      Impression    IMPRESSION:  HEAD CT:  1.  No acute intracranial hemorrhage or calvarial fracture.  2.  Mild volume loss and presumed chronic small vessel ischemic changes.    FACIAL BONE CT:  1.  No facial bone or mandibular fracture.    CERVICAL SPINE CT:  1.  No fracture or posttraumatic subluxation.  2.  No high-grade spinal canal or neural foraminal stenosis.   Cervical spine CT w/o contrast    Narrative    EXAM: CT HEAD W/O CONTRAST, CT CERVICAL SPINE W/O CONTRAST, CT FACIAL BONES WITHOUT CONTRAST  LOCATION: [facility]  DATE/TIME: 10/15/2022 10:23 PM    INDICATION: Trauma head face and neck injury  COMPARISON: None.  TECHNIQUE:   1) Routine CT Head without IV contrast. Multiplanar reformats. Dose reduction techniques were used.  2) Routine CT Facial Bones without IV contrast. Multiplanar reformats. Dose reduction techniques were used.  3) Routine CT Cervical Spine without IV contrast. Multiplanar  reformats. Dose reduction techniques were used.    FINDINGS:  HEAD CT:   INTRACRANIAL CONTENTS: No intracranial hemorrhage, extraaxial collection, or mass effect.  No CT evidence of acute infarct. Mild presumed chronic small vessel ischemic changes. Mild generalized volume loss. No hydrocephalus. There is a partially empty   pituitary sella which is typically an incidental finding.    OSSEOUS STRUCTURES/SOFT TISSUES: No significant abnormality.     FACIAL BONE CT:  OSSEOUS STRUCTURES/SOFT TISSUES: No localized soft tissue swelling/inflammation. No facial bone fracture or malalignment. No evidence for dental trauma or periapical abscess.    ORBITAL CONTENTS: No acute abnormality.    SINUSES: 1.5 cm polyp or retention cyst in the right maxillary sinus.    CERVICAL SPINE CT:   VERTEBRA: Normal vertebral body heights and alignment. No fracture or posttraumatic subluxation.     CANAL/FORAMINA: Scattered degenerative changes. No canal or neural foraminal stenosis.    PARASPINAL: No extraspinal abnormality. Visualized lung fields are clear.      Impression    IMPRESSION:  HEAD CT:  1.  No acute intracranial hemorrhage or calvarial fracture.  2.  Mild volume loss and presumed chronic small vessel ischemic changes.    FACIAL BONE CT:  1.  No facial bone or mandibular fracture.    CERVICAL SPINE CT:  1.  No fracture or posttraumatic subluxation.  2.  No high-grade spinal canal or neural foraminal stenosis.   XR Wrist Left G/E 3 Views    Narrative    EXAM: XR WRIST LEFT G/E 3 VIEWS  LOCATION: [facility]  DATE/TIME: 10/15/2022 10:20 PM    INDICATION: Pain after fall.  COMPARISON: None.      Impression    IMPRESSION: Comminuted fracture of the distal radius with impaction and dorsal displacement. Minimally displaced ulnar styloid fracture.   XR Wrist Right G/E 3 Views    Narrative    EXAM: XR WRIST RIGHT G/E 3 VIEWS  LOCATION: [facility]  [place]  DATE/TIME: 10/15/2022 10:19 PM    INDICATION: Pain after fall.  COMPARISON: None.      Impression    IMPRESSION: Small bone fragment at the dorsal aspect of the proximal carpal row may be an acute avulsion type fracture seen only on the lateral view. No other fracture or dislocation.   Humerus XR, G/E 2 views, right    Narrative    EXAM: XR HUMERUS RIGHT G/E 2 VIEWS  LOCATION: [facility]  DATE/TIME: 10/15/2022 10:21 PM    INDICATION: Trauma with right arm pain.  COMPARISON: None.      Impression    IMPRESSION: Intra-articular fracture involving the right radial head with articular surface offset and depression of the lateral fracture fragment. Right elbow effusion. No evidence for acute fracture or dislocation involving the right humerus. Minimal   to moderate degenerative changes right glenohumeral joint and right acromioclavicular joint.       XR Chest 2 Views    Narrative    EXAM: XR CHEST 2 VIEWS  LOCATION: [facility]  DATE/TIME: 10/15/2022 10:21 PM    INDICATION: Pain after trauma.  COMPARISON: None.      Impression    IMPRESSION:     No focal airspace disease. No pleural effusion or pneumothorax.    The cardiomediastinal silhouette is unremarkable. Aortic calcifications.    Multilevel degenerative changes of the spine.    Surgical clips project over the upper abdomen.   XR Elbow Right G/E 3 Views    Narrative    EXAM: XR ELBOW RIGHT G/E 3 VIEWS  LOCATION: [facility]  DATE/TIME: 10/15/2022 11:17 PM    INDICATION: trauma  COMPARISON: None.      Impression    IMPRESSION: Fracture of radial head measuring approximately 2 mm of displacement involving articular surface. Alignment otherwise normal. Large joint effusion.       Time Spent on this Encounter   Benito OHARA APRN CNP, personally saw the patient today and spent greater than 30 minutes discharging this  patient.    We appreciate the opportunity to care for your patient while in the hospital.  Should you have any questions about their injuries or this discharge summary our contact information is below.    Trauma Services  [facility]   Department of Critical Care and Acute Care Surgery  [address] [place] [id number]  Office: [phone]

## 2022-10-18 NOTE — PLAN OF CARE
Goal Outcome Evaluation:BP (!) 142/90 (BP Location: Right arm)   Pulse 87   Temp 98.1  F (36.7  C) (Oral)   Resp 18   SpO2 97%          Adequate pain control on oral analgesia- Met  - Vital Signs normal or at patient baseline-Met  - Tolerating oral intake to maintain hydration-Met  - Diagnostic tests and consults completed and resulted -Met  - Cleared for discharge from consultants' standpoint if involved in care -Met  - Safe disposition plan has been identified - Met  - Return to baseline functional status -Not met

## 2022-10-18 NOTE — PLAN OF CARE
Discharge paperwork reviewed with patient/packet of information sent with for facility. Pt has no other questions and understands. IV removed prior to leaving. Pt belongings will be sent with and transportation will be provided by pt's daughter.

## 2022-10-18 NOTE — PLAN OF CARE
Goal Outcome Evaluation:  - Adequate pain control on oral analgesia: Met  - Vital Signs normal or at patient baseline: Met  - Tolerating oral intake to maintain hydration: Met  - Diagnostic tests and consults completed and resulted: Met  - Cleared for discharge from consultants' standpoint if involved in care: Met  - Safe disposition plan has been identified: Met, plans to discharge to TCU today  - Return to baseline functional status: Not met

## 2022-10-18 NOTE — PROGRESS NOTES
Writer called TCU to give RN to RN report X3 with no answer. There was no option to leave a message.

## 2022-10-18 NOTE — PROGRESS NOTES
Care Management Discharge Note    Discharge Date: 10/18/2022       Discharge Disposition: Transitional Care    [facility]  P: [phone]  P: [phone] - Admissions  P: [phone] - RN Report  F: [phone]    Discharge Services:  (Defer to TCU)    Discharge DME:  (Defer to TCU)    Discharge Transportation: family or friend will provide    Private pay costs discussed: private room/amenity fees and transportation costs    PAS Confirmation Code:  [id number]  Patient/family educated on Medicare website which has current facility and service quality ratings: yes    Education Provided on the Discharge Plan:    Persons Notified of Discharge Plans: Pt; SNF; Leila; RN; Trauma NP  Patient/Family in Agreement with the Plan: yes    Handoff Referral Completed: No    Additional Information:  Writer spoke with pt's daughter, Leila (P: [phone]) who reported that she would send writer some additional later today. Writer followed up with Leila and updated her on pt's acceptance to [facility] and private room cost. Leila is agreeable to the SNF and costs. Leila reports she will transport pt to SNF.     Case discussed with Trauma PA. Pt is medically appropriate for discharge today and will have orders completed by noon.     Writer met with pt and confirmed acceptance to facility. Pt is agreeable to facility and private room.    [id number]    Referrals have been made to the following facilities and their status is as follows:    [facility]  P: [phone]  P: [phone] - Admissions  P: [phone] - RN Report  F: [phone]  - Writer left VM with SNF admissions.   - Writer spoke with Dian in admissions. SNF has clinically accepted pt to a private room ($30). SNF can accept pt today.   - Writer spoke with Dian and confirmed discharge plan for today. Writer faxed scripts to SNF.  - Writer faxed orders. Writer updated bedside RN to call in RN to RN report.  - Writer faxed orders to SNF.  - Writer  received VM from SNF that she had not received the orders yet. Writer faxed orders again.    [facility]  [address] [place]  [address]  P: [phone]  F: [phone]  - Writer spoke with admissions. SNF still reviewing.    The following facilities have either declined pt or lack bed availability:    [facility]  [address] [place], [place]  [address]  P: [phone]  P: [phone] - Admissions  F: [phone]  - Received EHR update that SNF declined pt.    ________________    JT Hebert, [facility]  ED/Observation   [facility]  Phone: [phone]  Pager: [phone]  Fax: [phone]    On-call pager, [phone], 4:00pm to midnight

## 2022-10-19 RX ORDER — DEXTROAMPHETAMINE SACCHARATE, AMPHETAMINE ASPARTATE, DEXTROAMPHETAMINE SULFATE AND AMPHETAMINE SULFATE 7.5; 7.5; 7.5; 7.5 MG/1; MG/1; MG/1; MG/1
30 TABLET ORAL EVERY 24 HOURS
Qty: 30 TABLET | Refills: 0 | OUTPATIENT
Start: 2022-10-19 | End: 2024-08-08

## 2022-10-19 NOTE — PLAN OF CARE
Occupational Therapy Discharge Summary    Reason for therapy discharge:    Discharged to transitional care facility.    Progress towards therapy goal(s). See goals on Care Plan in [facility] electronic health record for goal details.  Goals met    Therapy recommendation(s):    Continued therapy is recommended.  Rationale/Recommendations:  decreased ADL I.    Goal Outcome Evaluation:

## 2022-10-20 ENCOUNTER — TELEPHONE (OUTPATIENT)
Dept: FAMILY MEDICINE | Facility: CLINIC | Age: 67
End: 2022-10-20

## 2022-10-20 NOTE — PLAN OF CARE
Physical Therapy Discharge Summary    Reason for therapy discharge:    Discharged to transitional care facility.    Progress towards therapy goal(s). See goals on Care Plan in [facility] electronic health record for goal details.  Goals partially met.  Barriers to achieving goals:   discharge from facility.    Therapy recommendation(s):    Continued therapy is recommended.  Rationale/Recommendations:  Recommend continued rehab at TCU for progressing independence in functional mobility.

## 2022-10-20 NOTE — TELEPHONE ENCOUNTER
Who is calling? Daughter  Reason for Call:Reqeusting to speak with RN about an appointment that Karine has with a specialist next week.    Martha

## 2022-10-21 NOTE — TELEPHONE ENCOUNTER
DIAGNOSIS: BL arm fracture / xray   APPOINTMENT DATE: 10.28.22   NOTES STATUS DETAILS   DISCHARGE SUMMARY from hospital Internal 10.15.22-10.18.22 [facility] ED   MEDICATION LIST Internal    LABS     CBC/DIFF Internal    XRAYS (IMAGES & REPORTS) Internal 10.15.22 R elbow, R humerus, R wrist, L wrist

## 2022-10-21 NOTE — CONFIDENTIAL NOTE
Spoke with Leila, daughter of Karine, who reported Karine had a fall down the steps of her home on 10/15/22 sustaining a fracture of the right radial head and a comminuted left distal radial fracture.  She did lose consciousness but does not know for how long.  Stroke work up negative and no fractures or other injuries found on scans of the head.    Karine is currently in a TCU and Leila wanted Dr. Martinez to be aware and that they are rescheduling her Neuropsychological testing that was to occur Tuesday November 1st as Karine is not cognitively up for it just yet.    DELIA FernandesN, RN, [facility]  10/21/22  9:41 AM
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
## 2022-10-26 ENCOUNTER — VIRTUAL VISIT (OUTPATIENT)
Dept: BEHAVIORAL HEALTH | Facility: CLINIC | Age: 67
End: 2022-10-26
Payer: COMMERCIAL

## 2022-10-26 DIAGNOSIS — F43.10 PTSD (POST-TRAUMATIC STRESS DISORDER): Primary | ICD-10-CM

## 2022-10-26 ASSESSMENT — ANXIETY QUESTIONNAIRES
GAD7 TOTAL SCORE: 9
1. FEELING NERVOUS, ANXIOUS, OR ON EDGE: SEVERAL DAYS
IF YOU CHECKED OFF ANY PROBLEMS ON THIS QUESTIONNAIRE, HOW DIFFICULT HAVE THESE PROBLEMS MADE IT FOR YOU TO DO YOUR WORK, TAKE CARE OF THINGS AT HOME, OR GET ALONG WITH OTHER PEOPLE: SOMEWHAT DIFFICULT
7. FEELING AFRAID AS IF SOMETHING AWFUL MIGHT HAPPEN: MORE THAN HALF THE DAYS
5. BEING SO RESTLESS THAT IT IS HARD TO SIT STILL: SEVERAL DAYS
7. FEELING AFRAID AS IF SOMETHING AWFUL MIGHT HAPPEN: MORE THAN HALF THE DAYS
GAD7 TOTAL SCORE: 9
3. WORRYING TOO MUCH ABOUT DIFFERENT THINGS: SEVERAL DAYS
6. BECOMING EASILY ANNOYED OR IRRITABLE: SEVERAL DAYS
4. TROUBLE RELAXING: MORE THAN HALF THE DAYS
GAD7 TOTAL SCORE: 9
8. IF YOU CHECKED OFF ANY PROBLEMS, HOW DIFFICULT HAVE THESE MADE IT FOR YOU TO DO YOUR WORK, TAKE CARE OF THINGS AT HOME, OR GET ALONG WITH OTHER PEOPLE?: SOMEWHAT DIFFICULT
2. NOT BEING ABLE TO STOP OR CONTROL WORRYING: SEVERAL DAYS

## 2022-10-26 ASSESSMENT — PATIENT HEALTH QUESTIONNAIRE - PHQ9
SUM OF ALL RESPONSES TO PHQ QUESTIONS 1-9: 5
10. IF YOU CHECKED OFF ANY PROBLEMS, HOW DIFFICULT HAVE THESE PROBLEMS MADE IT FOR YOU TO DO YOUR WORK, TAKE CARE OF THINGS AT HOME, OR GET ALONG WITH OTHER PEOPLE: SOMEWHAT DIFFICULT
SUM OF ALL RESPONSES TO PHQ QUESTIONS 1-9: 5

## 2022-10-28 ENCOUNTER — OFFICE VISIT (OUTPATIENT)
Dept: ORTHOPEDICS | Facility: CLINIC | Age: 67
End: 2022-10-28
Payer: COMMERCIAL

## 2022-10-28 ENCOUNTER — PRE VISIT (OUTPATIENT)
Dept: ORTHOPEDICS | Facility: CLINIC | Age: 67
End: 2022-10-28

## 2022-10-28 DIAGNOSIS — S62.102A CLOSED FRACTURE OF LEFT WRIST, INITIAL ENCOUNTER: Primary | ICD-10-CM

## 2022-10-28 PROCEDURE — 99203 OFFICE O/P NEW LOW 30 MIN: CPT | Mod: 25 | Performed by: ORTHOPAEDIC SURGERY

## 2022-10-28 PROCEDURE — 29075 APPL CST ELBW FNGR SHORT ARM: CPT | Mod: LT | Performed by: ORTHOPAEDIC SURGERY

## 2022-10-28 ASSESSMENT — ENCOUNTER SYMPTOMS
SEIZURES: 0
NECK PAIN: 0
MYALGIAS: 0
DEPRESSION: 0
TINGLING: 1
HEADACHES: 0
WEAKNESS: 0
NERVOUS/ANXIOUS: 1
INSOMNIA: 0
LOSS OF CONSCIOUSNESS: 0
STIFFNESS: 1
NUMBNESS: 1
DIZZINESS: 1
MEMORY LOSS: 1
JOINT SWELLING: 0
TREMORS: 0
MUSCLE CRAMPS: 0
MUSCLE WEAKNESS: 0
BACK PAIN: 1
DECREASED CONCENTRATION: 1
DISTURBANCES IN COORDINATION: 1
PARALYSIS: 0
ARTHRALGIAS: 1
SPEECH CHANGE: 0
PANIC: 0

## 2022-10-28 NOTE — NURSING NOTE
Reason For Visit:   Chief Complaint   Patient presents with     Consult     Consult:  bilateral arm/wrist Fx from a fall two weeks ago on 10/15/22.  ED visit on the 15th and imaging in chart.  Still has pain in both wrists/arms: pain is 4/10 on left arm, pain is 1/10 right arm.  She is in a right removable wrist splint and a left arm mu[place]ster cast/splint above elbow but allows some elbow flexion.       Patient lives in TCU, needs paperwork signed by physician.  If prescribing meds, please confirm patient's TCU pharmacy.      There were no vitals taken for this visit.    Pain Assessment  Patient Currently in Pain: Yes  0-10 Pain Scale: 4 (4/10 left arm, 1/10 right arm)    Brendan Ruiz, ATC

## 2022-10-28 NOTE — LETTER
10/28/2022       RE: Karine Moss  [address]  [facility] [address]    Dear Colleague,    Thank you for referring your patient, Karine Moss, to the [facility] [place]. Please see a copy of my visit note below.    Cast/splint application    Date/Time: 10/28/2022 12:23 PM  Performed by: Brendan Leal MD  Authorized by: Brendan Leal MD     Consent:     Consent obtained:  Verbal    Consent given by:  Patient    Risks discussed:  Discoloration, numbness, pain and swelling  Pre-procedure details:     Sensation:  Numbness    Skin color:  Normal with ecchymosis around injury site, distal L. radius.  Procedure details:     Laterality:  Left    Location:  Wrist    Wrist:  L wrist    Strapping: no      Cast type:  Short arm    Supplies:  Fiberglass  Post-procedure details:     Pain:  Unchanged    Sensation:  Normal    Skin color:  Normal    Patient tolerance of procedure:  Tolerated well, no immediate complications    Patient provided with cast or splint care instructions: Yes    Comments:      Left wrist positioned in slight flexion with ulnar deviation while molding cast per instructions by Dr. Leal.    I was present entire visit.  Brendan Leal MD    S:  Patient fell 2 weeks ago.  Bilateral upper extremity injuries.  Splint L wrist falling off, R wrist brace intact, no sling R elbow.           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)     Keratoconus     Hyperlipidemia LDL goal <100     Obstructive sleep apnea     Vitamin D insufficiency     Major depression in partial remission (H)     Plantar warts     Low back pain     DJD (degenerative joint disease) of knee     Pancreatitis     Panic     Chest pain     IBS (irritable bowel syndrome)     Heart murmur     Hypertension goal BP (blood pressure) < 140/90     Type 2 diabetes mellitus without complication,  Medical Week 2 Survey      Responses   [facility]   COVID-19 Test Status  Negative   Does the patient have one of the following disease processes/diagnoses(primary or secondary)?  Other   Week 2 attempt successful?  Yes   Call start time  1018   Discharge diagnosis  Alcohol-induced acute pancreatitis    Call end time  1021   Is patient permission given to speak with other caregiver?  Yes   Person spoke with today (if not patient) and relationship  Spouse   Meds reviewed with patient/caregiver?  Yes   Is the patient having any side effects they believe may be caused by any medication additions or changes?  No   Does the patient have all medications ordered at discharge?  Yes   Prescription comments  Pt did not fill Librium per MD instructions.   Is the patient taking all medications as directed (includes completed medication regime)?  Yes   Does the patient have a primary care provider?   Yes   Does the patient have an appointment with their PCP within 7 days of discharge?  Yes   Has the patient kept scheduled appointments due by today?  Yes   Has home health visited the patient within 72 hours of discharge?  N/A   Pulse Ox monitoring  None   Psychosocial issues?  No   Did the patient receive a copy of their discharge instructions?  Yes   Nursing interventions  Reviewed instructions with patient   What is the patient's perception of their health status since discharge?  Improving   Is the patient/caregiver able to teach back signs and symptoms related to disease process for when to call PCP?  Yes   Is the patient/caregiver able to teach back signs and symptoms related to disease process for when to call 911?  Yes   Week 2 Call Completed?  Yes          Seng Gonzales RN   without long-term current use of insulin (H)     Osteopenia of hip     Acute conjunctivitis of left eye     PTSD (post-traumatic stress disorder)     Diabetic peripheral neuropathy (H)     Bilateral sciatica     Gastrointestinal hemorrhage associated with gastric ulcer     Chronic kidney disease, stage 3 (H)     Chronic diastolic congestive heart failure (H)     Upper GI bleed     Neck pain     Closed nondisplaced fracture of head of right radius, initial encounter     Injury of head, initial encounter     Fall, initial encounter     Closed fracture of right hand, initial encounter     Closed fracture of left wrist, initial encounter            Past Medical History:   Diagnosis Date     Arthritis      Chest pain     seeing Cardiology     Depression 1991    after 's death     Diabetes mellitus (H)      Diabetic renal disease (H)     microalbuminuria     DJD (degenerative joint disease) of knee      H/O vitamin D deficiency      Hypertension      IBS (irritable bowel syndrome)     diarrhea      Keratoconus      Low back pain      Narcolepsy 2007    Dx'd by Sleep specialist 347.00, pt discontinued Adderal mid Nov. 13 due to tacycardia concerns     Obesity      Obstructive sleep apnea     327.23, 3 sleep studies,Dr. [facility]     Pancreatitis     related to Victoza, also on Xyrem     Panic      RLS (restless legs syndrome)      Sinus tachycardia             Past Surgical History:   Procedure Laterality Date     COLONOSCOPY  10/01/2020    poor quality prep     ear surgery  2002 and 01/2004     ESOPHAGOSCOPY, GASTROSCOPY, DUODENOSCOPY (EGD), COMBINED N/A 11/16/2021    Procedure: ESOPHAGOGASTRODUODENOSCOPY (EGD);  Surgeon: Jayla Calvo MD;  Location:  GI     GASTRIC BYPASS  2013    laparoscopic jimmy en y c ometopexy     HEMORRHOIDECTOMY  09/14/2000     LAPAROSCOPIC CHOLECYSTECTOMY  2015     LASIK BILATERAL       UVULOPALATOPHARYNGOPLASTY       UVVP              Social History     Tobacco Use     Smoking  status: Never     Smokeless tobacco: Never   Substance Use Topics     Alcohol use: Not Currently     Comment: rare            Family History   Problem Relation Age of Onset     Memory loss Mother      Diabetes Father      Chronic Obstructive Pulmonary Disease Sister      Anemia Sister         hx of ulcers     Other - See Comments Sister 65        MVA, followed by leg pain after a fall     Dementia Sister 68     Dementia Brother 70     Cancer Brother         lung     Cancer - colorectal Maternal Grandmother      Cancer Daughter      Obesity Daughter         s/p lap band               Allergies   Allergen Reactions     Pravastatin Other (See Comments)     woozy     Codeine Nausea and Vomiting     Nsaids GI Disturbance and Other (See Comments)     Pt had bariatric surgery, should not ever take oral NSAIDS due to risk of gastric ulcers.  Pt had bariatric surgery, should not ever take oral NSAIDS due to risk of gastric ulcers.            Current Outpatient Medications   Medication Sig Dispense Refill     acetaminophen (TYLENOL) 325 MG tablet Take 2 tablets (650 mg) by mouth every 6 hours       amLODIPine (NORVASC) 5 MG tablet Take 1 tablet (5 mg) by mouth daily 90 tablet 0     amphetamine-dextroamphetamine (ADDERALL) 30 MG tablet Take 1 tablet (30 mg) by mouth every 24 hours 1.5 mg a total of 45 mg  Daily 30 tablet 0     atorvastatin (LIPITOR) 20 MG tablet Take 1 tablet (20 mg) by mouth daily 90 tablet 3     blood glucose (NO BRAND SPECIFIED) test strip Use to test blood sugar as needed with Freestyle Nirmal CGM. 100 strip 0     blood glucose (NO BRAND SPECIFIED) test strip Use to test blood sugar as directed with CGM sensor. 50 strip 1     calcium carbonate (OS-KINJAL) 1500 (600 Ca) MG tablet Take 2 tablets (1,200 mg) by mouth daily       cevimeline (EVOXAC) 30 MG capsule take 1 cap  capsule 3     Continuous Blood Gluc  (FREESTYLE NIRMAL 14 DAY READER) EBEN Use to read blood sugars as per 's  instructions. 1 each 0     Continuous Blood Gluc Sensor (FREESTYLE JORDIN 14 DAY SENSOR) MISC Change every 14 days. 2 each 11     cyanocobalamin (VITAMIN B-12) 1000 MCG tablet Take one every other day 90 tablet 1     DULoxetine (CYMBALTA) 30 MG capsule Take 1 capsule (30 mg) by mouth 2 times daily 180 capsule 3     DULoxetine (CYMBALTA) 60 MG capsule Take 1 capsule (60 mg) by mouth At Bedtime Take in addition to current 30 mg evening dose for a total of 90 mg at bedtime. 90 capsule 3     empagliflozin (JARDIANCE) 10 MG TABS tablet Take 1 tablet (10 mg) by mouth daily Schedule appt with ADEN Blas, [facility] to ensure future refills 30 tablet 2     losartan (COZAAR) 100 MG tablet Take 1 tablet (100 mg) by mouth daily 90 tablet 3     metFORMIN (GLUCOPHAGE-XR) 500 MG 24 hr tablet Take 4 po q am with breakfast. 360 tablet 3     oxyCODONE (ROXICODONE) 5 MG tablet Take 0.5 tablets (2.5 mg) by mouth every 6 hours as needed for moderate to severe pain 16 tablet 0     pantoprazole (PROTONIX) 40 MG EC tablet Take 1 tablet (40 mg) by mouth 2 times daily 180 tablet 3     polyethylene glycol (MIRALAX) 17 GM/Dose powder Take 17 g by mouth 2 times daily 510 g 0     SENNA-docusate sodium (SENNA S) 8.6-50 MG tablet Take 1 tablet by mouth At Bedtime       tolterodine ER (DETROL LA) 4 MG 24 hr capsule Take 1 capsule (4 mg) by mouth daily 90 capsule 3     traZODone (DESYREL) 50 MG tablet Take 1 tablet by mouth every 24 hours       Vitamin D3 (CHOLECALCIFEROL) 25 mcg (1000 units) tablet Take 1 tablet (25 mcg) by mouth daily 90 tablet 2          Review Of Systems  Skin: negative  Eyes: negative  Ears/Nose/Throat: negative  Respiratory: No shortness of breath, dyspnea on exertion, cough, or hemoptysis    O: Physical Exam:  Pain to palpation R radial head.  NO limit to pronation supination forearm RUE.  No limit to elbow flexion extension.  CMS intact to both upper extremities.  Pain to palpation R dorsal lunar/capitate region, pain  to L distal radius/ radial styloid/ listers tubercle.  Adequate function fingers/ ecchymosis with some edema L greater than R.    Lab:  No recent Vit D  Images:     IMPRESSION: Fracture of radial head measuring approximately 2 mm of displacement involving articular surface. Alignment otherwise normal. Large joint effusion.    IMPRESSION: Comminuted fracture of the distal radius with impaction and dorsal displacement. Minimally displaced ulnar styloid fracture.                                                                   IMPRESSION: Small bone fragment at the dorsal aspect of the proximal carpal row may be an acute avulsion type fracture seen only on the lateral view. No other fracture or dislocation.    A: R radial head fx, no surgical intervention warranted.  R wrist carpal avulsion, no surgical intervention warranted.  L distal radial fx does not require reduction, stable.    P: L short arm cast well molded for distal radial fracture.  Continue brace R wrist.  Sling R elbow if needed.  Limit activity to avoid exacerbation.    25 Oh Vit D level  Update DEXA  Discussed Calcium and Vit D supplementation  See back 4-6 weeks.  Remove cast and repeat image L wrist.  No further images R elbow or R wrist unless next exam warrants.  Notify if exacerbation symptoms.   Send to bone health clinic  Consider Prolia if warranted by studies           In addition to the above assessment and plan each active problem on Karine's problem list was evaluated today. This included the questioning of Karine for any medication problems. We will continue the current treatment plan for these active problems except as noted.  Answers for HPI/ROS submitted by the patient on 10/28/2022  General Symptoms: No  Skin Symptoms: No  HENT Symptoms: No  EYE SYMPTOMS: No  HEART SYMPTOMS: No  LUNG SYMPTOMS: No  INTESTINAL SYMPTOMS: No  URINARY SYMPTOMS: No  GYNECOLOGIC SYMPTOMS: No  BREAST SYMPTOMS: No  SKELETAL SYMPTOMS: Yes  BLOOD SYMPTOMS:  No  NERVOUS SYSTEM SYMPTOMS: Yes  MENTAL HEALTH SYMPTOMS: Yes  Back pain: Yes  Muscle aches: No  Neck pain: No  Swollen joints: No  Joint pain: Yes  Bone pain: Yes  Muscle cramps: No  Muscle weakness: No  Joint stiffness: Yes  Bone fracture: Yes  Trouble with coordination: Yes  Dizziness or trouble with balance: Yes  Fainting or black-out spells: No  Memory loss: Yes  Headache: No  Seizures: No  Speech problems: No  Tingling: Yes  Tremor: No  Weakness: No  Difficulty walking: Yes  Paralysis: No  Numbness: Yes  Nervous or Anxious: Yes  Depression: No  Trouble sleeping: No  Trouble thinking or concentrating: Yes  Mood changes: No  Panic attacks: No      GREY CASEY MD

## 2022-10-28 NOTE — PROGRESS NOTES
Cast/splint application    Date/Time: 10/28/2022 12:23 PM  Performed by: Brendan Leal MD  Authorized by: Brendan Leal MD     Consent:     Consent obtained:  Verbal    Consent given by:  Patient    Risks discussed:  Discoloration, numbness, pain and swelling  Pre-procedure details:     Sensation:  Numbness    Skin color:  Normal with ecchymosis around injury site, distal L. radius.  Procedure details:     Laterality:  Left    Location:  Wrist    Wrist:  L wrist    Strapping: no      Cast type:  Short arm    Supplies:  Fiberglass  Post-procedure details:     Pain:  Unchanged    Sensation:  Normal    Skin color:  Normal    Patient tolerance of procedure:  Tolerated well, no immediate complications    Patient provided with cast or splint care instructions: Yes    Comments:      Left wrist positioned in slight flexion with ulnar deviation while molding cast per instructions by Dr. Leal.    I was present entire visit.  Brendan Leal MD

## 2022-11-01 NOTE — PROGRESS NOTES
S:  Patient fell 2 weeks ago.  Bilateral upper extremity injuries.  Splint L wrist falling off, R wrist brace intact, no sling R elbow.           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)     Keratoconus     Hyperlipidemia LDL goal <100     Obstructive sleep apnea     Vitamin D insufficiency     Major depression in partial remission (H)     Plantar warts     Low back pain     DJD (degenerative joint disease) of knee     Pancreatitis     Panic     Chest pain     IBS (irritable bowel syndrome)     Heart murmur     Hypertension goal BP (blood pressure) < 140/90     Type 2 diabetes mellitus without complication, without long-term current use of insulin (H)     Osteopenia of hip     Acute conjunctivitis of left eye     PTSD (post-traumatic stress disorder)     Diabetic peripheral neuropathy (H)     Bilateral sciatica     Gastrointestinal hemorrhage associated with gastric ulcer     Chronic kidney disease, stage 3 (H)     Chronic diastolic congestive heart failure (H)     Upper GI bleed     Neck pain     Closed nondisplaced fracture of head of right radius, initial encounter     Injury of head, initial encounter     Fall, initial encounter     Closed fracture of right hand, initial encounter     Closed fracture of left wrist, initial encounter            Past Medical History:   Diagnosis Date     Arthritis      Chest pain     seeing Cardiology     Depression 1991    after 's death     Diabetes mellitus (H)      Diabetic renal disease (H)     microalbuminuria     DJD (degenerative joint disease) of knee      H/O vitamin D deficiency      Hypertension      IBS (irritable bowel syndrome)     diarrhea      Keratoconus      Low back pain      Narcolepsy 2007    Dx'd by Sleep specialist 347.00, pt discontinued Adderal mid Nov. 13 due to tacycardia concerns     Obesity      Obstructive sleep apnea     327.23, 3  sleep studies,[facility]. [facility]     Pancreatitis     related to Victoza, also on Xyrem     Panic      RLS (restless legs syndrome)      Sinus tachycardia             Past Surgical History:   Procedure Laterality Date     COLONOSCOPY  10/01/2020    poor quality prep     ear surgery  2002 and 01/2004     ESOPHAGOSCOPY, GASTROSCOPY, DUODENOSCOPY (EGD), COMBINED N/A 11/16/2021    Procedure: ESOPHAGOGASTRODUODENOSCOPY (EGD);  Surgeon: Jayla Calvo MD;  Location:  GI     GASTRIC BYPASS  2013    laparoscopic jimmy en y c ometopexy     HEMORRHOIDECTOMY  09/14/2000     LAPAROSCOPIC CHOLECYSTECTOMY  2015     LASIK BILATERAL       UVULOPALATOPHARYNGOPLASTY       UVVP              Social History     Tobacco Use     Smoking status: Never     Smokeless tobacco: Never   Substance Use Topics     Alcohol use: Not Currently     Comment: rare            Family History   Problem Relation Age of Onset     Memory loss Mother      Diabetes Father      Chronic Obstructive Pulmonary Disease Sister      Anemia Sister         hx of ulcers     Other - See Comments Sister 65        MVA, followed by leg pain after a fall     Dementia Sister 68     Dementia Brother 70     Cancer Brother         lung     Cancer - colorectal Maternal Grandmother      Cancer Daughter      Obesity Daughter         s/p lap band               Allergies   Allergen Reactions     Pravastatin Other (See Comments)     woozy     Codeine Nausea and Vomiting     Nsaids GI Disturbance and Other (See Comments)     Pt had bariatric surgery, should not ever take oral NSAIDS due to risk of gastric ulcers.  Pt had bariatric surgery, should not ever take oral NSAIDS due to risk of gastric ulcers.            Current Outpatient Medications   Medication Sig Dispense Refill     acetaminophen (TYLENOL) 325 MG tablet Take 2 tablets (650 mg) by mouth every 6 hours       amLODIPine (NORVASC) 5 MG tablet Take 1 tablet (5 mg) by mouth daily 90 tablet 0     amphetamine-dextroamphetamine  (ADDERALL) 30 MG tablet Take 1 tablet (30 mg) by mouth every 24 hours 1.5 mg a total of 45 mg  Daily 30 tablet 0     atorvastatin (LIPITOR) 20 MG tablet Take 1 tablet (20 mg) by mouth daily 90 tablet 3     blood glucose (NO BRAND SPECIFIED) test strip Use to test blood sugar as needed with Freestyle Nirmal CGM. 100 strip 0     blood glucose (NO BRAND SPECIFIED) test strip Use to test blood sugar as directed with CGM sensor. 50 strip 1     calcium carbonate (OS-KINJAL) 1500 (600 Ca) MG tablet Take 2 tablets (1,200 mg) by mouth daily       cevimeline (EVOXAC) 30 MG capsule take 1 cap  capsule 3     Continuous Blood Gluc  (FREESTYLE NIRMAL 14 DAY READER) EBEN Use to read blood sugars as per 's instructions. 1 each 0     Continuous Blood Gluc Sensor (FREESTYLE NIRMAL 14 DAY SENSOR) MISC Change every 14 days. 2 each 11     cyanocobalamin (VITAMIN B-12) 1000 MCG tablet Take one every other day 90 tablet 1     DULoxetine (CYMBALTA) 30 MG capsule Take 1 capsule (30 mg) by mouth 2 times daily 180 capsule 3     DULoxetine (CYMBALTA) 60 MG capsule Take 1 capsule (60 mg) by mouth At Bedtime Take in addition to current 30 mg evening dose for a total of 90 mg at bedtime. 90 capsule 3     empagliflozin (JARDIANCE) 10 MG TABS tablet Take 1 tablet (10 mg) by mouth daily Schedule appt with ADEN Blas, [facility] to ensure future refills 30 tablet 2     losartan (COZAAR) 100 MG tablet Take 1 tablet (100 mg) by mouth daily 90 tablet 3     metFORMIN (GLUCOPHAGE-XR) 500 MG 24 hr tablet Take 4 po q am with breakfast. 360 tablet 3     oxyCODONE (ROXICODONE) 5 MG tablet Take 0.5 tablets (2.5 mg) by mouth every 6 hours as needed for moderate to severe pain 16 tablet 0     pantoprazole (PROTONIX) 40 MG EC tablet Take 1 tablet (40 mg) by mouth 2 times daily 180 tablet 3     polyethylene glycol (MIRALAX) 17 GM/Dose powder Take 17 g by mouth 2 times daily 510 g 0     SENNA-docusate sodium (SENNA S) 8.6-50 MG tablet  Take 1 tablet by mouth At Bedtime       tolterodine ER (DETROL LA) 4 MG 24 hr capsule Take 1 capsule (4 mg) by mouth daily 90 capsule 3     traZODone (DESYREL) 50 MG tablet Take 1 tablet by mouth every 24 hours       Vitamin D3 (CHOLECALCIFEROL) 25 mcg (1000 units) tablet Take 1 tablet (25 mcg) by mouth daily 90 tablet 2          Review Of Systems  Skin: negative  Eyes: negative  Ears/Nose/Throat: negative  Respiratory: No shortness of breath, dyspnea on exertion, cough, or hemoptysis    O: Physical Exam:  Pain to palpation R radial head.  NO limit to pronation supination forearm RUE.  No limit to elbow flexion extension.  CMS intact to both upper extremities.  Pain to palpation R dorsal lunar/capitate region, pain to L distal radius/ radial styloid/ listers tubercle.  Adequate function fingers/ ecchymosis with some edema L greater than R.    Lab:  No recent Vit D  Images:     IMPRESSION: Fracture of radial head measuring approximately 2 mm of displacement involving articular surface. Alignment otherwise normal. Large joint effusion.    IMPRESSION: Comminuted fracture of the distal radius with impaction and dorsal displacement. Minimally displaced ulnar styloid fracture.                                                                     IMPRESSION: Small bone fragment at the dorsal aspect of the proximal carpal row may be an acute avulsion type fracture seen only on the lateral view. No other fracture or dislocation.    A: R radial head fx, no surgical intervention warranted.  R wrist carpal avulsion, no surgical intervention warranted.  L distal radial fx does not require reduction, stable.    P: L short arm cast well molded for distal radial fracture.  Continue brace R wrist.  Sling R elbow if needed.  Limit activity to avoid exacerbation.    25 Oh Vit D level  Update DEXA  Discussed Calcium and Vit D supplementation  See back 4-6 weeks.  Remove cast and repeat image L wrist.  No further images R elbow or R wrist  unless next exam warrants.  Notify if exacerbation symptoms.   Send to bone health clinic  Consider Prolia if warranted by studies           In addition to the above assessment and plan each active problem on Kairne's problem list was evaluated today. This included the questioning of Karine for any medication problems. We will continue the current treatment plan for these active problems except as noted.  Answers for HPI/ROS submitted by the patient on 10/28/2022  General Symptoms: No  Skin Symptoms: No  HENT Symptoms: No  EYE SYMPTOMS: No  HEART SYMPTOMS: No  LUNG SYMPTOMS: No  INTESTINAL SYMPTOMS: No  URINARY SYMPTOMS: No  GYNECOLOGIC SYMPTOMS: No  BREAST SYMPTOMS: No  SKELETAL SYMPTOMS: Yes  BLOOD SYMPTOMS: No  NERVOUS SYSTEM SYMPTOMS: Yes  MENTAL HEALTH SYMPTOMS: Yes  Back pain: Yes  Muscle aches: No  Neck pain: No  Swollen joints: No  Joint pain: Yes  Bone pain: Yes  Muscle cramps: No  Muscle weakness: No  Joint stiffness: Yes  Bone fracture: Yes  Trouble with coordination: Yes  Dizziness or trouble with balance: Yes  Fainting or black-out spells: No  Memory loss: Yes  Headache: No  Seizures: No  Speech problems: No  Tingling: Yes  Tremor: No  Weakness: No  Difficulty walking: Yes  Paralysis: No  Numbness: Yes  Nervous or Anxious: Yes  Depression: No  Trouble sleeping: No  Trouble thinking or concentrating: Yes  Mood changes: No  Panic attacks: No

## 2022-11-03 ENCOUNTER — TELEPHONE (OUTPATIENT)
Dept: FAMILY MEDICINE | Facility: CLINIC | Age: 67
End: 2022-11-03

## 2022-11-07 ENCOUNTER — TELEPHONE (OUTPATIENT)
Dept: FAMILY MEDICINE | Facility: CLINIC | Age: 67
End: 2022-11-07

## 2022-11-07 NOTE — TELEPHONE ENCOUNTER
Home Care Verbal Orders    Caller Name:Glenn  Agency: [employer]    Orders Requested:   Physical Therapy (PT) twice a week for 3 weeks, once a week for 3 weeks.  and Occupational Therapy (OT) Evaluation and treat   HHA discharge per patient request    Ok to leave voicemail

## 2022-11-07 NOTE — TELEPHONE ENCOUNTER
Not positive that VM is confidential - requested call back from Glenn with [employer] to confirm orders.    Navin PHILIP, RN  11/07/22 4:43 PM

## 2022-11-08 ENCOUNTER — TRANSFERRED RECORDS (OUTPATIENT)
Dept: HEALTH INFORMATION MANAGEMENT | Facility: CLINIC | Age: 67
End: 2022-11-08
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
## 2022-11-09 ENCOUNTER — VIRTUAL VISIT (OUTPATIENT)
Dept: BEHAVIORAL HEALTH | Facility: CLINIC | Age: 67
End: 2022-11-09
Payer: COMMERCIAL

## 2022-11-09 DIAGNOSIS — F43.10 PTSD (POST-TRAUMATIC STRESS DISORDER): Primary | ICD-10-CM

## 2022-11-09 ASSESSMENT — ANXIETY QUESTIONNAIRES
7. FEELING AFRAID AS IF SOMETHING AWFUL MIGHT HAPPEN: SEVERAL DAYS
GAD7 TOTAL SCORE: 2
7. FEELING AFRAID AS IF SOMETHING AWFUL MIGHT HAPPEN: SEVERAL DAYS
3. WORRYING TOO MUCH ABOUT DIFFERENT THINGS: NOT AT ALL
5. BEING SO RESTLESS THAT IT IS HARD TO SIT STILL: NOT AT ALL
4. TROUBLE RELAXING: NOT AT ALL
IF YOU CHECKED OFF ANY PROBLEMS ON THIS QUESTIONNAIRE, HOW DIFFICULT HAVE THESE PROBLEMS MADE IT FOR YOU TO DO YOUR WORK, TAKE CARE OF THINGS AT HOME, OR GET ALONG WITH OTHER PEOPLE: NOT DIFFICULT AT ALL
GAD7 TOTAL SCORE: 2
2. NOT BEING ABLE TO STOP OR CONTROL WORRYING: SEVERAL DAYS
6. BECOMING EASILY ANNOYED OR IRRITABLE: NOT AT ALL
8. IF YOU CHECKED OFF ANY PROBLEMS, HOW DIFFICULT HAVE THESE MADE IT FOR YOU TO DO YOUR WORK, TAKE CARE OF THINGS AT HOME, OR GET ALONG WITH OTHER PEOPLE?: NOT DIFFICULT AT ALL
1. FEELING NERVOUS, ANXIOUS, OR ON EDGE: NOT AT ALL
GAD7 TOTAL SCORE: 2

## 2022-11-09 ASSESSMENT — PATIENT HEALTH QUESTIONNAIRE - PHQ9
SUM OF ALL RESPONSES TO PHQ QUESTIONS 1-9: 2
10. IF YOU CHECKED OFF ANY PROBLEMS, HOW DIFFICULT HAVE THESE PROBLEMS MADE IT FOR YOU TO DO YOUR WORK, TAKE CARE OF THINGS AT HOME, OR GET ALONG WITH OTHER PEOPLE: NOT DIFFICULT AT ALL
SUM OF ALL RESPONSES TO PHQ QUESTIONS 1-9: 2

## 2022-11-09 NOTE — PROGRESS NOTES
Surgical Office Location:  [facility]  [address] [place] [id number]

## 2022-11-10 ENCOUNTER — OFFICE VISIT (OUTPATIENT)
Dept: DERMATOLOGY | Facility: CLINIC | Age: 67
End: 2022-11-10
Payer: COMMERCIAL

## 2022-11-10 DIAGNOSIS — L82.1 SEBORRHEIC KERATOSIS: ICD-10-CM

## 2022-11-10 DIAGNOSIS — D23.9 DERMAL NEVUS: ICD-10-CM

## 2022-11-10 DIAGNOSIS — L81.4 LENTIGO: Primary | ICD-10-CM

## 2022-11-10 DIAGNOSIS — C44.41 BASAL CELL CARCINOMA (BCC) OF NECK: ICD-10-CM

## 2022-11-10 DIAGNOSIS — D18.01 ANGIOMA OF SKIN: ICD-10-CM

## 2022-11-10 PROCEDURE — 99203 OFFICE O/P NEW LOW 30 MIN: CPT | Mod: 25 | Performed by: DERMATOLOGY

## 2022-11-10 PROCEDURE — 13132 CMPLX RPR F/C/C/M/N/AX/G/H/F: CPT | Performed by: DERMATOLOGY

## 2022-11-10 PROCEDURE — 17311 MOHS 1 STAGE H/N/HF/G: CPT | Performed by: DERMATOLOGY

## 2022-11-10 NOTE — PATIENT INSTRUCTIONS
Sutured Wound Care     [facility]: [phone]    [facility]: [phone]          No strenuous activity for 48 hours. Resume moderate activity in 48 hours. No heavy exercising until you are seen for follow up in one week.     Take Tylenol as needed for discomfort.                         Do not drink alcoholic beverages for 48 hours.     Keep the pressure bandage in place for 24 hours. If the bandage becomes blood tinged or loose, reinforce it with gauze and tape.        (Refer to the reverse side of this page for management of bleeding).    Remove pressure bandage in 24 hours     Leave the flat bandage in place until your follow up appointment.    Keep the bandage dry. Wash around it carefully.    If the tape becomes soiled or starts to come off, reinforce it with additional paper tape.    Do not smoke for 3 weeks; smoking is detrimental to wound healing.    It is normal to have swelling and bruising around the surgical site. The bruising will fade in approximately 10-14 days. Elevate the area to reduce swelling.    Numbness, itchiness and sensitivity to temperature changes can occur after surgery and may take up to 18 months to normalize.      POSSIBLE COMPLICATIONS    BLEEDING:    Leave the bandage in place.  Use tightly rolled up gauze or a cloth to apply direct pressure over the bandage for 20   minutes.  Reapply pressure for an additional 20 minutes if necessary  Call the office or go to the nearest emergency room if pressure fails to stop the bleeding.  Use additional gauze and tape to maintain pressure once the bleeding has stopped.        PAIN:    Post operative pain should slowly get better, never worse.  A severe increase in pain may indicate a problem. Call the office if this occurs.    In case of emergency phone:Dr Hollingsworth [phone]

## 2022-11-10 NOTE — PROGRESS NOTES
Karine Moss , a 67 year old year old female patient, I was asked to see by GIOVANNI Burgos for basal cell carcinoma on right neck.  Patient states this has been present for a whil.  Patient reports the following symptoms:  growing .  Patient reports the following previous treatments none.  Patient reports the following modifying factors none.  Associated symptoms: none.  Patient has no other skin complaints today.  Remainder of the HPI, Meds, PMH, Allergies, FH, and SH was reviewed in chart.      Past Medical History:   Diagnosis Date     Arthritis      Basal cell carcinoma      Chest pain     seeing Cardiology     Depression 1991    after 's death     Diabetes mellitus (H)      Diabetic renal disease (H)     microalbuminuria     DJD (degenerative joint disease) of knee      H/O vitamin D deficiency      Hypertension      IBS (irritable bowel syndrome)     diarrhea      Keratoconus      Low back pain      Narcolepsy 2007    Dx'd by Sleep specialist 347.00, pt discontinued Adderal mid Nov. 13 due to tacycardia concerns     Obesity      Obstructive sleep apnea     327.23, 3 sleep studies,Dr. [facility]     Pancreatitis     related to Victoza, also on Xyrem     Panic      RLS (restless legs syndrome)      Sinus tachycardia        Past Surgical History:   Procedure Laterality Date     COLONOSCOPY  10/01/2020    poor quality prep     ear surgery  2002 and 01/2004     ESOPHAGOSCOPY, GASTROSCOPY, DUODENOSCOPY (EGD), COMBINED N/A 11/16/2021    Procedure: ESOPHAGOGASTRODUODENOSCOPY (EGD);  Surgeon: Jayla Calvo MD;  Location:  [facility]     GASTRIC BYPASS  2013    laparoscopic jimmy en y c ometopexy     HEMORRHOIDECTOMY  09/14/2000     LAPAROSCOPIC CHOLECYSTECTOMY  2015     LASIK BILATERAL       UVULOPALATOPHARYNGOPLASTY       UVVP          Family History   Problem Relation Age of Onset     Memory loss Mother      Diabetes Father      Chronic Obstructive Pulmonary Disease Sister      Anemia Sister         hx of  ulcers     Other - See Comments Sister 65        MVA, followed by leg pain after a fall     Dementia Sister 68     Dementia Brother 70     Cancer Brother         lung     Cancer - colorectal Maternal Grandmother      Cancer Daughter      Obesity Daughter         s/p lap band       Social History     Socioeconomic History     Marital status: Single     Spouse name: Not on file     Number of children: 1     Years of education: Not on file     Highest education level: Not on file   Occupational History     Employer: [employer]     Comment: on disability   Tobacco Use     Smoking status: Never     Smokeless tobacco: Never   Substance and Sexual Activity     Alcohol use: Not Currently     Comment: rare     Drug use: No     Sexual activity: Never   Other Topics Concern     Parent/sibling w/ CABG, MI or angioplasty before 65F 55M? No   Social History Narrative    Daughter- 42,  since 1991    Drives only short distances due to narcolepsy            --------------------------------------------------------------------------------    Surgical History  Return To Top     Status Surgery Time Frame Comment Record Date     Inactive  ear surgery  1/04 5/27/2008      Inactive  eye surgery  2002 5/27/2008      Inactive  Hemorrhoidectomy  9/14/00 5/27/2008          --------------------------------------------------------------------------------    Food Allergy  Return To Top     Allergen Reaction Comment Record Date     * No known food allergies      10/28/2008          --------------------------------------------------------------------------------    Drug Allergy  Return To Top     Allergen Reaction Comment Record Date     Codeine  nausea    6/21/2007          --------------------------------------------------------------------------------    Environment Allergy  Return To Top     Allergen Reaction Comment Record Date     * No known environmental allergies      10/28/2008           --------------------------------------------------------------------------------    Social History  Return To Top     Question Answer Comment Record Date     Marital status      5/27/2008      Advance Directive or Living Will  Form Given to Patient    1/18/2010      Emotional Abuse  Yes    1/18/2010      Exercise  No    1/18/2010      Caffeine  Yes    5/27/2008      Physical Abuse  No    1/18/2010      Sealtbelts  Yes    1/18/2010      Sexual Abuse  No    1/18/2010      Breast/Testicle Self Check  No    1/18/2010      Number of children  1    1/18/2010      Living arrangements  House    1/18/2010      Number of adults in household  2    1/18/2010      Education level  High School Graduate    1/18/2010      Employment  Currently employed    1/18/2010      Tobacco history  Has never smoked or chewed tobacco    5/27/2008      Alcohol history  Currently drinks alcohol    5/27/2008      Has the patient ever used illegal drugs?  Has never used illegal drugs    5/27/2008          --------------------------------------------------------------------------------    History - Overall Remark: 3/21/2012 Return To Top         --------------------------------------------------------------------------------    Medical History Return To Top     Status Diagnosis Time Frame Comment Record Date     Active (250.00) - C - Diabetes mellitus, type II controlled      5/15/2012      Active (788.41) - C - Urinary frequency      4/17/2012      Active (250.02) - C - Diabetes mellitus, type II uncontrolled      3/6/2012      Active (278.00) - C - Obesity      2/14/2011      Active (250.00) - C - Diabetes mellitus, type II controlled      2/14/2011      Active (739.3) - C - Somatic dysfunction, lumbar region      8/16/2010      Active (739.5) - C - Somatic dysfunction, pelvic region      8/16/2010      Active (401.9) - C - Hypertension      2/8/2010      Active 268.9 UNSP VITAMIN D DEFICIENCY      1/18/2010      Active (695.3) - C -  Rosacea      2010      Active 272.1 Hypertriglyceridemia      2010      Active 300.4 Depression with Anxiety (Dysthymic Disorder)      10/28/2008      Active 739.6 Somatic dysfunction, lower extremities      10/28/2008      Active 780.79 Fatigue      2008      Active 278.01 Obesity, morbid      2008      Active 347.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, 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      2008      Inactive  (462) - C - Pharyngitis, acute      1/15/2011      Inactive  250.02 Diabetes mellitus, type II uncontrolled      2010      Inactive  726.71 Tendinitis, achilles      10/28/2008      Inactive  (250.00) - C - Diabetes mellitus, type II controlled      10/28/2008      Inactive  (250.00) - C - Diabetes mellitus, type II controlled      2008      Inactive  V77.91 Screening, lipids      2008      Inactive  599.0 Urinary tract infection, unspec./pyuria (UTI)      2008      Inactive  V70.0 Routine Medical Exam      2008      Active Constipation      2008      Active Hemorrhoids      2008      Pancreatitis pancreatitis 2013-------------------------------------------------------------------------------    M        --------------------------------------------------------------------------------    Family History  Return To Top     Status Relationship Disease Comment Record Date     Alive Sister  *Denies any medical problems  3 sisters  2008      Alive Brother  *Denies any medical problems  2 brothers  2008         Father    Age of death 56  2010         Mother  Dementia  Age of death 82  2008          --------------------------------------------------------------------------------                         Social Determinants of Health     Financial Resource Strain: Not on file   Food Insecurity: Not on file   Transportation Needs: Not on file   Physical Activity: Not on file   Stress: Not on file   Social Connections: Not on file   Intimate Partner  Violence: Not on file   Housing Stability: Not on file       Outpatient Encounter Medications as of 11/10/2022   Medication Sig Dispense Refill     amLODIPine (NORVASC) 5 MG tablet Take 1 tablet (5 mg) by mouth daily 90 tablet 0     amphetamine-dextroamphetamine (ADDERALL) 30 MG tablet Take 1 tablet (30 mg) by mouth every 24 hours 1.5 mg a total of 45 mg  Daily 30 tablet 0     atorvastatin (LIPITOR) 20 MG tablet Take 1 tablet (20 mg) by mouth daily 90 tablet 3     blood glucose (NO BRAND SPECIFIED) test strip Use to test blood sugar as needed with Freestyle Nirmal CGM. 100 strip 0     blood glucose (NO BRAND SPECIFIED) test strip Use to test blood sugar as directed with CGM sensor. 50 strip 1     calcium carbonate (OS-KINJAL) 1500 (600 Ca) MG tablet Take 2 tablets (1,200 mg) by mouth daily       cevimeline (EVOXAC) 30 MG capsule take 1 cap  capsule 3     Continuous Blood Gluc  (FREESTYLE NIRMAL 14 DAY READER) EBEN Use to read blood sugars as per 's instructions. 1 each 0     Continuous Blood Gluc Sensor (FREESTYLE NIRMAL 14 DAY SENSOR) MISC Change every 14 days. 2 each 11     cyanocobalamin (VITAMIN B-12) 1000 MCG tablet Take one every other day 90 tablet 1     DULoxetine (CYMBALTA) 30 MG capsule Take 1 capsule (30 mg) by mouth 2 times daily 180 capsule 3     DULoxetine (CYMBALTA) 60 MG capsule Take 1 capsule (60 mg) by mouth At Bedtime Take in addition to current 30 mg evening dose for a total of 90 mg at bedtime. 90 capsule 3     empagliflozin (JARDIANCE) 10 MG TABS tablet Take 1 tablet (10 mg) by mouth daily Schedule appt with ADEN Blas, [facility] to ensure future refills 30 tablet 2     losartan (COZAAR) 100 MG tablet Take 1 tablet (100 mg) by mouth daily 90 tablet 3     metFORMIN (GLUCOPHAGE-XR) 500 MG 24 hr tablet Take 4 po q am with breakfast. 360 tablet 3     oxyCODONE (ROXICODONE) 5 MG tablet Take 0.5 tablets (2.5 mg) by mouth every 6 hours as needed for moderate to severe pain  16 tablet 0     pantoprazole (PROTONIX) 40 MG EC tablet Take 1 tablet (40 mg) by mouth 2 times daily 180 tablet 3     SENNA-docusate sodium (SENNA S) 8.6-50 MG tablet Take 1 tablet by mouth At Bedtime       tolterodine ER (DETROL LA) 4 MG 24 hr capsule Take 1 capsule (4 mg) by mouth daily 90 capsule 3     traZODone (DESYREL) 50 MG tablet Take 1 tablet by mouth every 24 hours       Vitamin D3 (CHOLECALCIFEROL) 25 mcg (1000 units) tablet Take 1 tablet (25 mcg) by mouth daily 90 tablet 2     acetaminophen (TYLENOL) 325 MG tablet Take 2 tablets (650 mg) by mouth every 6 hours       polyethylene glycol (MIRALAX) 17 GM/Dose powder Take 17 g by mouth 2 times daily 510 g 0     No facility-administered encounter medications on file as of 11/10/2022.             Review Of Systems  Skin: As above  Eyes: negative  Ears/Nose/Throat: negative  Respiratory: No shortness of breath, dyspnea on exertion, cough, or hemoptysis  Cardiovascular: negative  Gastrointestinal: negative  Genitourinary: negative  Musculoskeletal: negative  Neurologic: negative  Psychiatric: negative  Hematologic/Lymphatic/Immunologic: negative  Endocrine: negative      O:   NAD, WDWN, Alert & Oriented, Mood & Affect wnl, Vitals stable   Here today alone   General appearance souleymane ii   Vitals stale   Alert, oriented and in no acute distress   R IA Neck 1.2cm pink pearly papule    Stuck on papules and brown macules on neck and trun k   Red papules on trunk   Flesh colored papules on neck       Eyes: Conjunctivae/lids:Normal     ENT: Lips, buccal mucosa, tongue: normal    MSK:Normal    Cardiovascular: peripheral edema none    Pulm: Breathing Normal    Neuro/Psych: Orientation:Normal; Mood/Affect:Normal      A/P:  1. Seborrheic keratosis, lentigo, angioma, dermal nevus  2. R neck basal cell carcinoma   It was a pleasure speaking to Karine Moss today.  Previous clinic  notes and pertinent laboratory tests were reviewed prior to Karine Moss's  visit.  Patient encouraged to perform monthly skin exams.  UV precautions reviewed with patient.  Risks of non-melanoma skin cancer discussed with patient   Return to clinic 6 months  PROCEDURE NOTE  R neck basal cell carcinoma   MOHS:   Location    The rationale for Mohs surgery was discussed with the patient and consent was obtained.  The risks and benefits as well as alternatives to therapy were discussed, in detail.  Specifically, the risks of infection, scarring, bleeding, prolonged wound healing, incomplete removal, allergy to anesthesia, nerve injury and recurrence were addressed.  Indication for Mohs was Location. Prior to the procedure, the treatment site was clearly identified and, if available, confirmed with previous photos and confirmed by the patient   All components of the Universal Protocol/PAUSE rule were completed.  The Mohs surgeon operated in two distinct and integrated capacities as the surgeon and pathologist.      The area was prepped with Betasept.  A rim of normal appearing skin was marked circumferentially around the lesion.  The area was infiltrated with local anesthesia.  The tumor was first debulked to remove all clinically apparent tumor.  An incision following the standard Mohs approach was done and the specimen was oriented,mapped and placed in 1 block(s).  Each specimen was then chromacoded and processed in the Mohs laboratory using standard Mohs technique and submitted for frozen section histology.  Frozen section analysis showed no residual tumor but CLEAR MARGINS.      The tumor was excised using standard Mohs technique in 1 stages(s).  CLEAR MARGINS OBTAINED and Final defect size was 1.7 cm.     We discussed the options for wound management in full with the patient including risks/benefits/ possible outcomes.        REPAIR COMPLEX: Because of the tightness of the surrounding skin and Because of the size and full thickness nature of the defect, Because of the tightness of the  surrounding skin, To maintain form and function and Because of the proximity to the ear, a complex closure was planned. After LE anesthesia and prep, Burow's triangles were excised in the relaxed skin tension lines. The wound edges were widely undermined greater than width of the defect on both sides by dissection in the subcutaneous plane until adequate tissue mobility was obtained. Hemostasis was obtained. The wound edges were closed in a layered fashion using Vicryl and Fast Absorbing Plain Gut sutures. Postoperative length was 4 cm.   EBL minimal; complications none; wound care routine.  The patient was discharged in good condition and will return in one week for wound evaluation.

## 2022-11-10 NOTE — LETTER
11/10/2022         RE: Karine Moss  [address]  [facility] [address]        Dear Colleague,    Thank you for referring your patient, Karine Moss, to the [facility]. Please see a copy of my visit note below.    Surgical Office Location:  [facility]  [address] [place] [address]      Karine Moss , a 67 year old year old female patient, I was asked to see by GIOVANNI Burgos for basal cell carcinoma on right neck.  Patient states this has been present for a whil.  Patient reports the following symptoms:  growing .  Patient reports the following previous treatments none.  Patient reports the following modifying factors none.  Associated symptoms: none.  Patient has no other skin complaints today.  Remainder of the HPI, Meds, PMH, Allergies, FH, and SH was reviewed in chart.      Past Medical History:   Diagnosis Date     Arthritis      Basal cell carcinoma      Chest pain     seeing Cardiology     Depression 1991    after 's death     Diabetes mellitus (H)      Diabetic renal disease (H)     microalbuminuria     DJD (degenerative joint disease) of knee      H/O vitamin D deficiency      Hypertension      IBS (irritable bowel syndrome)     diarrhea      Keratoconus      Low back pain      Narcolepsy 2007    Dx'd by Sleep specialist 347.00, pt discontinued Adderal mid Nov. 13 due to tacycardia concerns     Obesity      Obstructive sleep apnea     327.23, 3 sleep studies,Dr. Flaco SELBY [facility]     Pancreatitis     related to Victoza, also on Xyrem     Panic      RLS (restless legs syndrome)      Sinus tachycardia        Past Surgical History:   Procedure Laterality Date     COLONOSCOPY  10/01/2020    poor quality prep     ear surgery  2002 and 01/2004     ESOPHAGOSCOPY, GASTROSCOPY, DUODENOSCOPY (EGD), COMBINED N/A 11/16/2021    Procedure: ESOPHAGOGASTRODUODENOSCOPY (EGD);  Surgeon: Jayla Calvo MD;  Location:  GI     GASTRIC  BYPASS  2013    laparoscopic jimmy en y c ometopexy     HEMORRHOIDECTOMY  09/14/2000     LAPAROSCOPIC CHOLECYSTECTOMY  2015     LASIK BILATERAL       UVULOPALATOPHARYNGOPLASTY       UVVP          Family History   Problem Relation Age of Onset     Memory loss Mother      Diabetes Father      Chronic Obstructive Pulmonary Disease Sister      Anemia Sister         hx of ulcers     Other - See Comments Sister 65        MVA, followed by leg pain after a fall     Dementia Sister 68     Dementia Brother 70     Cancer Brother         lung     Cancer - colorectal Maternal Grandmother      Cancer Daughter      Obesity Daughter         s/p lap band       Social History     Socioeconomic History     Marital status: Single     Spouse name: Not on file     Number of children: 1     Years of education: Not on file     Highest education level: Not on file   Occupational History     Employer: [employer]     Comment: on disability   Tobacco Use     Smoking status: Never     Smokeless tobacco: Never   Substance and Sexual Activity     Alcohol use: Not Currently     Comment: rare     Drug use: No     Sexual activity: Never   Other Topics Concern     Parent/sibling w/ CABG, MI or angioplasty before 65F 55M? No   Social History Narrative    Daughter- 42,  since 1991    Drives only short distances due to narcolepsy            --------------------------------------------------------------------------------    Surgical History  Return To Top     Status Surgery Time Frame Comment Record Date     Inactive  ear surgery  1/04 5/27/2008      Inactive  eye surgery  2002 5/27/2008      Inactive  Hemorrhoidectomy  9/14/00 5/27/2008          --------------------------------------------------------------------------------    Food Allergy  Return To Top     Allergen Reaction Comment Record Date     * No known food allergies      10/28/2008          --------------------------------------------------------------------------------    Drug  Allergy  Return To Top     Allergen Reaction Comment Record Date     Codeine  nausea    6/21/2007          --------------------------------------------------------------------------------    Environment Allergy  Return To Top     Allergen Reaction Comment Record Date     * No known environmental allergies      10/28/2008          --------------------------------------------------------------------------------    Social History  Return To Top     Question Answer Comment Record Date     Marital status      5/27/2008      Advance Directive or Living Will  Form Given to Patient    1/18/2010      Emotional Abuse  Yes    1/18/2010      Exercise  No    1/18/2010      Caffeine  Yes    5/27/2008      Physical Abuse  No    1/18/2010      Sealtbelts  Yes    1/18/2010      Sexual Abuse  No    1/18/2010      Breast/Testicle Self Check  No    1/18/2010      Number of children  1    1/18/2010      Living arrangements  House    1/18/2010      Number of adults in household  2    1/18/2010      Education level  High School Graduate    1/18/2010      Employment  Currently employed    1/18/2010      Tobacco history  Has never smoked or chewed tobacco    5/27/2008      Alcohol history  Currently drinks alcohol    5/27/2008      Has the patient ever used illegal drugs?  Has never used illegal drugs    5/27/2008          --------------------------------------------------------------------------------    History - Overall Remark: 3/21/2012 Return To Top         --------------------------------------------------------------------------------    Medical History Return To Top     Status Diagnosis Time Frame Comment Record Date     Active (250.00) - C - Diabetes mellitus, type II controlled      5/15/2012      Active (788.41) - C - Urinary frequency      4/17/2012      Active (250.02) - C - Diabetes mellitus, type II uncontrolled      3/6/2012      Active (278.00) - C - Obesity      2/14/2011      Active (250.00) - C - Diabetes  mellitus, type II controlled      2011      Active (739.3) - C - Somatic dysfunction, lumbar region      2010      Active (739.5) - C - Somatic dysfunction, pelvic region      2010      Active (401.9) - C - Hypertension      2010      Active 268.9 UNSP VITAMIN D DEFICIENCY      2010      Active (695.3) - C - Rosacea      2010      Active 272.1 Hypertriglyceridemia      2010      Active 300.4 Depression with Anxiety (Dysthymic Disorder)      10/28/2008      Active 739.6 Somatic dysfunction, lower extremities      10/28/2008      Active 780.79 Fatigue      2008      Active 278.01 Obesity, morbid      2008      Active 347.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, 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      2008      Inactive  (462) - C - Pharyngitis, acute      1/15/2011      Inactive  250.02 Diabetes mellitus, type II uncontrolled      2010      Inactive  726.71 Tendinitis, achilles      10/28/2008      Inactive  (250.00) - C - Diabetes mellitus, type II controlled      10/28/2008      Inactive  (250.00) - C - Diabetes mellitus, type II controlled      2008      Inactive  V77.91 Screening, lipids      2008      Inactive  599.0 Urinary tract infection, unspec./pyuria (UTI)      2008      Inactive  V70.0 Routine Medical Exam      2008      Active Constipation      2008      Active Hemorrhoids      2008      Pancreatitis pancreatitis 2013-------------------------------------------------------------------------------    M        --------------------------------------------------------------------------------    Family History  Return To Top     Status Relationship Disease Comment Record Date     Alive Sister  *Denies any medical problems  3 sisters  2008      Alive Brother  *Denies any medical problems  2 brothers  2008         Father    Age of death 56  2010         Mother  Dementia  Age of death 82  2008           --------------------------------------------------------------------------------                         Social Determinants of Health     Financial Resource Strain: Not on file   Food Insecurity: Not on file   Transportation Needs: Not on file   Physical Activity: Not on file   Stress: Not on file   Social Connections: Not on file   Intimate Partner Violence: Not on file   Housing Stability: Not on file       Outpatient Encounter Medications as of 11/10/2022   Medication Sig Dispense Refill     amLODIPine (NORVASC) 5 MG tablet Take 1 tablet (5 mg) by mouth daily 90 tablet 0     amphetamine-dextroamphetamine (ADDERALL) 30 MG tablet Take 1 tablet (30 mg) by mouth every 24 hours 1.5 mg a total of 45 mg  Daily 30 tablet 0     atorvastatin (LIPITOR) 20 MG tablet Take 1 tablet (20 mg) by mouth daily 90 tablet 3     blood glucose (NO BRAND SPECIFIED) test strip Use to test blood sugar as needed with Freestyle Nirmal CGM. 100 strip 0     blood glucose (NO BRAND SPECIFIED) test strip Use to test blood sugar as directed with CGM sensor. 50 strip 1     calcium carbonate (OS-KINJAL) 1500 (600 Ca) MG tablet Take 2 tablets (1,200 mg) by mouth daily       cevimeline (EVOXAC) 30 MG capsule take 1 cap  capsule 3     Continuous Blood Gluc  (FREESTYLE NIRMAL 14 DAY READER) EBNE Use to read blood sugars as per 's instructions. 1 each 0     Continuous Blood Gluc Sensor (FREESTYLE NIRMAL 14 DAY SENSOR) MISC Change every 14 days. 2 each 11     cyanocobalamin (VITAMIN B-12) 1000 MCG tablet Take one every other day 90 tablet 1     DULoxetine (CYMBALTA) 30 MG capsule Take 1 capsule (30 mg) by mouth 2 times daily 180 capsule 3     DULoxetine (CYMBALTA) 60 MG capsule Take 1 capsule (60 mg) by mouth At Bedtime Take in addition to current 30 mg evening dose for a total of 90 mg at bedtime. 90 capsule 3     empagliflozin (JARDIANCE) 10 MG TABS tablet Take 1 tablet (10 mg) by mouth daily [facility] 30 tablet 2     losartan (COZAAR) 100 MG tablet Take 1 tablet (100 mg) by mouth daily 90 tablet 3     metFORMIN (GLUCOPHAGE-XR) 500 MG 24 hr tablet Take 4 po q am with breakfast. 360 tablet 3     oxyCODONE (ROXICODONE) 5 MG tablet Take 0.5 tablets (2.5 mg) by mouth every 6 hours as needed for moderate to severe pain 16 tablet 0     pantoprazole (PROTONIX) 40 MG EC tablet Take 1 tablet (40 mg) by mouth 2 times daily 180 tablet 3     SENNA-docusate sodium (SENNA S) 8.6-50 MG tablet Take 1 tablet by mouth At Bedtime       tolterodine ER (DETROL LA) 4 MG 24 hr capsule Take 1 capsule (4 mg) by mouth daily 90 capsule 3     traZODone (DESYREL) 50 MG tablet Take 1 tablet by mouth every 24 hours       Vitamin D3 (CHOLECALCIFEROL) 25 mcg (1000 units) tablet Take 1 tablet (25 mcg) by mouth daily 90 tablet 2     acetaminophen (TYLENOL) 325 MG tablet Take 2 tablets (650 mg) by mouth every 6 hours       polyethylene glycol (MIRALAX) 17 GM/Dose powder Take 17 g by mouth 2 times daily 510 g 0     No facility-administered encounter medications on file as of 11/10/2022.             Review Of Systems  Skin: As above  Eyes: negative  Ears/Nose/Throat: negative  Respiratory: No shortness of breath, dyspnea on exertion, cough, or hemoptysis  Cardiovascular: negative  Gastrointestinal: negative  Genitourinary: negative  Musculoskeletal: negative  Neurologic: negative  Psychiatric: negative  Hematologic/Lymphatic/Immunologic: negative  Endocrine: negative      O:   NAD, WDWN, Alert & Oriented, Mood & Affect wnl, Vitals stable   Here today alone   General appearance souleymane ii   Vitals stale   Alert, oriented and in no acute distress   R IA Neck 1.2cm pink pearly papule    Stuck on papules and brown macules on neck and trun k   Red papules on trunk   Flesh colored papules on neck       Eyes: Conjunctivae/lids:Normal     ENT: Lips, buccal mucosa, tongue: normal    MSK:Normal    Cardiovascular: peripheral  edema none    Pulm: Breathing Normal    Neuro/Psych: Orientation:Normal; Mood/Affect:Normal      A/P:  1. Seborrheic keratosis, lentigo, angioma, dermal nevus  2. R neck basal cell carcinoma   It was a pleasure speaking to Karine Moss today.  Previous clinic  notes and pertinent laboratory tests were reviewed prior to Karine Moss's visit.  Patient encouraged to perform monthly skin exams.  UV precautions reviewed with patient.  Risks of non-melanoma skin cancer discussed with patient   Return to clinic 6 months  PROCEDURE NOTE  R neck basal cell carcinoma   MOHS:   Location    The rationale for Mohs surgery was discussed with the patient and consent was obtained.  The risks and benefits as well as alternatives to therapy were discussed, in detail.  Specifically, the risks of infection, scarring, bleeding, prolonged wound healing, incomplete removal, allergy to anesthesia, nerve injury and recurrence were addressed.  Indication for Mohs was Location. Prior to the procedure, the treatment site was clearly identified and, if available, confirmed with previous photos and confirmed by the patient   All components of the Universal Protocol/PAUSE rule were completed.  The Mohs surgeon operated in two distinct and integrated capacities as the surgeon and pathologist.      The area was prepped with Betasept.  A rim of normal appearing skin was marked circumferentially around the lesion.  The area was infiltrated with local anesthesia.  The tumor was first debulked to remove all clinically apparent tumor.  An incision following the standard Mohs approach was done and the specimen was oriented,mapped and placed in 1 block(s).  Each specimen was then chromacoded and processed in the Mohs laboratory using standard Mohs technique and submitted for frozen section histology.  Frozen section analysis showed no residual tumor but CLEAR MARGINS.      The tumor was excised using standard Mohs technique in 1 stages(s).   CLEAR MARGINS OBTAINED and Final defect size was 1.7 cm.     We discussed the options for wound management in full with the patient including risks/benefits/ possible outcomes.        REPAIR COMPLEX: Because of the tightness of the surrounding skin and Because of the size and full thickness nature of the defect, Because of the tightness of the surrounding skin, To maintain form and function and Because of the proximity to the ear, a complex closure was planned. After LE anesthesia and prep, Burow's triangles were excised in the relaxed skin tension lines. The wound edges were widely undermined greater than width of the defect on both sides by dissection in the subcutaneous plane until adequate tissue mobility was obtained. Hemostasis was obtained. The wound edges were closed in a layered fashion using Vicryl and Fast Absorbing Plain Gut sutures. Postoperative length was 4 cm.   EBL minimal; complications none; wound care routine.  The patient was discharged in good condition and will return in one week for wound evaluation.        Again, thank you for allowing me to participate in the care of your patient.        Sincerely,        Brendan Hollingsworth MD

## 2022-11-10 NOTE — TELEPHONE ENCOUNTER
DIAGNOSIS: Bone Health/ osteoporosis should be on Prolia / Brendan Leal MD / eulalia / brad   APPOINTMENT DATE: 12.9.22   NOTES STATUS DETAILS   OFFICE NOTE from referring provider Internal 10.28.22 Mel   DISCHARGE REPORT from the ER Internal 10.15.22 Tiffany   XRAYS (IMAGES & REPORTS) Internal 10.15.22 L wrist  10.15.22 R wrist

## 2022-11-11 ENCOUNTER — OFFICE VISIT (OUTPATIENT)
Dept: FAMILY MEDICINE | Facility: CLINIC | Age: 67
End: 2022-11-11
Payer: COMMERCIAL

## 2022-11-11 VITALS
OXYGEN SATURATION: 97 % | TEMPERATURE: 98 F | BODY MASS INDEX: 24.41 KG/M2 | WEIGHT: 143 LBS | DIASTOLIC BLOOD PRESSURE: 74 MMHG | HEIGHT: 64 IN | SYSTOLIC BLOOD PRESSURE: 107 MMHG | HEART RATE: 99 BPM

## 2022-11-11 DIAGNOSIS — K92.2 UPPER GI BLEED: ICD-10-CM

## 2022-11-11 DIAGNOSIS — E11.9 TYPE 2 DIABETES MELLITUS WITHOUT COMPLICATION, WITHOUT LONG-TERM CURRENT USE OF INSULIN (H): ICD-10-CM

## 2022-11-11 DIAGNOSIS — Z09 HOSPITAL DISCHARGE FOLLOW-UP: Primary | ICD-10-CM

## 2022-11-11 DIAGNOSIS — E28.39 ESTROGEN DEFICIENCY: ICD-10-CM

## 2022-11-11 DIAGNOSIS — R15.9 FULL INCONTINENCE OF FECES: ICD-10-CM

## 2022-11-11 DIAGNOSIS — S52.124A CLOSED NONDISPLACED FRACTURE OF HEAD OF RIGHT RADIUS, INITIAL ENCOUNTER: ICD-10-CM

## 2022-11-11 NOTE — PROGRESS NOTES
"Karine Moss is a 67 year old female with history of Type II DM, hypertension, hyperlipidemia, osteoporosis, gastric ulcers, depression and anxiety. She is accompanied by her daughter. She is here for the following issues:       Hospital discharge follow-up- trauma to upper extremities  Karine is a 68 yo woman who presented to the ER on 10/15/22 for evaluation of upper extremity injuries suffered with a fall down some stairs. She was carrying pumpkins down basement steps and lost her footing, then fell. Cannot recall full details of the fall and reports she must have blacked out. She suffered abrasions and bruising to the right temple. She was able to climb the stairs and called her daughter. Per daughter, Karine said \"I think I fell down stairs and broke my wrist\". She is right hand dominant. Daughter drove her to the ER. She suffered closed fracture of left wrist, closed fracture of right hand and closed nondisplaced fracture of head of right radius. She has a fiberglass cast on the left arm, wearing a splint on the right arm. Describes some numbness in her hands, L>R.    Had head CT, no sign of fracture or other pathology.   She initially had a headache which has resolved. Doing ok at this time.     Prescribed Oxycodone 5mg, but taking rarely for pain. Cannot take NSAIDs due to hx of gastric ulcer. Tylenol not helpful. Karine had some constipation after using oxycodone and is now taking Benefiber powder.     She was told to start taking vitamin D 5000 international unit(s) daily.  She was referred to see Dr. Wise in December to discuss bone health. She is in need of a DEXA scan.     Upper GI bleed   Karine has history of hospitalization for gastric ulcer 11/2021. On follow up EGD in 2/2022, she still had a 21-25mm gastric ulcer at the anastomosis of her previous gastric bypass. Ulcer was noted to be larger than it had been in November 2021. She was placed on protonix 40mg twice daily but is actually using " "omeprazole 40mg bid. . She is followed by Dr. Calvo, [facility]. CT scan of abdomen and pelvis completed on 2/19/22. She has not followed up with GI since then. She is agreeable to scheduling with GI. She was sick recently and felt as though her ulcers were \"flared\". Her BMs at that time were soft. She does have occasional dizziness and lightheadedness. Denies hematochezia or black BMs.      Bowel and bladder incontinence  Karine reports 2 yr hx of Bowel incontinence. Reports urgency, and some loss of sensation that she has to void. She is taking Tolterodine ER 4mg daily dose for urinary incontinence and this is helping. No stress incontinence.     She has not been to the pelvic floor clinic. Would like to be seen in the [facility] system for these issues.     Diabetes  Karine has type II diabetes that is well controlled. She is currently taking empagliflozin 10mg daily and metformin XR 500mg, takes 4 at breakfast.   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)     Keratoconus     Hyperlipidemia LDL goal <100     Obstructive sleep apnea     Vitamin D insufficiency     Major depression in partial remission (H)     Plantar warts     Low back pain     DJD (degenerative joint disease) of knee     Pancreatitis     Panic     Chest pain     IBS (irritable bowel syndrome)     Heart murmur     Hypertension goal BP (blood pressure) < 140/90     Type 2 diabetes mellitus without complication, without long-term current use of insulin (H)     Osteopenia of hip     Acute conjunctivitis of left eye     PTSD (post-traumatic stress disorder)     Diabetic peripheral neuropathy (H)     Bilateral sciatica     Gastrointestinal hemorrhage associated with gastric ulcer     Chronic kidney disease, stage 3 (H)     Chronic " diastolic congestive heart failure (H)     Upper GI bleed     Neck pain     Closed nondisplaced fracture of head of right radius, initial encounter     Injury of head, initial encounter     Fall, initial encounter     Closed fracture of right hand, initial encounter     Closed fracture of left wrist, initial encounter       Current Outpatient Medications   Medication Sig Dispense Refill     acetaminophen (TYLENOL) 325 MG tablet Take 2 tablets (650 mg) by mouth every 6 hours       amLODIPine (NORVASC) 5 MG tablet Take 1 tablet (5 mg) by mouth daily 90 tablet 0     amphetamine-dextroamphetamine (ADDERALL) 30 MG tablet Take 1 tablet (30 mg) by mouth every 24 hours 1.5 mg a total of 45 mg  Daily 30 tablet 0     atorvastatin (LIPITOR) 20 MG tablet Take 1 tablet (20 mg) by mouth daily 90 tablet 3     blood glucose (NO BRAND SPECIFIED) test strip Use to test blood sugar as needed with Freestyle Nirmal CGM. 100 strip 0     blood glucose (NO BRAND SPECIFIED) test strip Use to test blood sugar as directed with CGM sensor. 50 strip 1     calcium carbonate (OS-KINJAL) 1500 (600 Ca) MG tablet Take 2 tablets (1,200 mg) by mouth daily       cevimeline (EVOXAC) 30 MG capsule take 1 cap  capsule 3     Continuous Blood Gluc  (FREESTYLE NIRMAL 14 DAY READER) EBEN Use to read blood sugars as per 's instructions. 1 each 0     Continuous Blood Gluc Sensor (FREESTYLE NIRMAL 14 DAY SENSOR) MISC Change every 14 days. 2 each 11     cyanocobalamin (VITAMIN B-12) 1000 MCG tablet Take one every other day 90 tablet 1     DULoxetine (CYMBALTA) 30 MG capsule Take 1 capsule (30 mg) by mouth 2 times daily 180 capsule 3     DULoxetine (CYMBALTA) 60 MG capsule Take 1 capsule (60 mg) by mouth At Bedtime Take in addition to current 30 mg evening dose for a total of 90 mg at bedtime. 90 capsule 3     empagliflozin (JARDIANCE) 10 MG TABS tablet Take 1 tablet (10 mg) by mouth daily Schedule appt with ADEN Blas, [facility] to  "ensure future refills 30 tablet 2     losartan (COZAAR) 100 MG tablet Take 1 tablet (100 mg) by mouth daily 90 tablet 3     metFORMIN (GLUCOPHAGE-XR) 500 MG 24 hr tablet Take 4 po q am with breakfast. 360 tablet 3     oxyCODONE (ROXICODONE) 5 MG tablet Take 0.5 tablets (2.5 mg) by mouth every 6 hours as needed for moderate to severe pain 16 tablet 0     pantoprazole (PROTONIX) 40 MG EC tablet Take 1 tablet (40 mg) by mouth 2 times daily 180 tablet 3     polyethylene glycol (MIRALAX) 17 GM/Dose powder Take 17 g by mouth 2 times daily 510 g 0     SENNA-docusate sodium (SENNA S) 8.6-50 MG tablet Take 1 tablet by mouth At Bedtime       tolterodine ER (DETROL LA) 4 MG 24 hr capsule Take 1 capsule (4 mg) by mouth daily 90 capsule 3     traZODone (DESYREL) 50 MG tablet Take 1 tablet by mouth every 24 hours       Vitamin D3 (CHOLECALCIFEROL) 25 mcg (1000 units) tablet Take 1 tablet (25 mcg) by mouth daily 90 tablet 2       Allergies   Allergen Reactions     Pravastatin Other (See Comments)     woozy     Codeine Nausea and Vomiting     Nsaids GI Disturbance and Other (See Comments)     Pt had bariatric surgery, should not ever take oral NSAIDS due to risk of gastric ulcers.  Pt had bariatric surgery, should not ever take oral NSAIDS due to risk of gastric ulcers.        EXAM  /74   Pulse 99   Temp 98  F (36.7  C)   Ht 1.613 m (5' 3.5\")   Wt 64.9 kg (143 lb)   SpO2 97%   BMI 24.93 kg/m    Gen: Alert, pleasant, NAD  COR: S1,S2, no murmur  Lungs: CTA bilaterally, no rhonchi, wheezes or rales  Upper extremities: both arms splinted, cap refill brisk at fingertips, fingers warm.      Assessment:  (Z09) Hospital discharge follow-up  (primary encounter diagnosis)  (S50.870U) Closed nondisplaced fracture of head of right radius, initial encounter  Comment: bilateral arm fractures  Plan: Dexa hip/pelvis/spine*, CANCELED: Dexa         hip/pelvis/spine*        Follow up with ortho as planned. Follow up with ortho provider, " Dr. Friedman to discuss bone density issues.     (E11.9) Type 2 diabetes mellitus without complication, without long-term current use of insulin (H)  Comment: A1c in target range  Lab Results   Component Value Date    A1C 6.2 10/17/2022    A1C 6.6 08/25/2022   Plan: no change in medications. Follow up in January 2023.     (K92.2) Upper GI bleed  Comment: overdue for follow up endoscopy and surveillance. Currently denies abdominal pain, black stools taking omeprazole 40mg bid  Plan: omeprazole (PRILOSEC) 40 MG DR capsule  GI  Referral - Procedure Only, Adult         GI  Referral - Consult Only        Will re enter referral to GI team at the [facility] and order endoscopy    (E28.39) Estrogen deficiency  Comment: previous osteopenia on DEXA, currently with bilateral arm fractures  Plan: Dexa hip/pelvis/spine*, CANCELED: Dexa         hip/pelvis/spine*        Refer for bone density testing, discussed recommendation for calcium 1200mg daily (she takes supplements) and vitamin D 5000 international unit(s) daily per ortho recommendations.       (R15.9) Full incontinence of feces  Comment: 2 yr hs of urgency, soiling without warning  Plan: Adult Colorectal Surgery  Referral        Refer to colorectal team for evaluation and treatment.     42 minutes spend on the date of this encounter doing chart review, history and exam, documentation and further activities as noted above.       Anay Martinez MD  Internal Medicine/Pediatrics'

## 2022-11-11 NOTE — PATIENT INSTRUCTIONS
BOWEL INCONTINENCE  Will contact you regarding referral with colorectal group at the [facility]                                                         With GI team to take over ulcer surveillance    BONE DENSITY TESTING  Bone density testing  [phone], any location  Follow up with Dr. Wise in December      GI ULCER FOLLOW UP  I will order upper endoscopy but will coordinate with the GI team before I do this so you can get started with care at the [facility].

## 2022-11-16 ENCOUNTER — ALLIED HEALTH/NURSE VISIT (OUTPATIENT)
Dept: DERMATOLOGY | Facility: CLINIC | Age: 67
End: 2022-11-16
Payer: COMMERCIAL

## 2022-11-16 DIAGNOSIS — Z48.01 DRESSING CHANGE OR REMOVAL, SURGICAL WOUND: Primary | ICD-10-CM

## 2022-11-16 PROCEDURE — 99207 PR NO CHARGE NURSE ONLY: CPT | Performed by: STUDENT IN AN ORGANIZED HEALTH CARE EDUCATION/TRAINING PROGRAM

## 2022-11-16 NOTE — PROGRESS NOTES
Karine Moss comes into clinic today at the request of Dr. Hollingsworth Ordering Provider for Wound Check Action taken: bandage changed.      This service provided today was under the supervising provider of the day Dr. Ortega, who was available if needed.    Please see providers note on 11/10/22 for orders  Patient returned to clinic for post surgery 1 week follow up bandage change. Patient has no complaints, denies pain.  Bandage removed from R neck. Area cleansed with wound cleanser. Site is healing with no signs of infection, wound edges approximating well.   Reapplied new steri strips and paper tape.     Advised to watch for signs and symptons of infection; spreading redness, increasing pain, drainage, odor, fever.   Call or report promptly to clinic if any of these symptoms are present.    Wound care post op instructions given and discussed for 7 day bandage removal and continued incision/scar care.    Patient verbalized understanding.     Dorene PANDYA RN  Dermatology   [phone]

## 2022-11-16 NOTE — PATIENT INSTRUCTIONS
WOUND CARE INSTRUCTIONS  for  ONE WEEK AFTER SURGERY          Leave flat bandage on your skin for one week after today s bandage change.  In one week when you remove the bandage, you may resume your regular skin care routine, including washing with mild soap and water, applying moisturizer, make-up and sunscreen.    If there are any open or bleeding areas at the incision/graft site you should begin to cover the area with a bandage daily as follows:    Clean and dry the area with plain tap water using a Q-tip or sterile gauze pad.  Apply Polysporin or Bacitracin ointment to the open area.  Cover the wound with a band-aid or a sterile non-stick gauze pad and micropore paper tape.         SIGNS OF INFECTION  - If you notice any of these signs of infection, call your doctor right away: expanding redness around the wound.  - Yellow or greenish-colored pus or cloudy wound drainage.    - Red streaking spreading from the wound.  - Increased swelling, tenderness, or pain around the wound.   - Fever.    Please remember that yellow and clear drainage from a wound can be normal and related to normal wound healing.  Isolated drainage from a wound without a combination of the above features does not indicate infection.       *Once the bandages are removed, the scar will be red and firm (especially in the lip/chin area). This is normal and will fade in time. It might take 6-12 months for this to happen.     *Massaging the area will help the scar soften and fade quicker. Begin to massage the area one month after the bandages have been removed. To massage apply pressure directly and firmly over the scar with the fingertips and move in a circular motion. Massage the area for a few minutes several times a day. Continue to massage the site for several months.    *Approximately 6-8 weeks after surgery it is not uncommon to see the formation of  tender pimple-like  bump along the scar. This is normal. As the scar continues to mature and  the stitches underneath the skin begin to dissolve, this might occur. Do not pick or squeeze, this will resolve on it s own. Should one break open producing a small amount of drainage, apply Polysporin or Bacitracin ointment a few times a day until the wound is completely healed.    *Numbness in the surgical area is expected. It might take 12-18 months for the feeling to return to normal. During this time sensations of itchiness, tingling and occasional sharp pains might be noted. These feelings are normal and will subside once the nerves have completely healed.         IN CASE OF EMERGENCY: Dr Hollingsworth [phone]     If you were seen in [place] call: [phone]    If you were seen in [place] call: [phone]

## 2022-11-17 ENCOUNTER — TRANSFERRED RECORDS (OUTPATIENT)
Dept: HEALTH INFORMATION MANAGEMENT | Facility: CLINIC | Age: 67
End: 2022-11-17

## 2022-11-17 ENCOUNTER — TELEPHONE (OUTPATIENT)
Dept: FAMILY MEDICINE | Facility: CLINIC | Age: 67
End: 2022-11-17

## 2022-11-17 NOTE — CONFIDENTIAL NOTE
Called and left verbal confirmation for ARI Haynes, at  [phone], with [facility] for the following orders:    Occupational Therapy (OT) once a week for one week; twice a week for two weeks; once a week for three weeks.    Requested that Ivy call the clinic with any further questions or order needs.    DELIA FernandesN, RN, [facility]  11/17/22  10:15 AM

## 2022-11-20 RX ORDER — OMEPRAZOLE 40 MG/1
CAPSULE, DELAYED RELEASE ORAL
Qty: 180 CAPSULE | Refills: 1 | Status: SHIPPED | OUTPATIENT
Start: 2022-11-20 | End: 2023-06-16

## 2022-11-21 ENCOUNTER — TELEPHONE (OUTPATIENT)
Dept: FAMILY MEDICINE | Facility: CLINIC | Age: 67
End: 2022-11-21

## 2022-11-21 DIAGNOSIS — N39.46 MIXED INCONTINENCE URGE AND STRESS (MALE)(FEMALE): ICD-10-CM

## 2022-11-21 NOTE — TELEPHONE ENCOUNTER
Patient's daughter called hoping to speak with an RN about patient's adderall. Dr. Dirk Sam from [facility] this medication, however, has not been able to contacted (Started last Tuesday). Patient's daughter needs help with getting the medication refilled.

## 2022-11-21 NOTE — TELEPHONE ENCOUNTER
Spoke with Leila regarding her request for assistance with Karine's Adderall prescription. The pharmacy where this prescription is usually filled does not have stock to refill the medication so Leila located one that does and requested that they reach out to Dr. Sam's team at [facility]. The first pharmacy request was on Tuesday, then another on Thursday, then Leila left a VM with [facility] on Friday. None of the calls have been returned. Called [facility] and [facility] requesting that they contact Leila with status update of prescription refill request.    Navin PHILIP, RN  11/21/22 3:32 PM

## 2022-11-22 RX ORDER — TOLTERODINE 4 MG/1
4 CAPSULE, EXTENDED RELEASE ORAL DAILY
Qty: 90 CAPSULE | Refills: 3 | Status: SHIPPED | OUTPATIENT
Start: 2022-11-22 | End: 2023-10-30
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
## 2022-11-22 NOTE — TELEPHONE ENCOUNTER
Last visit 11/11/22, no future visit  Prescription approved per [facility] Refill Protocol.  Davina Chawla RN  [facility]

## 2022-11-23 ENCOUNTER — VIRTUAL VISIT (OUTPATIENT)
Dept: BEHAVIORAL HEALTH | Facility: CLINIC | Age: 67
End: 2022-11-23
Payer: COMMERCIAL

## 2022-11-23 DIAGNOSIS — F43.10 PTSD (POST-TRAUMATIC STRESS DISORDER): Primary | ICD-10-CM

## 2022-11-23 ASSESSMENT — ANXIETY QUESTIONNAIRES
7. FEELING AFRAID AS IF SOMETHING AWFUL MIGHT HAPPEN: SEVERAL DAYS
6. BECOMING EASILY ANNOYED OR IRRITABLE: NOT AT ALL
5. BEING SO RESTLESS THAT IT IS HARD TO SIT STILL: NOT AT ALL
1. FEELING NERVOUS, ANXIOUS, OR ON EDGE: NOT AT ALL
4. TROUBLE RELAXING: NOT AT ALL
GAD7 TOTAL SCORE: 2
2. NOT BEING ABLE TO STOP OR CONTROL WORRYING: NOT AT ALL
GAD7 TOTAL SCORE: 2
3. WORRYING TOO MUCH ABOUT DIFFERENT THINGS: SEVERAL DAYS
IF YOU CHECKED OFF ANY PROBLEMS ON THIS QUESTIONNAIRE, HOW DIFFICULT HAVE THESE PROBLEMS MADE IT FOR YOU TO DO YOUR WORK, TAKE CARE OF THINGS AT HOME, OR GET ALONG WITH OTHER PEOPLE: NOT DIFFICULT AT ALL
GAD7 TOTAL SCORE: 2
7. FEELING AFRAID AS IF SOMETHING AWFUL MIGHT HAPPEN: SEVERAL DAYS
8. IF YOU CHECKED OFF ANY PROBLEMS, HOW DIFFICULT HAVE THESE MADE IT FOR YOU TO DO YOUR WORK, TAKE CARE OF THINGS AT HOME, OR GET ALONG WITH OTHER PEOPLE?: NOT DIFFICULT AT ALL

## 2022-11-23 ASSESSMENT — PATIENT HEALTH QUESTIONNAIRE - PHQ9
10. IF YOU CHECKED OFF ANY PROBLEMS, HOW DIFFICULT HAVE THESE PROBLEMS MADE IT FOR YOU TO DO YOUR WORK, TAKE CARE OF THINGS AT HOME, OR GET ALONG WITH OTHER PEOPLE: NOT DIFFICULT AT ALL
SUM OF ALL RESPONSES TO PHQ QUESTIONS 1-9: 2
SUM OF ALL RESPONSES TO PHQ QUESTIONS 1-9: 2

## 2022-11-25 ENCOUNTER — TELEPHONE (OUTPATIENT)
Dept: GASTROENTEROLOGY | Facility: CLINIC | Age: 67
End: 2022-11-25

## 2022-11-25 DIAGNOSIS — S62.102A CLOSED FRACTURE OF LEFT WRIST: Primary | ICD-10-CM

## 2022-11-25 NOTE — TELEPHONE ENCOUNTER
Diagnosis, Referred by & from: Incontinence of Feces; referred by Dr. Anay Martinez   Appt date: 3/7/2023   NOTES STATUS DETAILS   OFFICE NOTE from referring provider Internal [facility]:  12/6/22, 11/11/22 - PCC OV with Dr. Martinez   OFFICE NOTE from other specialist Received [facility]:  11/2/20 - GI OV with LISA eLe   DISCHARGE SUMMARY from [facility]:  11/16/21 - Admission with Dr. Gavin   DISCHARGE REPORT from the ER N/A    OPERATIVE REPORT Care Everywhere [facility]:  4/22/15 - OP Note for LAPAROSCOPIC BYPASS GASTRIC JUANCHO-N-Y with Dr. Berkowitz   MEDICATION LIST Internal    LABS N/A    DIAGNOSTIC PROCEDURES     COLONOSCOPY Received / Care Everywhere [facility]:  10/1/20 - Colonoscopy  4/27/18 - Colonoscopy    [facility]:  4/8/13 - Colonoscopy   UPPER ENDOSCOPY (EGD) Received / Internal [facility]:  1/9/23 - EGD    [facility]:  2/11/22 - EGD  11/16/21 - EGD  1/28/21 - EGD  1/11/21 - EGD  11/2/20 - EGD    [facility]:  11/16/21 - EGD   IMAGING (DISC & REPORT)      CT Internal [facility]:  2/19/22 - CT Abd/Pelvis     Records Requested    Facility  [facility]  Fax: [phone]   Outcome * 11/25/22 9:27 AM Faxed req to [facility] for records to be faxed to the clinic. - Rebekah    * 12/5/22 1:12 PM Records received from [facility] and sent to HIM to be scanned into the chart. Serge Welch

## 2022-11-25 NOTE — TELEPHONE ENCOUNTER
Screening Questions  BLUE  KIND OF PREP RED  LOCATION [review exclusion criteria] GREEN  SEDATION TYPE        Yes Are you active on mychart?       [place] Ordering/Referring Provider?        [facility] What type of coverage do you have?      N Have you had a positive covid test in the last 14 days?     23.7 1. BMI  [BMI 40+ - review exclusion criteria]    Yes  2. Are you able to give consent for your medical care? [IF NO,RN REVIEW]        N  3. Are you taking any prescription pain medications on a routine schedule?      NA  3a. EXTENDED PREP What kind of prescription?     N 4. Do you have any chemical dependencies such as alcohol, street drugs, or methadone?    N 5. Do you have any history of post-traumatic stress syndrome, severe anxiety or history of psychosis?      **If yes 3- 5 , please schedule with MAC sedation.**          IF YES TO ANY 6 - 10 - HOSPITAL SETTING ONLY.     N 6.   Do you need assistance transferring?     N 7.   Have you had a heart or lung transplant?    N 8.   Are you currently on dialysis?   N 9.   Do you use daily home oxygen?   N 10. Do you take nitroglycerin?   10a. NA If yes, how often?     11. [FEMALES]  NA Are you currently pregnant?    11a. NA If yes, how many weeks? [ Greater than 12 weeks, OR NEEDED]    N 12. Do you have Pulmonary Hypertension? *NEED PAC APPT AT [facility]*     N 13. [review exclusion criteria]  Do you have any implantable devices in your body (pacemaker, defib, LVAD)?    N 14. In the past 6 months, have you had any heart related issues including cardiomyopathy or heart attack?     14a. N If yes, did it require cardiac stenting if so when?     N 15. Have you had a stroke or Transient ischemic attack (TIA - aka  mini stroke ) within 6 months?      Yes 16. Do you have mod to severe Obstructive Sleep Apnea?  [Hospital only - Ok at [facility]]    N 17. Do you have SEVERE AND UNCONTROLLED asthma? *NEED PAC APPT AT [facility]*     N 18. Are you currently taking any blood thinners?     " 18a. If yes, inform patient to \"follow up w/ ordering provider for bridging instructions.\"    N 19. Do you take the medication Phentermine?    19a. If yes, \"Hold for 7 days before procedure.  Please consult your prescribing provider if you have questions about holding this medication.\"     N  20. Do you have chronic kidney disease?      Yes  21. Do you have a diagnosis of diabetes?     NA  22. On a regular basis do you go 3-5 days between bowel movements?      23. Preferred LOCAL Pharmacy for Pre Prescription    [ LIST ONLY ONE PHARMACY]     [facility]      - CLOSING REMINDERS -    Informed patient they will need an adult    Cannot take any type of public or medical transportation alone    Conscious Sedation- Needs  for 6 hours after the procedure       MAC/General-Needs  for 24 hours after procedure    Pre-Procedure Covid test to be completed [[facility] PCR Testing Required]    Confirmed Nurse will call to complete assessment       - SCHEDULING DETAILS -     Eduar  Surgeon    1/9/23  Date of Procedure  Upper Endoscopy [EGD]  Type of Procedure Scheduled  [facility]       CS Sedation Type     NO PAC / Pre-op Required         Additional comments:            "

## 2022-11-29 ENCOUNTER — TELEPHONE (OUTPATIENT)
Dept: FAMILY MEDICINE | Facility: CLINIC | Age: 67
End: 2022-11-29

## 2022-11-29 DIAGNOSIS — E11.9 TYPE 2 DIABETES MELLITUS WITHOUT COMPLICATION, WITHOUT LONG-TERM CURRENT USE OF INSULIN (H): ICD-10-CM

## 2022-11-29 NOTE — TELEPHONE ENCOUNTER
Home Care Verbal Orders    Caller Name:Reny  Agency: [employer]    Orders Requested:   Other- Due to their policy, required to report when heartrate is over 100

## 2022-11-30 NOTE — TELEPHONE ENCOUNTER
Left voice message for Reny ([employer] [facility]) acknowledging receipt of her report of pt's heartrate being above 100.    Madyson Steward, DELIAN, RN  11/30/22, 11:07 AM

## 2022-12-01 ENCOUNTER — HOSPITAL ENCOUNTER (OUTPATIENT)
Dept: BONE DENSITY | Facility: CLINIC | Age: 67
Discharge: HOME | End: 2022-12-01
Attending: INTERNAL MEDICINE | Admitting: INTERNAL MEDICINE
Payer: COMMERCIAL

## 2022-12-01 DIAGNOSIS — E28.39 ESTROGEN DEFICIENCY: ICD-10-CM

## 2022-12-01 DIAGNOSIS — S52.124A CLOSED NONDISPLACED FRACTURE OF HEAD OF RIGHT RADIUS, INITIAL ENCOUNTER: ICD-10-CM

## 2022-12-01 PROCEDURE — 77080 DXA BONE DENSITY AXIAL: CPT

## 2022-12-01 NOTE — TELEPHONE ENCOUNTER
Medication requested: empagliflozin (JARDIANCE) 10 MG TABS tablet  Last office visit: 11/11/2022  [facility] appointments: 12/6/2022  Medication last refilled: 8/17/2022; 30 + 2 refills  Last qualifying labs:     Component      Latest Ref Rng & Units 10/17/2022   Hemoglobin A1C      <5.7 % 6.2 (H)     Component      Latest Ref Rng & Units 10/17/2022   Potassium      3.4 - 5.3 mmol/L 5.0   Creatinine      0.51 - 0.95 mg/dL 1.04 (H)     Prescription approved per [facility] Refill Protocol.    CEDRICK Juares, RN  12/01/22, 10:18 AM

## 2022-12-02 ENCOUNTER — ANCILLARY PROCEDURE (OUTPATIENT)
Dept: GENERAL RADIOLOGY | Facility: CLINIC | Age: 67
End: 2022-12-02
Attending: ORTHOPAEDIC SURGERY
Payer: COMMERCIAL

## 2022-12-02 ENCOUNTER — OFFICE VISIT (OUTPATIENT)
Dept: ORTHOPEDICS | Facility: CLINIC | Age: 67
End: 2022-12-02
Payer: COMMERCIAL

## 2022-12-02 DIAGNOSIS — S62.102A CLOSED FRACTURE OF LEFT WRIST: ICD-10-CM

## 2022-12-02 DIAGNOSIS — S52.501D: Primary | ICD-10-CM

## 2022-12-02 DIAGNOSIS — M81.8 OTHER OSTEOPOROSIS WITHOUT CURRENT PATHOLOGICAL FRACTURE: ICD-10-CM

## 2022-12-02 DIAGNOSIS — S52.601D: Primary | ICD-10-CM

## 2022-12-02 PROCEDURE — 73110 X-RAY EXAM OF WRIST: CPT | Mod: LT | Performed by: RADIOLOGY

## 2022-12-02 PROCEDURE — 99213 OFFICE O/P EST LOW 20 MIN: CPT | Performed by: ORTHOPAEDIC SURGERY

## 2022-12-02 NOTE — PROGRESS NOTES
DME FITTING    Relevant Diagnosis: Closed fracture of left wrist  Wrist brace was fit on patient's Left UE    Person(s) involved in teaching:   Patient    Brace was applied in standard [place]:  Yes  Brace fit well:  Yes  Patient reports brace to fit comfortably:  Yes    Education:   Patient shown self application and removal of brace: Yes  Patient shown how to adjust brace fit, if necessary: Yes  Patient educated on billing and return policy: Yes  Patient confirmed understanding when and how to contact clinic with concerns: Yes    Roni Eller EMT    I was present entire visit.    Brendan Leal MD

## 2022-12-02 NOTE — LETTER
12/2/2022         RE: Karine Moss  [address]  [place] [address]        Dear Colleague,    Thank you for referring your patient, Karine Moss, to the [facility] [place]. Please see a copy of my visit note below.    DME FITTING    Relevant Diagnosis: Closed fracture of left wrist  Wrist brace was fit on patient's Left UE    Person(s) involved in teaching:   Patient    Brace was applied in standard Manner:  Yes  Brace fit well:  Yes  Patient reports brace to fit comfortably:  Yes    Education:   Patient shown self application and removal of brace: Yes  Patient shown how to adjust brace fit, if necessary: Yes  Patient educated on billing and return policy: Yes  Patient confirmed understanding when and how to contact clinic with concerns: Yes    Rnoi Eller, EMT    I was present entire visit.    Brendan Leal MD    S:  R elbow doing well.  R wrist also not bothering patient.  Has been using wrist brace.  No problems LUE cast/ pressure issues or other complaints.         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)     Keratoconus     Hyperlipidemia LDL goal <100     Obstructive sleep apnea     Vitamin D insufficiency     Major depression in partial remission (H)     Plantar warts     Low back pain     DJD (degenerative joint disease) of knee     Pancreatitis     Panic     Chest pain     IBS (irritable bowel syndrome)     Heart murmur     Hypertension goal BP (blood pressure) < 140/90     Type 2 diabetes mellitus without complication, without long-term current use of insulin (H)     Osteopenia of hip     Acute conjunctivitis of left eye     PTSD (post-traumatic stress disorder)     Diabetic peripheral neuropathy (H)     Bilateral sciatica     Gastrointestinal hemorrhage associated with gastric ulcer     Chronic kidney disease, stage 3 (H)     Chronic  diastolic congestive heart failure (H)     Upper GI bleed     Neck pain     Closed nondisplaced fracture of head of right radius, initial encounter     Injury of head, initial encounter     Fall, initial encounter     Closed fracture of right hand, initial encounter     Closed fracture of left wrist, initial encounter            Past Medical History:   Diagnosis Date     Arthritis      Basal cell carcinoma      Chest pain     seeing Cardiology     Depression 1991    after 's death     Diabetes mellitus (H)      Diabetic renal disease (H)     microalbuminuria     DJD (degenerative joint disease) of knee      H/O vitamin D deficiency      Hypertension      IBS (irritable bowel syndrome)     diarrhea      Keratoconus      Low back pain      Narcolepsy 2007    Dx'd by Sleep specialist 347.00, pt discontinued Adderal mid Nov. 13 due to tacycardia concerns     Obesity      Obstructive sleep apnea     327.23, 3 sleep studies,Dr. [facility]     Pancreatitis     related to Victoza, also on Xyrem     Panic      RLS (restless legs syndrome)      Sinus tachycardia             Past Surgical History:   Procedure Laterality Date     COLONOSCOPY  10/01/2020    poor quality prep     ear surgery  2002 and 01/2004     ESOPHAGOSCOPY, GASTROSCOPY, DUODENOSCOPY (EGD), COMBINED N/A 11/16/2021    Procedure: ESOPHAGOGASTRODUODENOSCOPY (EGD);  Surgeon: Jayla Calvo MD;  Location:  GI     GASTRIC BYPASS  2013    laparoscopic jimmy en y c ometopexy     HEMORRHOIDECTOMY  09/14/2000     LAPAROSCOPIC CHOLECYSTECTOMY  2015     LASIK BILATERAL       UVULOPALATOPHARYNGOPLASTY       UVVP              Social History     Tobacco Use     Smoking status: Never     Smokeless tobacco: Never   Substance Use Topics     Alcohol use: Not Currently     Comment: rare            Family History   Problem Relation Age of Onset     Memory loss Mother      Diabetes Father      Chronic Obstructive Pulmonary Disease Sister      Anemia Sister         hx  of ulcers     Other - See Comments Sister 65        MVA, followed by leg pain after a fall     Dementia Sister 68     Dementia Brother 70     Cancer Brother         lung     Cancer - colorectal Maternal Grandmother      Cancer Daughter      Obesity Daughter         s/p lap band               Allergies   Allergen Reactions     Pravastatin Other (See Comments)     woozy     Codeine Nausea and Vomiting     Nsaids GI Disturbance and Other (See Comments)     Pt had bariatric surgery, should not ever take oral NSAIDS due to risk of gastric ulcers.  Pt had bariatric surgery, should not ever take oral NSAIDS due to risk of gastric ulcers.            Current Outpatient Medications   Medication Sig Dispense Refill     amphetamine-dextroamphetamine (ADDERALL) 30 MG tablet Take 1 tablet (30 mg) by mouth every 24 hours 1.5 mg a total of 45 mg  Daily 30 tablet 0     atorvastatin (LIPITOR) 20 MG tablet Take 1 tablet (20 mg) by mouth daily 90 tablet 3     blood glucose (NO BRAND SPECIFIED) test strip Use to test blood sugar as needed with Freestyle Nirmal CGM. 100 strip 0     blood glucose (NO BRAND SPECIFIED) test strip Use to test blood sugar as directed with CGM sensor. 50 strip 1     calcium carbonate (OS-KINJAL) 1500 (600 Ca) MG tablet Take 2 tablets (1,200 mg) by mouth daily       cevimeline (EVOXAC) 30 MG capsule take 1 cap  capsule 3     Continuous Blood Gluc  (FREESTYLE NIRMAL 14 DAY READER) EBEN Use to read blood sugars as per 's instructions. 1 each 0     Continuous Blood Gluc Sensor (FREESTYLE NIRMAL 14 DAY SENSOR) MISC Change every 14 days. 2 each 11     cyanocobalamin (VITAMIN B-12) 1000 MCG tablet Take one every other day 90 tablet 1     DULoxetine (CYMBALTA) 30 MG capsule Take 1 capsule (30 mg) by mouth 2 times daily 180 capsule 3     DULoxetine (CYMBALTA) 60 MG capsule Take 1 capsule (60 mg) by mouth At Bedtime Take in addition to current 30 mg evening dose for a total of 90 mg at bedtime. 90  capsule 3     empagliflozin (JARDIANCE) 10 MG TABS tablet Take 1 tablet (10 mg) by mouth daily Schedule appt with ADEN Blas, [facility] to ensure future refills 30 tablet 0     losartan (COZAAR) 100 MG tablet Take 1 tablet (100 mg) by mouth daily 90 tablet 3     metFORMIN (GLUCOPHAGE-XR) 500 MG 24 hr tablet Take 4 po q am with breakfast. 360 tablet 3     omeprazole (PRILOSEC) 40 MG DR capsule Take 40mg twice daily 180 capsule 1     oxyCODONE (ROXICODONE) 5 MG tablet Take 0.5 tablets (2.5 mg) by mouth every 6 hours as needed for moderate to severe pain 16 tablet 0     SENNA-docusate sodium (SENNA S) 8.6-50 MG tablet Take 1 tablet by mouth At Bedtime       tolterodine ER (DETROL LA) 4 MG 24 hr capsule Take 1 capsule (4 mg) by mouth daily 90 capsule 3     traZODone (DESYREL) 50 MG tablet Take 1 tablet by mouth every 24 hours            Review Of Systems  Skin: negative  Eyes: negative  Ears/Nose/Throat: negative  Respiratory: No shortness of breath, dyspnea on exertion, cough, or hemoptysis    O: Physical Exam:  R elbow without pain to palpation, no crepitus.  No limits to pronation/supination.  R wrist exam unremarkable, supple, without pain.  Swelling improved compared to last visit.  L wrist no evidence excoriation around the margins of the cast, no evidence pressure necrosis.  CMS intact to both upper extremities.    Images:  Findings:   PA, oblique, and lateral radiograph the left wrist. Interval bony  bridging along the previously seen minimally displaced ulnar styloid  fracture and comminuted and impacted fracture of the distal radius.     Linear lucency through the trapezium not definitively seen on prior  and may be projectional.     Osteopenic appearance of the bones.                                                                      Impression:   1. Interval healing of the left distal radial and ulnar styloid  fractures.  2. Linear lucency through the trapezium not definitively seen on prior  exam  and may be projectional though fracture cannot be entirely  excluded. Correlate for point tenderness.         A:  Healing R radial head fx, R distal radial fx.  L wrist fracture healing well.    P: Discontinue brace R wrist, continue to rehab R wrist and elbow.  Brace for L wrist.    See back 6 weeks and repeat images L wrist  DEXA and Vit D update  Notify if exacerbation symptoms           In addition to the above assessment and plan each active problem on Karine's problem list was evaluated today. This included the questioning of Karine for any medication problems. We will continue the current treatment plan for these active problems except as noted      GREY CASEY MD

## 2022-12-02 NOTE — NURSING NOTE
Reason For Visit:   Chief Complaint   Patient presents with     RECHECK     Follow-up patient here for 4 week return visit left wrist Fx, DOI: 10/15/22.  Patient is to have cast removed and then new XR images today.  Patient reports wrist feels okay, could feel the Fx when pronating/supinating.  Reports occasional finger numbness when she uses her hand a lot.         There were no vitals taken for this visit.    Pain Assessment  Patient Currently in Pain: Yes  0-10 Pain Scale: 1    Brendan Ruiz ATC

## 2022-12-06 ENCOUNTER — OFFICE VISIT (OUTPATIENT)
Dept: FAMILY MEDICINE | Facility: CLINIC | Age: 67
End: 2022-12-06
Payer: COMMERCIAL

## 2022-12-06 VITALS
SYSTOLIC BLOOD PRESSURE: 112 MMHG | WEIGHT: 143 LBS | BODY MASS INDEX: 24.41 KG/M2 | DIASTOLIC BLOOD PRESSURE: 74 MMHG | HEART RATE: 92 BPM | OXYGEN SATURATION: 97 % | TEMPERATURE: 97.8 F | HEIGHT: 64 IN

## 2022-12-06 DIAGNOSIS — I10 HYPERTENSION GOAL BP (BLOOD PRESSURE) < 140/90: Primary | ICD-10-CM

## 2022-12-06 DIAGNOSIS — I10 HYPERTENSION GOAL BP (BLOOD PRESSURE) < 140/90: ICD-10-CM

## 2022-12-06 DIAGNOSIS — E11.9 TYPE 2 DIABETES MELLITUS WITHOUT COMPLICATION, WITHOUT LONG-TERM CURRENT USE OF INSULIN (H): ICD-10-CM

## 2022-12-06 DIAGNOSIS — Z87.11 HISTORY OF GASTRIC ULCER: Primary | ICD-10-CM

## 2022-12-06 NOTE — PROGRESS NOTES
"Karine Moss is a 67 year old female with history of Type II DM, hypertension, hyperlipidemia, osteoporosis, gastric ulcers, depression and anxiety. She is accompanied by her daughter. She is here for the following issues:    History of gastric ulcer  Karine has history of hospitalization for gastric ulcer 11/2021. On follow up EGD in 2/2022, she still had a 21-25mm gastric ulcer at the anastomosis of her previous gastric bypass. Ulcer was noted to be larger than it had been in November 2021. She was placed on protonix 40mg twice daily but is actually using omeprazole 40 mg bid. She is currently followed by Dr. Calvo, [facility] but is interested in moving all of her care to the [facility]. . CT scan of abdomen and pelvis completed on 2/19/22. She has an upcoming upper endoscopy with [facility] on 1/9/2023. Today, she reports that her stomach has been feeling \"fine.\" Her daughter is curious whether Karine should continue omeprazole, which is moving into Tier 2, or change to famotidine to stay with a Tier 1 drug. Karine has enough omeprazole through 1/3/2023 and also has some protonix left from a previous prescription.     Mixed incontinence, urge and stress  Karine has been taking tolterodine 4 mg daily for treatment of mixed urge and stress incontinence. This has been working well for her and she no longer has to wear a pad. This medication is moving into Tier 3 from Tier 2 and her daughter would like to discuss which Tier 2 medications would be a good replacement.     Diabetes Mellitus Type 2  Karine is taking Jardiance 10 mg daily and metformin 2000 mg daily for the treatment of diabetes. Her A1c has been <7%.  Denies any low blood sugars.   Her next A1c is due in January 2023.  Lab Results   Component Value Date    A1C 6.2 10/17/2022    A1C 6.6 08/25/2022    A1C 6.3 03/29/2022    A1C 6.0 12/13/2021       Hypertension  Karine is currently taking amlodipine 5 mg daily and losartan 100 mg " "for the treatment of hypertension. She takes these medications at 2 pm. Her BP is in target range. At home readings are in the range 110s-120s/60s-70s. Karine states that she sometimes feels dizzy or lightheaded after getting up too quickly from a lying down or sitting position. She describes this feeling as \"a rush.\" She notes that she is not getting enough fluids. She does not drink coffee. She denies tremors and sweats. Her daughter reports that they are often told that Mauros BP is low and her heart rate is high at medical appointments and would like to confirm whether her heart rate has been consistent with previous readings here at the clinic.    BP Readings from Last 3 Encounters:   12/06/22 112/74   11/11/22 107/74   10/18/22 126/76      Right Wrist Fracture  Karine fractured her right wrist recently. She has been wearing a splint on that wrist since the cast was removed as it is less painful for her with the splint on, although she is no longer required to wear the splint. Her daughter is concerned about an area of swelling over the dorsal aspect of the base of Karine's right hand. Karine notes that the area also seems bruised. This was not the area of the original fracture. Discussed the possibility of a dependent edema and advised taking breaks from the splint or keeping a pillow below the wrist when at rest.       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)     Keratoconus     Hyperlipidemia LDL goal <100     Obstructive sleep apnea     Vitamin D insufficiency     Major depression in partial remission (H)     Plantar warts     Low back pain     DJD (degenerative joint disease) of knee     Pancreatitis     Panic     Chest pain     IBS (irritable bowel syndrome)     Heart murmur     Hypertension goal BP (blood pressure) < 140/90     Type 2 diabetes mellitus without complication, without long-term " current use of insulin (H)     Osteopenia of hip     Acute conjunctivitis of left eye     PTSD (post-traumatic stress disorder)     Diabetic peripheral neuropathy (H)     Bilateral sciatica     Gastrointestinal hemorrhage associated with gastric ulcer     Chronic kidney disease, stage 3 (H)     Chronic diastolic congestive heart failure (H)     Upper GI bleed     Neck pain     Closed nondisplaced fracture of head of right radius, initial encounter     Injury of head, initial encounter     Fall, initial encounter     Closed fracture of right hand, initial encounter     Closed fracture of left wrist, initial encounter       Current Outpatient Medications   Medication Sig Dispense Refill     amLODIPine (NORVASC) 5 MG tablet Take 1 tablet (5 mg) by mouth daily 90 tablet 0     amphetamine-dextroamphetamine (ADDERALL) 30 MG tablet Take 1 tablet (30 mg) by mouth every 24 hours 1.5 mg a total of 45 mg  Daily 30 tablet 0     atorvastatin (LIPITOR) 20 MG tablet Take 1 tablet (20 mg) by mouth daily 90 tablet 3     blood glucose (NO BRAND SPECIFIED) test strip Use to test blood sugar as needed with Freestyle Nirmal CGM. 100 strip 0     blood glucose (NO BRAND SPECIFIED) test strip Use to test blood sugar as directed with CGM sensor. 50 strip 1     calcium carbonate (OS-KINJAL) 1500 (600 Ca) MG tablet Take 2 tablets (1,200 mg) by mouth daily       cevimeline (EVOXAC) 30 MG capsule take 1 cap  capsule 3     Continuous Blood Gluc  (FREESTYLE NIRMAL 14 DAY READER) EBEN Use to read blood sugars as per 's instructions. 1 each 0     Continuous Blood Gluc Sensor (FREESTYLE NIRMAL 14 DAY SENSOR) MISC Change every 14 days. 2 each 11     cyanocobalamin (VITAMIN B-12) 1000 MCG tablet Take one every other day 90 tablet 1     DULoxetine (CYMBALTA) 30 MG capsule Take 1 capsule (30 mg) by mouth 2 times daily 180 capsule 3     DULoxetine (CYMBALTA) 60 MG capsule Take 1 capsule (60 mg) by mouth At Bedtime Take in addition to  "current 30 mg evening dose for a total of 90 mg at bedtime. 90 capsule 3     empagliflozin (JARDIANCE) 10 MG TABS tablet Take 1 tablet (10 mg) by mouth daily Schedule appt with ADEN Blas, [facility] to ensure future refills 30 tablet 0     losartan (COZAAR) 100 MG tablet Take 1 tablet (100 mg) by mouth daily 90 tablet 3     metFORMIN (GLUCOPHAGE-XR) 500 MG 24 hr tablet Take 4 po q am with breakfast. 360 tablet 3     omeprazole (PRILOSEC) 40 MG DR capsule Take 40mg twice daily 180 capsule 1     oxyCODONE (ROXICODONE) 5 MG tablet Take 0.5 tablets (2.5 mg) by mouth every 6 hours as needed for moderate to severe pain 16 tablet 0     SENNA-docusate sodium (SENNA S) 8.6-50 MG tablet Take 1 tablet by mouth At Bedtime       tolterodine ER (DETROL LA) 4 MG 24 hr capsule Take 1 capsule (4 mg) by mouth daily 90 capsule 3     traZODone (DESYREL) 50 MG tablet Take 1 tablet by mouth every 24 hours       Vitamin D3 (CHOLECALCIFEROL) 25 mcg (1000 units) tablet Take 1 tablet (25 mcg) by mouth daily 90 tablet 2       Allergies   Allergen Reactions     Pravastatin Other (See Comments)     woozy     Codeine Nausea and Vomiting     Nsaids GI Disturbance and Other (See Comments)     Pt had bariatric surgery, should not ever take oral NSAIDS due to risk of gastric ulcers.  Pt had bariatric surgery, should not ever take oral NSAIDS due to risk of gastric ulcers.        EXAM  /74   Pulse 92   Temp 97.8  F (36.6  C)   Ht 1.613 m (5' 3.5\")   Wt 64.9 kg (143 lb)   SpO2 97%   BMI 24.93 kg/m    Gen: Alert, pleasant, NAD  Right wrist: palpable soft tissue over dorsum of hand, wrist. No ecchymoses      Assessment:  (Z87.11) History of gastric ulcer  (primary encounter diagnosis)  Comment: currently followed by [facility]. Taking omeprazole for now. Has upper endoscopy in January scheduled.  Plan: Follow with GI for upper endoscopy. Continue omeprazole until prescription runs out, then switch to protonix until upper GI endoscopy. " Discuss medication management with GI doctor.      (I10) Hypertension goal BP (blood pressure) < 140/90  Comment: blood pressure in target range but she reports orthostatic hypotension  Plan: recommend holding amlodipine for now. Continue taking losartan 100mg daily.. Monitor home BP readings. Discussed adequate fluid intake.     Anay Martinez MD  Internal Medicine/Pediatrics      I, Karen Solitario, am serving as a scribe to document services personally performed by Dr. Anay Martinez, based on data collection and the provider's statements to me. Dr. Martinez has reviewed, edited, and approved the above note.

## 2022-12-06 NOTE — PATIENT INSTRUCTIONS
Omeprazole 40mg twice daily, use up this rx, then  Switch to protonix to get you through to the GI appointment    Discuss whether Famotidine is covered.      Blood pressure  Continue losartan 100mg daily  Stop (HOLD) on taking the amlodine,    Monitor blood pressure  Monitor symptoms for dizziness    Get fluids in your diet
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
## 2022-12-06 NOTE — NURSING NOTE
"67 year old  Chief Complaint   Patient presents with     Diabetes     Check in.      Forms     Pt needs some forms filled in       Blood pressure 112/74, pulse 92, temperature 97.8  F (36.6  C), height 1.613 m (5' 3.5\"), weight 64.9 kg (143 lb), SpO2 97 %, not currently breastfeeding. Body mass index is 24.93 kg/m .  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)     Keratoconus     Hyperlipidemia LDL goal <100     Obstructive sleep apnea     Vitamin D insufficiency     Major depression in partial remission (H)     Plantar warts     Low back pain     DJD (degenerative joint disease) of knee     Pancreatitis     Panic     Chest pain     IBS (irritable bowel syndrome)     Heart murmur     Hypertension goal BP (blood pressure) < 140/90     Type 2 diabetes mellitus without complication, without long-term current use of insulin (H)     Osteopenia of hip     Acute conjunctivitis of left eye     PTSD (post-traumatic stress disorder)     Diabetic peripheral neuropathy (H)     Bilateral sciatica     Gastrointestinal hemorrhage associated with gastric ulcer     Chronic kidney disease, stage 3 (H)     Chronic diastolic congestive heart failure (H)     Upper GI bleed     Neck pain     Closed nondisplaced fracture of head of right radius, initial encounter     Injury of head, initial encounter     Fall, initial encounter     Closed fracture of right hand, initial encounter     Closed fracture of left wrist, initial encounter       Wt Readings from Last 2 Encounters:   12/06/22 64.9 kg (143 lb)   11/11/22 64.9 kg (143 lb)     BP Readings from Last 3 Encounters:   12/06/22 112/74   11/11/22 107/74   10/18/22 126/76         Current Outpatient Medications   Medication     amLODIPine (NORVASC) 5 MG tablet     amphetamine-dextroamphetamine (ADDERALL) 30 MG tablet     atorvastatin (LIPITOR) 20 MG tablet     blood glucose (NO BRAND " SPECIFIED) test strip     blood glucose (NO BRAND SPECIFIED) test strip     calcium carbonate (OS-KINJAL) 1500 (600 Ca) MG tablet     cevimeline (EVOXAC) 30 MG capsule     Continuous Blood Gluc  (FREESTYLE JORDIN 14 DAY READER) EBEN     Continuous Blood Gluc Sensor (FREESTYLE JORDIN 14 DAY SENSOR) MISC     cyanocobalamin (VITAMIN B-12) 1000 MCG tablet     DULoxetine (CYMBALTA) 30 MG capsule     DULoxetine (CYMBALTA) 60 MG capsule     empagliflozin (JARDIANCE) 10 MG TABS tablet     losartan (COZAAR) 100 MG tablet     metFORMIN (GLUCOPHAGE-XR) 500 MG 24 hr tablet     omeprazole (PRILOSEC) 40 MG DR capsule     oxyCODONE (ROXICODONE) 5 MG tablet     SENNA-docusate sodium (SENNA S) 8.6-50 MG tablet     tolterodine ER (DETROL LA) 4 MG 24 hr capsule     traZODone (DESYREL) 50 MG tablet     Vitamin D3 (CHOLECALCIFEROL) 25 mcg (1000 units) tablet     No current facility-administered medications for this visit.       Social History     Tobacco Use     Smoking status: Never     Smokeless tobacco: Never   Substance Use Topics     Alcohol use: Not Currently     Comment: rare     Drug use: No       Health Maintenance Due   Topic Date Due     HF ACTION PLAN  Never done     ADVANCE CARE PLANNING  Never done     HEPATITIS C SCREENING  Never done     MEDICARE ANNUAL WELLNESS VISIT  04/12/2020     EYE EXAM  03/10/2021     COLORECTAL CANCER SCREENING  11/17/2021     Pneumococcal Vaccine: 65+ Years (2 - PCV) 05/27/2022       Lab Results   Component Value Date    PAP NIL 04/12/2019 December 6, 2022 9:48 AM

## 2022-12-06 NOTE — PROGRESS NOTES
ASSESSMENT:    1. Condition - HTN: Efficacy - Needs additional monitoring: Medication requires monitoring  Monitoring of BP would be useful at home. Appropriate technique to monitoring with home cuff will give most accurate results.     2. Condition - DM: No drug therapy problem  A1c continues to be at goal of <7%. No changes needed at this time.       PLAN:  Medications comments/ concerns are:   1. Educated patient on proper technique for measuring BP at home. -completed   2. Compared home BP cuff to clinic readings, verified readings are accurate. -completed       Follow up: as needed      - - - - - - - - - - - - - - -  SUBJECTIVE:  Karine is a 67 year old female coming in for a follow up medication therapy management visit. Primary care provider is Anay Martinez. Was referred by her provider for   Chief Complaint   Patient presents with     Medication Therapy Management   . Karine brought medications to the visit: no.       ----  Karine's MAIN CONCERN TODAY is Using home BP cuff.  Allergies/ADRs: Reviewed in chart  Pt reports using the following medical devices: none  Pt reports the following barriers to care: none  Adverse reactions to medications: none  Concerns with medications: none    Social History     Tobacco Use     Smoking status: Never     Smokeless tobacco: Never   Substance Use Topics     Alcohol use: Not Currently     Comment: rare     Drug use: No       Medication Experience: comfortable with medications  Reports of cognitive concerns: no     BP - Experiencing low BP readings at times, discussed with PCP and made medication changes today. Will be monitoring BP at home and wants to know how best to take it and if machine is accurate.      120/74 on personal BP cuff when measured in clinic today.       OBJECTIVE:   Patient Care Team:  Anay Martinez MD as PCP - General (Internal Medicine)  Patrizia Garces LICSW as Lead Care Coordinator  Gabrielle Blas [facility] as Pharmacist  (Pharmacist)  Amanda West MD as Fellow (Student in organized health care education/training program)  Anay Martinez MD as Assigned PCP  Amanda West MD as Assigned Nephrology Provider  Soledad Lee PA-C as Physician Assistant (Pulmonary Disease)  Anay Martinez MD as Referring Physician (Internal Medicine)  Tiffanie Burgos PA-C as Physician Assistant (Dermatology)  Anay Martinez MD as Referring Physician (Internal Medicine)  Lila Newton AuD as Audiologist (Audiology)  Ángela Orourke NP as Nurse Practitioner (ENT-Otolaryngology)  Gabrielle Blas [facility] as Assigned MTM Pharmacist  Joi Taveras RN as Registered Nurse  Brendan Leal MD as Assigned Musculoskeletal Provider  Brendan Hollingsworth MD as Assigned Surgical Provider  Latanya Petty APRN CNP as Nurse Practitioner (Colon & Rectal)  Current Outpatient Medications   Medication Sig Dispense Refill     amLODIPine (NORVASC) 5 MG tablet Take 1 tablet (5 mg) by mouth daily 90 tablet 0     amphetamine-dextroamphetamine (ADDERALL) 30 MG tablet Take 1 tablet (30 mg) by mouth every 24 hours 1.5 mg a total of 45 mg  Daily 30 tablet 0     atorvastatin (LIPITOR) 20 MG tablet Take 1 tablet (20 mg) by mouth daily 90 tablet 3     blood glucose (NO BRAND SPECIFIED) test strip Use to test blood sugar as needed with Freestyle Nirmal CGM. 100 strip 0     blood glucose (NO BRAND SPECIFIED) test strip Use to test blood sugar as directed with CGM sensor. 50 strip 1     calcium carbonate (OS-KINJAL) 1500 (600 Ca) MG tablet Take 2 tablets (1,200 mg) by mouth daily       cevimeline (EVOXAC) 30 MG capsule take 1 cap  capsule 3     Continuous Blood Gluc  (FREESTYLE NIRMAL 14 DAY READER) EBEN Use to read blood sugars as per 's instructions. 1 each 0     Continuous Blood Gluc Sensor (FREESTYLE NIRMAL 14 DAY SENSOR) MISC Change every 14 days. 2 each 11     cyanocobalamin (VITAMIN B-12)  1000 MCG tablet Take one every other day 90 tablet 1     DULoxetine (CYMBALTA) 30 MG capsule Take 1 capsule (30 mg) by mouth 2 times daily 180 capsule 3     DULoxetine (CYMBALTA) 60 MG capsule Take 1 capsule (60 mg) by mouth At Bedtime Take in addition to current 30 mg evening dose for a total of 90 mg at bedtime. 90 capsule 3     empagliflozin (JARDIANCE) 10 MG TABS tablet Take 1 tablet (10 mg) by mouth daily Schedule appt with ADEN Blas, [facility] to ensure future refills 30 tablet 0     losartan (COZAAR) 100 MG tablet Take 1 tablet (100 mg) by mouth daily 90 tablet 3     metFORMIN (GLUCOPHAGE-XR) 500 MG 24 hr tablet Take 4 po q am with breakfast. 360 tablet 3     omeprazole (PRILOSEC) 40 MG DR capsule Take 40mg twice daily 180 capsule 1     oxyCODONE (ROXICODONE) 5 MG tablet Take 0.5 tablets (2.5 mg) by mouth every 6 hours as needed for moderate to severe pain 16 tablet 0     SENNA-docusate sodium (SENNA S) 8.6-50 MG tablet Take 1 tablet by mouth At Bedtime       tolterodine ER (DETROL LA) 4 MG 24 hr capsule Take 1 capsule (4 mg) by mouth daily 90 capsule 3     traZODone (DESYREL) 50 MG tablet Take 1 tablet by mouth every 24 hours       Vitamin D3 (CHOLECALCIFEROL) 25 mcg (1000 units) tablet Take 1 tablet (25 mcg) by mouth daily 90 tablet 2     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)     Keratoconus     Hyperlipidemia LDL goal <100     Obstructive sleep apnea     Vitamin D insufficiency     Major depression in partial remission (H)     Plantar warts     Low back pain     DJD (degenerative joint disease) of knee     Pancreatitis     Panic     Chest pain     IBS (irritable bowel syndrome)     Heart murmur     Hypertension goal BP (blood pressure) < 140/90     Type 2 diabetes mellitus without complication, without long-term current use of insulin (H)     Osteopenia of hip     Acute conjunctivitis  "of left eye     PTSD (post-traumatic stress disorder)     Diabetic peripheral neuropathy (H)     Bilateral sciatica     Gastrointestinal hemorrhage associated with gastric ulcer     Chronic kidney disease, stage 3 (H)     Chronic diastolic congestive heart failure (H)     Upper GI bleed     Neck pain     Closed nondisplaced fracture of head of right radius, initial encounter     Injury of head, initial encounter     Fall, initial encounter     Closed fracture of right hand, initial encounter     Closed fracture of left wrist, initial encounter       There were no vitals filed for this visit.  Estimated body mass index is 24.93 kg/m  as calculated from the following:    Height as of 12/9/22: 1.613 m (5' 3.5\").    Weight as of 12/9/22: 64.9 kg (143 lb).    BP Readings from Last 3 Encounters:   12/06/22 112/74   11/11/22 107/74   10/18/22 126/76     - - - - - - - - - - - - - - -  Options for treatment and/or follow-up care were reviewed with the patient. Karine was engaged and actively involved in the decision making process. Karine verbalized understanding of the options discussed and was satisfied with the final plan. Karine was given a copy of these recommendations and an up to date medication list in an after visit summary. This report was sent to Anay Martinez.     Gabrielle Blas, PharmD, BCACP  Medication Management (MTM) Pharmacist  [facility]      - - - - - - - - - - - - - - -  Flowsheet completed.  # of medical conditions reviewed: 2  # of medications reviewed: 3  # of DTP identified: 1  Time spent: 20 minutes  Level of service:2  "

## 2022-12-07 ENCOUNTER — VIRTUAL VISIT (OUTPATIENT)
Dept: BEHAVIORAL HEALTH | Facility: CLINIC | Age: 67
End: 2022-12-07
Payer: COMMERCIAL

## 2022-12-07 DIAGNOSIS — F43.10 PTSD (POST-TRAUMATIC STRESS DISORDER): Primary | ICD-10-CM

## 2022-12-07 ASSESSMENT — ANXIETY QUESTIONNAIRES
GAD7 TOTAL SCORE: 2
7. FEELING AFRAID AS IF SOMETHING AWFUL MIGHT HAPPEN: SEVERAL DAYS
8. IF YOU CHECKED OFF ANY PROBLEMS, HOW DIFFICULT HAVE THESE MADE IT FOR YOU TO DO YOUR WORK, TAKE CARE OF THINGS AT HOME, OR GET ALONG WITH OTHER PEOPLE?: NOT DIFFICULT AT ALL
6. BECOMING EASILY ANNOYED OR IRRITABLE: SEVERAL DAYS
2. NOT BEING ABLE TO STOP OR CONTROL WORRYING: NOT AT ALL
GAD7 TOTAL SCORE: 2
7. FEELING AFRAID AS IF SOMETHING AWFUL MIGHT HAPPEN: SEVERAL DAYS
3. WORRYING TOO MUCH ABOUT DIFFERENT THINGS: NOT AT ALL
1. FEELING NERVOUS, ANXIOUS, OR ON EDGE: NOT AT ALL
IF YOU CHECKED OFF ANY PROBLEMS ON THIS QUESTIONNAIRE, HOW DIFFICULT HAVE THESE PROBLEMS MADE IT FOR YOU TO DO YOUR WORK, TAKE CARE OF THINGS AT HOME, OR GET ALONG WITH OTHER PEOPLE: NOT DIFFICULT AT ALL
GAD7 TOTAL SCORE: 2
5. BEING SO RESTLESS THAT IT IS HARD TO SIT STILL: NOT AT ALL
4. TROUBLE RELAXING: NOT AT ALL

## 2022-12-09 ENCOUNTER — ANCILLARY PROCEDURE (OUTPATIENT)
Dept: GENERAL RADIOLOGY | Facility: CLINIC | Age: 67
End: 2022-12-09
Attending: FAMILY MEDICINE
Payer: COMMERCIAL

## 2022-12-09 ENCOUNTER — PRE VISIT (OUTPATIENT)
Dept: ORTHOPEDICS | Facility: CLINIC | Age: 67
End: 2022-12-09

## 2022-12-09 ENCOUNTER — OFFICE VISIT (OUTPATIENT)
Dept: ORTHOPEDICS | Facility: CLINIC | Age: 67
End: 2022-12-09
Attending: ORTHOPAEDIC SURGERY
Payer: COMMERCIAL

## 2022-12-09 ENCOUNTER — ANCILLARY PROCEDURE (OUTPATIENT)
Dept: MAMMOGRAPHY | Facility: CLINIC | Age: 67
End: 2022-12-09
Attending: INTERNAL MEDICINE
Payer: COMMERCIAL

## 2022-12-09 ENCOUNTER — LAB (OUTPATIENT)
Dept: LAB | Facility: CLINIC | Age: 67
End: 2022-12-09
Payer: COMMERCIAL

## 2022-12-09 VITALS — WEIGHT: 143 LBS | HEIGHT: 64 IN | BODY MASS INDEX: 24.41 KG/M2

## 2022-12-09 DIAGNOSIS — M85.88 OSTEOPENIA OF SPINE: Primary | ICD-10-CM

## 2022-12-09 DIAGNOSIS — Z12.31 VISIT FOR SCREENING MAMMOGRAM: ICD-10-CM

## 2022-12-09 DIAGNOSIS — M51.369 LUMBAR DEGENERATIVE DISC DISEASE: ICD-10-CM

## 2022-12-09 DIAGNOSIS — E11.9 TYPE 2 DIABETES MELLITUS WITHOUT COMPLICATION, WITHOUT LONG-TERM CURRENT USE OF INSULIN (H): ICD-10-CM

## 2022-12-09 DIAGNOSIS — M85.88 OSTEOPENIA OF SPINE: ICD-10-CM

## 2022-12-09 DIAGNOSIS — E13.29 OTHER SPECIFIED DIABETES MELLITUS WITH OTHER DIABETIC KIDNEY COMPLICATION (H): ICD-10-CM

## 2022-12-09 DIAGNOSIS — E55.9 VITAMIN D DEFICIENCY: Primary | ICD-10-CM

## 2022-12-09 LAB
ALBUMIN SERPL BCG-MCNC: 4.2 G/DL (ref 3.5–5.2)
ALP SERPL-CCNC: 88 U/L (ref 35–104)
ALT SERPL W P-5'-P-CCNC: 15 U/L (ref 10–35)
ANION GAP SERPL CALCULATED.3IONS-SCNC: 11 MMOL/L (ref 7–15)
AST SERPL W P-5'-P-CCNC: 23 U/L (ref 10–35)
BILIRUB SERPL-MCNC: 0.4 MG/DL
BUN SERPL-MCNC: 22.5 MG/DL (ref 8–23)
CALCIUM SERPL-MCNC: 9.6 MG/DL (ref 8.8–10.2)
CHLORIDE SERPL-SCNC: 105 MMOL/L (ref 98–107)
CREAT SERPL-MCNC: 1.32 MG/DL (ref 0.51–0.95)
DEPRECATED CALCIDIOL+CALCIFEROL SERPL-MC: 50 UG/L (ref 20–75)
DEPRECATED HCO3 PLAS-SCNC: 26 MMOL/L (ref 22–29)
ERYTHROCYTE [DISTWIDTH] IN BLOOD BY AUTOMATED COUNT: 15.1 % (ref 10–15)
GFR SERPL CREATININE-BSD FRML MDRD: 44 ML/MIN/1.73M2
GLUCOSE SERPL-MCNC: 111 MG/DL (ref 70–99)
HBA1C MFR BLD: 6.7 %
HCT VFR BLD AUTO: 37.9 % (ref 35–47)
HGB BLD-MCNC: 11.7 G/DL (ref 11.7–15.7)
MAGNESIUM SERPL-MCNC: 1.7 MG/DL (ref 1.7–2.3)
MCH RBC QN AUTO: 27.1 PG (ref 26.5–33)
MCHC RBC AUTO-ENTMCNC: 30.9 G/DL (ref 31.5–36.5)
MCV RBC AUTO: 88 FL (ref 78–100)
PHOSPHATE SERPL-MCNC: 4 MG/DL (ref 2.5–4.5)
PLATELET # BLD AUTO: 181 10E3/UL (ref 150–450)
POTASSIUM SERPL-SCNC: 4.6 MMOL/L (ref 3.4–5.3)
PROT SERPL-MCNC: 7.5 G/DL (ref 6.4–8.3)
PTH-INTACT SERPL-MCNC: 76 PG/ML (ref 15–65)
RBC # BLD AUTO: 4.32 10E6/UL (ref 3.8–5.2)
SODIUM SERPL-SCNC: 142 MMOL/L (ref 136–145)
TSH SERPL DL<=0.005 MIU/L-ACNC: 1.52 UIU/ML (ref 0.3–4.2)
WBC # BLD AUTO: 8.8 10E3/UL (ref 4–11)

## 2022-12-09 PROCEDURE — 72070 X-RAY EXAM THORAC SPINE 2VWS: CPT | Performed by: STUDENT IN AN ORGANIZED HEALTH CARE EDUCATION/TRAINING PROGRAM

## 2022-12-09 PROCEDURE — 83970 ASSAY OF PARATHORMONE: CPT | Performed by: PATHOLOGY

## 2022-12-09 PROCEDURE — 99204 OFFICE O/P NEW MOD 45 MIN: CPT | Performed by: FAMILY MEDICINE

## 2022-12-09 PROCEDURE — 82306 VITAMIN D 25 HYDROXY: CPT | Mod: 90 | Performed by: PATHOLOGY

## 2022-12-09 PROCEDURE — 77067 SCR MAMMO BI INCL CAD: CPT | Mod: GC | Performed by: RADIOLOGY

## 2022-12-09 PROCEDURE — 99000 SPECIMEN HANDLING OFFICE-LAB: CPT | Mod: QW | Performed by: PATHOLOGY

## 2022-12-09 PROCEDURE — 83735 ASSAY OF MAGNESIUM: CPT | Performed by: PATHOLOGY

## 2022-12-09 PROCEDURE — 84443 ASSAY THYROID STIM HORMONE: CPT | Performed by: PATHOLOGY

## 2022-12-09 PROCEDURE — 83036 HEMOGLOBIN GLYCOSYLATED A1C: CPT | Mod: 90 | Performed by: PATHOLOGY

## 2022-12-09 PROCEDURE — 80053 COMPREHEN METABOLIC PANEL: CPT | Performed by: PATHOLOGY

## 2022-12-09 PROCEDURE — 85027 COMPLETE CBC AUTOMATED: CPT | Performed by: PATHOLOGY

## 2022-12-09 PROCEDURE — 84100 ASSAY OF PHOSPHORUS: CPT | Performed by: PATHOLOGY

## 2022-12-09 PROCEDURE — 36415 COLL VENOUS BLD VENIPUNCTURE: CPT | Performed by: PATHOLOGY

## 2022-12-09 NOTE — LETTER
12/9/2022      RE: Karine Moss  [address]  [facility] [address]     Dear Colleague,    Thank you for referring your patient, Karine Moss, to the [facility] [place]. Please see a copy of my visit note below.    SUBJECTIVE:    Karine Moss is a 67 year old female here today to discuss osteoporosis. Left wrist- Interval healing of the left distal radial and ulnar styloid, right elbow fracture.  fractures.Previous DEXA done 12/1/22, 4/2019.  T-score showed her to be Osteopenia.  FRAX score- Hip fracture 2.5%, MOF 16.9%  Risk factors for osteoporosis include postmenopausal,  or , diabetes, gastric bypass, CRF and no cancer, no chemo or radiation, no kidney stones, no MI, no Stroke. No past bone health meds.  Can't eat a whole meal, calcium and vit D increased  Exercising for rehab, Fall- trying to remember to do, prior to the fall- walking  Has many body aches  Mom-not known if osteoporosis, had a hunch, no hip fracture  Plan is for dental implants- daughter unsure of procedure date    Osteoporosis Risk Score/FRAX score - osteopenia  http://[email or web address].ac.uk/FRAX/  Risk of Hip Fracture: 2.5% (Significant if >3%)  Risk of Overall Fracture: 16.9% (Significant if >20%)  If included diabetes, and past fracture- MOF 22%, Hip 3.5%  Past Medical History:   Diagnosis Date     Arthritis      Basal cell carcinoma      Chest pain     seeing Cardiology     Depression 1991    after 's death     Diabetes mellitus (H)      Diabetic renal disease (H)     microalbuminuria     DJD (degenerative joint disease) of knee      H/O vitamin D deficiency      Hypertension      IBS (irritable bowel syndrome)     diarrhea      Keratoconus      Low back pain      Narcolepsy 2007    Dx'd by Sleep specialist 347.00, pt discontinued Adderal mid Nov. 13 due to tacycardia concerns     Obesity      Obstructive sleep apnea     327.23, 3 sleep studies,Dr. Flaco [facility]      Pancreatitis     related to Victoza, also on Xyrem     Panic      RLS (restless legs syndrome)      Sinus tachycardia        Past Surgical History:   Procedure Laterality Date     COLONOSCOPY  10/01/2020    poor quality prep     ear surgery  2002 and 01/2004     ESOPHAGOSCOPY, GASTROSCOPY, DUODENOSCOPY (EGD), COMBINED N/A 11/16/2021    Procedure: ESOPHAGOGASTRODUODENOSCOPY (EGD);  Surgeon: Jayla Calvo MD;  Location:  GI     GASTRIC BYPASS  2013    laparoscopic jimmy en y c ometopexy     HEMORRHOIDECTOMY  09/14/2000     LAPAROSCOPIC CHOLECYSTECTOMY  2015     LASIK BILATERAL       UVULOPALATOPHARYNGOPLASTY       UVVP         Current Outpatient Medications   Medication Sig Dispense Refill     amLODIPine (NORVASC) 5 MG tablet Take 1 tablet (5 mg) by mouth daily 90 tablet 0     amphetamine-dextroamphetamine (ADDERALL) 30 MG tablet Take 1 tablet (30 mg) by mouth every 24 hours 1.5 mg a total of 45 mg  Daily 30 tablet 0     atorvastatin (LIPITOR) 20 MG tablet Take 1 tablet (20 mg) by mouth daily 90 tablet 3     blood glucose (NO BRAND SPECIFIED) test strip Use to test blood sugar as needed with Freestyle Nirmal CGM. 100 strip 0     blood glucose (NO BRAND SPECIFIED) test strip Use to test blood sugar as directed with CGM sensor. 50 strip 1     calcium carbonate (OS-KINJAL) 1500 (600 Ca) MG tablet Take 2 tablets (1,200 mg) by mouth daily       cevimeline (EVOXAC) 30 MG capsule take 1 cap  capsule 3     Continuous Blood Gluc  (FREESTYLE NIRMAL 14 DAY READER) EBEN Use to read blood sugars as per 's instructions. 1 each 0     Continuous Blood Gluc Sensor (FREESTYLE NIRMAL 14 DAY SENSOR) MISC Change every 14 days. 2 each 11     cyanocobalamin (VITAMIN B-12) 1000 MCG tablet Take one every other day 90 tablet 1     DULoxetine (CYMBALTA) 30 MG capsule Take 1 capsule (30 mg) by mouth 2 times daily 180 capsule 3     DULoxetine (CYMBALTA) 60 MG capsule Take 1 capsule (60 mg) by mouth At Bedtime Take in  addition to current 30 mg evening dose for a total of 90 mg at bedtime. 90 capsule 3     empagliflozin (JARDIANCE) 10 MG TABS tablet Take 1 tablet (10 mg) by mouth daily Schedule appt with AD[facility] Blas, [facility] to ensure future refills 30 tablet 0     losartan (COZAAR) 100 MG tablet Take 1 tablet (100 mg) by mouth daily 90 tablet 3     metFORMIN (GLUCOPHAGE-XR) 500 MG 24 hr tablet Take 4 po q am with breakfast. 360 tablet 3     omeprazole (PRILOSEC) 40 MG DR capsule Take 40mg twice daily 180 capsule 1     oxyCODONE (ROXICODONE) 5 MG tablet Take 0.5 tablets (2.5 mg) by mouth every 6 hours as needed for moderate to severe pain 16 tablet 0     SENNA-docusate sodium (SENNA S) 8.6-50 MG tablet Take 1 tablet by mouth At Bedtime       tolterodine ER (DETROL LA) 4 MG 24 hr capsule Take 1 capsule (4 mg) by mouth daily 90 capsule 3     traZODone (DESYREL) 50 MG tablet Take 1 tablet by mouth every 24 hours       Vitamin D3 (CHOLECALCIFEROL) 25 mcg (1000 units) tablet Take 1 tablet (25 mcg) by mouth daily 90 tablet 2       Family History   Problem Relation Age of Onset     Memory loss Mother      Diabetes Father      Chronic Obstructive Pulmonary Disease Sister      Anemia Sister         hx of ulcers     Other - See Comments Sister 65        MVA, followed by leg pain after a fall     Dementia Sister 68     Dementia Brother 70     Cancer Brother         lung     Cancer - colorectal Maternal Grandmother      Cancer Daughter      Obesity Daughter         s/p lap band       Social History     Socioeconomic History     Marital status: Single     Spouse name: Not on file     Number of children: 1     Years of education: Not on file     Highest education level: Not on file   Occupational History     Employer: [employer]     Comment: on disability   Tobacco Use     Smoking status: Never     Smokeless tobacco: Never   Substance and Sexual Activity     Alcohol use: Not Currently     Comment: rare     Drug use: No     Sexual  activity: Never   Other Topics Concern     Parent/sibling w/ CABG, MI or angioplasty before 65F 55M? No   Social History Narrative    Daughter- 42,  since 1991    Drives only short distances due to narcolepsy            --------------------------------------------------------------------------------    Surgical History  Return To Top     Status Surgery Time Frame Comment Record Date     Inactive  ear surgery  1/04 5/27/2008      Inactive  eye surgery  2002 5/27/2008      Inactive  Hemorrhoidectomy  9/14/00 5/27/2008          --------------------------------------------------------------------------------    Food Allergy  Return To Top     Allergen Reaction Comment Record Date     * No known food allergies      10/28/2008          --------------------------------------------------------------------------------    Drug Allergy  Return To Top     Allergen Reaction Comment Record Date     Codeine  nausea    6/21/2007          --------------------------------------------------------------------------------    Environment Allergy  Return To Top     Allergen Reaction Comment Record Date     * No known environmental allergies      10/28/2008          --------------------------------------------------------------------------------    Social History  Return To Top     Question Answer Comment Record Date     Marital status      5/27/2008      Advance Directive or Living Will  Form Given to Patient    1/18/2010      Emotional Abuse  Yes    1/18/2010      Exercise  No    1/18/2010      Caffeine  Yes    5/27/2008      Physical Abuse  No    1/18/2010      Sealtbelts  Yes    1/18/2010      Sexual Abuse  No    1/18/2010      Breast/Testicle Self Check  No    1/18/2010      Number of children  1    1/18/2010      Living arrangements  House    1/18/2010      Number of adults in household  2    1/18/2010      Education level  High School Graduate    1/18/2010      Employment  Currently employed    1/18/2010       Tobacco history  Has never smoked or chewed tobacco    5/27/2008      Alcohol history  Currently drinks alcohol    5/27/2008      Has the patient ever used illegal drugs?  Has never used illegal drugs    5/27/2008          --------------------------------------------------------------------------------    History - Overall Remark: 3/21/2012 Return To Top         --------------------------------------------------------------------------------    Medical History Return To Top     Status Diagnosis Time Frame Comment Record Date     Active (250.00) - C - Diabetes mellitus, type II controlled      5/15/2012      Active (788.41) - C - Urinary frequency      4/17/2012      Active (250.02) - C - Diabetes mellitus, type II uncontrolled      3/6/2012      Active (278.00) - C - Obesity      2/14/2011      Active (250.00) - C - Diabetes mellitus, type II controlled      2/14/2011      Active (739.3) - C - Somatic dysfunction, lumbar region      8/16/2010      Active (739.5) - C - Somatic dysfunction, pelvic region      8/16/2010      Active (401.9) - C - Hypertension      2/8/2010      Active 268.9 UNSP VITAMIN D DEFICIENCY      1/18/2010      Active (695.3) - C - Rosacea      1/18/2010      Active 272.1 Hypertriglyceridemia      1/18/2010      Active 300.4 Depression with Anxiety (Dysthymic Disorder)      10/28/2008      Active 739.6 Somatic dysfunction, lower extremities      10/28/2008      Active 780.79 Fatigue      6/23/2008      Active 278.01 Obesity, morbid      6/19/2008      Active 347.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, 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      6/19/2008      Inactive  (462) - C - Pharyngitis, acute      1/15/2011      Inactive  250.02 Diabetes mellitus, type II uncontrolled      1/18/2010      Inactive  726.71 Tendinitis, achilles      10/28/2008      Inactive  (250.00) - C - Diabetes mellitus, type II controlled      10/28/2008      Inactive  (250.00) - C - Diabetes mellitus, type II controlled      6/23/2008      Inactive  V77.91 Screening,  "lipids      2008      Inactive  599.0 Urinary tract infection, unspec./pyuria (UTI)      2008      Inactive  V70.0 Routine Medical Exam      2008      Active Constipation      2008      Active Hemorrhoids      2008      Pancreatitis pancreatitis 2013     --------------------------------------------------------------------------------    M        --------------------------------------------------------------------------------    Family History  Return To Top     Status Relationship Disease Comment Record Date     Alive Sister  *Denies any medical problems  3 sisters  2008      Alive Brother  *Denies any medical problems  2 brothers  2008         Father    Age of death 56  2010         Mother  Dementia  Age of death 82  2008          --------------------------------------------------------------------------------                         Social Determinants of Health     Financial Resource Strain: Not on file   Food Insecurity: Not on file   Transportation Needs: Not on file   Physical Activity: Not on file   Stress: Not on file   Social Connections: Not on file   Intimate Partner Violence: Not on file   Housing Stability: Not on file       OBJECTIVE:  Ht 1.613 m (5' 3.5\")   Wt 64.9 kg (143 lb)   BMI 24.93 kg/m    There has been a change in patients height.  1/2 inch loss in height    ASSESSMENT:  Osteopenia  Fall resulted in elbow and wrist fracture- fell down steps  Diabetic and has kidney disease    PLAN:  Calcium and vit D throughout the day  Has so many appts- wants to know if exercises are duplicates  Labs  Thoracic imaging  Need to know when dental implants would happen    The importance of calcium and vitamin D in bone health was discussed in detail. A calcium intake of 1500 mg total per day in divided doses, to include diet and supplements, was recommended.  I have urged the patient to obtain as much as the recommended amount of calcium as " possible from the diet.  I recommended use of the International [employer]opor[employer] Calcium Calculator to get an estimate of daily total intake in the diet (www.iofcalciumcalculator).800-1000 international units vitamin D daily was also recommended.       We also discussed the role of weight bearing exercise for the treatment of bone loss. I have also recommended weight training at a minimum of 2x/week.   The patient will be referred to the [facility] for the osteoporosis protocol.      Roberta Friedman MD, CAQ   Sports Medicine and Bone Health team

## 2022-12-09 NOTE — PROGRESS NOTES
SUBJECTIVE:    Karine Moss is a 67 year old female here today to discuss osteoporosis. Left wrist- Interval healing of the left distal radial and ulnar styloid, right elbow fracture.  fractures.Previous DEXA done 12/1/22, 4/2019.  T-score showed her to be Osteopenia.  FRAX score- Hip fracture 2.5%, MOF 16.9%  Risk factors for osteoporosis include postmenopausal,  or , diabetes, gastric bypass, CRF and no cancer, no chemo or radiation, no kidney stones, no MI, no Stroke. No past bone health meds.  Can't eat a whole meal, calcium and vit D increased  Exercising for rehab, Fall- trying to remember to do, prior to the fall- walking  Has many body aches  Mom-not known if osteoporosis, had a hunch, no hip fracture  Plan is for dental implants- daughter unsure of procedure date    Osteoporosis Risk Score/FRAX score - osteopenia  http://www.shef.ac.uk/FRAX/  Risk of Hip Fracture: 2.5% (Significant if >3%)  Risk of Overall Fracture: 16.9% (Significant if >20%)  If included diabetes, and past fracture- MOF 22%, Hip 3.5%  Past Medical History:   Diagnosis Date     Arthritis      Basal cell carcinoma      Chest pain     seeing Cardiology     Depression 1991    after 's death     Diabetes mellitus (H)      Diabetic renal disease (H)     microalbuminuria     DJD (degenerative joint disease) of knee      H/O vitamin D deficiency      Hypertension      IBS (irritable bowel syndrome)     diarrhea      Keratoconus      Low back pain      Narcolepsy 2007    Dx'd by Sleep specialist 347.00, pt discontinued Adderal mid Nov. 13 due to tacycardia concerns     Obesity      Obstructive sleep apnea     327.23, 3 sleep studies,[facility]. [facility]     Pancreatitis     related to Victoza, also on Xyrem     Panic      RLS (restless legs syndrome)      Sinus tachycardia        Past Surgical History:   Procedure Laterality Date     COLONOSCOPY  10/01/2020    poor quality prep     ear surgery  2002 and 01/2004      ESOPHAGOSCOPY, GASTROSCOPY, DUODENOSCOPY (EGD), COMBINED N/A 11/16/2021    Procedure: ESOPHAGOGASTRODUODENOSCOPY (EGD);  Surgeon: Jayla Calvo MD;  Location:  GI     GASTRIC BYPASS  2013    laparoscopic jimmy en y c ometopexy     HEMORRHOIDECTOMY  09/14/2000     LAPAROSCOPIC CHOLECYSTECTOMY  2015     LASIK BILATERAL       UVULOPALATOPHARYNGOPLASTY       UVVP         Current Outpatient Medications   Medication Sig Dispense Refill     amLODIPine (NORVASC) 5 MG tablet Take 1 tablet (5 mg) by mouth daily 90 tablet 0     amphetamine-dextroamphetamine (ADDERALL) 30 MG tablet Take 1 tablet (30 mg) by mouth every 24 hours 1.5 mg a total of 45 mg  Daily 30 tablet 0     atorvastatin (LIPITOR) 20 MG tablet Take 1 tablet (20 mg) by mouth daily 90 tablet 3     blood glucose (NO BRAND SPECIFIED) test strip Use to test blood sugar as needed with Freestyle Nirmal CGM. 100 strip 0     blood glucose (NO BRAND SPECIFIED) test strip Use to test blood sugar as directed with CGM sensor. 50 strip 1     calcium carbonate (OS-KINJAL) 1500 (600 Ca) MG tablet Take 2 tablets (1,200 mg) by mouth daily       cevimeline (EVOXAC) 30 MG capsule take 1 cap  capsule 3     Continuous Blood Gluc  (FREESTYLE NIRMAL 14 DAY READER) EBEN Use to read blood sugars as per 's instructions. 1 each 0     Continuous Blood Gluc Sensor (FREESTYLE NIRMAL 14 DAY SENSOR) MISC Change every 14 days. 2 each 11     cyanocobalamin (VITAMIN B-12) 1000 MCG tablet Take one every other day 90 tablet 1     DULoxetine (CYMBALTA) 30 MG capsule Take 1 capsule (30 mg) by mouth 2 times daily 180 capsule 3     DULoxetine (CYMBALTA) 60 MG capsule Take 1 capsule (60 mg) by mouth At Bedtime Take in addition to current 30 mg evening dose for a total of 90 mg at bedtime. 90 capsule 3     empagliflozin (JARDIANCE) 10 MG TABS tablet Take 1 tablet (10 mg) by mouth daily Schedule appt with ADEN Blas, [facility] to ensure future refills 30 tablet 0      losartan (COZAAR) 100 MG tablet Take 1 tablet (100 mg) by mouth daily 90 tablet 3     metFORMIN (GLUCOPHAGE-XR) 500 MG 24 hr tablet Take 4 po q am with breakfast. 360 tablet 3     omeprazole (PRILOSEC) 40 MG DR capsule Take 40mg twice daily 180 capsule 1     oxyCODONE (ROXICODONE) 5 MG tablet Take 0.5 tablets (2.5 mg) by mouth every 6 hours as needed for moderate to severe pain 16 tablet 0     SENNA-docusate sodium (SENNA S) 8.6-50 MG tablet Take 1 tablet by mouth At Bedtime       tolterodine ER (DETROL LA) 4 MG 24 hr capsule Take 1 capsule (4 mg) by mouth daily 90 capsule 3     traZODone (DESYREL) 50 MG tablet Take 1 tablet by mouth every 24 hours       Vitamin D3 (CHOLECALCIFEROL) 25 mcg (1000 units) tablet Take 1 tablet (25 mcg) by mouth daily 90 tablet 2       Family History   Problem Relation Age of Onset     Memory loss Mother      Diabetes Father      Chronic Obstructive Pulmonary Disease Sister      Anemia Sister         hx of ulcers     Other - See Comments Sister 65        MVA, followed by leg pain after a fall     Dementia Sister 68     Dementia Brother 70     Cancer Brother         lung     Cancer - colorectal Maternal Grandmother      Cancer Daughter      Obesity Daughter         s/p lap band       Social History     Socioeconomic History     Marital status: Single     Spouse name: Not on file     Number of children: 1     Years of education: Not on file     Highest education level: Not on file   Occupational History     Employer: [employer]     Comment: on disability   Tobacco Use     Smoking status: Never     Smokeless tobacco: Never   Substance and Sexual Activity     Alcohol use: Not Currently     Comment: rare     Drug use: No     Sexual activity: Never   Other Topics Concern     Parent/sibling w/ CABG, MI or angioplasty before 65F 55M? No   Social History Narrative    Daughter- 42,  since 1991    Drives only short distances due to narcolepsy             --------------------------------------------------------------------------------    Surgical History  Return To Top     Status Surgery Time Frame Comment Record Date     Inactive  ear surgery  1/04 5/27/2008      Inactive  eye surgery  2002 5/27/2008      Inactive  Hemorrhoidectomy  9/14/00 5/27/2008          --------------------------------------------------------------------------------    Food Allergy  Return To Top     Allergen Reaction Comment Record Date     * No known food allergies      10/28/2008          --------------------------------------------------------------------------------    Drug Allergy  Return To Top     Allergen Reaction Comment Record Date     Codeine  nausea    6/21/2007          --------------------------------------------------------------------------------    Environment Allergy  Return To Top     Allergen Reaction Comment Record Date     * No known environmental allergies      10/28/2008          --------------------------------------------------------------------------------    Social History  Return To Top     Question Answer Comment Record Date     Marital status      5/27/2008      Advance Directive or Living Will  Form Given to Patient    1/18/2010      Emotional Abuse  Yes    1/18/2010      Exercise  No    1/18/2010      Caffeine  Yes    5/27/2008      Physical Abuse  No    1/18/2010      Sealtbelts  Yes    1/18/2010      Sexual Abuse  No    1/18/2010      Breast/Testicle Self Check  No    1/18/2010      Number of children  1    1/18/2010      Living arrangements  House    1/18/2010      Number of adults in household  2    1/18/2010      Education level  High School Graduate    1/18/2010      Employment  Currently employed    1/18/2010      Tobacco history  Has never smoked or chewed tobacco    5/27/2008      Alcohol history  Currently drinks alcohol    5/27/2008      Has the patient ever used illegal drugs?  Has never used illegal drugs    5/27/2008           --------------------------------------------------------------------------------    History - Overall Remark: 3/21/2012 Return To Top         --------------------------------------------------------------------------------    Medical History Return To Top     Status Diagnosis Time Frame Comment Record Date     Active (250.00) - C - Diabetes mellitus, type II controlled      5/15/2012      Active (788.41) - C - Urinary frequency      4/17/2012      Active (250.02) - C - Diabetes mellitus, type II uncontrolled      3/6/2012      Active (278.00) - C - Obesity      2/14/2011      Active (250.00) - C - Diabetes mellitus, type II controlled      2/14/2011      Active (739.3) - C - Somatic dysfunction, lumbar region      8/16/2010      Active (739.5) - C - Somatic dysfunction, pelvic region      8/16/2010      Active (401.9) - C - Hypertension      2/8/2010      Active 268.9 UNSP VITAMIN D DEFICIENCY      1/18/2010      Active (695.3) - C - Rosacea      1/18/2010      Active 272.1 Hypertriglyceridemia      1/18/2010      Active 300.4 Depression with Anxiety (Dysthymic Disorder)      10/28/2008      Active 739.6 Somatic dysfunction, lower extremities      10/28/2008      Active 780.79 Fatigue      6/23/2008      Active 278.01 Obesity, morbid      6/19/2008      Active 347.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, 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      6/19/2008      Inactive  (462) - C - Pharyngitis, acute      1/15/2011      Inactive  250.02 Diabetes mellitus, type II uncontrolled      1/18/2010      Inactive  726.71 Tendinitis, achilles      10/28/2008      Inactive  (250.00) - C - Diabetes mellitus, type II controlled      10/28/2008      Inactive  (250.00) - C - Diabetes mellitus, type II controlled      6/23/2008      Inactive  V77.91 Screening, lipids      6/23/2008      Inactive  599.0 Urinary tract infection, unspec./pyuria (UTI)      6/23/2008      Inactive  V70.0 Routine Medical Exam      6/19/2008      Active Constipation      5/27/2008      Active  "Hemorrhoids      2008      Pancreatitis pancreatitis 2013     --------------------------------------------------------------------------------    M        --------------------------------------------------------------------------------    Family History  Return To Top     Status Relationship Disease Comment Record Date     Alive Sister  *Denies any medical problems  3 sisters  2008      Alive Brother  *Denies any medical problems  2 brothers  2008         Father    Age of death 56  2010         Mother  Dementia  Age of death 82  2008          --------------------------------------------------------------------------------                         Social Determinants of Health     Financial Resource Strain: Not on file   Food Insecurity: Not on file   Transportation Needs: Not on file   Physical Activity: Not on file   Stress: Not on file   Social Connections: Not on file   Intimate Partner Violence: Not on file   Housing Stability: Not on file       OBJECTIVE:  Ht 1.613 m (5' 3.5\")   Wt 64.9 kg (143 lb)   BMI 24.93 kg/m    There has been a change in patients height.  1/2 inch loss in height    ASSESSMENT:  Osteopenia  Fall resulted in elbow and wrist fracture- fell down steps  Diabetic and has kidney disease    PLAN:  Calcium and vit D throughout the day  Has so many appts- wants to know if exercises are duplicates  Labs  Thoracic imaging  Need to know when dental implants would happen    The importance of calcium and vitamin D in bone health was discussed in detail. A calcium intake of 1500 mg total per day in divided doses, to include diet and supplements, was recommended.  I have urged the patient to obtain as much as the recommended amount of calcium as possible from the diet.  I recommended use of the International Osteoporosis Foundation Calcium Calculator to get an estimate of daily total intake in the diet (www.iofcalciumcalculator).800-1000 international " units vitamin D daily was also recommended.       We also discussed the role of weight bearing exercise for the treatment of bone loss. I have also recommended weight training at a minimum of 2x/week.   The patient will be referred to the [facility] for the osteoporosis protocol.      Roberta Friedman MD, CAQ   Sports Medicine and Bone Health team

## 2022-12-12 NOTE — PROGRESS NOTES
S:  R elbow doing well.  R wrist also not bothering patient.  Has been using wrist brace.  No problems LUE cast/ pressure issues or other complaints.         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)     Keratoconus     Hyperlipidemia LDL goal <100     Obstructive sleep apnea     Vitamin D insufficiency     Major depression in partial remission (H)     Plantar warts     Low back pain     DJD (degenerative joint disease) of knee     Pancreatitis     Panic     Chest pain     IBS (irritable bowel syndrome)     Heart murmur     Hypertension goal BP (blood pressure) < 140/90     Type 2 diabetes mellitus without complication, without long-term current use of insulin (H)     Osteopenia of hip     Acute conjunctivitis of left eye     PTSD (post-traumatic stress disorder)     Diabetic peripheral neuropathy (H)     Bilateral sciatica     Gastrointestinal hemorrhage associated with gastric ulcer     Chronic kidney disease, stage 3 (H)     Chronic diastolic congestive heart failure (H)     Upper GI bleed     Neck pain     Closed nondisplaced fracture of head of right radius, initial encounter     Injury of head, initial encounter     Fall, initial encounter     Closed fracture of right hand, initial encounter     Closed fracture of left wrist, initial encounter            Past Medical History:   Diagnosis Date     Arthritis      Basal cell carcinoma      Chest pain     seeing Cardiology     Depression 1991    after 's death     Diabetes mellitus (H)      Diabetic renal disease (H)     microalbuminuria     DJD (degenerative joint disease) of knee      H/O vitamin D deficiency      Hypertension      IBS (irritable bowel syndrome)     diarrhea      Keratoconus      Low back pain      Narcolepsy 2007    Dx'd by Sleep specialist 347.00, pt discontinued Adderal mid Nov. 13 due to tacycardia concerns     Obesity       Obstructive sleep apnea     327.23, 3 sleep studies,[facility]. [facility]     Pancreatitis     related to Victoza, also on Xyrem     Panic      RLS (restless legs syndrome)      Sinus tachycardia             Past Surgical History:   Procedure Laterality Date     COLONOSCOPY  10/01/2020    poor quality prep     ear surgery  2002 and 01/2004     ESOPHAGOSCOPY, GASTROSCOPY, DUODENOSCOPY (EGD), COMBINED N/A 11/16/2021    Procedure: ESOPHAGOGASTRODUODENOSCOPY (EGD);  Surgeon: Jayla Calvo MD;  Location:  GI     GASTRIC BYPASS  2013    laparoscopic jimmy en y c ometopexy     HEMORRHOIDECTOMY  09/14/2000     LAPAROSCOPIC CHOLECYSTECTOMY  2015     LASIK BILATERAL       UVULOPALATOPHARYNGOPLASTY       UVVP              Social History     Tobacco Use     Smoking status: Never     Smokeless tobacco: Never   Substance Use Topics     Alcohol use: Not Currently     Comment: rare            Family History   Problem Relation Age of Onset     Memory loss Mother      Diabetes Father      Chronic Obstructive Pulmonary Disease Sister      Anemia Sister         hx of ulcers     Other - See Comments Sister 65        MVA, followed by leg pain after a fall     Dementia Sister 68     Dementia Brother 70     Cancer Brother         lung     Cancer - colorectal Maternal Grandmother      Cancer Daughter      Obesity Daughter         s/p lap band               Allergies   Allergen Reactions     Pravastatin Other (See Comments)     woozy     Codeine Nausea and Vomiting     Nsaids GI Disturbance and Other (See Comments)     Pt had bariatric surgery, should not ever take oral NSAIDS due to risk of gastric ulcers.  Pt had bariatric surgery, should not ever take oral NSAIDS due to risk of gastric ulcers.            Current Outpatient Medications   Medication Sig Dispense Refill     amphetamine-dextroamphetamine (ADDERALL) 30 MG tablet Take 1 tablet (30 mg) by mouth every 24 hours 1.5 mg a total of 45 mg  Daily 30 tablet 0     atorvastatin  (LIPITOR) 20 MG tablet Take 1 tablet (20 mg) by mouth daily 90 tablet 3     blood glucose (NO BRAND SPECIFIED) test strip Use to test blood sugar as needed with Freestyle Nirmal CGM. 100 strip 0     blood glucose (NO BRAND SPECIFIED) test strip Use to test blood sugar as directed with CGM sensor. 50 strip 1     calcium carbonate (OS-KINJAL) 1500 (600 Ca) MG tablet Take 2 tablets (1,200 mg) by mouth daily       cevimeline (EVOXAC) 30 MG capsule take 1 cap  capsule 3     Continuous Blood Gluc  (FREESTYLE NIRMAL 14 DAY READER) EBEN Use to read blood sugars as per 's instructions. 1 each 0     Continuous Blood Gluc Sensor (FREESTYLE NIRMAL 14 DAY SENSOR) MISC Change every 14 days. 2 each 11     cyanocobalamin (VITAMIN B-12) 1000 MCG tablet Take one every other day 90 tablet 1     DULoxetine (CYMBALTA) 30 MG capsule Take 1 capsule (30 mg) by mouth 2 times daily 180 capsule 3     DULoxetine (CYMBALTA) 60 MG capsule Take 1 capsule (60 mg) by mouth At Bedtime Take in addition to current 30 mg evening dose for a total of 90 mg at bedtime. 90 capsule 3     empagliflozin (JARDIANCE) 10 MG TABS tablet Take 1 tablet (10 mg) by mouth daily Schedule appt with ADEN Blas, [facility] to ensure future refills 30 tablet 0     losartan (COZAAR) 100 MG tablet Take 1 tablet (100 mg) by mouth daily 90 tablet 3     metFORMIN (GLUCOPHAGE-XR) 500 MG 24 hr tablet Take 4 po q am with breakfast. 360 tablet 3     omeprazole (PRILOSEC) 40 MG DR capsule Take 40mg twice daily 180 capsule 1     oxyCODONE (ROXICODONE) 5 MG tablet Take 0.5 tablets (2.5 mg) by mouth every 6 hours as needed for moderate to severe pain 16 tablet 0     SENNA-docusate sodium (SENNA S) 8.6-50 MG tablet Take 1 tablet by mouth At Bedtime       tolterodine ER (DETROL LA) 4 MG 24 hr capsule Take 1 capsule (4 mg) by mouth daily 90 capsule 3     traZODone (DESYREL) 50 MG tablet Take 1 tablet by mouth every 24 hours            Review Of Systems  Skin:  negative  Eyes: negative  Ears/Nose/Throat: negative  Respiratory: No shortness of breath, dyspnea on exertion, cough, or hemoptysis    O: Physical Exam:  R elbow without pain to palpation, no crepitus.  No limits to pronation/supination.  R wrist exam unremarkable, supple, without pain.  Swelling improved compared to last visit.  L wrist no evidence excoriation around the margins of the cast, no evidence pressure necrosis.  CMS intact to both upper extremities.    Images:  Findings:   PA, oblique, and lateral radiograph the left wrist. Interval bony  bridging along the previously seen minimally displaced ulnar styloid  fracture and comminuted and impacted fracture of the distal radius.     Linear lucency through the trapezium not definitively seen on prior  and may be projectional.     Osteopenic appearance of the bones.                                                                      Impression:   1. Interval healing of the left distal radial and ulnar styloid  fractures.  2. Linear lucency through the trapezium not definitively seen on prior  exam and may be projectional though fracture cannot be entirely  excluded. Correlate for point tenderness.         A:  Healing R radial head fx, R distal radial fx.  L wrist fracture healing well.    P: Discontinue brace R wrist, continue to rehab R wrist and elbow.  Brace for L wrist.    See back 6 weeks and repeat images L wrist  DEXA and Vit D update  Notify if exacerbation symptoms           In addition to the above assessment and plan each active problem on Karine's problem list was evaluated today. This included the questioning of Karine for any medication problems. We will continue the current treatment plan for these active problems except as noted

## 2022-12-13 ENCOUNTER — TELEPHONE (OUTPATIENT)
Dept: ORTHOPEDICS | Facility: CLINIC | Age: 67
End: 2022-12-13

## 2022-12-13 DIAGNOSIS — E21.3 HYPERPARATHYROIDISM (H): Primary | ICD-10-CM

## 2022-12-13 NOTE — TELEPHONE ENCOUNTER
I called the patient to inform her of her recent lab results, PTH is elevated and vitamin D level is OK. She should schedule her US at this time and was provided phone number for scheduling. No answer, left detailed message with this information. Soledad Busch, ATC on 12/13/2022 at 8:37 AM

## 2022-12-16 ENCOUNTER — TELEPHONE (OUTPATIENT)
Dept: FAMILY MEDICINE | Facility: CLINIC | Age: 67
End: 2022-12-16

## 2022-12-16 DIAGNOSIS — S62.102A WRIST FRACTURE, LEFT, CLOSED, INITIAL ENCOUNTER: Primary | ICD-10-CM

## 2022-12-16 DIAGNOSIS — S52.124A CLOSED NONDISPLACED FRACTURE OF HEAD OF RIGHT RADIUS, INITIAL ENCOUNTER: ICD-10-CM

## 2022-12-16 NOTE — CONFIDENTIAL NOTE
Called and spoke with ARI Rizzo, [phone], with [facility], who is requesting we place a referral for ongoing outpatient hand therapy for Karine's left wrist fracture.  Referral placed.  DELIA FernandesN, RN, [facility]  12/16/22  12:15 PM
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
## 2022-12-21 ENCOUNTER — VIRTUAL VISIT (OUTPATIENT)
Dept: BEHAVIORAL HEALTH | Facility: CLINIC | Age: 67
End: 2022-12-21
Payer: COMMERCIAL

## 2022-12-21 DIAGNOSIS — F43.10 PTSD (POST-TRAUMATIC STRESS DISORDER): Primary | ICD-10-CM

## 2022-12-21 ASSESSMENT — PATIENT HEALTH QUESTIONNAIRE - PHQ9
10. IF YOU CHECKED OFF ANY PROBLEMS, HOW DIFFICULT HAVE THESE PROBLEMS MADE IT FOR YOU TO DO YOUR WORK, TAKE CARE OF THINGS AT HOME, OR GET ALONG WITH OTHER PEOPLE: SOMEWHAT DIFFICULT
SUM OF ALL RESPONSES TO PHQ QUESTIONS 1-9: 4
SUM OF ALL RESPONSES TO PHQ QUESTIONS 1-9: 4

## 2022-12-21 ASSESSMENT — ANXIETY QUESTIONNAIRES
4. TROUBLE RELAXING: NOT AT ALL
3. WORRYING TOO MUCH ABOUT DIFFERENT THINGS: NOT AT ALL
GAD7 TOTAL SCORE: 4
IF YOU CHECKED OFF ANY PROBLEMS ON THIS QUESTIONNAIRE, HOW DIFFICULT HAVE THESE PROBLEMS MADE IT FOR YOU TO DO YOUR WORK, TAKE CARE OF THINGS AT HOME, OR GET ALONG WITH OTHER PEOPLE: NOT DIFFICULT AT ALL
7. FEELING AFRAID AS IF SOMETHING AWFUL MIGHT HAPPEN: MORE THAN HALF THE DAYS
8. IF YOU CHECKED OFF ANY PROBLEMS, HOW DIFFICULT HAVE THESE MADE IT FOR YOU TO DO YOUR WORK, TAKE CARE OF THINGS AT HOME, OR GET ALONG WITH OTHER PEOPLE?: NOT DIFFICULT AT ALL
5. BEING SO RESTLESS THAT IT IS HARD TO SIT STILL: NOT AT ALL
GAD7 TOTAL SCORE: 4
2. NOT BEING ABLE TO STOP OR CONTROL WORRYING: SEVERAL DAYS
1. FEELING NERVOUS, ANXIOUS, OR ON EDGE: SEVERAL DAYS
6. BECOMING EASILY ANNOYED OR IRRITABLE: NOT AT ALL
GAD7 TOTAL SCORE: 4
7. FEELING AFRAID AS IF SOMETHING AWFUL MIGHT HAPPEN: MORE THAN HALF THE DAYS

## 2022-12-23 ENCOUNTER — TELEPHONE (OUTPATIENT)
Dept: GASTROENTEROLOGY | Facility: CLINIC | Age: 67
End: 2022-12-23

## 2022-12-23 NOTE — TELEPHONE ENCOUNTER
The Pt did , spoke with the Pt's daughter Cathy per the Pt's request.     Pre assessment questions completed for upcoming upper endoscopy (EGD) procedure scheduled on 1/9/23    COVID policy reviewed.     Reviewed procedural arrival time 1030 and facility location [facility]; [address] [place] [address]    Designated  policy reviewed. Instructed to have someone stay 6 hours post procedure. Cathy will talk with the Pt about CS vs MAC. She thinks CS will work just fine but will call back if this needs to be switched.     Anticoagulation/blood thinners? No    Electronic implanted devices? No    Diabetic? Yes - Patient to hold oral diabetic medications day of procedure    Reviewed procedure prep instructions.     Prep instructions sent via [employer].      Patient verbalized understanding and had no questions or concerns at this time.    Ericka Apodaca RN

## 2022-12-23 NOTE — TELEPHONE ENCOUNTER
Patient scheduled for upper endoscopy (EGD) on 1/9/23.     Discuss Covid policy.     Arrival time: 1030    Facility location: [facility]; [address] [place] [address], [place] [address]    Sedation type: Conscious sedation  -- Previous procedures were MAC, will discuss with the Pt.     Anticoagulations? No    Electronic implanted devices? No    Diabetic? Yes - Patient to hold oral diabetic medications day of procedure    Indication for procedure: HISTORY OF GASTRIC ULCER, 22 MM, NEEDING FOLLOW UP EGD, ON OMEPRAZOLE, SWITCHING CARE FROM [place] [facility]    Prep instructions sent via Hello! Messenger    Pre visit planning completed.    Ericka Apodaca RN

## 2022-12-29 DIAGNOSIS — E11.9 TYPE 2 DIABETES MELLITUS WITHOUT COMPLICATION, WITHOUT LONG-TERM CURRENT USE OF INSULIN (H): ICD-10-CM

## 2022-12-29 NOTE — CONFIDENTIAL NOTE
Empagliflozin (Jardiance) 10 mg    Last Office Visit: 12/6/22  [facility] Appointments: None  Medication last refilled: 12/1/22 #30 with 0 refill(s)    Required labs per protocol:    LAB REF RANGE 10/17/22 12/9/22   Creatinine 0.8-1.25 mg/dL 1.04 high 1.32 High   Hgb A1C 4.1-5.7 % 6.2 High 6.7 High     Routing refill request to provider for review/approval because:  Labs out of range:  Elevated Creatinine and Hgb A1C    DELIA FernandesN, RN, CCM

## 2023-01-04 ENCOUNTER — VIRTUAL VISIT (OUTPATIENT)
Dept: BEHAVIORAL HEALTH | Facility: CLINIC | Age: 68
End: 2023-01-04
Payer: COMMERCIAL

## 2023-01-04 DIAGNOSIS — F43.10 PTSD (POST-TRAUMATIC STRESS DISORDER): Primary | ICD-10-CM

## 2023-01-04 ASSESSMENT — PATIENT HEALTH QUESTIONNAIRE - PHQ9
SUM OF ALL RESPONSES TO PHQ QUESTIONS 1-9: 3
SUM OF ALL RESPONSES TO PHQ QUESTIONS 1-9: 3
10. IF YOU CHECKED OFF ANY PROBLEMS, HOW DIFFICULT HAVE THESE PROBLEMS MADE IT FOR YOU TO DO YOUR WORK, TAKE CARE OF THINGS AT HOME, OR GET ALONG WITH OTHER PEOPLE: NOT DIFFICULT AT ALL

## 2023-01-04 ASSESSMENT — ANXIETY QUESTIONNAIRES
2. NOT BEING ABLE TO STOP OR CONTROL WORRYING: SEVERAL DAYS
GAD7 TOTAL SCORE: 2
4. TROUBLE RELAXING: NOT AT ALL
6. BECOMING EASILY ANNOYED OR IRRITABLE: NOT AT ALL
IF YOU CHECKED OFF ANY PROBLEMS ON THIS QUESTIONNAIRE, HOW DIFFICULT HAVE THESE PROBLEMS MADE IT FOR YOU TO DO YOUR WORK, TAKE CARE OF THINGS AT HOME, OR GET ALONG WITH OTHER PEOPLE: NOT DIFFICULT AT ALL
GAD7 TOTAL SCORE: 2
5. BEING SO RESTLESS THAT IT IS HARD TO SIT STILL: NOT AT ALL
3. WORRYING TOO MUCH ABOUT DIFFERENT THINGS: NOT AT ALL
8. IF YOU CHECKED OFF ANY PROBLEMS, HOW DIFFICULT HAVE THESE MADE IT FOR YOU TO DO YOUR WORK, TAKE CARE OF THINGS AT HOME, OR GET ALONG WITH OTHER PEOPLE?: NOT DIFFICULT AT ALL
7. FEELING AFRAID AS IF SOMETHING AWFUL MIGHT HAPPEN: SEVERAL DAYS
7. FEELING AFRAID AS IF SOMETHING AWFUL MIGHT HAPPEN: SEVERAL DAYS
1. FEELING NERVOUS, ANXIOUS, OR ON EDGE: NOT AT ALL
GAD7 TOTAL SCORE: 2
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
## 2023-01-05 ENCOUNTER — ANCILLARY PROCEDURE (OUTPATIENT)
Dept: ULTRASOUND IMAGING | Facility: CLINIC | Age: 68
End: 2023-01-05
Attending: FAMILY MEDICINE
Payer: COMMERCIAL

## 2023-01-05 DIAGNOSIS — E21.3 HYPERPARATHYROIDISM (H): ICD-10-CM

## 2023-01-05 LAB
COLLECT DURATION TIME UR: 830 H
CREAT 24H UR-MRATE: 0.03 G/(24.H) (ref 0.72–1.51)
CREAT UR-MCNC: 46.9 MG/DL
SPECIMEN VOL UR: 1890 ML

## 2023-01-05 PROCEDURE — 82570 ASSAY OF URINE CREATININE: CPT | Performed by: PATHOLOGY

## 2023-01-05 PROCEDURE — 81050 URINALYSIS VOLUME MEASURE: CPT | Performed by: PATHOLOGY

## 2023-01-05 PROCEDURE — 76536 US EXAM OF HEAD AND NECK: CPT | Mod: GC | Performed by: RADIOLOGY

## 2023-01-06 ENCOUNTER — TELEPHONE (OUTPATIENT)
Dept: ORTHOPEDICS | Facility: CLINIC | Age: 68
End: 2023-01-06

## 2023-01-06 DIAGNOSIS — E21.3 HYPERPARATHYROIDISM (H): Primary | ICD-10-CM

## 2023-01-06 NOTE — TELEPHONE ENCOUNTER
Hello,  I'm with ortho jodi Genao with [facility] called from .  She is asking for clarification on the order put in today which says its for hyperparathyroidism as a bone scan.  She is asking if this should be a parathyroid scan instead.  Is a new order needed?  Thanks you.

## 2023-01-06 NOTE — TELEPHONE ENCOUNTER
I called the patient's daughter, Leila, and provided the phone number to call and schedule her SPECT scan. Leila states she will try and get this scheduled before Karine's Follow-up appointment with dr. Friedman on 1/13/23. If she is unable to get the SPECT scan scheduled before then Leila will call or MyChart us. All questions were answered at this time. Soleadd Busch, LAURA on 1/6/2023 at 7:58 AM

## 2023-01-06 NOTE — TELEPHONE ENCOUNTER
US with abnormal changes, will need a SPECT scan for further assessment    Please schedule with Radiology

## 2023-01-06 NOTE — TELEPHONE ENCOUNTER
I returned NM phone call and discussed order that needs to be placed. Parathyroid planar w/ spect dual isotope order was placed, NM staff confirmed this is the correct order and will reach out to the patient to reschedule correct spect scan to be done. All questions were answered at this time. Soledad Busch ATC on 1/6/2023 at 11:08 AM

## 2023-01-09 ENCOUNTER — HOSPITAL ENCOUNTER (OUTPATIENT)
Facility: CLINIC | Age: 68
Discharge: HOME | End: 2023-01-09
Attending: INTERNAL MEDICINE | Admitting: INTERNAL MEDICINE
Payer: COMMERCIAL

## 2023-01-09 VITALS
OXYGEN SATURATION: 98 % | RESPIRATION RATE: 14 BRPM | SYSTOLIC BLOOD PRESSURE: 125 MMHG | DIASTOLIC BLOOD PRESSURE: 72 MMHG | TEMPERATURE: 98 F | HEART RATE: 96 BPM

## 2023-01-09 LAB
GLUCOSE BLDC GLUCOMTR-MCNC: 116 MG/DL (ref 70–99)
UPPER GI ENDOSCOPY: NORMAL

## 2023-01-09 PROCEDURE — 250N000009 HC RX 250: Performed by: INTERNAL MEDICINE

## 2023-01-09 PROCEDURE — 999N000099 HC STATISTIC MODERATE SEDATION < 10 MIN: Performed by: INTERNAL MEDICINE

## 2023-01-09 PROCEDURE — 88305 TISSUE EXAM BY PATHOLOGIST: CPT | Mod: TC | Performed by: INTERNAL MEDICINE

## 2023-01-09 PROCEDURE — 88305 TISSUE EXAM BY PATHOLOGIST: CPT | Mod: 26 | Performed by: STUDENT IN AN ORGANIZED HEALTH CARE EDUCATION/TRAINING PROGRAM

## 2023-01-09 PROCEDURE — 82962 GLUCOSE BLOOD TEST: CPT

## 2023-01-09 PROCEDURE — 43239 EGD BIOPSY SINGLE/MULTIPLE: CPT | Performed by: INTERNAL MEDICINE

## 2023-01-09 ASSESSMENT — ACTIVITIES OF DAILY LIVING (ADL)
ADLS_ACUITY_SCORE: 35
ADLS_ACUITY_SCORE: 35

## 2023-01-09 NOTE — H&P
ENDOSCOPY PRE-SEDATION H&P FOR OUTPATIENT PROCEDURES    Karine Moss  [id number]  1955    Procedure: Endoscopy    Pre-procedure diagnosis: anastomotic ulcer    Past medical history:   Past Medical History:   Diagnosis Date     Arthritis      Basal cell carcinoma      Chest pain     seeing Cardiology     Depression 1991    after 's death     Diabetes mellitus (H)      Diabetic renal disease (H)     microalbuminuria     DJD (degenerative joint disease) of knee      H/O vitamin D deficiency      Hypertension      IBS (irritable bowel syndrome)     diarrhea      Keratoconus      Low back pain      Narcolepsy 2007    Dx'd by Sleep specialist 347.00, pt discontinued Adderal mid Nov. 13 due to tacycardia concerns     Obesity      Obstructive sleep apnea     327.23, 3 sleep studies,Dr. 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)     Keratoconus     Hyperlipidemia LDL goal <100     Obstructive sleep apnea     Vitamin D insufficiency     Major depression in partial remission (H)     Plantar warts     Low back pain     DJD (degenerative joint disease) of knee     Pancreatitis     Panic     Chest pain     IBS (irritable bowel syndrome)     Heart murmur     Hypertension goal BP (blood pressure) < 140/90     Type 2 diabetes mellitus without complication, without long-term current use of insulin (H)     Osteopenia of hip     Acute conjunctivitis of left eye     PTSD (post-traumatic stress disorder)     Diabetic peripheral neuropathy (H)     Bilateral sciatica     Gastrointestinal hemorrhage associated with gastric ulcer     Chronic kidney disease, stage 3 (H)     Chronic diastolic congestive heart failure (H)     Upper GI bleed     Neck pain     Closed nondisplaced  fracture of head of right radius, initial encounter     Injury of head, initial encounter     Fall, initial encounter     Closed fracture of right hand, initial encounter     Closed fracture of left wrist, initial encounter       Past surgical history:   Past Surgical History:   Procedure Laterality Date     COLONOSCOPY  10/01/2020    poor quality prep     ear surgery  2002 and 01/2004     ESOPHAGOSCOPY, GASTROSCOPY, DUODENOSCOPY (EGD), COMBINED N/A 11/16/2021    Procedure: ESOPHAGOGASTRODUODENOSCOPY (EGD);  Surgeon: Jayla Calvo MD;  Location:  GI     GASTRIC BYPASS  2013    laparoscopic jimmy en y c ometopexy     HEMORRHOIDECTOMY  09/14/2000     LAPAROSCOPIC CHOLECYSTECTOMY  2015     LASIK BILATERAL       UVULOPALATOPHARYNGOPLASTY       UVVP         No current facility-administered medications for this encounter.       Allergies   Allergen Reactions     Pravastatin Other (See Comments)     woozy     Codeine Nausea and Vomiting     Nsaids GI Disturbance and Other (See Comments)     Pt had bariatric surgery, should not ever take oral NSAIDS due to risk of gastric ulcers.  Pt had bariatric surgery, should not ever take oral NSAIDS due to risk of gastric ulcers.       History of Anesthesia/Sedation Problems: no    Physical Exam:    Mental status: alert  Heart: Normal  Lung: Normal  Assessment of patient's airway: Normal  Other as pertinent for procedure: None     Lab Results   Component Value Date    WBC 8.8 12/09/2022    WBC 7.2 05/27/2021     Lab Results   Component Value Date    RBC 4.32 12/09/2022    RBC 4.45 05/27/2021     Lab Results   Component Value Date    HGB 11.7 12/09/2022    HGB 12.4 05/27/2021     Lab Results   Component Value Date    HCT 37.9 12/09/2022    HCT 40.0 05/27/2021     Lab Results   Component Value Date    MCV 88 12/09/2022    MCV 90 05/27/2021     Lab Results   Component Value Date    MCH 27.1 12/09/2022    MCH 27.9 05/27/2021     Lab Results   Component Value Date    MCHC 30.9  12/09/2022    MCHC 31.0 05/27/2021     Lab Results   Component Value Date    RDW 15.1 12/09/2022    RDW 14.6 05/27/2021     Lab Results   Component Value Date     12/09/2022     05/27/2021     INR   Date Value Ref Range Status   11/15/2021 1.10 0.85 - 1.15 Final        ASA Score: See Provation note    Mallampati score:  II - Faucial pillars and soft palate may be seen, but uvula is masked by the base of the tongue    Assessment/Plan:     The patient is an appropriate candidate to receive sedation.    Informed consent was discussed with the patient/family, including the risks, benefits, potential complications and any alternative options associated with sedation.    Patient assessment completed just prior to sedation and while under constant observation by the provider. Condition determined to be adequate for proceeding with sedation.    The specific risks for the procedure were discussed with the patient at the time of informed consent and include but are not limited to perforation which could require surgery, missing significant neoplasm or lesion, hemorrhage and adverse sedative complication.      Viviana De La Cruz MD

## 2023-01-09 NOTE — OR NURSING
Pt had EGD with biopsies. Pt tolerated procedure well. Pt stable at time of transfer to , report given to Joy VIGIL.

## 2023-01-11 ENCOUNTER — HOSPITAL ENCOUNTER (OUTPATIENT)
Dept: NUCLEAR MEDICINE | Facility: CLINIC | Age: 68
Setting detail: NUCLEAR MEDICINE
Discharge: HOME | End: 2023-01-11
Attending: FAMILY MEDICINE
Payer: COMMERCIAL

## 2023-01-11 DIAGNOSIS — E21.3 HYPERPARATHYROIDISM (H): ICD-10-CM

## 2023-01-11 LAB
PATH REPORT.COMMENTS IMP SPEC: NORMAL
PATH REPORT.COMMENTS IMP SPEC: NORMAL
PATH REPORT.FINAL DX SPEC: NORMAL
PATH REPORT.GROSS SPEC: NORMAL
PATH REPORT.MICROSCOPIC SPEC OTHER STN: NORMAL
PATH REPORT.RELEVANT HX SPEC: NORMAL
PHOTO IMAGE: NORMAL

## 2023-01-11 PROCEDURE — A9516 IODINE I-123 SOD IODIDE MIC: HCPCS | Performed by: FAMILY MEDICINE

## 2023-01-11 PROCEDURE — 343N000001 HC RX 343: Performed by: FAMILY MEDICINE

## 2023-01-11 PROCEDURE — 78072 PARATHYRD PLANAR W/SPECT&CT: CPT

## 2023-01-11 PROCEDURE — A9500 TC99M SESTAMIBI: HCPCS | Performed by: FAMILY MEDICINE

## 2023-01-11 RX ADMIN — Medication 757 UCI.: at 10:44

## 2023-01-11 RX ADMIN — Medication 25.8 MILLICURIE: at 13:58

## 2023-01-13 ENCOUNTER — OFFICE VISIT (OUTPATIENT)
Dept: ORTHOPEDICS | Facility: CLINIC | Age: 68
End: 2023-01-13
Payer: COMMERCIAL

## 2023-01-13 VITALS — HEIGHT: 63 IN | BODY MASS INDEX: 24.59 KG/M2 | WEIGHT: 138.8 LBS

## 2023-01-13 DIAGNOSIS — E13.29 OTHER SPECIFIED DIABETES MELLITUS WITH OTHER DIABETIC KIDNEY COMPLICATION (H): ICD-10-CM

## 2023-01-13 DIAGNOSIS — M85.88 OSTEOPENIA OF SPINE: Primary | ICD-10-CM

## 2023-01-13 PROCEDURE — 99213 OFFICE O/P EST LOW 20 MIN: CPT | Performed by: FAMILY MEDICINE

## 2023-01-13 NOTE — LETTER
1/13/2023      RE: Karine Moss  [address]  [facility] [address]     Dear Colleague,    Thank you for referring your patient, Karine Moss, to the [facility] [place]. Please see a copy of my visit note below.    SUBJECTIVE:    Karine Moss is a 67 year old female here today to discuss osteoporosis.  Previous DEXA done 12/1/22.   2/28 consult for dental implants. Dealing with teeth for 2 years. T-score showed her to be Osteopenia.  Risk factors for osteoporosis include postmenopausal,  or , diabetes, CRF and gastric bypass.  Careful with walking, not falling  Didn't trip upstairs on purpose, doesn't recall event  No MI or stroke    Osteoporosis Risk Score/FRAX score - elevated with diabetes [email or web address]://[email or web address].ac.uk/FRAX/  Risk of Hip Fracture: 3.5 (Significant if >3%)  Risk of Overall Fracture: 22 (Significant if >20%)    Past Medical History:   Diagnosis Date     Arthritis      Basal cell carcinoma      Chest pain     seeing Cardiology     Depression 1991    after 's death     Diabetes mellitus (H)      Diabetic renal disease (H)     microalbuminuria     DJD (degenerative joint disease) of knee      H/O vitamin D deficiency      Hypertension      IBS (irritable bowel syndrome)     diarrhea      Keratoconus      Low back pain      Narcolepsy 2007    Dx'd by Sleep specialist 347.00, pt discontinued Adderal mid Nov. 13 due to tacycardia concerns     Obesity      Obstructive sleep apnea     327.23, 3 sleep studies,Dr. [facility]     Pancreatitis     related to Victoza, also on Xyrem     Panic      RLS (restless legs syndrome)      Sinus tachycardia        Past Surgical History:   Procedure Laterality Date     COLONOSCOPY  10/01/2020    poor quality prep     ear surgery  2002 and 01/2004     ESOPHAGOSCOPY, GASTROSCOPY, DUODENOSCOPY (EGD), COMBINED N/A 11/16/2021    Procedure: ESOPHAGOGASTRODUODENOSCOPY (EGD);  Surgeon: Jayla Calvo,  MD;  Location:  GI     ESOPHAGOSCOPY, GASTROSCOPY, DUODENOSCOPY (EGD), COMBINED N/A 1/9/2023    Procedure: ESOPHAGOGASTRODUODENOSCOPY, WITH BIOPSY;  Surgeon: Brandon Witt MD;  Location:  GI     GASTRIC BYPASS  2013    laparoscopic jimmy en y c ometopexy     HEMORRHOIDECTOMY  09/14/2000     LAPAROSCOPIC CHOLECYSTECTOMY  2015     LASIK BILATERAL       UVULOPALATOPHARYNGOPLASTY       UVVP         Current Outpatient Medications   Medication Sig Dispense Refill     amphetamine-dextroamphetamine (ADDERALL) 30 MG tablet Take 1 tablet (30 mg) by mouth every 24 hours 1.5 mg a total of 45 mg  Daily 30 tablet 0     atorvastatin (LIPITOR) 20 MG tablet Take 1 tablet (20 mg) by mouth daily 90 tablet 3     blood glucose (NO BRAND SPECIFIED) test strip Use to test blood sugar as needed with Freestyle Nirmal CGM. 100 strip 0     blood glucose (NO BRAND SPECIFIED) test strip Use to test blood sugar as directed with CGM sensor. 50 strip 1     calcium carbonate (OS-KINJAL) 1500 (600 Ca) MG tablet Take 2 tablets (1,200 mg) by mouth daily       cevimeline (EVOXAC) 30 MG capsule take 1 cap  capsule 3     Continuous Blood Gluc  (FREESTYLE NIRMAL 14 DAY READER) EBEN Use to read blood sugars as per 's instructions. 1 each 0     Continuous Blood Gluc Sensor (FREESTYLE NIRMAL 14 DAY SENSOR) MISC Change every 14 days. 2 each 11     cyanocobalamin (VITAMIN B-12) 1000 MCG tablet Take one every other day 90 tablet 1     DULoxetine (CYMBALTA) 30 MG capsule Take 1 capsule (30 mg) by mouth 2 times daily 180 capsule 3     DULoxetine (CYMBALTA) 60 MG capsule Take 1 capsule (60 mg) by mouth At Bedtime Take in addition to current 30 mg evening dose for a total of 90 mg at bedtime. 90 capsule 3     empagliflozin (JARDIANCE) 10 MG TABS tablet Take 1 tablet (10 mg) by mouth daily Schedule appt with ADEN Blas, [facility] to ensure future refills 30 tablet 0     losartan (COZAAR) 100 MG tablet Take 1 tablet (100 mg)  by mouth daily 90 tablet 3     metFORMIN (GLUCOPHAGE-XR) 500 MG 24 hr tablet Take 4 po q am with breakfast. 360 tablet 3     omeprazole (PRILOSEC) 40 MG DR capsule Take 40mg twice daily 180 capsule 1     oxyCODONE (ROXICODONE) 5 MG tablet Take 0.5 tablets (2.5 mg) by mouth every 6 hours as needed for moderate to severe pain 16 tablet 0     SENNA-docusate sodium (SENNA S) 8.6-50 MG tablet Take 1 tablet by mouth At Bedtime       tolterodine ER (DETROL LA) 4 MG 24 hr capsule Take 1 capsule (4 mg) by mouth daily 90 capsule 3     traZODone (DESYREL) 50 MG tablet Take 1 tablet by mouth every 24 hours         Family History   Problem Relation Age of Onset     Memory loss Mother      Diabetes Father      Chronic Obstructive Pulmonary Disease Sister      Anemia Sister         hx of ulcers     Other - See Comments Sister 65        MVA, followed by leg pain after a fall     Dementia Sister 68     Dementia Brother 70     Cancer Brother         lung     Cancer - colorectal Maternal Grandmother      Cancer Daughter      Obesity Daughter         s/p lap band       Social History     Socioeconomic History     Marital status: Single     Spouse name: Not on file     Number of children: 1     Years of education: Not on file     Highest education level: Not on file   Occupational History     Employer: [employer]     Comment: on disability   Tobacco Use     Smoking status: Never     Smokeless tobacco: Never   Substance and Sexual Activity     Alcohol use: Not Currently     Comment: rare     Drug use: No     Sexual activity: Never   Other Topics Concern     Parent/sibling w/ CABG, MI or angioplasty before 65F 55M? No   Social History Narrative    Daughter- 42,  since 1991    Drives only short distances due to narcolepsy            --------------------------------------------------------------------------------    Surgical History  Return To Top     Status Surgery Time Frame Comment Record Date     Inactive  ear surgery  1/04     5/27/2008      Inactive  eye surgery  2002    5/27/2008      Inactive  Hemorrhoidectomy  9/14/00 5/27/2008          --------------------------------------------------------------------------------    Food Allergy  Return To Top     Allergen Reaction Comment Record Date     * No known food allergies      10/28/2008          --------------------------------------------------------------------------------    Drug Allergy  Return To Top     Allergen Reaction Comment Record Date     Codeine  nausea    6/21/2007          --------------------------------------------------------------------------------    Environment Allergy  Return To Top     Allergen Reaction Comment Record Date     * No known environmental allergies      10/28/2008          --------------------------------------------------------------------------------    Social History  Return To Top     Question Answer Comment Record Date     Marital status      5/27/2008      Advance Directive or Living Will  Form Given to Patient    1/18/2010      Emotional Abuse  Yes    1/18/2010      Exercise  No    1/18/2010      Caffeine  Yes    5/27/2008      Physical Abuse  No    1/18/2010      Sealtbelts  Yes    1/18/2010      Sexual Abuse  No    1/18/2010      Breast/Testicle Self Check  No    1/18/2010      Number of children  1    1/18/2010      Living arrangements  House    1/18/2010      Number of adults in household  2    1/18/2010      Education level  High School Graduate    1/18/2010      Employment  Currently employed    1/18/2010      Tobacco history  Has never smoked or chewed tobacco    5/27/2008      Alcohol history  Currently drinks alcohol    5/27/2008      Has the patient ever used illegal drugs?  Has never used illegal drugs    5/27/2008          --------------------------------------------------------------------------------    History - Overall Remark: 3/21/2012 Return To Top          --------------------------------------------------------------------------------    Medical History Return To Top     Status Diagnosis Time Frame Comment Record Date     Active (250.00) - C - Diabetes mellitus, type II controlled      5/15/2012      Active (788.41) - C - Urinary frequency      4/17/2012      Active (250.02) - C - Diabetes mellitus, type II uncontrolled      3/6/2012      Active (278.00) - C - Obesity      2/14/2011      Active (250.00) - C - Diabetes mellitus, type II controlled      2/14/2011      Active (739.3) - C - Somatic dysfunction, lumbar region      8/16/2010      Active (739.5) - C - Somatic dysfunction, pelvic region      8/16/2010      Active (401.9) - C - Hypertension      2/8/2010      Active 268.9 UNSP VITAMIN D DEFICIENCY      1/18/2010      Active (695.3) - C - Rosacea      1/18/2010      Active 272.1 Hypertriglyceridemia      1/18/2010      Active 300.4 Depression with Anxiety (Dysthymic Disorder)      10/28/2008      Active 739.6 Somatic dysfunction, lower extremities      10/28/2008      Active 780.79 Fatigue      6/23/2008      Active 278.01 Obesity, morbid      6/19/2008      Active 347.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, 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      6/19/2008      Inactive  (462) - C - Pharyngitis, acute      1/15/2011      Inactive  250.02 Diabetes mellitus, type II uncontrolled      1/18/2010      Inactive  726.71 Tendinitis, achilles      10/28/2008      Inactive  (250.00) - C - Diabetes mellitus, type II controlled      10/28/2008      Inactive  (250.00) - C - Diabetes mellitus, type II controlled      6/23/2008      Inactive  V77.91 Screening, lipids      6/23/2008      Inactive  599.0 Urinary tract infection, unspec./pyuria (UTI)      6/23/2008      Inactive  V70.0 Routine Medical Exam      6/19/2008      Active Constipation      5/27/2008      Active Hemorrhoids      5/27/2008      Pancreatitis pancreatitis 2013 April      "--------------------------------------------------------------------------------    M        --------------------------------------------------------------------------------    Family History  Return To Top     Status Relationship Disease Comment Record Date     Alive Sister  *Denies any medical problems  3 sisters  2008      Alive Brother  *Denies any medical problems  2 brothers  2008         Father    Age of death 56  2010         Mother  Dementia  Age of death 82  2008          --------------------------------------------------------------------------------                         Social Determinants of Health     Financial Resource Strain: Not on file   Food Insecurity: Not on file   Transportation Needs: Not on file   Physical Activity: Not on file   Stress: Not on file   Social Connections: Not on file   Intimate Partner Violence: Not on file   Housing Stability: Not on file       OBJECTIVE:  Ht 1.604 m (5' 3.13\")   Wt 63 kg (138 lb 12.8 oz)   BMI 24.49 kg/m    There has been a change in patients height.    ASSESSMENT:  Osteopenia  Diabetes, kidney function reduced      PLAN:  Optimize calcium and vit D  Touching base with Endo to confirm med selection, but I think the start will be delayed due to dental implants  Check with dental about starting bisphosphanates    Reclast over 30 minutes- rec by Endo    The importance of calcium and vitamin D in bone health was discussed in detail. A calcium intake of 1500 mg total per day in divided doses, to include diet and supplements, was recommended.  I have urged the patient to obtain as much as the recommended amount of calcium as possible from the diet.  I recommended use of the International [employer]opor[employer] Calcium Calculator to get an estimate of daily total intake in the diet (www.iofcalciumcalculator).800-1000 international units vitamin D daily was also recommended.       We also discussed the role of weight " bearing exercise for the treatment of bone loss. I have also recommended weight training at a minimum of 2x/week.   The patient will be referred to the [facility] for the osteoporosis protocol.      Roberta Friedman MD, CAQ   Sports Medicine and Bone Health team

## 2023-01-13 NOTE — PROGRESS NOTES
SUBJECTIVE:    Karine Moss is a 67 year old female here today to discuss osteoporosis.  Previous DEXA done 12/1/22.   2/28 consult for dental implants. Dealing with teeth for 2 years. T-score showed her to be Osteopenia.  Risk factors for osteoporosis include postmenopausal,  or , diabetes, CRF and gastric bypass.  Careful with walking, not falling  Didn't trip upstairs on purpose, doesn't recall event  No MI or stroke    Osteoporosis Risk Score/FRAX score - elevated with diabetes [email or web address]  Risk of Hip Fracture: 3.5 (Significant if >3%)  Risk of Overall Fracture: 22 (Significant if >20%)    Past Medical History:   Diagnosis Date     Arthritis      Basal cell carcinoma      Chest pain     seeing Cardiology     Depression 1991    after 's death     Diabetes mellitus (H)      Diabetic renal disease (H)     microalbuminuria     DJD (degenerative joint disease) of knee      H/O vitamin D deficiency      Hypertension      IBS (irritable bowel syndrome)     diarrhea      Keratoconus      Low back pain      Narcolepsy 2007    Dx'd by Sleep specialist 347.00, pt discontinued Adderal mid Nov. 13 due to tacycardia concerns     Obesity      Obstructive sleep apnea     327.23, 3 sleep studies,Dr. Flaco SELBY [facility]     Pancreatitis     related to Victoza, also on Xyrem     Panic      RLS (restless legs syndrome)      Sinus tachycardia        Past Surgical History:   Procedure Laterality Date     COLONOSCOPY  10/01/2020    poor quality prep     ear surgery  2002 and 01/2004     ESOPHAGOSCOPY, GASTROSCOPY, DUODENOSCOPY (EGD), COMBINED N/A 11/16/2021    Procedure: ESOPHAGOGASTRODUODENOSCOPY (EGD);  Surgeon: Jayla Calvo MD;  Location: [facility]     ESOPHAGOSCOPY, GASTROSCOPY, DUODENOSCOPY (EGD), COMBINED N/A 1/9/2023    Procedure: ESOPHAGOGASTRODUODENOSCOPY, WITH BIOPSY;  Surgeon: Brandon Witt MD;  Location:  GI     GASTRIC BYPASS  2013    laparoscopic jimmy en y c ometopexy      HEMORRHOIDECTOMY  09/14/2000     LAPAROSCOPIC CHOLECYSTECTOMY  2015     LASIK BILATERAL       UVULOPALATOPHARYNGOPLASTY       UVVP         Current Outpatient Medications   Medication Sig Dispense Refill     amphetamine-dextroamphetamine (ADDERALL) 30 MG tablet Take 1 tablet (30 mg) by mouth every 24 hours 1.5 mg a total of 45 mg  Daily 30 tablet 0     atorvastatin (LIPITOR) 20 MG tablet Take 1 tablet (20 mg) by mouth daily 90 tablet 3     blood glucose (NO BRAND SPECIFIED) test strip Use to test blood sugar as needed with Freestyle Nirmal CGM. 100 strip 0     blood glucose (NO BRAND SPECIFIED) test strip Use to test blood sugar as directed with CGM sensor. 50 strip 1     calcium carbonate (OS-KINJAL) 1500 (600 Ca) MG tablet Take 2 tablets (1,200 mg) by mouth daily       cevimeline (EVOXAC) 30 MG capsule take 1 cap  capsule 3     Continuous Blood Gluc  (FREESTYLE NIRMAL 14 DAY READER) EBEN Use to read blood sugars as per 's instructions. 1 each 0     Continuous Blood Gluc Sensor (FREESTYLE NIRMAL 14 DAY SENSOR) MISC Change every 14 days. 2 each 11     cyanocobalamin (VITAMIN B-12) 1000 MCG tablet Take one every other day 90 tablet 1     DULoxetine (CYMBALTA) 30 MG capsule Take 1 capsule (30 mg) by mouth 2 times daily 180 capsule 3     DULoxetine (CYMBALTA) 60 MG capsule Take 1 capsule (60 mg) by mouth At Bedtime Take in addition to current 30 mg evening dose for a total of 90 mg at bedtime. 90 capsule 3     empagliflozin (JARDIANCE) 10 MG TABS tablet Take 1 tablet (10 mg) by mouth daily Schedule appt with ADEN Blas, [facility] to ensure future refills 30 tablet 0     losartan (COZAAR) 100 MG tablet Take 1 tablet (100 mg) by mouth daily 90 tablet 3     metFORMIN (GLUCOPHAGE-XR) 500 MG 24 hr tablet Take 4 po q am with breakfast. 360 tablet 3     omeprazole (PRILOSEC) 40 MG DR capsule Take 40mg twice daily 180 capsule 1     oxyCODONE (ROXICODONE) 5 MG tablet Take 0.5 tablets (2.5 mg) by  mouth every 6 hours as needed for moderate to severe pain 16 tablet 0     SENNA-docusate sodium (SENNA S) 8.6-50 MG tablet Take 1 tablet by mouth At Bedtime       tolterodine ER (DETROL LA) 4 MG 24 hr capsule Take 1 capsule (4 mg) by mouth daily 90 capsule 3     traZODone (DESYREL) 50 MG tablet Take 1 tablet by mouth every 24 hours         Family History   Problem Relation Age of Onset     Memory loss Mother      Diabetes Father      Chronic Obstructive Pulmonary Disease Sister      Anemia Sister         hx of ulcers     Other - See Comments Sister 65        MVA, followed by leg pain after a fall     Dementia Sister 68     Dementia Brother 70     Cancer Brother         lung     Cancer - colorectal Maternal Grandmother      Cancer Daughter      Obesity Daughter         s/p lap band       Social History     Socioeconomic History     Marital status: Single     Spouse name: Not on file     Number of children: 1     Years of education: Not on file     Highest education level: Not on file   Occupational History     Employer: [employer]     Comment: on disability   Tobacco Use     Smoking status: Never     Smokeless tobacco: Never   Substance and Sexual Activity     Alcohol use: Not Currently     Comment: rare     Drug use: No     Sexual activity: Never   Other Topics Concern     Parent/sibling w/ CABG, MI or angioplasty before 65F 55M? No   Social History Narrative    Daughter- 42,  since 1991    Drives only short distances due to narcolepsy            --------------------------------------------------------------------------------    Surgical History  Return To Top     Status Surgery Time Frame Comment Record Date     Inactive  ear surgery  1/04 5/27/2008      Inactive  eye surgery  2002 5/27/2008      Inactive  Hemorrhoidectomy  9/14/00 5/27/2008          --------------------------------------------------------------------------------    Food Allergy  Return To Top     Allergen Reaction Comment Record Date      * No known food allergies      10/28/2008          --------------------------------------------------------------------------------    Drug Allergy  Return To Top     Allergen Reaction Comment Record Date     Codeine  nausea    6/21/2007          --------------------------------------------------------------------------------    Environment Allergy  Return To Top     Allergen Reaction Comment Record Date     * No known environmental allergies      10/28/2008          --------------------------------------------------------------------------------    Social History  Return To Top     Question Answer Comment Record Date     Marital status      5/27/2008      Advance Directive or Living Will  Form Given to Patient    1/18/2010      Emotional Abuse  Yes    1/18/2010      Exercise  No    1/18/2010      Caffeine  Yes    5/27/2008      Physical Abuse  No    1/18/2010      Sealtbelts  Yes    1/18/2010      Sexual Abuse  No    1/18/2010      Breast/Testicle Self Check  No    1/18/2010      Number of children  1    1/18/2010      Living arrangements  House    1/18/2010      Number of adults in household  2    1/18/2010      Education level  High School Graduate    1/18/2010      Employment  Currently employed    1/18/2010      Tobacco history  Has never smoked or chewed tobacco    5/27/2008      Alcohol history  Currently drinks alcohol    5/27/2008      Has the patient ever used illegal drugs?  Has never used illegal drugs    5/27/2008          --------------------------------------------------------------------------------    History - Overall Remark: 3/21/2012 Return To Top         --------------------------------------------------------------------------------    Medical History Return To Top     Status Diagnosis Time Frame Comment Record Date     Active (250.00) - C - Diabetes mellitus, type II controlled      5/15/2012      Active (788.41) - C - Urinary frequency      4/17/2012      Active (250.02) - C -  Diabetes mellitus, type II uncontrolled      3/6/2012      Active (278.00) - C - Obesity      2/14/2011      Active (250.00) - C - Diabetes mellitus, type II controlled      2/14/2011      Active (739.3) - C - Somatic dysfunction, lumbar region      8/16/2010      Active (739.5) - C - Somatic dysfunction, pelvic region      8/16/2010      Active (401.9) - C - Hypertension      2/8/2010      Active 268.9 UNSP VITAMIN D DEFICIENCY      1/18/2010      Active (695.3) - C - Rosacea      1/18/2010      Active 272.1 Hypertriglyceridemia      1/18/2010      Active 300.4 Depression with Anxiety (Dysthymic Disorder)      10/28/2008      Active 739.6 Somatic dysfunction, lower extremities      10/28/2008      Active 780.79 Fatigue      6/23/2008      Active 278.01 Obesity, morbid      6/19/2008      Active 347.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, 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      6/19/2008      Inactive  (462) - C - Pharyngitis, acute      1/15/2011      Inactive  250.02 Diabetes mellitus, type II uncontrolled      1/18/2010      Inactive  726.71 Tendinitis, achilles      10/28/2008      Inactive  (250.00) - C - Diabetes mellitus, type II controlled      10/28/2008      Inactive  (250.00) - C - Diabetes mellitus, type II controlled      6/23/2008      Inactive  V77.91 Screening, lipids      6/23/2008      Inactive  599.0 Urinary tract infection, unspec./pyuria (UTI)      6/23/2008      Inactive  V70.0 Routine Medical Exam      6/19/2008      Active Constipation      5/27/2008      Active Hemorrhoids      5/27/2008      Pancreatitis pancreatitis 2013 April --------------------------------------------------------------------------------    M        --------------------------------------------------------------------------------    Family History  Return To Top     Status Relationship Disease Comment Record Date     Alive Sister  *Denies any medical problems  3 sisters  5/27/2008      Alive Brother  *Denies any medical problems  2 brothers  5/27/2008       "   Father    Age of death 56  2010         Mother  Dementia  Age of death 82  2008          --------------------------------------------------------------------------------                         Social Determinants of Health     Financial Resource Strain: Not on file   Food Insecurity: Not on file   Transportation Needs: Not on file   Physical Activity: Not on file   Stress: Not on file   Social Connections: Not on file   Intimate Partner Violence: Not on file   Housing Stability: Not on file       OBJECTIVE:  Ht 1.604 m (5' 3.13\")   Wt 63 kg (138 lb 12.8 oz)   BMI 24.49 kg/m    There has been a change in patients height.    ASSESSMENT:  Osteopenia  Diabetes, kidney function reduced      PLAN:  Optimize calcium and vit D  Touching base with Endo to confirm med selection, but I think the start will be delayed due to dental implants  Check with dental about starting bisphosphanates    Reclast over 30 minutes- rec by Endo    The importance of calcium and vitamin D in bone health was discussed in detail. A calcium intake of 1500 mg total per day in divided doses, to include diet and supplements, was recommended.  I have urged the patient to obtain as much as the recommended amount of calcium as possible from the diet.  I recommended use of the International Osteoporosis Foundation Calcium Calculator to get an estimate of daily total intake in the diet (www.iofcalciumcalculator).800-1000 international units vitamin D daily was also recommended.       We also discussed the role of weight bearing exercise for the treatment of bone loss. I have also recommended weight training at a minimum of 2x/week.   The patient will be referred to the [facility] for the osteoporosis protocol.      Roberta Friedman MD, CAQ   Sports Medicine and Bone Health team        "
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
## 2023-01-18 ENCOUNTER — OFFICE VISIT (OUTPATIENT)
Dept: NEUROPSYCHOLOGY | Facility: CLINIC | Age: 68
End: 2023-01-18
Attending: INTERNAL MEDICINE
Payer: COMMERCIAL

## 2023-01-18 ENCOUNTER — VIRTUAL VISIT (OUTPATIENT)
Dept: BEHAVIORAL HEALTH | Facility: CLINIC | Age: 68
End: 2023-01-18
Payer: COMMERCIAL

## 2023-01-18 DIAGNOSIS — F43.10 PTSD (POST-TRAUMATIC STRESS DISORDER): Primary | ICD-10-CM

## 2023-01-18 DIAGNOSIS — R41.3 COMPLAINTS OF MEMORY DISTURBANCE: Primary | ICD-10-CM

## 2023-01-18 PROCEDURE — 90791 PSYCH DIAGNOSTIC EVALUATION: CPT

## 2023-01-18 PROCEDURE — 96139 PSYCL/NRPSYC TST TECH EA: CPT

## 2023-01-18 PROCEDURE — 96133 NRPSYC TST EVAL PHYS/QHP EA: CPT

## 2023-01-18 PROCEDURE — 96132 NRPSYC TST EVAL PHYS/QHP 1ST: CPT

## 2023-01-18 PROCEDURE — 96138 PSYCL/NRPSYC TECH 1ST: CPT

## 2023-01-18 ASSESSMENT — PATIENT HEALTH QUESTIONNAIRE - PHQ9
SUM OF ALL RESPONSES TO PHQ QUESTIONS 1-9: 2
SUM OF ALL RESPONSES TO PHQ QUESTIONS 1-9: 2
10. IF YOU CHECKED OFF ANY PROBLEMS, HOW DIFFICULT HAVE THESE PROBLEMS MADE IT FOR YOU TO DO YOUR WORK, TAKE CARE OF THINGS AT HOME, OR GET ALONG WITH OTHER PEOPLE: NOT DIFFICULT AT ALL
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
## 2023-01-19 NOTE — NURSING NOTE
Pt was seen for neuropsychological evaluation at the request of Aany Martinez MD for the purposes of diagnostic clarification and treatment planning. 223 minutes of test administration and scoring were provided by this writer. Please see Dr. Sharona De La Fuente's report for a full interpretation of the findings.    Racquel Saba  Psychometrist

## 2023-01-23 DIAGNOSIS — E11.9 TYPE 2 DIABETES MELLITUS WITHOUT COMPLICATION, WITHOUT LONG-TERM CURRENT USE OF INSULIN (H): ICD-10-CM

## 2023-01-23 DIAGNOSIS — I10 HYPERTENSION GOAL BP (BLOOD PRESSURE) < 140/90: ICD-10-CM

## 2023-01-23 DIAGNOSIS — R68.2 DRY MOUTH, UNSPECIFIED: ICD-10-CM

## 2023-01-23 DIAGNOSIS — E78.5 HYPERLIPIDEMIA LDL GOAL <100: ICD-10-CM

## 2023-01-23 NOTE — PROGRESS NOTES
Provider: CE      Tech: BRANT      Patient Name: Karine Moss     : [birth year]      MRN: [id number]      THAPA: 23      Age: 67      Education: 11      Ethnicity: C      Handedness: Right      Station: OP             NEUROPSYCHOLOGICAL TESTS RAW SCORE STANDARDIZED SCORE* DESCRIPTIVE RANGE**          PREMORBID ABILITY RAW SCORE STANDARDIZED SCORE* DESCRIPTIVE RANGE**   WAIS-IV ACS Test of Premorbid Functioning (Estimated FSIQ) 32 SS= 92 Average     Estimated FSIQ = 96 Average          ATTENTION AND EXECUTIVE FUNCTIONS RAW SCORE STANDARDIZED SCORE* DESCRIPTIVE RANGE**   Wechsler Adult Intelligence Scale - 4th Edition (WAIS-IV)      Digit Span Forward 9 ss= 9 Average   Digit Span Backward 12 ss= 15 Above Average   Digit Span Sequencing 6 ss= 8 Average   Digit Span Total 27 ss= 11 Average   Coding 49 ss= 9 Average   Similarities 21 ss= 8 Average   Trail Making Test (Time/errors)        Part A s,r,e 30/0 TS= 56 Average   Part B s,r,e 63/0 TS= 64 Above Average   Stroop       Word e 86 TS= 44 Average   Color e 67 TS= 47 Average   Color/Word e 28 TS= 46 Average   Interference e -9 TS= 41 Low Average   Independent Living Scales (ILS)       Health and Safety 39 TS= 61 High          LANGUAGE RAW SCORE STANDARDIZED SCORE* DESCRIPTIVE RANGE**   [place] Naming Test s,r,e 57 TS= 60 High Average   Letter-Cue Verbal Fluency s,r,e 14-14-13 (41) TS= 51 Average   Animal Fluency s,r,e 18 TS=  50 Average          VISUOSPATIAL PROCESSING RAW SCORE STANDARDIZED SCORE* DESCRIPTIVE RANGE**   [place] Complex Figure Test (RCFT) Copy 35 %ile= >16 WNL   RBANS; Form B       Line Orientation 15 %ile= 26-50 Average   WMS-IV Visual Reproduction Copy 42 %= 26-50 Average          LEARNING & MEMORY RAW SCORE STANDARDIZED SCORE* DESCRIPTIVE RANGE**   Wechsler Memory Scale-4th Edition (WMS-IV)      Logical Memory I 28 ss= 8 Average   Logical Memory II 19 ss= 10 Average   Logical Memory Recognition 18 %= 26-50 Average   Visual Reproduction I 32 ss= 10  Average   Visual Reproduction II 27 ss= 12 High Average   Visual Reproduction Recognition 5 %= 51-75 Average   [place] Verbal Learning Test - Revised (HVLT-R; Form 1)     Total Trials 1-3 7-9-10 (26) TS= 49 Average   Delayed Recall 10 TS= 53 Average   Retention (%) 100% TS= 57 High Average   Recognition Hits 12 --     Recognition False Alarms 1 --     Recognition Discriminability 11 TS= 52 Average          PSYCHOLOGICAL & BEHAVIORAL SYMPTOMS RAW SCORE STANDARDIZED SCORE* DESCRIPTIVE RANGE**   Geriatric Depression Scale (GDS) 7 --  Minimal   Geriatric Anxiey Scale (GAS)       Somatic 4 --  Minimal   Cognitive 0 --  Minimal   Affective 0 --  Minimal   Total 4 --  Minimal   Frontal Systems Behavior Scale (FRSBE; Family)      Apathy Before 15 TS= 38 WNL   Apathy After 29 TS= 61 WNL   Disinhibition Before 16 TS= 37 WNL   Disinhibition After 27 TS= 60 WNL   Executive Dysfunction Before 21 TS= 38 WNL   Executive Dysfunction After 45 TS= 67 Elevated   Total Before 52 TS= 36 WNL   Total After 101 TS= 65 Elevated          PERFORMANCE VALIDITY RAW SCORE   DESCRIPTIVE RANGE**   RDS 11 --  Valid Range   ACS Word Choice 50 --  Valid Range          * All standardized scores are adjusted for age. Superscripts denote additional adjustment for (s)ex, (r)ace/ethnicity, (l)anguage, and/or (e)ducation.   ** Descriptive ranges are based on American Academy of Clinical Neuropsychology (2020) consensus guidelines, or test manuals where appropriate.    WNL = within normal limits. DC = discontinued due to patient s inability to complete the test.     Standardized scores: TS = T-score; mean = 50, standard deviation =10; z = z-score; mean = 0, standard deviation = 1; ss = scaled score; mean = 10, standard deviation = 3; MAS = [place] Older Adult Normative Study age adjusted scaled score; mean = 10, standard deviation = 3; SS = standard score; mean = 100, standard deviation = 15.
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
## 2023-01-24 RX ORDER — ATORVASTATIN CALCIUM 20 MG/1
20 TABLET, FILM COATED ORAL DAILY
Qty: 90 TABLET | Refills: 1 | Status: SHIPPED | OUTPATIENT
Start: 2023-01-24 | End: 2023-06-16

## 2023-01-24 RX ORDER — CEVIMELINE HYDROCHLORIDE 30 MG/1
CAPSULE ORAL
Qty: 270 CAPSULE | Refills: 1 | Status: SHIPPED | OUTPATIENT
Start: 2023-01-24 | End: 2023-06-16

## 2023-01-24 RX ORDER — LOSARTAN POTASSIUM 100 MG/1
100 TABLET ORAL DAILY
Qty: 90 TABLET | Refills: 1 | Status: SHIPPED | OUTPATIENT
Start: 2023-01-24 | End: 2023-06-16

## 2023-01-24 RX ORDER — METFORMIN HCL 500 MG
TABLET, EXTENDED RELEASE 24 HR ORAL
Qty: 360 TABLET | Refills: 1 | Status: SHIPPED | OUTPATIENT
Start: 2023-01-24 | End: 2023-06-16

## 2023-01-24 NOTE — TELEPHONE ENCOUNTER
Metformin (Glucophage XR) 500 mg, Losartan (Cozaar) 100 mg, Cevimeline (Evoxac) 30 mg, Atorvastatin (Lipitor) 20 mg    Last Office Visit: 12/6/22  [facility] Appointments: None  Medication last refilled:     2/17/22 #90 with 3 refill(s)-Atorvastatin  2/24/22 #270 with 3 refill(s)-Cevimeline  2/17/22 #90 with 3 refill(s)-Losartan  2/24/22 #360 with 3 refill(s)-Metformin    Vital Signs 3/29/2022 12/6/2022 1/9/2023   Systolic 133 112 125   Diastolic 82 74 72     Required labs per protocol:    LAB REF RANGE 3/29/22 12/9/22   Creatinine 0.51-0.95 mg/dL 1.22 High 1.32 High   Potassium 3.4-5.3 mmol/L 4.6 4.6   LDL < 100 mg/dL 49 --     Routing refill request to provider for review/approval because:  Labs out of range:  Elevated Creatinine    CEDRICK Fernandes, RN, CCM

## 2023-01-30 DIAGNOSIS — E11.9 TYPE 2 DIABETES MELLITUS WITHOUT COMPLICATION, WITHOUT LONG-TERM CURRENT USE OF INSULIN (H): ICD-10-CM

## 2023-02-10 ENCOUNTER — OFFICE VISIT (OUTPATIENT)
Dept: FAMILY MEDICINE | Facility: CLINIC | Age: 68
End: 2023-02-10
Payer: COMMERCIAL

## 2023-02-10 VITALS
DIASTOLIC BLOOD PRESSURE: 77 MMHG | BODY MASS INDEX: 24.11 KG/M2 | HEART RATE: 91 BPM | TEMPERATURE: 97.6 F | SYSTOLIC BLOOD PRESSURE: 123 MMHG | OXYGEN SATURATION: 99 % | HEIGHT: 63 IN | WEIGHT: 136.08 LBS

## 2023-02-10 DIAGNOSIS — N95.2 ATROPHIC VAGINITIS: ICD-10-CM

## 2023-02-10 DIAGNOSIS — Z23 NEED FOR TD VACCINE: ICD-10-CM

## 2023-02-10 DIAGNOSIS — K14.5 TONGUE, FISSURED: ICD-10-CM

## 2023-02-10 DIAGNOSIS — S91.109A OPEN WOUND OF TOE, INITIAL ENCOUNTER: Primary | ICD-10-CM

## 2023-02-10 DIAGNOSIS — H90.3 BILATERAL SENSORINEURAL HEARING LOSS: ICD-10-CM

## 2023-02-10 RX ORDER — TRIAMCINOLONE ACETONIDE 0.1 %
PASTE (GRAM) DENTAL
Qty: 5 G | Refills: 0 | Status: SHIPPED | OUTPATIENT
Start: 2023-02-10 | End: 2023-06-20

## 2023-02-10 NOTE — NURSING NOTE
"67 year old  Chief Complaint   Patient presents with     Toe Injury     Right foot second toe issue     Tongue Lesion(S)     Pt reports some spots on her tongue.        Blood pressure 123/77, pulse 91, temperature 97.6  F (36.4  C), height 1.604 m (5' 3.15\"), weight 61.7 kg (136 lb 1.3 oz), SpO2 99 %, not currently breastfeeding. Body mass index is 23.99 kg/m .  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)     Keratoconus     Hyperlipidemia LDL goal <100     Obstructive sleep apnea     Vitamin D insufficiency     Major depression in partial remission (H)     Plantar warts     Low back pain     DJD (degenerative joint disease) of knee     Pancreatitis     Panic     Chest pain     IBS (irritable bowel syndrome)     Heart murmur     Hypertension goal BP (blood pressure) < 140/90     Type 2 diabetes mellitus without complication, without long-term current use of insulin (H)     Osteopenia of hip     Acute conjunctivitis of left eye     PTSD (post-traumatic stress disorder)     Diabetic peripheral neuropathy (H)     Bilateral sciatica     Gastrointestinal hemorrhage associated with gastric ulcer     Chronic kidney disease, stage 3 (H)     Chronic diastolic congestive heart failure (H)     Upper GI bleed     Neck pain     Closed nondisplaced fracture of head of right radius, initial encounter     Injury of head, initial encounter     Fall, initial encounter     Closed fracture of right hand, initial encounter     Closed fracture of left wrist, initial encounter       Wt Readings from Last 2 Encounters:   02/10/23 61.7 kg (136 lb 1.3 oz)   01/13/23 63 kg (138 lb 12.8 oz)     BP Readings from Last 3 Encounters:   02/10/23 123/77   01/09/23 125/72   12/06/22 112/74         Current Outpatient Medications   Medication     amphetamine-dextroamphetamine (ADDERALL) 30 MG tablet     atorvastatin (LIPITOR) 20 MG tablet     " blood glucose (NO BRAND SPECIFIED) test strip     blood glucose (NO BRAND SPECIFIED) test strip     calcium carbonate (OS-KINJAL) 1500 (600 Ca) MG tablet     cevimeline (EVOXAC) 30 MG capsule     Continuous Blood Gluc  (FREESTYLE JORDIN 14 DAY READER) EBEN     Continuous Blood Gluc Sensor (FREESTYLE JORDIN 14 DAY SENSOR) MISC     cyanocobalamin (VITAMIN B-12) 1000 MCG tablet     DULoxetine (CYMBALTA) 30 MG capsule     DULoxetine (CYMBALTA) 60 MG capsule     empagliflozin (JARDIANCE) 10 MG TABS tablet     losartan (COZAAR) 100 MG tablet     metFORMIN (GLUCOPHAGE XR) 500 MG 24 hr tablet     omeprazole (PRILOSEC) 40 MG DR capsule     oxyCODONE (ROXICODONE) 5 MG tablet     SENNA-docusate sodium (SENNA S) 8.6-50 MG tablet     tolterodine ER (DETROL LA) 4 MG 24 hr capsule     traZODone (DESYREL) 50 MG tablet     No current facility-administered medications for this visit.       Social History     Tobacco Use     Smoking status: Never     Smokeless tobacco: Never   Substance Use Topics     Alcohol use: Not Currently     Comment: rare     Drug use: No       Health Maintenance Due   Topic Date Due     HF ACTION PLAN  Never done     ADVANCE CARE PLANNING  Never done     HEPATITIS C SCREENING  Never done     MEDICARE ANNUAL WELLNESS VISIT  04/12/2020     EYE EXAM  03/10/2021     COLORECTAL CANCER SCREENING  11/17/2021     Pneumococcal Vaccine: 65+ Years (2 - PCV) 05/27/2022     DIABETIC FOOT EXAM  01/17/2023     FALL RISK ASSESSMENT  02/17/2023       Lab Results   Component Value Date    PAP NIL 04/12/2019         February 10, 2023 11:13 AM

## 2023-02-10 NOTE — PROGRESS NOTES
"Karine Moss is a 67 year old female with a history of type II DM, peripheral neuropathy, hyperlipidemia, narcolepsy, IZABEL, major depression, PTSD osteoarthritis, irritable bowel, HTN, osteopenia,  peripheral neuropathy, hx of gastric ulcer, CKD stage 3. She is here for the following issues:    \"Hole on Bottom of Toe\"  Karine repots several months hx of pain on underside of 2nd toe of right foot. Worse with wearing shoes, stepping \"wrong\". She recently note an open sore on the pad of the second toe of the right foot, which sometimes bleeds. Not sure if she stepped on something. Reports a broken fluorescent bulb in the basement. No fever or purulent discharge. She has been applying antibiotic ointment, wearing a bandage during the day.  Unsure if this is improving. She has not been soaking her feet. She is wearing shoes consistently.   Her last Td was given in 2013.     Karine has a history of type 2 diabetes. Her most recent A1c was 6.7 on 12/9/2022.    Bilateral sensorineural hearing loss  Karine wears hearing aids and is in need of an adjustment. Is asking for referral to audiology.     Painful tongue  Karine reports pain over the top of her tongue, especially with consumption of spicy or salty foods. She is asking about treatment. No recent antibiotics.     Atrophic vaginitis  Karine is sexually active, reports discomfort with intercourse. Would like refill of topical estrogen cream.       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)     Keratoconus     Hyperlipidemia LDL goal <100     Obstructive sleep apnea     Vitamin D insufficiency     Major depression in partial remission (H)     Plantar warts     Low back pain     DJD (degenerative joint disease) of knee     Pancreatitis     Panic     Chest pain     IBS (irritable bowel syndrome)     Heart murmur     Hypertension goal BP (blood pressure) < 140/90 "     Type 2 diabetes mellitus without complication, without long-term current use of insulin (H)     Osteopenia of hip     Acute conjunctivitis of left eye     PTSD (post-traumatic stress disorder)     Diabetic peripheral neuropathy (H)     Bilateral sciatica     Gastrointestinal hemorrhage associated with gastric ulcer     Chronic kidney disease, stage 3 (H)     Chronic diastolic congestive heart failure (H)     Upper GI bleed     Neck pain     Closed nondisplaced fracture of head of right radius, initial encounter     Injury of head, initial encounter     Fall, initial encounter     Closed fracture of right hand, initial encounter     Closed fracture of left wrist, initial encounter       Current Outpatient Medications   Medication Sig Dispense Refill     amphetamine-dextroamphetamine (ADDERALL) 30 MG tablet Take 1 tablet (30 mg) by mouth every 24 hours 1.5 mg a total of 45 mg  Daily 30 tablet 0     atorvastatin (LIPITOR) 20 MG tablet Take 1 tablet (20 mg) by mouth daily 90 tablet 1     blood glucose (NO BRAND SPECIFIED) test strip Use to test blood sugar as needed with Freestyle Nirmal CGM. 100 strip 0     blood glucose (NO BRAND SPECIFIED) test strip Use to test blood sugar as directed with CGM sensor. 50 strip 1     calcium carbonate (OS-KINJAL) 1500 (600 Ca) MG tablet Take 2 tablets (1,200 mg) by mouth daily       cevimeline (EVOXAC) 30 MG capsule take 1 cap  capsule 1     Continuous Blood Gluc  (FREESTYLE NIRMAL 14 DAY READER) EBEN Use to read blood sugars as per 's instructions. 1 each 0     Continuous Blood Gluc Sensor (FREESTYLE NIRMAL 14 DAY SENSOR) MISC Change every 14 days. 2 each 11     cyanocobalamin (VITAMIN B-12) 1000 MCG tablet Take one every other day 90 tablet 1     DULoxetine (CYMBALTA) 30 MG capsule Take 1 capsule (30 mg) by mouth 2 times daily 180 capsule 3     DULoxetine (CYMBALTA) 60 MG capsule Take 1 capsule (60 mg) by mouth At Bedtime Take in addition to current 30 mg  "evening dose for a total of 90 mg at bedtime. 90 capsule 3     empagliflozin (JARDIANCE) 10 MG TABS tablet Take one daily, schedule clinic visit for mid March. 90 tablet 0     losartan (COZAAR) 100 MG tablet Take 1 tablet (100 mg) by mouth daily 90 tablet 1     metFORMIN (GLUCOPHAGE XR) 500 MG 24 hr tablet Take 4 po q am with breakfast. Schedule clinic visit 360 tablet 1     omeprazole (PRILOSEC) 40 MG DR capsule Take 40mg twice daily 180 capsule 1     oxyCODONE (ROXICODONE) 5 MG tablet Take 0.5 tablets (2.5 mg) by mouth every 6 hours as needed for moderate to severe pain 16 tablet 0     SENNA-docusate sodium (SENNA S) 8.6-50 MG tablet Take 1 tablet by mouth At Bedtime       tolterodine ER (DETROL LA) 4 MG 24 hr capsule Take 1 capsule (4 mg) by mouth daily 90 capsule 3     traZODone (DESYREL) 50 MG tablet Take 1 tablet by mouth every 24 hours         Allergies   Allergen Reactions     Pravastatin Other (See Comments)     woozy     Codeine Nausea and Vomiting     Nsaids GI Disturbance and Other (See Comments)     Pt had bariatric surgery, should not ever take oral NSAIDS due to risk of gastric ulcers.  Pt had bariatric surgery, should not ever take oral NSAIDS due to risk of gastric ulcers.        EXAM  /77   Pulse 91   Temp 97.6  F (36.4  C)   Ht 1.604 m (5' 3.15\")   Wt 61.7 kg (136 lb 1.3 oz)   SpO2 99%   BMI 23.99 kg/m    Gen: Alert, pleasant, NAD  HEENT:  Ear canals and  TM normal bilaterally. OP with somewhat dry tongue, no coating but fissuring is noted. No bleeding.   COR: S1,S2, no murmur  Lungs: CTA bilaterally, no rhonchi, wheezes or rales  Ext: no peripheral edema, pulses full  Right foot exam: 5mm x 7mm superficial ulceration over toe pad of second right toe, dried blood. No purulent drainage or surrounding redness.  Also with 2mm x 3mm raised erythematous lesion on 2nd toe pad (on side of great toe),   Neuro: microfilament testing with mild sensory changes but she can feel pain at the site of " above skin lesions.     Assessment:  (S91.109A) Open wound of toe, initial encounter  (primary encounter diagnosis)  Comment: several month hx of pain at right second toe, now with superficial ulceration  Plan: Orthopedic  Referral, CANCELED: TD         (ADULT, 7+) PRESERVE FREE      Discussed soaking foot daily and covering with bacitracin and light bandage. Gave referral to podiatrist for management.     (H90.3) Bilateral sensorineural hearing loss  Comment: hearing loss, hearing aids, needs check  Plan: Adult Audiology  Referral        Referral is given    (K14.5) Tongue, fissured  Comment: painful cracked tongue, sensitive to spicy, salty foods  Plan: triamcinolone (KENALOG) 0.1 % paste        Discussed avoidance of food triggers. Use topical triamcinolone BID, 5 days until healed    (N95.2) Atrophic vaginitis  Comment: hx of urinary symptoms, she is sexually active and would like to restart topical estrogen  Plan: COMPOUNDED NON-CONTROLLED SUBSTANCE (CMPD RX) -        PHARMACY TO MIX COMPOUNDED MEDICATION        Sent rx to compound pharmacy for vaginal cream. Use daily x 10 days then reduce to twice weekly    (Z23) Need for Td vaccine  Comment: open wound on right 2nd toe, due for Td booster  Plan: TD(ADULT),2 LF TETANUS         TOXOID,PF,ADSORBED(TDVAX)        Given    35 minutes spend on the date of this encounter doing chart review, history and exam, documentation and further activities as noted above.       Anay Martinez MD  Internal Medicine/Pediatrics
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
## 2023-02-15 ENCOUNTER — VIRTUAL VISIT (OUTPATIENT)
Dept: BEHAVIORAL HEALTH | Facility: CLINIC | Age: 68
End: 2023-02-15
Payer: COMMERCIAL

## 2023-02-15 DIAGNOSIS — F43.10 PTSD (POST-TRAUMATIC STRESS DISORDER): Primary | ICD-10-CM

## 2023-02-15 ASSESSMENT — ANXIETY QUESTIONNAIRES
7. FEELING AFRAID AS IF SOMETHING AWFUL MIGHT HAPPEN: SEVERAL DAYS
GAD7 TOTAL SCORE: 1
3. WORRYING TOO MUCH ABOUT DIFFERENT THINGS: NOT AT ALL
8. IF YOU CHECKED OFF ANY PROBLEMS, HOW DIFFICULT HAVE THESE MADE IT FOR YOU TO DO YOUR WORK, TAKE CARE OF THINGS AT HOME, OR GET ALONG WITH OTHER PEOPLE?: NOT DIFFICULT AT ALL
IF YOU CHECKED OFF ANY PROBLEMS ON THIS QUESTIONNAIRE, HOW DIFFICULT HAVE THESE PROBLEMS MADE IT FOR YOU TO DO YOUR WORK, TAKE CARE OF THINGS AT HOME, OR GET ALONG WITH OTHER PEOPLE: NOT DIFFICULT AT ALL
6. BECOMING EASILY ANNOYED OR IRRITABLE: NOT AT ALL
GAD7 TOTAL SCORE: 1
1. FEELING NERVOUS, ANXIOUS, OR ON EDGE: NOT AT ALL
GAD7 TOTAL SCORE: 1
7. FEELING AFRAID AS IF SOMETHING AWFUL MIGHT HAPPEN: SEVERAL DAYS
5. BEING SO RESTLESS THAT IT IS HARD TO SIT STILL: NOT AT ALL
2. NOT BEING ABLE TO STOP OR CONTROL WORRYING: NOT AT ALL
4. TROUBLE RELAXING: NOT AT ALL

## 2023-02-15 ASSESSMENT — PATIENT HEALTH QUESTIONNAIRE - PHQ9
SUM OF ALL RESPONSES TO PHQ QUESTIONS 1-9: 3
10. IF YOU CHECKED OFF ANY PROBLEMS, HOW DIFFICULT HAVE THESE PROBLEMS MADE IT FOR YOU TO DO YOUR WORK, TAKE CARE OF THINGS AT HOME, OR GET ALONG WITH OTHER PEOPLE: NOT DIFFICULT AT ALL
SUM OF ALL RESPONSES TO PHQ QUESTIONS 1-9: 3

## 2023-02-20 DIAGNOSIS — E11.42 DIABETIC POLYNEUROPATHY ASSOCIATED WITH TYPE 2 DIABETES MELLITUS (H): ICD-10-CM

## 2023-02-20 RX ORDER — DULOXETIN HYDROCHLORIDE 30 MG/1
30 CAPSULE, DELAYED RELEASE ORAL 2 TIMES DAILY
Qty: 180 CAPSULE | Refills: 0 | Status: SHIPPED | OUTPATIENT
Start: 2023-02-20 | End: 2023-06-16

## 2023-02-20 RX ORDER — DULOXETIN HYDROCHLORIDE 60 MG/1
60 CAPSULE, DELAYED RELEASE ORAL AT BEDTIME
Qty: 90 CAPSULE | Refills: 0 | Status: SHIPPED | OUTPATIENT
Start: 2023-02-20 | End: 2023-06-16

## 2023-02-20 NOTE — TELEPHONE ENCOUNTER
Duloxetine (Cymbalta) 30 mg + 60 mg    Last Office Visit: 2/10/23  [facility] Appointments: None  Medication last refilled:     3/29/22 #180 with 3 refill(s) - Duloxetine 30 mg  3/29/22 #90 with 3 refill(s) - Duloxetine 60 mg    DELMY-7 SCORE 12/21/2022 1/4/2023 2/15/2023   Total Score 4 2 1     Last PHQ-9 1/4/2023 1/18/2023 2/15/2023   PHQ-9 Total Score 3 2 3     Prescription approved per [facility] Refill Protocol.    Joi Taveras, DELIAN, RN, CCM

## 2023-02-24 ENCOUNTER — THERAPY VISIT (OUTPATIENT)
Dept: OCCUPATIONAL THERAPY | Facility: CLINIC | Age: 68
End: 2023-02-24
Payer: COMMERCIAL

## 2023-02-24 ENCOUNTER — OFFICE VISIT (OUTPATIENT)
Dept: PODIATRY | Facility: CLINIC | Age: 68
End: 2023-02-24
Attending: INTERNAL MEDICINE
Payer: COMMERCIAL

## 2023-02-24 VITALS — HEART RATE: 102 BPM | BODY MASS INDEX: 23.98 KG/M2 | OXYGEN SATURATION: 96 % | WEIGHT: 136 LBS

## 2023-02-24 DIAGNOSIS — S91.109A OPEN WOUND OF TOE, INITIAL ENCOUNTER: ICD-10-CM

## 2023-02-24 DIAGNOSIS — M25.632 WRIST STIFFNESS, LEFT: ICD-10-CM

## 2023-02-24 DIAGNOSIS — M25.532 LEFT WRIST PAIN: ICD-10-CM

## 2023-02-24 PROCEDURE — 97165 OT EVAL LOW COMPLEX 30 MIN: CPT | Mod: GO | Performed by: OCCUPATIONAL THERAPIST

## 2023-02-24 PROCEDURE — 99203 OFFICE O/P NEW LOW 30 MIN: CPT | Performed by: PODIATRIST

## 2023-02-24 PROCEDURE — 97535 SELF CARE MNGMENT TRAINING: CPT | Mod: GO | Performed by: OCCUPATIONAL THERAPIST

## 2023-02-24 PROCEDURE — 97110 THERAPEUTIC EXERCISES: CPT | Mod: GO | Performed by: OCCUPATIONAL THERAPIST

## 2023-02-24 NOTE — PROGRESS NOTES
Assessment:      ICD-10-CM    1. Open wound of toe, initial encounter  S91.109A Orthopedic  Referral             Plan:  No infection  Crest pad          Thi West DPM                              Chief Complaint:     Patient presents with:  Right 2nd Toe - Pain       HPI:  Karine Moss is a 67 year old year old female who presents for foot concern.      Past Medical & Surgical History:  Past Medical History:   Diagnosis Date     Arthritis      Basal cell carcinoma      Chest pain     seeing Cardiology     Depression 1991    after 's death     Diabetes mellitus (H)      Diabetic renal disease (H)     microalbuminuria     DJD (degenerative joint disease) of knee      H/O vitamin D deficiency      Hypertension      IBS (irritable bowel syndrome)     diarrhea      Keratoconus      Low back pain      Narcolepsy 2007    Dx'd by Sleep specialist 347.00, pt discontinued Adderal mid Nov. 13 due to tacycardia concerns     Obesity      Obstructive sleep apnea     327.23, 3 sleep studies,Dr. [facility]     Pancreatitis     related to Victoza, also on Xyrem     Panic      RLS (restless legs syndrome)      Sinus tachycardia       Past Surgical History:   Procedure Laterality Date     COLONOSCOPY  10/01/2020    poor quality prep     ear surgery  2002 and 01/2004     ESOPHAGOSCOPY, GASTROSCOPY, DUODENOSCOPY (EGD), COMBINED N/A 11/16/2021    Procedure: ESOPHAGOGASTRODUODENOSCOPY (EGD);  Surgeon: Jayla Calvo MD;  Location: [facility]     ESOPHAGOSCOPY, GASTROSCOPY, DUODENOSCOPY (EGD), COMBINED N/A 1/9/2023    Procedure: ESOPHAGOGASTRODUODENOSCOPY, WITH BIOPSY;  Surgeon: Brandon Witt MD;  Location:  GI     GASTRIC BYPASS  2013    laparoscopic jimmy en y c ometopexy     HEMORRHOIDECTOMY  09/14/2000     LAPAROSCOPIC CHOLECYSTECTOMY  2015     LASIK BILATERAL       UVULOPALATOPHARYNGOPLASTY       UVVP        Family History   Problem Relation Age of Onset     Memory loss Mother      Diabetes  Father      Chronic Obstructive Pulmonary Disease Sister      Anemia Sister         hx of ulcers     Other - See Comments Sister 65        MVA, followed by leg pain after a fall     Dementia Sister 68     Dementia Brother 70     Cancer Brother         lung     Cancer - colorectal Maternal Grandmother      Cancer Daughter      Obesity Daughter         s/p lap band        Social History:  ?  History   Smoking Status     Never   Smokeless Tobacco     Never     History   Drug Use No     Social History    Substance and Sexual Activity      Alcohol use: Not Currently        Comment: rare      Allergies:  ?   Allergies   Allergen Reactions     Pravastatin Other (See Comments)     woozy     Codeine Nausea and Vomiting     Nsaids GI Disturbance and Other (See Comments)     Pt had bariatric surgery, should not ever take oral NSAIDS due to risk of gastric ulcers.  Pt had bariatric surgery, should not ever take oral NSAIDS due to risk of gastric ulcers.        Medications:    Current Outpatient Medications   Medication     amphetamine-dextroamphetamine (ADDERALL) 30 MG tablet     atorvastatin (LIPITOR) 20 MG tablet     blood glucose (NO BRAND SPECIFIED) test strip     blood glucose (NO BRAND SPECIFIED) test strip     calcium carbonate (OS-KINJAL) 1500 (600 Ca) MG tablet     cevimeline (EVOXAC) 30 MG capsule     COMPOUNDED NON-CONTROLLED SUBSTANCE (CMPD RX) - PHARMACY TO MIX COMPOUNDED MEDICATION     Continuous Blood Gluc  (FREESTYLE JORDIN 14 DAY READER) EBEN     Continuous Blood Gluc Sensor (FREESTYLE JORDIN 14 DAY SENSOR) MISC     cyanocobalamin (VITAMIN B-12) 1000 MCG tablet     DULoxetine (CYMBALTA) 30 MG capsule     DULoxetine (CYMBALTA) 60 MG capsule     empagliflozin (JARDIANCE) 10 MG TABS tablet     losartan (COZAAR) 100 MG tablet     metFORMIN (GLUCOPHAGE XR) 500 MG 24 hr tablet     omeprazole (PRILOSEC) 40 MG DR capsule     oxyCODONE (ROXICODONE) 5 MG tablet     SENNA-docusate sodium (SENNA S) 8.6-50 MG tablet      tolterodine ER (DETROL LA) 4 MG 24 hr capsule     traZODone (DESYREL) 50 MG tablet     triamcinolone (KENALOG) 0.1 % paste     No current facility-administered medications for this visit.       Physical Exam:    Vitals:  [unfilled]  General:  WD/WN, in NAD.  A&O x3.  Dermatologic:  Skin is intact, open lesions absent.   Skin texture, turgor is abnormal, scabbed over wound 2nd toe, no cellulitis.  Vascular:  Pulses palpable bilateral.  Digital capillary refill time normal bilateral.  Skin temperature is normal bilateral.  Generalized edema- none bilateral.  Neurologic:    Gross sensation normal.  Gait and balance normal.  Musculoskeletal:  aROM digits, ankle intact.  Muscle strength intact to foot & ankle.  Deformity present - none

## 2023-02-24 NOTE — LETTER
2/24/2023         RE: Karine Moss  [address]  [place] [address]        Dear Colleague,    Thank you for referring your patient, Karine Moss, to the [facility]. Please see a copy of my visit note below.    Assessment:      ICD-10-CM    1. Open wound of toe, initial encounter  S91.109A Orthopedic  Referral             Plan:  No infection  Crest pad          Thi West DPM                              Chief Complaint:     Patient presents with:  Right 2nd Toe - Pain       HPI:  Karine Moss is a 67 year old year old female who presents for foot concern.      Past Medical & Surgical History:  Past Medical History:   Diagnosis Date     Arthritis      Basal cell carcinoma      Chest pain     seeing Cardiology     Depression 1991    after 's death     Diabetes mellitus (H)      Diabetic renal disease (H)     microalbuminuria     DJD (degenerative joint disease) of knee      H/O vitamin D deficiency      Hypertension      IBS (irritable bowel syndrome)     diarrhea      Keratoconus      Low back pain      Narcolepsy 2007    Dx'd by Sleep specialist 347.00, pt discontinued Adderal mid Nov. 13 due to tacycardia concerns     Obesity      Obstructive sleep apnea     327.23, 3 sleep studies,Dr. Sam [facility]     Pancreatitis     related to Victoza, also on Xyrem     Panic      RLS (restless legs syndrome)      Sinus tachycardia       Past Surgical History:   Procedure Laterality Date     COLONOSCOPY  10/01/2020    poor quality prep     ear surgery  2002 and 01/2004     ESOPHAGOSCOPY, GASTROSCOPY, DUODENOSCOPY (EGD), COMBINED N/A 11/16/2021    Procedure: ESOPHAGOGASTRODUODENOSCOPY (EGD);  Surgeon: Jayla Calvo MD;  Location: [facility]     ESOPHAGOSCOPY, GASTROSCOPY, DUODENOSCOPY (EGD), COMBINED N/A 1/9/2023    Procedure: ESOPHAGOGASTRODUODENOSCOPY, WITH BIOPSY;  Surgeon: Brandon Witt MD;  Location:  GI     GASTRIC BYPASS  2013     laparoscopic jimmy en y c ometopexy     HEMORRHOIDECTOMY  09/14/2000     LAPAROSCOPIC CHOLECYSTECTOMY  2015     LASIK BILATERAL       UVULOPALATOPHARYNGOPLASTY       UVVP        Family History   Problem Relation Age of Onset     Memory loss Mother      Diabetes Father      Chronic Obstructive Pulmonary Disease Sister      Anemia Sister         hx of ulcers     Other - See Comments Sister 65        MVA, followed by leg pain after a fall     Dementia Sister 68     Dementia Brother 70     Cancer Brother         lung     Cancer - colorectal Maternal Grandmother      Cancer Daughter      Obesity Daughter         s/p lap band        Social History:  ?  History   Smoking Status     Never   Smokeless Tobacco     Never     History   Drug Use No     Social History    Substance and Sexual Activity      Alcohol use: Not Currently        Comment: rare      Allergies:  ?   Allergies   Allergen Reactions     Pravastatin Other (See Comments)     woozy     Codeine Nausea and Vomiting     Nsaids GI Disturbance and Other (See Comments)     Pt had bariatric surgery, should not ever take oral NSAIDS due to risk of gastric ulcers.  Pt had bariatric surgery, should not ever take oral NSAIDS due to risk of gastric ulcers.        Medications:    Current Outpatient Medications   Medication     amphetamine-dextroamphetamine (ADDERALL) 30 MG tablet     atorvastatin (LIPITOR) 20 MG tablet     blood glucose (NO BRAND SPECIFIED) test strip     blood glucose (NO BRAND SPECIFIED) test strip     calcium carbonate (OS-KINJAL) 1500 (600 Ca) MG tablet     cevimeline (EVOXAC) 30 MG capsule     COMPOUNDED NON-CONTROLLED SUBSTANCE (CMPD RX) - PHARMACY TO MIX COMPOUNDED MEDICATION     Continuous Blood Gluc  (FREESTYLE JORDIN 14 DAY READER) EBEN     Continuous Blood Gluc Sensor (FREESTYLE JORDIN 14 DAY SENSOR) MISC     cyanocobalamin (VITAMIN B-12) 1000 MCG tablet     DULoxetine (CYMBALTA) 30 MG capsule     DULoxetine (CYMBALTA) 60 MG capsule      empagliflozin (JARDIANCE) 10 MG TABS tablet     losartan (COZAAR) 100 MG tablet     metFORMIN (GLUCOPHAGE XR) 500 MG 24 hr tablet     omeprazole (PRILOSEC) 40 MG DR capsule     oxyCODONE (ROXICODONE) 5 MG tablet     SENNA-docusate sodium (SENNA S) 8.6-50 MG tablet     tolterodine ER (DETROL LA) 4 MG 24 hr capsule     traZODone (DESYREL) 50 MG tablet     triamcinolone (KENALOG) 0.1 % paste     No current facility-administered medications for this visit.       Physical Exam:    Vitals:  [unfilled]  General:  WD/WN, in NAD.  A&O x3.  Dermatologic:  Skin is intact, open lesions absent.   Skin texture, turgor is abnormal, scabbed over wound 2nd toe, no cellulitis.  Vascular:  Pulses palpable bilateral.  Digital capillary refill time normal bilateral.  Skin temperature is normal bilateral.  Generalized edema- none bilateral.  Neurologic:    Gross sensation normal.  Gait and balance normal.  Musculoskeletal:  aROM digits, ankle intact.  Muscle strength intact to foot & ankle.  Deformity present - none              Again, thank you for allowing me to participate in the care of your patient.        Sincerely,        Thi West DPM

## 2023-02-24 NOTE — PROGRESS NOTES
[facility]    OUTPATIENT Occupational Therapy ORTHOPEDIC EVALUATION  PLAN OF TREATMENT FOR OUTPATIENT REHABILITATION  (COMPLETE FOR INITIAL CLAIMS ONLY)  Patient's Last Name, First Name, M.I.  YOB: 1955  IsaKarine  M    Provider s Name:  [facility]   Medical Record No.  [id number]   Start of Care Date:  02/24/23   Onset Date:   10/15/22 (12/16/22 order date)   Treatment Diagnosis:  Left wrist fracture Medical Diagnosis:     Left wrist pain  Wrist stiffness, left       Goals:     02/24/23 0500   Goal #1   Goal #1 household chores   Current Functional Task Hold   Current Performance Level Unable to hold a shopping bag in the left hand.   STG [employer] Shopping bag   STG Target Perform Level Able to lift a shopping bag with 1/10 left wrist pain.   Due Date 03/24/23   LTG Target Task/Performance Pain free household chores   Due Date 04/21/23   Goals #2   Goal #2 household chores   Current Functional Task Push   Current Performance Level Unable to push up from chair using left hand.   STG [employer] Through hand   STG Target Perform Level Able to partially weight bear through left hand when pushing up from chair with 2/10 wrist pain.   Due Date 03/24/23   LTG Target Task/Performance Pain free household chores   Due Date 04/21/23         Therapy Frequency:  1x/week  Predicted Duration of Therapy Intervention:  8 weeks    Andria Eid OT                 I CERTIFY THE NEED FOR THESE SERVICES FURNISHED UNDER        THIS PLAN OF TREATMENT AND WHILE UNDER MY CARE     (Physician attestation of this document indicates review and certification of the therapy plan).                     Certification Date From:  02/24/23   Certification Date To:  04/21/23    Referring Provider:  Anay Martinez MD    Initial Assessment        See Epic Evaluation SOC  Date: 02/24/23

## 2023-02-24 NOTE — PROGRESS NOTES
"[facility] Hand Therapy Initial Evaluation     Current Date: 2/24/2023    Diagnosis: Left distal radius and ulnar styloid fractures; right radial head fracture; right triquetrum fracture  DOI: 10/15/22 (Order date: 12/16/22)  Referring physician: Anay Martinez MD      Subjective:    Patient Health History  Karine Moss being seen for Left wrist fracture.     Problem began: 10/15/2022.   Problem occurred: Fell down my basement steps   Pain is reported as 2/10 on pain scale.  General health as reported by patient is fair.  Pertinent medical history includes: changes in bowel/bladder, concussions/dizziness, depression, diabetes, history of fractures, high blood pressure and sleep disorder/apnea.     Medical allergies: none.   Surgeries include:  Other. Other surgery history details: Batiatric bypass.    Current medications:  High blood pressure medication, sleep medication and other. Other medications details: See EMR.    Current occupation is Retired.                   * The above history was provided by the patient    Occupational Profile Information:  Right hand dominant  Occupation: Retired  Prior functional level:  Currently living with daughter, has own home.  Patient reports symptoms of pain, stiffness/loss of motion, weakness/loss of strength and edema  Special tests:  x-ray. Per report from 12/2/22, \" 1. Interval healing of the left distal radial and ulnar styloid fractures. 2. Linear lucency through the trapezium not definitively seen on prior exam and may be projectional though fracture cannot be entirely excluded. Correlate for point tenderness.\"  Previous treatment: Rehab at TCU and in-home rehab  Barriers include: None  Mobility: No difficulty  Transportation: Drives  Leisure activities/hobbies: Salma art, crafting, playing cards  Other: Original injury occurred on 10/15/22 when she fell down the stairs landing on outstretched hands. Patient stayed at a TCU for a few days then had in-home " therapy. In-home occupational therapist recommended outpatient hand therapy for the left wrist. Patient continues to have left wrist pain with use. She is able to use the left hand as an assist for lifting/gripping, but unable to perform these tasks with the left hand independently. Patient reports she cannot weight bear through the left hand. She discontinued wearing a wrist brace, but likes to wear a compression glove.       Functional Outcome Measure:   Upper Extremity Functional Index Score:  SCORE: 45/80   (A lower score indicates greater disability.)        Objective:  Pain Level (Scale 0-10)   2/24/2023   At Rest 0/10   With Use 2-3/10     Pain Description  Date 2/24/2023   Location Distal radius and ulnar   Pain Quality Aching and Sharp   Frequency intermittent     Pain is worst  daytime   Exacerbated by  Use   Relieved by Copper compression glove   Progression Gradually improving     Edema  Circumference:  (Measured in cm)  DWC 2/24/2023   Right 15.2   Left 17.0     Sensation   WNL throughout all nerve distributions; per patient report.    ROM  Pain Report: - none  + mild    ++ moderate    +++ severe   Wrist 2/24/2023 2/24/2023   AROM (PROM) Right Left   Extension 70 55+   Flexion 60 26+   RD 20 15+   UD 35 20+   Supination 85 75   Pronation 85 75   Hand- Patient is able to fully extend fingers and make a full composite fist. Opposes thumb to small finger.      Strength   (Measured in pounds)  Pain Report: - none  + mild    ++ moderate    +++ severe    2/24/2023 2/24/2023   Trials Right Left   1  2 55  54 16+   Average 54.5 16     Lat Pinch 2/24/2023 2/24/2023   Trials Right Left   1 12 5+     3 Pt Pinch 2/24/2023 2/24/2023   Trials Right Left   1 10 4+       Assessment:  Patient presents with symptoms consistent with diagnosis of the above condition,  with non-surgical intervention.     Patient's limitations or Problem List includes:  Pain, Decreased ROM/motion, Increased edema, Decreased  and  Decreased pinch of the left wrist which interferes with the patient's ability to perform Self Care Tasks (dressing, eating, bathing), Recreational Activities, Household Chores and Driving  as compared to previous level of function.    Rehab Potential:  Excellent - Return to full activity, no limitations    Patient will benefit from skilled Occupational Therapy to increase ROM,  strength and pinch strength and decrease pain and edema to return to previous activity level and resume normal daily tasks and to reach their rehab potential.    Barriers to Learning:  No barrier    Communication Issues:  Patient appears to be able to clearly communicate and understand verbal and written communication and follow directions correctly.    Chart Review: Chart Review    Identified Performance Deficits: bathing/showering, toileting, dressing, feeding, hygiene and grooming, driving and community mobility, home establishment and management, meal preparation and cleanup, shopping and leisure activities    Assessment of Occupational Performance:  3-5 Performance Deficits    Clinical Decision Making (Complexity): Low complexity    Treatment Explanation:  The following has been discussed with the patient:  RX ordered/plan of care  Anticipated outcomes  Possible risks and side effects    Plan:  Frequency:  1 X week, once daily  Duration:  for 8 weeks    Treatment Plan:   Modalities:  US and Paraffin  Therapeutic Exercise:  AROM, AAROM, PROM, Tendon Gliding, Isotonics and Isometrics  Manual Techniques:  Joint mobilization and Myofascial release  Orthotic Fabrication:  Static or static-progressive, as indicated    Discharge Plan:  Achieve all LTG.  Independent in home treatment program.  Reach maximal therapeutic benefit.    Home Exercise Program:  Wrist AROM  Gentle wrist flexion PROM  Gentle  strengthening, pink sponge    Next Visit:  Progress ROM

## 2023-02-24 NOTE — PATIENT INSTRUCTIONS
PATIENT INSTRUCTIONS - Podiatry / Foot & Ankle Surgery      Crest Pads - [email or web address] , [facility]      Not currently infected

## 2023-02-28 ENCOUNTER — MYC MEDICAL ADVICE (OUTPATIENT)
Dept: SURGERY | Facility: CLINIC | Age: 68
End: 2023-02-28
Payer: COMMERCIAL

## 2023-03-01 NOTE — TELEPHONE ENCOUNTER
REFERRAL INFORMATION:    Referring Provider:  Dr. Prasad Martinez    Referring Clinic:  [facility]    Reason for Visit/Diagnosis: Upper GI bleed/Gastric Ulcer     FUTURE VISIT INFORMATION:    Appointment Date: 04-05-23    Appointment Time: @ 10am     NOTES STATUS DETAILS   OFFICE NOTE from Referring Provider Care Everywhere Dr. Anay Martinez 11-20-22, 01-17-22, 12-13-21   OFFICE NOTE from Other Specialist Care Everywhere/Internal Dr. Errol Phillips 11-18-21, 11-17-21  Dr. Fantasma Gavin 11-16-21   HOSPITAL DISCHARGE SUMMARY/  ED VISITS Care Everywhere Dr. Kamla Mckeon 11-15-21   OPERATIVE REPORT N/A    MEDICATION LIST Internal         ENDOSCOPY  Care Everywhere/Internal 12-05-22, 11-16-21, 02-11-22, 01-28-21, 01-11-21, 11-02-20   COLONOSCOPY Care Everywhere/Internal 10-29-20, 10-01-20, 04-22-19, 04-27-18   ERCP N/A    EUS N/A    STOOL TESTING Care Everywhere 11-16-21   PERTINENT LABS Care Everywhere/Internal C-diff 10-15-22, 11-17-21, 11-15-21   PATHOLOGY REPORTS (RELATED) Care Everywhere 01-09-23, 02-11-22   IMAGING (CT, MRI, EGD, MRCP, Small Bowel Follow Through/SBT, MR/CT Enterography) Care Everywhere/Internal EGD 01-09-23  XR Chest 10-15-22  CT Abdomen 02-19-22 03-01-23 records/requested [facility] @ 10:48 am all recs RECVD  03-23-23 2nd Request/recs [facility] @ 8:32am

## 2023-03-02 ENCOUNTER — THERAPY VISIT (OUTPATIENT)
Dept: PHYSICAL THERAPY | Facility: CLINIC | Age: 68
End: 2023-03-02
Attending: FAMILY MEDICINE
Payer: COMMERCIAL

## 2023-03-02 DIAGNOSIS — Z91.81 PERSONAL HISTORY OF FALL: ICD-10-CM

## 2023-03-02 DIAGNOSIS — M85.88 OSTEOPENIA OF SPINE: ICD-10-CM

## 2023-03-02 DIAGNOSIS — R53.81 PHYSICAL DECONDITIONING: ICD-10-CM

## 2023-03-02 PROCEDURE — 97110 THERAPEUTIC EXERCISES: CPT | Mod: GP | Performed by: PHYSICAL THERAPIST

## 2023-03-02 PROCEDURE — 97162 PT EVAL MOD COMPLEX 30 MIN: CPT | Mod: GP | Performed by: PHYSICAL THERAPIST

## 2023-03-02 PROCEDURE — 97112 NEUROMUSCULAR REEDUCATION: CPT | Mod: GP | Performed by: PHYSICAL THERAPIST

## 2023-03-02 NOTE — PROGRESS NOTES
"Physical Therapy Initial Evaluation  Subjective:  The history is provided by the patient and medical records (Has daughter present ). No  was used.   Patient Health History  Karine Moss being seen for Due to Osteopopenia, broke wrist after falling down stairs.     Problem began: 10/15/2022.   Problem occurred: Fell down my basement steps   Pain is reported as 2/10 on pain scale.  General health as reported by patient is fair.  Pertinent medical history includes: changes in bowel/bladder, concussions/dizziness, depression, diabetes, history of fractures, high blood pressure and sleep disorder/apnea. Other medical history details: Osteopenia.     Medical allergies: none.   Surgeries include:  Other. Other surgery history details: Batiatric bypass.    Current medications:  High blood pressure medication, sleep medication and other. Other medications details: Please see MyChart med list. Too many to type.    Current occupation is Retired; Lives w/ 2 grandsons.                     Therapist Generated HPI Evaluation  Problem details: Pt had fall and fractured wrist 10/15/22. Has osteopenia and multi-joint pain with deconditioning. .         Type of problem:  Lumbar and thoracic.    This is a chronic condition.  Condition occurred with:  A fall/slip.  Where condition occurred: other.  Site of Pain: Feels \"pain in all joints\" but feels most limiting pain is in low back.  Pain is described as aching and is constant.  Pain radiates to:  No radiation (feels pain in all joints with activity). Pain is the same all the time.  Since onset symptoms are gradually worsening.  Associated symptoms:  Incontinence, loss of motion/stiffness and loss of strength (denies numbness or tingling). Symptoms are exacerbated by certain positions, lifting, sitting, standing and walking  Relieved by: stretching.  Imaging testing: DEXA Scan.  Previous treatment includes physical therapy. There was moderate improvement " following previous treatment.  Barriers include:  Stairs.                        Objective:  Standing Alignment:    Cervical/Thoracic:  Forward head  Shoulder/UE:  Rounded shoulders  Lumbar:  Lordosis decr                      Physical Exam        General Evaluation:  AROM:      Cervical/Thoracic:  Significant findings:  Thoracic ext to neutral    Upper Extremity:  Significant findings:  Mild end range motion loss all directions w/ ++pain in B GH joints; feels pain in low back when reaching B UE into flex or abd  Lower Extremity:  Significant findings:  WFL/ EQ B    Gross Strength:            Upper Extremity:   Significant findings: B Shoulder flex, ER, and abd 4/5; IR 5/5 B, Elbow Flex and Ext 5/5 B  Lower Extremity:   Significant findings:  B Hip flex 3/5, Abd 4/5, Ext 4+/5, Knee flex 5-/5, Knee Ext 5/5 B  Core:   Significant findings:  Able to pelvic tilt at wall                                                             ROS    Assessment/Plan:    Patient is a 67 year old female with multi-joint complaints.    Patient has the following significant findings with corresponding treatment plan.                Diagnosis 1:  Osteopenia w/ multi-joint pain and hx of fall w/ fracture  Pain -  hot/cold therapy, manual therapy, self management, education, directional preference exercise and home program  Decreased ROM/flexibility - manual therapy, therapeutic exercise, therapeutic activity and home program  Decreased strength - therapeutic exercise, therapeutic activities and home program  Impaired balance - neuro re-education, therapeutic activities and home program  Impaired muscle performance - neuro re-education and home program  Decreased function - therapeutic activities and home program  Impaired posture - neuro re-education, therapeutic activities and home program    Therapy Evaluation Codes:   1) History comprised of:   Personal factors that impact the plan of care:      Age, Gender, Living environment,  Past/current experiences and Time since onset of symptoms.    Comorbidity factors that impact the plan of care are:      Bowel/bladder changes, Diabetes, Depression, Dizziness, High blood pressure, Sleep disorder/apnea, Weakness and Osteopenia.     Medications impacting care: High blood pressure and Sleep.  2) Examination of Body Systems comprised of:   Body structures and functions that impact the plan of care:      Hip, Knee, Lumbar spine and Shoulder.   Activity limitations that impact the plan of care are:      Bathing, Bending, Cooking, Driving, Dressing, Lifting, Stairs, Standing, Walking and Laying down.  3) Clinical presentation characteristics are:   Evolving/Changing.  4) Decision-Making    Moderate complexity using standardized patient assessment instrument and/or measureable assessment of functional outcome.  Cumulative Therapy Evaluation is: Moderate complexity.    Previous and current functional limitations:  (See Goal Flow Sheet for this information)    Short term and Long term goals: (See Goal Flow Sheet for this information)     Communication ability:  Patient appears to be able to clearly communicate and understand verbal and written communication and follow directions correctly.  Treatment Explanation - The following has been discussed with the patient:   RX ordered/plan of care  Anticipated outcomes  Possible risks and side effects  This patient would benefit from PT intervention to resume normal activities.   Rehab potential is good.    Frequency:  1 X week, once daily  Duration:  for 12 weeks  Discharge Plan:  Achieve all LTG.  Independent in home treatment program.  Reach maximal therapeutic benefit.    Please refer to the daily flowsheet for treatment today, total treatment time and time spent performing 1:1 timed codes.

## 2023-03-06 PROBLEM — R53.81 PHYSICAL DECONDITIONING: Status: ACTIVE | Noted: 2023-03-06

## 2023-03-06 PROBLEM — Z91.81 PERSONAL HISTORY OF FALL: Status: ACTIVE | Noted: 2023-03-06

## 2023-03-06 PROBLEM — M85.88 OSTEOPENIA OF SPINE: Status: ACTIVE | Noted: 2023-03-06

## 2023-03-06 NOTE — PROGRESS NOTES
[facility]    OUTPATIENT Physical Therapy ORTHOPEDIC EVALUATION  PLAN OF TREATMENT FOR OUTPATIENT REHABILITATION  (COMPLETE FOR INITIAL CLAIMS ONLY)  Patient's Last Name, First Name, M.I.  YOB: 1955  Karine Moss    Provider s Name:  [facility]   Medical Record No.  [id number]   Start of Care Date:  03/02/23   Onset Date:   10/15/22   Treatment Diagnosis:  Multi-joint pain w/ osteopenia and deconditioning Medical Diagnosis:     Physical deconditioning  Osteopenia of spine  Personal history of fall       Goals:     03/02/23 0500   Body Part   Goals listed below are for Low back and Spine   Goal #1   Goal #1 standing   Previous Functional Level No restrictions   Current Functional Level Hours patient can stand   Performance level <1 hour   STG Target Performance Hours patient will be able to stand   Performance level 1 hour during daily tasks w/ pain no higher than 2/10   Rationale for housekeeping tasks such as vacuuming, bed making, mowing, gardening;for personal hygiene;for meal preparation;for safe household ambulation;for safe community ambulation   Due date 04/13/23   LTG Target Performance Hours patient will be able to stand   Performance Level 1.5 hours 1 hour during daily tasks w/ pain no higher than 2/10   Rationale for housekeeping tasks such as vacuuming, bed making, mowing   Due date 05/25/23         Therapy Frequency:  1x/week  Predicted Duration of Therapy Intervention:  12 weeks    Amanda Hilligoss, PT                 I CERTIFY THE NEED FOR THESE SERVICES FURNISHED UNDER        THIS PLAN OF TREATMENT AND WHILE UNDER MY CARE     (Physician attestation of this document indicates review and certification of the therapy plan).                     Certification Date From:  03/02/23   Certification Date To:  05/30/23    Referring Provider:  Roberta  Shelia Friedman    Initial Assessment        See Epic Evaluation SOC Date: 03/02/23

## 2023-03-07 ENCOUNTER — THERAPY VISIT (OUTPATIENT)
Dept: PHYSICAL THERAPY | Facility: CLINIC | Age: 68
End: 2023-03-07
Payer: COMMERCIAL

## 2023-03-07 ENCOUNTER — PRE VISIT (OUTPATIENT)
Dept: SURGERY | Facility: CLINIC | Age: 68
End: 2023-03-07

## 2023-03-07 DIAGNOSIS — Z91.81 PERSONAL HISTORY OF FALL: ICD-10-CM

## 2023-03-07 DIAGNOSIS — R53.81 PHYSICAL DECONDITIONING: Primary | ICD-10-CM

## 2023-03-07 DIAGNOSIS — M85.88 OSTEOPENIA OF SPINE: ICD-10-CM

## 2023-03-07 PROCEDURE — 97110 THERAPEUTIC EXERCISES: CPT | Mod: GP | Performed by: PHYSICAL THERAPIST

## 2023-03-09 ENCOUNTER — VIRTUAL VISIT (OUTPATIENT)
Dept: BEHAVIORAL HEALTH | Facility: CLINIC | Age: 68
End: 2023-03-09
Payer: COMMERCIAL

## 2023-03-09 DIAGNOSIS — F43.10 PTSD (POST-TRAUMATIC STRESS DISORDER): Primary | ICD-10-CM

## 2023-03-09 ASSESSMENT — ANXIETY QUESTIONNAIRES
4. TROUBLE RELAXING: NOT AT ALL
7. FEELING AFRAID AS IF SOMETHING AWFUL MIGHT HAPPEN: SEVERAL DAYS
6. BECOMING EASILY ANNOYED OR IRRITABLE: NOT AT ALL
5. BEING SO RESTLESS THAT IT IS HARD TO SIT STILL: NOT AT ALL
8. IF YOU CHECKED OFF ANY PROBLEMS, HOW DIFFICULT HAVE THESE MADE IT FOR YOU TO DO YOUR WORK, TAKE CARE OF THINGS AT HOME, OR GET ALONG WITH OTHER PEOPLE?: NOT DIFFICULT AT ALL
7. FEELING AFRAID AS IF SOMETHING AWFUL MIGHT HAPPEN: SEVERAL DAYS
2. NOT BEING ABLE TO STOP OR CONTROL WORRYING: NOT AT ALL
GAD7 TOTAL SCORE: 1
GAD7 TOTAL SCORE: 1
1. FEELING NERVOUS, ANXIOUS, OR ON EDGE: NOT AT ALL
3. WORRYING TOO MUCH ABOUT DIFFERENT THINGS: NOT AT ALL
IF YOU CHECKED OFF ANY PROBLEMS ON THIS QUESTIONNAIRE, HOW DIFFICULT HAVE THESE PROBLEMS MADE IT FOR YOU TO DO YOUR WORK, TAKE CARE OF THINGS AT HOME, OR GET ALONG WITH OTHER PEOPLE: NOT DIFFICULT AT ALL
GAD7 TOTAL SCORE: 1

## 2023-03-13 ENCOUNTER — THERAPY VISIT (OUTPATIENT)
Dept: PHYSICAL THERAPY | Facility: CLINIC | Age: 68
End: 2023-03-13
Payer: COMMERCIAL

## 2023-03-13 DIAGNOSIS — M85.88 OSTEOPENIA OF SPINE: ICD-10-CM

## 2023-03-13 DIAGNOSIS — Z91.81 PERSONAL HISTORY OF FALL: ICD-10-CM

## 2023-03-13 DIAGNOSIS — R53.81 PHYSICAL DECONDITIONING: Primary | ICD-10-CM

## 2023-03-13 PROCEDURE — 97110 THERAPEUTIC EXERCISES: CPT | Mod: GP | Performed by: PHYSICAL THERAPIST

## 2023-03-16 ENCOUNTER — OFFICE VISIT (OUTPATIENT)
Dept: SURGERY | Facility: CLINIC | Age: 68
End: 2023-03-16
Payer: COMMERCIAL

## 2023-03-16 VITALS — DIASTOLIC BLOOD PRESSURE: 83 MMHG | HEART RATE: 91 BPM | OXYGEN SATURATION: 99 % | SYSTOLIC BLOOD PRESSURE: 144 MMHG

## 2023-03-16 DIAGNOSIS — R15.9 INCONTINENCE OF FECES, UNSPECIFIED FECAL INCONTINENCE TYPE: Primary | ICD-10-CM

## 2023-03-16 PROCEDURE — 99203 OFFICE O/P NEW LOW 30 MIN: CPT

## 2023-03-16 ASSESSMENT — ENCOUNTER SYMPTOMS
HEARTBURN: 0
TREMORS: 0
BACK PAIN: 1
SEIZURES: 0
MUSCLE CRAMPS: 0
DIARRHEA: 1
MYALGIAS: 0
ABDOMINAL PAIN: 0
NAUSEA: 0
JOINT SWELLING: 0
MEMORY LOSS: 1
CONSTIPATION: 0
VOMITING: 0
LOSS OF CONSCIOUSNESS: 0
PARALYSIS: 0
WEAKNESS: 1
ARTHRALGIAS: 1
NUMBNESS: 1
STIFFNESS: 1
NECK PAIN: 0
TINGLING: 1
SPEECH CHANGE: 0
MUSCLE WEAKNESS: 1
DISTURBANCES IN COORDINATION: 0
BLOATING: 0
HEADACHES: 0
DIZZINESS: 0

## 2023-03-16 ASSESSMENT — PAIN SCALES - GENERAL: PAINLEVEL: MILD PAIN (2)

## 2023-03-16 NOTE — LETTER
3/16/2023       RE: Karine Moss  [address]  [facility] [address]     Dear Colleague,    Thank you for referring your patient, Karine Moss, to the [facility] COLON AND RECTAL SURGERY CLINIC [place] at [facility]. Please see a copy of my visit note below.    Colon and Rectal Surgery Consult Clinic Note    Date: 3/16/2023     Referring provider:  No referring provider defined for this encounter.     RE: Karine Moss  : 1955  MIGUELITO: 3/16/2023    Karine Moss is a very pleasant 68 year old female here for fecal incontinence.    Karine is here today with her daughter. She reports several years of fecal incontinence. She reports that loose stool will leak out and she does not have sensation of this. If her stools are more formed, then she will be able to feel a sudden urge to defecate but often does not make it to the bathroom in time. This used to happen 7-8 times a week, but it has improved since she started taking a fiber supplement. She is currently taking a fiber supplement twice a day but still having episodes of incontinence 1-2 times a week. No nighttime incontinence. She does also report urinary incontinence for which she takes tolterodine, and that has helped the urinary incontinence.     Last colonoscopy 2020 normal.    Physical Examination:  BP (!) 144/83 (BP Location: Left arm, Patient Position: Sitting, Cuff Size: Adult Regular)   Pulse 91   SpO2 99%   General: alert, oriented, in no acute distress, sitting comfortably  HEENT: moist mucous membranes    Assessment/Plan: Karine Moss is a 68 year old female with fecal incontinence. We discussed treatment options including the role of sacral nerve stimulation. She and her daughter are interested in exploring this. Gave them an information packet with bowel diary. We discussed candidacy for SNS. She is already on a fiber supplement twice daily- she will need to  increase to 3 times daily, which she previously has done. She understands that she needs to complete a bowel diary for 3 weeks and that she would need at least 1 episode of incontinence per week in order to to be a candidate for SNS. She and her daughter would like to look into this device some more. If they are interested, she will complete the bowel diary and send it to us. I gave her instructions on how to do this. She will contact us if she would like to move forward with SNS. Patient's questions were answered to her stated satisfaction and she is in agreement with this plan.     Medical history:  Past Medical History:   Diagnosis Date     Arthritis      Basal cell carcinoma      Chest pain     seeing Cardiology     Depression 1991    after 's death     Diabetes mellitus (H)      Diabetic renal disease (H)     microalbuminuria     DJD (degenerative joint disease) of knee      H/O vitamin D deficiency      Hypertension      IBS (irritable bowel syndrome)     diarrhea      Keratoconus      Low back pain      Narcolepsy 2007    Dx'd by Sleep specialist 347.00, pt discontinued Adderal mid Nov. 13 due to tacycardia concerns     Obesity      Obstructive sleep apnea     327.23, 3 sleep studies,Dr. [facility]     Pancreatitis     related to Victoza, also on Xyrem     Panic      RLS (restless legs syndrome)      Sinus tachycardia        Surgical history:  Past Surgical History:   Procedure Laterality Date     COLONOSCOPY  10/01/2020    poor quality prep     ear surgery  2002 and 01/2004     ESOPHAGOSCOPY, GASTROSCOPY, DUODENOSCOPY (EGD), COMBINED N/A 11/16/2021    Procedure: ESOPHAGOGASTRODUODENOSCOPY (EGD);  Surgeon: Jayla Calvo MD;  Location: [facility]     ESOPHAGOSCOPY, GASTROSCOPY, DUODENOSCOPY (EGD), COMBINED N/A 1/9/2023    Procedure: ESOPHAGOGASTRODUODENOSCOPY, WITH BIOPSY;  Surgeon: Brandon Witt MD;  Location:  GI     GASTRIC BYPASS  2013    laparoscopic jimmy en y c ometopexy      HEMORRHOIDECTOMY  09/14/2000     LAPAROSCOPIC CHOLECYSTECTOMY  2015     LASIK BILATERAL       UVULOPALATOPHARYNGOPLASTY       UVVP         Problem list:    Patient Active Problem List    Diagnosis Date Noted     Physical deconditioning 03/06/2023     Priority: Medium     Osteopenia of spine 03/06/2023     Priority: Medium     Personal history of fall 03/06/2023     Priority: Medium     Left wrist pain 02/24/2023     Priority: Medium     Wrist stiffness, left 02/24/2023     Priority: Medium     Neck pain 10/15/2022     Priority: Medium     Closed nondisplaced fracture of head of right radius, initial encounter 10/15/2022     Priority: Medium     Injury of head, initial encounter 10/15/2022     Priority: Medium     Fall, initial encounter 10/15/2022     Priority: Medium     Closed fracture of right hand, initial encounter 10/15/2022     Priority: Medium     Closed fracture of left wrist, initial encounter 10/15/2022     Priority: Medium     Upper GI bleed 11/16/2021     Priority: Medium     Chronic kidney disease, stage 3 (H) 07/29/2021     Priority: Medium     Chronic diastolic congestive heart failure (H) 07/29/2021     Priority: Medium     Gastrointestinal hemorrhage associated with gastric ulcer 11/05/2020     Priority: Medium     Diabetic peripheral neuropathy (H) 02/06/2020     Priority: Medium     Bilateral sciatica 02/06/2020     Priority: Medium     PTSD (post-traumatic stress disorder) 11/12/2019     Priority: Medium     Acute conjunctivitis of left eye 05/20/2019     Priority: Medium     Osteopenia of hip 04/29/2019     Priority: Medium     T -1.8 Left hip, T -1.4 Right hip  T -1.3 Lumbar spine    Repeat in 2021       Type 2 diabetes mellitus without complication, without long-term current use of insulin (H) 06/27/2018     Priority: Medium     Hypertension goal BP (blood pressure) < 140/90 02/04/2014     Priority: Medium     Heart murmur 12/05/2013     Priority: Medium     Low back pain      Priority:  Medium     DJD (degenerative joint disease) of knee      Priority: Medium     Pancreatitis      Priority: Medium     related to Victoza, also on Xyrem       Panic      Priority: Medium     Chest pain      Priority: Medium     seeing Cardiology       IBS (irritable bowel syndrome)      Priority: Medium     diarrhea        Plantar warts 10/09/2013     Priority: Medium     RIGHT OUTER POSTERIOR HEEL THERE FOR YEARS       Vitamin D insufficiency 06/03/2013     Priority: Medium     Major depression in partial remission (H) 06/03/2013     Priority: Medium     Obstructive sleep apnea      Priority: Medium     327.23       Hyperlipidemia LDL goal <100 12/12/2012     Priority: Medium     Keratoconus 11/14/2012     Priority: Medium     Vitamin D deficiency 11/12/2012     Priority: Medium     Problem list name updated by automated process. Provider to review       Dysthymic disorder 11/12/2012     Priority: Medium     Malaise and fatigue 11/12/2012     Priority: Medium     Problem list name updated by automated process. Provider to review       Narcolepsy without cataplexy(347.00) 11/12/2012     Priority: Medium       Medications:  Current Outpatient Medications   Medication Sig Dispense Refill     amphetamine-dextroamphetamine (ADDERALL) 30 MG tablet Take 1 tablet (30 mg) by mouth every 24 hours 1.5 mg a total of 45 mg  Daily 30 tablet 0     atorvastatin (LIPITOR) 20 MG tablet Take 1 tablet (20 mg) by mouth daily 90 tablet 1     blood glucose (NO BRAND SPECIFIED) test strip Use to test blood sugar as needed with Freestyle Nirmal CGM. 100 strip 0     blood glucose (NO BRAND SPECIFIED) test strip Use to test blood sugar as directed with CGM sensor. 50 strip 1     calcium carbonate (OS-KINJAL) 1500 (600 Ca) MG tablet Take 2 tablets (1,200 mg) by mouth daily       cevimeline (EVOXAC) 30 MG capsule take 1 cap  capsule 1     COMPOUNDED NON-CONTROLLED SUBSTANCE (CMPD RX) - PHARMACY TO MIX COMPOUNDED MEDICATION Estradiol 0.0075%  in HRT cream  Apply 2 grams,  intravaginally and small amount externally daily for one week then twice weekly for maintenance. 45 g 11     Continuous Blood Gluc  (FREESTYLE JORDIN 14 DAY READER) EBEN Use to read blood sugars as per 's instructions. 1 each 0     Continuous Blood Gluc Sensor (FREESTYLE JORDIN 14 DAY SENSOR) MISC Change every 14 days. 2 each 11     cyanocobalamin (VITAMIN B-12) 1000 MCG tablet Take one every other day 90 tablet 1     DULoxetine (CYMBALTA) 30 MG capsule Take 1 capsule (30 mg) by mouth 2 times daily 180 capsule 0     DULoxetine (CYMBALTA) 60 MG capsule Take 1 capsule (60 mg) by mouth At Bedtime Take in addition to current 30 mg evening dose for a total of 90 mg at bedtime. 90 capsule 0     empagliflozin (JARDIANCE) 10 MG TABS tablet Take one daily, schedule clinic visit for mid March. 90 tablet 0     losartan (COZAAR) 100 MG tablet Take 1 tablet (100 mg) by mouth daily 90 tablet 1     metFORMIN (GLUCOPHAGE XR) 500 MG 24 hr tablet Take 4 po q am with breakfast. Schedule clinic visit 360 tablet 1     omeprazole (PRILOSEC) 40 MG DR capsule Take 40mg twice daily 180 capsule 1     oxyCODONE (ROXICODONE) 5 MG tablet Take 0.5 tablets (2.5 mg) by mouth every 6 hours as needed for moderate to severe pain 16 tablet 0     SENNA-docusate sodium (SENNA S) 8.6-50 MG tablet Take 1 tablet by mouth At Bedtime       tolterodine ER (DETROL LA) 4 MG 24 hr capsule Take 1 capsule (4 mg) by mouth daily 90 capsule 3     traZODone (DESYREL) 50 MG tablet Take 1 tablet by mouth every 24 hours       triamcinolone (KENALOG) 0.1 % paste Apply to tongue twice daily x 10 days 5 g 0       Allergies:  Allergies   Allergen Reactions     Pravastatin Other (See Comments)     woozy     Codeine Nausea and Vomiting     Nsaids GI Disturbance and Other (See Comments)     Pt had bariatric surgery, should not ever take oral NSAIDS due to risk of gastric ulcers.  Pt had bariatric surgery, should not ever take oral  NSAIDS due to risk of gastric ulcers.       Family history:  Family History   Problem Relation Age of Onset     Memory loss Mother      Diabetes Father      Chronic Obstructive Pulmonary Disease Sister      Anemia Sister         hx of ulcers     Other - See Comments Sister 65        MVA, followed by leg pain after a fall     Dementia Sister 68     Dementia Brother 70     Cancer Brother         lung     Cancer - colorectal Maternal Grandmother      Cancer Daughter      Obesity Daughter         s/p lap band       Social history:  Social History     Tobacco Use     Smoking status: Never     Smokeless tobacco: Never   Substance Use Topics     Alcohol use: Not Currently     Comment: rare    Marital status: single.    Nursing Notes:   Eduardo Tian, EMT  3/16/2023 10:00 AM  Signed  Chief Complaint   Patient presents with     Consult       Vitals:    03/16/23 0958   BP: (!) 144/83   BP Location: Left arm   Patient Position: Sitting   Cuff Size: Adult Regular   Pulse: 91   SpO2: 99%       There is no height or weight on file to calculate BMI.    Eduardo Tian EMT-P         40 minutes spent on the date of encounter performing chart review, history and exam, documentation and further activities as noted above.     -----------------------------------------------------  Maria E Noble PA-C  Colon and Rectal Surgery   [facility]

## 2023-03-16 NOTE — NURSING NOTE
Chief Complaint   Patient presents with     Consult       Vitals:    03/16/23 0958   BP: (!) 144/83   BP Location: Left arm   Patient Position: Sitting   Cuff Size: Adult Regular   Pulse: 91   SpO2: 99%       There is no height or weight on file to calculate BMI.    Eduardo Tian EMT-P

## 2023-03-16 NOTE — PROGRESS NOTES
Colon and Rectal Surgery Consult Clinic Note    Date: 3/16/2023     Referring provider:  No referring provider defined for this encounter.     RE: Karine Moss  : 1955  MIGUELITO: 3/16/2023    Karine Moss is a very pleasant 68 year old female here for fecal incontinence.    Karine is here today with her daughter. She reports several years of fecal incontinence. She reports that loose stool will leak out and she does not have sensation of this. If her stools are more formed, then she will be able to feel a sudden urge to defecate but often does not make it to the bathroom in time. This used to happen 7-8 times a week, but it has improved since she started taking a fiber supplement. She is currently taking a fiber supplement twice a day but still having episodes of incontinence 1-2 times a week. No nighttime incontinence. She does also report urinary incontinence for which she takes tolterodine, and that has helped the urinary incontinence.     Last colonoscopy 2020 normal.    Physical Examination:  BP (!) 144/83 (BP Location: Left arm, Patient Position: Sitting, Cuff Size: Adult Regular)   Pulse 91   SpO2 99%   General: alert, oriented, in no acute distress, sitting comfortably  HEENT: moist mucous membranes    Assessment/Plan: Karine Moss is a 68 year old female with fecal incontinence. We discussed treatment options including the role of sacral nerve stimulation. She and her daughter are interested in exploring this. Gave them an information packet with bowel diary. We discussed candidacy for SNS. She is already on a fiber supplement twice daily- she will need to increase to 3 times daily, which she previously has done. She understands that she needs to complete a bowel diary for 3 weeks and that she would need at least 1 episode of incontinence per week in order to to be a candidate for SNS. She and her daughter would like to look into this device some more. If they are interested, she will  complete the bowel diary and send it to us. I gave her instructions on how to do this. She will contact us if she would like to move forward with SNS. Patient's questions were answered to her stated satisfaction and she is in agreement with this plan.     Medical history:  Past Medical History:   Diagnosis Date     Arthritis      Basal cell carcinoma      Chest pain     seeing Cardiology     Depression 1991    after 's death     Diabetes mellitus (H)      Diabetic renal disease (H)     microalbuminuria     DJD (degenerative joint disease) of knee      H/O vitamin D deficiency      Hypertension      IBS (irritable bowel syndrome)     diarrhea      Keratoconus      Low back pain      Narcolepsy 2007    Dx'd by Sleep specialist 347.00, pt discontinued Adderal mid Nov. 13 due to tacycardia concerns     Obesity      Obstructive sleep apnea     327.23, 3 sleep studies,[facility]. [facility]     Pancreatitis     related to Victoza, also on Xyrem     Panic      RLS (restless legs syndrome)      Sinus tachycardia        Surgical history:  Past Surgical History:   Procedure Laterality Date     COLONOSCOPY  10/01/2020    poor quality prep     ear surgery  2002 and 01/2004     ESOPHAGOSCOPY, GASTROSCOPY, DUODENOSCOPY (EGD), COMBINED N/A 11/16/2021    Procedure: ESOPHAGOGASTRODUODENOSCOPY (EGD);  Surgeon: Jayla Calvo MD;  Location: [facility]     ESOPHAGOSCOPY, GASTROSCOPY, DUODENOSCOPY (EGD), COMBINED N/A 1/9/2023    Procedure: ESOPHAGOGASTRODUODENOSCOPY, WITH BIOPSY;  Surgeon: Brandon Witt MD;  Location:  GI     GASTRIC BYPASS  2013    laparoscopic jimmy en y c ometopexy     HEMORRHOIDECTOMY  09/14/2000     LAPAROSCOPIC CHOLECYSTECTOMY  2015     LASIK BILATERAL       UVULOPALATOPHARYNGOPLASTY       UVVP         Problem list:    Patient Active Problem List    Diagnosis Date Noted     Physical deconditioning 03/06/2023     Priority: Medium     Osteopenia of spine 03/06/2023     Priority: Medium     Personal  history of fall 03/06/2023     Priority: Medium     Left wrist pain 02/24/2023     Priority: Medium     Wrist stiffness, left 02/24/2023     Priority: Medium     Neck pain 10/15/2022     Priority: Medium     Closed nondisplaced fracture of head of right radius, initial encounter 10/15/2022     Priority: Medium     Injury of head, initial encounter 10/15/2022     Priority: Medium     Fall, initial encounter 10/15/2022     Priority: Medium     Closed fracture of right hand, initial encounter 10/15/2022     Priority: Medium     Closed fracture of left wrist, initial encounter 10/15/2022     Priority: Medium     Upper GI bleed 11/16/2021     Priority: Medium     Chronic kidney disease, stage 3 (H) 07/29/2021     Priority: Medium     Chronic diastolic congestive heart failure (H) 07/29/2021     Priority: Medium     Gastrointestinal hemorrhage associated with gastric ulcer 11/05/2020     Priority: Medium     Diabetic peripheral neuropathy (H) 02/06/2020     Priority: Medium     Bilateral sciatica 02/06/2020     Priority: Medium     PTSD (post-traumatic stress disorder) 11/12/2019     Priority: Medium     Acute conjunctivitis of left eye 05/20/2019     Priority: Medium     Osteopenia of hip 04/29/2019     Priority: Medium     T -1.8 Left hip, T -1.4 Right hip  T -1.3 Lumbar spine    Repeat in 2021       Type 2 diabetes mellitus without complication, without long-term current use of insulin (H) 06/27/2018     Priority: Medium     Hypertension goal BP (blood pressure) < 140/90 02/04/2014     Priority: Medium     Heart murmur 12/05/2013     Priority: Medium     Low back pain      Priority: Medium     DJD (degenerative joint disease) of knee      Priority: Medium     Pancreatitis      Priority: Medium     related to Victoza, also on Xyrem       Panic      Priority: Medium     Chest pain      Priority: Medium     seeing Cardiology       IBS (irritable bowel syndrome)      Priority: Medium     diarrhea        Plantar warts  10/09/2013     Priority: Medium     RIGHT OUTER POSTERIOR HEEL THERE FOR YEARS       Vitamin D insufficiency 06/03/2013     Priority: Medium     Major depression in partial remission (H) 06/03/2013     Priority: Medium     Obstructive sleep apnea      Priority: Medium     327.23       Hyperlipidemia LDL goal <100 12/12/2012     Priority: Medium     Keratoconus 11/14/2012     Priority: Medium     Vitamin D deficiency 11/12/2012     Priority: Medium     Problem list name updated by automated process. Provider to review       Dysthymic disorder 11/12/2012     Priority: Medium     Malaise and fatigue 11/12/2012     Priority: Medium     Problem list name updated by automated process. Provider to review       Narcolepsy without cataplexy(347.00) 11/12/2012     Priority: Medium       Medications:  Current Outpatient Medications   Medication Sig Dispense Refill     amphetamine-dextroamphetamine (ADDERALL) 30 MG tablet Take 1 tablet (30 mg) by mouth every 24 hours 1.5 mg a total of 45 mg  Daily 30 tablet 0     atorvastatin (LIPITOR) 20 MG tablet Take 1 tablet (20 mg) by mouth daily 90 tablet 1     blood glucose (NO BRAND SPECIFIED) test strip Use to test blood sugar as needed with Freestyle Nirmal CGM. 100 strip 0     blood glucose (NO BRAND SPECIFIED) test strip Use to test blood sugar as directed with CGM sensor. 50 strip 1     calcium carbonate (OS-KINJAL) 1500 (600 Ca) MG tablet Take 2 tablets (1,200 mg) by mouth daily       cevimeline (EVOXAC) 30 MG capsule take 1 cap  capsule 1     COMPOUNDED NON-CONTROLLED SUBSTANCE (CMPD RX) - PHARMACY TO MIX COMPOUNDED MEDICATION Estradiol 0.0075% in HRT cream  Apply 2 grams,  intravaginally and small amount externally daily for one week then twice weekly for maintenance. 45 g 11     Continuous Blood Gluc  (FREESTYLE NIRMAL 14 DAY READER) EBEN Use to read blood sugars as per 's instructions. 1 each 0     Continuous Blood Gluc Sensor (FreeBordersSTYLE NIRMAL 14 DAY  SENSOR) MISC Change every 14 days. 2 each 11     cyanocobalamin (VITAMIN B-12) 1000 MCG tablet Take one every other day 90 tablet 1     DULoxetine (CYMBALTA) 30 MG capsule Take 1 capsule (30 mg) by mouth 2 times daily 180 capsule 0     DULoxetine (CYMBALTA) 60 MG capsule Take 1 capsule (60 mg) by mouth At Bedtime Take in addition to current 30 mg evening dose for a total of 90 mg at bedtime. 90 capsule 0     empagliflozin (JARDIANCE) 10 MG TABS tablet Take one daily, schedule clinic visit for mid March. 90 tablet 0     losartan (COZAAR) 100 MG tablet Take 1 tablet (100 mg) by mouth daily 90 tablet 1     metFORMIN (GLUCOPHAGE XR) 500 MG 24 hr tablet Take 4 po q am with breakfast. Schedule clinic visit 360 tablet 1     omeprazole (PRILOSEC) 40 MG DR capsule Take 40mg twice daily 180 capsule 1     oxyCODONE (ROXICODONE) 5 MG tablet Take 0.5 tablets (2.5 mg) by mouth every 6 hours as needed for moderate to severe pain 16 tablet 0     SENNA-docusate sodium (SENNA S) 8.6-50 MG tablet Take 1 tablet by mouth At Bedtime       tolterodine ER (DETROL LA) 4 MG 24 hr capsule Take 1 capsule (4 mg) by mouth daily 90 capsule 3     traZODone (DESYREL) 50 MG tablet Take 1 tablet by mouth every 24 hours       triamcinolone (KENALOG) 0.1 % paste Apply to tongue twice daily x 10 days 5 g 0       Allergies:  Allergies   Allergen Reactions     Pravastatin Other (See Comments)     woozy     Codeine Nausea and Vomiting     Nsaids GI Disturbance and Other (See Comments)     Pt had bariatric surgery, should not ever take oral NSAIDS due to risk of gastric ulcers.  Pt had bariatric surgery, should not ever take oral NSAIDS due to risk of gastric ulcers.       Family history:  Family History   Problem Relation Age of Onset     Memory loss Mother      Diabetes Father      Chronic Obstructive Pulmonary Disease Sister      Anemia Sister         hx of ulcers     Other - See Comments Sister 65        MVA, followed by leg pain after a fall      Dementia Sister 68     Dementia Brother 70     Cancer Brother         lung     Cancer - colorectal Maternal Grandmother      Cancer Daughter      Obesity Daughter         s/p lap band       Social history:  Social History     Tobacco Use     Smoking status: Never     Smokeless tobacco: Never   Substance Use Topics     Alcohol use: Not Currently     Comment: rare    Marital status: single.    Nursing Notes:   Eduardo Tian, EMT  3/16/2023 10:00 AM  Signed  Chief Complaint   Patient presents with     Consult       Vitals:    03/16/23 0958   BP: (!) 144/83   BP Location: Left arm   Patient Position: Sitting   Cuff Size: Adult Regular   Pulse: 91   SpO2: 99%       There is no height or weight on file to calculate BMI.    Eduardo Tian EMT-P         40 minutes spent on the date of encounter performing chart review, history and exam, documentation and further activities as noted above.     -----------------------------------------------------  Maria E Noble PA-C  Colon and Rectal Surgery   [facility]

## 2023-03-26 ENCOUNTER — HEALTH MAINTENANCE LETTER (OUTPATIENT)
Age: 68
End: 2023-03-26
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
## 2023-03-30 ENCOUNTER — VIRTUAL VISIT (OUTPATIENT)
Dept: BEHAVIORAL HEALTH | Facility: CLINIC | Age: 68
End: 2023-03-30
Payer: COMMERCIAL

## 2023-03-30 DIAGNOSIS — F43.10 PTSD (POST-TRAUMATIC STRESS DISORDER): Primary | ICD-10-CM

## 2023-03-30 ASSESSMENT — ANXIETY QUESTIONNAIRES
8. IF YOU CHECKED OFF ANY PROBLEMS, HOW DIFFICULT HAVE THESE MADE IT FOR YOU TO DO YOUR WORK, TAKE CARE OF THINGS AT HOME, OR GET ALONG WITH OTHER PEOPLE?: NOT DIFFICULT AT ALL
GAD7 TOTAL SCORE: 2
3. WORRYING TOO MUCH ABOUT DIFFERENT THINGS: NOT AT ALL
GAD7 TOTAL SCORE: 2
GAD7 TOTAL SCORE: 2
7. FEELING AFRAID AS IF SOMETHING AWFUL MIGHT HAPPEN: SEVERAL DAYS
1. FEELING NERVOUS, ANXIOUS, OR ON EDGE: SEVERAL DAYS
2. NOT BEING ABLE TO STOP OR CONTROL WORRYING: NOT AT ALL
4. TROUBLE RELAXING: NOT AT ALL
6. BECOMING EASILY ANNOYED OR IRRITABLE: NOT AT ALL
IF YOU CHECKED OFF ANY PROBLEMS ON THIS QUESTIONNAIRE, HOW DIFFICULT HAVE THESE PROBLEMS MADE IT FOR YOU TO DO YOUR WORK, TAKE CARE OF THINGS AT HOME, OR GET ALONG WITH OTHER PEOPLE: NOT DIFFICULT AT ALL
5. BEING SO RESTLESS THAT IT IS HARD TO SIT STILL: NOT AT ALL
7. FEELING AFRAID AS IF SOMETHING AWFUL MIGHT HAPPEN: SEVERAL DAYS

## 2023-03-30 ASSESSMENT — PATIENT HEALTH QUESTIONNAIRE - PHQ9
SUM OF ALL RESPONSES TO PHQ QUESTIONS 1-9: 2
SUM OF ALL RESPONSES TO PHQ QUESTIONS 1-9: 2

## 2023-04-05 ENCOUNTER — VIRTUAL VISIT (OUTPATIENT)
Dept: GASTROENTEROLOGY | Facility: CLINIC | Age: 68
End: 2023-04-05
Payer: COMMERCIAL

## 2023-04-05 ENCOUNTER — PRE VISIT (OUTPATIENT)
Dept: GASTROENTEROLOGY | Facility: CLINIC | Age: 68
End: 2023-04-05

## 2023-04-05 DIAGNOSIS — K27.9 PEPTIC ULCER DISEASE: Primary | ICD-10-CM

## 2023-04-05 PROCEDURE — 99203 OFFICE O/P NEW LOW 30 MIN: CPT | Mod: VID | Performed by: STUDENT IN AN ORGANIZED HEALTH CARE EDUCATION/TRAINING PROGRAM

## 2023-04-05 NOTE — NURSING NOTE
Is the patient currently in the state of [place]? YES    Visit mode:VIDEO    If the visit is dropped, the patient can be reconnected by: VIDEO VISIT: Text to cell phone: [phone]    Will anyone else be joining the visit? NO      How would you like to obtain your AVS? MyChart    Are changes needed to the allergy or medication list? NO    Reason for visit: No comments or concerns

## 2023-04-05 NOTE — LETTER
4/5/2023         RE: Karine Moss  [address]  [facility] [address]        Dear Colleague,    Thank you for referring your patient, Karine Moss, to the [facility] [place]. Please see a copy of my visit note below.      GI CLINIC VISIT    CC/REFERRING MD:  No ref. provider found  REASON FOR CONSULTATION: Hx of PUD  No ref. provider found for   Chief Complaint   Patient presents with    Video Visit       ASSESSMENT/PLAN:    Gastric Ulcer  67 year old history of gastric bypass, T2DM, HTN, hyperlipidemia, gastric ulcer who was reportedly hospitalized for GI bleed and found to have a large anastomotic gastric ulcer suspected to be due to NSAID in 11/2021, follow up EGD to check for healing continued to show a 21-25 mm gastric ulcer in the antrum and patient was supposed to get another follow up EGD, unclear why that was not done.     Patient then recently followed up with her PCP who then requested she followed up with GI and gets a follow up EGD.     Patient is now s/p EGD in 1/23 with evidence of a clean based gastric ulcer, she was asked to continue PPI daily for indefinitely.     She states she has been doing well and denies any GI related concerns/symptoms. hgb stable at 11.7. no evidence of overt GI bleed. She has continued to avoid NSAID.     Plan:  --Continue PPI daily indefinitely  --Continue to avoid NSAID   --Pt informed to return to the ED in the event of overt bleeding, significant abdominal pain, nausea, vomiting, fever or chills.       30 minutes spent on the date of the encounter performing chart review, history and exam, documentation and further activities as noted above.  .      Ron Mason MD  Gastroenterology Fellow  Division of Gastroenterology, Hepatology and Nutrition  [facility]  P:         HPI  67 year old history of gastric bypass, T2DM, HTN, hyperlipidemia, gastric ulcer who was reportedly hospitalized for  GI bleed and found to have a large anastomotic gastric ulcer suspected to be due to NSAID in 11/2021, follow up EGD to check for healing continued to show a 21-25 mm gastric ulcer in the antrum and patient was supposed to get another follow up EGD, unclear why that was not done.     Patient then recently followed up with her PCP who then requested she followed up with GI and gets a follow up EGD.     She denies any new GI related concerns at this time including abdominal pain, nausea, vomiting, fever, chills or evidence of overt GI bleed.     ROS:    No fevers or chills  No weight loss  No blurry vision, double vision or change in vision  No sore throat  No lymphadenopathy  No headache, paraesthesias, or weakness in a limb  No shortness of breath or wheezing  No chest pain or pressure  No arthralgias or myalgias  No rashes or skin changes  No odynophagia or dysphagia  No BRBPR, hematochezia, melena  No dysuria, frequency or urgency  No hot/cold intolerance or polyria  No anxiety or depression    PROBLEM LIST  Patient Active Problem List    Diagnosis Date Noted    Physical deconditioning 03/06/2023     Priority: Medium    Osteopenia of spine 03/06/2023     Priority: Medium    Personal history of fall 03/06/2023     Priority: Medium    Left wrist pain 02/24/2023     Priority: Medium    Wrist stiffness, left 02/24/2023     Priority: Medium    Neck pain 10/15/2022     Priority: Medium    Closed nondisplaced fracture of head of right radius, initial encounter 10/15/2022     Priority: Medium    Injury of head, initial encounter 10/15/2022     Priority: Medium    Fall, initial encounter 10/15/2022     Priority: Medium    Closed fracture of right hand, initial encounter 10/15/2022     Priority: Medium    Closed fracture of left wrist, initial encounter 10/15/2022     Priority: Medium    Upper GI bleed 11/16/2021     Priority: Medium    Chronic kidney disease, stage 3 (H) 07/29/2021     Priority: Medium    Chronic diastolic  congestive heart failure (H) 07/29/2021     Priority: Medium    Gastrointestinal hemorrhage associated with gastric ulcer 11/05/2020     Priority: Medium    Diabetic peripheral neuropathy (H) 02/06/2020     Priority: Medium    Bilateral sciatica 02/06/2020     Priority: Medium    PTSD (post-traumatic stress disorder) 11/12/2019     Priority: Medium    Acute conjunctivitis of left eye 05/20/2019     Priority: Medium    Osteopenia of hip 04/29/2019     Priority: Medium     T -1.8 Left hip, T -1.4 Right hip  T -1.3 Lumbar spine    Repeat in 2021      Type 2 diabetes mellitus without complication, without long-term current use of insulin (H) 06/27/2018     Priority: Medium    Hypertension goal BP (blood pressure) < 140/90 02/04/2014     Priority: Medium    Heart murmur 12/05/2013     Priority: Medium    Low back pain      Priority: Medium    DJD (degenerative joint disease) of knee      Priority: Medium    Pancreatitis      Priority: Medium     related to Victoza, also on Xyrem      Panic      Priority: Medium    Chest pain      Priority: Medium     seeing Cardiology      IBS (irritable bowel syndrome)      Priority: Medium     diarrhea       Plantar warts 10/09/2013     Priority: Medium     RIGHT OUTER POSTERIOR HEEL THERE FOR YEARS      Vitamin D insufficiency 06/03/2013     Priority: Medium    Major depression in partial remission (H) 06/03/2013     Priority: Medium    Obstructive sleep apnea      Priority: Medium     327.23      Hyperlipidemia LDL goal <100 12/12/2012     Priority: Medium    Keratoconus 11/14/2012     Priority: Medium    Vitamin D deficiency 11/12/2012     Priority: Medium     Problem list name updated by automated process. Provider to review      Dysthymic disorder 11/12/2012     Priority: Medium    Malaise and fatigue 11/12/2012     Priority: Medium     Problem list name updated by automated process. Provider to review      Narcolepsy without cataplexy(347.00) 11/12/2012     Priority: Medium        PERTINENT PAST MEDICAL HISTORY:  Past Medical History:   Diagnosis Date    Arthritis     Basal cell carcinoma     Chest pain     seeing Cardiology    Depression 1991    after 's death    Diabetes mellitus (H)     Diabetic renal disease (H)     microalbuminuria    DJD (degenerative joint disease) of knee     H/O vitamin D deficiency     Hypertension     IBS (irritable bowel syndrome)     diarrhea     Keratoconus     Low back pain     Narcolepsy 2007    Dx'd by Sleep specialist 347.00, pt discontinued Adderal mid Nov. 13 due to tacycardia concerns    Obesity     Obstructive sleep apnea     327.23, 3 sleep studies,Dr. [facility]    Pancreatitis     related to Victoza, also on Xyrem    Panic     RLS (restless legs syndrome)     Sinus tachycardia        PREVIOUS SURGERIES:  Past Surgical History:   Procedure Laterality Date    COLONOSCOPY  10/01/2020    poor quality prep    ear surgery  2002 and 01/2004    ESOPHAGOSCOPY, GASTROSCOPY, DUODENOSCOPY (EGD), COMBINED N/A 11/16/2021    Procedure: ESOPHAGOGASTRODUODENOSCOPY (EGD);  Surgeon: Jayla Calvo MD;  [facility]: [facility]    ESOPHAGOSCOPY, GASTROSCOPY, DUODENOSCOPY (EGD), COMBINED N/A 1/9/2023    Procedure: ESOPHAGOGASTRODUODENOSCOPY, WITH BIOPSY;  Surgeon: Brandon Witt MD;  Location:  GI    GASTRIC BYPASS  2013    laparoscopic jimmy en y c ometopexy    HEMORRHOIDECTOMY  09/14/2000    LAPAROSCOPIC CHOLECYSTECTOMY  2015    LASIK BILATERAL      UVULOPALATOPHARYNGOPLASTY      UVVP         PREVIOUS ENDOSCOPY:    EGD 1/23    Impression:            This patient presents with a history of gastric bypass                          and large anastomotic ulcer (~20 mm). She was taking                          NSAIDs in the past but has stopped them. EGD today                          showed a very superficial small ulcer (5 mm),                          indicating substantial healing.                          - Z-line, 35 cm from the incisors.                           - Normal esophagus.                          - Gastric bypass with a normal-sized pouch and intact                          staple line. Gastrojejunal anastomosis characterized                          by healthy appearing mucosa.                          - Non-bleeding gastric ulcer with no stigmata of                          bleeding.                          - A few gastric polyps. Biopsied.     ALLERGIES:     Allergies   Allergen Reactions    Pravastatin Other (See Comments)     woozy    Codeine Nausea and Vomiting    Nsaids GI Disturbance and Other (See Comments)     Pt had bariatric surgery, should not ever take oral NSAIDS due to risk of gastric ulcers.  Pt had bariatric surgery, should not ever take oral NSAIDS due to risk of gastric ulcers.       PERTINENT MEDICATIONS:    Current Outpatient Medications:     amphetamine-dextroamphetamine (ADDERALL) 30 MG tablet, Take 1 tablet (30 mg) by mouth every 24 hours 1.5 mg a total of 45 mg  Daily, Disp: 30 tablet, Rfl: 0    atorvastatin (LIPITOR) 20 MG tablet, Take 1 tablet (20 mg) by mouth daily, Disp: 90 tablet, Rfl: 1    blood glucose (NO BRAND SPECIFIED) test strip, Use to test blood sugar as needed with Freestyle Nirmal CGM., Disp: 100 strip, Rfl: 0    blood glucose (NO BRAND SPECIFIED) test strip, Use to test blood sugar as directed with CGM sensor., Disp: 50 strip, Rfl: 1    calcium carbonate (OS-KINJAL) 1500 (600 Ca) MG tablet, Take 2 tablets (1,200 mg) by mouth daily, Disp: , Rfl:     cevimeline (EVOXAC) 30 MG capsule, take 1 cap TID, Disp: 270 capsule, Rfl: 1    COMPOUNDED NON-CONTROLLED SUBSTANCE (CMPD RX) - PHARMACY TO MIX COMPOUNDED MEDICATION, Estradiol 0.0075% in HRT cream  Apply 2 grams,  intravaginally and small amount externally daily for one week then twice weekly for maintenance., Disp: 45 g, Rfl: 11    Continuous Blood Gluc  (FREESTYLE NIRMAL 14 DAY READER) EBEN, Use to read blood sugars as per 's instructions.,  Disp: 1 each, Rfl: 0    Continuous Blood Gluc Sensor (FREESTYLE JORDIN 14 DAY SENSOR) MIS, Change every 14 days., Disp: 2 each, Rfl: 11    cyanocobalamin (VITAMIN B-12) 1000 MCG tablet, Take one every other day, Disp: 90 tablet, Rfl: 1    DULoxetine (CYMBALTA) 30 MG capsule, Take 1 capsule (30 mg) by mouth 2 times daily, Disp: 180 capsule, Rfl: 0    DULoxetine (CYMBALTA) 60 MG capsule, Take 1 capsule (60 mg) by mouth At Bedtime Take in addition to current 30 mg evening dose for a total of 90 mg at bedtime., Disp: 90 capsule, Rfl: 0    empagliflozin (JARDIANCE) 10 MG TABS tablet, Take one daily, schedule clinic visit for mid March., Disp: 90 tablet, Rfl: 0    losartan (COZAAR) 100 MG tablet, Take 1 tablet (100 mg) by mouth daily, Disp: 90 tablet, Rfl: 1    metFORMIN (GLUCOPHAGE XR) 500 MG 24 hr tablet, Take 4 po q am with breakfast. Schedule clinic visit, Disp: 360 tablet, Rfl: 1    omeprazole (PRILOSEC) 40 MG DR capsule, Take 40mg twice daily, Disp: 180 capsule, Rfl: 1    oxyCODONE (ROXICODONE) 5 MG tablet, Take 0.5 tablets (2.5 mg) by mouth every 6 hours as needed for moderate to severe pain, Disp: 16 tablet, Rfl: 0    SENNA-docusate sodium (SENNA S) 8.6-50 MG tablet, Take 1 tablet by mouth At Bedtime, Disp: , Rfl:     tolterodine ER (DETROL LA) 4 MG 24 hr capsule, Take 1 capsule (4 mg) by mouth daily, Disp: 90 capsule, Rfl: 3    traZODone (DESYREL) 50 MG tablet, Take 1 tablet by mouth every 24 hours, Disp: , Rfl:     triamcinolone (KENALOG) 0.1 % paste, Apply to tongue twice daily x 10 days, Disp: 5 g, Rfl: 0    SOCIAL HISTORY:  Social History     Socioeconomic History    Marital status: Single     Spouse name: Not on file    Number of children: 1    Years of education: Not on file    Highest education level: Not on file   Occupational History     Employer: [employer]     Comment: on disability   Tobacco Use    Smoking status: Never    Smokeless tobacco: Never   Vaping Use    Vaping status: Not on file   Substance  and Sexual Activity    Alcohol use: Not Currently     Comment: rare    Drug use: No    Sexual activity: Never   Other Topics Concern    Parent/sibling w/ CABG, MI or angioplasty before 65F 55M? No   Social History Narrative    Daughter- 42,  since 1991    Drives only short distances due to narcolepsy            --------------------------------------------------------------------------------    Surgical History  Return To Top     Status Surgery Time Frame Comment Record Date     Inactive  ear surgery  1/04 5/27/2008      Inactive  eye surgery  2002 5/27/2008      Inactive  Hemorrhoidectomy  9/14/00 5/27/2008          --------------------------------------------------------------------------------    Food Allergy  Return To Top     Allergen Reaction Comment Record Date     * No known food allergies      10/28/2008          --------------------------------------------------------------------------------    Drug Allergy  Return To Top     Allergen Reaction Comment Record Date     Codeine  nausea    6/21/2007          --------------------------------------------------------------------------------    Environment Allergy  Return To Top     Allergen Reaction Comment Record Date     * No known environmental allergies      10/28/2008          --------------------------------------------------------------------------------    Social History  Return To Top     Question Answer Comment Record Date     Marital status      5/27/2008      Advance Directive or Living Will  Form Given to Patient    1/18/2010      Emotional Abuse  Yes    1/18/2010      Exercise  No    1/18/2010      Caffeine  Yes    5/27/2008      Physical Abuse  No    1/18/2010      Sealtbelts  Yes    1/18/2010      Sexual Abuse  No    1/18/2010      Breast/Testicle Self Check  No    1/18/2010      Number of children  1    1/18/2010      Living arrangements  House    1/18/2010      Number of adults in household  2    1/18/2010      Education  level  High School Graduate    1/18/2010      Employment  Currently employed    1/18/2010      Tobacco history  Has never smoked or chewed tobacco    5/27/2008      Alcohol history  Currently drinks alcohol    5/27/2008      Has the patient ever used illegal drugs?  Has never used illegal drugs    5/27/2008          --------------------------------------------------------------------------------    History - Overall Remark: 3/21/2012 Return To Top         --------------------------------------------------------------------------------    Medical History Return To Top     Status Diagnosis Time Frame Comment Record Date     Active (250.00) - C - Diabetes mellitus, type II controlled      5/15/2012      Active (788.41) - C - Urinary frequency      4/17/2012      Active (250.02) - C - Diabetes mellitus, type II uncontrolled      3/6/2012      Active (278.00) - C - Obesity      2/14/2011      Active (250.00) - C - Diabetes mellitus, type II controlled      2/14/2011      Active (739.3) - C - Somatic dysfunction, lumbar region      8/16/2010      Active (739.5) - C - Somatic dysfunction, pelvic region      8/16/2010      Active (401.9) - C - Hypertension      2/8/2010      Active 268.9 UNSP VITAMIN D DEFICIENCY      1/18/2010      Active (695.3) - C - Rosacea      1/18/2010      Active 272.1 Hypertriglyceridemia      1/18/2010      Active 300.4 Depression with Anxiety (Dysthymic Disorder)      10/28/2008      Active 739.6 Somatic dysfunction, lower extremities      10/28/2008      Active 780.79 Fatigue      6/23/2008      Active 278.01 Obesity, morbid      6/19/2008      Active 347.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, 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      6/19/2008      Inactive  (462) - C - Pharyngitis, acute      1/15/2011      Inactive  250.02 Diabetes mellitus, type II uncontrolled      1/18/2010      Inactive  726.71 Tendinitis, achilles      10/28/2008      Inactive  (250.00) - C - Diabetes mellitus, type II controlled      10/28/2008      Inactive   (250.00) - C - Diabetes mellitus, type II controlled      2008      Inactive  V77.91 Screening, lipids      2008      Inactive  599.0 Urinary tract infection, unspec./pyuria (UTI)      2008      Inactive  V70.0 Routine Medical Exam      2008      Active Constipation      2008      Active Hemorrhoids      2008      Pancreatitis pancreatitis 2013     --------------------------------------------------------------------------------    M        --------------------------------------------------------------------------------    Family History  Return To Top     Status Relationship Disease Comment Record Date     Alive Sister  *Denies any medical problems  3 sisters  2008      Alive Brother  *Denies any medical problems  2 brothers  2008         Father    Age of death 56  2010         Mother  Dementia  Age of death 82  2008          --------------------------------------------------------------------------------                         Social Determinants of Health     Financial Resource Strain: Not on file   Food Insecurity: Not on file   Transportation Needs: Not on file   Physical Activity: Not on file   Stress: Not on file   Social Connections: Not on file   Intimate Partner Violence: Not on file   Housing Stability: Not on file       FAMILY HISTORY:  Family History   Problem Relation Age of Onset    Memory loss Mother     Diabetes Father     Chronic Obstructive Pulmonary Disease Sister     Anemia Sister         hx of ulcers    Other - See Comments Sister 65        MVA, followed by leg pain after a fall    Dementia Sister 68    Dementia Brother 70    Cancer Brother         lung    Cancer - colorectal Maternal Grandmother     Cancer Daughter     Obesity Daughter         s/p lap band       Past/family/social history reviewed and no changes    PHYSICAL EXAMINATION:  Vitals reviewed: There were no vitals taken for this visit.  Wt:   Wt Readings from  Last 2 Encounters:   02/24/23 61.7 kg (136 lb)   02/10/23 61.7 kg (136 lb 1.3 oz)      This is a virtual visit. I did not examine the patient.       PERTINENT STUDIES:    Lab on 12/09/2022   Component Date Value Ref Range Status    Hemoglobin A1C 12/09/2022 6.7 (H)  <5.7 % Final    Parathyroid Hormone Intact 12/09/2022 76 (H)  15 - 65 pg/mL Final    Sodium 12/09/2022 142  136 - 145 mmol/L Final    Potassium 12/09/2022 4.6  3.4 - 5.3 mmol/L Final    Chloride 12/09/2022 105  98 - 107 mmol/L Final    Carbon Dioxide (CO2) 12/09/2022 26  22 - 29 mmol/L Final    Anion Gap 12/09/2022 11  7 - 15 mmol/L Final    Urea Nitrogen 12/09/2022 22.5  8.0 - 23.0 mg/dL Final    Creatinine 12/09/2022 1.32 (H)  0.51 - 0.95 mg/dL Final    Calcium 12/09/2022 9.6  8.8 - 10.2 mg/dL Final    Glucose 12/09/2022 111 (H)  70 - 99 mg/dL Final    Alkaline Phosphatase 12/09/2022 88  35 - 104 U/L Final    AST 12/09/2022 23  10 - 35 U/L Final    ALT 12/09/2022 15  10 - 35 U/L Final    Protein Total 12/09/2022 7.5  6.4 - 8.3 g/dL Final    Albumin 12/09/2022 4.2  3.5 - 5.2 g/dL Final    Bilirubin Total 12/09/2022 0.4  <=1.2 mg/dL Final    GFR Estimate 12/09/2022 44 (L)  >60 mL/min/1.73m2 Final    TSH 12/09/2022 1.52  0.30 - 4.20 uIU/mL Final    Vitamin D, Total (25-Hydroxy) 12/09/2022 50  20 - 75 ug/L Final    Magnesium 12/09/2022 1.7  1.7 - 2.3 mg/dL Final    Phosphorus 12/09/2022 4.0  2.5 - 4.5 mg/dL Final    WBC Count 12/09/2022 8.8  4.0 - 11.0 10e3/uL Final    RBC Count 12/09/2022 4.32  3.80 - 5.20 10e6/uL Final    Hemoglobin 12/09/2022 11.7  11.7 - 15.7 g/dL Final    Hematocrit 12/09/2022 37.9  35.0 - 47.0 % Final    MCV 12/09/2022 88  78 - 100 fL Final    MCH 12/09/2022 27.1  26.5 - 33.0 pg Final    MCHC 12/09/2022 30.9 (L)  31.5 - 36.5 g/dL Final    RDW 12/09/2022 15.1 (H)  10.0 - 15.0 % Final    Platelet Count 12/09/2022 181  150 - 450 10e3/uL Final    Creatinine Urine mg/dL 01/05/2023 46.9  mg/dL Final    Duration in hours 01/05/2023 830.0  h  Final    Volume in mL 01/05/2023 1,890  mL Final    Creatinine Urine Timed g/spec 01/05/2023 0.03 (L)  0.72 - 1.51 Final           Again, thank you for allowing me to participate in the care of your patient.      Sincerely,    Ron Mason MD

## 2023-04-05 NOTE — PROGRESS NOTES
Virtual Visit Details    Type of service:  Video Visit   Video Start Time: 10am  Video End Time:10:40am    Originating Location (pt. Location): Home    Distant Location (provider location):  On-site  Platform used for Video Visit: [facility]       GI CLINIC VISIT    CC/REFERRING MD:  No ref. provider found  REASON FOR CONSULTATION: Hx of PUD  No ref. provider found for   Chief Complaint   Patient presents with     Video Visit       ASSESSMENT/PLAN:    Gastric Ulcer  67 year old history of gastric bypass, T2DM, HTN, hyperlipidemia, gastric ulcer who was reportedly hospitalized for GI bleed and found to have a large anastomotic gastric ulcer suspected to be due to NSAID in 11/2021, follow up EGD to check for healing continued to show a 21-25 mm gastric ulcer in the antrum and patient was supposed to get another follow up EGD, unclear why that was not done.     Patient then recently followed up with her PCP who then requested she followed up with GI and gets a follow up EGD.     Patient is now s/p EGD in 1/23 with evidence of a clean based gastric ulcer, she was asked to continue PPI daily for indefinitely.     She states she has been doing well and denies any GI related concerns/symptoms. hgb stable at 11.7. no evidence of overt GI bleed. She has continued to avoid NSAID.     Plan:  --Continue PPI daily indefinitely  --Continue to avoid NSAID   --Pt informed to return to the ED in the event of overt bleeding, significant abdominal pain, nausea, vomiting, fever or chills.       30 minutes spent on the date of the encounter performing chart review, history and exam, documentation and further activities as noted above.  .      Ron Mason MD  Gastroenterology Fellow  Division of Gastroenterology, Hepatology and Nutrition  [facility]  P:         HPI  67 year old history of gastric bypass, T2DM, HTN, hyperlipidemia, gastric ulcer who was reportedly hospitalized for GI bleed and found to have a  large anastomotic gastric ulcer suspected to be due to NSAID in 11/2021, follow up EGD to check for healing continued to show a 21-25 mm gastric ulcer in the antrum and patient was supposed to get another follow up EGD, unclear why that was not done.     Patient then recently followed up with her PCP who then requested she followed up with GI and gets a follow up EGD.     She denies any new GI related concerns at this time including abdominal pain, nausea, vomiting, fever, chills or evidence of overt GI bleed.     ROS:    No fevers or chills  No weight loss  No blurry vision, double vision or change in vision  No sore throat  No lymphadenopathy  No headache, paraesthesias, or weakness in a limb  No shortness of breath or wheezing  No chest pain or pressure  No arthralgias or myalgias  No rashes or skin changes  No odynophagia or dysphagia  No BRBPR, hematochezia, melena  No dysuria, frequency or urgency  No hot/cold intolerance or polyria  No anxiety or depression    PROBLEM LIST  Patient Active Problem List    Diagnosis Date Noted     Physical deconditioning 03/06/2023     Priority: Medium     Osteopenia of spine 03/06/2023     Priority: Medium     Personal history of fall 03/06/2023     Priority: Medium     Left wrist pain 02/24/2023     Priority: Medium     Wrist stiffness, left 02/24/2023     Priority: Medium     Neck pain 10/15/2022     Priority: Medium     Closed nondisplaced fracture of head of right radius, initial encounter 10/15/2022     Priority: Medium     Injury of head, initial encounter 10/15/2022     Priority: Medium     Fall, initial encounter 10/15/2022     Priority: Medium     Closed fracture of right hand, initial encounter 10/15/2022     Priority: Medium     Closed fracture of left wrist, initial encounter 10/15/2022     Priority: Medium     Upper GI bleed 11/16/2021     Priority: Medium     Chronic kidney disease, stage 3 (H) 07/29/2021     Priority: Medium     Chronic diastolic congestive  heart failure (H) 07/29/2021     Priority: Medium     Gastrointestinal hemorrhage associated with gastric ulcer 11/05/2020     Priority: Medium     Diabetic peripheral neuropathy (H) 02/06/2020     Priority: Medium     Bilateral sciatica 02/06/2020     Priority: Medium     PTSD (post-traumatic stress disorder) 11/12/2019     Priority: Medium     Acute conjunctivitis of left eye 05/20/2019     Priority: Medium     Osteopenia of hip 04/29/2019     Priority: Medium     T -1.8 Left hip, T -1.4 Right hip  T -1.3 Lumbar spine    Repeat in 2021       Type 2 diabetes mellitus without complication, without long-term current use of insulin (H) 06/27/2018     Priority: Medium     Hypertension goal BP (blood pressure) < 140/90 02/04/2014     Priority: Medium     Heart murmur 12/05/2013     Priority: Medium     Low back pain      Priority: Medium     DJD (degenerative joint disease) of knee      Priority: Medium     Pancreatitis      Priority: Medium     related to Victoza, also on Xyrem       Panic      Priority: Medium     Chest pain      Priority: Medium     seeing Cardiology       IBS (irritable bowel syndrome)      Priority: Medium     diarrhea        Plantar warts 10/09/2013     Priority: Medium     RIGHT OUTER POSTERIOR HEEL THERE FOR YEARS       Vitamin D insufficiency 06/03/2013     Priority: Medium     Major depression in partial remission (H) 06/03/2013     Priority: Medium     Obstructive sleep apnea      Priority: Medium     327.23       Hyperlipidemia LDL goal <100 12/12/2012     Priority: Medium     Keratoconus 11/14/2012     Priority: Medium     Vitamin D deficiency 11/12/2012     Priority: Medium     Problem list name updated by automated process. Provider to review       Dysthymic disorder 11/12/2012     Priority: Medium     Malaise and fatigue 11/12/2012     Priority: Medium     Problem list name updated by automated process. Provider to review       Narcolepsy without cataplexy(347.00) 11/12/2012      Priority: Medium       PERTINENT PAST MEDICAL HISTORY:  Past Medical History:   Diagnosis Date     Arthritis      Basal cell carcinoma      Chest pain     seeing Cardiology     Depression 1991    after 's death     Diabetes mellitus (H)      Diabetic renal disease (H)     microalbuminuria     DJD (degenerative joint disease) of knee      H/O vitamin D deficiency      Hypertension      IBS (irritable bowel syndrome)     diarrhea      Keratoconus      Low back pain      Narcolepsy 2007    Dx'd by Sleep specialist 347.00, pt discontinued Adderal mid Nov. 13 due to tacycardia concerns     Obesity      Obstructive sleep apnea     327.23, 3 sleep studies,Dr. [facility]     Pancreatitis     related to Victoza, also on Xyrem     Panic      RLS (restless legs syndrome)      Sinus tachycardia        PREVIOUS SURGERIES:  Past Surgical History:   Procedure Laterality Date     COLONOSCOPY  10/01/2020    poor quality prep     ear surgery  2002 and 01/2004     ESOPHAGOSCOPY, GASTROSCOPY, DUODENOSCOPY (EGD), COMBINED N/A 11/16/2021    Procedure: ESOPHAGOGASTRODUODENOSCOPY (EGD);  Surgeon: Jayla Calvo MD;  Location: [facility]     ESOPHAGOSCOPY, GASTROSCOPY, DUODENOSCOPY (EGD), COMBINED N/A 1/9/2023    Procedure: ESOPHAGOGASTRODUODENOSCOPY, WITH BIOPSY;  Surgeon: Brandno Witt MD;  Location:  GI     GASTRIC BYPASS  2013    laparoscopic jimmy en y c ometopexy     HEMORRHOIDECTOMY  09/14/2000     LAPAROSCOPIC CHOLECYSTECTOMY  2015     LASIK BILATERAL       UVULOPALATOPHARYNGOPLASTY       UVVP         PREVIOUS ENDOSCOPY:    EGD 1/23    Impression:            This patient presents with a history of gastric bypass                          and large anastomotic ulcer (~20 mm). She was taking                          NSAIDs in the past but has stopped them. EGD today                          showed a very superficial small ulcer (5 mm),                          indicating substantial healing.                           - Z-line, 35 cm from the incisors.                          - Normal esophagus.                          - Gastric bypass with a normal-sized pouch and intact                          staple line. Gastrojejunal anastomosis characterized                          by healthy appearing mucosa.                          - Non-bleeding gastric ulcer with no stigmata of                          bleeding.                          - A few gastric polyps. Biopsied.     ALLERGIES:     Allergies   Allergen Reactions     Pravastatin Other (See Comments)     woozy     Codeine Nausea and Vomiting     Nsaids GI Disturbance and Other (See Comments)     Pt had bariatric surgery, should not ever take oral NSAIDS due to risk of gastric ulcers.  Pt had bariatric surgery, should not ever take oral NSAIDS due to risk of gastric ulcers.       PERTINENT MEDICATIONS:    Current Outpatient Medications:      amphetamine-dextroamphetamine (ADDERALL) 30 MG tablet, Take 1 tablet (30 mg) by mouth every 24 hours 1.5 mg a total of 45 mg  Daily, Disp: 30 tablet, Rfl: 0     atorvastatin (LIPITOR) 20 MG tablet, Take 1 tablet (20 mg) by mouth daily, Disp: 90 tablet, Rfl: 1     blood glucose (NO BRAND SPECIFIED) test strip, Use to test blood sugar as needed with Freestyle Nirmal CGM., Disp: 100 strip, Rfl: 0     blood glucose (NO BRAND SPECIFIED) test strip, Use to test blood sugar as directed with CGM sensor., Disp: 50 strip, Rfl: 1     calcium carbonate (OS-KINJAL) 1500 (600 Ca) MG tablet, Take 2 tablets (1,200 mg) by mouth daily, Disp: , Rfl:      cevimeline (EVOXAC) 30 MG capsule, take 1 cap TID, Disp: 270 capsule, Rfl: 1     COMPOUNDED NON-CONTROLLED SUBSTANCE (CMPD RX) - PHARMACY TO MIX COMPOUNDED MEDICATION, Estradiol 0.0075% in HRT cream  Apply 2 grams,  intravaginally and small amount externally daily for one week then twice weekly for maintenance., Disp: 45 g, Rfl: 11     Continuous Blood Gluc  (FREESTYLE NIRMAL 14 DAY READER) EBEN, Use to  read blood sugars as per 's instructions., Disp: 1 each, Rfl: 0     Continuous Blood Gluc Sensor (FREESTYLE JORDIN 14 DAY SENSOR) [facility], Change every 14 days., Disp: 2 each, Rfl: 11     cyanocobalamin (VITAMIN B-12) 1000 MCG tablet, Take one every other day, Disp: 90 tablet, Rfl: 1     DULoxetine (CYMBALTA) 30 MG capsule, Take 1 capsule (30 mg) by mouth 2 times daily, Disp: 180 capsule, Rfl: 0     DULoxetine (CYMBALTA) 60 MG capsule, Take 1 capsule (60 mg) by mouth At Bedtime Take in addition to current 30 mg evening dose for a total of 90 mg at bedtime., Disp: 90 capsule, Rfl: 0     empagliflozin (JARDIANCE) 10 MG TABS tablet, Take one daily, schedule clinic visit for mid March., Disp: 90 tablet, Rfl: 0     losartan (COZAAR) 100 MG tablet, Take 1 tablet (100 mg) by mouth daily, Disp: 90 tablet, Rfl: 1     metFORMIN (GLUCOPHAGE XR) 500 MG 24 hr tablet, Take 4 po q am with breakfast. Schedule clinic visit, Disp: 360 tablet, Rfl: 1     omeprazole (PRILOSEC) 40 MG DR capsule, Take 40mg twice daily, Disp: 180 capsule, Rfl: 1     oxyCODONE (ROXICODONE) 5 MG tablet, Take 0.5 tablets (2.5 mg) by mouth every 6 hours as needed for moderate to severe pain, Disp: 16 tablet, Rfl: 0     SENNA-docusate sodium (SENNA S) 8.6-50 MG tablet, Take 1 tablet by mouth At Bedtime, Disp: , Rfl:      tolterodine ER (DETROL LA) 4 MG 24 hr capsule, Take 1 capsule (4 mg) by mouth daily, Disp: 90 capsule, Rfl: 3     traZODone (DESYREL) 50 MG tablet, Take 1 tablet by mouth every 24 hours, Disp: , Rfl:      triamcinolone (KENALOG) 0.1 % paste, Apply to tongue twice daily x 10 days, Disp: 5 g, Rfl: 0    SOCIAL HISTORY:  Social History     Socioeconomic History     Marital status: Single     Spouse name: Not on file     Number of children: 1     Years of education: Not on file     Highest education level: Not on file   Occupational History     Employer: [employer]     Comment: on disability   Tobacco Use     Smoking status: Never      Smokeless tobacco: Never   Vaping Use     Vaping status: Not on file   Substance and Sexual Activity     Alcohol use: Not Currently     Comment: rare     Drug use: No     Sexual activity: Never   Other Topics Concern     Parent/sibling w/ CABG, MI or angioplasty before 65F 55M? No   Social History Narrative    Daughter- 42,  since 1991    Drives only short distances due to narcolepsy            --------------------------------------------------------------------------------    Surgical History  Return To Top     Status Surgery Time Frame Comment Record Date     Inactive  ear surgery  1/04 5/27/2008      Inactive  eye surgery  2002 5/27/2008      Inactive  Hemorrhoidectomy  9/14/00 5/27/2008          --------------------------------------------------------------------------------    Food Allergy  Return To Top     Allergen Reaction Comment Record Date     * No known food allergies      10/28/2008          --------------------------------------------------------------------------------    Drug Allergy  Return To Top     Allergen Reaction Comment Record Date     Codeine  nausea    6/21/2007          --------------------------------------------------------------------------------    Environment Allergy  Return To Top     Allergen Reaction Comment Record Date     * No known environmental allergies      10/28/2008          --------------------------------------------------------------------------------    Social History  Return To Top     Question Answer Comment Record Date     Marital status      5/27/2008      Advance Directive or Living Will  Form Given to Patient    1/18/2010      Emotional Abuse  Yes    1/18/2010      Exercise  No    1/18/2010      Caffeine  Yes    5/27/2008      Physical Abuse  No    1/18/2010      Sealtbelts  Yes    1/18/2010      Sexual Abuse  No    1/18/2010      Breast/Testicle Self Check  No    1/18/2010      Number of children  1    1/18/2010      Living arrangements   House    1/18/2010      Number of adults in household  2    1/18/2010      Education level  High School Graduate    1/18/2010      Employment  Currently employed    1/18/2010      Tobacco history  Has never smoked or chewed tobacco    5/27/2008      Alcohol history  Currently drinks alcohol    5/27/2008      Has the patient ever used illegal drugs?  Has never used illegal drugs    5/27/2008          --------------------------------------------------------------------------------    History - Overall Remark: 3/21/2012 Return To Top         --------------------------------------------------------------------------------    Medical History Return To Top     Status Diagnosis Time Frame Comment Record Date     Active (250.00) - C - Diabetes mellitus, type II controlled      5/15/2012      Active (788.41) - C - Urinary frequency      4/17/2012      Active (250.02) - C - Diabetes mellitus, type II uncontrolled      3/6/2012      Active (278.00) - C - Obesity      2/14/2011      Active (250.00) - C - Diabetes mellitus, type II controlled      2/14/2011      Active (739.3) - C - Somatic dysfunction, lumbar region      8/16/2010      Active (739.5) - C - Somatic dysfunction, pelvic region      8/16/2010      Active (401.9) - C - Hypertension      2/8/2010      Active 268.9 UNSP VITAMIN D DEFICIENCY      1/18/2010      Active (695.3) - C - Rosacea      1/18/2010      Active 272.1 Hypertriglyceridemia      1/18/2010      Active 300.4 Depression with Anxiety (Dysthymic Disorder)      10/28/2008      Active 739.6 Somatic dysfunction, lower extremities      10/28/2008      Active 780.79 Fatigue      6/23/2008      Active 278.01 Obesity, morbid      6/19/2008      Active 347.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, 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      6/19/2008      Inactive  (462) - C - Pharyngitis, acute      1/15/2011      Inactive  250.02 Diabetes mellitus, type II uncontrolled      1/18/2010      Inactive  726.71 Tendinitis, achilles      10/28/2008      Inactive   (250.00) - C - Diabetes mellitus, type II controlled      10/28/2008      Inactive  (250.00) - C - Diabetes mellitus, type II controlled      2008      Inactive  V77.91 Screening, lipids      2008      Inactive  599.0 Urinary tract infection, unspec./pyuria (UTI)      2008      Inactive  V70.0 Routine Medical Exam      2008      Active Constipation      2008      Active Hemorrhoids      2008      Pancreatitis pancreatitis 2013-------------------------------------------------------------------------------    M        --------------------------------------------------------------------------------    Family History  Return To Top     Status Relationship Disease Comment Record Date     Alive Sister  *Denies any medical problems  3 sisters  2008      Alive Brother  *Denies any medical problems  2 brothers  2008         Father    Age of death 56  2010         Mother  Dementia  Age of death 82  2008          --------------------------------------------------------------------------------                         Social Determinants of Health     Financial Resource Strain: Not on file   Food Insecurity: Not on file   Transportation Needs: Not on file   Physical Activity: Not on file   Stress: Not on file   Social Connections: Not on file   Intimate Partner Violence: Not on file   Housing Stability: Not on file       FAMILY HISTORY:  Family History   Problem Relation Age of Onset     Memory loss Mother      Diabetes Father      Chronic Obstructive Pulmonary Disease Sister      Anemia Sister         hx of ulcers     Other - See Comments Sister 65        MVA, followed by leg pain after a fall     Dementia Sister 68     Dementia Brother 70     Cancer Brother         lung     Cancer - colorectal Maternal Grandmother      Cancer Daughter      Obesity Daughter         s/p lap band       Past/family/social history reviewed and no changes    PHYSICAL  EXAMINATION:  Vitals reviewed: There were no vitals taken for this visit.  Wt:   Wt Readings from Last 2 Encounters:   02/24/23 61.7 kg (136 lb)   02/10/23 61.7 kg (136 lb 1.3 oz)      This is a virtual visit. I did not examine the patient.       PERTINENT STUDIES:    Lab on 12/09/2022   Component Date Value Ref Range Status     Hemoglobin A1C 12/09/2022 6.7 (H)  <5.7 % Final     Parathyroid Hormone Intact 12/09/2022 76 (H)  15 - 65 pg/mL Final     Sodium 12/09/2022 142  136 - 145 mmol/L Final     Potassium 12/09/2022 4.6  3.4 - 5.3 mmol/L Final     Chloride 12/09/2022 105  98 - 107 mmol/L Final     Carbon Dioxide (CO2) 12/09/2022 26  22 - 29 mmol/L Final     Anion Gap 12/09/2022 11  7 - 15 mmol/L Final     Urea Nitrogen 12/09/2022 22.5  8.0 - 23.0 mg/dL Final     Creatinine 12/09/2022 1.32 (H)  0.51 - 0.95 mg/dL Final     Calcium 12/09/2022 9.6  8.8 - 10.2 mg/dL Final     Glucose 12/09/2022 111 (H)  70 - 99 mg/dL Final     Alkaline Phosphatase 12/09/2022 88  35 - 104 U/L Final     AST 12/09/2022 23  10 - 35 U/L Final     ALT 12/09/2022 15  10 - 35 U/L Final     Protein Total 12/09/2022 7.5  6.4 - 8.3 g/dL Final     Albumin 12/09/2022 4.2  3.5 - 5.2 g/dL Final     Bilirubin Total 12/09/2022 0.4  <=1.2 mg/dL Final     GFR Estimate 12/09/2022 44 (L)  >60 mL/min/1.73m2 Final     TSH 12/09/2022 1.52  0.30 - 4.20 uIU/mL Final     Vitamin D, Total (25-Hydroxy) 12/09/2022 50  20 - 75 ug/L Final     Magnesium 12/09/2022 1.7  1.7 - 2.3 mg/dL Final     Phosphorus 12/09/2022 4.0  2.5 - 4.5 mg/dL Final     WBC Count 12/09/2022 8.8  4.0 - 11.0 10e3/uL Final     RBC Count 12/09/2022 4.32  3.80 - 5.20 10e6/uL Final     Hemoglobin 12/09/2022 11.7  11.7 - 15.7 g/dL Final     Hematocrit 12/09/2022 37.9  35.0 - 47.0 % Final     MCV 12/09/2022 88  78 - 100 fL Final     MCH 12/09/2022 27.1  26.5 - 33.0 pg Final     MCHC 12/09/2022 30.9 (L)  31.5 - 36.5 g/dL Final     RDW 12/09/2022 15.1 (H)  10.0 - 15.0 % Final     Platelet Count  12/09/2022 181  150 - 450 10e3/uL Final     Creatinine Urine mg/dL 01/05/2023 46.9  mg/dL Final     Duration in hours 01/05/2023 830.0  h Final     Volume in mL 01/05/2023 1,890  mL Final     Creatinine Urine Timed g/spec 01/05/2023 0.03 (L)  0.72 - 1.51 Final

## 2023-04-05 NOTE — PATIENT INSTRUCTIONS
Shaw Milton,     It was so nice meeting you in clinic today, as discussed during your visit,     -Your ulcers appear to be healing, we encourage you to continue to avoid NSAID and continue your omeprazole as prescribed.   -I am glad you are doing well without any additional GI related symptom today.   -If you develop any symptoms of vomiting blood, blood in stool, black stools, nausea, vomiting, severe abdominal pain, fevers or chills, please present to the ED for further evaluation.     Thank you so much for visiting with us today. Please feel free to call us with questions or concerns. We will be happy to see you back in our GI clinic as needed.     Take care and stay well.       Ron Mason   Gastroenterology

## 2023-04-12 ENCOUNTER — TELEPHONE (OUTPATIENT)
Dept: GASTROENTEROLOGY | Facility: CLINIC | Age: 68
End: 2023-04-12
Payer: COMMERCIAL

## 2023-04-25 ENCOUNTER — MYC MEDICAL ADVICE (OUTPATIENT)
Dept: ORTHOPEDICS | Facility: CLINIC | Age: 68
End: 2023-04-25
Payer: COMMERCIAL

## 2023-04-25 DIAGNOSIS — M85.852 OSTEOPENIA OF LEFT HIP: Primary | ICD-10-CM

## 2023-04-25 RX ORDER — EPINEPHRINE 1 MG/ML
0.3 INJECTION, SOLUTION, CONCENTRATE INTRAVENOUS EVERY 5 MIN PRN
Status: CANCELLED | OUTPATIENT
Start: 2023-04-26

## 2023-04-25 RX ORDER — ACETAMINOPHEN 325 MG/1
650 TABLET ORAL ONCE
Status: CANCELLED | OUTPATIENT
Start: 2023-04-26

## 2023-04-25 RX ORDER — ALBUTEROL SULFATE 0.83 MG/ML
2.5 SOLUTION RESPIRATORY (INHALATION)
Status: CANCELLED | OUTPATIENT
Start: 2023-04-26

## 2023-04-25 RX ORDER — METHYLPREDNISOLONE SODIUM SUCCINATE 125 MG/2ML
125 INJECTION, POWDER, LYOPHILIZED, FOR SOLUTION INTRAMUSCULAR; INTRAVENOUS
Status: CANCELLED
Start: 2023-04-26

## 2023-04-25 RX ORDER — HEPARIN SODIUM,PORCINE 10 UNIT/ML
5 VIAL (ML) INTRAVENOUS
Status: CANCELLED | OUTPATIENT
Start: 2023-04-26

## 2023-04-25 RX ORDER — DIPHENHYDRAMINE HYDROCHLORIDE 50 MG/ML
50 INJECTION INTRAMUSCULAR; INTRAVENOUS
Status: CANCELLED
Start: 2023-04-26

## 2023-04-25 RX ORDER — ALBUTEROL SULFATE 90 UG/1
1-2 AEROSOL, METERED RESPIRATORY (INHALATION)
Status: CANCELLED
Start: 2023-04-26

## 2023-04-25 RX ORDER — ZOLEDRONIC ACID 5 MG/100ML
5 INJECTION, SOLUTION INTRAVENOUS ONCE
Status: CANCELLED
Start: 2023-04-26

## 2023-04-25 RX ORDER — HEPARIN SODIUM (PORCINE) LOCK FLUSH IV SOLN 100 UNIT/ML 100 UNIT/ML
5 SOLUTION INTRAVENOUS
Status: CANCELLED | OUTPATIENT
Start: 2023-04-26

## 2023-04-25 RX ORDER — MEPERIDINE HYDROCHLORIDE 25 MG/ML
25 INJECTION INTRAMUSCULAR; INTRAVENOUS; SUBCUTANEOUS EVERY 30 MIN PRN
Status: CANCELLED | OUTPATIENT
Start: 2023-04-26

## 2023-05-01 ENCOUNTER — THERAPY VISIT (OUTPATIENT)
Dept: PHYSICAL THERAPY | Facility: CLINIC | Age: 68
End: 2023-05-01
Payer: COMMERCIAL

## 2023-05-01 DIAGNOSIS — R53.81 PHYSICAL DECONDITIONING: Primary | ICD-10-CM

## 2023-05-01 DIAGNOSIS — M85.88 OSTEOPENIA OF SPINE: ICD-10-CM

## 2023-05-01 DIAGNOSIS — Z91.81 PERSONAL HISTORY OF FALL: ICD-10-CM

## 2023-05-01 PROCEDURE — 97110 THERAPEUTIC EXERCISES: CPT | Mod: GP

## 2023-05-05 ENCOUNTER — INFUSION THERAPY VISIT (OUTPATIENT)
Dept: INFUSION THERAPY | Facility: CLINIC | Age: 68
End: 2023-05-05
Attending: FAMILY MEDICINE
Payer: COMMERCIAL

## 2023-05-05 ENCOUNTER — APPOINTMENT (OUTPATIENT)
Dept: LAB | Facility: CLINIC | Age: 68
End: 2023-05-05
Attending: FAMILY MEDICINE
Payer: COMMERCIAL

## 2023-05-05 VITALS
OXYGEN SATURATION: 98 % | WEIGHT: 126.1 LBS | HEART RATE: 104 BPM | SYSTOLIC BLOOD PRESSURE: 110 MMHG | TEMPERATURE: 99.5 F | BODY MASS INDEX: 22.23 KG/M2 | DIASTOLIC BLOOD PRESSURE: 68 MMHG | RESPIRATION RATE: 18 BRPM

## 2023-05-05 DIAGNOSIS — M85.852 OSTEOPENIA OF LEFT HIP: Primary | ICD-10-CM

## 2023-05-05 LAB
CALCIUM SERPL-MCNC: 9.7 MG/DL (ref 8.8–10.2)
CREAT SERPL-MCNC: 2.09 MG/DL (ref 0.51–0.95)
GFR SERPL CREATININE-BSD FRML MDRD: 25 ML/MIN/1.73M2

## 2023-05-05 PROCEDURE — 82565 ASSAY OF CREATININE: CPT | Performed by: FAMILY MEDICINE

## 2023-05-05 PROCEDURE — 36415 COLL VENOUS BLD VENIPUNCTURE: CPT | Performed by: FAMILY MEDICINE

## 2023-05-05 PROCEDURE — 82310 ASSAY OF CALCIUM: CPT | Performed by: FAMILY MEDICINE

## 2023-05-05 RX ORDER — HEPARIN SODIUM (PORCINE) LOCK FLUSH IV SOLN 100 UNIT/ML 100 UNIT/ML
5 SOLUTION INTRAVENOUS
Status: CANCELLED | OUTPATIENT
Start: 2023-05-05

## 2023-05-05 RX ORDER — ACETAMINOPHEN 325 MG/1
650 TABLET ORAL ONCE
Status: CANCELLED | OUTPATIENT
Start: 2023-05-05

## 2023-05-05 RX ORDER — METHYLPREDNISOLONE SODIUM SUCCINATE 125 MG/2ML
125 INJECTION, POWDER, LYOPHILIZED, FOR SOLUTION INTRAMUSCULAR; INTRAVENOUS
Status: CANCELLED
Start: 2023-05-05

## 2023-05-05 RX ORDER — EPINEPHRINE 1 MG/ML
0.3 INJECTION, SOLUTION INTRAMUSCULAR; SUBCUTANEOUS EVERY 5 MIN PRN
Status: CANCELLED | OUTPATIENT
Start: 2023-05-05

## 2023-05-05 RX ORDER — HEPARIN SODIUM,PORCINE 10 UNIT/ML
5 VIAL (ML) INTRAVENOUS
Status: CANCELLED | OUTPATIENT
Start: 2023-05-05

## 2023-05-05 RX ORDER — DIPHENHYDRAMINE HYDROCHLORIDE 50 MG/ML
50 INJECTION INTRAMUSCULAR; INTRAVENOUS
Status: CANCELLED
Start: 2023-05-05

## 2023-05-05 RX ORDER — ZOLEDRONIC ACID 5 MG/100ML
5 INJECTION, SOLUTION INTRAVENOUS ONCE
Status: CANCELLED
Start: 2023-05-05

## 2023-05-05 RX ORDER — MEPERIDINE HYDROCHLORIDE 25 MG/ML
25 INJECTION INTRAMUSCULAR; INTRAVENOUS; SUBCUTANEOUS EVERY 30 MIN PRN
Status: CANCELLED | OUTPATIENT
Start: 2023-05-05

## 2023-05-05 RX ORDER — ALBUTEROL SULFATE 0.83 MG/ML
2.5 SOLUTION RESPIRATORY (INHALATION)
Status: CANCELLED | OUTPATIENT
Start: 2023-05-05

## 2023-05-05 RX ORDER — ALBUTEROL SULFATE 90 UG/1
1-2 AEROSOL, METERED RESPIRATORY (INHALATION)
Status: CANCELLED
Start: 2023-05-05

## 2023-05-05 NOTE — NURSING NOTE
Chief Complaint   Patient presents with     Blood Draw     Labs drawn from PIV placed by RN. Line flushed with saline. Vitals taken. Pt checked in for appointment(s).      Dipti Mahan RN

## 2023-05-05 NOTE — PROGRESS NOTES
Nursing Note    Progress note:  Patient identification verified by name and date of birth.  Assessment completed.  Vitals recorded in Doc Flowsheets.  Patient was provided with education regarding medication/procedure and possible side effects.    Due to elevated creatinine and creatinine clearance, patient unable to receive Reclast today. Ordering provider, Dr. Friedman on vacation. On-call resident with orthopedics did not feel comfortable giving permission to go ahead with Reclast due to elevated lab results. Dr. Friedman sent a message regarding lab results and to follow up with patient regarding the plan moving forward regarding Reclast.    Lucinda Castro RN      /68 (BP Location: Left arm, Patient Position: Sitting, Cuff Size: Adult Regular)   Pulse 104   Temp 99.5  F (37.5  C) (Oral)   Resp 18   Wt 57.2 kg (126 lb 1.6 oz)   SpO2 98%   BMI 22.23 kg/m

## 2023-05-23 ENCOUNTER — MYC MEDICAL ADVICE (OUTPATIENT)
Dept: FAMILY MEDICINE | Facility: CLINIC | Age: 68
End: 2023-05-23

## 2023-05-23 DIAGNOSIS — S91.109A OPEN WOUND OF TOE, INITIAL ENCOUNTER: Primary | ICD-10-CM

## 2023-05-24 NOTE — TELEPHONE ENCOUNTER
Pt requesting referral to Dr. Mendez or Dr. Echeverria in podiatry clinic that Dr. Martinez approved on 5/16/2023 in her my chart message. Sending referral.    CEDRICK Juarse, RN  05/24/23, 9:31 AM

## 2023-05-26 ENCOUNTER — TELEPHONE (OUTPATIENT)
Dept: ORTHOPEDICS | Facility: CLINIC | Age: 68
End: 2023-05-26
Payer: COMMERCIAL

## 2023-05-26 NOTE — TELEPHONE ENCOUNTER
ATC left voicemail for patient with information below from . patient was instructed to follow up with PCP due to Creatine labs being high. Reclast will need to be delayed for 3 months. Sports number left to call with yan.

## 2023-05-26 NOTE — TELEPHONE ENCOUNTER
DIAGNOSIS: RT Toe - Open Wound   APPOINTMENT DATE: 06/28/2023   NOTES STATUS DETAILS   OFFICE NOTE from referring provider Internal 05/23/2023 - [facility] Message   OFFICE NOTE from other specialist Internal 02/24/2023 - Thi West DPM - [facility]   MEDICATION LIST Internal

## 2023-05-26 NOTE — TELEPHONE ENCOUNTER
----- Message from Roberta Friedman MD sent at 5/26/2023  3:04 PM CDT -----  Pt will need to follow up with PCP for further work up because of kidney labs   Will need to delay Reclast for at least 3 months

## 2023-05-26 NOTE — RESULT ENCOUNTER NOTE
Pt will need to follow up with PCP for further work up because of kidney labs   Will need to delay Reclast for at least 3 months

## 2023-06-09 ENCOUNTER — THERAPY VISIT (OUTPATIENT)
Dept: PHYSICAL THERAPY | Facility: CLINIC | Age: 68
End: 2023-06-09
Payer: COMMERCIAL

## 2023-06-09 DIAGNOSIS — R53.81 PHYSICAL DECONDITIONING: Primary | ICD-10-CM

## 2023-06-09 DIAGNOSIS — Z91.81 PERSONAL HISTORY OF FALL: ICD-10-CM

## 2023-06-09 DIAGNOSIS — M85.88 OSTEOPENIA OF SPINE: ICD-10-CM

## 2023-06-09 PROCEDURE — 97110 THERAPEUTIC EXERCISES: CPT | Mod: GP

## 2023-06-11 ENCOUNTER — HEALTH MAINTENANCE LETTER (OUTPATIENT)
Age: 68
End: 2023-06-11

## 2023-06-12 ENCOUNTER — OFFICE VISIT (OUTPATIENT)
Dept: FAMILY MEDICINE | Facility: CLINIC | Age: 68
End: 2023-06-12
Payer: COMMERCIAL

## 2023-06-12 VITALS
DIASTOLIC BLOOD PRESSURE: 82 MMHG | HEART RATE: 97 BPM | BODY MASS INDEX: 21.16 KG/M2 | WEIGHT: 120 LBS | TEMPERATURE: 98.6 F | SYSTOLIC BLOOD PRESSURE: 124 MMHG | OXYGEN SATURATION: 98 % | RESPIRATION RATE: 18 BRPM

## 2023-06-12 DIAGNOSIS — E11.9 TYPE 2 DIABETES MELLITUS WITHOUT COMPLICATION, WITHOUT LONG-TERM CURRENT USE OF INSULIN (H): Primary | ICD-10-CM

## 2023-06-12 DIAGNOSIS — N18.4 CKD (CHRONIC KIDNEY DISEASE) STAGE 4, GFR 15-29 ML/MIN (H): ICD-10-CM

## 2023-06-12 DIAGNOSIS — E78.5 HYPERLIPIDEMIA LDL GOAL <100: ICD-10-CM

## 2023-06-12 DIAGNOSIS — L65.9 HAIR THINNING: ICD-10-CM

## 2023-06-12 DIAGNOSIS — I10 HYPERTENSION GOAL BP (BLOOD PRESSURE) < 140/90: ICD-10-CM

## 2023-06-12 LAB
ALBUMIN MFR UR ELPH: 16.7 MG/DL
ALBUMIN UR-MCNC: 10 MG/DL
APPEARANCE UR: CLEAR
BILIRUB UR QL STRIP: NEGATIVE
COLOR UR AUTO: ABNORMAL
CREAT UR-MCNC: 92.7 MG/DL
ERYTHROCYTE [DISTWIDTH] IN BLOOD BY AUTOMATED COUNT: 14.1 % (ref 10–15)
GLUCOSE UR STRIP-MCNC: >=1000 MG/DL
HBA1C MFR BLD: 5.9 % (ref 0–5.6)
HCT VFR BLD AUTO: 41 % (ref 35–47)
HGB BLD-MCNC: 12.7 G/DL (ref 11.7–15.7)
HGB UR QL STRIP: NEGATIVE
HYALINE CASTS: 1 /LPF
KETONES UR STRIP-MCNC: NEGATIVE MG/DL
LEUKOCYTE ESTERASE UR QL STRIP: NEGATIVE
MCH RBC QN AUTO: 29.2 PG (ref 26.5–33)
MCHC RBC AUTO-ENTMCNC: 31 G/DL (ref 31.5–36.5)
MCV RBC AUTO: 94 FL (ref 78–100)
MUCOUS THREADS #/AREA URNS LPF: PRESENT /LPF
NITRATE UR QL: NEGATIVE
PH UR STRIP: 5 [PH] (ref 5–7)
PLATELET # BLD AUTO: 187 10E3/UL (ref 150–450)
PROT/CREAT 24H UR: 0.18 MG/MG CR (ref 0–0.2)
RBC # BLD AUTO: 4.35 10E6/UL (ref 3.8–5.2)
RBC URINE: 0 /HPF
SP GR UR STRIP: 1.02 (ref 1–1.03)
SQUAMOUS EPITHELIAL: <1 /HPF
UROBILINOGEN UR STRIP-MCNC: NORMAL MG/DL
WBC # BLD AUTO: 7.6 10E3/UL (ref 4–11)
WBC URINE: 1 /HPF

## 2023-06-12 PROCEDURE — 84156 ASSAY OF PROTEIN URINE: CPT | Performed by: INTERNAL MEDICINE

## 2023-06-12 PROCEDURE — 85014 HEMATOCRIT: CPT | Performed by: INTERNAL MEDICINE

## 2023-06-12 PROCEDURE — 82728 ASSAY OF FERRITIN: CPT | Performed by: INTERNAL MEDICINE

## 2023-06-12 PROCEDURE — 84443 ASSAY THYROID STIM HORMONE: CPT | Performed by: INTERNAL MEDICINE

## 2023-06-12 PROCEDURE — 81001 URINALYSIS AUTO W/SCOPE: CPT | Performed by: INTERNAL MEDICINE

## 2023-06-12 RX ORDER — ATORVASTATIN CALCIUM 20 MG/1
20 TABLET, FILM COATED ORAL DAILY
Qty: 90 TABLET | Refills: 1 | Status: CANCELLED | OUTPATIENT
Start: 2023-06-12

## 2023-06-12 RX ORDER — LOSARTAN POTASSIUM 100 MG/1
100 TABLET ORAL DAILY
Qty: 90 TABLET | Refills: 1 | Status: CANCELLED | OUTPATIENT
Start: 2023-06-12

## 2023-06-12 RX ORDER — METFORMIN HCL 500 MG
TABLET, EXTENDED RELEASE 24 HR ORAL
Qty: 360 TABLET | Refills: 1 | Status: CANCELLED | OUTPATIENT
Start: 2023-06-12

## 2023-06-12 NOTE — NURSING NOTE
68 year old  Chief Complaint   Patient presents with     Diabetes     High creatine at last check, patient concerned about hair loss       Blood pressure 124/82, pulse 97, temperature 98.6  F (37  C), temperature source Temporal, resp. rate 18, weight 54.4 kg (120 lb), SpO2 98 %, not currently breastfeeding. Body mass index is 21.16 kg/m .  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)     Keratoconus     Hyperlipidemia LDL goal <100     Obstructive sleep apnea     Vitamin D insufficiency     Major depression in partial remission (H)     Plantar warts     Low back pain     DJD (degenerative joint disease) of knee     Pancreatitis     Panic     Chest pain     IBS (irritable bowel syndrome)     Heart murmur     Hypertension goal BP (blood pressure) < 140/90     Type 2 diabetes mellitus without complication, without long-term current use of insulin (H)     Osteopenia of hip     Acute conjunctivitis of left eye     PTSD (post-traumatic stress disorder)     Diabetic peripheral neuropathy (H)     Bilateral sciatica     Gastrointestinal hemorrhage associated with gastric ulcer     Chronic kidney disease, stage 3 (H)     Chronic diastolic congestive heart failure (H)     Upper GI bleed     Neck pain     Closed nondisplaced fracture of head of right radius, initial encounter     Injury of head, initial encounter     Fall, initial encounter     Closed fracture of right hand, initial encounter     Closed fracture of left wrist, initial encounter     Left wrist pain     Wrist stiffness, left     Physical deconditioning     Osteopenia of spine     Personal history of fall       Wt Readings from Last 2 Encounters:   06/12/23 54.4 kg (120 lb)   05/05/23 57.2 kg (126 lb 1.6 oz)     BP Readings from Last 3 Encounters:   06/12/23 124/82   05/05/23 110/68   03/16/23 (!) 144/83         Current Outpatient Medications   Medication      amphetamine-dextroamphetamine (ADDERALL) 30 MG tablet     atorvastatin (LIPITOR) 20 MG tablet     blood glucose (NO BRAND SPECIFIED) test strip     blood glucose (NO BRAND SPECIFIED) test strip     calcium carbonate (OS-KINJAL) 1500 (600 Ca) MG tablet     cevimeline (EVOXAC) 30 MG capsule     COMPOUNDED NON-CONTROLLED SUBSTANCE (CMPD RX) - PHARMACY TO MIX COMPOUNDED MEDICATION     Continuous Blood Gluc  (FREESTYLE JORDIN 14 DAY READER) EBEN     Continuous Blood Gluc Sensor (FREESTYLE JORDIN 14 DAY SENSOR) MISC     cyanocobalamin (VITAMIN B-12) 1000 MCG tablet     DULoxetine (CYMBALTA) 30 MG capsule     DULoxetine (CYMBALTA) 60 MG capsule     empagliflozin (JARDIANCE) 10 MG TABS tablet     losartan (COZAAR) 100 MG tablet     metFORMIN (GLUCOPHAGE XR) 500 MG 24 hr tablet     omeprazole (PRILOSEC) 40 MG DR capsule     oxyCODONE (ROXICODONE) 5 MG tablet     SENNA-docusate sodium (SENNA S) 8.6-50 MG tablet     tolterodine ER (DETROL LA) 4 MG 24 hr capsule     traZODone (DESYREL) 50 MG tablet     triamcinolone (KENALOG) 0.1 % paste     No current facility-administered medications for this visit.       Social History     Tobacco Use     Smoking status: Never     Smokeless tobacco: Never   Substance Use Topics     Alcohol use: Not Currently     Comment: rare     Drug use: No       Health Maintenance Due   Topic Date Due     HF ACTION PLAN  Never done     ADVANCE CARE PLANNING  Never done     HEPATITIS C SCREENING  Never done     MEDICARE ANNUAL WELLNESS VISIT  04/12/2020     EYE EXAM  03/10/2021     COLORECTAL CANCER SCREENING  11/17/2021     Pneumococcal Vaccine: 65+ Years (2 - PCV) 05/27/2022     DIABETIC FOOT EXAM  01/17/2023     FALL RISK ASSESSMENT  02/17/2023     COVID-19 Vaccine (5 - Pfizer series) 02/25/2023     LIPID  03/29/2023     A1C  06/09/2023     BMP  06/09/2023       Lab Results   Component Value Date    PAP NIL 04/12/2019 June 12, 2023 3:53 PM

## 2023-06-12 NOTE — PROGRESS NOTES
PROGRESS  REPORT    Progress reporting period is from 03/02/23 to 06/09/23.       SUBJECTIVE  Subjective changes noted by patient: Pt reports that she's been completing HEP here & there. No longer having extreme mouth pain d/t implants.   Current pain level is 2/10  .     Previous pain level was  2/10  .   Changes in function:  None  Adverse reaction to treatment or activity: None    OBJECTIVE  Changes noted in objective findings:  Yes, standing postural assessment revealed slight forward trunk lean, decr lumbar lordosis, rounded shoulders + forward head      ASSESSMENT/PLAN  Updated problem list and treatment plan: Diagnosis 1:  Low back pain  Pain -  electric stimulation  Decreased strength - therapeutic exercise, therapeutic activities and home program  Impaired balance - neuro re-education, gait training, therapeutic activities, adaptive equipment/assistive device and home program  Impaired gait - gait training, assistive devices and home program  Impaired muscle performance - electric stimulation, neuro re-education and home program  Impaired posture - neuro re-education, therapeutic activities and home program  STG/LTGs have been met or progress has been made towards goals:  Yes (See Goal flow sheet completed today.) and None  Assessment of Progress: The patient's condition has potential to improve.  Self Management Plans:  Patient has been instructed in a home treatment program.    Karine continues to require the following intervention to meet STG and LTG's:  PT    Recommendations:  This patient would benefit from continued therapy.     Frequency:  1 X week  Duration:  for 12 weeks    *Pt has only used up 5 of originally approved 12 visits*    Please refer to the daily flowsheet for treatment today, total treatment time and time spent performing 1:1 timed [facility]                                                                                   OUTPATIENT  PHYSICAL THERAPY      PLAN OF TREATMENT FOR OUTPATIENT REHABILITATION   Patient's Last Name, First Name, Karine Dyer YOB: 1955   Provider's Name   [facility]   Medical Record No.  [id number]     Onset Date: 10/15/22 (MD referral date)  Start of Care Date: 03/02/23     Medical Diagnosis:         PT Treatment Diagnosis:  Low back pain; osteopenia Plan of Treatment  Frequency/Duration: 1 x week/ 12 weeks    Certification date from 05/30/23 to 08/27/23         See note for plan of treatment details and functional goals     Rula Johnson, PT                         I CERTIFY THE NEED FOR THESE SERVICES FURNISHED UNDER        THIS PLAN OF TREATMENT AND WHILE UNDER MY CARE     (Physician attestation of this document indicates review and certification of the therapy plan).                  Referring Provider:  Roberta Friedman MD      Initial Assessment  See Epic Evaluation- Start of Care Date: 03/02/23

## 2023-06-12 NOTE — PROGRESS NOTES
"Karine Moss is a 68 year old female with history of type 2 diabetes, depression, PTSD, narcolepsy, hyperlipidemia, sleep apnea, osteoarthritis, irritable bowel, hypertension, osteopenia, peripheral neuropathy, history of gastric ulcers.  She presents to the clinic today for a recheck of:    DIABETES  Here for Type II diabetes.  Last eye exam was over 1 year ago. She notes that this is \"always the last thing I take care of.\"   Peripheral neuropathy: Tingling in her toes  Weight: Down 10 lbs from last visit, reports puree/soft foods diet due to dental complication. Not taking any pain medication for this.   Wt Readings from Last 3 Encounters:   05/05/23 57.2 kg (126 lb 1.6 oz)   02/24/23 61.7 kg (136 lb)   02/10/23 61.7 kg (136 lb 1.3 oz)     Candidiasis/skin issues: no  UTI/ yeast infections: no    Frequency of FBS: daily   General range of FBS: 140s-180s  Hypoglycemia: no    Lab Results   Component Value Date    A1C 6.7 12/09/2022    A1C 6.2 10/17/2022    A1C 7.0 03/08/2021    A1C 6.2 09/04/2020     No results found for: MICROALBUMIN    DM Meds: metformin 2000 mg daily with breakfast                   Jardiance 10 mg daily  Compliance: Good    Aspirin Use: no    PMH, PSH, FH, medications, allergies and immunizations are updated this visit.    Dental issues  Karine had all of her teeth extracted and then underwent full implants. She has had difficulty with gum irritation and will follow up with her dentist.       HYPERLIPIDEMIA  Recent Labs   Lab Test 03/29/22  1033 03/08/21  1615 02/06/20  1523   CHOL 126 113.0 111.0   HDL 52 35.0* 47.0*   LDL 49 46.0 29.0   TRIG 123 157.0* 179.0*   CHOLHDLRATIO  --  3.2 2.4     LDL is in target range. Currently taking atorvastatin 20 mg daily. Compliant with med(s). No reported myalgias or other side effects.     HYPERTENSION  BP Readings from Last 3 Encounters:   06/12/23 124/82   05/05/23 110/68   03/16/23 (!) 144/83     Here for blood pressure check. She is currently on " "losartan 100 mg daily. She is compliant with meds, no reported side effects. Blood pressure readings have been in target range.     Elevated Creatinine 2.09  Karine has a history of osteoporosis and was planning to start Reclast infusions, but infusion was held due to elevated creatinine ( 2.09 on 5/5/2023). Her last Cr was 1.32 on 12/9/2022. She notes that she is not good at getting enough fluids and has downloaded a program where she can input her weight and how much water she needs to drink per day, and it will split this up into increments and create an alarm when she needs to drink fluids.     Hair Thinning  Karine reports a longstanding history of hair thinning. She has followed with Dr. Borden in dermatology for this in the past and would like to return to them now. She states that she is now losing hair \"all over my body.\" She notes that her lower eyelashes are almost gone and she hasn't shaved her legs in years.    GERD  Karine takes omeprazole 40 mg twice daily for treatment of GERD. She reports that her stomach is doing well. She has previous history of ulcers.  She is s/p gastric bypass surgery in 2013. Does not currently take iron supplements.       EXAM  /82 (BP Location: Left arm, Patient Position: Sitting, Cuff Size: Adult Regular)   Pulse 97   Temp 98.6  F (37  C) (Temporal)   Resp 18   Wt 54.4 kg (120 lb)   SpO2 98%   BMI 21.16 kg/m    Gen: Alert, NAD  Neck: No thyromegaly  Cor: S1S2, soft murmur  Lungs: CTA bilaterally  Abd: soft nontender +BS, injection sites look good.  Ext: no edema, pulses are palpable  Skin: skin thinning with several ecchymoses on left forearm.  Neuro: DTRs intact in all 4 extremities   Feet: no ulcerations, Callouses absent, sensation dampened up to the level of the ankle bilaterally (microfilament testing)    Results for orders placed or performed in visit on 06/12/23   Hemoglobin A1c     Status: Abnormal   Result Value Ref Range    Hemoglobin A1C 5.9 (H) 0.0 " - 5.6 %   Protein  random urine     Status: Abnormal   Result Value Ref Range    Total Protein Urine mg/dL 16.7 (H)   mg/dL    Total Protein UR MG/MG CR 0.18 0.00 - 0.20 mg/mg Cr    Creatinine Urine mg/dL 92.7 mg/dL   TSH with free T4 reflex     Status: Normal   Result Value Ref Range    TSH 1.01 0.30 - 4.20 uIU/mL   CBC with platelets     Status: Abnormal   Result Value Ref Range    WBC Count 7.6 4.0 - 11.0 10e3/uL    RBC Count 4.35 3.80 - 5.20 10e6/uL    Hemoglobin 12.7 11.7 - 15.7 g/dL    Hematocrit 41.0 35.0 - 47.0 %    MCV 94 78 - 100 fL    MCH 29.2 26.5 - 33.0 pg    MCHC 31.0 (L) 31.5 - 36.5 g/dL    RDW 14.1 10.0 - 15.0 %    Platelet Count 187 150 - 450 10e3/uL   Ferritin     Status: Normal   Result Value Ref Range    Ferritin 74 11 - 328 ng/mL   Routine UA with microscopic - No culture     Status: Abnormal   Result Value Ref Range    Color Urine Light Yellow Colorless, Straw, Light Yellow, Yellow    Appearance Urine Clear Clear    Glucose Urine >=1000 (A) Negative mg/dL    Bilirubin Urine Negative Negative    Ketones Urine Negative Negative mg/dL    Specific Gravity Urine 1.024 1.003 - 1.035    Blood Urine Negative Negative    pH Urine 5.0 5.0 - 7.0    Protein Albumin Urine 10 (A) Negative mg/dL    Urobilinogen Urine Normal Normal, 2.0 mg/dL    Nitrite Urine Negative Negative    Leukocyte Esterase Urine Negative Negative    Mucus Urine Present (A) None Seen /LPF    RBC Urine 0 <=2 /HPF    WBC Urine 1 <=5 /HPF    Squamous Epithelials Urine <1 <=1 /HPF    Hyaline Casts Urine 1 <=2 /LPF   Basic metabolic panel     Status: Abnormal   Result Value Ref Range    Sodium 142 136 - 145 mmol/L    Potassium 4.4 3.4 - 5.3 mmol/L    Chloride 104 98 - 107 mmol/L    Carbon Dioxide (CO2) 22 22 - 29 mmol/L    Anion Gap 16 (H) 7 - 15 mmol/L    Urea Nitrogen 21.1 8.0 - 23.0 mg/dL    Creatinine 1.26 (H) 0.51 - 0.95 mg/dL    Calcium 9.8 8.8 - 10.2 mg/dL    Glucose 127 (H) 70 - 99 mg/dL    GFR Estimate 46 (L) >60 mL/min/1.73m2        Assessment:  (E11.9) Type 2 diabetes mellitus without complication, without long-term current use of insulin (H)  (primary encounter diagnosis)  Comment: A1c is in prediabetic range, improved since December, currently on Metformin 2000mg daily and Jardiance 10mg daily  Lab Results   Component Value Date    A1C 5.9 06/12/2023    A1C 6.7 12/09/2022   Plan: Hemoglobin A1c, UT FOOT EXAM NO CHARGE        Recommend cutting back on metformin dose to 1000 mg daily due to elevated Cr.   Recheck in 3 months    (E78.5) Hyperlipidemia LDL goal <100  Comment: Currently on Atorvastatin 20mg daily  Plan: refill for one year.     (I10) Hypertension goal BP (blood pressure) < 140/90  Comment: BP in target range  BP Readings from Last 3 Encounters:   06/12/23 124/82   05/05/23 110/68   03/16/23 (!) 144/83   Plan: Basic metabolic panel        Continue with losartan 100mg daily    (L65.9) Hair thinning  Comment: ongoing concern for Karine  Plan: TSH with free T4 reflex, CBC with platelets,         Ferritin, Adult Dermatology Referral        Check labs to rule out reversible causes, return to dermatologist for consultation    (N18.4) CKD (chronic kidney disease) stage 4, GFR 15-29 ml/min (H)  Comment: recent Cr 2.09, now 1.26 which is closer to her normal baseline  Plan: Protein  random urine, Routine UA with         microscopic - No culture, Basic metabolic         panel, CANCELED: Albumin Random Urine         Quantitative with Creat Ratio        Discussed importance of checking Cr regularly as medications may need to be adjusted or discontinued if creatinine is rising.   Recommend cutting back on metformin dose to 1000 mg daily instead of 2000 mg daily. Recheck in 3 months    I have reviewed, edited and approved the above scribed note.    Anay Martinez MD  Internal Medicine/Pediatrics      I, Karen Solitario, am serving as a scribe to document services personally performed by Dr. Anay Martinez, based on data collection and the  provider's statements to me.

## 2023-06-13 LAB
ANION GAP SERPL CALCULATED.3IONS-SCNC: 16 MMOL/L (ref 7–15)
BUN SERPL-MCNC: 21.1 MG/DL (ref 8–23)
CALCIUM SERPL-MCNC: 9.8 MG/DL (ref 8.8–10.2)
CHLORIDE SERPL-SCNC: 104 MMOL/L (ref 98–107)
CREAT SERPL-MCNC: 1.26 MG/DL (ref 0.51–0.95)
DEPRECATED HCO3 PLAS-SCNC: 22 MMOL/L (ref 22–29)
FERRITIN SERPL-MCNC: 74 NG/ML (ref 11–328)
GFR SERPL CREATININE-BSD FRML MDRD: 46 ML/MIN/1.73M2
GLUCOSE SERPL-MCNC: 127 MG/DL (ref 70–99)
POTASSIUM SERPL-SCNC: 4.4 MMOL/L (ref 3.4–5.3)
SODIUM SERPL-SCNC: 142 MMOL/L (ref 136–145)
TSH SERPL DL<=0.005 MIU/L-ACNC: 1.01 UIU/ML (ref 0.3–4.2)

## 2023-06-15 ENCOUNTER — THERAPY VISIT (OUTPATIENT)
Dept: PHYSICAL THERAPY | Facility: CLINIC | Age: 68
End: 2023-06-15
Payer: COMMERCIAL

## 2023-06-15 DIAGNOSIS — M85.88 OSTEOPENIA OF SPINE: ICD-10-CM

## 2023-06-15 DIAGNOSIS — R53.81 PHYSICAL DECONDITIONING: Primary | ICD-10-CM

## 2023-06-15 DIAGNOSIS — Z91.81 PERSONAL HISTORY OF FALL: ICD-10-CM

## 2023-06-15 PROCEDURE — 97112 NEUROMUSCULAR REEDUCATION: CPT | Mod: GP

## 2023-06-15 PROCEDURE — 97110 THERAPEUTIC EXERCISES: CPT | Mod: GP

## 2023-06-16 ENCOUNTER — TRANSFERRED RECORDS (OUTPATIENT)
Dept: HEALTH INFORMATION MANAGEMENT | Facility: CLINIC | Age: 68
End: 2023-06-16

## 2023-06-16 DIAGNOSIS — I10 HYPERTENSION GOAL BP (BLOOD PRESSURE) < 140/90: ICD-10-CM

## 2023-06-16 DIAGNOSIS — E11.42 DIABETIC POLYNEUROPATHY ASSOCIATED WITH TYPE 2 DIABETES MELLITUS (H): ICD-10-CM

## 2023-06-16 DIAGNOSIS — R68.2 DRY MOUTH, UNSPECIFIED: ICD-10-CM

## 2023-06-16 DIAGNOSIS — E11.9 TYPE 2 DIABETES MELLITUS WITHOUT COMPLICATION, WITHOUT LONG-TERM CURRENT USE OF INSULIN (H): ICD-10-CM

## 2023-06-16 DIAGNOSIS — K92.2 UPPER GI BLEED: ICD-10-CM

## 2023-06-16 DIAGNOSIS — E78.5 HYPERLIPIDEMIA LDL GOAL <100: ICD-10-CM

## 2023-06-16 RX ORDER — DULOXETIN HYDROCHLORIDE 30 MG/1
30 CAPSULE, DELAYED RELEASE ORAL 2 TIMES DAILY
Qty: 180 CAPSULE | Refills: 1 | Status: SHIPPED | OUTPATIENT
Start: 2023-06-16 | End: 2024-01-29

## 2023-06-16 RX ORDER — DULOXETIN HYDROCHLORIDE 60 MG/1
60 CAPSULE, DELAYED RELEASE ORAL AT BEDTIME
Qty: 90 CAPSULE | Refills: 1 | Status: SHIPPED | OUTPATIENT
Start: 2023-06-16 | End: 2024-01-29

## 2023-06-16 RX ORDER — CEVIMELINE HYDROCHLORIDE 30 MG/1
CAPSULE ORAL
Qty: 270 CAPSULE | Refills: 1 | Status: SHIPPED | OUTPATIENT
Start: 2023-06-16 | End: 2024-03-28

## 2023-06-16 RX ORDER — OMEPRAZOLE 40 MG/1
CAPSULE, DELAYED RELEASE ORAL
Qty: 180 CAPSULE | Refills: 1 | Status: SHIPPED | OUTPATIENT
Start: 2023-06-16 | End: 2024-03-28

## 2023-06-16 RX ORDER — ATORVASTATIN CALCIUM 20 MG/1
20 TABLET, FILM COATED ORAL DAILY
Qty: 90 TABLET | Refills: 1 | Status: SHIPPED | OUTPATIENT
Start: 2023-06-16 | End: 2024-03-28

## 2023-06-16 RX ORDER — LOSARTAN POTASSIUM 100 MG/1
100 TABLET ORAL DAILY
Qty: 90 TABLET | Refills: 1 | Status: SHIPPED | OUTPATIENT
Start: 2023-06-16 | End: 2024-03-20

## 2023-06-16 RX ORDER — METFORMIN HCL 500 MG
TABLET, EXTENDED RELEASE 24 HR ORAL
Qty: 180 TABLET | Refills: 1 | Status: SHIPPED | OUTPATIENT
Start: 2023-06-16 | End: 2023-08-28

## 2023-06-20 ENCOUNTER — VIRTUAL VISIT (OUTPATIENT)
Dept: PHARMACY | Facility: CLINIC | Age: 68
End: 2023-06-20
Payer: COMMERCIAL

## 2023-06-20 DIAGNOSIS — E11.9 TYPE 2 DIABETES MELLITUS WITHOUT COMPLICATION, WITHOUT LONG-TERM CURRENT USE OF INSULIN (H): Primary | ICD-10-CM

## 2023-06-20 NOTE — Clinical Note
Livan Martinez - I had a video visit with Karine and her daughter this week for a medication review.  I think everything is going well.  I walked them through how to adjust metformin (dose was recently adjusted based on kidney function) in her pill pack.  Also noted that since her renal function is back to baseline, it's likely that she can pursue Recalst again, but to touch base with prescriber.  Answered questions, but otherwise didn't identify any recommendations.  All the best, Snehal

## 2023-06-20 NOTE — PROGRESS NOTES
"Telephone/video visits are billed at different rates depending on your insurance coverage. During this emergency period, for some insurers they may be billed the same as an in-person visit.  Please reach out to your insurance provider with any questions.  If during the course of the call the physician/provider feels a telephone visit is not appropriate, you will not be charged for this service.    SUBJECTIVE: Karine is a 68 year old female who was referred by Dr. Lissa MD for MT services for medication management.      Diabetes: Patient's daughter wonders why her metformin dose has been changed and what to do about her current pill pack.  Her daughter will plan to tell her mom to take out metformin such that she is only taking 2 pills a day. She will continue to take empagliflozin 10 mg once daily.      Diabetic neuropathy: Daughter wonders if duloxetine is for mental health reasons or for neuropathy.    Narcoplesy: Takes adderall in the morning for narcolepsy    ASCVD risk: Taking atorvastatin daily.    Dry mouth: taking cevimiline    Vaginal dryness: taking estradiol cream.    General health/ history of gastric bypass surgery: taking calcium carbonate. Daily. And also vitamin B12    Stomach ulcers: Taking omeprazole.  Her understanding is that this is a lifetime medication.      Urinary incontinence: She feels the detrol is helping    Insomnia: She notes that trazodone \"works like a charm.\"    Osteoporosis: Karine saw Dr. Alcaraz about a month ago.  She was receiving bloodwork for reclast and at that time, Scr was high.  Patient was told to follow up with her primary care provider to determine what to do.     Questions: Daughter wonders if medications are weight based dosing.  She notes that her mom has lost weight recently and wonders if medications should be adjusted.      Patient Active Problem List   Diagnosis     Vitamin D deficiency     Dysthymic disorder     Malaise and fatigue     Narcolepsy without " "cataplexy(347.00)     Keratoconus     Hyperlipidemia LDL goal <100     Obstructive sleep apnea     Vitamin D insufficiency     Major depression in partial remission (H)     Plantar warts     Low back pain     DJD (degenerative joint disease) of knee     Pancreatitis     Panic     Chest pain     IBS (irritable bowel syndrome)     Heart murmur     Hypertension goal BP (blood pressure) < 140/90     Type 2 diabetes mellitus without complication, without long-term current use of insulin (H)     Osteopenia of hip     Acute conjunctivitis of left eye     PTSD (post-traumatic stress disorder)     Diabetic peripheral neuropathy (H)     Bilateral sciatica     Gastrointestinal hemorrhage associated with gastric ulcer     Chronic kidney disease, stage 3 (H)     Chronic diastolic congestive heart failure (H)     Upper GI bleed     Neck pain     Closed nondisplaced fracture of head of right radius, initial encounter     Injury of head, initial encounter     Fall, initial encounter     Closed fracture of right hand, initial encounter     Closed fracture of left wrist, initial encounter     Left wrist pain     Wrist stiffness, left     Physical deconditioning     Osteopenia of spine     Personal history of fall        OBJECTIVE:         2/15/2023     9:58 AM 3/9/2023     9:48 AM 3/30/2023    10:28 AM   DELMY-7 SCORE   Total Score 1 (minimal anxiety) 1 (minimal anxiety) 2 (minimal anxiety)   Total Score 1 1 2           2/15/2023     9:56 AM 3/9/2023     9:46 AM 3/30/2023    10:26 AM   PHQ-9 SCORE   PHQ-9 Total Score MyChart 3 (Minimal depression) 3 (Minimal depression) 2 (Minimal depression)   PHQ-9 Total Score 3 3 2         VITALS:  BP Readings from Last 3 Encounters:   06/12/23 124/82   05/05/23 110/68   03/16/23 (!) 144/83           Weight:   Wt Readings from Last 1 Encounters:   06/12/23 54.4 kg (120 lb)       Height:   Ht Readings from Last 1 Encounters:   02/10/23 1.604 m (5' 3.15\")       LABORATORY VALUES:   Last A1C:    Lab " Results   Component Value Date    A1C 5.9 06/12/2023    A1C 7.0 03/08/2021   .    Last Basic Metabolic Panel:  Lab Results   Component Value Date     06/12/2023     05/27/2021      Lab Results   Component Value Date    POTASSIUM 4.4 06/12/2023    POTASSIUM 5.3 10/15/2022    POTASSIUM 4.5 05/27/2021     Lab Results   Component Value Date    CHLORIDE 104 06/12/2023    CHLORIDE 108 10/15/2022    CHLORIDE 110 05/27/2021     Lab Results   Component Value Date    KINJAL 9.8 06/12/2023    KINJAL 9.5 05/27/2021     Lab Results   Component Value Date    CO2 22 06/12/2023    CO2 27 10/15/2022    CO2 26 05/27/2021     Lab Results   Component Value Date    BUN 21.1 06/12/2023    BUN 31 10/15/2022    BUN 18 05/27/2021     Lab Results   Component Value Date    CR 1.26 06/12/2023    CR 1.27 05/27/2021     Lab Results   Component Value Date     06/12/2023     01/09/2023     10/15/2022     05/27/2021       Lipid Panel Labs  Lab Results   Component Value Date    CHOL 126 03/29/2022    CHOL 113.0 03/08/2021    CHOL 111.0 02/06/2020     Lab Results   Component Value Date    TRIG 123 03/29/2022    TRIG 157.0 03/08/2021    TRIG 179.0 02/06/2020     Lab Results   Component Value Date    HDL 52 03/29/2022    HDL 35.0 03/08/2021    HDL 47.0 02/06/2020     Lab Results   Component Value Date    LDL 49 03/29/2022    LDL 46.0 03/08/2021    LDL 29.0 02/06/2020    LDL 97 04/12/2019     10/09/2013       Hepatic Panel Labs  Lab Results   Component Value Date    AST 23 12/09/2022    AST 21 05/27/2021     Lab Results   Component Value Date    ALT 15 12/09/2022    ALT 25 05/27/2021         SOCIAL AND FAMILY HISTORY  Social History     Tobacco Use     Smoking status: Never     Smokeless tobacco: Never   Vaping Use     Vaping status: Not on file   Substance Use Topics     Alcohol use: Not Currently     Comment: rare    .  Most Recent Immunizations   Administered Date(s) Administered     COVID-19 Bivalent 12+  (Pfizer) 10/25/2022     COVID-19 MONOVALENT 12+ (Pfizer) 12/15/2021     Influenza (H1N1) 01/12/2010     Influenza (IIV3) PF 04/10/2013     Influenza Vaccine 50-64 or 18-64 w/egg allergy (Flublok) 10/07/2020     Influenza Vaccine 65+ (Fluzone HD) 11/12/2022     Pneumococcal 23 valent 05/27/2021     TD,PF 7+ (Tenivac) 08/25/2000     TDAP Vaccine (Adacel) 06/11/2013     Td (Adult), Adsorbed 02/10/2023     Zoster recombinant adjuvanted (SHINGRIX) 01/22/2023       CURRENT MEDICATIONS:   Current Outpatient Medications   Medication Sig Dispense Refill     amphetamine-dextroamphetamine (ADDERALL) 30 MG tablet Take 1 tablet (30 mg) by mouth every 24 hours 1.5 mg a total of 45 mg  Daily 30 tablet 0     atorvastatin (LIPITOR) 20 MG tablet Take 1 tablet (20 mg) by mouth daily 90 tablet 1     blood glucose (NO BRAND SPECIFIED) test strip Use to test blood sugar as needed with Freestyle Nirmal CGM. 100 strip 0     blood glucose (NO BRAND SPECIFIED) test strip Use to test blood sugar as directed with CGM sensor. 50 strip 1     calcium carbonate (OS-KINJAL) 1500 (600 Ca) MG tablet Take 2 tablets (1,200 mg) by mouth daily       cevimeline (EVOXAC) 30 MG capsule take 1 cap  capsule 1     COMPOUNDED NON-CONTROLLED SUBSTANCE (CMPD RX) - PHARMACY TO MIX COMPOUNDED MEDICATION Estradiol 0.0075% in HRT cream  Apply 2 grams,  intravaginally and small amount externally daily for one week then twice weekly for maintenance. 45 g 11     Continuous Blood Gluc  (FREESTYLE NIRMAL 14 DAY READER) EBEN Use to read blood sugars as per 's instructions. 1 each 0     Continuous Blood Gluc Sensor (FREESTYLE NIRMAL 14 DAY SENSOR) MISC Change every 14 days. 2 each 11     cyanocobalamin (VITAMIN B-12) 1000 MCG tablet Take one every other day 90 tablet 1     DULoxetine (CYMBALTA) 30 MG capsule Take 1 capsule (30 mg) by mouth 2 times daily 180 capsule 1     DULoxetine (CYMBALTA) 60 MG capsule Take 1 capsule (60 mg) by mouth At Bedtime Take  in addition to current 30 mg evening dose for a total of 90 mg at bedtime. 90 capsule 1     empagliflozin (JARDIANCE) 10 MG TABS tablet Take one daily 90 tablet 1     losartan (COZAAR) 100 MG tablet Take 1 tablet (100 mg) by mouth daily 90 tablet 1     metFORMIN (GLUCOPHAGE XR) 500 MG 24 hr tablet Take 2 po q am with breakfast. Note change in dose due to elevated creatinine 180 tablet 1     omeprazole (PRILOSEC) 40 MG DR capsule Take 40mg twice daily 180 capsule 1     oxyCODONE (ROXICODONE) 5 MG tablet Take 0.5 tablets (2.5 mg) by mouth every 6 hours as needed for moderate to severe pain 16 tablet 0     SENNA-docusate sodium (SENNA S) 8.6-50 MG tablet Take 1 tablet by mouth At Bedtime       tolterodine ER (DETROL LA) 4 MG 24 hr capsule Take 1 capsule (4 mg) by mouth daily 90 capsule 3     traZODone (DESYREL) 50 MG tablet Take 1 tablet by mouth every 24 hours       triamcinolone (KENALOG) 0.1 % paste Apply to tongue twice daily x 10 days 5 g 0       ALLERGIES:     Allergies   Allergen Reactions     Pravastatin Other (See Comments)     woozy     Codeine Nausea and Vomiting     Nsaids GI Disturbance and Other (See Comments)     Pt had bariatric surgery, should not ever take oral NSAIDS due to risk of gastric ulcers.  Pt had bariatric surgery, should not ever take oral NSAIDS due to risk of gastric ulcers.          ASSESSMENT:    Diabetes: At goal based on A1c.  Goal A1c is <7%. Metformin was recently adjusted based on patient's eGFR and daughter and patient understand how to make changes in patient's pill pack.     Diabetic neuropathy: controlled, per patient    CKD: Karine has microalbuminuria and reduced eGFR.  Risk factors for worsening progression such as diabetes and HTN are well-controlled.  Additionally, she is taking an SGLT2i and an ARB, which are both recommended in the setting of CKD.    Narcolepsy: At goal, per patient    ASCVD risk: At goal based on LDL from March 2022.  LDL goal is <70 mg/dl    Urinary  incontinence: Not at goal, but she does believe tolterodine helps.  She is considering a procedure which might help both urinary and fecal incontinence.    Dry mouth: At goal per patient. It is possible that tolterodine is contributing to dry mouth, but, at the same time, tolterodine is helping this patient with her incontinence, so for the time being might be necessary to treat the side effect.    Vaginal dryness: At goal, per patient    History of gastric bypass: At goal    Stomach ulcers: On appropriate medication    Insomnia: at goal per patient    Osteoporosis: Patient was not able to be administered reclast most recently due to high Scr.  However, recent repeat Scr demonstrates she is back to baseline with an crcl of 47.  CrCl >35 is usually considered safe for reclast.      All medications were reviewed and found to be indicated, effective, safe and convenient unless drug therapy problem identified as described above.    PLAN:     - Continue current medications  - Will touch base with Dr. Alcaraz about reclast  - Walked daughter and Karine through how to adjust metformin dose in pill pack.    Options for treatment and/or follow-up care were reviewed with the patient. Karine Moss was engaged and actively involved in the decision making process. He/She verbalized understanding of the options discussed and was satisfied with the final plan.    Patient was provided with written instructions/medication list via AVS.       Medical conditions reviewed: 12    Medications reviewed: 14    MTP identified: 2    Time spent: 40 minutes    Level of service: 3, nc

## 2023-06-28 ENCOUNTER — PRE VISIT (OUTPATIENT)
Dept: ORTHOPEDICS | Facility: CLINIC | Age: 68
End: 2023-06-28

## 2023-06-29 ENCOUNTER — NURSE TRIAGE (OUTPATIENT)
Dept: FAMILY MEDICINE | Facility: CLINIC | Age: 68
End: 2023-06-29

## 2023-06-29 NOTE — TELEPHONE ENCOUNTER
Pt's daughter Leila calling to report Karine had two hypoglycemic events within last 12 hours. BG at 2300 last night was in 50s - Karine was symptomatic (shaky, sweaty, and fatigued). She was surprised because she had eaten three pieces of pizza between 8-10 PM. She ate some fruit and it increased to 120. This morning BG at 0500 was in the 50s again. BG at 99 about one hour ago (around 0800) after eating some fruit. She is concerned that Karine is forgetting to eat/not eating properly to maintain glycemic control. Asked Leila if she had any concerns about Karine not taking her medication correctly and she does not suspect this is an issue right now. Karine uses a Freestyle Nirmal CGM device and will continue to monitor her BG and symptoms until this can be discussed with Dr. Martinez who will be in clinic this afternoon. Explained to Leila that if she has any concerns that Karine may become unresponsive while hypoglycemic that she should arrange to have something stay with her and if needed take her to hospital, and she confirms understanding.    Reason for Disposition    [1] Morning (before breakfast) blood glucose < 80 mg/dL (4.4 mmol/L) AND [2] more than once in past week    Protocols used: DIABETES - LOW BLOOD SUGAR-A-    Navin PHILIP, RN  06/29/23 9:31 AM

## 2023-06-29 NOTE — TELEPHONE ENCOUNTER
Spoke with Dr. Martinez who recommends discontinuing metformin, stay on Jardiance 10 mg daily. Called Leila to explain this change. She states she will have Karine continue to monitor her BGs through her Nirmal device and will report back midweek with how numbers look. Will assist in scheduling follow-up with better picture of how BG is trending.    Navin PHILIP, RN  06/29/23 12:53 PM

## 2023-07-05 NOTE — RESULT ENCOUNTER NOTE
Our plan was to delay Reclast 3 months  If the labs are coming in OK can we set up the infusion again   Thanks

## 2023-07-10 DIAGNOSIS — E53.8 VITAMIN B12 DEFICIENCY (NON ANEMIC): ICD-10-CM

## 2023-07-10 RX ORDER — LANOLIN ALCOHOL/MO/W.PET/CERES
CREAM (GRAM) TOPICAL
Qty: 90 TABLET | Refills: 3 | Status: SHIPPED | OUTPATIENT
Start: 2023-07-10 | End: 2024-06-26

## 2023-07-10 NOTE — TELEPHONE ENCOUNTER
Medication requested: cyanocobalamin (VITAMIN B-12) 1000 MCG tablet  Last office visit: 6/12/23  [facility] appointments: none  Medication last refilled: 7/22/22; 90 + 1 refill   Last qualifying labs:   Component      Latest Ref Rng 3/29/2022  10:33 AM   Vitamin B12      193 - 986 pg/mL 726      Routing refill request to provider for review/approval because:  Last Vitamin B12 level drawn 3/29/22    Navin PHILIP, RN  07/10/23 12:01 PM

## 2023-07-13 ENCOUNTER — THERAPY VISIT (OUTPATIENT)
Dept: PHYSICAL THERAPY | Facility: CLINIC | Age: 68
End: 2023-07-13
Payer: COMMERCIAL

## 2023-07-13 DIAGNOSIS — R53.81 PHYSICAL DECONDITIONING: Primary | ICD-10-CM

## 2023-07-13 DIAGNOSIS — Z91.81 PERSONAL HISTORY OF FALL: ICD-10-CM

## 2023-07-13 DIAGNOSIS — M85.88 OSTEOPENIA OF SPINE: ICD-10-CM

## 2023-07-13 PROCEDURE — 97110 THERAPEUTIC EXERCISES: CPT | Mod: GP

## 2023-07-13 PROCEDURE — 97112 NEUROMUSCULAR REEDUCATION: CPT | Mod: GP

## 2023-08-26 ENCOUNTER — MYC MEDICAL ADVICE (OUTPATIENT)
Dept: FAMILY MEDICINE | Facility: CLINIC | Age: 68
End: 2023-08-26

## 2023-08-26 DIAGNOSIS — K14.5 TONGUE, FISSURED: ICD-10-CM

## 2023-08-28 RX ORDER — TRIAMCINOLONE ACETONIDE 0.1 %
PASTE (GRAM) DENTAL
Qty: 5 G | Refills: 0 | Status: SHIPPED | OUTPATIENT
Start: 2023-08-28

## 2023-08-28 NOTE — TELEPHONE ENCOUNTER
Medication requested: triamcinolone (KENALOG) 0.1 % paste   Last office visit: 6/12/23  [facility] appointments: 9/5/23  Medication last refilled: 2/10/23; 5 g + 0 refills  Last qualifying labs: N/A    Routing refill request to provider for review/approval because:  Drug not on the [facility] refill protocol   Drug not active on patient's medication list    Navin PHILIP, RN  08/28/23 10:01 AM

## 2023-08-31 DIAGNOSIS — E11.9 TYPE 2 DIABETES MELLITUS WITHOUT COMPLICATION, WITHOUT LONG-TERM CURRENT USE OF INSULIN (H): ICD-10-CM

## 2023-08-31 RX ORDER — FLASH GLUCOSE SENSOR
KIT MISCELLANEOUS
Qty: 2 EACH | Refills: 11 | Status: SHIPPED | OUTPATIENT
Start: 2023-08-31 | End: 2024-06-26

## 2023-08-31 NOTE — TELEPHONE ENCOUNTER
Last visit 6/12/23, future appt 9/5/232  Prescription approved per [employer] Refill Protocol.  Davina Chawla RN  [facility]

## 2023-09-05 ENCOUNTER — OFFICE VISIT (OUTPATIENT)
Dept: FAMILY MEDICINE | Facility: CLINIC | Age: 68
End: 2023-09-05
Payer: COMMERCIAL

## 2023-09-05 VITALS
TEMPERATURE: 98.6 F | RESPIRATION RATE: 15 BRPM | HEART RATE: 103 BPM | HEIGHT: 63 IN | BODY MASS INDEX: 22.5 KG/M2 | DIASTOLIC BLOOD PRESSURE: 79 MMHG | WEIGHT: 127 LBS | SYSTOLIC BLOOD PRESSURE: 126 MMHG

## 2023-09-05 DIAGNOSIS — N18.31 STAGE 3A CHRONIC KIDNEY DISEASE (H): ICD-10-CM

## 2023-09-05 DIAGNOSIS — G62.9 NEUROPATHY: ICD-10-CM

## 2023-09-05 DIAGNOSIS — E11.9 TYPE 2 DIABETES MELLITUS WITHOUT COMPLICATION, WITHOUT LONG-TERM CURRENT USE OF INSULIN (H): Primary | ICD-10-CM

## 2023-09-05 LAB
ANION GAP SERPL CALCULATED.3IONS-SCNC: 11 MMOL/L (ref 7–15)
BUN SERPL-MCNC: 20.7 MG/DL (ref 8–23)
CALCIUM SERPL-MCNC: 9.6 MG/DL (ref 8.8–10.2)
CHLORIDE SERPL-SCNC: 104 MMOL/L (ref 98–107)
CREAT SERPL-MCNC: 1.2 MG/DL (ref 0.51–0.95)
DEPRECATED HCO3 PLAS-SCNC: 26 MMOL/L (ref 22–29)
FOLATE SERPL-MCNC: 30.5 NG/ML (ref 4.6–34.8)
GFR SERPL CREATININE-BSD FRML MDRD: 49 ML/MIN/1.73M2
GLUCOSE SERPL-MCNC: 195 MG/DL (ref 70–99)
HBA1C MFR BLD: 6.1 % (ref 0–5.6)
POTASSIUM SERPL-SCNC: 4.8 MMOL/L (ref 3.4–5.3)
SODIUM SERPL-SCNC: 141 MMOL/L (ref 136–145)
VIT B12 SERPL-MCNC: 856 PG/ML (ref 232–1245)

## 2023-09-05 RX ORDER — METFORMIN HCL 500 MG
TABLET, EXTENDED RELEASE 24 HR ORAL
Qty: 180 TABLET | Refills: 1 | Status: CANCELLED | COMMUNITY
Start: 2023-09-05

## 2023-09-05 NOTE — PROGRESS NOTES
"Karine Moss is a 68 year old female with history of type 2 diabetes, depression, PTSD, narcolepsy, hyperlipidemia, sleep apnea, osteoarthritis, irritable bowel, hypertension, osteopenia, peripheral neuropathy, history of gastric ulcers.  She presents to the noclinic today for a recheck of:     DIABETES  Here for Type II diabetes.  Last eye exam is due, [facility].  Retinopathy: no  Peripheral neuropathy: yes  Weight: up by 7 lbs  Wt Readings from Last 3 Encounters:   09/05/23 57.6 kg (127 lb)   06/12/23 54.4 kg (120 lb)   05/05/23 57.2 kg (126 lb 1.6 oz)     Frequency of FBS multiple times daily   General range of FBS: 120-130  Hypoglycemia: none    Lab Results   Component Value Date    A1C 5.9 06/12/2023    A1C 6.7 12/09/2022    A1C 7.0 03/08/2021    A1C 6.2 09/04/2020     No results found for: MICROALBUMIN    DM Meds: Empagliflozin 10mg daily                    Metformin discontinued due to change in kidney function  Duloxetine 30+60mg daily for peripheral neuropathy    Compliance: good    Aspirin Use: none    PMH, PSH, FH, medications, allergies and immunizations are updated this visit.      HYPERLIPIDEMIA  Recent Labs   Lab Test 03/29/22  1033 03/08/21  1615 02/06/20  1523   CHOL 126 113.0 111.0   HDL 52 35.0* 47.0*   LDL 49 46.0 29.0   TRIG 123 157.0* 179.0*   CHOLHDLRATIO  --  3.2 2.4     LDL is at goal, taking atorvastatin 20mg daily. Compliant with med(s). No reported myalgias or other side effects.     HYPERTENSION  BP Readings from Last 3 Encounters:   09/05/23 126/79   06/12/23 124/82   05/05/23 110/68     Here for blood pressure check. She is currently on losartan 100mg daily.   She is compliant with meds, no reported side effects. Blood pressure readings have been in target range.       EXAM  /79   Pulse 103   Temp 98.6  F (37  C) (Skin)   Resp 15   Ht 1.604 m (5' 3.15\")   Wt 57.6 kg (127 lb)   BMI 22.39 kg/m    Gen: Alert, NAD  Cor: S1S2, no murmur  Lungs: CTA " bilaterally  Abd: soft nontender +BS  Ext: no edema, pulses palpable  Skin: no rash     Results for orders placed or performed in visit on 09/05/23   Hemoglobin A1c     Status: Abnormal   Result Value Ref Range    Hemoglobin A1C 6.1 (H) 0.0 - 5.6 %   Basic metabolic panel     Status: Abnormal   Result Value Ref Range    Sodium 141 136 - 145 mmol/L    Potassium 4.8 3.4 - 5.3 mmol/L    Chloride 104 98 - 107 mmol/L    Carbon Dioxide (CO2) 26 22 - 29 mmol/L    Anion Gap 11 7 - 15 mmol/L    Urea Nitrogen 20.7 8.0 - 23.0 mg/dL    Creatinine 1.20 (H) 0.51 - 0.95 mg/dL    Calcium 9.6 8.8 - 10.2 mg/dL    Glucose 195 (H) 70 - 99 mg/dL    GFR Estimate 49 (L) >60 mL/min/1.73m2   Vitamin B12     Status: Normal   Result Value Ref Range    Vitamin B12 856 232 - 1,245 pg/mL   Folate     Status: Normal   Result Value Ref Range    Folic Acid 30.5 4.6 - 34.8 ng/mL         Assessment:  (E11.9) Type 2 diabetes mellitus without complication, without long-term current use of insulin (H)  (primary encounter diagnosis)  Comment: doing very well with use of Empagliflozin 10mg daily  Lab Results   Component Value Date    A1C 6.1 09/05/2023    A1C 5.9 06/12/2023   Plan: Hemoglobin A1c        No change in dose    (N18.31) Stage 3a chronic kidney disease (H)  Comment: Creatinine is 1.2, GFR is 49%, relatively unchanged from previous. She is followed by endocrinology for treatment for osteoporosis. Treatment was held due to elevated creatinine.   Plan: Basic metabolic panel        Will forward information to endocrine team.     (G62.9) Neuropathy  Comment: chronic issue  Plan: Vitamin B12, Folate        B12 and folic acid are normal. Continue duloxetine    Anay Martinez MD  Internal Medicine/Pediatrics

## 2023-09-08 NOTE — PROGRESS NOTES
Discharge Note    Progress reporting period is from last progress note on June 9, 2023  to Jul 13, 2023.    Karine failed to follow up and current status is unknown.  Please see information below for last relevant information on current status.  Patient seen for  7  visits.    SUBJECTIVE  Subjective changes noted by patient:     .Pt reports her low back, upper back and shoulders have been aggravating her. It hurts to bend over. She's been doing her exercises 5x/week.   Current pain level is  .     Previous pain level was   .   Changes in function:  Yes (See Goal flowsheet attached for changes in current functional level)  Adverse reaction to treatment or activity: None    OBJECTIVE  Changes noted in objective findings:   Able to complete tandem balance with use of min use of countertop for UE support. Unable to complete slow marching and slow walking without loss of balance.     ASSESSMENT/PLAN      Updated problem list and treatment plan:   Pain - HEP  Decreased ROM/flexibility - HEP  Decreased function - HEP  Decreased strength - HEP  Impaired gait - HEP  Impaired balance - HEP  STG/LTGs have been met or progress has been made towards goals:  Yes, please see goal flowsheet for most current information  Assessment of Progress: current status is unknown.    Last current status:     Self Management Plans:  HEP  I have re-evaluated this patient and find that the nature, scope, duration and intensity of the therapy is appropriate for the medical condition of the patient.  Karine continues to require the following intervention to meet STG and LTG's:  HEP.    Recommendations:  Discharge with current home program.  Patient to follow up with MD as needed.    Please refer to the daily flowsheet for treatment today, total treatment time and time spent performing 1:1 timed codes.

## 2023-10-30 DIAGNOSIS — N39.46 MIXED INCONTINENCE URGE AND STRESS (MALE)(FEMALE): ICD-10-CM

## 2023-10-31 RX ORDER — TOLTERODINE 4 MG/1
4 CAPSULE, EXTENDED RELEASE ORAL DAILY
Qty: 90 CAPSULE | Refills: 3 | Status: SHIPPED | OUTPATIENT
Start: 2023-10-31 | End: 2024-09-19

## 2023-10-31 NOTE — TELEPHONE ENCOUNTER
Tolerodine ER (Detrol LA) 4 mg    Last Office Visit: 9/5/23  [facility] Appointments: None  Medication last refilled: 11/22/22 #90 with 3 refill(s)    Prescription approved per [facility] Refill Protocol.    DELIA FernandesN, RN, CCM

## 2023-11-10 ENCOUNTER — VIRTUAL VISIT (OUTPATIENT)
Dept: ORTHOPEDICS | Facility: CLINIC | Age: 68
End: 2023-11-10
Payer: COMMERCIAL

## 2023-11-10 DIAGNOSIS — N18.30 STAGE 3 CHRONIC KIDNEY DISEASE, UNSPECIFIED WHETHER STAGE 3A OR 3B CKD (H): Primary | ICD-10-CM

## 2023-11-10 DIAGNOSIS — M85.88 OSTEOPENIA OF SPINE: ICD-10-CM

## 2023-11-10 PROCEDURE — 99441 PR PHYSICIAN TELEPHONE EVALUATION 5-10 MIN: CPT | Mod: 95 | Performed by: FAMILY MEDICINE

## 2023-11-10 RX ORDER — HEPARIN SODIUM,PORCINE 10 UNIT/ML
5-20 VIAL (ML) INTRAVENOUS DAILY PRN
Status: CANCELLED | OUTPATIENT
Start: 2023-11-17

## 2023-11-10 RX ORDER — METHYLPREDNISOLONE SODIUM SUCCINATE 125 MG/2ML
125 INJECTION, POWDER, LYOPHILIZED, FOR SOLUTION INTRAMUSCULAR; INTRAVENOUS
Status: CANCELLED
Start: 2023-11-17

## 2023-11-10 RX ORDER — ACETAMINOPHEN 325 MG/1
650 TABLET ORAL EVERY 8 HOURS PRN
Status: CANCELLED | OUTPATIENT
Start: 2023-11-17

## 2023-11-10 RX ORDER — MEPERIDINE HYDROCHLORIDE 25 MG/ML
25 INJECTION INTRAMUSCULAR; INTRAVENOUS; SUBCUTANEOUS EVERY 30 MIN PRN
Status: CANCELLED | OUTPATIENT
Start: 2023-11-17

## 2023-11-10 RX ORDER — DIPHENHYDRAMINE HYDROCHLORIDE 50 MG/ML
50 INJECTION INTRAMUSCULAR; INTRAVENOUS
Status: CANCELLED
Start: 2023-11-17

## 2023-11-10 RX ORDER — ALBUTEROL SULFATE 90 UG/1
1-2 AEROSOL, METERED RESPIRATORY (INHALATION)
Status: CANCELLED
Start: 2023-11-17

## 2023-11-10 RX ORDER — ZOLEDRONIC ACID 5 MG/100ML
5 INJECTION, SOLUTION INTRAVENOUS ONCE
Status: CANCELLED
Start: 2023-11-17

## 2023-11-10 RX ORDER — HEPARIN SODIUM (PORCINE) LOCK FLUSH IV SOLN 100 UNIT/ML 100 UNIT/ML
5 SOLUTION INTRAVENOUS
Status: CANCELLED | OUTPATIENT
Start: 2023-11-17

## 2023-11-10 RX ORDER — EPINEPHRINE 1 MG/ML
0.3 INJECTION, SOLUTION, CONCENTRATE INTRAVENOUS EVERY 5 MIN PRN
Status: CANCELLED | OUTPATIENT
Start: 2023-11-17

## 2023-11-10 RX ORDER — ALBUTEROL SULFATE 0.83 MG/ML
2.5 SOLUTION RESPIRATORY (INHALATION)
Status: CANCELLED | OUTPATIENT
Start: 2023-11-17

## 2023-11-10 NOTE — PROGRESS NOTES
Karine is a 68 year old who is being evaluated via a billable telephone visit.      What phone number would you like to be contacted at? [phone]  How would you like to obtain your AVS? Yuval    Distant Location (provider location):  On-site    Assessment & Plan     Stage 3 chronic kidney disease, unspecified whether stage 3a or 3b CKD (H)      Osteopenia of spine  Reclast, ok to pursue with improved Cr  Give over 30 min    RTC prn, 3 months    Prescription drug management         See Patient Instructions    Return in 3 months (on 2/10/2024), or if symptoms worsen or fail to improve.    Roberta Friedman MD  [facility] SPORTS MEDICINE [facility]    Padilla Milton is a 68 year old, presenting for the following health issues:  No chief complaint on file.    HPI     Pt is a 69 yo white female really wants to get her Reclast infusion.  Daughter Cathy able to conference call.  Problem with phone this a.m.      Review of Systems   Constitutional, HEENT, cardiovascular, pulmonary, gi and gu systems are negative, except as otherwise noted.      Objective           Vitals:  No vitals were obtained today due to virtual visit.    Physical Exam   healthy, alert, no distress, and cooperative  PSYCH: Alert and oriented times 3; coherent speech, normal   rate and volume, able to articulate logical thoughts, able   to abstract reason, no tangential thoughts, no hallucinations   or delusions  Her affect is normal  RESP: No cough, no audible wheezing, able to talk in full sentences  Remainder of exam unable to be completed due to telephone visits    NAD  Nonlabored breathing            Phone call duration: 7 minutes

## 2023-11-10 NOTE — LETTER
11/10/2023       RE: Karine Moss  [address]  [facility] [address]     Dear Colleague,    Thank you for referring your patient, Karine Moss, to the [facility]. Please see a copy of my visit note below.    Karine is a 68 year old who is being evaluated via a billable telephone visit.      What phone number would you like to be contacted at? [phone]  How would you like to obtain your AVS? MyChart    Distant Location (provider location):  On-site    Assessment & Plan    Stage 3 chronic kidney disease, unspecified whether stage 3a or 3b CKD (H)      Osteopenia of spine  Reclast, ok to pursue with improved Cr  Give over 30 min    RTC prn, 3 months    Prescription drug management         See Patient Instructions    Return in 3 months (on 2/10/2024), or if symptoms worsen or fail to improve.    Roberta Friedman MD  [facility]    Subjective  Karine is a 68 year old, presenting for the following health issues:  No chief complaint on file.    HPI     Pt is a 67 yo white female really wants to get her Reclast infusion.  Daughter Cathy able to conference call.  Problem with phone this a.m.      Review of Systems   Constitutional, HEENT, cardiovascular, pulmonary, gi and gu systems are negative, except as otherwise noted.      Objective          Vitals:  No vitals were obtained today due to virtual visit.    Physical Exam   healthy, alert, no distress, and cooperative  PSYCH: Alert and oriented times 3; coherent speech, normal   rate and volume, able to articulate logical thoughts, able   to abstract reason, no tangential thoughts, no hallucinations   or delusions  Her affect is normal  RESP: No cough, no audible wheezing, able to talk in full sentences  Remainder of exam unable to be completed due to telephone visits    NAD  Nonlabored  breathing            Phone call duration: 7 minutes        Again, thank you for allowing me to participate in the care of your patient.      Sincerely,    Roberta Friedman MD

## 2023-11-26 RX ORDER — METHYLPREDNISOLONE SODIUM SUCCINATE 125 MG/2ML
125 INJECTION, POWDER, LYOPHILIZED, FOR SOLUTION INTRAMUSCULAR; INTRAVENOUS
Start: 2024-11-25

## 2023-11-26 RX ORDER — ZOLEDRONIC ACID 5 MG/100ML
5 INJECTION, SOLUTION INTRAVENOUS ONCE
Start: 2024-11-25

## 2023-11-26 RX ORDER — MEPERIDINE HYDROCHLORIDE 25 MG/ML
25 INJECTION INTRAMUSCULAR; INTRAVENOUS; SUBCUTANEOUS EVERY 30 MIN PRN
OUTPATIENT
Start: 2024-11-25

## 2023-11-26 RX ORDER — HEPARIN SODIUM,PORCINE 10 UNIT/ML
5-20 VIAL (ML) INTRAVENOUS DAILY PRN
OUTPATIENT
Start: 2024-11-25

## 2023-11-26 RX ORDER — EPINEPHRINE 1 MG/ML
0.3 INJECTION, SOLUTION INTRAMUSCULAR; SUBCUTANEOUS EVERY 5 MIN PRN
OUTPATIENT
Start: 2024-11-25

## 2023-11-26 RX ORDER — DIPHENHYDRAMINE HYDROCHLORIDE 50 MG/ML
50 INJECTION INTRAMUSCULAR; INTRAVENOUS
Start: 2024-11-25

## 2023-11-26 RX ORDER — ALBUTEROL SULFATE 0.83 MG/ML
2.5 SOLUTION RESPIRATORY (INHALATION)
OUTPATIENT
Start: 2024-11-25

## 2023-11-26 RX ORDER — ACETAMINOPHEN 325 MG/1
650 TABLET ORAL EVERY 8 HOURS PRN
OUTPATIENT
Start: 2024-11-25

## 2023-11-26 RX ORDER — HEPARIN SODIUM (PORCINE) LOCK FLUSH IV SOLN 100 UNIT/ML 100 UNIT/ML
5 SOLUTION INTRAVENOUS
OUTPATIENT
Start: 2024-11-25

## 2023-11-26 RX ORDER — ALBUTEROL SULFATE 90 UG/1
1-2 AEROSOL, METERED RESPIRATORY (INHALATION)
Start: 2024-11-25

## 2023-11-27 ENCOUNTER — APPOINTMENT (OUTPATIENT)
Dept: LAB | Facility: CLINIC | Age: 68
End: 2023-11-27
Attending: FAMILY MEDICINE
Payer: COMMERCIAL

## 2023-11-27 ENCOUNTER — INFUSION THERAPY VISIT (OUTPATIENT)
Dept: INFUSION THERAPY | Facility: CLINIC | Age: 68
End: 2023-11-27
Attending: FAMILY MEDICINE
Payer: COMMERCIAL

## 2023-11-27 VITALS
HEART RATE: 85 BPM | TEMPERATURE: 98.6 F | SYSTOLIC BLOOD PRESSURE: 148 MMHG | RESPIRATION RATE: 16 BRPM | BODY MASS INDEX: 22.71 KG/M2 | OXYGEN SATURATION: 97 % | DIASTOLIC BLOOD PRESSURE: 87 MMHG | WEIGHT: 128.8 LBS

## 2023-11-27 DIAGNOSIS — N18.30 STAGE 3 CHRONIC KIDNEY DISEASE, UNSPECIFIED WHETHER STAGE 3A OR 3B CKD (H): Primary | ICD-10-CM

## 2023-11-27 DIAGNOSIS — M85.88 OSTEOPENIA OF SPINE: ICD-10-CM

## 2023-11-27 LAB
CALCIUM SERPL-MCNC: 9.4 MG/DL (ref 8.8–10.2)
CREAT SERPL-MCNC: 1.09 MG/DL (ref 0.51–0.95)
EGFRCR SERPLBLD CKD-EPI 2021: 55 ML/MIN/1.73M2

## 2023-11-27 PROCEDURE — 82565 ASSAY OF CREATININE: CPT | Performed by: FAMILY MEDICINE

## 2023-11-27 PROCEDURE — 36415 COLL VENOUS BLD VENIPUNCTURE: CPT | Performed by: FAMILY MEDICINE

## 2023-11-27 PROCEDURE — 96365 THER/PROPH/DIAG IV INF INIT: CPT

## 2023-11-27 PROCEDURE — 82310 ASSAY OF CALCIUM: CPT | Performed by: FAMILY MEDICINE

## 2023-11-27 PROCEDURE — 250N000011 HC RX IP 250 OP 636: Mod: JZ | Performed by: FAMILY MEDICINE

## 2023-11-27 RX ORDER — ZOLEDRONIC ACID 5 MG/100ML
5 INJECTION, SOLUTION INTRAVENOUS ONCE
Status: COMPLETED | OUTPATIENT
Start: 2023-11-27 | End: 2023-11-27

## 2023-11-27 RX ADMIN — ZOLEDRONIC ACID 5 MG: 0.05 INJECTION, SOLUTION INTRAVENOUS at 16:14

## 2023-11-27 ASSESSMENT — PAIN SCALES - GENERAL: PAINLEVEL: MODERATE PAIN (5)

## 2023-11-27 NOTE — PATIENT INSTRUCTIONS
Dear Karine Moss    Thank you for choosing [facility] for your infusion.  The following information is a summary of our appointment as well as important reminders.          If you are a transplant patient and require transplant education, please click on this link: [email or web address].    We look forward in seeing you on your next appointment here at [facility]  Please don t hesitate to call us at [phone] to reschedule any of your appointments or to speak with one of the [facility] registered nurses.  It was a pleasure taking care of you today.    Sincerely,    [facility]  [facility]  [address] [place] [address]  [address]  Phone:  [phone]

## 2023-11-27 NOTE — PROGRESS NOTES
Infusion Nursing Note:  Karine Moss presents today for Reclast.    Patient seen by provider today: No   present during visit today: Not Applicable.    Note: Reclast infused over 30 minutes per orders. Observed for 20 minutes post infusion. Patient declined tylenol, per patient it doesn't work on his pain.     Administrations This Visit       zoledronic Acid (RECLAST) infusion 5 mg       Admin Date  11/27/2023 Action  $New Bag Dose  5 mg Rate  200 mL/hr Route  Intravenous Documented By  So King RN                   Intravenous Access:  Peripheral IV placed in lab.  Labs drawn in lab prior to SIPC.    Treatment Conditions:      *Creatinine clearance-46 ml/min.     Latest Reference Range & Units 09/05/23 16:11 11/27/23 15:10   Creatinine 0.51 - 0.95 mg/dL 1.20 (H) 1.09 (H)   Calcium 8.8 - 10.2 mg/dL 9.6 9.4   (H): Data is abnormally high          Discharge Plan:   Discharge instructions reviewed with: Patient.  Patient and/or family verbalized understanding of discharge instructions and all questions answered.  AVS to patient via SEAHART.  Patient will return to her provider as needed for next appointment.     -Care transfer to late shift nurse at 1633 hr.      /83 (BP Location: Right arm, Patient Position: Sitting, Cuff Size: Adult Regular)   Pulse 94   Temp 98.6  F (37  C) (Oral)   Resp 16   Wt 58.4 kg (128 lb 12.8 oz)   SpO2 97%   BMI 22.71 kg/m       oS King RN

## 2023-11-27 NOTE — NURSING NOTE
Chief Complaint   Patient presents with    Blood Draw     Labs drawn with piv start by rn.  VS taken.     Labs drawn with PIV start by rn.  Pt tolerated well.  VS taken and pt checked in for next appt.    Bettie Nichols RN

## 2023-12-12 ENCOUNTER — ANCILLARY PROCEDURE (OUTPATIENT)
Dept: MAMMOGRAPHY | Facility: CLINIC | Age: 68
End: 2023-12-12
Attending: INTERNAL MEDICINE
Payer: COMMERCIAL

## 2023-12-12 DIAGNOSIS — Z12.31 VISIT FOR SCREENING MAMMOGRAM: ICD-10-CM

## 2023-12-12 PROCEDURE — 77063 BREAST TOMOSYNTHESIS BI: CPT | Performed by: RADIOLOGY

## 2023-12-12 PROCEDURE — 77067 SCR MAMMO BI INCL CAD: CPT | Performed by: RADIOLOGY

## 2023-12-29 ENCOUNTER — OFFICE VISIT (OUTPATIENT)
Dept: DERMATOLOGY | Facility: CLINIC | Age: 68
End: 2023-12-29
Attending: INTERNAL MEDICINE
Payer: COMMERCIAL

## 2023-12-29 DIAGNOSIS — E11.9 TYPE 2 DIABETES MELLITUS WITHOUT COMPLICATION, WITHOUT LONG-TERM CURRENT USE OF INSULIN (H): ICD-10-CM

## 2023-12-29 DIAGNOSIS — L64.9 ANDROGENIC ALOPECIA: Primary | ICD-10-CM

## 2023-12-29 DIAGNOSIS — L65.9 HAIR THINNING: ICD-10-CM

## 2023-12-29 PROCEDURE — 99214 OFFICE O/P EST MOD 30 MIN: CPT | Performed by: PHYSICIAN ASSISTANT

## 2023-12-29 RX ORDER — FINASTERIDE 5 MG/1
TABLET, FILM COATED ORAL
Qty: 45 TABLET | Refills: 3 | Status: SHIPPED | OUTPATIENT
Start: 2023-12-29

## 2023-12-29 ASSESSMENT — PAIN SCALES - GENERAL: PAINLEVEL: NO PAIN (0)

## 2023-12-29 NOTE — PROGRESS NOTES
HPI:   Chief complaints: Karnie Moss is a pleasant 68 year old female who presents for evaluation of hair loss. She has had gradual thinning of her scalp hair for many years, worse since 2015 post bariatric surgery. She denies any pain or itching on her scalp. She has not tried any treatments for this yet. She had blood work done in 2020 and more recently which was all normal. Fhx of hair loss in the males of her family.       PHYSICAL EXAM:    There were no vitals taken for this visit.  Skin exam performed as follows: Type 2 skin. Mood appropriate  Alert and Oriented X 3. Well developed, well nourished in no distress.  General appearance: Normal  Head including face: Normal  Eyes: conjunctiva and lids: Normal  Mouth: Lips, teeth, gums: Normal  Neck: Normal  Skin: Scalp and body hair: See below    Decreased density through vertex scalp. Scalp normal without erythema or scaling. Negative hair pull.     ASSESSMENT/PLAN:     Androgenic alopecia. Advised on natural course and progression secondary to hormones and genetics. Discussed topical minoxidil 5% foam or solution as well as finasteride.      --Start finasteride 2.5 mg daily. Discussed risks of decreased libido, decreased spermatogenesis, erectile dysfunction and increased risk of breast cancer in men.   --Start 5% minoxidil daily   --Photographs taken today for reference           Follow-up: 9-12 months  CC:   Scribed By: Deneen Pereira, MS, PASergeC

## 2023-12-29 NOTE — TELEPHONE ENCOUNTER
Empagliflozin (Jardiance) 10 mg    Last Office Visit: 9/5/23  Future [facility] Appointments: None  Medication last refilled: 6/16/23 #90 with 1 refill(s)    Required labs per protocol:    LAB REF RANGE 12/9/22 9/5/23   Creatinine 0.51-0.95 mg/dL 1.32 High 1.20 High   Hgb A1C 0.0-5.6 % 6.7 High 6.1 High     Medication is being filled for 1 time refill only due to:  Patient needs labs Creatinine and Hgb A1C. Patient needs to be seen because due for diabetic follow up.    [employer] message sent to patient to call and schedule appointment.    DELIA FernandesN, RN, CCM

## 2023-12-29 NOTE — PATIENT INSTRUCTIONS
This is androgenic alopecia (female pattern hair loss)    Start taking 1/2 tablet of finasteride daily     Option of starting OTC 5% minoxidil to the crown of your scalp daily    You can also consider OTC Viviscal or Nutrafol (these are vitamins that have been shown to help hair loss)

## 2023-12-29 NOTE — LETTER
12/29/2023         RE: Karine Moss  [address]  [facility] [address]        Dear Colleague,    Thank you for referring your patient, Karine Moss, to the [facility]. Please see a copy of my visit note below.    HPI:   Chief complaints: Karine Moss is a pleasant 68 year old female who presents for evaluation of hair loss. She has had gradual thinning of her scalp hair for many years, worse since 2015 post bariatric surgery. She denies any pain or itching on her scalp. She has not tried any treatments for this yet. She had blood work done in 2020 and more recently which was all normal. Fhx of hair loss in the males of her family.       PHYSICAL EXAM:    There were no vitals taken for this visit.  Skin exam performed as follows: Type 2 skin. Mood appropriate  Alert and Oriented X 3. Well developed, well nourished in no distress.  General appearance: Normal  Head including face: Normal  Eyes: conjunctiva and lids: Normal  Mouth: Lips, teeth, gums: Normal  Neck: Normal  Skin: Scalp and body hair: See below    Decreased density through vertex scalp. Scalp normal without erythema or scaling. Negative hair pull.     ASSESSMENT/PLAN:     Androgenic alopecia. Advised on natural course and progression secondary to hormones and genetics. Discussed topical minoxidil 5% foam or solution as well as finasteride.      --Start finasteride 2.5 mg daily. Discussed risks of decreased libido, decreased spermatogenesis, erectile dysfunction and increased risk of breast cancer in men.   --Start 5% minoxidil daily   --Photographs taken today for reference           Follow-up: 9-12 months  CC:   Scribed By: Deneen Pereira, MS, PASergeC      Again, thank you for allowing me to participate in the care of your patient.        Sincerely,        Deneen Pereira PA-C

## 2024-01-06 ENCOUNTER — OFFICE VISIT (OUTPATIENT)
Dept: URGENT CARE | Facility: URGENT CARE | Age: 69
End: 2024-01-06
Payer: COMMERCIAL

## 2024-01-06 ENCOUNTER — ANCILLARY PROCEDURE (OUTPATIENT)
Dept: GENERAL RADIOLOGY | Facility: CLINIC | Age: 69
End: 2024-01-06
Attending: INTERNAL MEDICINE
Payer: COMMERCIAL

## 2024-01-06 VITALS
DIASTOLIC BLOOD PRESSURE: 74 MMHG | BODY MASS INDEX: 22.57 KG/M2 | OXYGEN SATURATION: 98 % | WEIGHT: 128 LBS | HEART RATE: 91 BPM | SYSTOLIC BLOOD PRESSURE: 133 MMHG | TEMPERATURE: 98.1 F

## 2024-01-06 DIAGNOSIS — S92.351A CLOSED FRACTURE OF BASE OF FIFTH METATARSAL BONE OF RIGHT FOOT, INITIAL ENCOUNTER: Primary | ICD-10-CM

## 2024-01-06 DIAGNOSIS — S99.921A FOOT INJURY, RIGHT, INITIAL ENCOUNTER: ICD-10-CM

## 2024-01-06 PROCEDURE — 99213 OFFICE O/P EST LOW 20 MIN: CPT | Performed by: INTERNAL MEDICINE

## 2024-01-06 PROCEDURE — 73630 X-RAY EXAM OF FOOT: CPT | Mod: TC | Performed by: RADIOLOGY

## 2024-01-06 NOTE — PROGRESS NOTES
"  Assessment & Plan     Closed fracture of base of fifth metatarsal bone of right foot, initial encounter  Weight-bear as tolerated. Ortho follow-up for healing.  Given her history of fragility fracture and known diminished BMD, ongoing care as per Dr. Friedman for osteopenia / osteoporosis.  - Ankle/Foot Bracing Supplies Order Post-op Shoe; Right  - Orthopedic  Referral; Future    Foot injury, right, initial encounter  - XR Foot Right G/E 3 Views; Future    David Sanchez MD  [facility] [place]    Padilla Milton is a 68 year old, presenting for the following health issues:  Urgent Care and Foot Pain (C/O right foot pain after a fall last night)    HPI   Chief complaint of foot pain on the right side.  She had been sitting watching television and her \"foot fell asleep\" and then as she stood to walk the limb was still numb and she felt unsteady.  After three steps she fell toward the right and had a twisting motion of the right foot.  Pain is currently located over the lateral dorsum of the foot.     Review of the chart shows osteopenia and prior fragility fractures.  Dr. Friedman has been looking at starting Reclast.        Objective    /74   Pulse 91   Temp 98.1  F (36.7  C) (Tympanic)   Wt 58.1 kg (128 lb)   SpO2 98%   BMI 22.57 kg/m    Body mass index is 22.57 kg/m .  Physical Exam   GENERAL APPEARANCE: alert and no distress  RIGHT FOOT: ecchymotic and swollen over the dorsum and lateral forefoot with focal tenderness at the base of the fifth metatarsal.    X-ray, which I have personally reviewed and interpreted, shows a fracture at the base of the fifth metatarsal.                "

## 2024-01-06 NOTE — Clinical Note
Nick Cueva,  Saw this pleasant lady -- she has a low-energy fracture of the base of the fifth metatarsal and known osteopenia.  Getting care from Dr. Friedman who was wanting to start Reclast.  So, that's all good.  My only thought for you was any potential role for finasteride from a bone mineralization perspective (she's on this for androgenic alopecia).  Quick literature review indicated some papers which showed finasteride to be safe relative to BMD and fracture risk however the literature was all relatively old and was in men.  One theoretical and more recent piece suggested a possible detrimental effect.  So, just wanted to put the thought in your head from a risk/benefit perspective.  I think that the finasteride is a relatively new thing for her from dermatology and clearly the osteopenia pre-dates it.  So, it's just a subjective call there on risk/benefit.    Hope that you're well.  Long.

## 2024-01-08 NOTE — PROGRESS NOTES
"Karine Moss is a 68 year old female with hx of type II DM, peripheral neuropathy, narcolepsy, hyperlipidemia, sleep apnea, depression, irritable bowel, s/p gastric bypass surgery, osteopenia, PTSD,  who presents to the clinic today for a recheck of      DIABETES  Last eye exam -- overdue.  Retinopathy: none  Peripheral neuropathy: yes, R>L  Weight: up 5 lbs  Wt Readings from Last 3 Encounters:   01/09/24 60.3 kg (133 lb)   01/06/24 58.1 kg (128 lb)   11/27/23 58.4 kg (128 lb 12.8 oz)     Candidiasis/skin issues: no  UTI/ yeast infections: no.    S/p jimmy en y surgery 2013,  post prandial hypoglycemia?  Endo referral ?      DM Meds  Insulin--no longer using  Metformin--discontinued             Jardiance 10mg daily    Freestyle torsten: in the past month, lows 47  Average 118 in past 7 days  Last 14 day is 116, down to 47 on 12/26/23,  12:31 am, treated with 2 sugar pills  6:48 pm 12/25 test 192  Compliance: good    Aspirin Use: yes    Lab Results   Component Value Date    A1C 6.1 09/05/2023    A1C 5.9 06/12/2023    A1C 7.0 03/08/2021    A1C 6.2 09/04/2020     No results found for: \"MICROALBUMIN\"    HYPERLIPIDEMIA  Recent Labs   Lab Test 03/29/22  1033 03/08/21  1615 02/06/20  1523   CHOL 126 113.0 111.0   HDL 52 35.0* 47.0*   LDL 49 46.0 29.0   TRIG 123 157.0* 179.0*   CHOLHDLRATIO  --  3.2 2.4   Atorvastatin 20mg daily  No hx of ASCVD or stroke.  Compliant with med, not fasting today    HYPERTENSION  BP Readings from Last 3 Encounters:   01/06/24 133/74   11/27/23 (!) 148/87   09/05/23 126/79     Losartan 100mg daily  BP in target range    EXAM  Blood pressure 112/71, pulse 100, temperature 98.3  F (36.8  C), temperature source Skin, resp. rate 15, height 1.626 m (5' 4\"), weight 60.3 kg (133 lb), SpO2 97%, not currently breastfeeding.    Gen: Alert, NAD  Cor: S1S2, soft 1/6 murmur  Lungs: CTA bilaterally  Abd: soft nontender +BS   Ext: no edema, pulses palpable  Skin: no rash   Feet: no ulcerations, Callouses " absent, sensation diminished at toes, forefeet, bilaterally   Wearing a hard shoe, fracture of 5th metatarsal bone of right foot.       Assessment:  (E11.9) Type 2 diabetes mellitus without complication, without long-term current use of insulin (H)  (primary encounter diagnosis)  Comment: reports of hypoglycemia with mono Jardiance use  Lab Results   Component Value Date    A1C 6.4 01/09/2024    A1C 6.1 09/05/2023    A1C 5.9 06/12/2023   Plan: CBC with platelets, Hemoglobin A1c, Basic         Metabolic Panel ([place]), Lipid Profile,         Albumin Random Urine Quantitative with Creat         Ratio, MN FOOT EXAM NO CHARGE, blood glucose         monitoring (NO BRAND SPECIFIED) meter device         kit, blood glucose (NO BRAND SPECIFIED) test         strip, blood glucose (NO BRAND SPECIFIED)         lancets standard        Stop jardiance and measure blood sugars, treat any lows. Report back to me in next 2 wks. Refer to bariatric team if hypoglycemia recurs off Jardiance. Jardiance has low risk of hypoglycemia, so above reported episodes are unusual.    (E16.2) Hypoglycemia  Comment:  S/p jimmy en y surgery 2013,  post prandial hypoglycemia?  Endo referral ?  Plan: asked Karine to stop using Jardiance and check readings for the next 2-4 wks, send readings. If she continues to go low then refer to bariatric clinic. Treat any hypoglycemic episodes with juice or glucose tabs, recheck at 30 min to ensure sugars are rising.    (G62.9) Peripheral polyneuropathy  Comment: chronic, she is requesting referral to see neurologist as she is worried neuropathy may be from some other condition besides diabetes.   Plan: Adult Neurology  Referral            30minutes spend on the date of this encounter doing chart review, history and exam, documentation and further activities as noted above.       Anay Martinez MD  Internal Medicine/Pediatrics

## 2024-01-09 ENCOUNTER — OFFICE VISIT (OUTPATIENT)
Dept: FAMILY MEDICINE | Facility: CLINIC | Age: 69
End: 2024-01-09
Payer: COMMERCIAL

## 2024-01-09 VITALS
SYSTOLIC BLOOD PRESSURE: 112 MMHG | HEIGHT: 64 IN | HEART RATE: 100 BPM | TEMPERATURE: 98.3 F | WEIGHT: 133 LBS | RESPIRATION RATE: 15 BRPM | OXYGEN SATURATION: 97 % | DIASTOLIC BLOOD PRESSURE: 71 MMHG | BODY MASS INDEX: 22.71 KG/M2

## 2024-01-09 DIAGNOSIS — E16.2 HYPOGLYCEMIA: ICD-10-CM

## 2024-01-09 DIAGNOSIS — G62.9 PERIPHERAL POLYNEUROPATHY: ICD-10-CM

## 2024-01-09 DIAGNOSIS — S99.929A FOOT INJURY: Primary | ICD-10-CM

## 2024-01-09 DIAGNOSIS — E11.9 TYPE 2 DIABETES MELLITUS WITHOUT COMPLICATION, WITHOUT LONG-TERM CURRENT USE OF INSULIN (H): Primary | ICD-10-CM

## 2024-01-09 LAB
ANION GAP SERPL CALCULATED.3IONS-SCNC: 12 MMOL/L (ref 7–15)
BUN SERPL-MCNC: 29.6 MG/DL (ref 8–23)
CALCIUM SERPL-MCNC: 9.3 MG/DL (ref 8.8–10.2)
CHLORIDE SERPL-SCNC: 105 MMOL/L (ref 98–107)
CHOLEST SERPL-MCNC: 133 MG/DL
CREAT SERPL-MCNC: 1.55 MG/DL (ref 0.51–0.95)
CREAT UR-MCNC: 72.3 MG/DL
DEPRECATED HCO3 PLAS-SCNC: 24 MMOL/L (ref 22–29)
EGFRCR SERPLBLD CKD-EPI 2021: 36 ML/MIN/1.73M2
ERYTHROCYTE [DISTWIDTH] IN BLOOD BY AUTOMATED COUNT: 13.9 % (ref 10–15)
FASTING STATUS PATIENT QL REPORTED: NO
GLUCOSE SERPL-MCNC: 95 MG/DL (ref 70–99)
HBA1C MFR BLD: 6.4 % (ref 0–5.6)
HCT VFR BLD AUTO: 38.3 % (ref 35–47)
HDLC SERPL-MCNC: 55 MG/DL
HGB BLD-MCNC: 12.1 G/DL (ref 11.7–15.7)
LDLC SERPL CALC-MCNC: 52 MG/DL
MCH RBC QN AUTO: 28.9 PG (ref 26.5–33)
MCHC RBC AUTO-ENTMCNC: 31.6 G/DL (ref 31.5–36.5)
MCV RBC AUTO: 91 FL (ref 78–100)
MICROALBUMIN UR-MCNC: 42.6 MG/L
MICROALBUMIN/CREAT UR: 58.92 MG/G CR (ref 0–25)
NONHDLC SERPL-MCNC: 78 MG/DL
PLATELET # BLD AUTO: 160 10E3/UL (ref 150–450)
POTASSIUM SERPL-SCNC: 4.7 MMOL/L (ref 3.4–5.3)
RBC # BLD AUTO: 4.19 10E6/UL (ref 3.8–5.2)
SODIUM SERPL-SCNC: 141 MMOL/L (ref 135–145)
TRIGL SERPL-MCNC: 129 MG/DL
WBC # BLD AUTO: 7.6 10E3/UL (ref 4–11)

## 2024-01-09 ASSESSMENT — ANXIETY QUESTIONNAIRES
5. BEING SO RESTLESS THAT IT IS HARD TO SIT STILL: MORE THAN HALF THE DAYS
1. FEELING NERVOUS, ANXIOUS, OR ON EDGE: NOT AT ALL
7. FEELING AFRAID AS IF SOMETHING AWFUL MIGHT HAPPEN: NOT AT ALL
3. WORRYING TOO MUCH ABOUT DIFFERENT THINGS: NOT AT ALL
IF YOU CHECKED OFF ANY PROBLEMS ON THIS QUESTIONNAIRE, HOW DIFFICULT HAVE THESE PROBLEMS MADE IT FOR YOU TO DO YOUR WORK, TAKE CARE OF THINGS AT HOME, OR GET ALONG WITH OTHER PEOPLE: NOT DIFFICULT AT ALL
GAD7 TOTAL SCORE: 2
2. NOT BEING ABLE TO STOP OR CONTROL WORRYING: NOT AT ALL
GAD7 TOTAL SCORE: 2
6. BECOMING EASILY ANNOYED OR IRRITABLE: NOT AT ALL

## 2024-01-09 ASSESSMENT — PATIENT HEALTH QUESTIONNAIRE - PHQ9
5. POOR APPETITE OR OVEREATING: NOT AT ALL
SUM OF ALL RESPONSES TO PHQ QUESTIONS 1-9: 5

## 2024-01-09 NOTE — PATIENT INSTRUCTIONS
Stop taking the Jardiance  Continue checking FASTING blood sugar, before meals, bedtime  Send me readings after 2 weeks

## 2024-01-09 NOTE — NURSING NOTE
"68 year old  Chief Complaint   Patient presents with    RECHECK     Diabetes follow up.     Musculoskeletal Problem     Pt would like to discuss pain in their L arm. Mobility is limited.        Blood pressure 112/71, pulse 100, temperature 98.3  F (36.8  C), temperature source Skin, resp. rate 15, height 1.626 m (5' 4\"), weight 60.3 kg (133 lb), SpO2 97%, not currently breastfeeding. Body mass index is 22.83 kg/m .  Patient Active Problem List   Diagnosis    Vitamin D deficiency    Dysthymic disorder    Malaise and fatigue    Narcolepsy without cataplexy(347.00)    Keratoconus    Hyperlipidemia LDL goal <100    Obstructive sleep apnea    Vitamin D insufficiency    Major depression in partial remission (H24)    Plantar warts    Low back pain    DJD (degenerative joint disease) of knee    Pancreatitis    Panic    Chest pain    IBS (irritable bowel syndrome)    Heart murmur    Hypertension goal BP (blood pressure) < 140/90    Type 2 diabetes mellitus without complication, without long-term current use of insulin (H)    Osteopenia of hip    Acute conjunctivitis of left eye    PTSD (post-traumatic stress disorder)    Diabetic peripheral neuropathy (H)    Bilateral sciatica    Gastrointestinal hemorrhage associated with gastric ulcer    Chronic kidney disease, stage 3 (H)    Chronic diastolic congestive heart failure (H)    Upper GI bleed    Neck pain    Closed nondisplaced fracture of head of right radius, initial encounter    Injury of head, initial encounter    Fall, initial encounter    Closed fracture of right hand, initial encounter    Closed fracture of left wrist, initial encounter    Left wrist pain    Wrist stiffness, left    Physical deconditioning    Osteopenia of spine    Personal history of fall       Wt Readings from Last 2 Encounters:   01/09/24 60.3 kg (133 lb)   01/06/24 58.1 kg (128 lb)     BP Readings from Last 3 Encounters:   01/09/24 112/71   01/06/24 133/74   11/27/23 (!) 148/87         Current " Outpatient Medications   Medication    amphetamine-dextroamphetamine (ADDERALL) 30 MG tablet    atorvastatin (LIPITOR) 20 MG tablet    blood glucose (NO BRAND SPECIFIED) test strip    blood glucose (NO BRAND SPECIFIED) test strip    calcium carbonate (OS-KINJAL) 1500 (600 Ca) MG tablet    cevimeline (EVOXAC) 30 MG capsule    cholecalciferol (VITAMIN D3) 125 mcg (5000 units) capsule    COMPOUNDED NON-CONTROLLED SUBSTANCE (CMPD RX) - PHARMACY TO MIX COMPOUNDED MEDICATION    Continuous Blood Gluc  (FREESTYLE JORDIN 14 DAY READER) EBEN    Continuous Blood Gluc Sensor (FREESTYLE JORDIN 14 DAY SENSOR) MISC    cyanocobalamin (VITAMIN B-12) 1000 MCG tablet    DULoxetine (CYMBALTA) 30 MG capsule    DULoxetine (CYMBALTA) 60 MG capsule    empagliflozin (JARDIANCE) 10 MG TABS tablet    finasteride (PROSCAR) 5 MG tablet    losartan (COZAAR) 100 MG tablet    omeprazole (PRILOSEC) 40 MG DR capsule    tolterodine ER (DETROL LA) 4 MG 24 hr capsule    traZODone (DESYREL) 50 MG tablet    triamcinolone (KENALOG) 0.1 % paste     No current facility-administered medications for this visit.       Social History     Tobacco Use    Smoking status: Never     Passive exposure: Never    Smokeless tobacco: Never   Vaping Use    Vaping Use: Never used   Substance Use Topics    Alcohol use: Not Currently     Comment: rare    Drug use: No       Health Maintenance Due   Topic Date Due    HF ACTION PLAN  Never done    ADVANCE CARE PLANNING  Never done    HEPATITIS C SCREENING  Never done    RSV VACCINE (Pregnancy & 60+) (1 - 1-dose 60+ series) Never done    MEDICARE ANNUAL WELLNESS VISIT  04/12/2020    EYE EXAM  03/10/2021    COLORECTAL CANCER SCREENING  11/17/2021    Pneumococcal Vaccine: 65+ Years (2 of 2 - PCV) 05/27/2022    FALL RISK ASSESSMENT  02/17/2023    LIPID  03/29/2023    MICROALBUMIN  08/25/2023    PHQ-9  09/30/2023    ALT  12/09/2023       Lab Results   Component Value Date    PAP NIL 04/12/2019 January 9, 2024 10:18 AM

## 2024-01-13 PROBLEM — K92.2 UPPER GI BLEED: Status: RESOLVED | Noted: 2021-11-16 | Resolved: 2024-01-13

## 2024-01-13 PROBLEM — M54.2 NECK PAIN: Status: RESOLVED | Noted: 2022-10-15 | Resolved: 2024-01-13

## 2024-01-13 PROBLEM — M25.532 LEFT WRIST PAIN: Status: RESOLVED | Noted: 2023-02-24 | Resolved: 2024-01-13
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
## 2024-01-13 PROBLEM — R53.81 PHYSICAL DECONDITIONING: Status: RESOLVED | Noted: 2023-03-06 | Resolved: 2024-01-13

## 2024-01-13 PROBLEM — M25.632 WRIST STIFFNESS, LEFT: Status: RESOLVED | Noted: 2023-02-24 | Resolved: 2024-01-13

## 2024-01-13 PROBLEM — S62.91XA CLOSED FRACTURE OF RIGHT HAND, INITIAL ENCOUNTER: Status: RESOLVED | Noted: 2022-10-15 | Resolved: 2024-01-13

## 2024-01-13 PROBLEM — S52.124A CLOSED NONDISPLACED FRACTURE OF HEAD OF RIGHT RADIUS, INITIAL ENCOUNTER: Status: RESOLVED | Noted: 2022-10-15 | Resolved: 2024-01-13

## 2024-01-13 PROBLEM — H10.32 ACUTE CONJUNCTIVITIS OF LEFT EYE: Status: RESOLVED | Noted: 2019-05-20 | Resolved: 2024-01-13

## 2024-01-13 PROBLEM — S09.90XA INJURY OF HEAD, INITIAL ENCOUNTER: Status: RESOLVED | Noted: 2022-10-15 | Resolved: 2024-01-13

## 2024-01-13 PROBLEM — S62.102A CLOSED FRACTURE OF LEFT WRIST, INITIAL ENCOUNTER: Status: RESOLVED | Noted: 2022-10-15 | Resolved: 2024-01-13

## 2024-01-13 PROBLEM — Z91.81 PERSONAL HISTORY OF FALL: Status: RESOLVED | Noted: 2023-03-06 | Resolved: 2024-01-13

## 2024-01-29 DIAGNOSIS — E11.42 DIABETIC POLYNEUROPATHY ASSOCIATED WITH TYPE 2 DIABETES MELLITUS (H): ICD-10-CM

## 2024-01-29 DIAGNOSIS — E11.9 TYPE 2 DIABETES MELLITUS WITHOUT COMPLICATION, WITHOUT LONG-TERM CURRENT USE OF INSULIN (H): ICD-10-CM

## 2024-01-29 RX ORDER — DULOXETIN HYDROCHLORIDE 30 MG/1
30 CAPSULE, DELAYED RELEASE ORAL 2 TIMES DAILY
Qty: 180 CAPSULE | Refills: 1 | Status: SHIPPED | OUTPATIENT
Start: 2024-01-29 | End: 2024-07-26

## 2024-01-29 RX ORDER — DULOXETIN HYDROCHLORIDE 60 MG/1
60 CAPSULE, DELAYED RELEASE ORAL AT BEDTIME
Qty: 90 CAPSULE | Refills: 1 | Status: SHIPPED | OUTPATIENT
Start: 2024-01-29 | End: 2024-07-26

## 2024-01-29 NOTE — TELEPHONE ENCOUNTER
Duloxetine (Cymbalta) 30 mg + 60 mg + Empagliflozin (Jardiance) 10 mg    Last Office Visit: 1/9/24  Future [facility] Appointments: None    Medication last refilled:   6/16/23 #180 with 1 refill(s) - Duloxetine 30 mg  6/16/23 #90 with 1 refill(s) - Duloxetine 60 mg  12/29/23 #30 with 0 refill(s) - Jardiance    PHQ-9 / DELMY-7 Scores  1/4/23 3/30/23 1/9/24   DELMY-7 Score DocFlow 2 1 2   PHQ-9 Score DocFlow 2 2 5     Required labs per protocol:    LAB REF RANGE 9/5/23 1/9/24   Creatinine 0.67-1.17 mg/dL 1.20 High 1.55 High   Hgb A1C 0.0-5.6 % 6.1 High 6.4 High     Prescription approved per [facility] Refill Protocol.    DELIA FernandesN, RN, CCM

## 2024-02-09 ENCOUNTER — OFFICE VISIT (OUTPATIENT)
Dept: ORTHOPEDICS | Facility: CLINIC | Age: 69
End: 2024-02-09
Attending: INTERNAL MEDICINE
Payer: COMMERCIAL

## 2024-02-09 ENCOUNTER — ANCILLARY PROCEDURE (OUTPATIENT)
Dept: GENERAL RADIOLOGY | Facility: CLINIC | Age: 69
End: 2024-02-09
Attending: FAMILY MEDICINE
Payer: COMMERCIAL

## 2024-02-09 DIAGNOSIS — M25.512 CHRONIC LEFT SHOULDER PAIN: ICD-10-CM

## 2024-02-09 DIAGNOSIS — G89.29 CHRONIC LEFT SHOULDER PAIN: Primary | ICD-10-CM

## 2024-02-09 DIAGNOSIS — S99.929A FOOT INJURY: ICD-10-CM

## 2024-02-09 DIAGNOSIS — S92.351A CLOSED FRACTURE OF BASE OF FIFTH METATARSAL BONE OF RIGHT FOOT, INITIAL ENCOUNTER: ICD-10-CM

## 2024-02-09 DIAGNOSIS — M25.512 CHRONIC LEFT SHOULDER PAIN: Primary | ICD-10-CM

## 2024-02-09 DIAGNOSIS — G89.29 CHRONIC LEFT SHOULDER PAIN: ICD-10-CM

## 2024-02-09 PROCEDURE — 73630 X-RAY EXAM OF FOOT: CPT | Mod: RT | Performed by: RADIOLOGY

## 2024-02-09 PROCEDURE — 99214 OFFICE O/P EST MOD 30 MIN: CPT | Performed by: FAMILY MEDICINE

## 2024-02-09 PROCEDURE — 73030 X-RAY EXAM OF SHOULDER: CPT | Mod: LT | Performed by: RADIOLOGY

## 2024-02-09 NOTE — LETTER
"  2/9/2024      RE: Karine Moss  [address]  [facility] [address]     Dear Colleague,    Thank you for referring your patient, Karine Moss, to the [facility] [place]. Please see a copy of my visit note below.    ASSESSMENT/PLAN:    69 yo white female RHD with PMhx of bilateral sciatica, snores, presenting with right foot pain and left shoulder pain  Right foot- healed 5th MTP fracture  Left shoulder pain- +impingement signs, supraspinatus  PT  Ice and heat packs  Capsaicin cream  If the patient is unable to sleep or if pain escalates or the shoulder locks up patient to [facility] and we will consider ultrasound-guided injection    RTC 6-8 weeks    Pt is a 68 year old female here today for:     right sided 5th MT fx      HPI: right foot is feeling good, feels ok to drive  Left arm- hot coals burning- burning resolved, had pain in arm ever since  Can't reach all the way- 1 year  Very painful to rotate the arm  Can't sleep on her back  Ice, hot packs, capsaician    right foot:  Location: Right lateral foot   Duration:1/6/24   Trauma/ Fall? She had been sitting watching television and her \"foot fell asleep\" and then as she stood to walk the limb was still numb and she felt unsteady. After three steps she fell toward the right and had a twisting motion of the right foot.    Able to walk? Yes   Swelling? Not currently   Bruising? Not currently  Numbness/ Tingling? None   Weakness? None   Instability? None  Snapping/Clicking? None  Imaging? XR 1/6/24 2/9/24    Treatment? Post op shoe which she discontinued 1.5 weeks ago      EXAM:     Right  Foot/Ankle: none tender    left Shoulder:   Inspection: asymmetry? Unable to lift overhead; dyskinesis? mild   ROM:   Active - forward flexion - 90/ abduction - 90 int rot - symmetric/ ext rot - symmetric   Passive- forward flexion - 140/ abduction - 140  Strength: deltoid/supraspinatous - 5/5-; infraspinatous/ teres minor - 5/5; " subscapularis - 5/5   Maneuvers:   RTC - empty can - pos; painful arc - pos; lift off - neg   Impingement - Conroy -neg; Neer- neg   AC - cross arm - neg   Biceps - Speed's - neg; Yergason's - neg   Labrum - Leone's - neg; Hubbard shear - not performed  Instability - Apprehension - not performed  Palpation: AC - neg; Acromion/ supraspinatus - neg; scapula - neg; bicipital groove - neg         Past Medical History:   Diagnosis Date     Arthritis      Basal cell carcinoma      Chest pain     seeing Cardiology     Depression 1991    after 's death     Diabetes mellitus (H)      Diabetic renal disease (H)     microalbuminuria     DJD (degenerative joint disease) of knee      H/O vitamin D deficiency      Hypertension      IBS (irritable bowel syndrome)     diarrhea      Keratoconus      Low back pain      Narcolepsy 2007    Dx'd by Sleep specialist 347.00, pt discontinued Adderal mid Nov. 13 due to tacycardia concerns     Obesity      Obstructive sleep apnea     327.23, 3 sleep studies,Dr. [facility]     Pancreatitis     related to Victoza, also on Xyrem     Panic      RLS (restless legs syndrome)      Sinus tachycardia       Past Surgical History:   Procedure Laterality Date     COLONOSCOPY  10/01/2020    poor quality prep     ear surgery  2002 and 01/2004     ESOPHAGOSCOPY, GASTROSCOPY, DUODENOSCOPY (EGD), COMBINED N/A 11/16/2021    Procedure: ESOPHAGOGASTRODUODENOSCOPY (EGD);  Surgeon: Jayla Calvo MD;  Location: [facility]     ESOPHAGOSCOPY, GASTROSCOPY, DUODENOSCOPY (EGD), COMBINED N/A 1/9/2023    Procedure: ESOPHAGOGASTRODUODENOSCOPY, WITH BIOPSY;  Surgeon: Brandon Witt MD;  Location:  GI     GASTRIC BYPASS  2013    laparoscopic jimmy en y c ometopexy     HEMORRHOIDECTOMY  09/14/2000     LAPAROSCOPIC CHOLECYSTECTOMY  2015     LASIK BILATERAL       UVULOPALATOPHARYNGOPLASTY       UVVP        Current Outpatient Medications   Medication Sig Dispense Refill     amphetamine-dextroamphetamine  (ADDERALL) 30 MG tablet Take 1 tablet (30 mg) by mouth every 24 hours 1.5 mg a total of 45 mg  Daily 30 tablet 0     atorvastatin (LIPITOR) 20 MG tablet Take 1 tablet (20 mg) by mouth daily 90 tablet 1     blood glucose (NO BRAND SPECIFIED) lancets standard Use to test blood sugar 1 times daily or as directed. 90 Lancet 3     blood glucose (NO BRAND SPECIFIED) test strip Use to test blood sugar 1 times daily or as directed. 90 strip 3     blood glucose (NO BRAND SPECIFIED) test strip Use to test blood sugar as needed with Freestyle Nirmal CGM. 100 strip 0     blood glucose (NO BRAND SPECIFIED) test strip Use to test blood sugar as directed with CGM sensor. 50 strip 1     blood glucose monitoring (NO BRAND SPECIFIED) meter device kit Use to test blood sugar 1 times daily or as directed. 1 kit 0     calcium carbonate (OS-KINJAL) 1500 (600 Ca) MG tablet Take 2 tablets (1,200 mg) by mouth daily       cevimeline (EVOXAC) 30 MG capsule take 1 cap  capsule 1     cholecalciferol (VITAMIN D3) 125 mcg (5000 units) capsule Take 125 mcg by mouth daily       COMPOUNDED NON-CONTROLLED SUBSTANCE (CMPD RX) - PHARMACY TO MIX COMPOUNDED MEDICATION Estradiol 0.0075% in HRT cream  Apply 2 grams,  intravaginally and small amount externally daily for one week then twice weekly for maintenance. 45 g 11     Continuous Blood Gluc  (FREESTYLE NIRMAL 14 DAY READER) EBEN Use to read blood sugars as per 's instructions. 1 each 0     Continuous Blood Gluc Sensor (FREESTYLE NIRMAL 14 DAY SENSOR) MISC Change every 14 days. 2 each 11     cyanocobalamin (VITAMIN B-12) 1000 MCG tablet Take one every other day 90 tablet 3     DULoxetine (CYMBALTA) 30 MG capsule Take 1 capsule (30 mg) by mouth 2 times daily 180 capsule 1     DULoxetine (CYMBALTA) 60 MG capsule Take 1 capsule (60 mg) by mouth at bedtime Take in addition to current 30 mg evening dose for a total of 90 mg at bedtime. 90 capsule 1     empagliflozin (JARDIANCE) 10 MG  TABS tablet Take one daily 90 tablet 0     finasteride (PROSCAR) 5 MG tablet Take 1/2 tablet daily 45 tablet 3     losartan (COZAAR) 100 MG tablet Take 1 tablet (100 mg) by mouth daily 90 tablet 1     omeprazole (PRILOSEC) 40 MG DR capsule Take 40mg twice daily 180 capsule 1     tolterodine ER (DETROL LA) 4 MG 24 hr capsule Take 1 capsule (4 mg) by mouth daily 90 capsule 3     traZODone (DESYREL) 50 MG tablet Take 1 tablet by mouth every 24 hours       triamcinolone (KENALOG) 0.1 % paste Apply to tongue twice daily x 10 days 5 g 0      Allergies   Allergen Reactions     Pravastatin Other (See Comments)     woozy     Codeine Nausea and Vomiting     Nsaids GI Disturbance and Other (See Comments)     Pt had bariatric surgery, should not ever take oral NSAIDS due to risk of gastric ulcers.  Pt had bariatric surgery, should not ever take oral NSAIDS due to risk of gastric ulcers.      ROS:   Gen- no fevers/chills   Rheum - no morning stiffness   Derm - no rash/ redness   Neuro - no numbness, no tingling   Remainder of ROS negative.     Exam:   There were no vitals taken for this visit.     [place] of right foot on [place] 9, 20[place] [facility] - films personally reviewed with patient at time of visit     My impression: healed fracture on 5th MTP     Today x-ray of the left shoulder taken demonstrates mild arthritis      Again, thank you for allowing me to participate in the care of your patient.      Sincerely,    Roberta Friedman MD

## 2024-02-09 NOTE — PROGRESS NOTES
"ASSESSMENT/PLAN:    69 yo white female RHD with PMhx of bilateral sciatica, snores, presenting with right foot pain and left shoulder pain  Right foot- healed 5th MTP fracture  Left shoulder pain- +impingement signs, supraspinatus  PT  Ice and heat packs  Capsaicin cream  If the patient is unable to sleep or if pain escalates or the shoulder locks up patient to My[facility] us and we will consider ultrasound-guided injection    RTC 6-8 weeks    Pt is a 68 year old female here today for:     right sided 5th MT fx      HPI: right foot is feeling good, feels ok to drive  Left arm- hot coals burning- burning resolved, had pain in arm ever since  Can't reach all the way- 1 year  Very painful to rotate the arm  Can't sleep on her back  Ice, hot packs, capsaician    right foot:  Location: Right lateral foot   Duration:1/6/24   Trauma/ Fall? She had been sitting watching television and her \"foot fell asleep\" and then as she stood to walk the limb was still numb and she felt unsteady. After three steps she fell toward the right and had a twisting motion of the right foot.    Able to walk? Yes   Swelling? Not currently   Bruising? Not currently  Numbness/ Tingling? None   Weakness? None   Instability? None  Snapping/Clicking? None  Imaging? XR 1/6/24 2/9/24    Treatment? Post op shoe which she discontinued 1.5 weeks ago      EXAM:     Right  Foot/Ankle: none tender    left Shoulder:   Inspection: asymmetry? Unable to lift overhead; dyskinesis? mild   ROM:   Active - forward flexion - 90/ abduction - 90 int rot - symmetric/ ext rot - symmetric   Passive- forward flexion - 140/ abduction - 140  Strength: deltoid/supraspinatous - 5/5-; infraspinatous/ teres minor - 5/5; subscapularis - 5/5   Maneuvers:   RTC - empty can - pos; painful arc - pos; lift off - neg   Impingement - Conroy -neg; Neer- neg   AC - cross arm - neg   Biceps - Speed's - neg; Yergason's - neg   Labrum - Leone's - neg; Hubbard shear - not performed  Instability - " Apprehension - not performed  Palpation: AC - neg; Acromion/ supraspinatus - neg; scapula - neg; bicipital groove - neg         Past Medical History:   Diagnosis Date    Arthritis     Basal cell carcinoma     Chest pain     seeing Cardiology    Depression 1991    after 's death    Diabetes mellitus (H)     Diabetic renal disease (H)     microalbuminuria    DJD (degenerative joint disease) of knee     H/O vitamin D deficiency     Hypertension     IBS (irritable bowel syndrome)     diarrhea     Keratoconus     Low back pain     Narcolepsy 2007    Dx'd by Sleep specialist 347.00, pt discontinued Adderal mid Nov. 13 due to tacycardia concerns    Obesity     Obstructive sleep apnea     327.23, 3 sleep studies,Dr. Flaco [facility]    Pancreatitis     related to Victoza, also on Xyrem    Panic     RLS (restless legs syndrome)     Sinus tachycardia       Past Surgical History:   Procedure Laterality Date    COLONOSCOPY  10/01/2020    poor quality prep    ear surgery  2002 and 01/2004    ESOPHAGOSCOPY, GASTROSCOPY, DUODENOSCOPY (EGD), COMBINED N/A 11/16/2021    Procedure: ESOPHAGOGASTRODUODENOSCOPY (EGD);  Surgeon: Jayla Calvo MD;  Location: [facility]    ESOPHAGOSCOPY, GASTROSCOPY, DUODENOSCOPY (EGD), COMBINED N/A 1/9/2023    Procedure: ESOPHAGOGASTRODUODENOSCOPY, WITH BIOPSY;  Surgeon: Brandon Witt MD;  Location: [facility]    GASTRIC BYPASS  2013    laparoscopic jimmy en y c ometopexy    HEMORRHOIDECTOMY  09/14/2000    LAPAROSCOPIC CHOLECYSTECTOMY  2015    LASIK BILATERAL      UVULOPALATOPHARYNGOPLASTY      UVVP        Current Outpatient Medications   Medication Sig Dispense Refill    amphetamine-dextroamphetamine (ADDERALL) 30 MG tablet Take 1 tablet (30 mg) by mouth every 24 hours 1.5 mg a total of 45 mg  Daily 30 tablet 0    atorvastatin (LIPITOR) 20 MG tablet Take 1 tablet (20 mg) by mouth daily 90 tablet 1    blood glucose (NO BRAND SPECIFIED) lancets standard Use to test blood sugar 1 times daily or as  directed. 90 Lancet 3    blood glucose (NO BRAND SPECIFIED) test strip Use to test blood sugar 1 times daily or as directed. 90 strip 3    blood glucose (NO BRAND SPECIFIED) test strip Use to test blood sugar as needed with Freestyle Nirmal CGM. 100 strip 0    blood glucose (NO BRAND SPECIFIED) test strip Use to test blood sugar as directed with CGM sensor. 50 strip 1    blood glucose monitoring (NO BRAND SPECIFIED) meter device kit Use to test blood sugar 1 times daily or as directed. 1 kit 0    calcium carbonate (OS-KINJAL) 1500 (600 Ca) MG tablet Take 2 tablets (1,200 mg) by mouth daily      cevimeline (EVOXAC) 30 MG capsule take 1 cap  capsule 1    cholecalciferol (VITAMIN D3) 125 mcg (5000 units) capsule Take 125 mcg by mouth daily      COMPOUNDED NON-CONTROLLED SUBSTANCE (CMPD RX) - PHARMACY TO MIX COMPOUNDED MEDICATION Estradiol 0.0075% in HRT cream  Apply 2 grams,  intravaginally and small amount externally daily for one week then twice weekly for maintenance. 45 g 11    Continuous Blood Gluc  (FREESTYLE NIRMAL 14 DAY READER) EBEN Use to read blood sugars as per 's instructions. 1 each 0    Continuous Blood Gluc Sensor (FREESTYLE NIRMAL 14 DAY SENSOR) MISC Change every 14 days. 2 each 11    cyanocobalamin (VITAMIN B-12) 1000 MCG tablet Take one every other day 90 tablet 3    DULoxetine (CYMBALTA) 30 MG capsule Take 1 capsule (30 mg) by mouth 2 times daily 180 capsule 1    DULoxetine (CYMBALTA) 60 MG capsule Take 1 capsule (60 mg) by mouth at bedtime Take in addition to current 30 mg evening dose for a total of 90 mg at bedtime. 90 capsule 1    empagliflozin (JARDIANCE) 10 MG TABS tablet Take one daily 90 tablet 0    finasteride (PROSCAR) 5 MG tablet Take 1/2 tablet daily 45 tablet 3    losartan (COZAAR) 100 MG tablet Take 1 tablet (100 mg) by mouth daily 90 tablet 1    omeprazole (PRILOSEC) 40 MG DR capsule Take 40mg twice daily 180 capsule 1    tolterodine ER (DETROL LA) 4 MG 24 hr  capsule Take 1 capsule (4 mg) by mouth daily 90 capsule 3    traZODone (DESYREL) 50 MG tablet Take 1 tablet by mouth every 24 hours      triamcinolone (KENALOG) 0.1 % paste Apply to tongue twice daily x 10 days 5 g 0      Allergies   Allergen Reactions    Pravastatin Other (See Comments)     woozy    Codeine Nausea and Vomiting    Nsaids GI Disturbance and Other (See Comments)     Pt had bariatric surgery, should not ever take oral NSAIDS due to risk of gastric ulcers.  Pt had bariatric surgery, should not ever take oral NSAIDS due to risk of gastric ulcers.      ROS:   Gen- no fevers/chills   Rheum - no morning stiffness   Derm - no rash/ redness   Neuro - no numbness, no tingling   Remainder of ROS negative.     Exam:   There were no vitals taken for this visit.     Xray of right foot on February 9, 2024 at [facility] [place] location - films personally reviewed with patient at time of visit     My impression: healed fracture on 5th MTP     Today x-ray of the left shoulder taken demonstrates mild arthritis

## 2024-02-24 NOTE — TELEPHONE ENCOUNTER
Empagliflozin (Jardiance) 10 mg    Last Office Visit: 12/6/22  Future [facility] Appointments: None  Medication last refilled: 12/31/22 #30 with 0 refill(s)    Required labs per protocol:    LAB REF RANGE 10/17/22 12/9/22   Creatinine 0.51-0.95 mg/dL 1.04 High 1.32 High   Hgb A1C 4.1-5.7 % 6.2 High 6.7 High     Routing refill request to provider for review/approval because:  Labs out of range:  Elevated Creatinine and Hgb A1C    Gabrielle Blas saw Karine in clinic on 12/6/22 and will follow PRN as Hgb A1C has been under her goal of 7.    Joi Taveras, DELIAN, RN, CCM     Yes

## 2024-03-15 ENCOUNTER — THERAPY VISIT (OUTPATIENT)
Dept: PHYSICAL THERAPY | Facility: CLINIC | Age: 69
End: 2024-03-15
Payer: COMMERCIAL

## 2024-03-15 DIAGNOSIS — G89.29 CHRONIC LEFT SHOULDER PAIN: ICD-10-CM

## 2024-03-15 DIAGNOSIS — M25.512 CHRONIC LEFT SHOULDER PAIN: ICD-10-CM

## 2024-03-15 PROCEDURE — 97161 PT EVAL LOW COMPLEX 20 MIN: CPT | Mod: GP

## 2024-03-15 PROCEDURE — 97110 THERAPEUTIC EXERCISES: CPT | Mod: GP

## 2024-03-15 ASSESSMENT — ACTIVITIES OF DAILY LIVING (ADL): PLEASE_INDICATE_YOR_PRIMARY_REASON_FOR_REFERRAL_TO_THERAPY:: SHOULDER

## 2024-03-15 NOTE — PROGRESS NOTES
PHYSICAL THERAPY EVALUATION  Type of Visit: Evaluation    See electronic medical record for Abuse and Falls Screening details.  KEY PT FINDINGS:  1) Hypomobility in GH anterior and inferior glides on LUE  2) Mild pain with shoulder abduction resisted testing  3) Negative RC pathology tests    Therapist Assessment: Karine Moss is a 69 year old female patient presenting to Physical Therapy with left shoulder pain. Patient demonstrates pain, weakness, UE flexibility limitations, joint mobility restrictions and postural impairments. Signs and symptoms are consistent with osteoarthritis of left shoulder. These impairments limit their ability to take jackets on and off, and lift arms up overhead. Skilled PT services are necessary in order to reduce impairments and improve independent function.    Patient was referred by Roberta Friedman MD on 2/9/24.    Precautions/restrictions/MD instructions include: None  Subjective History    Patient is a 69 year old female presenting to outpatient physical therapy with chronic left shoulder pain that started about 6 months. Both arms hurt, but the left arm is more bothersome. The pain started randomly with no specific KIMMIE. She mentions that she had a fall around that time when she first started having pain but is unsure if she landed on her arm or not. Reaching up overhead and reaching behind the back are the most bothersome. Pain is located anterior lateral shoulder and can radiate down to the elbow. Denies any numbness or tingling. Denies any sudden weakness. Denies any neck pain. Pain can get severe when using it, but once she puts it back down and rests it, it completely goes away instantly.    Pain: Pain Level at Rest: 0/10  Pain Level with Use: 8/10  Pain Location: shoulder  Pain Quality: Sharp  Pain Frequency: intermittent  Pain is Worst: depends on activity; waking up in the morning can be painful when getting out of bed  Pain is Exacerbated By: Sleeping on her left  side, reaching up overhead, reaching behind the back.  Pain is Relieved By: heat and rest  Pain Progression: Unchanged    Red Flags review findings: denies    Prior Treatment Includes: None    Medications: OTC pain meds    Imaging:   Narrative & Impression   4 views left shoulder radiographs 2/9/2024 4:25 PM     History: Chronic left shoulder pain; Chronic left shoulder pain      Comparison: None     Findings:     AP, Grashey, transscapular Y, axillary  views of the left shoulder  were obtained.      No acute osseous abnormality. Glenohumeral and acromioclavicular  joints are congruent.     Moderate degenerative changes of the acromioclavicular joint. Mild to  moderate degenerative change of the glenohumeral joint.     Soft tissue is unremarkable. The visualized lung is clear.                                                                     Impression:  1. No acute osseous abnormality.  2. Mild to moderate glenohumeral degenerative change.     ARNOL ARORA MD (Joe)       Lifestyle & General Medical History:   - Occupation: Retired   - Experience with physical activity: None   - Notable medical history: CHF, osteopenia, Diabetic Peripheral Neuropathy, DMII, HTN, history of gastric bypass    Current Functional Status: Independent  Previous Functional Status:  fully functional prior to pain onset/injury.  Outcome measure: SPADI    General health as reported by patient: NT.    Additional considerations:  Blood sugars are well managed.    Patient Goals for Physical Therapy: I want the pain to be gone and mobility back, 100% if possible.       Objective   SHOULDER EVALUATION  INTEGUMENTARY (edema, incisions): WNL  POSTURE: Standing Posture: Rounded shoulders, Forward head  Sitting Posture: Rounded shoulders, Forward head  ROM:   (Degrees) Left AROM Left PROM Right AROM  Right PROM   Shoulder Flexion 130 +pain Limited approx 145 135 WNL   Shoulder Extension       Shoulder Abduction 133 +pain Limited approx 140  WNL WNL   Shoulder Adduction       Shoulder Internal Rotation PSIS +pain Limited L1 WNL   Shoulder External Rotation 50 +pain at end range Limited +pain 62 WNL   Shoulder Horizontal Abduction       Shoulder Horizontal Adduction       Shoulder Flexion ER       Shoulder Flexion IR       Elbow Extension       Elbow Flexion         STRENGTH:   Pain: - none + mild ++ moderate +++ severe  Strength Scale: 0-5/5 Left Right   Shoulder Flexion 5 5   Shoulder Extension 5 5   Shoulder Abduction 4 +pain 5   Shoulder Adduction     Shoulder Internal Rotation 5 5   Shoulder External Rotation 4 5   Shoulder Horizontal Abduction     Shoulder Horizontal Adduction     Elbow Flexion 5 5   Elbow Extension 5 5   Mid Trap     Lower Trap     Rhomboid     Serratus Anterior       FLEXIBILITY: Decreased rhomboids L, Decreased pectoralis minor L  SPECIAL TESTS:    Left Right   Impingement     Neer's Positive Negative    Hawkin's-Kiran Positive Negative    Coracoid Impingement Positive Negative    Internal impingement     Labral     Anterior Slide     Erie's     Crank     Instability     Apprehension (anterior)     Relocation (anterior)     Anterior Load & Shift     Posterior Load & Shift     Posterior instability (with 90 degrees flex)     Multi-Directional Instability      Sulcus     Biceps      Speed's     Rotator Cuff Tear     Drop Arm Negative  Negative    Belly Press Negative  Negative    Lift off  Negative  Negative      PALPATION: WNL  JOINT MOBILITY:    Left Right   Glenohumeral anterior WNL WNL   Glenohumeral posterior Hypomobile WNL   Glenohumeral inferior Hypomobile WNL   Acromioclavicular     Scapulothoracic     Sternoclavicular     Scapulohumeral rhythm       CERVICAL SCREEN: WNL    Assessment & Plan   CLINICAL IMPRESSIONS  Medical Diagnosis: Osteoarthritis of left shoulder    Treatment Diagnosis: L shoulder pain   Impression/Assessment: Patient is a 69 year old female with left shoulder pain complaints.  The following  significant findings have been identified: Pain, Decreased ROM/flexibility, Decreased joint mobility, Decreased strength, Impaired muscle performance, Decreased activity tolerance, and Impaired posture. These impairments interfere with their ability to perform self care tasks, work tasks, recreational activities, household chores, driving , household mobility, and community mobility as compared to previous level of function.     Clinical Decision Making (Complexity):  Clinical Presentation: Stable/Uncomplicated  Clinical Presentation Rationale: based on medical and personal factors listed in PT evaluation  Clinical Decision Making (Complexity): Low complexity    PLAN OF CARE  Treatment Interventions:  Interventions: Manual Therapy, Neuromuscular Re-education, Therapeutic Activity, Therapeutic Exercise, Self-Care/Home Management    Long Term Goals     PT Goal 1  Goal Identifier: LTG1  Goal Description: Patient will be able to lift left arm 140 degrees without any pain  Rationale: to maximize safety and independence with performance of ADLs and functional tasks;to maximize safety and independence within the home;to maximize safety and independence within the community;to maximize safety and independence with transportation;to maximize safety and independence with self cares  Target Date: 05/12/24  PT Goal 2  Goal Identifier: LTG2  Goal Description: Patient will report an increase of at least 8 on the SPADI to demonstrate MCID in 8 weeks  Rationale: to maximize safety and independence with performance of ADLs and functional tasks;to maximize safety and independence within the home;to maximize safety and independence within the community;to maximize safety and independence with transportation;to maximize safety and independence with self cares  Target Date: 05/12/24      Frequency of Treatment: 1x/wk  Duration of Treatment: 8 weeks    Recommended Referrals to Other Professionals:  None indicated  Education Assessment:    Learner/Method: Patient    Risks and benefits of evaluation/treatment have been explained.   Patient/Family/caregiver agrees with Plan of Care.     Evaluation Time:     PT Eval, Low Complexity Minutes (54931): 25     Signing Clinician: ERIC Perea [facility]                                                                                   OUTPATIENT PHYSICAL THERAPY      PLAN OF TREATMENT FOR OUTPATIENT REHABILITATION   Patient's Last Name, First Name, Karine Dyer YOB: 1955   Provider's Name   [facility]   Medical Record No.  [id number]     Onset Date: 03/15/24  Start of Care Date: 03/15/24     Medical Diagnosis:  Osteoarthritis of left shoulder      PT Treatment Diagnosis:  L shoulder pain Plan of Treatment  Frequency/Duration: 1x/wk/ 8 weeks    Certification date from 03/15/24 to 05/12/24         See note for plan of treatment details and functional goals     Stewart Raymond PT                         I CERTIFY THE NEED FOR THESE SERVICES FURNISHED UNDER        THIS PLAN OF TREATMENT AND WHILE UNDER MY CARE     (Physician attestation of this document indicates review and certification of the therapy plan).              Referring Provider:  Roberta Friedman    Initial Assessment  See Epic Evaluation- Start of Care Date: 03/15/24

## 2024-03-18 NOTE — PROGRESS NOTES
Karine Moss is a 69 year old female with hx of type II DM, peripheral neuropathy, narcolepsy, hyperlipidemia, sleep apnea, depression, irritable bowel, s/p gastric bypass surgery, osteopenia, PTSD,CKD who presents with her daughter to discuss-    DIABETES  Here for Type II diabetes.  Last seen 1/9/2024  Was having hypogycemic events, to 47 on monotherapy with Jardiance 10mg dose  Medication was stopped to see if hypoglycemia would resolve  Discussed consideration for referral to weight management clinic to evaluate post prandial hypoglycemia due to gastric bypass surgery 2013 (jimmy en y). [place]    Weight: up 8 pounds today  Wt Readings from Last 3 Encounters:   03/19/24 64 kg (141 lb)   01/09/24 60.3 kg (133 lb)   01/06/24 58.1 kg (128 lb)     Walking dog daily    Lab Results   Component Value Date    A1C 6.4 01/09/2024    A1C 6.1 09/05/2023    A1C 7.0 03/08/2021    A1C 6.2 09/04/2020     +Microalbuminuria    DM Meds: Insulin-No longer using                    Metformin -no longer using                   Jardiance 10mg daily--stopped last visit 1/9/24    March [birth year] out on her birthday  Ate 2:30 pm, then   4:45pm 140-160 on meter  5:30pm 49-59, felt shaky, jittery, drank some soda, minidoughnuts  Typically eats small bites during the day    Frequency of FBS Freestyle Nirmal   General range of FBS: average past 7d = 116, 14 d avg 111 30d avg 116  March 11 about 9 am below 50  Other time discrepancy with meter and CGM  March 9 8 pm 50  March 8  6am 50  Last night meter read 75 and dropping, ate oatmeal cookies (proactive did not wait for sympotms)  Reports this occurs about 3x per month    Aspirin Use: yes    CKD  Change in Cr at last clinic visit  Cr 1.55 with GFR 36, down from Cr 1.09 GFR 55, 3 mos ago  Microalbumin ---59 at last check  Karine is asking about addition of Kerendia (finerenone)  Losartan max dose 100mg daily  Jardiance started 4/2022 but stopped Jan 2024 due to report of hypoglycemia. Indicated  "this is rare with this medication but to monitor blood sugars  Microalbumin 200 in March 2022, fell to 41 in August 2022 while on Jardiance 10mg daily  Karine started measuring I/O. Yesterday took in 64 oz, put out 42 oz, concerned about the discrepancy   Occasional swelling of ankles, no swelling of hands, face  No orthopnea/PND      HYPERTENSION  BP Readings from Last 3 Encounters:   03/19/24 118/75   01/09/24 112/71   01/06/24 133/74   Losartan 100mg daily  BP in target range    EXAM  /75 (BP Location: Left arm, Patient Position: Sitting, Cuff Size: Adult Regular)   Pulse 91   Temp 98.2  F (36.8  C) (Skin)   Resp 15   Ht 1.626 m (5' 4\")   Wt 64 kg (141 lb)   SpO2 99%   BMI 24.20 kg/m    Gen: Alert, NAD  Cor: S1S2, 2/6 murmur, RUSB  Lungs: CTA bilaterally  Ext: +1edema    Results for orders placed or performed in visit on 03/19/24   Basic metabolic panel     Status: Abnormal   Result Value Ref Range    Sodium 138 135 - 145 mmol/L    Potassium 5.3 3.4 - 5.3 mmol/L    Chloride 107 98 - 107 mmol/L    Carbon Dioxide (CO2) 20 (L) 22 - 29 mmol/L    Anion Gap 11 7 - 15 mmol/L    Urea Nitrogen 49.5 (H) 8.0 - 23.0 mg/dL    Creatinine 1.45 (H) 0.51 - 0.95 mg/dL    GFR Estimate 39 (L) >60 mL/min/1.73m2    Calcium 9.3 8.8 - 10.2 mg/dL    Glucose 178 (H) 70 - 99 mg/dL   Phosphorus     Status: Abnormal   Result Value Ref Range    Phosphorus 4.8 (H) 2.5 - 4.5 mg/dL   Routine UA with microscopic - No culture     Status: Abnormal   Result Value Ref Range    Color Urine Light Yellow Colorless, Straw, Light Yellow, Yellow    Appearance Urine Clear Clear    Glucose Urine >=1000 (A) Negative mg/dL    Bilirubin Urine Negative Negative    Ketones Urine Negative Negative mg/dL    Specific Gravity Urine 1.016 1.003 - 1.035    Blood Urine Negative Negative    pH Urine 5.0 5.0 - 7.0    Protein Albumin Urine 10 (A) Negative mg/dL    Urobilinogen Urine Normal Normal, 2.0 mg/dL    Nitrite Urine Positive (A) Negative    Leukocyte " Esterase Urine Small (A) Negative    Bacteria Urine Few (A) None Seen /HPF    Mucus Urine Present (A) None Seen /LPF    RBC Urine 2 <=2 /HPF    WBC Urine 13 (H) <=5 /HPF   Protein  random urine     Status: Abnormal   Result Value Ref Range    Total Protein Urine mg/dL 22.2   mg/dL    Total Protein Urine mg/mg Creat 0.44 (H) 0.00 - 0.20 mg/mg Cr    Creatinine Urine mg/dL 50.5 mg/dL   Urine Culture Aerobic Bacterial     Status: Abnormal (Preliminary result)    Specimen: Urine, Clean Catch   Result Value Ref Range    Culture >100,000 CFU/mL Escherichia coli (A)          Assessment:  (N18.32) Chronic kidney disease, stage 3b (H)  (primary encounter diagnosis)  Comment: discussed elevated Cr, low GFR and need to investigate further with labs. Also discussed gold standard of treatment is max dose ARB which she is taking, followed by Jardiance(which was recently stopped due to concern for hypoglycemia) and then addition of mineralcorticoid in hypertensive patients. BP is currently in good control  Plan: Basic metabolic panel, Phosphorus, Routine UA         with microscopic - No culture, Protein  random         urine        Discussed recommendation to obtain labs, consider referral to nephrology for further workup  Addendum:March 20, 2024  Elevated BUN/Cr ratio despite good hydration. Borderline elevated potassium, phosphorous is elevated.  Discussed lab result with daughter, Leila, as Karine has memory issues. Recommend referral to nephrology for evaluation. Recommend cutting losartan 100mg dose to 50mg dose. She is spilling protein in her urine. Jardiance had been stopped due to hypoglycemic events and referral to bariatric team placed as concern that hypoglycemia is secondary to previous Rai en Y bypass surgery.     Recommend follow up in 2-4 wks with lab appt to recheck kidney function, awaiting nephrology appt.    (E16.2) Hypoglycemia  (Z98.84) History of Rai-en-Y gastric bypass  Comment: episodic hypoglycemia off all  diabetic medication  Plan: Adult Comprehensive Weight Management         Referral        Recommend referral to weight management for evaluation given jimmy en y procedure. Small frequent meals may help  Plan: Adult Comprehensive Weight Management         Referral        Referral is given. Treat all hypoglycemic episodes  If unable to get timely appt, I will sent Endocrinology E consult.    (R01.1) Heart murmur  Comment: MINAL, aortic sclerosis on ECHO 2018  Plan: Echocardiogram Complete        Reordered ECHO    (E11.29,  R80.9) Type 2 diabetes mellitus with microalbuminuria, without long-term current use of insulin (H)  Comment: currently diet controlled,  Plan: Urine Culture Aerobic Bacterial        Rule out infection       (R35.0) Urinary frequency  (N30.00) Acute cystitis without hematuria  Comment:  she reports some urinary frequency this past week, urine culture grew Ecoli  Plan: Routine UA with microscopic - No culture, Urine        Culture Aerobic Bacterial, ciprofloxacin         (CIPRO) 250 MG tablet        Informed daughter of infection requiring antibiotics on March 20, 2024.  Rx for ciprofloxacin 250mg bid x 7 days (renal dosing).      57 minutes spend on the date of this encounter doing chart review, history and exam, documentation and further activities as noted above.       Anay Martinez MD  Internal Medicine/Pediatrics

## 2024-03-19 ENCOUNTER — OFFICE VISIT (OUTPATIENT)
Dept: FAMILY MEDICINE | Facility: CLINIC | Age: 69
End: 2024-03-19
Payer: COMMERCIAL

## 2024-03-19 VITALS
DIASTOLIC BLOOD PRESSURE: 75 MMHG | BODY MASS INDEX: 24.07 KG/M2 | WEIGHT: 141 LBS | HEART RATE: 91 BPM | RESPIRATION RATE: 15 BRPM | SYSTOLIC BLOOD PRESSURE: 118 MMHG | HEIGHT: 64 IN | OXYGEN SATURATION: 99 % | TEMPERATURE: 98.2 F

## 2024-03-19 DIAGNOSIS — R35.0 URINARY FREQUENCY: ICD-10-CM

## 2024-03-19 DIAGNOSIS — I10 HYPERTENSION GOAL BP (BLOOD PRESSURE) < 140/90: ICD-10-CM

## 2024-03-19 DIAGNOSIS — E16.2 HYPOGLYCEMIA: ICD-10-CM

## 2024-03-19 DIAGNOSIS — E11.29 TYPE 2 DIABETES MELLITUS WITH MICROALBUMINURIA, WITHOUT LONG-TERM CURRENT USE OF INSULIN (H): ICD-10-CM

## 2024-03-19 DIAGNOSIS — N30.00 ACUTE CYSTITIS WITHOUT HEMATURIA: ICD-10-CM

## 2024-03-19 DIAGNOSIS — Z98.84 HISTORY OF ROUX-EN-Y GASTRIC BYPASS: ICD-10-CM

## 2024-03-19 DIAGNOSIS — R80.9 TYPE 2 DIABETES MELLITUS WITH MICROALBUMINURIA, WITHOUT LONG-TERM CURRENT USE OF INSULIN (H): ICD-10-CM

## 2024-03-19 DIAGNOSIS — R01.1 HEART MURMUR: ICD-10-CM

## 2024-03-19 DIAGNOSIS — N18.32 CHRONIC KIDNEY DISEASE, STAGE 3B (H): Primary | ICD-10-CM

## 2024-03-19 LAB
ALBUMIN MFR UR ELPH: 22.2 MG/DL
ALBUMIN UR-MCNC: 10 MG/DL
APPEARANCE UR: CLEAR
BACTERIA #/AREA URNS HPF: ABNORMAL /HPF
BILIRUB UR QL STRIP: NEGATIVE
COLOR UR AUTO: ABNORMAL
CREAT UR-MCNC: 50.5 MG/DL
GLUCOSE UR STRIP-MCNC: >=1000 MG/DL
HGB UR QL STRIP: NEGATIVE
KETONES UR STRIP-MCNC: NEGATIVE MG/DL
LEUKOCYTE ESTERASE UR QL STRIP: ABNORMAL
MUCOUS THREADS #/AREA URNS LPF: PRESENT /LPF
NITRATE UR QL: POSITIVE
PH UR STRIP: 5 [PH] (ref 5–7)
PROT/CREAT 24H UR: 0.44 MG/MG CR (ref 0–0.2)
RBC URINE: 2 /HPF
SP GR UR STRIP: 1.02 (ref 1–1.03)
UROBILINOGEN UR STRIP-MCNC: NORMAL MG/DL
WBC URINE: 13 /HPF

## 2024-03-19 PROCEDURE — 80048 BASIC METABOLIC PNL TOTAL CA: CPT | Performed by: INTERNAL MEDICINE

## 2024-03-19 PROCEDURE — 81001 URINALYSIS AUTO W/SCOPE: CPT | Performed by: INTERNAL MEDICINE

## 2024-03-19 PROCEDURE — 84156 ASSAY OF PROTEIN URINE: CPT | Performed by: INTERNAL MEDICINE

## 2024-03-19 PROCEDURE — 84100 ASSAY OF PHOSPHORUS: CPT | Performed by: INTERNAL MEDICINE

## 2024-03-19 NOTE — NURSING NOTE
"69 year old  Chief Complaint   Patient presents with    Consult     Pt needs kidney value labs, concern about jardiance and is concerned about water retention.     Phq declined.       Blood pressure 118/75, pulse 91, temperature 98.2  F (36.8  C), temperature source Skin, resp. rate 15, height 1.626 m (5' 4\"), weight 64 kg (141 lb), SpO2 99%, not currently breastfeeding. Body mass index is 24.2 kg/m .  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)    Hyperlipidemia LDL goal <100    Obstructive sleep apnea    Major depression in partial remission (H24)    Low back pain    DJD (degenerative joint disease) of knee    IBS (irritable bowel syndrome)    Heart murmur    Hypertension goal BP (blood pressure) < 140/90    Type 2 diabetes mellitus without complication, without long-term current use of insulin (H)    Osteopenia of hip    PTSD (post-traumatic stress disorder)    Diabetic peripheral neuropathy (H)    Bilateral sciatica    Gastrointestinal hemorrhage associated with gastric ulcer    Chronic kidney disease, stage 3 (H)    Chronic diastolic congestive heart failure (H)    Osteopenia of spine       Wt Readings from Last 2 Encounters:   03/19/24 64 kg (141 lb)   01/09/24 60.3 kg (133 lb)     BP Readings from Last 3 Encounters:   03/19/24 118/75   01/09/24 112/71   01/06/24 133/74         Current Outpatient Medications   Medication    amphetamine-dextroamphetamine (ADDERALL) 30 MG tablet    atorvastatin (LIPITOR) 20 MG tablet    blood glucose (NO BRAND SPECIFIED) lancets standard    blood glucose (NO BRAND SPECIFIED) test strip    blood glucose (NO BRAND SPECIFIED) test strip    blood glucose (NO BRAND SPECIFIED) test strip    blood glucose monitoring (NO BRAND SPECIFIED) meter device kit    calcium carbonate (OS-KINJAL) 1500 (600 Ca) MG tablet    cevimeline (EVOXAC) 30 MG capsule    cholecalciferol (VITAMIN D3) 125 mcg (5000 units) capsule    COMPOUNDED " NON-CONTROLLED SUBSTANCE (CMPD RX) - PHARMACY TO MIX COMPOUNDED MEDICATION    Continuous Blood Gluc  (FREESTYLE JORDIN 14 DAY READER) EBEN    Continuous Blood Gluc Sensor (FREESTYLE JORDIN 14 DAY SENSOR) MISC    cyanocobalamin (VITAMIN B-12) 1000 MCG tablet    DULoxetine (CYMBALTA) 30 MG capsule    DULoxetine (CYMBALTA) 60 MG capsule    finasteride (PROSCAR) 5 MG tablet    losartan (COZAAR) 100 MG tablet    omeprazole (PRILOSEC) 40 MG DR capsule    tolterodine ER (DETROL LA) 4 MG 24 hr capsule    traZODone (DESYREL) 50 MG tablet    triamcinolone (KENALOG) 0.1 % paste    empagliflozin (JARDIANCE) 10 MG TABS tablet     No current facility-administered medications for this visit.       Social History     Tobacco Use    Smoking status: Never     Passive exposure: Never    Smokeless tobacco: Never   Vaping Use    Vaping Use: Never used   Substance Use Topics    Alcohol use: Not Currently     Comment: rare    Drug use: No       Health Maintenance Due   Topic Date Due    HF ACTION PLAN  Never done    ADVANCE CARE PLANNING  Never done    HEPATITIS C SCREENING  Never done    RSV VACCINE (Pregnancy & 60+) (1 - 1-dose 60+ series) Never done    MEDICARE ANNUAL WELLNESS VISIT  04/12/2020    EYE EXAM  03/10/2021    COLORECTAL CANCER SCREENING  11/17/2021    Pneumococcal Vaccine: 65+ Years (2 of 2 - PCV) 05/27/2022    FALL RISK ASSESSMENT  02/17/2023    ALT  12/09/2023       Lab Results   Component Value Date    PAP NIL 04/12/2019 March 19, 2024 10:36 AM

## 2024-03-20 LAB
ANION GAP SERPL CALCULATED.3IONS-SCNC: 11 MMOL/L (ref 7–15)
BUN SERPL-MCNC: 49.5 MG/DL (ref 8–23)
CALCIUM SERPL-MCNC: 9.3 MG/DL (ref 8.8–10.2)
CHLORIDE SERPL-SCNC: 107 MMOL/L (ref 98–107)
CREAT SERPL-MCNC: 1.45 MG/DL (ref 0.51–0.95)
DEPRECATED HCO3 PLAS-SCNC: 20 MMOL/L (ref 22–29)
EGFRCR SERPLBLD CKD-EPI 2021: 39 ML/MIN/1.73M2
GLUCOSE SERPL-MCNC: 178 MG/DL (ref 70–99)
PHOSPHATE SERPL-MCNC: 4.8 MG/DL (ref 2.5–4.5)
POTASSIUM SERPL-SCNC: 5.3 MMOL/L (ref 3.4–5.3)
SODIUM SERPL-SCNC: 138 MMOL/L (ref 135–145)

## 2024-03-20 RX ORDER — CIPROFLOXACIN 250 MG/1
250 TABLET, FILM COATED ORAL 2 TIMES DAILY
Qty: 14 TABLET | Refills: 0 | Status: SHIPPED | OUTPATIENT
Start: 2024-03-20 | End: 2024-03-27

## 2024-03-20 RX ORDER — LOSARTAN POTASSIUM 100 MG/1
TABLET ORAL
Status: SHIPPED
Start: 2024-03-20 | End: 2024-04-06 | Stop reason: DRUGHIGH

## 2024-03-21 LAB — BACTERIA UR CULT: ABNORMAL

## 2024-03-22 ENCOUNTER — TELEPHONE (OUTPATIENT)
Dept: SURGERY | Facility: CLINIC | Age: 69
End: 2024-03-22

## 2024-03-22 NOTE — TELEPHONE ENCOUNTER
REFERRAL INFORMATION:  Referring Provider: Dr. Méndez  Referring Clinic: [facility]  Reason for Visit/Diagnosis: BMI 24.2 with comorbs, 2015 gastric surgery with [employer]; hypoglycemia issues       FUTURE VISIT INFORMATION:  Appointment Date: 6/17/2024  Appointment Time: 11 AM     NOTES RECORD STATUS  DETAILS   OFFICE NOTE from Referring Provider Internal [facility]:  3/19/24 - PCC OV with Dr. Martinez   OFFICE NOTE from Other Specialists Care Everywhere [facility]:  11/5/15 - BARIATRIC OV with Dr. Berkowitz   HOSPITAL DISCHARGE SUMMARY/ ED VISITS  N/A    OPERATIVE REPORT Care Everywhere [facility]:  4/22/15 - OP Note for LAPAROSCOPIC BYPASS GASTRIC JUANCHO-N-Y with Dr. Berkowitz   ENDOSCOPY (EGD)  Internal [facility]:  1/9/23 - EGD   PERTINENT LABS Internal    IMAGING (CT, MRI, US, XR)  Internal [facility]:  10/15/22 - XR Chest  2/19/22 - CT Abd/Pelvis

## 2024-03-22 NOTE — TELEPHONE ENCOUNTER
Pt. has a referral from her MD, Dr. Martinez, for a New Re Establish care appointment with Jessica Van on June 17th, 2024. This is not a timely appointment as Pt. Is having hypoglycemic issues. Pt. had Rai-en-Y gastric bypass in 2013 at [facility], is diabetic, and is having kidney issues. Can we find an appointment sooner for this Pt.? Questions, please contact Pt.'s daughter Leila at [phone].

## 2024-03-26 ENCOUNTER — MYC MEDICAL ADVICE (OUTPATIENT)
Dept: FAMILY MEDICINE | Facility: CLINIC | Age: 69
End: 2024-03-26

## 2024-03-26 DIAGNOSIS — I10 HYPERTENSION GOAL BP (BLOOD PRESSURE) < 140/90: Primary | ICD-10-CM

## 2024-03-26 RX ORDER — LOSARTAN POTASSIUM 50 MG/1
50 TABLET ORAL DAILY
Qty: 90 TABLET | Refills: 1 | Status: SHIPPED | OUTPATIENT
Start: 2024-03-26 | End: 2024-09-19

## 2024-03-27 ENCOUNTER — MYC MEDICAL ADVICE (OUTPATIENT)
Dept: FAMILY MEDICINE | Facility: CLINIC | Age: 69
End: 2024-03-27

## 2024-03-27 DIAGNOSIS — E11.9 TYPE 2 DIABETES MELLITUS WITHOUT COMPLICATION, WITHOUT LONG-TERM CURRENT USE OF INSULIN (H): Primary | ICD-10-CM

## 2024-03-27 RX ORDER — BLOOD-GLUCOSE CONTROL, NORMAL
EACH MISCELLANEOUS
Qty: 1 EACH | Refills: 3 | Status: SHIPPED | OUTPATIENT
Start: 2024-03-27

## 2024-03-28 DIAGNOSIS — R68.2 DRY MOUTH, UNSPECIFIED: ICD-10-CM

## 2024-03-28 DIAGNOSIS — E78.5 HYPERLIPIDEMIA LDL GOAL <100: ICD-10-CM

## 2024-03-28 DIAGNOSIS — K92.2 UPPER GI BLEED: ICD-10-CM

## 2024-03-28 RX ORDER — OMEPRAZOLE 40 MG/1
CAPSULE, DELAYED RELEASE ORAL
Qty: 180 CAPSULE | Refills: 3 | Status: SHIPPED | OUTPATIENT
Start: 2024-03-28

## 2024-03-28 RX ORDER — CEVIMELINE HYDROCHLORIDE 30 MG/1
CAPSULE ORAL
Qty: 270 CAPSULE | Refills: 1 | Status: SHIPPED | OUTPATIENT
Start: 2024-03-28

## 2024-03-28 RX ORDER — ATORVASTATIN CALCIUM 20 MG/1
20 TABLET, FILM COATED ORAL DAILY
Qty: 90 TABLET | Refills: 3 | Status: SHIPPED | OUTPATIENT
Start: 2024-03-28

## 2024-03-28 NOTE — TELEPHONE ENCOUNTER
Medication requested: omeprazole (PRILOSEC) 40 MG DR capsule   Last office visit: 3/19/24  [facility] appointments: none  Medication last refilled: 6/16/23; 180 + 1 refill  Last qualifying labs: N/A    Prescription approved per [facility] Refill Protocol.    Medication requested: cevimeline (EVOXAC) 30 MG capsule   Medication last refilled: 6/16/23; 270 + 1 refill  Last qualifying labs: N/A    Routing refill request to provider for review/approval because:  Drug not on the [facility] [place]     Medication requested: atorvastatin (LIPITOR) 20 MG tablet   Medication last refilled: 6/16/23; 90 + 1 refill  Last qualifying labs:   Component      Latest Ref Rng 1/9/2024  11:00 AM   Cholesterol      <200 mg/dL 133    Triglycerides      <150 mg/dL 129    HDL Cholesterol      >=50 mg/dL 55    LDL Cholesterol Calculated      <=100 mg/dL 52    Non HDL Cholesterol      <130 mg/dL 78    Patient Fasting? No      Prescription approved per [facility] Refill Protocol.    Navin PHILIP, RN  03/28/24 10:21 AM

## 2024-03-29 ENCOUNTER — THERAPY VISIT (OUTPATIENT)
Dept: PHYSICAL THERAPY | Facility: CLINIC | Age: 69
End: 2024-03-29
Payer: COMMERCIAL

## 2024-03-29 DIAGNOSIS — G89.29 CHRONIC LEFT SHOULDER PAIN: Primary | ICD-10-CM

## 2024-03-29 DIAGNOSIS — M25.512 CHRONIC LEFT SHOULDER PAIN: Primary | ICD-10-CM

## 2024-03-29 PROCEDURE — 97110 THERAPEUTIC EXERCISES: CPT | Mod: GP | Performed by: PHYSICAL THERAPIST

## 2024-04-02 ENCOUNTER — TELEPHONE (OUTPATIENT)
Dept: ENDOCRINOLOGY | Facility: CLINIC | Age: 69
End: 2024-04-02
Payer: COMMERCIAL

## 2024-04-02 NOTE — TELEPHONE ENCOUNTER
General Call    Contacts         Type Contact Phone/Fax    04/02/2024 01:26 PM CDT Phone (Incoming) SUSAN GUILLEN (Emergency Contact) [phone]          Reason for Call:  Feels urgent:  Pt having alarming drops/swings in blood sugar lever, and her PCP says not diabetes and more likely related to her previous idalmis surg.     Is there Any way someone can see her (preferably in person, but video if must) before the May 7 Radha spot.  Daughter is in a panic.    Please Only call Daughter, guardian, mother will get confused.      Could we send this information to you in [employer] [place] or would you prefer to receive a phone call?:   Patient would prefer a phone call   Okay to leave a detailed message?: Yes at [phone]

## 2024-04-04 ENCOUNTER — ANCILLARY PROCEDURE (OUTPATIENT)
Dept: CARDIOLOGY | Facility: CLINIC | Age: 69
End: 2024-04-04
Attending: INTERNAL MEDICINE
Payer: COMMERCIAL

## 2024-04-04 DIAGNOSIS — R01.1 HEART MURMUR: ICD-10-CM

## 2024-04-04 LAB — LVEF ECHO: NORMAL

## 2024-04-04 PROCEDURE — 93306 TTE W/DOPPLER COMPLETE: CPT | Performed by: STUDENT IN AN ORGANIZED HEALTH CARE EDUCATION/TRAINING PROGRAM

## 2024-04-04 NOTE — TELEPHONE ENCOUNTER
Talked with Patient - Spoke with Leila, reminded of Karine's appointment. Told her we're on 4th floor.

## 2024-04-05 ENCOUNTER — OFFICE VISIT (OUTPATIENT)
Dept: ENDOCRINOLOGY | Facility: CLINIC | Age: 69
End: 2024-04-05
Payer: COMMERCIAL

## 2024-04-05 ENCOUNTER — THERAPY VISIT (OUTPATIENT)
Dept: PHYSICAL THERAPY | Facility: CLINIC | Age: 69
End: 2024-04-05
Payer: COMMERCIAL

## 2024-04-05 ENCOUNTER — LAB (OUTPATIENT)
Dept: LAB | Facility: CLINIC | Age: 69
End: 2024-04-05
Payer: COMMERCIAL

## 2024-04-05 VITALS
OXYGEN SATURATION: 99 % | DIASTOLIC BLOOD PRESSURE: 87 MMHG | HEART RATE: 91 BPM | BODY MASS INDEX: 23.9 KG/M2 | HEIGHT: 64 IN | SYSTOLIC BLOOD PRESSURE: 144 MMHG | WEIGHT: 140 LBS

## 2024-04-05 DIAGNOSIS — M25.512 CHRONIC LEFT SHOULDER PAIN: Primary | ICD-10-CM

## 2024-04-05 DIAGNOSIS — Z98.84 HISTORY OF ROUX-EN-Y GASTRIC BYPASS: ICD-10-CM

## 2024-04-05 DIAGNOSIS — E16.1 POSTPRANDIAL HYPOGLYCEMIA: Primary | ICD-10-CM

## 2024-04-05 DIAGNOSIS — K91.1 DUMPING SYNDROME: ICD-10-CM

## 2024-04-05 DIAGNOSIS — G89.29 CHRONIC LEFT SHOULDER PAIN: Primary | ICD-10-CM

## 2024-04-05 DIAGNOSIS — E16.2 HYPOGLYCEMIA: ICD-10-CM

## 2024-04-05 DIAGNOSIS — N18.32 CHRONIC KIDNEY DISEASE, STAGE 3B (H): ICD-10-CM

## 2024-04-05 PROCEDURE — 80048 BASIC METABOLIC PNL TOTAL CA: CPT

## 2024-04-05 PROCEDURE — 97110 THERAPEUTIC EXERCISES: CPT | Mod: GP

## 2024-04-05 PROCEDURE — 99205 OFFICE O/P NEW HI 60 MIN: CPT

## 2024-04-05 PROCEDURE — 36415 COLL VENOUS BLD VENIPUNCTURE: CPT

## 2024-04-05 ASSESSMENT — PAIN SCALES - GENERAL: PAINLEVEL: NO PAIN (0)

## 2024-04-05 NOTE — NURSING NOTE
"(   Chief Complaint   Patient presents with    New Patient     Re-establish care    )    ( Weight: 63.5 kg (140 lb) )  ( Height: 162.6 cm (5' 4\") )  ( BMI (Calculated): 24.03 )  (   )  ( Cumulative weight loss (lbs): 0 )  (   )  (   )  ( Waist Circumference (cm): 89 cm )  (   )    ( BP: (!) 144/87 )  (   )  (   )  (   )  ( Pulse: 91 )  (   )  ( SpO2: 99 % )    (   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)    Hyperlipidemia LDL goal <100    Obstructive sleep apnea    Major depression in partial remission (H24)    Low back pain    DJD (degenerative joint disease) of knee    IBS (irritable bowel syndrome)    Heart murmur    Hypertension goal BP (blood pressure) < 140/90    Type 2 diabetes mellitus without complication, without long-term current use of insulin (H)    Osteopenia of hip    PTSD (post-traumatic stress disorder)    Diabetic peripheral neuropathy (H)    Bilateral sciatica    Gastrointestinal hemorrhage associated with gastric ulcer    Chronic kidney disease, stage 3 (H)    Chronic diastolic congestive heart failure (H)    Osteopenia of spine    Chronic left shoulder pain    )  (   Current Outpatient Medications   Medication Sig Dispense Refill    amphetamine-dextroamphetamine (ADDERALL) 30 MG tablet Take 1 tablet (30 mg) by mouth every 24 hours 1.5 mg a total of 45 mg  Daily 30 tablet 0    atorvastatin (LIPITOR) 20 MG tablet Take 1 tablet (20 mg) by mouth daily 90 tablet 3    blood glucose (NO BRAND SPECIFIED) lancets standard Use to test blood sugar 1 times daily or as directed. 90 Lancet 3    blood glucose (NO BRAND SPECIFIED) test strip Use to test blood sugar 1 times daily or as directed. 100 strip 3    blood glucose (NO BRAND SPECIFIED) test strip Use to test blood sugar as needed with Freestyle Nirmal CGM. 100 strip 0    blood glucose (NO BRAND SPECIFIED) test strip Use to test blood sugar as directed with CGM sensor. 50 strip 1    blood " glucose calibration (NO BRAND SPECIFIED) solution Use to calibrate blood glucose monitor as needed as directed. 1 each 3    blood glucose monitoring (NO BRAND SPECIFIED) meter device kit Use to test blood sugar 1 times daily or as directed. 1 kit 0    calcium carbonate (OS-KINJAL) 1500 (600 Ca) MG tablet Take 2 tablets (1,200 mg) by mouth daily      cevimeline (EVOXAC) 30 MG capsule take 1 cap  capsule 1    cholecalciferol (VITAMIN D3) 125 mcg (5000 units) capsule Take 125 mcg by mouth daily      Continuous Blood Gluc  (FREESTYLE JORDIN 14 DAY READER) EBEN Use to read blood sugars as per 's instructions. 1 each 0    Continuous Blood Gluc Sensor (FREESTYLE JORDIN 14 DAY SENSOR) MISC Change every 14 days. 2 each 11    cyanocobalamin (VITAMIN B-12) 1000 MCG tablet Take one every other day 90 tablet 3    DULoxetine (CYMBALTA) 30 MG capsule Take 1 capsule (30 mg) by mouth 2 times daily 180 capsule 1    DULoxetine (CYMBALTA) 60 MG capsule Take 1 capsule (60 mg) by mouth at bedtime Take in addition to current 30 mg evening dose for a total of 90 mg at bedtime. 90 capsule 1    finasteride (PROSCAR) 5 MG tablet Take 1/2 tablet daily 45 tablet 3    losartan (COZAAR) 100 MG tablet Take 0.5 tablet daily, note change in dose on 3/20/2024 due to change in kidney function.      losartan (COZAAR) 50 MG tablet Take 1 tablet (50 mg) by mouth daily 90 tablet 1    omeprazole (PRILOSEC) 40 MG DR capsule Take 40mg twice daily 180 capsule 3    tolterodine ER (DETROL LA) 4 MG 24 hr capsule Take 1 capsule (4 mg) by mouth daily 90 capsule 3    traZODone (DESYREL) 50 MG tablet Take 1 tablet by mouth every 24 hours      triamcinolone (KENALOG) 0.1 % paste Apply to tongue twice daily x 10 days 5 g 0    COMPOUNDED NON-CONTROLLED SUBSTANCE (CMPD RX) - PHARMACY TO MIX COMPOUNDED MEDICATION Estradiol 0.0075% in HRT cream  Apply 2 grams,  intravaginally and small amount externally daily for one week then twice weekly for  maintenance. (Patient not taking: Reported on 4/5/2024) 45 g 11    )  ( Diabetes Eval:    )    ( Pain Eval:  No Pain (0) )    ( Wound Eval:       )    (   History   Smoking Status    Never   Smokeless Tobacco    Never    )    ( Signed By:  Jovanna Hernandez EMT; April 5, 2024; 12:25 PM )

## 2024-04-05 NOTE — LETTER
2024       RE: Karine Moss  [address]  [facility] [address]     Dear Colleague,    Thank you for referring your patient, Karine Moss, to the [facility]. Please see a copy of my visit note below.    New Re-establish Bariatric Surgery Note    RE: Karine Moss  MR#: [id number]  : 1955  VISIT DATE: 2024      Dear Anay Martinez,    I had the pleasure of seeing your patient, Karine Moss, in my post-bariatric surgery assessment clinic.    Assessment & Plan  Problem List Items Addressed This Visit       Dumping syndrome - Primary    History of Rai-en-Y gastric bypass    Hypoglycemia       68 minutes spent by me on the date of the encounter doing chart review, history and exam, documentation and further activities per the note    CHIEF COMPLAINT: Post-bariatric surgery follow-up. 11 years s/p RYGB at [facility]     HISTORY OF PRESENT ILLNESS:      2024     3:20 PM   Questions Regarding Prior Weight Loss Surgery Reviewed With Patient   I had the following weight loss procedure Rai-en-y Gastric Bypass   What year was your surgery? 2013   How has your weight changed since your last visit? I have stayed about the same   Do you currently have any of the following None of the above   Do you have any concerns today? Blood sugar lows     Assessment:   69 year old female with type 2 diabetes, concern for kidney disease, hypertension, narcolepsy, sleep apnea, history gastric ulcer, osteoporosis. Gastric bypass 11 years ago through [facility]. Presents today with concerns for hypoglycemia despite not being on any medication for type 2 DM. Additional concerns of recent issues with diarrhea, stool incontinence, urinary incontinence.     Given her history of having a ari en y bypass and Karine endorsing eating foods that are high in sugar most days,  Karine's hypoglycemic events are most likely due to early dumping syndrome vs postprandial hyperinsulinemic hypoglycemia (also referred to as late dumping syndrome). This phenomenon is also likely at least partially related to her recent concerns with incontinence of stool and diarrhea. In order to confirm that these issues are related to early or late dumping syndrome, will trial 1 month of minimizing sugar intake, have Karine write down all foods and any hypoglycemic events, and emphasize management of any hypoglycemic events a snack high in protein and complex carbohydrates in addition to some simple carbohydrates.      Can consider a meal test in the future to further evaluate for dumping syndrome, could also consider starting a glp-1 or acarbose for post prandial blood sugar management.       Plan  Minimize all sugary or refined carb foods as this is likely exacerbating your symptoms of dumping syndrome   If you are seeing low blood sugars, have a snack high in protein and complex carbohydrates in addition to some simple carbohydrates    Write down all food you eat and document when you are seeing high blood sugars and when you are seeing low blood sugars for the next month   Dietician appointment to be scheduled ASAP to further discuss dumping syndrome and dietary adjustments to minimize post prandial hypoglycemia              69 year old female with type 2 diabetes (last A1c 6.4) , concern for kidney disease, hypertension, narcolepsy, sleep apnea, history gastric ulcer, osteoporosis. Gastric bypass 11 years ago through [employer]. Presents today with concerns for hypoglycemia despite not being on any medication for type 2 DM (primary care recently discontinued jardiance due to concern for frequent episodes of hypoglycemia).    Highest weight prior to bariatric surgery 220lbs  Lowest weight after rygb is 125lbs  Today is 140lbs, is very comfortable at this weight.     Since October 2022 started seeing  "episodes of hypoglycemia (weak knees, woozy, sleepy, shaky), where she would check her blood sugar and see numbers in 60s, 50s, lowest seen with CGM was 47.     At first these episodes were happening once a month, but lately has seen these happening multiple times a week. At first this was presumed to her being overmedicated with diabetes, but these episodes have persisted and actually increased in frequency despite stopping all diabetes medications.     When these episodes happen, will eat or drink something sweet (glucose pill, juice), blood sugar numbers will resolve, but Karine will feel \"exhausted\" as numbers resolve. Karine cannot identify triggers that lead to these episodes of hypoglycemia, cannot recall if there are certain foods causing it.       Regarding eating patterns and diet, she  typically eats 2-3 meals a day, describes she will nibble on meals throughout the day. Craves sweets (baked goods, mint candies). Is able to get full. Can stay full until next meal. Sometimes forgets to eat due to lack of hunger.     Meal recall:  Breakfast: toast, yogurt or oatmeal, fruit, juice. Sometimes \"hobo breakfast\" hashbrowns, roast beef, poached eggs. Sometimes leftovers from night before instead.     Lunch: sandwich (ham + cheese, wheat bread ), soup, mac n cheese, leftovers, hot dish.    Dinners: hot dish, potato dumplings, chicken noodle soup, spaghetti, baked pork chops. Steaks, burgers.  salad     Snacks: fruit, sweets \"chocolate is my downfall,\" popcorn, caramel popcorn, kettle corn.   Likes baking, bakes cookies, brownies, rice pudding, jello fruit salad   Endorses eating sweets of this nature multiple times a day.     Eats out/ gets take out twice a month. Drinks water, juice (v8, orange juice, splash), carbonated flavored water. Sometimes milk. Drinks protein shakes once a day.       Regarding activity, lives with 2 grandsons. Ages 24 and 21. Does housework, loves sewing, crocheting, paiting. Makes " dollhouse furniture. Likes crafts.       Vitamins/Labs: b12, calcium, flinstone chewables with iron, vitamin d3  Iron deficiency: none   Post idalmis labs managed through pcp: folate, b12, lipids, ferritin, tsh, bmp, cbc all normal within the last year aside from elevated kidney labs which are currently being worked up     GERD symptoms: none    Diarrhea: loosened stools lately, has also be incontinent of bowel lately (also issues with urinary incontinence lately, is currently being evaluated as a candidate for an implant).     Nausea : none    Vomiting : none     Dysphagia:  none           Weight History:      4/4/2024     3:20 PM   --   What is your highest lifetime weight? 1234   What is your lowest weight since surgery? (In pounds) 135   *234 lbs is highest lifetime weight     Initial Weight (lbs): 140 lbs  Weight: 63.5 kg (140 lb)     Cumulative weight loss (lbs): 0  Weight Loss Percentage: 0%  Waist Circumference (cm): 89 cm        4/4/2024     3:20 PM   Questions Regarding Co-Morbidities and Health Concerns Reviewed With Patient   Pre-diabetes Never   Diabetes II Improved   High Blood Pressure Improved   High cholesterol Improved   Heartburn/Reflux Never   Sleep apnea Gone away   PCOS Never   Back pain Worsened   Joint pain Worsened   Lower leg swelling Stayed the same           4/4/2024     3:20 PM   Eating Habits   How many meals do you eat per day? 2   Do you snack between meals? Yes   How much food are you eating at each meal? 1/2 cup to 1 cup   Are you able to separate your meals and liquids by at least 30 minutes? Sometimes   Are you able to avoid liquid calories? Sometimes           4/4/2024     3:20 PM   Exercise Questions Reviewed With Patient   How often do you exercise? Never   What keeps you from being more active? I am as active as I can possbily be    My ability to walk or move around is limited    Too tired       Social History:      4/4/2024     3:20 PM   --   Are you smoking? No   Are you  drinking alcohol? No       Medications:  Current Outpatient Medications   Medication Sig Dispense Refill    amphetamine-dextroamphetamine (ADDERALL) 30 MG tablet Take 1 tablet (30 mg) by mouth every 24 hours 1.5 mg a total of 45 mg  Daily 30 tablet 0    atorvastatin (LIPITOR) 20 MG tablet Take 1 tablet (20 mg) by mouth daily 90 tablet 3    blood glucose (NO BRAND SPECIFIED) lancets standard Use to test blood sugar 1 times daily or as directed. 90 Lancet 3    blood glucose (NO BRAND SPECIFIED) test strip Use to test blood sugar 1 times daily or as directed. 100 strip 3    blood glucose (NO BRAND SPECIFIED) test strip Use to test blood sugar as needed with Freestyle Nirmal CGM. 100 strip 0    blood glucose (NO BRAND SPECIFIED) test strip Use to test blood sugar as directed with CGM sensor. 50 strip 1    blood glucose calibration (NO BRAND SPECIFIED) solution Use to calibrate blood glucose monitor as needed as directed. 1 each 3    blood glucose monitoring (NO BRAND SPECIFIED) meter device kit Use to test blood sugar 1 times daily or as directed. 1 kit 0    calcium carbonate (OS-KINJAL) 1500 (600 Ca) MG tablet Take 2 tablets (1,200 mg) by mouth daily      cevimeline (EVOXAC) 30 MG capsule take 1 cap  capsule 1    cholecalciferol (VITAMIN D3) 125 mcg (5000 units) capsule Take 125 mcg by mouth daily      Continuous Blood Gluc  (FREESTYLE NIRMAL 14 DAY READER) EBEN Use to read blood sugars as per 's instructions. 1 each 0    Continuous Blood Gluc Sensor (FREESTYLE NIRMAL 14 DAY SENSOR) MISC Change every 14 days. 2 each 11    cyanocobalamin (VITAMIN B-12) 1000 MCG tablet Take one every other day 90 tablet 3    DULoxetine (CYMBALTA) 30 MG capsule Take 1 capsule (30 mg) by mouth 2 times daily 180 capsule 1    DULoxetine (CYMBALTA) 60 MG capsule Take 1 capsule (60 mg) by mouth at bedtime Take in addition to current 30 mg evening dose for a total of 90 mg at bedtime. 90 capsule 1    finasteride (PROSCAR) 5  "MG tablet Take 1/2 tablet daily 45 tablet 3    losartan (COZAAR) 50 MG tablet Take 1 tablet (50 mg) by mouth daily 90 tablet 1    omeprazole (PRILOSEC) 40 MG DR capsule Take 40mg twice daily 180 capsule 3    tolterodine ER (DETROL LA) 4 MG 24 hr capsule Take 1 capsule (4 mg) by mouth daily 90 capsule 3    traZODone (DESYREL) 50 MG tablet Take 1 tablet by mouth every 24 hours      triamcinolone (KENALOG) 0.1 % paste Apply to tongue twice daily x 10 days 5 g 0    COMPOUNDED NON-CONTROLLED SUBSTANCE (CMPD RX) - PHARMACY TO MIX COMPOUNDED MEDICATION Estradiol 0.0075% in HRT cream  Apply 2 grams,  intravaginally and small amount externally daily for one week then twice weekly for maintenance. (Patient not taking: Reported on 4/5/2024) 45 g 11     No current facility-administered medications for this visit.         4/4/2024     3:20 PM   --   Do you avoid NSAIDs such as (Ibuprofen, Aleve, Naproxen, Advil)? Yes       ROS:  GI:       4/4/2024     3:20 PM   --   Vomiting No   Diarrhea Yes   Constipation No   Swallowing trouble No   Abdominal pain No   Heartburn No     Skin:       4/4/2024     3:20 PM   BAR RBS ROS - SKIN   Rash in skin folds No     Psych:       4/4/2024     3:20 PM   --   Depression No   Anxiety No     Female Only:       4/4/2024     3:20 PM   BAR RBS ROS -    Female only Post-menopausal   Stress urinary incontinence Yes           PHYSICAL EXAM:  Objective   BP (!) 144/87 (BP Location: Left arm, Patient Position: Sitting, Cuff Size: Adult Regular)   Pulse 91   Ht 1.626 m (5' 4\")   Wt 63.5 kg (140 lb)   SpO2 99%   BMI 24.03 kg/m    BP (!) 144/87 (BP Location: Left arm, Patient Position: Sitting, Cuff Size: Adult Regular)   Pulse 91   Ht 1.626 m (5' 4\")   Wt 63.5 kg (140 lb)   SpO2 99%   BMI 24.03 kg/m    Body mass index is 24.03 kg/m .  Physical Exam   GENERAL: alert and no distress  EYES: Eyes grossly normal to inspection.  No discharge or erythema, or obvious scleral/conjunctival " abnormalities.  RESP: No audible wheeze, cough, or visible cyanosis.    SKIN: Visible skin clear. No significant rash, abnormal pigmentation or lesions.  NEURO: Cranial nerves grossly intact.  Mentation and speech appropriate for age.  PSYCH: Appropriate affect, tone, and pace of words          Sincerely,    Radha Hull PA-C

## 2024-04-05 NOTE — PROGRESS NOTES
New Re-establish Bariatric Surgery Note    RE: Karine Moss  MR#: [id number]  : 1955  VISIT DATE: 2024      Dear Anay Martinez,    I had the pleasure of seeing your patient, Karine Moss, in my post-bariatric surgery assessment clinic.    Assessment & Plan   Problem List Items Addressed This Visit       Dumping syndrome - Primary    History of Rai-en-Y gastric bypass    Hypoglycemia       68 minutes spent by me on the date of the encounter doing chart review, history and exam, documentation and further activities per the note    CHIEF COMPLAINT: Post-bariatric surgery follow-up. 11 years s/p RYGB at [facility]     HISTORY OF PRESENT ILLNESS:      2024     3:20 PM   Questions Regarding Prior Weight Loss Surgery Reviewed With Patient   I had the following weight loss procedure Rai-en-y Gastric Bypass   What year was your surgery?    How has your weight changed since your last visit? I have stayed about the same   Do you currently have any of the following None of the above   Do you have any concerns today? Blood sugar lows     Assessment:   69 year old female with type 2 diabetes, concern for kidney disease, hypertension, narcolepsy, sleep apnea, history gastric ulcer, osteoporosis. Gastric bypass 11 years ago through [facility]. Presents today with concerns for hypoglycemia despite not being on any medication for type 2 DM. Additional concerns of recent issues with diarrhea, stool incontinence, urinary incontinence.     Given her history of having a rai en y bypass and Karine endorsing eating foods that are high in sugar most days, Karine's hypoglycemic events are most likely due to early dumping syndrome vs postprandial hyperinsulinemic hypoglycemia (also referred to as late dumping syndrome). This phenomenon is also likely at least partially related to her recent concerns with incontinence of stool and diarrhea. In order to confirm that these issues are  related to early or late dumping syndrome, will trial 1 month of minimizing sugar intake, have Karine write down all foods and any hypoglycemic events, and emphasize management of any hypoglycemic events a snack high in protein and complex carbohydrates in addition to some simple carbohydrates.      Can consider a meal test in the future to further evaluate for dumping syndrome, could also consider starting a glp-1 or acarbose for post prandial blood sugar management.       Plan  Minimize all sugary or refined carb foods as this is likely exacerbating your symptoms of dumping syndrome   If you are seeing low blood sugars, have a snack high in protein and complex carbohydrates in addition to some simple carbohydrates    Write down all food you eat and document when you are seeing high blood sugars and when you are seeing low blood sugars for the next month   Dietician appointment to be scheduled ASAP to further discuss dumping syndrome and dietary adjustments to minimize post prandial hypoglycemia              69 year old female with type 2 diabetes (last A1c 6.4) , concern for kidney disease, hypertension, narcolepsy, sleep apnea, history gastric ulcer, osteoporosis. Gastric bypass 11 years ago through [facility]. Presents today with concerns for hypoglycemia despite not being on any medication for type 2 DM (primary care recently discontinued jardiance due to concern for frequent episodes of hypoglycemia).    Highest weight prior to bariatric surgery 220lbs  Lowest weight after rygb is 125lbs  Today is 140lbs, is very comfortable at this weight.     Since October 2022 started seeing episodes of hypoglycemia (weak knees, woozy, sleepy, shaky), where she would check her blood sugar and see numbers in 60s, 50s, lowest seen with CGM was 47.     At first these episodes were happening once a month, but lately has seen these happening multiple times a week. At first this was presumed to her being overmedicated  "with diabetes, but these episodes have persisted and actually increased in frequency despite stopping all diabetes medications.     When these episodes happen, will eat or drink something sweet (glucose pill, juice), blood sugar numbers will resolve, but Karine will feel \"exhausted\" as numbers resolve. Karine cannot identify triggers that lead to these episodes of hypoglycemia, cannot recall if there are certain foods causing it.       Regarding eating patterns and diet, she  typically eats 2-3 meals a day, describes she will nibble on meals throughout the day. Craves sweets (baked goods, mint candies). Is able to get full. Can stay full until next meal. Sometimes forgets to eat due to lack of hunger.     Meal recall:  Breakfast: toast, yogurt or oatmeal, fruit, juice. Sometimes \"hobo breakfast\" hashbrowns, roast beef, poached eggs. Sometimes leftovers from night before instead.     Lunch: sandwich (ham + cheese, wheat bread ), soup, mac n cheese, leftovers, hot dish.    Dinners: hot dish, potato dumplings, chicken noodle soup, spaghetti, baked pork chops. Steaks, burgers.  salad     Snacks: fruit, sweets \"chocolate is my downfall,\" popcorn, caramel popcorn, kettle corn.   Likes baking, bakes cookies, brownies, rice pudding, jello fruit salad   Endorses eating sweets of this nature multiple times a day.     Eats out/ gets take out twice a month. Drinks water, juice (v8, orange juice, splash), carbonated flavored water. Sometimes milk. Drinks protein shakes once a day.       Regarding activity, lives with 2 grandsons. Ages 24 and 21. Does housework, loves sewing, crocheting, paiting. Makes dollhouse furniture. Likes crafts.       Vitamins/Labs: b12, calcium, flinstone chewables with iron, vitamin d3  Iron deficiency: none   Post idalmis labs managed through pcp: folate, b12, lipids, ferritin, tsh, bmp, cbc all normal within the last year aside from elevated kidney labs which are currently being worked up     GERD " symptoms: none    Diarrhea: loosened stools lately, has also be incontinent of bowel lately (also issues with urinary incontinence lately, is currently being evaluated as a candidate for an implant).     Nausea : none    Vomiting : none     Dysphagia:  none           Weight History:      4/4/2024     3:20 PM   --   What is your highest lifetime weight? 1234   What is your lowest weight since surgery? (In pounds) 135   *234 lbs is highest lifetime weight     Initial Weight (lbs): 140 lbs  Weight: 63.5 kg (140 lb)     Cumulative weight loss (lbs): 0  Weight Loss Percentage: 0%  Waist Circumference (cm): 89 cm        4/4/2024     3:20 PM   Questions Regarding Co-Morbidities and Health Concerns Reviewed With Patient   Pre-diabetes Never   Diabetes II Improved   High Blood Pressure Improved   High cholesterol Improved   Heartburn/Reflux Never   Sleep apnea Gone away   PCOS Never   Back pain Worsened   Joint pain Worsened   Lower leg swelling Stayed the same           4/4/2024     3:20 PM   Eating Habits   How many meals do you eat per day? 2   Do you snack between meals? Yes   How much food are you eating at each meal? 1/2 cup to 1 cup   Are you able to separate your meals and liquids by at least 30 minutes? Sometimes   Are you able to avoid liquid calories? Sometimes           4/4/2024     3:20 PM   Exercise Questions Reviewed With Patient   How often do you exercise? Never   What keeps you from being more active? I am as active as I can possbily be    My ability to walk or move around is limited    Too tired       Social History:      4/4/2024     3:20 PM   --   Are you smoking? No   Are you drinking alcohol? No       Medications:  Current Outpatient Medications   Medication Sig Dispense Refill    amphetamine-dextroamphetamine (ADDERALL) 30 MG tablet Take 1 tablet (30 mg) by mouth every 24 hours 1.5 mg a total of 45 mg  Daily 30 tablet 0    atorvastatin (LIPITOR) 20 MG tablet Take 1 tablet (20 mg) by mouth daily 90  tablet 3    blood glucose (NO BRAND SPECIFIED) lancets standard Use to test blood sugar 1 times daily or as directed. 90 Lancet 3    blood glucose (NO BRAND SPECIFIED) test strip Use to test blood sugar 1 times daily or as directed. 100 strip 3    blood glucose (NO BRAND SPECIFIED) test strip Use to test blood sugar as needed with Freestyle Nirmal CGM. 100 strip 0    blood glucose (NO BRAND SPECIFIED) test strip Use to test blood sugar as directed with CGM sensor. 50 strip 1    blood glucose calibration (NO BRAND SPECIFIED) solution Use to calibrate blood glucose monitor as needed as directed. 1 each 3    blood glucose monitoring (NO BRAND SPECIFIED) meter device kit Use to test blood sugar 1 times daily or as directed. 1 kit 0    calcium carbonate (OS-KNIJAL) 1500 (600 Ca) MG tablet Take 2 tablets (1,200 mg) by mouth daily      cevimeline (EVOXAC) 30 MG capsule take 1 cap  capsule 1    cholecalciferol (VITAMIN D3) 125 mcg (5000 units) capsule Take 125 mcg by mouth daily      Continuous Blood Gluc  (FREESTYLE NIRMAL 14 DAY READER) EBEN Use to read blood sugars as per 's instructions. 1 each 0    Continuous Blood Gluc Sensor (FREESTYLE NIRMAL 14 DAY SENSOR) MISC Change every 14 days. 2 each 11    cyanocobalamin (VITAMIN B-12) 1000 MCG tablet Take one every other day 90 tablet 3    DULoxetine (CYMBALTA) 30 MG capsule Take 1 capsule (30 mg) by mouth 2 times daily 180 capsule 1    DULoxetine (CYMBALTA) 60 MG capsule Take 1 capsule (60 mg) by mouth at bedtime Take in addition to current 30 mg evening dose for a total of 90 mg at bedtime. 90 capsule 1    finasteride (PROSCAR) 5 MG tablet Take 1/2 tablet daily 45 tablet 3    losartan (COZAAR) 50 MG tablet Take 1 tablet (50 mg) by mouth daily 90 tablet 1    omeprazole (PRILOSEC) 40 MG DR capsule Take 40mg twice daily 180 capsule 3    tolterodine ER (DETROL LA) 4 MG 24 hr capsule Take 1 capsule (4 mg) by mouth daily 90 capsule 3    traZODone (DESYREL) 50  "MG tablet Take 1 tablet by mouth every 24 hours      triamcinolone (KENALOG) 0.1 % paste Apply to tongue twice daily x 10 days 5 g 0    COMPOUNDED NON-CONTROLLED SUBSTANCE (CMPD RX) - PHARMACY TO MIX COMPOUNDED MEDICATION Estradiol 0.0075% in HRT cream  Apply 2 grams,  intravaginally and small amount externally daily for one week then twice weekly for maintenance. (Patient not taking: Reported on 4/5/2024) 45 g 11     No current facility-administered medications for this visit.         4/4/2024     3:20 PM   --   Do you avoid NSAIDs such as (Ibuprofen, Aleve, Naproxen, Advil)? Yes       ROS:  GI:       4/4/2024     3:20 PM   --   Vomiting No   Diarrhea Yes   Constipation No   Swallowing trouble No   Abdominal pain No   Heartburn No     Skin:       4/4/2024     3:20 PM   BAR RBS ROS - SKIN   Rash in skin folds No     Psych:       4/4/2024     3:20 PM   --   Depression No   Anxiety No     Female Only:       4/4/2024     3:20 PM   BAR RBS ROS -    Female only Post-menopausal   Stress urinary incontinence Yes           PHYSICAL EXAM:  Objective    BP (!) 144/87 (BP Location: Left arm, Patient Position: Sitting, Cuff Size: Adult Regular)   Pulse 91   Ht 1.626 m (5' 4\")   Wt 63.5 kg (140 lb)   SpO2 99%   BMI 24.03 kg/m    BP (!) 144/87 (BP Location: Left arm, Patient Position: Sitting, Cuff Size: Adult Regular)   Pulse 91   Ht 1.626 m (5' 4\")   Wt 63.5 kg (140 lb)   SpO2 99%   BMI 24.03 kg/m    Body mass index is 24.03 kg/m .  Physical Exam   GENERAL: alert and no distress  EYES: Eyes grossly normal to inspection.  No discharge or erythema, or obvious scleral/conjunctival abnormalities.  RESP: No audible wheeze, cough, or visible cyanosis.    SKIN: Visible skin clear. No significant rash, abnormal pigmentation or lesions.  NEURO: Cranial nerves grossly intact.  Mentation and speech appropriate for age.  PSYCH: Appropriate affect, tone, and pace of words          Sincerely,    Radha Hull PA-C     "

## 2024-04-06 LAB
ANION GAP SERPL CALCULATED.3IONS-SCNC: 12 MMOL/L (ref 7–15)
BUN SERPL-MCNC: 31.6 MG/DL (ref 8–23)
CALCIUM SERPL-MCNC: 9.9 MG/DL (ref 8.8–10.2)
CHLORIDE SERPL-SCNC: 103 MMOL/L (ref 98–107)
CREAT SERPL-MCNC: 1.21 MG/DL (ref 0.51–0.95)
DEPRECATED HCO3 PLAS-SCNC: 26 MMOL/L (ref 22–29)
EGFRCR SERPLBLD CKD-EPI 2021: 48 ML/MIN/1.73M2
GLUCOSE SERPL-MCNC: 103 MG/DL (ref 70–99)
POTASSIUM SERPL-SCNC: 5.4 MMOL/L (ref 3.4–5.3)
SODIUM SERPL-SCNC: 141 MMOL/L (ref 135–145)

## 2024-04-07 PROBLEM — E16.2 HYPOGLYCEMIA: Status: ACTIVE | Noted: 2024-04-07

## 2024-04-07 PROBLEM — Z98.84 HISTORY OF ROUX-EN-Y GASTRIC BYPASS: Status: ACTIVE | Noted: 2024-04-07

## 2024-04-07 PROBLEM — K91.1 DUMPING SYNDROME: Status: ACTIVE | Noted: 2024-04-07

## 2024-04-07 NOTE — PATIENT INSTRUCTIONS
Plan  Minimize all sugary or refined carb foods as this is likely exacerbating your symptoms of dumping syndrome   If you are seeing low blood sugars, have a snack high in protein and complex carbohydrates in addition to some simple carbohydrates    Write down all food you eat and document when you are seeing high blood sugars and when you are seeing low blood sugars for the next month   Dietician appointment to be scheduled ASAP to further discuss dumping syndrome and dietary adjustments to minimize post prandial hypoglycemia

## 2024-04-08 NOTE — PROGRESS NOTES
"Video-Visit Details    Type of service:  Video Visit, patient's daughter was also present for visit.     Video Start Time: 9:02 AM   Video End Time: 9:34 AM     Originating Location (pt. Location): Home    Distant Location (provider location):  Offsite (providers home) [facility] [place]     Platform used for Video Visit: The Infatuation    ABN form for Medicare signed and scanned into chart on 4/9/24.     Nutrition Assessment  Reason For Visit:  Karine Moss is a 69 year old female presents today for new re-establish nutrition visit. Pt with history of RNYGB in 2013 at [facility].  Patient referred by LISA Sargent on April 5, 2024.    Recently saw provider due to concerns for hypoglycemia despite not being on any medication for type 2 DM. Additional concerns of recent issues with diarrhea, stool incontinence, urinary incontinence.     Patient with Co-morbidities of obesity including:  Type II DM yes  Renal Failure yes  Sleep apnea yes  Hypertension yes   Dyslipidemia no  Joint pain yes  Back pain yes  GERD no    Anthropometrics:  Pre-op Weight: 234 lbs  Current Weight:   Estimated body mass index is 24.03 kg/m  as calculated from the following:    Height as of 4/5/24: 1.626 m (5' 4\").    Weight as of 4/5/24: 63.5 kg (140 lb).    Weight Loss Medications: none     Current Vitamins/Minerals: b12, calcium, flinstone chewables with iron, vitamin d3     Nutrition History:  Per Radha's Visit: Given her history of having a jimmy en y bypass and Karine endorsing eating foods that are high in sugar most days, Karine's hypoglycemic events are most likely due to early dumping syndrome vs postprandial hyperinsulinemic hypoglycemia (also referred to as late dumping syndrome). This phenomenon is also likely at least partially related to her recent concerns with incontinence of stool and diarrhea. In order to confirm that these issues are related to early or late dumping syndrome, " "will trial 1 month of minimizing sugar intake, have Karine write down all foods and any hypoglycemic events, and emphasize management of any hypoglycemic events a snack high in protein and complex carbohydrates in addition to some simple carbohydrates.       Can consider a meal test in the future to further evaluate for dumping syndrome, could also consider starting a glp-1 or acarbose for post prandial blood sugar management.      Recent food recall:  Regarding eating patterns and diet, she  typically eats 2-3 meals a day, describes she will nibble on meals throughout the day. Craves sweets (baked goods, mint candies). Is able to get full. Can stay full until next meal. Sometimes forgets to eat due to lack of hunger.      Meal recall:  Breakfast/Lunch - 2 cups  salad, sandwiches with cheese/ham or tuna. Beef burgers. Casserole/hot dishes  Snacks - yogurt, protein bars, fresh fruits, cookies, cottage cheese.      Dinners: hot dish, potato dumplings, chicken noodle soup, spaghetti, baked pork chops. Steaks, burgers.  salad      Snacks: fruit, sweets \"chocolate is my downfall,\" popcorn, caramel popcorn, kettle corn.   Likes baking, bakes cookies, brownies, rice pudding, jello fruit salad.   Endorses eating sweets of this nature multiple times a day.      Eats out/ gets take out twice a month. Drinks water, juice (v8, orange juice, splash), carbonated flavored water. Sometimes milk. Drinks protein shakes once a day.      Physical Activity:  Lives with 2 grandsons. Ages 24 and 21. Does housework, loves sewing, crocheting, paiting. Makes dollhouse furniture. Likes crafts.     Nutrition Prescription:  Grams Protein: 60 (minimum)  Amount of Fluid: 64 oz    Intervention  Materials/Education provided on dietary guidelines after bariatric surgery for managing dumping syndrome and reactive hypoglycemia. Discussed incorporating more foods that have a lesser effect on blood sugar level. Incorporate protein, non starch " vegetables, sugar free foods and try to avoid high sugar foods. Also discussed nutritional considerations for helping prevent dumping syndrome. Patient and daughter demonstrates understanding. Provided pt with list of goals and RD contact information.    Expected Engagement: good    Goals:  Relating To Eating:  Consume 60-90 g protein per day  Eat smaller more frequent meals.   Eat protein first, followed by non-starchy vegetables, and carbohydrate.   Limit high sugar sweets and snack foods.     Relating to beverages:  Reduce caffeine/carbonation/calorie containing beverages    Protein Sources   [email or web address]?      Carbohydrates   [email or web address]     Healthier chip/cracker ideas.   Simple Mills - [place] Flour cracker   RW Olman Sweet Potato crackers  Triscuits   Crunchmaster   Off the Beaten Path - Chick pea veggie crisps  Popchips   Irma Snaps   Wheat Thins   Blue Salma Nut thins   Whisps Parmesean Cheese Crisps    Pasta Alternatives   -[email or web address]-pasta-[email or web address]  Banza - made from chick peas  Barilla - red lentil   Ronzoni - garden delight veggie   Natural Heaven - hearts of palm   Barilla - protein pasta (lentils, chick peas, and peas)    After Weight Loss Surgery    Keeping Up Your Diet after Weight Loss Surgery  [email or web address]    Preventing Low Blood Sugar after Weight Loss Surgery  [email or web address]     Preventing Dumping Syndrome after Weight Loss Surgery  [email or web address]     Follow-Up: as needed.     Time spent with patient: 32 minutes.  Sally Carlton RD, LD

## 2024-04-09 ENCOUNTER — VIRTUAL VISIT (OUTPATIENT)
Dept: ENDOCRINOLOGY | Facility: CLINIC | Age: 69
End: 2024-04-09
Payer: COMMERCIAL

## 2024-04-09 VITALS — WEIGHT: 140 LBS | HEIGHT: 64 IN | BODY MASS INDEX: 23.9 KG/M2

## 2024-04-09 DIAGNOSIS — Z98.84 HISTORY OF ROUX-EN-Y GASTRIC BYPASS: ICD-10-CM

## 2024-04-09 DIAGNOSIS — Z71.3 NUTRITIONAL COUNSELING: Primary | ICD-10-CM

## 2024-04-09 PROCEDURE — 99207 PR NO CHARGE LOS: CPT | Mod: 95

## 2024-04-09 PROCEDURE — 97802 MEDICAL NUTRITION INDIV IN: CPT | Mod: GA

## 2024-04-09 NOTE — LETTER
"4/9/2024       RE: Karine Moss  [address]  [place] [facility] [address]     Dear Colleague,    Thank you for referring your patient, Karine Moss, to the [facility] [place] at [facility]. Please see a copy of my visit note below.    Video-Visit Details    Type of service:  Video Visit, patient's daughter was also present for visit.     Video Start Time: 9:02 AM   Video End Time: 9:34 AM     Originating Location (pt. Location): Home    Distant Location (provider location):  Offsite (providers home) [facility] [place]     Platform used for Video Visit: [employer]ioDynamix    ABN form for Medicare signed and scanned into chart on 4/9/24.     Nutrition Assessment  Reason For Visit:  Karine Moss is a 69 year old female presents today for new re-establish nutrition visit. Pt with history of RNYGB in 2013 at [facility].  Patient referred by LISA Sargent on April 5, 2024.    Recently saw provider due to concerns for hypoglycemia despite not being on any medication for type 2 DM. Additional concerns of recent issues with diarrhea, stool incontinence, urinary incontinence.     Patient with Co-morbidities of obesity including:  Type II DM yes  Renal Failure yes  Sleep apnea yes  Hypertension yes   Dyslipidemia no  Joint pain yes  Back pain yes  GERD no    Anthropometrics:  Pre-op Weight: 234 lbs  Current Weight:   Estimated body mass index is 24.03 kg/m  as calculated from the following:    Height as of 4/5/24: 1.626 m (5' 4\").    Weight as of 4/5/24: 63.5 kg (140 lb).    Weight Loss Medications: none     Current Vitamins/Minerals: b12, calcium, flinstone chewables with iron, vitamin d3     Nutrition History:  Per Radha's Visit: Given her history of having a jimmy en y bypass and Karine endorsing eating foods that are high in sugar most days, Karine's hypoglycemic events are most likely " "due to early dumping syndrome vs postprandial hyperinsulinemic hypoglycemia (also referred to as late dumping syndrome). This phenomenon is also likely at least partially related to her recent concerns with incontinence of stool and diarrhea. In order to confirm that these issues are related to early or late dumping syndrome, will trial 1 month of minimizing sugar intake, have Karine write down all foods and any hypoglycemic events, and emphasize management of any hypoglycemic events a snack high in protein and complex carbohydrates in addition to some simple carbohydrates.       Can consider a meal test in the future to further evaluate for dumping syndrome, could also consider starting a glp-1 or acarbose for post prandial blood sugar management.      Recent food recall:  Regarding eating patterns and diet, she  typically eats 2-3 meals a day, describes she will nibble on meals throughout the day. Craves sweets (baked goods, mint candies). Is able to get full. Can stay full until next meal. Sometimes forgets to eat due to lack of hunger.      Meal recall:  Breakfast/Lunch - 2 cups  salad, sandwiches with cheese/ham or tuna. Beef burgers. Casserole/hot dishes  Snacks - yogurt, protein bars, fresh fruits, cookies, cottage cheese.      Dinners: hot dish, potato dumplings, chicken noodle soup, spaghetti, baked pork chops. Steaks, burgers.  salad      Snacks: fruit, sweets \"chocolate is my downfall,\" popcorn, caramel popcorn, kettle corn.   Likes baking, bakes cookies, brownies, rice pudding, jello fruit salad.   Endorses eating sweets of this nature multiple times a day.      Eats out/ gets take out twice a month. Drinks water, juice (v8, orange juice, splash), carbonated flavored water. Sometimes milk. Drinks protein shakes once a day.      Physical Activity:  Lives with 2 grandsons. Ages 24 and 21. Does housework, loves sewing, crocheting, paiting. Makes dollhouse furniture. Likes crafts.     Nutrition " Prescription:  Grams Protein: 60 (minimum)  Amount of Fluid: 64 oz    Intervention  Materials/Education provided on dietary guidelines after bariatric surgery for managing dumping syndrome and reactive hypoglycemia. Discussed incorporating more foods that have a lesser effect on blood sugar level. Incorporate protein, non starch vegetables, sugar free foods and try to avoid high sugar foods. Also discussed nutritional considerations for helping prevent dumping syndrome. Patient and daughter demonstrates understanding. Provided pt with list of goals and RD contact information.    Expected Engagement: good    Goals:  Relating To Eating:  Consume 60-90 g protein per day  Eat smaller more frequent meals.   Eat protein first, followed by non-starchy vegetables, and carbohydrate.   Limit high sugar sweets and snack foods.     Relating to beverages:  Reduce caffeine/carbonation/calorie containing beverages    Protein Sources   [email or web address]?      Carbohydrates   [email or web address]     Healthier chip/cracker ideas.   Simple Mills - [place] Flour cracker   RW Olman Sweet Potato crackers  Triscuits   Crunchmaster   Off the Beaten Path - Chick pea veggie crisps  Popchips   Wayne Snaps   Wheat Thins   Blue Salma Nut thins   Whisps Parmesean Cheese Crisps    Pasta Alternatives   -[email or web address]/easy-pasta-[email or web address]  Banza - made from chick peas  Barilla - red lentil   Ronzoni - garden delight veggie   Natural Heaven - hearts of palm   Barilla - protein pasta (lentils, chick peas, and peas)    After Weight Loss Surgery    Keeping Up Your Diet after Weight Loss Surgery  [email or web address]    Preventing Low Blood Sugar after Weight Loss Surgery  [email or web address]     Preventing Dumping Syndrome after Weight Loss Surgery  [email or web address]     Follow-Up: as needed.     Time spent with patient: 32 minutes.  Sally Carlton RD, LD

## 2024-04-09 NOTE — NURSING NOTE
Is the patient currently in the state of [place]? YES    Visit mode:VIDEO    If the visit is dropped, the patient can be reconnected by: VIDEO VISIT: Text to cell phone:   Telephone Information:   Mobile [phone]   Mobile [phone]    and VIDEO VISIT: Send to e-mail at: [email or web address] My Mobile    Will anyone else be joining the visit? NO  (If patient encounters technical issues they should call [phone]    How would you like to obtain your AVS? MyChart    Are changes needed to the allergy or medication list? No    Reason for visit: Consult    Rossy CASTANEDA

## 2024-04-09 NOTE — PATIENT INSTRUCTIONS
Liavn Milton,     It was a pleasure visiting with you today.     Follow-up with RD as needed.     Thank you,    Sally Carlton, SPARKLE, LD  If you would like to schedule or reschedule an appointment with the RD, please call [phone]    Nutrition Goals  Relating To Eating:  Consume 60-90 g protein per day  Eat smaller more frequent meals.   Eat protein first, followed by non-starchy vegetables, and carbohydrate.   Limit high sugar sweets and snack foods.     Relating to beverages:  Reduce caffeine/carbonation/calorie containing beverages    Protein Sources   [email or web address]?      Carbohydrates   [email or web address]?     Healthier chip/cracker ideas.   [place] - [place] Flour cracker   RW Olman Sweet Potato crackers  Triscuits   Crunchmaster   [place] the [place]aten [place] - Chick pea veggie crisps  Popchips   [place] Snaps   Wheat Thins   Blue Salma Nut thins   Whisps Parmesean Cheese Crisps    Pasta Alternatives   -[email or web address]  Banza - made from chick peas  Barilla - red lentil   Ronzoni - garden delight veggie   Natural Heaven - hearts of palm   Barilla - protein pasta (lentils, chick peas, and peas)    After Weight Loss Surgery    Keeping Up Your Diet after Weight Loss Surgery  [email or web address]    Preventing Low Blood Sugar after Weight Loss Surgery  [email or web address]     Preventing Dumping Syndrome after Weight Loss Surgery  [email or web address]     COMPREHENSIVE WEIGHT MANAGEMENT PROGRAM  VIRTUAL SUPPORT GROUPS    At [facility], our Comprehensive Weight Management program offers on-line support groups for patients who are working on weight loss and considering, preparing for, or have had weight loss surgery.     There is no cost for this opportunity.  You are invited to attend the?Virtual Support Groups?provided by any of the following locations:    [facility] via [employer] Teams with Cass Montelongo  RN  2.   [place] via [employer] with Glenn Rose, PhD, LP  3.   [place] via [employer] with Ivy Cohen RN  4.   [facility] via a Zoom Meeting with JOSEFINA Zuniga    The following Support Group information can also be found on our website:  [email or web address]      [facility] Weight Loss Surgery Support Group  The support group is a patient-lead forum that meets monthly to share experiences, encouragement and education. It is open to those who have had weight loss surgery, are scheduled for surgery, or are considering surgery.   WHEN: This group meets on the 3rd Wednesday of each month from 5:00PM - 6:00PM virtually using Microsoft Teams.   FACILITATOR: Led by Cass Fletcher RD, SNEHAL, RN, the program's Clinical Coordinator.   TO REGISTER: Please contact the clinic via [employer] or call the nurse line directly at [phone] to inform our staff that you would like an invite sent to you and to let us know the email you would like the invite sent to. Prior to the meeting, a link with directions on how to join the meeting will be sent to you.    2024 Meetings   January 17  February 21  March 20  April 17  May 15  Maria Guadalupe 19      [facility] and [facility] Bariatric Care Support Group?  This is open to all pre- and post- operative bariatric surgery patients as well as their support system.   WHEN: This group meets the 3rd Tuesday of each month from 6:30 PM - 8:00 PM virtually using Microsoft Teams.   FACILITATOR: Led by Glenn Rose, Ph.D who is a Licensed Psychologist with the [facility] Comprehensive Weight Management Program.   TO REGISTER: Please send an email to Glenn Rose, Ph.D., LP at?[email or web address]?if you would like an invitation to the group. Prior to the meeting, a link with directions on how to join the meeting will be sent to  "you.    2024 Meetings January 16: \"Medication Management and Bariatric Surgery\", Alana Boyle, PharmD, Pharmacy Resident at [facility], [facility]  February 20: \"A Bariatric Surgery Patient's Perspective\", JT Gavin, [facility], Behavioral Health Clinician at [facility]  March 19  April 16  May 21  Maria Guadalupe 18: \"Nutritional Labeling\", Dietitian speaker to be announced.  November 19: \"Holiday Eating\", Dietitian speaker to be announced.    [facility] and [facility] Post-Operative Bariatric Surgery Support Group  This is a support group for [facility] bariatric patients (and those external to [facility]) who have had bariatric surgery and are at least 3 months post-surgery.  WHEN: This support group meets the 4th Wednesday of the month from 11:00 AM - 12:00 PM virtually using Microsoft Teams.   FACILITATOR: Led by Certified Bariatric Nurse, Ivy Cohen RN.   TO REGISTER: Please send an email to Ivy at [email or web address] if you would like an invitation to the group.  Prior to the meeting, a link with directions on how to join the meeting will be sent to you.    2024 Meetings  January 24  February 28  March 27  April 24  May 22  Maria Guadalupe 26    [facility] Healthy Lifestyle Group?  This is a 60 minute virtual coaching group for those who want to lead a healthier lifestyle. Come together to set goals and overcome barriers in a supportive group environment. We will address the four pillars of health: nutrition, exercise, sleep and emotional well-being.  This group is highly recommended for those who are participating in the 24 week Healthy Lifestyle Plan and our Health Coaching sessions.  WHEN: This group meets the 1st Friday of the month, 12:30 PM - 1:30 PM online, via a zoom meeting.    FACILITATOR: Led by National Board Certified Health and , UMER Zuniga[facility].   TO " "REGISTER: Please call the Call Center at [phone] to register. You will get an appointment to attend in [place]. Fifteen minutes prior to the meeting, complete the e-check in and you will get the link to join the meeting.    There is no charge to attend this group and space is limited.     2024 Meetings  January 5: \"New Years Vision: Manifest your Best 2024!\" (guided imagery,  journaling and discussion)  February 2: \"Let's Talk\"  March 1: \"10 Percent Happier\" by Alvino De Jesus (Book Bites - a guided discussion on the nuggets of wisdom from favorite wellness books, no need to read the book but highly encouraged)  April 5: \"Let's Talk\"  May 3: \"Essentialism: The Disciplined Pursuit of Less\" by Pepe Ventura (Book Bites discussion)  June 7: \"Let's Talk\"  July 5: NO MEETING, off for the 4th of July Holiday  August 2: \"The Blue Zones, Secrets for Living a Longer Life\" by Alvino Soares (Book Bites discussion)        "

## 2024-04-15 ENCOUNTER — TRANSFERRED RECORDS (OUTPATIENT)
Dept: HEALTH INFORMATION MANAGEMENT | Facility: CLINIC | Age: 69
End: 2024-04-15
Payer: COMMERCIAL

## 2024-04-16 ENCOUNTER — TRANSFERRED RECORDS (OUTPATIENT)
Dept: HEALTH INFORMATION MANAGEMENT | Facility: CLINIC | Age: 69
End: 2024-04-16
Payer: COMMERCIAL

## 2024-04-24 ENCOUNTER — MYC MEDICAL ADVICE (OUTPATIENT)
Dept: FAMILY MEDICINE | Facility: CLINIC | Age: 69
End: 2024-04-24

## 2024-04-26 NOTE — TELEPHONE ENCOUNTER
Called [facility] and spoke with medical records regarding Karine's visit with Dr. Tan on 4/15. They will fax visit note and lab results pertaining to visit. Will pass along to Dr. Martinez.    Navin PHILIP, RN  04/26/24 11:07 AM

## 2024-04-30 ENCOUNTER — TELEPHONE (OUTPATIENT)
Dept: FAMILY MEDICINE | Facility: CLINIC | Age: 69
End: 2024-04-30

## 2024-04-30 NOTE — TELEPHONE ENCOUNTER
"Pt daughter Leila calling to report that Karine has been experiencing some lightheadedness since early this morning that has been intermittent throughout the day. Karine states it feels \"woozy\" and a sensation of mild pressure. She has no speech or vision changes, no headache, nausea, vomiting. Her BG is within her normal range as of this morning, O2 sat 98% with pulse oximeter, and her BP was 130/85. Leila states that Karine was started on pregalabin by Dr. Tan at [facility] at office visit on 4/15 for treatment of her ongoing neuropathy. Will call [facility] and ask them to send us office visit note and labs from that visit. Advised Leila that Karine should be evaluated this week, scheduled appt with Dr. Martinez on Thursday 5/2. Explained that Leila should call back if Karine has any new or worsening symptoms, she is agreeable to this plan.    Navin PHILIP, RN  04/30/24 3:28 PM  "

## 2024-04-30 NOTE — PROGRESS NOTES
"Karine Moss is a 69 year old female here for the following issues:    Peripheral neuropathy  Neurology consultation from [facility] 4/26/24  Peripheral neuropathy 12 yr, R>L  No low back pain or radiculopathy  Some RLS symtpoms  Tingling, numbness, pins and needles  EMG recommended, MRI lumbar spine without contrast and labs were ordered  They recommended she continue duloxetine to help with depression and neuropathic pain  Lyrica 100mg q hs was prescribed for neuropathy and RLS  6 month follow up was recommended.   Lab data  SPEP, Calcium, methylmalonic acid, ESR, ANCA,Sjogrens panel, meyloperoxidase, Proteinase-3 antibody,folate, TSH, B12, Vitamin E normal.   PTH on high end of normal, B6 and thiamine--elevated  Ferritin 30----iron supplement recommended goal 75  Recommend   Today  EMG due  MRI, L3-4 bilaterally side may have impingement  Referred to neurosurgery  5/9/2024  Just has ankle pain      Lightheadedness  4 days ago, woke up from sleep, sat up felt like a hangover, symptoms last all day  \"World was moving\", no near syncope  Previous episodes many years ago  Still feeling some dizziness today, especially if turning her head to the left  \"Pressure\", denies sinus pressure or congestion  Vision is ok  No falls    Diabetes  Diet controlled  Off all medications  FBS  130 before meals, sometimes 140-180 after meals (unsure if she is waiting 2 hr to recheck).  Hypoglycemic episodes thought secondary to dumping syndrome, hx of gastric bypass 2013, Bariatric team consider starting a glp-1 or acarbose for post prandial blood sugar management.   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)    Hyperlipidemia LDL goal <100    Obstructive sleep apnea    Major depression " in partial remission (H24)    Low back pain    DJD (degenerative joint disease) of knee    IBS (irritable bowel syndrome)    Heart murmur    Hypertension goal BP (blood pressure) < 140/90    Type 2 diabetes mellitus without complication, without long-term current use of insulin (H)    Osteopenia of hip    PTSD (post-traumatic stress disorder)    Diabetic peripheral neuropathy (H)    Bilateral sciatica    Gastrointestinal hemorrhage associated with gastric ulcer    Chronic kidney disease, stage 3 (H)    Chronic diastolic congestive heart failure (H)    Osteopenia of spine    Chronic left shoulder pain    Dumping syndrome    History of Rai-en-Y gastric bypass    Hypoglycemia       Current Outpatient Medications   Medication Sig Dispense Refill    amphetamine-dextroamphetamine (ADDERALL) 30 MG tablet Take 1 tablet (30 mg) by mouth every 24 hours 1.5 mg a total of 45 mg  Daily 30 tablet 0    atorvastatin (LIPITOR) 20 MG tablet Take 1 tablet (20 mg) by mouth daily 90 tablet 3    blood glucose (NO BRAND SPECIFIED) lancets standard Use to test blood sugar 1 times daily or as directed. 90 Lancet 3    blood glucose (NO BRAND SPECIFIED) test strip Use to test blood sugar 1 times daily or as directed. 100 strip 3    blood glucose (NO BRAND SPECIFIED) test strip Use to test blood sugar as needed with Freestyle Nirmal CGM. 100 strip 0    blood glucose (NO BRAND SPECIFIED) test strip Use to test blood sugar as directed with CGM sensor. 50 strip 1    blood glucose calibration (NO BRAND SPECIFIED) solution Use to calibrate blood glucose monitor as needed as directed. 1 each 3    blood glucose monitoring (NO BRAND SPECIFIED) meter device kit Use to test blood sugar 1 times daily or as directed. 1 kit 0    calcium carbonate (OS-KINJAL) 1500 (600 Ca) MG tablet Take 2 tablets (1,200 mg) by mouth daily      cevimeline (EVOXAC) 30 MG capsule take 1 cap  capsule 1    cholecalciferol (VITAMIN D3) 125 mcg (5000 units) capsule Take 125  mcg by mouth daily      COMPOUNDED NON-CONTROLLED SUBSTANCE (CMPD RX) - PHARMACY TO MIX COMPOUNDED MEDICATION Estradiol 0.0075% in HRT cream  Apply 2 grams,  intravaginally and small amount externally daily for one week then twice weekly for maintenance. (Patient not taking: Reported on 4/5/2024) 45 g 11    Continuous Blood Gluc  (FREESTYLE JORDIN 14 DAY READER) EBEN Use to read blood sugars as per 's instructions. 1 each 0    Continuous Blood Gluc Sensor (FREESTYLE JORDIN 14 DAY SENSOR) MISC Change every 14 days. 2 each 11    cyanocobalamin (VITAMIN B-12) 1000 MCG tablet Take one every other day 90 tablet 3    DULoxetine (CYMBALTA) 30 MG capsule Take 1 capsule (30 mg) by mouth 2 times daily 180 capsule 1    DULoxetine (CYMBALTA) 60 MG capsule Take 1 capsule (60 mg) by mouth at bedtime Take in addition to current 30 mg evening dose for a total of 90 mg at bedtime. 90 capsule 1    finasteride (PROSCAR) 5 MG tablet Take 1/2 tablet daily 45 tablet 3    losartan (COZAAR) 50 MG tablet Take 1 tablet (50 mg) by mouth daily 90 tablet 1    omeprazole (PRILOSEC) 40 MG DR capsule Take 40mg twice daily 180 capsule 3    tolterodine ER (DETROL LA) 4 MG 24 hr capsule Take 1 capsule (4 mg) by mouth daily 90 capsule 3    traZODone (DESYREL) 50 MG tablet Take 1 tablet by mouth every 24 hours      triamcinolone (KENALOG) 0.1 % paste Apply to tongue twice daily x 10 days 5 g 0       Allergies   Allergen Reactions    Pravastatin Other (See Comments)     woozy    Codeine Nausea and Vomiting    Nsaids GI Disturbance and Other (See Comments)     Pt had bariatric surgery, should not ever take oral NSAIDS due to risk of gastric ulcers.  Pt had bariatric surgery, should not ever take oral NSAIDS due to risk of gastric ulcers.        EXAM  /70 (BP Location: Left arm, Patient Position: Sitting, Cuff Size: Adult Regular)   Pulse 70   Temp 98  F (36.7  C) (Skin)   SpO2 96%   Gen: Alert, pleasant, NAD  COR: S1,S2, 2/6  "murmur RUSB  Lungs: CTA bilaterally, no rhonchi, wheezes or rales  Ext: no peripheral edema, pulses full  Neuro: difficulty with balance with walking  Did not check for nystagmus as she verbally expressed discomfort with leftward gaze, was leery of reproducing symptoms of dizziness      Assessment:  (R42) Vertigo  (primary encounter diagnosis)  Comment: \"world spinning\" worse with looking to the left, balance poor, suspect BPPV  Plan: discussed rotisserie maneuver and gave handout. If not improving will refer to vestibular PT. Daughter, Leila at our visit today and will notify me if referral is needed.    (E11.9) Type 2 diabetes mellitus without complication, without long-term current use of insulin (H)  Comment: off all diabetic medications, she reports low fasting blood sugars. A1c in prediabetic range  Plan: Hemoglobin A1c          Lab Results   Component Value Date    A1C 5.8 05/02/2024    A1C 6.4 01/09/2024    A1C 6.1 09/05/2023    A1C 5.9 06/12/2023     Continue with eating small frequent meals, encouraged high fiber, complex carbs, she is working with nutritionist - bariatric team to help make changes      (N18.31) Stage 3a chronic kidney disease (H)  Comment: most recent creatinine is improving   Plan: Basic metabolic panel        She has follow up visit with nephrology this month.   We discussed adding back Jardiance due to renal insufficiency but will await visit with nephrologist    (G60.9) Idiopathic peripheral neuropathy  Comment: recent comprehensive evaluation with neurologist. Karine and her daughter had medical records sent to me for review and wished to discuss findings.   Neurologist ordered EMG, pending, MRI ordered but results unavailable to me, however she was referred to neurosurgeon to see if nerve impingement is causing the neuropathy  Plan: reviewed note from neurologist with Karine and her daughter, await EMG and neurosurgical findings.   Appt with [facility]. Elliott Vitale @[facility] " neurosurgery in [place]. 5/9/24    68 minutes spend on the date of this encounter doing chart review, history and exam, documentation and further activities as noted above.       Anay Martinez MD  Internal Medicine/Pediatrics

## 2024-04-30 NOTE — TELEPHONE ENCOUNTER
Spoke with [facility] regarding request for records. We only received a cover sheet on Friday 4/26, so requesting they send them again. Fax received, will provide to Dr. Martinez for Thursday appt with Karine.    Navin PHILIP, RN  04/30/24 4:19 PM

## 2024-05-02 ENCOUNTER — OFFICE VISIT (OUTPATIENT)
Dept: FAMILY MEDICINE | Facility: CLINIC | Age: 69
End: 2024-05-02
Payer: COMMERCIAL

## 2024-05-02 VITALS
HEART RATE: 70 BPM | OXYGEN SATURATION: 96 % | TEMPERATURE: 98 F | DIASTOLIC BLOOD PRESSURE: 70 MMHG | SYSTOLIC BLOOD PRESSURE: 116 MMHG

## 2024-05-02 DIAGNOSIS — R42 VERTIGO: Primary | ICD-10-CM

## 2024-05-02 DIAGNOSIS — N18.31 STAGE 3A CHRONIC KIDNEY DISEASE (H): ICD-10-CM

## 2024-05-02 DIAGNOSIS — E11.9 TYPE 2 DIABETES MELLITUS WITHOUT COMPLICATION, WITHOUT LONG-TERM CURRENT USE OF INSULIN (H): ICD-10-CM

## 2024-05-02 DIAGNOSIS — G60.9 IDIOPATHIC PERIPHERAL NEUROPATHY: ICD-10-CM

## 2024-05-02 LAB — HBA1C MFR BLD: 5.8 % (ref 0–5.6)

## 2024-05-02 PROCEDURE — 80048 BASIC METABOLIC PNL TOTAL CA: CPT | Performed by: INTERNAL MEDICINE

## 2024-05-02 RX ORDER — PREGABALIN 100 MG/1
100 CAPSULE ORAL DAILY
COMMUNITY

## 2024-05-02 NOTE — NURSING NOTE
Karine  69 year old    Chief Complaint   Patient presents with    Dizziness     Dizziness, lightheadedness, worsening confusion, and headache since Sunday - gets worse with positional changes, pt also mentions darker stools than normal    Results     Lab results (iron) from Dr. Tan            Blood pressure 116/70, pulse 70, temperature 98  F (36.7  C), temperature source Skin, SpO2 96%, not currently breastfeeding. There is no height or weight on file to calculate BMI.    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)    Hyperlipidemia LDL goal <100    Obstructive sleep apnea    Major depression in partial remission (H24)    Low back pain    DJD (degenerative joint disease) of knee    IBS (irritable bowel syndrome)    Heart murmur    Hypertension goal BP (blood pressure) < 140/90    Type 2 diabetes mellitus without complication, without long-term current use of insulin (H)    Osteopenia of hip    PTSD (post-traumatic stress disorder)    Diabetic peripheral neuropathy (H)    Bilateral sciatica    Gastrointestinal hemorrhage associated with gastric ulcer    Chronic kidney disease, stage 3 (H)    Chronic diastolic congestive heart failure (H)    Osteopenia of spine    Chronic left shoulder pain    Dumping syndrome    History of Rai-en-Y gastric bypass    Hypoglycemia              Wt Readings from Last 2 Encounters:   04/09/24 63.5 kg (140 lb)   04/05/24 63.5 kg (140 lb)       BP Readings from Last 3 Encounters:   05/02/24 116/70   04/05/24 (!) 144/87   03/19/24 118/75                Current Outpatient Medications   Medication Sig Dispense Refill    amphetamine-dextroamphetamine (ADDERALL) 30 MG tablet Take 1 tablet (30 mg) by mouth every 24 hours 1.5 mg a total of 45 mg  Daily 30 tablet 0    atorvastatin (LIPITOR) 20 MG tablet Take 1 tablet (20 mg) by mouth daily 90 tablet 3    blood glucose (NO BRAND SPECIFIED) lancets standard Use to test blood sugar 1  times daily or as directed. 90 Lancet 3    blood glucose (NO BRAND SPECIFIED) test strip Use to test blood sugar 1 times daily or as directed. 100 strip 3    blood glucose (NO BRAND SPECIFIED) test strip Use to test blood sugar as needed with Freestyle Nirmal CGM. 100 strip 0    blood glucose (NO BRAND SPECIFIED) test strip Use to test blood sugar as directed with CGM sensor. 50 strip 1    blood glucose calibration (NO BRAND SPECIFIED) solution Use to calibrate blood glucose monitor as needed as directed. 1 each 3    blood glucose monitoring (NO BRAND SPECIFIED) meter device kit Use to test blood sugar 1 times daily or as directed. 1 kit 0    calcium carbonate (OS-KINJAL) 1500 (600 Ca) MG tablet Take 2 tablets (1,200 mg) by mouth daily      cevimeline (EVOXAC) 30 MG capsule take 1 cap  capsule 1    cholecalciferol (VITAMIN D3) 125 mcg (5000 units) capsule Take 125 mcg by mouth daily      COMPOUNDED NON-CONTROLLED SUBSTANCE (CMPD RX) - PHARMACY TO MIX COMPOUNDED MEDICATION Estradiol 0.0075% in HRT cream  Apply 2 grams,  intravaginally and small amount externally daily for one week then twice weekly for maintenance. 45 g 11    Continuous Blood Gluc  (FREESTYLE NIRMAL 14 DAY READER) EBEN Use to read blood sugars as per 's instructions. 1 each 0    Continuous Blood Gluc Sensor (FREESTYLE NIRMAL 14 DAY SENSOR) MISC Change every 14 days. 2 each 11    cyanocobalamin (VITAMIN B-12) 1000 MCG tablet Take one every other day 90 tablet 3    DULoxetine (CYMBALTA) 30 MG capsule Take 1 capsule (30 mg) by mouth 2 times daily 180 capsule 1    DULoxetine (CYMBALTA) 60 MG capsule Take 1 capsule (60 mg) by mouth at bedtime Take in addition to current 30 mg evening dose for a total of 90 mg at bedtime. 90 capsule 1    finasteride (PROSCAR) 5 MG tablet Take 1/2 tablet daily 45 tablet 3    losartan (COZAAR) 50 MG tablet Take 1 tablet (50 mg) by mouth daily 90 tablet 1    omeprazole (PRILOSEC) 40 MG DR capsule Take  40mg twice daily 180 capsule 3    pregabalin (LYRICA) 100 MG capsule Take 100 mg by mouth daily      tolterodine ER (DETROL LA) 4 MG 24 hr capsule Take 1 capsule (4 mg) by mouth daily 90 capsule 3    traZODone (DESYREL) 50 MG tablet Take 1 tablet by mouth every 24 hours      triamcinolone (KENALOG) 0.1 % paste Apply to tongue twice daily x 10 days 5 g 0     No current facility-administered medications for this visit.              Social History     Tobacco Use    Smoking status: Never     Passive exposure: Never    Smokeless tobacco: Never   Vaping Use    Vaping status: Never Used   Substance Use Topics    Alcohol use: Not Currently     Comment: rare    Drug use: No              Health Maintenance Due   Topic Date Due    HF ACTION PLAN  Never done    ADVANCE CARE PLANNING  Never done    HEPATITIS C SCREENING  Never done    RSV VACCINE (Pregnancy & 60+) (1 - 1-dose 60+ series) Never done    MEDICARE ANNUAL WELLNESS VISIT  04/12/2020    EYE EXAM  03/10/2021    COLORECTAL CANCER SCREENING  11/17/2021    Pneumococcal Vaccine: 65+ Years (2 of 2 - PCV) 05/27/2022    FALL RISK ASSESSMENT  02/17/2023    ALT  12/09/2023    COVID-19 Vaccine (6 - 2023-24 season) 02/07/2024              Lab Results   Component Value Date    PAP NIL 04/12/2019              May 2, 2024 2:56 PM

## 2024-05-02 NOTE — PATIENT INSTRUCTIONS
Iron supplement  45mg -65 mg MON WED FRI  Take along with vitamin C 100mg dose for better absorption

## 2024-05-04 LAB
ANION GAP SERPL CALCULATED.3IONS-SCNC: 11 MMOL/L (ref 7–15)
BUN SERPL-MCNC: 48 MG/DL (ref 8–23)
CALCIUM SERPL-MCNC: 9.3 MG/DL (ref 8.8–10.2)
CHLORIDE SERPL-SCNC: 105 MMOL/L (ref 98–107)
CREAT SERPL-MCNC: 1.27 MG/DL (ref 0.51–0.95)
DEPRECATED HCO3 PLAS-SCNC: 24 MMOL/L (ref 22–29)
EGFRCR SERPLBLD CKD-EPI 2021: 46 ML/MIN/1.73M2
GLUCOSE SERPL-MCNC: 132 MG/DL (ref 70–99)
POTASSIUM SERPL-SCNC: 4.5 MMOL/L (ref 3.4–5.3)
SODIUM SERPL-SCNC: 140 MMOL/L (ref 135–145)

## 2024-05-07 DIAGNOSIS — N18.30 STAGE 3 CHRONIC KIDNEY DISEASE, UNSPECIFIED WHETHER STAGE 3A OR 3B CKD (H): Primary | ICD-10-CM

## 2024-05-08 ENCOUNTER — VIRTUAL VISIT (OUTPATIENT)
Dept: ENDOCRINOLOGY | Facility: CLINIC | Age: 69
End: 2024-05-08
Payer: COMMERCIAL

## 2024-05-08 VITALS — WEIGHT: 143 LBS | BODY MASS INDEX: 24.41 KG/M2 | HEIGHT: 64 IN

## 2024-05-08 DIAGNOSIS — K91.1 DUMPING SYNDROME: ICD-10-CM

## 2024-05-08 DIAGNOSIS — Z98.84 HISTORY OF ROUX-EN-Y GASTRIC BYPASS: Primary | ICD-10-CM

## 2024-05-08 PROCEDURE — 99213 OFFICE O/P EST LOW 20 MIN: CPT | Mod: 95

## 2024-05-08 ASSESSMENT — PAIN SCALES - GENERAL: PAINLEVEL: NO PAIN (0)

## 2024-05-08 NOTE — PROGRESS NOTES
Virtual Visit Details    Type of service:  Video Visit   Video Start Time:  10:26am  Video End Time: 10:44am    Originating Location (pt. Location): Home    Distant Location (provider location):  Off-site  Platform used for Video Visit: Bernadette

## 2024-05-08 NOTE — NURSING NOTE
Is the patient currently in the state of [place]? YES    Visit mode:VIDEO    If the visit is dropped, the patient can be reconnected by: VIDEO VISIT: Send to e-mail at: [email or web address]    Will anyone else be joining the visit? NO  (If patient encounters technical issues they should call [phone]    How would you like to obtain your AVS? MyChart    Are changes needed to the allergy or medication list? No    Are refills needed on medications prescribed by this physician? NO    Reason for visit: RECHECK    Alexis CASTANEDA

## 2024-05-08 NOTE — LETTER
2024       RE: Karine Moss  [address]  [facility] [address]     Dear Colleague,    Thank you for referring your patient, Karine Moss, to the [facility] [place] [facility]. Please see a copy of my visit note below.    Return Bariatric Surgery Note    RE: Karine Moss  MR#: [id number]  : 1955  VISIT DATE: May 8, 2024      Dear Anay Martinez,    I had the pleasure of seeing your patient, Karine Moss, in my post-bariatric surgery assessment clinic.    Assessment & Plan  Problem List Items Addressed This Visit       Dumping syndrome    History of Rai-en-Y gastric bypass - Primary          25 minutes spent by me on the date of the encounter doing chart review, history and exam, documentation and further activities per the note    CHIEF COMPLAINT: Post-bariatric surgery follow-up. 11 years s/p RYGB at [facility].    HISTORY OF PRESENT ILLNESS:      2024     2:22 PM   Questions Regarding Prior Weight Loss Surgery Reviewed With Patient   I had the following weight loss procedure Rai-en-y Gastric Bypass   What year was your surgery? 2013   How has your weight changed since your last visit? I have gained weight   Do you currently have any of the following Diabetes   Do you have any concerns today? Follow up       Plan  Because you have successfully avoided low blood sugar readings with avoiding sweets and getting in extra protein, and have also seen improvements in diarrhea symptoms, we can confidently say you were previously experiencing dumping syndrome   Great work on successfully preventing low blood sugars over the last month!   Goals we discussed today: continue focusing on high protein meals (60-90g a day), getting lots of non-starchy vegetables, continue to limit high sugar sweets and snack foods   Follow up with Radha in 3 months   Dietician appointment as needed  moving forward   Keep up the excellent work!       Interval History:   New Re-establish with me 4/5/24  69 year old female with type 2 diabetes, concern for kidney disease, hypertension, narcolepsy, sleep apnea, history gastric ulcer, osteoporosis. Gastric bypass 11 years ago through [facility]. Presents today with concerns for hypoglycemia despite not being on any medication for type 2 DM. Additional concerns of recent issues with diarrhea, stool incontinence, urinary incontinence.      Given her history of having a jimmy en y bypass and Karine endorsing eating foods that are high in sugar most days, Karine's hypoglycemic events are most likely due to early dumping syndrome vs postprandial hyperinsulinemic hypoglycemia (also referred to as late dumping syndrome). This phenomenon is also likely at least partially related to her recent concerns with incontinence of stool and diarrhea. In order to confirm that these issues are related to early or late dumping syndrome, will trial 1 month of minimizing sugar intake, have Karine write down all foods and any hypoglycemic events, and emphasize management of any hypoglycemic events a snack high in protein and complex carbohydrates in addition to some simple carbohydrates.       Can consider a meal test in the future to further evaluate for dumping syndrome, could also consider starting a glp-1 or acarbose for post prandial blood sugar management.      Plan  Minimize all sugary or refined carb foods as this is likely exacerbating your symptoms of dumping syndrome   If you are seeing low blood sugars, have a snack high in protein and complex carbohydrates in addition to some simple carbohydrates    Write down all food you eat and document when you are seeing high blood sugars and when you are seeing low blood sugars for the next month   Dietician appointment to be scheduled ASAP to further discuss dumping syndrome and dietary adjustments to minimize post prandial  hypoglycemia    Since last visit:     Has not seen any low blood sugar incidents for the last month, has been prioritizing getting lots of protein into her meal.   Has been tracking a handful of days on [employer], feels this helps her pay more attention to foods.     Diarrhea- much less diarrhea since reducing sugars and increasing protein .    Recent diet changes: focusing on getting lots of protein and lots of water.     -Protein- eating more sugar free protein bars     Vitamins/Labs: A1c down to 5.8     GERD symptoms: none     Nausea : none    Vomiting : none     Dysphagia:  none         Weight History:      5/7/2024     2:22 PM   --   What is your highest lifetime weight? 210   What is your lowest weight since surgery? (In pounds) 123     Initial Weight (lbs): 140 lbs  Weight: 64.9 kg (143 lb)  Last Visits Weight: 63.5 kg (140 lb)  Cumulative weight loss (lbs): -3  Weight Loss Percentage: -2.14%        5/7/2024     2:22 PM   Questions Regarding Co-Morbidities and Health Concerns Reviewed With Patient   Pre-diabetes Improved   Diabetes II Improved   What was your most recent hemoglobin a1c 5.8   How many years have you had diabetes? Approx 12   High Blood Pressure Improved   High cholesterol Improved   Heartburn/Reflux Never   Sleep apnea Improved   PCOS Never   Back pain Worsened   Joint pain Worsened   Lower leg swelling Never           5/7/2024     2:22 PM   Eating Habits   How many meals do you eat per day? 2   Do you snack between meals? Yes   How much food are you eating at each meal? 1/2 cup to 1 cup   Are you able to separate your meals and liquids by at least 30 minutes? Yes   Are you able to avoid liquid calories? No           5/7/2024     2:22 PM   Exercise Questions Reviewed With Patient   How often do you exercise? Less than 1 time per week   What is the duration of your exercise (in minutes)? 30 Minutes   What types of exercise do you do? walking   What keeps you from being more active? I am as  active as I can possbily be    Pain    Too tired       Social History:      5/7/2024     2:22 PM   --   Are you smoking? No   Are you drinking alcohol? No       Medications:  Current Outpatient Medications   Medication Sig Dispense Refill    amphetamine-dextroamphetamine (ADDERALL) 30 MG tablet Take 1 tablet (30 mg) by mouth every 24 hours 1.5 mg a total of 45 mg  Daily 30 tablet 0    atorvastatin (LIPITOR) 20 MG tablet Take 1 tablet (20 mg) by mouth daily 90 tablet 3    blood glucose (NO BRAND SPECIFIED) lancets standard Use to test blood sugar 1 times daily or as directed. 90 Lancet 3    blood glucose (NO BRAND SPECIFIED) test strip Use to test blood sugar 1 times daily or as directed. 100 strip 3    blood glucose (NO BRAND SPECIFIED) test strip Use to test blood sugar as needed with Freestyle Nirmal CGM. 100 strip 0    blood glucose (NO BRAND SPECIFIED) test strip Use to test blood sugar as directed with CGM sensor. 50 strip 1    blood glucose calibration (NO BRAND SPECIFIED) solution Use to calibrate blood glucose monitor as needed as directed. 1 each 3    blood glucose monitoring (NO BRAND SPECIFIED) meter device kit Use to test blood sugar 1 times daily or as directed. 1 kit 0    calcium carbonate (OS-KINJAL) 1500 (600 Ca) MG tablet Take 2 tablets (1,200 mg) by mouth daily      cevimeline (EVOXAC) 30 MG capsule take 1 cap  capsule 1    cholecalciferol (VITAMIN D3) 125 mcg (5000 units) capsule Take 125 mcg by mouth daily      COMPOUNDED NON-CONTROLLED SUBSTANCE (CMPD RX) - PHARMACY TO MIX COMPOUNDED MEDICATION Estradiol 0.0075% in HRT cream  Apply 2 grams,  intravaginally and small amount externally daily for one week then twice weekly for maintenance. 45 g 11    Continuous Blood Gluc  (FREESTYLE NIRMAL 14 DAY READER) EBEN Use to read blood sugars as per 's instructions. 1 each 0    Continuous Blood Gluc Sensor (FREESTYLE NIRMAL 14 DAY SENSOR) MISC Change every 14 days. 2 each 11     "cyanocobalamin (VITAMIN B-12) 1000 MCG tablet Take one every other day 90 tablet 3    DULoxetine (CYMBALTA) 30 MG capsule Take 1 capsule (30 mg) by mouth 2 times daily 180 capsule 1    DULoxetine (CYMBALTA) 60 MG capsule Take 1 capsule (60 mg) by mouth at bedtime Take in addition to current 30 mg evening dose for a total of 90 mg at bedtime. 90 capsule 1    finasteride (PROSCAR) 5 MG tablet Take 1/2 tablet daily 45 tablet 3    losartan (COZAAR) 50 MG tablet Take 1 tablet (50 mg) by mouth daily 90 tablet 1    omeprazole (PRILOSEC) 40 MG DR capsule Take 40mg twice daily 180 capsule 3    pregabalin (LYRICA) 100 MG capsule Take 100 mg by mouth daily      tolterodine ER (DETROL LA) 4 MG 24 hr capsule Take 1 capsule (4 mg) by mouth daily 90 capsule 3    traZODone (DESYREL) 50 MG tablet Take 1 tablet by mouth every 24 hours      triamcinolone (KENALOG) 0.1 % paste Apply to tongue twice daily x 10 days 5 g 0     No current facility-administered medications for this visit.         5/7/2024     2:22 PM   --   Do you avoid NSAIDs such as (Ibuprofen, Aleve, Naproxen, Advil)? Yes       ROS:  GI:       5/7/2024     2:22 PM   --   Vomiting No   Diarrhea Yes   Constipation No   Swallowing trouble No   Abdominal pain No   Heartburn No     Skin:       5/7/2024     2:22 PM   BAR RBS ROS - SKIN   Rash in skin folds No     Psych:       5/7/2024     2:22 PM   --   Depression No   Anxiety No     Female Only:       5/7/2024     2:22 PM   BAR RBS ROS -    Female only Post-menopausal   Stress urinary incontinence Yes           4/4/2024     3:15 PM   SARA Score (Last Two)   SARA Raw Score 28   Activation Score 50   SARA Level 2         PHYSICAL EXAM:  Objective   Ht 1.626 m (5' 4\")   Wt 64.9 kg (143 lb)   BMI 24.55 kg/m    Vitals - Patient Reported  Pain Score: No Pain (0)      Vitals:  No vitals were obtained today due to virtual visit.    Physical Exam   GENERAL: alert and no distress  EYES: Eyes grossly normal to inspection.  No discharge " or erythema, or obvious scleral/conjunctival abnormalities.  RESP: No audible wheeze, cough, or visible cyanosis.    SKIN: Visible skin clear. No significant rash, abnormal pigmentation or lesions.  NEURO: Cranial nerves grossly intact.  Mentation and speech appropriate for age.  PSYCH: Appropriate affect, tone, and pace of words        Sincerely,    Radha Hull PA-C      Virtual Visit Details    Type of service:  Video Visit   Video Start Time:  10:26am  Video End Time: 10:44am    Originating Location (pt. Location): Home    Distant Location (provider location):  Off-site  Platform used for Video Visit: [employer]

## 2024-05-08 NOTE — PROGRESS NOTES
Return Bariatric Surgery Note    RE: Karine Moss  MR#: [id number]  : 1955  VISIT DATE: May 8, 2024      Dear Anay Martinez,    I had the pleasure of seeing your patient, Karine Moss, in my post-bariatric surgery assessment clinic.    Assessment & Plan   Problem List Items Addressed This Visit       Dumping syndrome    History of Rai-en-Y gastric bypass - Primary          25 minutes spent by me on the date of the encounter doing chart review, history and exam, documentation and further activities per the note    CHIEF COMPLAINT: Post-bariatric surgery follow-up. 11 years s/p RYGB at [facility].    HISTORY OF PRESENT ILLNESS:      2024     2:22 PM   Questions Regarding Prior Weight Loss Surgery Reviewed With Patient   I had the following weight loss procedure Rai-en-y Gastric Bypass   What year was your surgery?    How has your weight changed since your last visit? I have gained weight   Do you currently have any of the following Diabetes   Do you have any concerns today? Follow up       Plan  Because you have successfully avoided low blood sugar readings with avoiding sweets and getting in extra protein, and have also seen improvements in diarrhea symptoms, we can confidently say you were previously experiencing dumping syndrome   Great work on successfully preventing low blood sugars over the last month!   Goals we discussed today: continue focusing on high protein meals (60-90g a day), getting lots of non-starchy vegetables, continue to limit high sugar sweets and snack foods   Follow up with Radha in 3 months   Dietician appointment as needed moving forward   Keep up the excellent work!       Interval History:   New Re-establish with me 24  69 year old female with type 2 diabetes, concern for kidney disease, hypertension, narcolepsy, sleep apnea, history gastric ulcer, osteoporosis. Gastric bypass 11 years ago through [facility]. Presents today with  concerns for hypoglycemia despite not being on any medication for type 2 DM. Additional concerns of recent issues with diarrhea, stool incontinence, urinary incontinence.      Given her history of having a jimmy en y bypass and Karine endorsing eating foods that are high in sugar most days, Karine's hypoglycemic events are most likely due to early dumping syndrome vs postprandial hyperinsulinemic hypoglycemia (also referred to as late dumping syndrome). This phenomenon is also likely at least partially related to her recent concerns with incontinence of stool and diarrhea. In order to confirm that these issues are related to early or late dumping syndrome, will trial 1 month of minimizing sugar intake, have Karine write down all foods and any hypoglycemic events, and emphasize management of any hypoglycemic events a snack high in protein and complex carbohydrates in addition to some simple carbohydrates.       Can consider a meal test in the future to further evaluate for dumping syndrome, could also consider starting a glp-1 or acarbose for post prandial blood sugar management.      Plan  Minimize all sugary or refined carb foods as this is likely exacerbating your symptoms of dumping syndrome   If you are seeing low blood sugars, have a snack high in protein and complex carbohydrates in addition to some simple carbohydrates    Write down all food you eat and document when you are seeing high blood sugars and when you are seeing low blood sugars for the next month   Dietician appointment to be scheduled ASAP to further discuss dumping syndrome and dietary adjustments to minimize post prandial hypoglycemia    Since last visit:     Has not seen any low blood sugar incidents for the last month, has been prioritizing getting lots of protein into her meal.   Has been tracking a handful of days on [employer], feels this helps her pay more attention to foods.     Diarrhea- much less diarrhea since reducing sugars and  increasing protein .    Recent diet changes: focusing on getting lots of protein and lots of water.     -Protein- eating more sugar free protein bars     Vitamins/Labs: A1c down to 5.8     GERD symptoms: none     Nausea : none    Vomiting : none     Dysphagia:  none         Weight History:      5/7/2024     2:22 PM   --   What is your highest lifetime weight? 210   What is your lowest weight since surgery? (In pounds) 123     Initial Weight (lbs): 140 lbs  Weight: 64.9 kg (143 lb)  Last Visits Weight: 63.5 kg (140 lb)  Cumulative weight loss (lbs): -3  Weight Loss Percentage: -2.14%        5/7/2024     2:22 PM   Questions Regarding Co-Morbidities and Health Concerns Reviewed With Patient   Pre-diabetes Improved   Diabetes II Improved   What was your most recent hemoglobin a1c 5.8   How many years have you had diabetes? Approx 12   High Blood Pressure Improved   High cholesterol Improved   Heartburn/Reflux Never   Sleep apnea Improved   PCOS Never   Back pain Worsened   Joint pain Worsened   Lower leg swelling Never           5/7/2024     2:22 PM   Eating Habits   How many meals do you eat per day? 2   Do you snack between meals? Yes   How much food are you eating at each meal? 1/2 cup to 1 cup   Are you able to separate your meals and liquids by at least 30 minutes? Yes   Are you able to avoid liquid calories? No           5/7/2024     2:22 PM   Exercise Questions Reviewed With Patient   How often do you exercise? Less than 1 time per week   What is the duration of your exercise (in minutes)? 30 Minutes   What types of exercise do you do? walking   What keeps you from being more active? I am as active as I can possbily be    Pain    Too tired       Social History:      5/7/2024     2:22 PM   --   Are you smoking? No   Are you drinking alcohol? No       Medications:  Current Outpatient Medications   Medication Sig Dispense Refill    amphetamine-dextroamphetamine (ADDERALL) 30 MG tablet Take 1 tablet (30 mg) by mouth  every 24 hours 1.5 mg a total of 45 mg  Daily 30 tablet 0    atorvastatin (LIPITOR) 20 MG tablet Take 1 tablet (20 mg) by mouth daily 90 tablet 3    blood glucose (NO BRAND SPECIFIED) lancets standard Use to test blood sugar 1 times daily or as directed. 90 Lancet 3    blood glucose (NO BRAND SPECIFIED) test strip Use to test blood sugar 1 times daily or as directed. 100 strip 3    blood glucose (NO BRAND SPECIFIED) test strip Use to test blood sugar as needed with Freestyle Nirmal CGM. 100 strip 0    blood glucose (NO BRAND SPECIFIED) test strip Use to test blood sugar as directed with CGM sensor. 50 strip 1    blood glucose calibration (NO BRAND SPECIFIED) solution Use to calibrate blood glucose monitor as needed as directed. 1 each 3    blood glucose monitoring (NO BRAND SPECIFIED) meter device kit Use to test blood sugar 1 times daily or as directed. 1 kit 0    calcium carbonate (OS-KINJAL) 1500 (600 Ca) MG tablet Take 2 tablets (1,200 mg) by mouth daily      cevimeline (EVOXAC) 30 MG capsule take 1 cap  capsule 1    cholecalciferol (VITAMIN D3) 125 mcg (5000 units) capsule Take 125 mcg by mouth daily      COMPOUNDED NON-CONTROLLED SUBSTANCE (CMPD RX) - PHARMACY TO MIX COMPOUNDED MEDICATION Estradiol 0.0075% in HRT cream  Apply 2 grams,  intravaginally and small amount externally daily for one week then twice weekly for maintenance. 45 g 11    Continuous Blood Gluc  (FREESTYLE NIRMAL 14 DAY READER) EBEN Use to read blood sugars as per 's instructions. 1 each 0    Continuous Blood Gluc Sensor (FREESTYLE NIRMAL 14 DAY SENSOR) MISC Change every 14 days. 2 each 11    cyanocobalamin (VITAMIN B-12) 1000 MCG tablet Take one every other day 90 tablet 3    DULoxetine (CYMBALTA) 30 MG capsule Take 1 capsule (30 mg) by mouth 2 times daily 180 capsule 1    DULoxetine (CYMBALTA) 60 MG capsule Take 1 capsule (60 mg) by mouth at bedtime Take in addition to current 30 mg evening dose for a total of 90 mg at  "bedtime. 90 capsule 1    finasteride (PROSCAR) 5 MG tablet Take 1/2 tablet daily 45 tablet 3    losartan (COZAAR) 50 MG tablet Take 1 tablet (50 mg) by mouth daily 90 tablet 1    omeprazole (PRILOSEC) 40 MG DR capsule Take 40mg twice daily 180 capsule 3    pregabalin (LYRICA) 100 MG capsule Take 100 mg by mouth daily      tolterodine ER (DETROL LA) 4 MG 24 hr capsule Take 1 capsule (4 mg) by mouth daily 90 capsule 3    traZODone (DESYREL) 50 MG tablet Take 1 tablet by mouth every 24 hours      triamcinolone (KENALOG) 0.1 % paste Apply to tongue twice daily x 10 days 5 g 0     No current facility-administered medications for this visit.         5/7/2024     2:22 PM   --   Do you avoid NSAIDs such as (Ibuprofen, Aleve, Naproxen, Advil)? Yes       ROS:  GI:       5/7/2024     2:22 PM   --   Vomiting No   Diarrhea Yes   Constipation No   Swallowing trouble No   Abdominal pain No   Heartburn No     Skin:       5/7/2024     2:22 PM   BAR RBS ROS - SKIN   Rash in skin folds No     Psych:       5/7/2024     2:22 PM   --   Depression No   Anxiety No     Female Only:       5/7/2024     2:22 PM   BAR RBS ROS -    Female only Post-menopausal   Stress urinary incontinence Yes           4/4/2024     3:15 PM   SARA Score (Last Two)   SARA Raw Score 28   Activation Score 50   SARA Level 2         PHYSICAL EXAM:  Objective    Ht 1.626 m (5' 4\")   Wt 64.9 kg (143 lb)   BMI 24.55 kg/m    Vitals - Patient Reported  Pain Score: No Pain (0)      Vitals:  No vitals were obtained today due to virtual visit.    Physical Exam   GENERAL: alert and no distress  EYES: Eyes grossly normal to inspection.  No discharge or erythema, or obvious scleral/conjunctival abnormalities.  RESP: No audible wheeze, cough, or visible cyanosis.    SKIN: Visible skin clear. No significant rash, abnormal pigmentation or lesions.  NEURO: Cranial nerves grossly intact.  Mentation and speech appropriate for age.  PSYCH: Appropriate affect, tone, and pace of " words        Sincerely,    Radha Hull PA-C

## 2024-05-08 NOTE — PATIENT INSTRUCTIONS
"Thank you for allowing us the privilege of caring for you. We hope we provided you with the excellent service you deserve.   Please let us know if there is anything else we can do for you so that we can be sure you are completely satisfied with your care experience.    To ensure the quality of our services you may be receiving a patient satisfaction survey from an independent patient satisfaction monitoring company.    The greatest compliment you can give is a \"Likely to Recommend\"    Your visit was with Radha Hull PA-C today.    Instructions per today's visit:     Livan Moss, it was great to visit with you today.  Here is a review of our visit.  If our clinic scheduler is not able to reach you please call [phone] to schedule your next appointments.    Plan  Because you have successfully avoided low blood sugar readings with avoiding sweets and getting in extra protein, and have also seen improvements in diarrhea symptoms, we can confidently say you were previously experiencing dumping syndrome   Great work on successfully preventing low blood sugars over the last month!   Goals we discussed today: continue focusing on high protein meals (60-90g a day), getting lots of non-starchy vegetables, continue to limit high sugar sweets and snack foods   Follow up with Radha in 3 months   Dietician appointment as needed moving forward   Keep up the excellent work!       Information about Video Visits with [employer]: video visit information  _________________________________________________________________________________________________________________________________________________________  If you are asked by your clinic team to have your blood pressure checked:  [facility] do offer several locations for blood pressure checks. Please follow the below link to schedule an appointment. Scheduling an appointment at the pharmacy for a blood pressure check is now preferred.    Appointment " Plus ([email or web address])  _________________________________________________________________________________________________________________________________________________________  Important contact and scheduling information:  Please call our contact center at [phone] to schedule your next appointments.  To find a lab location near you, please call [phone].  For any nursing questions or concerns call Dian Tong LPN at [phone] or Rebekah Hernandez RN at [phone]  Please call during clinic hours Monday through Friday 8:00a - 4:00p if you have questions or you can contact us via [employer] at anytime and we will reply during clinic hours.    Lab results will be communicated through My Chart or letter (if My Chart not used). Please call the clinic if you have not received communication after 1 week or if you have any questions.?  Clinic Fax: [phone]    _________________________________________________________________________________________________________________________________________________________  Meal Replacement Products:    Here is the link to our new e-store where you can purchase our meal replacement products    [facility] E-Store  [facility].[employer]/store    The one week starter kit is a great way to sample a variety of products and see what works for you.    If you want more information about the product go to: [employer]  [employer]    If you are an employee or [facility] or [facility] please contact your care team for a 10% estore discount    Free Shipping for orders over $75     Benefits of meal replacements products:    Portion and calorie control  Improved nutrition  Structured eating  Simplified food choices  Avoid contact with trigger foods  _________________________________________________________________________________________________________________________________________________________  Interested in working with a  health ?  Health coaches work with you to improve your overall health and wellbeing.  They look at the whole person, and may involve discussion of different areas of life, including, but not limited to the four pillars of health (sleep, exercise, nutrition, and stress management). Discuss with your care team if you would like to start working a health .  Health Coaching-3 Pack: Schedule by calling [phone]    $99 for three health coaching visits    Visits may be done in person or via phone    Coaching is a partnership between the  and the client; Coaches do not prescribe or diagnose    Coaching helps inspire the client to reach his/her personal goals   _________________________________________________________________________________________________________________________________________________________  24 Week Healthy Lifestyle Plan:    Our mission in the 24-week Healthy Lifestyle Plan is to provide you with individualized care by giving you the tools, education and support you need to lose weight and maintain a healthy lifestyle. In your 24-week journey, you ll be supported by a dedicated weight loss team that includes registered dietitians, medical weight management providers, health coaches, and nurses -- all with special expertise in weight loss -- to help you every step of the way.     Monthly meetings with your registered dietician or medical weight management provider help to review your progress, update your care plan, and make any adjustments needed to ensure success. Between these visits, weekly and bi-weekly health  visits will help you focus on the four pillars of weight loss -- stress, sleep, nutrition, and exercise -- and how you can best adapt each to achieve sustainable weight loss results.    In addition, you will be given exclusive access to online wellbeing classes through "[employer].  Your initial visit will be with a medical weight management provider who will help to  understand your weight loss goals and ensure this program is the right fit for you. Please let our team know if you are interested in the 24 week plan by sending a message to your care team or calling [phone] to schedule.  _________________________________________________________________________________________________________________________________________________________  __________  [facility] Get Moving Program  Our team of physical therapists is trained to help you understand and take control of your condition. They will perform a thorough evaluation to determine your ability for activity and develop a customized plan to fit your goals and physical ability.  Scheduling: Unsure if the Get Moving program is right for you? Discuss the program with your medical provider or diabetes educator. You can also call us at [phone] to ask questions or schedule an appointment.   [facility] Get Moving Program  ____________________________________________________________________________________________________________________________________________________________________________  [facility] Diabetes Prevention Program (DPP)  If you have prediabetes and Medicare please contact us via [employer] to learn more about the Diabetes Prevention Program (DPP)  Program Details:   [employer] [facility] offers the year-long Diabetes Prevention Program (DPP). The program helps you to make lifestyle changes that prevent or delay type 2 diabetes by supporting healthy eating, increased physical activity, stress reduction and use of coping skills.   On average, previous [facility] DPP cohorts have lost and maintained at least 5% of their starting weight throughout the program and averaged more than 150 minutes of physical activity per week.  Participants meet weekly for one-hour group sessions over sixteen weeks, every other week for the next 8 weeks, and monthly for the last six months.   A year-long  maintenance program is also available for participants who complete the first year.   Location & Cost:   During the COVID-19 Public Health Emergency, the program is offered virtually. When in-person classes can resume, they will be held at [facility].  For people with Medicare, the program is covered in full. A self-pay option will also be available for those with non-Medicare insurance plans.   ______________________________________________________________________________________________________________________________________________________________________________________________________________________________    To work with a Behavioral Health Psychologist:    Call to schedule:    Justin Phillips - [phone]  Claire Moon - [phone]  Deneen Sullivan - [phone]  Trudy Rodriguez - [phone]   Pattie John PhD (cannot accept Medicare) [phone]        Thank you,   [facility] Comprehensive Weight Management Team

## 2024-05-10 ENCOUNTER — LAB (OUTPATIENT)
Dept: LAB | Facility: CLINIC | Age: 69
End: 2024-05-10
Payer: COMMERCIAL

## 2024-05-10 ENCOUNTER — OFFICE VISIT (OUTPATIENT)
Dept: NEPHROLOGY | Facility: CLINIC | Age: 69
End: 2024-05-10
Attending: INTERNAL MEDICINE
Payer: COMMERCIAL

## 2024-05-10 VITALS
DIASTOLIC BLOOD PRESSURE: 86 MMHG | WEIGHT: 144.6 LBS | HEART RATE: 86 BPM | BODY MASS INDEX: 24.82 KG/M2 | OXYGEN SATURATION: 98 % | SYSTOLIC BLOOD PRESSURE: 130 MMHG

## 2024-05-10 DIAGNOSIS — I50.32 CHRONIC DIASTOLIC CONGESTIVE HEART FAILURE (H): ICD-10-CM

## 2024-05-10 DIAGNOSIS — N18.30 STAGE 3 CHRONIC KIDNEY DISEASE, UNSPECIFIED WHETHER STAGE 3A OR 3B CKD (H): ICD-10-CM

## 2024-05-10 DIAGNOSIS — K91.1 DUMPING SYNDROME: ICD-10-CM

## 2024-05-10 DIAGNOSIS — E11.9 TYPE 2 DIABETES MELLITUS WITHOUT COMPLICATION, WITHOUT LONG-TERM CURRENT USE OF INSULIN (H): ICD-10-CM

## 2024-05-10 DIAGNOSIS — N18.31 STAGE 3A CHRONIC KIDNEY DISEASE (H): Primary | ICD-10-CM

## 2024-05-10 DIAGNOSIS — E55.9 VITAMIN D DEFICIENCY: ICD-10-CM

## 2024-05-10 DIAGNOSIS — G47.33 OBSTRUCTIVE SLEEP APNEA: ICD-10-CM

## 2024-05-10 DIAGNOSIS — Z98.84 HISTORY OF ROUX-EN-Y GASTRIC BYPASS: ICD-10-CM

## 2024-05-10 DIAGNOSIS — I10 HYPERTENSION GOAL BP (BLOOD PRESSURE) < 140/90: ICD-10-CM

## 2024-05-10 LAB
ALBUMIN MFR UR ELPH: 15.9 MG/DL
ALBUMIN SERPL BCG-MCNC: 4.3 G/DL (ref 3.5–5.2)
ALBUMIN UR-MCNC: NEGATIVE MG/DL
ANION GAP SERPL CALCULATED.3IONS-SCNC: 11 MMOL/L (ref 7–15)
APPEARANCE UR: CLEAR
BILIRUB UR QL STRIP: NEGATIVE
BUN SERPL-MCNC: 34.6 MG/DL (ref 8–23)
CALCIUM SERPL-MCNC: 9.2 MG/DL (ref 8.8–10.2)
CHLORIDE SERPL-SCNC: 106 MMOL/L (ref 98–107)
COLOR UR AUTO: YELLOW
CREAT SERPL-MCNC: 1.42 MG/DL (ref 0.51–0.95)
CREAT UR-MCNC: 57.8 MG/DL
DEPRECATED HCO3 PLAS-SCNC: 25 MMOL/L (ref 22–29)
EGFRCR SERPLBLD CKD-EPI 2021: 40 ML/MIN/1.73M2
ERYTHROCYTE [DISTWIDTH] IN BLOOD BY AUTOMATED COUNT: 13.5 % (ref 10–15)
GLUCOSE SERPL-MCNC: 152 MG/DL (ref 70–99)
GLUCOSE UR STRIP-MCNC: >=1000 MG/DL
HCT VFR BLD AUTO: 40.1 % (ref 35–47)
HGB BLD-MCNC: 12.6 G/DL (ref 11.7–15.7)
HGB UR QL STRIP: NEGATIVE
KETONES UR STRIP-MCNC: NEGATIVE MG/DL
LEUKOCYTE ESTERASE UR QL STRIP: NEGATIVE
MCH RBC QN AUTO: 29.2 PG (ref 26.5–33)
MCHC RBC AUTO-ENTMCNC: 31.4 G/DL (ref 31.5–36.5)
MCV RBC AUTO: 93 FL (ref 78–100)
NITRATE UR QL: NEGATIVE
PH UR STRIP: 5 [PH] (ref 5–7)
PHOSPHATE SERPL-MCNC: 3.7 MG/DL (ref 2.5–4.5)
PLATELET # BLD AUTO: 153 10E3/UL (ref 150–450)
POTASSIUM SERPL-SCNC: 4.7 MMOL/L (ref 3.4–5.3)
PROT/CREAT 24H UR: 0.28 MG/MG CR (ref 0–0.2)
RBC # BLD AUTO: 4.32 10E6/UL (ref 3.8–5.2)
RBC URINE: 1 /HPF
SODIUM SERPL-SCNC: 142 MMOL/L (ref 135–145)
SP GR UR STRIP: 1.02 (ref 1–1.03)
SQUAMOUS EPITHELIAL: <1 /HPF
UROBILINOGEN UR STRIP-MCNC: NORMAL MG/DL
WBC # BLD AUTO: 7.8 10E3/UL (ref 4–11)
WBC URINE: 1 /HPF

## 2024-05-10 PROCEDURE — 84156 ASSAY OF PROTEIN URINE: CPT | Performed by: PATHOLOGY

## 2024-05-10 PROCEDURE — G0463 HOSPITAL OUTPT CLINIC VISIT: HCPCS | Performed by: INTERNAL MEDICINE

## 2024-05-10 PROCEDURE — 82043 UR ALBUMIN QUANTITATIVE: CPT | Performed by: INTERNAL MEDICINE

## 2024-05-10 PROCEDURE — 36415 COLL VENOUS BLD VENIPUNCTURE: CPT | Performed by: PATHOLOGY

## 2024-05-10 PROCEDURE — 99215 OFFICE O/P EST HI 40 MIN: CPT | Performed by: INTERNAL MEDICINE

## 2024-05-10 PROCEDURE — 85027 COMPLETE CBC AUTOMATED: CPT | Performed by: PATHOLOGY

## 2024-05-10 PROCEDURE — 81001 URINALYSIS AUTO W/SCOPE: CPT | Performed by: PATHOLOGY

## 2024-05-10 PROCEDURE — 80069 RENAL FUNCTION PANEL: CPT | Performed by: PATHOLOGY

## 2024-05-10 PROCEDURE — 99000 SPECIMEN HANDLING OFFICE-LAB: CPT | Performed by: PATHOLOGY

## 2024-05-10 ASSESSMENT — PAIN SCALES - GENERAL: PAINLEVEL: NO PAIN (0)

## 2024-05-10 NOTE — PROGRESS NOTES
June 30, 2021    I was asked to see this patient by Anay Forrester     CC: CKD    HPI: Karine Moss is a 66 year old female with PMH significant for DM, HTN, obesity s/p gastric bypass surgery 5 years, IZABEL, not on CPAP any more, who presents for evaluation of CKD.      Pt endorses continue to have neuropathy symptoms in her legs but improving with duloxetine. Up on questioning have had dry mouth for which she takes Cevimeline and does had overflow incontinence and take Tolterodine which helps her with symptoms. Other wise review of systemic symptoms were negative including fever/chills, nausea/vomting/diarrhea, urgency or frequency of urination, chest pain or SOB,     Does have long history of IZABEL,continue to snore a lot. Tried CPAP for a while, but was unable to go to sleep, so she discontinued, she have her sleep apt coming up.     Known diabetic from 2010 and had intermittently high A1c >10, but in past couple years her A1c been around 7. Have had gastric Bypass done few years ago for obesity. She was initially on insulin, but now on oral hypoglycemics only.     Drinks Water about 32 oz/day     BG :100-113 in the morning prior to breakfast, some times go low to 73 and was symptomatic   BP at home : no BP monitor at home, at [facility] controlled well on losartan and amlo     - History of Hematuria: no  - Swelling: no  - Hx of UTIs: no  - Hx of stones: no  - Rashes/Joint pain: no  - Family hx of kidney disease: no  - NSAID use: no    6/30/2021   Patient endorses doing well.  No new symptoms since her last visit.  Have had her Covid shots, continue to follow precautions.  In the recent past noted to have few low blood glucose levels in the low 70s with symptoms.  Did not check blood pressure during the time.  Her symptoms include shakiness and sweating which improves with taking some food.  Mentioned she do not actually feel hungry, usually eats when she can.  Since her gastric bypass surgery, her appetite has  gone down.  Otherwise no recent fevers, chills, nausea, vomiting, diarrhea, abdominal pain, chest pain or shortness of breath.    BP sugar levels : 70's and 130's in the morning  Not checking home blood pressure as she did not receive blood pressure monitor         Allergies   Allergen Reactions    Pravastatin Other (See Comments)     woozy    Codeine Nausea and Vomiting    Nsaids GI Disturbance and Other (See Comments)     Pt had bariatric surgery, should not ever take oral NSAIDS due to risk of gastric ulcers.  Pt had bariatric surgery, should not ever take oral NSAIDS due to risk of gastric ulcers.       Current Outpatient Medications   Medication Sig Dispense Refill    amphetamine-dextroamphetamine (ADDERALL) 30 MG tablet Take 1 tablet (30 mg) by mouth every 24 hours 1.5 mg a total of 45 mg  Daily 30 tablet 0    atorvastatin (LIPITOR) 20 MG tablet Take 1 tablet (20 mg) by mouth daily 90 tablet 3    blood glucose (NO BRAND SPECIFIED) lancets standard Use to test blood sugar 1 times daily or as directed. 90 Lancet 3    blood glucose (NO BRAND SPECIFIED) test strip Use to test blood sugar 1 times daily or as directed. 100 strip 3    blood glucose (NO BRAND SPECIFIED) test strip Use to test blood sugar as needed with Freestyle Nirmal CGM. 100 strip 0    blood glucose (NO BRAND SPECIFIED) test strip Use to test blood sugar as directed with CGM sensor. 50 strip 1    blood glucose calibration (NO BRAND SPECIFIED) solution Use to calibrate blood glucose monitor as needed as directed. 1 each 3    blood glucose monitoring (NO BRAND SPECIFIED) meter device kit Use to test blood sugar 1 times daily or as directed. 1 kit 0    calcium carbonate (OS-KINJAL) 1500 (600 Ca) MG tablet Take 2 tablets (1,200 mg) by mouth daily      cevimeline (EVOXAC) 30 MG capsule take 1 cap  capsule 1    cholecalciferol (VITAMIN D3) 125 mcg (5000 units) capsule Take 125 mcg by mouth daily      COMPOUNDED NON-CONTROLLED SUBSTANCE (CMPD RX) -  PHARMACY TO MIX COMPOUNDED MEDICATION Estradiol 0.0075% in HRT cream  Apply 2 grams,  intravaginally and small amount externally daily for one week then twice weekly for maintenance. 45 g 11    Continuous Blood Gluc  (FREESTYLE JORDIN 14 DAY READER) EBEN Use to read blood sugars as per 's instructions. 1 each 0    Continuous Blood Gluc Sensor (FREESTYLE JORDIN 14 DAY SENSOR) MISC Change every 14 days. 2 each 11    cyanocobalamin (VITAMIN B-12) 1000 MCG tablet Take one every other day 90 tablet 3    DULoxetine (CYMBALTA) 30 MG capsule Take 1 capsule (30 mg) by mouth 2 times daily 180 capsule 1    DULoxetine (CYMBALTA) 60 MG capsule Take 1 capsule (60 mg) by mouth at bedtime Take in addition to current 30 mg evening dose for a total of 90 mg at bedtime. 90 capsule 1    finasteride (PROSCAR) 5 MG tablet Take 1/2 tablet daily 45 tablet 3    losartan (COZAAR) 50 MG tablet Take 1 tablet (50 mg) by mouth daily 90 tablet 1    omeprazole (PRILOSEC) 40 MG DR capsule Take 40mg twice daily 180 capsule 3    pregabalin (LYRICA) 100 MG capsule Take 100 mg by mouth daily      tolterodine ER (DETROL LA) 4 MG 24 hr capsule Take 1 capsule (4 mg) by mouth daily 90 capsule 3    traZODone (DESYREL) 50 MG tablet Take 1 tablet by mouth every 24 hours      triamcinolone (KENALOG) 0.1 % paste Apply to tongue twice daily x 10 days 5 g 0     No current facility-administered medications for this visit.       Past Medical History:   Diagnosis Date    Arthritis     Basal cell carcinoma     Chest pain     seeing Cardiology    Depression 1991    after 's death    Diabetes mellitus (H)     Diabetic renal disease (H)     microalbuminuria    DJD (degenerative joint disease) of knee     H/O vitamin D deficiency     Hypertension     IBS (irritable bowel syndrome)     diarrhea     Keratoconus     Low back pain     Narcolepsy 2007    Dx'd by Sleep specialist 347.00, pt discontinued Adderal mid Nov. 13 due to tacycardia concerns     Obesity     Obstructive sleep apnea     327.23, 3 sleep studies,Dr. Flaco SELBY [facility]    Pancreatitis     related to Victoza, also on Xyrem    Panic     RLS (restless legs syndrome)     Sinus tachycardia        Past Surgical History:   Procedure Laterality Date    COLONOSCOPY  10/01/2020    poor quality prep    ear surgery  2002 and 01/2004    ESOPHAGOSCOPY, GASTROSCOPY, DUODENOSCOPY (EGD), COMBINED N/A 11/16/2021    Procedure: ESOPHAGOGASTRODUODENOSCOPY (EGD);  Surgeon: Jayla Calvo MD;  Location:  GI    ESOPHAGOSCOPY, GASTROSCOPY, DUODENOSCOPY (EGD), COMBINED N/A 1/9/2023    Procedure: ESOPHAGOGASTRODUODENOSCOPY, WITH BIOPSY;  Surgeon: Brandon Witt MD;  Location:  GI    GASTRIC BYPASS  2013    laparoscopic jimmy en y c ometopexy    HEMORRHOIDECTOMY  09/14/2000    LAPAROSCOPIC CHOLECYSTECTOMY  2015    LASIK BILATERAL      UVULOPALATOPHARYNGOPLASTY      UVVP         Social History     Tobacco Use    Smoking status: Never     Passive exposure: Never    Smokeless tobacco: Never   Vaping Use    Vaping status: Never Used   Substance Use Topics    Alcohol use: Not Currently     Comment: rare    Drug use: No       Family History   Problem Relation Age of Onset    Memory loss Mother     Other - See Comments Mother         hair thinning    Diabetes Father     Chronic Obstructive Pulmonary Disease Sister     Anemia Sister         hx of ulcers    Other - See Comments Sister 65        MVA, followed by leg pain after a fall    Dementia Sister 68    Dementia Brother 70    Cancer Brother         lung    Cancer - colorectal Maternal Grandmother     Cancer Daughter     Obesity Daughter         s/p lap band       ROS: A 12 system review of systems was negative other than noted here or above.     Exam:  /86   Pulse 86   Wt 65.6 kg (144 lb 9.6 oz)   SpO2 98%   BMI 24.82 kg/m      GENERAL APPEARANCE: alert and no distress  EYES:  no scleral icterus  RESP: No respiratory distress noted, not using accessory  muscles  SKIN: no visible facial rash  NEURO: mentation intact and speech normal  PSYCH: affect normal/bright    Results:    Recent Results (from the past 24 hour(s))   CBC with platelets    Collection Time: 05/10/24  1:04 PM   Result Value Ref Range    WBC Count 7.8 4.0 - 11.0 10e3/uL    RBC Count 4.32 3.80 - 5.20 10e6/uL    Hemoglobin 12.6 11.7 - 15.7 g/dL    Hematocrit 40.1 35.0 - 47.0 %    MCV 93 78 - 100 fL    MCH 29.2 26.5 - 33.0 pg    MCHC 31.4 (L) 31.5 - 36.5 g/dL    RDW 13.5 10.0 - 15.0 %    Platelet Count 153 150 - 450 10e3/uL   Renal panel    Collection Time: 05/10/24  1:04 PM   Result Value Ref Range    Sodium 142 135 - 145 mmol/L    Potassium 4.7 3.4 - 5.3 mmol/L    Chloride 106 98 - 107 mmol/L    Carbon Dioxide (CO2) 25 22 - 29 mmol/L    Anion Gap 11 7 - 15 mmol/L    Glucose 152 (H) 70 - 99 mg/dL    Urea Nitrogen 34.6 (H) 8.0 - 23.0 mg/dL    Creatinine 1.42 (H) 0.51 - 0.95 mg/dL    GFR Estimate 40 (L) >60 mL/min/1.73m2    Calcium 9.2 8.8 - 10.2 mg/dL    Albumin 4.3 3.5 - 5.2 g/dL    Phosphorus 3.7 2.5 - 4.5 mg/dL   Protein  random urine    Collection Time: 05/10/24  1:07 PM   Result Value Ref Range    Total Protein Urine mg/dL 15.9   mg/dL    Total Protein Urine mg/mg Creat 0.28 (H) 0.00 - 0.20 mg/mg Cr    Creatinine Urine mg/dL 57.8 mg/dL   UA with Microscopic    Collection Time: 05/10/24  1:07 PM   Result Value Ref Range    Color Urine Yellow Colorless, Straw, Light Yellow, Yellow    Appearance Urine Clear Clear    Glucose Urine >=1000 (A) Negative mg/dL    Bilirubin Urine Negative Negative    Ketones Urine Negative Negative mg/dL    Specific Gravity Urine 1.018 1.003 - 1.035    Blood Urine Negative Negative    pH Urine 5.0 5.0 - 7.0    Protein Albumin Urine Negative Negative mg/dL    Urobilinogen Urine Normal Normal, 2.0 mg/dL    Nitrite Urine Negative Negative    Leukocyte Esterase Urine Negative Negative    RBC Urine 1 <=2 /HPF    WBC Urine 1 <=5 /HPF    Squamous Epithelials Urine <1 <=1 /HPF      Assessment/Plan:   CKD stage III  Pt with baseline creatinine of 0.7-1 up until 2019 and had ceratinine elevated to 1.6 in 2020 and again was at 1.4 in 2021. So she is been referred to nephrology for CKD.  Have had albuminuria, improved significantly with improvement in diabetes control.  No recent albumin quantification noted.  UA with no sediment. Pt with very long h/o diabetes with associated diabetic neuropathy (but never been diagnosed with diabetic retinopathy). CKD probably because of long standing diabetes and HTN.  As there is no sediment, less likely other intrinsic GN's but will follow closely.  -We will obtain labs with the next visit including UA and pertinent blood work     Protein gap : Immunofixation negative for monoclonal protein     Hypernatremia :  Resolved.  Encouraged continued to drink water.     Hypertension : on losartan and amlodipine. Controlled well in clinics. Does not have home monitor.  - DME blood pressure monitor again prescribed.  - instructed to call us if SBP > 1 45 or < 105.     Anemia: Normal hemoglobin noted.     Discussed with Dr. Brett jN MD  Division of Renal Disease and Hypertension  [facility]  [employer] Web Console

## 2024-05-10 NOTE — NURSING NOTE
Chief Complaint   Patient presents with    RECHECK     CKD follow up. Restarted Jardince a week ago.      Vitals:    05/10/24 1324 05/10/24 1325 05/10/24 1326 05/10/24 1327   BP: 128/86 (!) 133/90 129/82 130/86   BP Location: Left arm Left arm Left arm    Patient Position: Sitting Sitting Sitting    Cuff Size: Adult Regular Adult Regular Adult Regular    Pulse: 86      SpO2: 98%      Weight: 65.6 kg (144 lb 9.6 oz)          BP Readings from Last 3 Encounters:   05/10/24 130/86   05/02/24 116/70   04/05/24 (!) 144/87       /86   Pulse 86   Wt 65.6 kg (144 lb 9.6 oz)   SpO2 98%   BMI 24.82 kg/m       Alejandrina Smith

## 2024-05-10 NOTE — LETTER
5/10/2024       RE: Karine Moss  [address]  [facility] [address]     Dear Colleague,    Thank you for referring your patient, Karine Moss, to the [facility] [place] at [facility]. Please see a copy of my visit note below.      Nephrology Clinic Note  May 10, 2024    I was asked to see this patient by Anay Forrester    CC: CKD    HPI: Karine Moss is a 66 year old female with PMH significant for DM, HTN, obesity s/p gastric bypass surgery 5 years, IZABEL, not on CPAP any more, who presents for evaluation of CKD.     Pt endorses continue to have neuropathy symptoms in her legs but improving with duloxetine. Up on questioning have had dry mouth for which she takes Cevimeline and does had overflow incontinence and take Tolterodine which helps her with symptoms. Other wise review of systemic symptoms were negative including fever/chills, nausea/vomting/diarrhea, urgency or frequency of urination, chest pain or SOB,    Does have long history of IZABEL,continue to snore a lot. Tried CPAP for a while, but was unable to go to sleep, so she discontinued, she have her sleep apt coming up.    Known diabetic from 2010 and had intermittently high A1c >10, but in past couple years her A1c been around 7. Have had gastric Bypass done few years ago for obesity. She was initially on insulin, but now on oral hypoglycemics only.    Drinks Water about 32 oz/day    BG :100-113 in the morning prior to breakfast, some times go low to 73 and was symptomatic   BP at home : no BP monitor at home, at [facility] controlled well on losartan and amlo.    5/10/2024  Pt presented with daughter for CKD follow up. Endorsed feeling stable. No new symptoms today. She is feeling much better since restarting on jardiance on 5/2. Not drinking that much of water lately. But she mentioned that she will increase water intake. Having some issues with urinary  incontinence and seeing some one about it. Denied other systemic symptoms today    - History of Hematuria: no  - Swelling: no  - Hx of UTIs: no  - Hx of stones: no  - Rashes/Joint pain: no  - Family hx of kidney disease: no  - NSAID use: no       Allergies   Allergen Reactions    Pravastatin Other (See Comments)     woozy    Codeine Nausea and Vomiting    Nsaids GI Disturbance and Other (See Comments)     Pt had bariatric surgery, should not ever take oral NSAIDS due to risk of gastric ulcers.  Pt had bariatric surgery, should not ever take oral NSAIDS due to risk of gastric ulcers.       Current Outpatient Medications   Medication Sig Dispense Refill    amphetamine-dextroamphetamine (ADDERALL) 30 MG tablet Take 1 tablet (30 mg) by mouth every 24 hours 1.5 mg a total of 45 mg  Daily 30 tablet 0    atorvastatin (LIPITOR) 20 MG tablet Take 1 tablet (20 mg) by mouth daily 90 tablet 3    blood glucose (NO BRAND SPECIFIED) lancets standard Use to test blood sugar 1 times daily or as directed. 90 Lancet 3    blood glucose (NO BRAND SPECIFIED) test strip Use to test blood sugar 1 times daily or as directed. 100 strip 3    blood glucose (NO BRAND SPECIFIED) test strip Use to test blood sugar as needed with Freestyle Nirmal CGM. 100 strip 0    blood glucose (NO BRAND SPECIFIED) test strip Use to test blood sugar as directed with CGM sensor. 50 strip 1    blood glucose calibration (NO BRAND SPECIFIED) solution Use to calibrate blood glucose monitor as needed as directed. 1 each 3    blood glucose monitoring (NO BRAND SPECIFIED) meter device kit Use to test blood sugar 1 times daily or as directed. 1 kit 0    calcium carbonate (OS-KINJAL) 1500 (600 Ca) MG tablet Take 2 tablets (1,200 mg) by mouth daily      cevimeline (EVOXAC) 30 MG capsule take 1 cap  capsule 1    cholecalciferol (VITAMIN D3) 125 mcg (5000 units) capsule Take 125 mcg by mouth daily      COMPOUNDED NON-CONTROLLED SUBSTANCE (CMPD RX) - PHARMACY TO MIX  COMPOUNDED MEDICATION Estradiol 0.0075% in HRT cream  Apply 2 grams,  intravaginally and small amount externally daily for one week then twice weekly for maintenance. 45 g 11    Continuous Blood Gluc  (FREESTYLE JORDIN 14 DAY READER) EBEN Use to read blood sugars as per 's instructions. 1 each 0    Continuous Blood Gluc Sensor (FREESTYLE JORDIN 14 DAY SENSOR) MISC Change every 14 days. 2 each 11    cyanocobalamin (VITAMIN B-12) 1000 MCG tablet Take one every other day 90 tablet 3    DULoxetine (CYMBALTA) 30 MG capsule Take 1 capsule (30 mg) by mouth 2 times daily 180 capsule 1    DULoxetine (CYMBALTA) 60 MG capsule Take 1 capsule (60 mg) by mouth at bedtime Take in addition to current 30 mg evening dose for a total of 90 mg at bedtime. 90 capsule 1    finasteride (PROSCAR) 5 MG tablet Take 1/2 tablet daily 45 tablet 3    losartan (COZAAR) 50 MG tablet Take 1 tablet (50 mg) by mouth daily 90 tablet 1    omeprazole (PRILOSEC) 40 MG DR capsule Take 40mg twice daily 180 capsule 3    pregabalin (LYRICA) 100 MG capsule Take 100 mg by mouth daily      tolterodine ER (DETROL LA) 4 MG 24 hr capsule Take 1 capsule (4 mg) by mouth daily 90 capsule 3    traZODone (DESYREL) 50 MG tablet Take 1 tablet by mouth every 24 hours      triamcinolone (KENALOG) 0.1 % paste Apply to tongue twice daily x 10 days 5 g 0     No current facility-administered medications for this visit.       Past Medical History:   Diagnosis Date    Arthritis     Basal cell carcinoma     Chest pain     seeing Cardiology    Depression 1991    after 's death    Diabetes mellitus (H)     Diabetic renal disease (H)     microalbuminuria    DJD (degenerative joint disease) of knee     H/O vitamin D deficiency     Hypertension     IBS (irritable bowel syndrome)     diarrhea     Keratoconus     Low back pain     Narcolepsy 2007    Dx'd by Sleep specialist 347.00, pt discontinued Adderal mid Nov. 13 due to tacycardia concerns    Obesity      Obstructive sleep apnea     327.23, 3 sleep studies,Dr. Flaco [facility]    Pancreatitis     related to Victoza, also on Xyrem    Panic     RLS (restless legs syndrome)     Sinus tachycardia        Past Surgical History:   Procedure Laterality Date    COLONOSCOPY  10/01/2020    poor quality prep    ear surgery  2002 and 01/2004    ESOPHAGOSCOPY, GASTROSCOPY, DUODENOSCOPY (EGD), COMBINED N/A 11/16/2021    Procedure: ESOPHAGOGASTRODUODENOSCOPY (EGD);  Surgeon: Jayla Calvo MD;  Location:  GI    ESOPHAGOSCOPY, GASTROSCOPY, DUODENOSCOPY (EGD), COMBINED N/A 1/9/2023    Procedure: ESOPHAGOGASTRODUODENOSCOPY, WITH BIOPSY;  Surgeon: Brandon Witt MD;  Location:  GI    GASTRIC BYPASS  2013    laparoscopic jimmy en y c ometopexy    HEMORRHOIDECTOMY  09/14/2000    LAPAROSCOPIC CHOLECYSTECTOMY  2015    LASIK BILATERAL      UVULOPALATOPHARYNGOPLASTY      UVVP         Social History     Tobacco Use    Smoking status: Never     Passive exposure: Never    Smokeless tobacco: Never   Vaping Use    Vaping status: Never Used   Substance Use Topics    Alcohol use: Not Currently     Comment: rare    Drug use: No       Family History   Problem Relation Age of Onset    Memory loss Mother     Other - See Comments Mother         hair thinning    Diabetes Father     Chronic Obstructive Pulmonary Disease Sister     Anemia Sister         hx of ulcers    Other - See Comments Sister 65        MVA, followed by leg pain after a fall    Dementia Sister 68    Dementia Brother 70    Cancer Brother         lung    Cancer - colorectal Maternal Grandmother     Cancer Daughter     Obesity Daughter         s/p lap band       ROS: A 12 system review of systems was negative other than noted here or above.     Exam:  /86   Pulse 86   Wt 65.6 kg (144 lb 9.6 oz)   SpO2 98%   BMI 24.82 kg/m      GENERAL APPEARANCE: alert and no distress  EYES: no scleral icterus  RESP: lungs clear   CARD : S2, S2 present   Extremities: no edema   SKIN:  no visible face rash  NEURO: mentation intact and speech normal  PSYCH: affect normal/bright    Results:    No visits with results within 1 Day(s) from this visit.   Latest known visit with results is:   Office Visit on 03/08/2021   Component Date Value Ref Range Status    Hemoglobin A1C 03/08/2021 7.0* 0 - 5.6 % Final    Comment: Normal <5.7% Prediabetes 5.7-6.4%  Diabetes 6.5% or higher - adopted from ADA   consensus guidelines.      FASTING SPECIMEN 03/08/2021 No   Final    Cholesterol 03/08/2021 113.0  0.0 - 200.0 Final    HDL Cholesterol 03/08/2021 35.0* >50.0 Final    Triglycerides 03/08/2021 157.0* 0.0 - 150.0 Final    Cholesterol/HDL Ratio 03/08/2021 3.2  0.0 - 5.0 Final    LDL Cholesterol Direct 03/08/2021 46.0  0.0 - 129.0 Final    VLDL-Cholesterol 03/08/2021 31.0  7.0 - 32.0 Final    Glucose 03/08/2021 163.0* 60.0 - 99.0 mg/dL Final    Urea Nitrogen 03/08/2021 32.0* 7.0 - 30.0 mg/dL Final    Calcium 03/08/2021 9.7  8.5 - 10.4 mg/dL Final    Creatinine 03/08/2021 1.4* 0.6 - 1.3 mg/dL Final    eGFR Calculated (Non Black Referen* 03/08/2021 40.1* >60.0 Final    eGFR Calculated (Black Reference) 03/08/2021 48.5* >60.0 Final    Sodium 03/08/2021 147.0* 137.3 - 146.3 mmol/L Final    Potassium 03/08/2021 5.4* 3.4 - 5.3 mmol/L Final    Chloride 03/08/2021 102.0  94.0 - 109.0 mmol/L Final    Carbon Dioxide 03/08/2021 27.0  20.0 - 32.0 mmol/L Final    Albumin 03/08/2021 3.6  3.2 - 4.5 g/dL Final    Alkaline Phosphatase 03/08/2021 88.0  40.0 - 150.0 U/L Final    ALT 03/08/2021 23.0  0.0 - 50.0 U/L Final    AST 03/08/2021 23.0  0.0 - 45.0 U/L Final    Bilirubin Total 03/08/2021 1.1  0.2 - 1.3 mg/dL Final    Protein Total 03/08/2021 9.0* 6.8 - 8.8 g/dL Final    WBC 03/08/2021 11.6* 4.0 - 11.0 10e9/L Final    RBC Count 03/08/2021 4.76  3.8 - 5.2 10e12/L Final    Hemoglobin 03/08/2021 12.6  11.7 - 15.7 g/dL Final    Hematocrit 03/08/2021 41.5  35.0 - 47.0 % Final    MCV 03/08/2021 87  78 - 100 fl Final    MCH 03/08/2021  26.5  26.5 - 33.0 pg Final    MCHC 03/08/2021 30.4* 31.5 - 36.5 g/dL Final    RDW 03/08/2021 14.0  10.0 - 15.0 % Final    Platelet Count 03/08/2021 253  150 - 450 10e9/L Final    Vitamin B12 03/08/2021 1,816* 193 - 986 pg/mL Final       Assessment/Plan:     CKD stage III  Pt with baseline creatinine of 0.7-1 up until 2019 since that time, its been variable and been at 1.2-1.5 range since 2024 with eGFR in 30's and 40's which put her in CKD stage IIIa/b. UA with out any sediment, UPCR was at 0.28 g/g of creatinine. No recent renal US noted. She had h/o gastric bypass surgery in 2013 for obesity. Currently her weight been over all stable. Underlying CKD is related to HTN, DM and or renovascular disease. Likely variation in her creatinine is likely hemodynamic given jardiance re initiation and or hypovolemia. But with gastric bypass concerned about oxalate deposition as well.   - encouraged to hydrate well while being on Jardiance   - continue low salt diet and fresh fruits and vegetable intake.  - strict BP and BG control.  - renal US to rule out stones   - ordered oxalate level serum.    Electrolytes : stable    Hypertension : on losartan and amlodipine. Controlled well in clinics. Does not have home monitor.  - DME blood pressure monitor   - instructed to call us if SBP > 180 or < 105.    Anemia   Hb is stable    Total time spent on the day of clinic visit was 40 min including face to face time of 20 min with pt, labs and image review, ordering labs and imaging, documenting as above.      Amanda West  Dept of Nephrology and Hypertension  [facility]

## 2024-05-10 NOTE — PROGRESS NOTES
Nephrology Clinic Note  May 10, 2024    I was asked to see this patient by Anay Forrester    CC: CKD    HPI: Karine Moss is a 66 year old female with PMH significant for DM, HTN, obesity s/p gastric bypass surgery 5 years, IZABEL, not on CPAP any more, who presents for evaluation of CKD.     Pt endorses continue to have neuropathy symptoms in her legs but improving with duloxetine. Up on questioning have had dry mouth for which she takes Cevimeline and does had overflow incontinence and take Tolterodine which helps her with symptoms. Other wise review of systemic symptoms were negative including fever/chills, nausea/vomting/diarrhea, urgency or frequency of urination, chest pain or SOB,    Does have long history of IZABEL,continue to snore a lot. Tried CPAP for a while, but was unable to go to sleep, so she discontinued, she have her sleep apt coming up.    Known diabetic from 2010 and had intermittently high A1c >10, but in past couple years her A1c been around 7. Have had gastric Bypass done few years ago for obesity. She was initially on insulin, but now on oral hypoglycemics only.    Drinks Water about 32 oz/day    BG :100-113 in the morning prior to breakfast, some times go low to 73 and was symptomatic   BP at home : no BP monitor at home, at [facility] controlled well on losartan and amlo.    5/10/2024  Pt presented with daughter for CKD follow up. Endorsed feeling stable. No new symptoms today. She is feeling much better since restarting on jardiance on 5/2. Not drinking that much of water lately. But she mentioned that she will increase water intake. Having some issues with urinary incontinence and seeing some one about it. Denied other systemic symptoms today    - History of Hematuria: no  - Swelling: no  - Hx of UTIs: no  - Hx of stones: no  - Rashes/Joint pain: no  - Family hx of kidney disease: no  - NSAID use: no       Allergies   Allergen Reactions    Pravastatin Other (See Comments)     george     Codeine Nausea and Vomiting    Nsaids GI Disturbance and Other (See Comments)     Pt had bariatric surgery, should not ever take oral NSAIDS due to risk of gastric ulcers.  Pt had bariatric surgery, should not ever take oral NSAIDS due to risk of gastric ulcers.       Current Outpatient Medications   Medication Sig Dispense Refill    amphetamine-dextroamphetamine (ADDERALL) 30 MG tablet Take 1 tablet (30 mg) by mouth every 24 hours 1.5 mg a total of 45 mg  Daily 30 tablet 0    atorvastatin (LIPITOR) 20 MG tablet Take 1 tablet (20 mg) by mouth daily 90 tablet 3    blood glucose (NO BRAND SPECIFIED) lancets standard Use to test blood sugar 1 times daily or as directed. 90 Lancet 3    blood glucose (NO BRAND SPECIFIED) test strip Use to test blood sugar 1 times daily or as directed. 100 strip 3    blood glucose (NO BRAND SPECIFIED) test strip Use to test blood sugar as needed with Freestyle Nirmal CGM. 100 strip 0    blood glucose (NO BRAND SPECIFIED) test strip Use to test blood sugar as directed with CGM sensor. 50 strip 1    blood glucose calibration (NO BRAND SPECIFIED) solution Use to calibrate blood glucose monitor as needed as directed. 1 each 3    blood glucose monitoring (NO BRAND SPECIFIED) meter device kit Use to test blood sugar 1 times daily or as directed. 1 kit 0    calcium carbonate (OS-KINJAL) 1500 (600 Ca) MG tablet Take 2 tablets (1,200 mg) by mouth daily      cevimeline (EVOXAC) 30 MG capsule take 1 cap  capsule 1    cholecalciferol (VITAMIN D3) 125 mcg (5000 units) capsule Take 125 mcg by mouth daily      COMPOUNDED NON-CONTROLLED SUBSTANCE (CMPD RX) - PHARMACY TO MIX COMPOUNDED MEDICATION Estradiol 0.0075% in HRT cream  Apply 2 grams,  intravaginally and small amount externally daily for one week then twice weekly for maintenance. 45 g 11    Continuous Blood Gluc  (FREESTYLE NIRMAL 14 DAY READER) EBEN Use to read blood sugars as per 's instructions. 1 each 0    Continuous  Blood Gluc Sensor (FREESTYLE JORDIN 14 DAY SENSOR) MISC Change every 14 days. 2 each 11    cyanocobalamin (VITAMIN B-12) 1000 MCG tablet Take one every other day 90 tablet 3    DULoxetine (CYMBALTA) 30 MG capsule Take 1 capsule (30 mg) by mouth 2 times daily 180 capsule 1    DULoxetine (CYMBALTA) 60 MG capsule Take 1 capsule (60 mg) by mouth at bedtime Take in addition to current 30 mg evening dose for a total of 90 mg at bedtime. 90 capsule 1    finasteride (PROSCAR) 5 MG tablet Take 1/2 tablet daily 45 tablet 3    losartan (COZAAR) 50 MG tablet Take 1 tablet (50 mg) by mouth daily 90 tablet 1    omeprazole (PRILOSEC) 40 MG DR capsule Take 40mg twice daily 180 capsule 3    pregabalin (LYRICA) 100 MG capsule Take 100 mg by mouth daily      tolterodine ER (DETROL LA) 4 MG 24 hr capsule Take 1 capsule (4 mg) by mouth daily 90 capsule 3    traZODone (DESYREL) 50 MG tablet Take 1 tablet by mouth every 24 hours      triamcinolone (KENALOG) 0.1 % paste Apply to tongue twice daily x 10 days 5 g 0     No current facility-administered medications for this visit.       Past Medical History:   Diagnosis Date    Arthritis     Basal cell carcinoma     Chest pain     seeing Cardiology    Depression 1991    after 's death    Diabetes mellitus (H)     Diabetic renal disease (H)     microalbuminuria    DJD (degenerative joint disease) of knee     H/O vitamin D deficiency     Hypertension     IBS (irritable bowel syndrome)     diarrhea     Keratoconus     Low back pain     Narcolepsy 2007    Dx'd by Sleep specialist 347.00, pt discontinued Adderal mid Nov. 13 due to tacycardia concerns    Obesity     Obstructive sleep apnea     327.23, 3 sleep studies,Dr. [facility]    Pancreatitis     related to Victoza, also on Xyrem    Panic     RLS (restless legs syndrome)     Sinus tachycardia        Past Surgical History:   Procedure Laterality Date    COLONOSCOPY  10/01/2020    poor quality prep    ear surgery  2002 and 01/2004     ESOPHAGOSCOPY, GASTROSCOPY, DUODENOSCOPY (EGD), COMBINED N/A 11/16/2021    Procedure: ESOPHAGOGASTRODUODENOSCOPY (EGD);  Surgeon: Jayla Calvo MD;  Location:  GI    ESOPHAGOSCOPY, GASTROSCOPY, DUODENOSCOPY (EGD), COMBINED N/A 1/9/2023    Procedure: ESOPHAGOGASTRODUODENOSCOPY, WITH BIOPSY;  Surgeon: Brandon Witt MD;  Location:  GI    GASTRIC BYPASS  2013    laparoscopic jimmy en y c ometopexy    HEMORRHOIDECTOMY  09/14/2000    LAPAROSCOPIC CHOLECYSTECTOMY  2015    LASIK BILATERAL      UVULOPALATOPHARYNGOPLASTY      UVVP         Social History     Tobacco Use    Smoking status: Never     Passive exposure: Never    Smokeless tobacco: Never   Vaping Use    Vaping status: Never Used   Substance Use Topics    Alcohol use: Not Currently     Comment: rare    Drug use: No       Family History   Problem Relation Age of Onset    Memory loss Mother     Other - See Comments Mother         hair thinning    Diabetes Father     Chronic Obstructive Pulmonary Disease Sister     Anemia Sister         hx of ulcers    Other - See Comments Sister 65        MVA, followed by leg pain after a fall    Dementia Sister 68    Dementia Brother 70    Cancer Brother         lung    Cancer - colorectal Maternal Grandmother     Cancer Daughter     Obesity Daughter         s/p lap band       ROS: A 12 system review of systems was negative other than noted here or above.     Exam:  /86   Pulse 86   Wt 65.6 kg (144 lb 9.6 oz)   SpO2 98%   BMI 24.82 kg/m      GENERAL APPEARANCE: alert and no distress  EYES: no scleral icterus  RESP: lungs clear   CARD : S2, S2 present   Extremities: no edema   SKIN: no visible face rash  NEURO: mentation intact and speech normal  PSYCH: affect normal/bright    Results:    No visits with results within 1 Day(s) from this visit.   Latest known visit with results is:   Office Visit on 03/08/2021   Component Date Value Ref Range Status    Hemoglobin A1C 03/08/2021 7.0* 0 - 5.6 % Final     Comment: Normal <5.7% Prediabetes 5.7-6.4%  Diabetes 6.5% or higher - adopted from ADA   consensus guidelines.      FASTING SPECIMEN 03/08/2021 No   Final    Cholesterol 03/08/2021 113.0  0.0 - 200.0 Final    HDL Cholesterol 03/08/2021 35.0* >50.0 Final    Triglycerides 03/08/2021 157.0* 0.0 - 150.0 Final    Cholesterol/HDL Ratio 03/08/2021 3.2  0.0 - 5.0 Final    LDL Cholesterol Direct 03/08/2021 46.0  0.0 - 129.0 Final    VLDL-Cholesterol 03/08/2021 31.0  7.0 - 32.0 Final    Glucose 03/08/2021 163.0* 60.0 - 99.0 mg/dL Final    Urea Nitrogen 03/08/2021 32.0* 7.0 - 30.0 mg/dL Final    Calcium 03/08/2021 9.7  8.5 - 10.4 mg/dL Final    Creatinine 03/08/2021 1.4* 0.6 - 1.3 mg/dL Final    eGFR Calculated (Non Black Referen* 03/08/2021 40.1* >60.0 Final    eGFR Calculated (Black Reference) 03/08/2021 48.5* >60.0 Final    Sodium 03/08/2021 147.0* 137.3 - 146.3 mmol/L Final    Potassium 03/08/2021 5.4* 3.4 - 5.3 mmol/L Final    Chloride 03/08/2021 102.0  94.0 - 109.0 mmol/L Final    Carbon Dioxide 03/08/2021 27.0  20.0 - 32.0 mmol/L Final    Albumin 03/08/2021 3.6  3.2 - 4.5 g/dL Final    Alkaline Phosphatase 03/08/2021 88.0  40.0 - 150.0 U/L Final    ALT 03/08/2021 23.0  0.0 - 50.0 U/L Final    AST 03/08/2021 23.0  0.0 - 45.0 U/L Final    Bilirubin Total 03/08/2021 1.1  0.2 - 1.3 mg/dL Final    Protein Total 03/08/2021 9.0* 6.8 - 8.8 g/dL Final    WBC 03/08/2021 11.6* 4.0 - 11.0 10e9/L Final    RBC Count 03/08/2021 4.76  3.8 - 5.2 10e12/L Final    Hemoglobin 03/08/2021 12.6  11.7 - 15.7 g/dL Final    Hematocrit 03/08/2021 41.5  35.0 - 47.0 % Final    MCV 03/08/2021 87  78 - 100 fl Final    MCH 03/08/2021 26.5  26.5 - 33.0 pg Final    MCHC 03/08/2021 30.4* 31.5 - 36.5 g/dL Final    RDW 03/08/2021 14.0  10.0 - 15.0 % Final    Platelet Count 03/08/2021 253  150 - 450 10e9/L Final    Vitamin B12 03/08/2021 1,816* 193 - 986 pg/mL Final       Assessment/Plan:     CKD stage III  Pt with baseline creatinine of 0.7-1 up until 2019  since that time, its been variable and been at 1.2-1.5 range since 2024 with eGFR in 30's and 40's which put her in CKD stage IIIa/b. UA with out any sediment, UPCR was at 0.28 g/g of creatinine. No recent renal US noted. She had h/o gastric bypass surgery in 2013 for obesity. Currently her weight been over all stable. Underlying CKD is related to HTN, DM and or renovascular disease. Likely variation in her creatinine is likely hemodynamic given jardiance re initiation and or hypovolemia. But with gastric bypass concerned about oxalate deposition as well.   - encouraged to hydrate well while being on Jardiance   - continue low salt diet and fresh fruits and vegetable intake.  - strict BP and BG control.  - renal US to rule out stones   - ordered oxalate level serum.    Electrolytes : stable    Hypertension : on losartan and amlodipine. Controlled well in clinics. Does not have home monitor.  - DME blood pressure monitor   - instructed to call us if SBP > 180 or < 105.    Anemia   Hb is stable    Total time spent on the day of clinic visit was 40 min including face to face time of 20 min with pt, labs and image review, ordering labs and imaging, documenting as above.      Amanda West  Dept of Nephrology and Hypertension  [facility]

## 2024-05-11 ENCOUNTER — TELEPHONE (OUTPATIENT)
Dept: ENDOCRINOLOGY | Facility: CLINIC | Age: 69
End: 2024-05-11
Payer: COMMERCIAL

## 2024-05-11 LAB
CREAT UR-MCNC: 57.1 MG/DL
MICROALBUMIN UR-MCNC: 42.4 MG/L
MICROALBUMIN/CREAT UR: 74.26 MG/G CR (ref 0–25)

## 2024-05-11 NOTE — TELEPHONE ENCOUNTER
Left Voicemail (1st Attempt) for the patient to call back and schedule the following:    Appointment type: return bariatric  Provider: Radha  Return date: 3 months, around 8/9/2024  Specialty phone number: wt mgmt  Additional appointment(s) needed: n/a  Additonal Notes: n/a

## 2024-05-14 ENCOUNTER — TELEPHONE (OUTPATIENT)
Dept: NEPHROLOGY | Facility: CLINIC | Age: 69
End: 2024-05-14

## 2024-05-16 ENCOUNTER — LAB (OUTPATIENT)
Dept: LAB | Facility: CLINIC | Age: 69
End: 2024-05-16
Payer: COMMERCIAL

## 2024-05-16 DIAGNOSIS — N18.31 STAGE 3A CHRONIC KIDNEY DISEASE (H): ICD-10-CM

## 2024-05-16 PROCEDURE — 99000 SPECIMEN HANDLING OFFICE-LAB: CPT

## 2024-05-16 PROCEDURE — 36415 COLL VENOUS BLD VENIPUNCTURE: CPT

## 2024-05-16 PROCEDURE — 83945 ASSAY OF OXALATE: CPT | Mod: 90

## 2024-05-18 LAB — OXALATE SERPL-SCNC: 8 UMOL/L

## 2024-05-21 ENCOUNTER — ANCILLARY PROCEDURE (OUTPATIENT)
Dept: ULTRASOUND IMAGING | Facility: CLINIC | Age: 69
End: 2024-05-21
Attending: INTERNAL MEDICINE
Payer: COMMERCIAL

## 2024-05-21 DIAGNOSIS — N18.31 STAGE 3A CHRONIC KIDNEY DISEASE (H): ICD-10-CM

## 2024-05-21 PROCEDURE — 76770 US EXAM ABDO BACK WALL COMP: CPT | Performed by: RADIOLOGY

## 2024-05-26 ENCOUNTER — HEALTH MAINTENANCE LETTER (OUTPATIENT)
Age: 69
End: 2024-05-26

## 2024-05-28 ENCOUNTER — TELEPHONE (OUTPATIENT)
Dept: MULTI SPECIALTY CLINIC | Facility: CLINIC | Age: 69
End: 2024-05-28
Payer: COMMERCIAL

## 2024-05-28 DIAGNOSIS — R79.89 HIGH PLASMA OXALATE: Primary | ICD-10-CM

## 2024-05-28 RX ORDER — CALCIUM CARBONATE 500 MG/1
1 TABLET, CHEWABLE ORAL 2 TIMES DAILY
Qty: 180 TABLET | Refills: 1 | Status: SHIPPED | OUTPATIENT
Start: 2024-05-28 | End: 2024-06-04 | Stop reason: ALTCHOICE

## 2024-05-28 NOTE — TELEPHONE ENCOUNTER
Called pt about her recent high oxalate level. Unfortunately pt did not answer her two phone no.s provided, so left a msg for her about the therapy.    Will start her on calcium carbonate (tums) 500 mg twice a day with food   Repeat levels during next visit.     Amanda West MD     Your cardiac catheterization  procedure has been scheduled for 3-9-21 at noon at [facility] with Dr. Hodan Kelly. Please report to Admitting Department by 10 am , or 2 hours prior to your scheduled procedure. Please bring a list of your current medications and medication bottles, if able, to the hospital on this day. You will be unable to drive after your procedure so please make sure to bring someone with you to your procedure. You will need to have nothing to eat or drink after midnight, the night prior to your procedure. You may have small sips of water, if needed, to take with your medication. You will need to have covid 19 test done, 3 days prior to procedure. Please go to the [address] [place] [address] location on Friday 3-5-21 or Saturday, 3-6-21. They are open on Fridays, from 7 am to 3 pm and on Saturday, from 7 am to 10 am.  Please self quarantine after testing. You do not need to stop your medications  prior to your scheduled procedure. After your procedure, you will need to follow up with Dr. Hodan Kelly. Your follow-up appointment has been scheduled for 3-26-21 @ 1:20pm.      Identifiers x 2. Informed of the above. Verbalized understanding. Sent instructions via [address].

## 2024-05-30 ENCOUNTER — DOCUMENTATION ONLY (OUTPATIENT)
Dept: SLEEP MEDICINE | Facility: CLINIC | Age: 69
End: 2024-05-30
Payer: COMMERCIAL

## 2024-06-03 PROBLEM — E11.9 TYPE 2 DIABETES MELLITUS WITHOUT COMPLICATION, WITHOUT LONG-TERM CURRENT USE OF INSULIN (H): Chronic | Status: ACTIVE | Noted: 2018-06-27

## 2024-06-03 PROBLEM — F43.10 PTSD (POST-TRAUMATIC STRESS DISORDER): Chronic | Status: ACTIVE | Noted: 2019-11-12

## 2024-06-04 ENCOUNTER — MYC MEDICAL ADVICE (OUTPATIENT)
Dept: FAMILY MEDICINE | Facility: CLINIC | Age: 69
End: 2024-06-04

## 2024-06-04 ENCOUNTER — VIRTUAL VISIT (OUTPATIENT)
Dept: SLEEP MEDICINE | Facility: CLINIC | Age: 69
End: 2024-06-04
Attending: INTERNAL MEDICINE
Payer: COMMERCIAL

## 2024-06-04 VITALS — WEIGHT: 144 LBS | BODY MASS INDEX: 24.59 KG/M2 | HEIGHT: 64 IN

## 2024-06-04 DIAGNOSIS — R06.89 DYSPNEA AND RESPIRATORY ABNORMALITY: Primary | ICD-10-CM

## 2024-06-04 DIAGNOSIS — G47.419 NARCOLEPSY WITHOUT CATAPLEXY(347.00): ICD-10-CM

## 2024-06-04 DIAGNOSIS — Z72.820 LACK OF ADEQUATE SLEEP: ICD-10-CM

## 2024-06-04 DIAGNOSIS — R06.00 DYSPNEA AND RESPIRATORY ABNORMALITY: Primary | ICD-10-CM

## 2024-06-04 DIAGNOSIS — E11.9 TYPE 2 DIABETES MELLITUS WITHOUT COMPLICATION, WITHOUT LONG-TERM CURRENT USE OF INSULIN (H): ICD-10-CM

## 2024-06-04 DIAGNOSIS — R79.89 HIGH PLASMA OXALATE: Primary | ICD-10-CM

## 2024-06-04 DIAGNOSIS — R40.0 DAYTIME SLEEPINESS: ICD-10-CM

## 2024-06-04 DIAGNOSIS — G47.33 OBSTRUCTIVE SLEEP APNEA: ICD-10-CM

## 2024-06-04 PROBLEM — Z98.84 HISTORY OF ROUX-EN-Y GASTRIC BYPASS: Chronic | Status: ACTIVE | Noted: 2024-04-07

## 2024-06-04 PROBLEM — I50.32 CHRONIC DIASTOLIC CONGESTIVE HEART FAILURE (H): Chronic | Status: ACTIVE | Noted: 2021-07-29

## 2024-06-04 PROBLEM — E11.42 DIABETIC PERIPHERAL NEUROPATHY (H): Chronic | Status: ACTIVE | Noted: 2020-02-06

## 2024-06-04 PROCEDURE — 99205 OFFICE O/P NEW HI 60 MIN: CPT | Mod: 95 | Performed by: PHYSICIAN ASSISTANT

## 2024-06-04 RX ORDER — CALCIUM ACETATE 667 MG/1
1 TABLET ORAL 2 TIMES DAILY
Qty: 180 TABLET | Refills: 3 | Status: SHIPPED | OUTPATIENT
Start: 2024-06-04 | End: 2025-05-30

## 2024-06-04 ASSESSMENT — SLEEP AND FATIGUE QUESTIONNAIRES
HOW LIKELY ARE YOU TO NOD OFF OR FALL ASLEEP WHILE LYING DOWN TO REST IN THE AFTERNOON WHEN CIRCUMSTANCES PERMIT: HIGH CHANCE OF DOZING
HOW LIKELY ARE YOU TO NOD OFF OR FALL ASLEEP WHILE WATCHING TV: MODERATE CHANCE OF DOZING
HOW LIKELY ARE YOU TO NOD OFF OR FALL ASLEEP WHILE SITTING QUIETLY AFTER LUNCH WITHOUT ALCOHOL: HIGH CHANCE OF DOZING
HOW LIKELY ARE YOU TO NOD OFF OR FALL ASLEEP WHILE SITTING AND TALKING TO SOMEONE: WOULD NEVER DOZE
HOW LIKELY ARE YOU TO NOD OFF OR FALL ASLEEP WHILE SITTING AND READING: HIGH CHANCE OF DOZING
HOW LIKELY ARE YOU TO NOD OFF OR FALL ASLEEP WHILE SITTING INACTIVE IN A PUBLIC PLACE: MODERATE CHANCE OF DOZING
HOW LIKELY ARE YOU TO NOD OFF OR FALL ASLEEP IN A CAR, WHILE STOPPED FOR A FEW MINUTES IN TRAFFIC: WOULD NEVER DOZE
HOW LIKELY ARE YOU TO NOD OFF OR FALL ASLEEP WHEN YOU ARE A PASSENGER IN A CAR FOR AN HOUR WITHOUT A BREAK: HIGH CHANCE OF DOZING

## 2024-06-04 ASSESSMENT — PAIN SCALES - GENERAL: PAINLEVEL: NO PAIN (0)

## 2024-06-04 NOTE — TELEPHONE ENCOUNTER
Medication requested: empagliflozin (JARDIANCE) 10 MG TABS tablet   Last office visit: 5/2/24  [facility] appointments: none  Medication last refilled: 1/29/2024; 90 + 0 refills  Last qualifying labs:       Component      Latest Ref Rng 5/10/2024  1:04 PM   GFR Estimate      >60 mL/min/1.73m2 40 (L)       Component      Latest Ref Rng 5/10/2024  1:04 PM   Potassium      3.4 - 5.3 mmol/L 4.7      Component      Latest Ref Rng 5/2/2024  3:48 PM   Hemoglobin A1C      0.0 - 5.6 % 5.8 (H)       Routing refill request to provider for review/approval because:  Drug not active on patient's medication list    CDERICK Juares, RN  06/04/24, 9:54 AM

## 2024-06-04 NOTE — PROGRESS NOTES
Virtual Visit Details    Type of service:  Video Visit   Video Start Time: 10:57 AM  Video End Time:11:25 AM    Originating Location (pt. Location): Home    Distant Location (provider location):  On-site  Platform used for Video Visit: [facility]    Outpatient Sleep Medicine Consultation:      Name: Karine Moss MRN# [id number]   Age: 69 year old YOB: 1955     Date of Consultation: June 4, 2024  Consultation is requested by: Anay Martinez MD  [address] [place] [id number] Anay Martinez  Primary care provider: Anay Martinez       Reason for Sleep Consult:     Karine Moss is sent by Anay Martinez for a sleep consultation regarding daytime sleepiness.    Patient s Reason for visit  Karine Moss main reason for visit:  Medication management.  Patient states problem(s) started:    Karine Moss's goals for this visit:             Assessment and Plan:     Summary Sleep Diagnoses:  History of IZABEL:  --Initial IZABEL diagnosis at [facility] in March 2, 2004 (216#)-AHI 26, RDI 33.8,  lowest oxygen saturation was 88%, CPAP 8  cm/H20. Interim weight loss of 72 lbs.   2. Narcolepsy without cataplexy  --Diagnosis of questionable veracity as MSLT followed a night of CPAP titration where CPAP treatment was not optimized and there is no record of actigraphy and UDS during this particular hypersomnia work up.   3. Excessive daytime sleepiness (ESS 16) without features of narcolepsy.   3. Chronic insomnia   4. Delayed sleep phase syndrome  5. Restless leg syndrome      At this point, there could be numerous factors at play.  It is certainly possible she may have narcolepsy but in addition to that he may have hypersomnia related to chronic obstructive sleep apnea and sedating medications.  She could have some degree of a prolonged sleep requirement or some degree of circadian misalignment.  Our strategy at this point will be to go ahead and get an actigraph and  gather more information in 2 weeks as well as an overnight polysomnogram and then follow up with me.  We will not pursue multiple sleep latency testing at this time but instead we will first focus on optimizing the duration and circadian timing of sleep as well as treating sleep disordered breathing.  Once that is addressed, we may certainly pursue multiple sleep latency testing.      Comorbid Diagnoses:  DM type 2  HTN  HFpEF    Summary Recommendations:  Orders Placed This Encounter   Procedures    Comprehensive Sleep Study     Summary Counseling:    Sleep Testing Reviewed  Obstructive Sleep Apnea Reviewed  Complications of Untreated Sleep Apnea Reviewed    Medical Decision-making:   Educational materials provided in instructions    Total time spent reviewing medical records, history and physical examination, review of previous testing and interpretation as well as documentation on this date:65 minutes    CC: Anaynazario Martinez          History of Present Illness:     Past Sleep Evaluations:  Initial IZABEL diagnosis at [facility] in March 2, 2004 (216#)-AHI 26, RDI 33.8,  lowest oxygen saturation was 88%, CPAP 8  cm/H20. Presenting concern of daytime sleepiness, snoring, RLS/PLMs.     She was subsequently seen at [facility] for continued daytime sleepiness on   CPAP. She was recommended to undergo titration PSG followed by MSLT by Dr. Mary Kay Sam. Of note, there was no actigraphy and UDS.   Sleep study done at [facility] on 4/18/2004 (216#)-The sleep study was initiated on CPAP 7 cm. Total seep time 422 minutes. Sleep latency was 1 minute. REM latency was 33 minutes, total arousal index 28.2, sleep efficiency was 83%. On CPAP CPAP 9cm/H20, AHI was 9.5, RDI 20, lowest oxygen saturation was 86%.  Following the overnight PSG, the patient underwent a series of 4 naps. The latencies to the naps  are unknown as we don't have records. Average sleep latency reported was 8 minutes. There were no sleep onset REM  periods.     She is currently on Adderall 60 mg once daily.     Bariatric surgery in 2015 and now weighs 144 lbs.    New Sleep Patient Intake    Question 6/4/2024 10:11 AM CDT - Filed by Patient   REASON FOR YOUR VISIT:    Please briefly describe the main reason(s) for your sleep visit: was at [facility],  retired, want to be with [place], keep up on meds   Approximately when did this problem start: i have had sleep issues my whole life. not sure of actually diagnosis was approx 26-27 yrs ago   What are your goals for this visit: establish a relationship with a dr in [facility] to continue my care and collaborte with my primary physician. discuss current meds and possible changes   TIME IN BED:    1) Work/School Days:    Do you work or go to school? No   What time do you usually get into bed? midnight   About how long does it take you to fall asleep? once i take the pregabalin, approx 45 mins,  have paused the trazadone due to sideeffects.   How often do you have trouble falling asleep? without the trazadone or pregabalin, at least 5 or more. with one of them, none   How often do you wake up during the night? on avg twice a night, i pee and then cant fall back to sleep   Do you work days/evenings/nights/rotating shifts?    What wakes you up at night? Use the bathroom   How often do you have trouble falling back to sleep? on avg 7-14   About how long does it take to fall back to sleep? a couple of hrs   What do you usually do if you have trouble getting back to sleep? play games on my phone, if i do this i fall asleep faster than 2 hrs   What time do you usually get out of bed to start your day? i wake up between 10-11, take my am meds including adderall. it takes about 1 hr or so before i actually get out of bed. then at around 1pm i feel functional   Do you use an alarm? No   2) Weekends/Non-work Days/All Other Days    What time do you usually get into bed? midnight   About how long does it take you to fall  asleep? with meds approx 45 min   What time do you usually get out of bed to start your day? approx 11-12   Do you use an alarm? No   SLEEP NEED    On average, about how much sleep do you think you get? 6-8   About how much sleep do you think you need? 8-10   SLEEP POSITION    Which sleep positions do you prefer? Side   Do you do any of the following activities in bed? Eat    Watch TV    Use phone, computer, or tablet   How often do you take a nap on purpose? on avg 2   About how long are your naps? couple hrs   Do you feel better after naps? Yes   How often do you doze off unintentionally? on avg approx 4   Have you ever had a driving accident or near-miss due to sleepiness/drowsiness? No   SLEEP DISRUPTIONS:    1) Breathing/Snoring:    Do you snore? Yes   Do other people complain about your snoring? No   Have you been told you stop breathing in your sleep? No   Do you have issues with any of the following? Morning mouth dryness    Stuffy nose when you wake up    Getting up to urinate more than once   2) Movement:    Do you get pain, discomfort, with an urge to move? No   Does it happen when you are resting?    Does it get better if you move around?    Does it happen more at night?    Have you been told you kick your legs at night? Yes   3) Behaviors in Sleep:    Have you ever experienced any of the following during your sleep? Recurring Nightmares    Eating    Sleep-talking    Sleepwalking    Kicking or punching   Do you ever experience sudden muscle weakness during the day? No   4) Is there anything else you would like your sleep provider to know: some answers are prediagnosis and pre meds. The trazadone gives me horrible nightmares, that is why i have paused it. The pregabalin is for my neuropathy, but the side effect of sleepiness is working. I also am no longer in a relationship that was caustic   CAFFEINE, ALCOHOL AND OTHER SUBSTANCES:    How many caffeinated beverages (coffee, tea, soda, energy drinks) per  day? one cup of coffee and 2-3 12 oz cans of diet pepsi   What time of day is your last caffeine use? 5-6   List any prescribed or over the counter stimulants that you take: adderall   List any prescribed or over the counter sleep medication you take: adderall   List previous sleep medications you have tried: i think lunesta and ambien   Do you drink alcohol to help you sleep? No   Do you drink alcohol near bedtime? No   FAMILY HISTORY:    Has any family member been diagnosed with a sleep disorder? No     SLEEP-WAKE SCHEDULE:     Work/School Days: Patient goes to school/work:     Usually gets into bed at    Takes patient about   to fall asleep  Has trouble falling asleep   nights per week  Wakes up in the middle of the night   times.  Wakes up due to    She has trouble falling back asleep   times a week.   It usually takes   to get back to sleep  Patient is usually up at    Uses alarm:      Weekends/Non-work Days/All Other Days:  Usually gets into bed at     Takes patient about   to fall asleep  Patient is usually up at    Uses alarm:      Sleep Need  Patient gets    sleep on average   Patient thinks she needs about   sleep    Karine Moss prefers to sleep in this position(s):     Patient states they do the following activities in bed:      Naps  Patient takes a purposeful nap   times a week and naps are usually   in duration  She feels better after a nap:    She dozes off unintentionally   days per week  Patient has had a driving accident or near-miss due to sleepiness/drowsiness:        SLEEP DISRUPTIONS:    Breathing/Snoring  Patient snores:   Other people complain about her snoring:    Patient has been told she stops breathing in her sleep:   She has issues with the following:      Movement:  Patient gets pain, discomfort, with an urge to move:     It happens when she is resting:     It happens more at night:     Patient has been told she kicks her legs at night:        Behaviors in Sleep:  Karine LIM  Isa has experienced the following behaviors while sleeping:    She has experienced sudden muscle weakness during the day:        Is there anything else you would like your sleep provider to know:          CAFFEINE AND OTHER SUBSTANCES:    Patient consumes caffeinated beverages per day:     Last caffeine use is usually:    List of any prescribed or over the counter stimulants that patient takes:    List of any prescribed or over the counter sleep medication patient takes:    List of previous sleep medications that patient has tried:    Patient drinks alcohol to help them sleep:    Patient drinks alcohol near bedtime:      Family History:  Patient has a family member been diagnosed with a sleep disorder:              SCALES:    EPWORTH SLEEPINESS SCALE         6/4/2024    10:16 AM    Poth Sleepiness Scale ( KESHIA Rios  [id number]<br>ESS - [place]/English - Final version - 21 Nov 07 - [facility] [place].)   Sitting and reading High chance of dozing   Watching TV Moderate chance of dozing   Sitting, inactive in a public place (e.g. a theatre or a meeting) Moderate chance of dozing   As a passenger in a car for an hour without a break High chance of dozing   Lying down to rest in the afternoon when circumstances permit High chance of dozing   Sitting and talking to someone Would never doze   Sitting quietly after a lunch without alcohol High chance of dozing   In a car, while stopped for a few minutes in traffic Would never doze   Poth Score (MC) 16   Poth Score (Sleep) 16         INSOMNIA SEVERITY INDEX (KULWANT)          6/4/2024     9:36 AM   Insomnia Severity Index (KULWANT)   Difficulty falling asleep 0   Difficulty staying asleep 2   Problems waking up too early 0   How SATISFIED/DISSATISFIED are you with your CURRENT sleep pattern? 2   How NOTICEABLE to others do you think your sleep problem is in terms of impairing the quality of your life? 3   How WORRIED/DISTRESSED are you about your current sleep  "problem? 4   To what extent do you consider your sleep problem to INTERFERE with your daily functioning (e.g. daytime fatigue, mood, ability to function at work/daily chores, concentration, memory, mood, etc.) CURRENTLY? 4   KULWANT Total Score 15       Guidelines for Scoring/Interpretation:  Total score categories:  0-7 = No clinically significant insomnia   8-14 = Subthreshold insomnia   15-21 = Clinical insomnia (moderate severity)  22-28 = Clinical insomnia (severe)  Used via courtesy of www.[employer].va.gov with permission from Raymond Celestin PhD., [facility]      STOP BANG         7/3/2024    11:45 AM   STOP BANG Questionnaire (  2008, the American Society of Anesthesiologists, Inc. [employer].)   B/P Clinic: 109/68   BMI Clinic: 25.39         GAD7        7/3/2024    11:53 AM   DELMY-7    1. Feeling nervous, anxious, or on edge 0   2. Not being able to stop or control worrying 0   3. Worrying too much about different things 0   4. Trouble relaxing 2   5. Being so restless that it is hard to sit still 2   6. Becoming easily annoyed or irritable 0   7. Feeling afraid, as if something awful might happen 0   DELMY-7 Total Score 4   If you checked any problems, how difficult have they made it for you to do your work, take care of things at home, or get along with other people? Somewhat difficult         CAGE-AID        10/12/2022     1:13 PM   CAGE-AID Flowsheet   Have you ever felt you should Cut down on your drinking or drug use? 0   Have people Annoyed you by criticizing your drinking or drug use? 0   Have you ever felt bad or Guilty about your drinking or drug use? 0   Have you ever had a drink or used drugs first thing in the morning to steady your nerves or to get rid of a hangover? (Eye opener) 0   CAGE-AID SCORE 0       CAGE-AID reprinted with permission from the [place] Medical Journal, MISTY Esteban. and TIMOTEO Sky, \"Conjoint screening questionnaires for alcohol and drug abuse\" " [place] Medical Journal 94: 135-140, 1995.      PATIENT HEALTH QUESTIONNAIRE-9 (PHQ - 9)        7/3/2024    11:53 AM   PHQ-9 ([employer])   1.  Little interest or pleasure in doing things 0   2.  Feeling down, depressed, or hopeless 0   3.  Trouble falling or staying asleep, or sleeping too much 2   4.  Feeling tired or having little energy 1   5.  Poor appetite or overeating 0   6.  Feeling bad about yourself - or that you are a failure or have let yourself or your family down 0   7.  Trouble concentrating on things, such as reading the newspaper or watching television 3   8.  Moving or speaking so slowly that other people could have noticed. Or the opposite - being so fidgety or restless that you have been moving around a lot more than usual 3   9.  Thoughts that you would be better off dead, or of hurting yourself in some way 0   PHQ-9 Total Score 9   If you checked off any problems, how difficult have these problems made it for you to do your work, take care of things at home, or get along with other people? Somewhat difficult   6.  Feeling bad about yourself 0   7.  Trouble concentrating 3   8.  Moving slowly or restless 3   9.  Suicidal or self-harm thoughts 0   Difficulty at work, home, or with people Somewhat difficult       Developed by Zenia Stone, Trudy Iverson, Dani Houston and colleagues, with an educational epifanio from [employer]. No permission required to reproduce, translate, display or distribute.        Allergies:    Allergies   Allergen Reactions    Pravastatin Other (See Comments)     woozy    Codeine Nausea and Vomiting    Nsaids GI Disturbance and Other (See Comments)     Pt had bariatric surgery, should not ever take oral NSAIDS due to risk of gastric ulcers.  Pt had bariatric surgery, should not ever take oral NSAIDS due to risk of gastric ulcers.       Medications:    Current Outpatient Medications   Medication Sig Dispense Refill    amphetamine-dextroamphetamine (ADDERALL) 30  MG tablet Take 1 tablet (30 mg) by mouth every 24 hours 1.5 mg a total of 45 mg  Daily 30 tablet 0    atorvastatin (LIPITOR) 20 MG tablet Take 1 tablet (20 mg) by mouth daily 90 tablet 3    blood glucose (NO BRAND SPECIFIED) lancets standard Use to test blood sugar 1 times daily or as directed. 90 Lancet 3    blood glucose (NO BRAND SPECIFIED) test strip Use to test blood sugar 1 times daily or as directed. 100 strip 3    blood glucose (NO BRAND SPECIFIED) test strip Use to test blood sugar as needed with Freestyle Nirmal CGM. 100 strip 0    blood glucose (NO BRAND SPECIFIED) test strip Use to test blood sugar as directed with CGM sensor. 50 strip 1    blood glucose calibration (NO BRAND SPECIFIED) solution Use to calibrate blood glucose monitor as needed as directed. 1 each 3    blood glucose monitoring (NO BRAND SPECIFIED) meter device kit Use to test blood sugar 1 times daily or as directed. 1 kit 0    Calcium Acetate 667 MG TABS Take 1 tablet by mouth 2 times daily for 360 days 180 tablet 3    calcium carbonate (OS-KINJAL) 1500 (600 Ca) MG tablet Take 2 tablets (1,200 mg) by mouth daily      cevimeline (EVOXAC) 30 MG capsule take 1 cap  capsule 1    cholecalciferol (VITAMIN D3) 125 mcg (5000 units) capsule Take 125 mcg by mouth daily      COMPOUNDED NON-CONTROLLED SUBSTANCE (CMPD RX) - PHARMACY TO MIX COMPOUNDED MEDICATION Estradiol 0.0075% in HRT cream  Apply 2 grams,  intravaginally and small amount externally daily for one week then twice weekly for maintenance. 45 g 11    Continuous Blood Gluc  (FREESTYLE NIRMAL 14 DAY READER) EBEN Use to read blood sugars as per 's instructions. 1 each 0    Continuous Glucose Sensor (FREESTYLE NIRMAL 14 DAY SENSOR) MISC Change every 14 days. 2 each 2    cyanocobalamin (VITAMIN B-12) 1000 MCG tablet Take one every other day 90 tablet 0    DULoxetine (CYMBALTA) 30 MG capsule Take 1 capsule (30 mg) by mouth 2 times daily 180 capsule 1    DULoxetine  (CYMBALTA) 60 MG capsule Take 1 capsule (60 mg) by mouth at bedtime Take in addition to current 30 mg evening dose for a total of 90 mg at bedtime. 90 capsule 1    empagliflozin (JARDIANCE) 10 MG TABS tablet Take one daily 90 tablet 3    finasteride (PROSCAR) 5 MG tablet Take 1/2 tablet daily 45 tablet 3    losartan (COZAAR) 50 MG tablet Take 1 tablet (50 mg) by mouth daily 90 tablet 1    metroNIDAZOLE (FLAGYL) 500 MG tablet Take 1 tablet (500 mg) by mouth 2 times daily 14 tablet 3    omeprazole (PRILOSEC) 40 MG DR capsule Take 40mg twice daily 180 capsule 3    pregabalin (LYRICA) 100 MG capsule Take 100 mg by mouth daily      tolterodine ER (DETROL LA) 4 MG 24 hr capsule Take 1 capsule (4 mg) by mouth daily 90 capsule 3    traZODone (DESYREL) 50 MG tablet Take 1 tablet by mouth every 24 hours      triamcinolone (KENALOG) 0.1 % paste Apply to tongue twice daily x 10 days 5 g 0       Problem List:  Patient Active Problem List    Diagnosis Date Noted    Dumping syndrome 04/07/2024     Priority: Medium    History of Rai-en-Y gastric bypass 04/07/2024     Priority: Medium    Hypoglycemia 04/07/2024     Priority: Medium    Chronic left shoulder pain 03/29/2024     Priority: Medium    Osteopenia of spine 03/06/2023     Priority: Medium    Chronic kidney disease, stage 3 (H) 07/29/2021     Priority: Medium    Chronic diastolic congestive heart failure (H) 07/29/2021     Priority: Medium     ECHO 4/2024  Interpretation Summary  Global and regional left ventricular function is normal with an EF of 55-60%.  Basal septal thickning is present.  Right ventricular function, chamber size, wall motion, and thickness are  normal.  Mild to moderate mitral annular calcification is present.  No pericardial effusion is present.        Gastrointestinal hemorrhage associated with gastric ulcer 11/05/2020     Priority: Medium    Diabetic peripheral neuropathy (H) 02/06/2020     Priority: Medium    Bilateral sciatica 02/06/2020      Priority: Medium    PTSD (post-traumatic stress disorder) 11/12/2019     Priority: Medium    Osteopenia of hip 04/29/2019     Priority: Medium     T -1.8 Left hip, T -1.4 Right hip  T -1.3 Lumbar spine    Repeat in 2021      Type 2 diabetes mellitus without complication, without long-term current use of insulin (H) 06/27/2018     Priority: Medium    Major depressive disorder, recurrent episode, mild (H24) 10/25/2016     Priority: Medium    Hx laparoscopic cholecystectomy 04/22/2015     Priority: Medium     Formatting of this note might be different from the original.   Dr. Berkowitz      Peripheral neuropathy 04/22/2015     Priority: Medium    RLS (restless legs syndrome) 04/22/2015     Priority: Medium    Hypertension goal BP (blood pressure) < 140/90 02/04/2014     Priority: Medium    Heart murmur 12/05/2013     Priority: Medium    Low back pain      Priority: Medium    DJD (degenerative joint disease) of knee      Priority: Medium    IBS (irritable bowel syndrome)      Priority: Medium     diarrhea       Major depression in partial remission (H24) 06/03/2013     Priority: Medium    Insomnia 04/06/2013     Priority: Medium    Obstructive sleep apnea      Priority: Medium     327.23      Hyperlipidemia LDL goal <100 12/12/2012     Priority: Medium    Vitamin D deficiency 11/12/2012     Priority: Medium     Problem list name updated by automated process. Provider to review      Narcolepsy without cataplexy(347.00) 11/12/2012     Priority: Medium        Past Medical/Surgical History:  Past Medical History:   Diagnosis Date    Arthritis     Basal cell carcinoma     Chest pain     seeing Cardiology    Depression 1991    after 's death    Diabetes mellitus (H)     Diabetic renal disease (H)     microalbuminuria    DJD (degenerative joint disease) of knee     H/O vitamin D deficiency     Hypertension     IBS (irritable bowel syndrome)     diarrhea     Keratoconus     Low back pain     Narcolepsy 2007    Dx'd by  Sleep specialist 347.00, pt discontinued Adderal mid Nov. 13 due to tacycardia concerns    Obesity     Obstructive sleep apnea     327.23, 3 sleep studies,Dr. Flaco SELBY Lung    Pancreatitis     related to Victoza, also on Xyrem    Panic     RLS (restless legs syndrome)     Sinus tachycardia      Past Surgical History:   Procedure Laterality Date    COLONOSCOPY  10/01/2020    poor quality prep    ear surgery  2002 and 01/2004    ESOPHAGOSCOPY, GASTROSCOPY, DUODENOSCOPY (EGD), COMBINED N/A 11/16/2021    Procedure: ESOPHAGOGASTRODUODENOSCOPY (EGD);  Surgeon: Jayla Calvo MD;  Location:  GI    ESOPHAGOSCOPY, GASTROSCOPY, DUODENOSCOPY (EGD), COMBINED N/A 1/9/2023    Procedure: ESOPHAGOGASTRODUODENOSCOPY, WITH BIOPSY;  Surgeon: Brandon Witt MD;  Location:  GI    GASTRIC BYPASS  2013    laparoscopic jimmy en y c ometopexy    HEMORRHOIDECTOMY  09/14/2000    LAPAROSCOPIC CHOLECYSTECTOMY  2015    LASIK BILATERAL      UVULOPALATOPHARYNGOPLASTY      UVVP         Social History:  Social History     Socioeconomic History    Marital status: Single     Spouse name: Not on file    Number of children: 1    Years of education: Not on file    Highest education level: Not on file   Occupational History     Employer: [email or web address]     Comment: on disability   Tobacco Use    Smoking status: Never     Passive exposure: Never    Smokeless tobacco: Never   Vaping Use    Vaping status: Never Used   Substance and Sexual Activity    Alcohol use: Not Currently     Comment: rare    Drug use: No    Sexual activity: Never   Other Topics Concern    Parent/sibling w/ CABG, MI or angioplasty before 65F 55M? No   Social History Narrative    Daughter- 42,  since 1991    Drives only short distances due to narcolepsy            --------------------------------------------------------------------------------    Surgical History  Return To Top     Status Surgery Time Frame Comment Record Date     Inactive  ear surgery  1/04 5/27/2008       Inactive  eye surgery  2002    5/27/2008      Inactive  Hemorrhoidectomy  9/14/00 5/27/2008          --------------------------------------------------------------------------------    Food Allergy  Return To Top     Allergen Reaction Comment Record Date     * No known food allergies      10/28/2008          --------------------------------------------------------------------------------    Drug Allergy  Return To Top     Allergen Reaction Comment Record Date     Codeine  nausea    6/21/2007          --------------------------------------------------------------------------------    Environment Allergy  Return To Top     Allergen Reaction Comment Record Date     * No known environmental allergies      10/28/2008          --------------------------------------------------------------------------------    Social History  Return To Top     Question Answer Comment Record Date     Marital status      5/27/2008      Advance Directive or Living Will  Form Given to Patient    1/18/2010      Emotional Abuse  Yes    1/18/2010      Exercise  No    1/18/2010      Caffeine  Yes    5/27/2008      Physical Abuse  No    1/18/2010      Sealtbelts  Yes    1/18/2010      Sexual Abuse  No    1/18/2010      Breast/Testicle Self Check  No    1/18/2010      Number of children  1    1/18/2010      Living arrangements  House    1/18/2010      Number of adults in household  2    1/18/2010      Education level  High School Graduate    1/18/2010      Employment  Currently employed    1/18/2010      Tobacco history  Has never smoked or chewed tobacco    5/27/2008      Alcohol history  Currently drinks alcohol    5/27/2008      Has the patient ever used illegal drugs?  Has never used illegal drugs    5/27/2008          --------------------------------------------------------------------------------    History - Overall Remark: 3/21/2012 Return To Top          --------------------------------------------------------------------------------    Medical History Return To Top     Status Diagnosis Time Frame Comment Record Date     Active (250.00) - C - Diabetes mellitus, type II controlled      5/15/2012      Active (788.41) - C - Urinary frequency      4/17/2012      Active (250.02) - C - Diabetes mellitus, type II uncontrolled      3/6/2012      Active (278.00) - C - Obesity      2/14/2011      Active (250.00) - C - Diabetes mellitus, type II controlled      2/14/2011      Active (739.3) - C - Somatic dysfunction, lumbar region      8/16/2010      Active (739.5) - C - Somatic dysfunction, pelvic region      8/16/2010      Active (401.9) - C - Hypertension      2/8/2010      Active 268.9 UNSP VITAMIN D DEFICIENCY      1/18/2010      Active (695.3) - C - Rosacea      1/18/2010      Active 272.1 Hypertriglyceridemia      1/18/2010      Active 300.4 Depression with Anxiety (Dysthymic Disorder)      10/28/2008      Active 739.6 Somatic dysfunction, lower extremities      10/28/2008      Active 780.79 Fatigue      6/23/2008      Active 278.01 Obesity, morbid      6/19/2008      Active 347.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, 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      6/19/2008      Inactive  (462) - C - Pharyngitis, acute      1/15/2011      Inactive  250.02 Diabetes mellitus, type II uncontrolled      1/18/2010      Inactive  726.71 Tendinitis, achilles      10/28/2008      Inactive  (250.00) - C - Diabetes mellitus, type II controlled      10/28/2008      Inactive  (250.00) - C - Diabetes mellitus, type II controlled      6/23/2008      Inactive  V77.91 Screening, lipids      6/23/2008      Inactive  599.0 Urinary tract infection, unspec./pyuria (UTI)      6/23/2008      Inactive  V70.0 Routine Medical Exam      6/19/2008      Active Constipation      5/27/2008      Active Hemorrhoids      5/27/2008      Pancreatitis pancreatitis 2013 April      --------------------------------------------------------------------------------    M        --------------------------------------------------------------------------------    Family History  Return To Top     Status Relationship Disease Comment Record Date     Alive Sister  *Denies any medical problems  3 sisters  2008      Alive Brother  *Denies any medical problems  2 brothers  2008         Father    Age of death 56  2010         Mother  Dementia  Age of death 82  2008          --------------------------------------------------------------------------------                         Social Determinants of Health     Financial Resource Strain: Not on file   Food Insecurity: Not on file   Transportation Needs: Not on file   Physical Activity: Not on file   Stress: Not on file   Social Connections: Unknown (1/3/2024)    [facility]    Social Connections     Frequency of Communication with Friends and Family: Not on file   Interpersonal Safety: Low Risk  (2024)    Interpersonal Safety     Do you feel physically and emotionally safe where you currently live?: Yes     Within the past 12 months, have you been hit, slapped, kicked or otherwise physically hurt by someone?: No     Within the past 12 months, have you been humiliated or emotionally abused in other ways by your partner or ex-partner?: No   Housing Stability: Not on file       Family History:  Family History   Problem Relation Age of Onset    Memory loss Mother     Other - See Comments Mother         hair thinning    Diabetes Father     Chronic Obstructive Pulmonary Disease Sister     Anemia Sister         hx of ulcers    Other - See Comments Sister 65        MVA, followed by leg pain after a fall    Dementia Sister 68    Dementia Brother 70    Cancer Brother         lung    Cancer - colorectal Maternal Grandmother     Cancer  "Daughter     Obesity Daughter         s/p lap band       Review of Systems:  A complete review of systems reviewed by me is negative with the exeption of what has been mentioned in the history of present illness.  In the last TWO WEEKS have you experienced any of the following symptoms?  Fevers: No  Night Sweats: No  Weight Gain: Yes  Pain at Night: No  Double Vision: No  Changes in Vision: No  Difficulty Breathing through Nose: No  Sore Throat in Morning: No  Dry Mouth in the Morning: Yes  Shortness of Breath Lying Flat: No  Shortness of Breath With Activity: No  Awakening with Shortness of Breath: No  Increased Cough: No  Heart Racing at Night: No  Swelling in Feet or Legs: No  Diarrhea at Night: No  Heartburn at Night: No  Urinating More than Once at Night: Yes  Losing Control of Urine at Night: No  Joint Pains at Night: Yes  Headaches in Morning: Yes  Weakness in Arms or Legs: No  Depressed Mood: No  Anxiety: No    Physical Examination:  Vitals: Ht 1.626 m (5' 4\")   Wt 65.3 kg (144 lb)   BMI 24.72 kg/m    BMI= Body mass index is 24.72 kg/m .           GENERAL APPEARANCE: alert and no distress  EYES: Eyes grossly normal to inspection  NECK: no asymmetry, masses, or scars  RESP: breathing is non-labored   NEURO: mentation intact and speech normal  PSYCH: affect normal/bright  Mallampati Class:   Tonsillar Stage:          Data: All pertinent previous laboratory data reviewed     Recent Labs   Lab Test 05/10/24  1304 05/02/24  1548    140   POTASSIUM 4.7 4.5   CHLORIDE 106 105   CO2 25 24   ANIONGAP 11 11   * 132*   BUN 34.6* 48.0*   CR 1.42* 1.27*   KINJAL 9.2 9.3       Recent Labs   Lab Test 05/10/24  1304   WBC 7.8   RBC 4.32   HGB 12.6   HCT 40.1   MCV 93   MCH 29.2   MCHC 31.4*   RDW 13.5          Recent Labs   Lab Test 05/10/24  1304 12/09/22  1203   PROTTOTAL  --  7.5   ALBUMIN 4.3 4.2   BILITOTAL  --  0.4   ALKPHOS  --  88   AST  --  23   ALT  --  15       TSH   Date Value   06/12/2023 " 1.01 uIU/mL   12/09/2022 1.52 uIU/mL   10/22/2019 1.69 mU/L   10/31/2018 1.47 mU/L       Iron Saturation Index   Date/Time Value Ref Range Status   05/27/2021 03:02 PM 14 (L) 15 - 46 % Final     Ferritin   Date/Time Value Ref Range Status   06/12/2023 04:27 PM 74 11 - 328 ng/mL Final   09/09/2020 08:27 AM 8 8 - 252 ng/mL Final       LISA Goldstein 6/4/2024

## 2024-06-04 NOTE — NURSING NOTE
Is the patient currently in the state of [place]? YES    Visit mode:VIDEO    If the visit is dropped, the patient can be reconnected by: VIDEO VISIT: Send to e-mail at: [email or web address]    Will anyone else be joining the visit? NO  (If patient encounters technical issues they should call [phone]    How would you like to obtain your AVS? MyChart    Are changes needed to the allergy or medication list? Pt stated no changes to allergies and Pt stated no med changes    Are refills needed on medications prescribed by this physician? NO    Reason for visit: Consult    Rula CASTANEDA    Has patient had flu shot for current/most recent flu season? If so, when? Yes: 10/23

## 2024-06-04 NOTE — PATIENT INSTRUCTIONS
"          MY TREATMENT INFORMATION FOR SLEEP APNEA-  Karine CARINA Moss    DOCTOR : LISA Goldstein    Am I having a sleep study at a sleep center?  --->Due to normal delays, you will be contacted within 2-4 weeks to schedule    Am I having a home sleep study?  --->Watch the video for the device you are using:    -/drop off device-   https://www.Vartopiaube.com/watch?v=yGGFBdELGhk    -Disposable device sent out require phone/computer application-   https://www.[employer].com/watch?v=BCce_vbiwxE      Frequently asked questions:  1. What is Obstructive Sleep Apnea (IZABEL)? IZABEL is the most common type of sleep apnea. Apnea means, \"without breath.\"  Apnea is most often caused by narrowing or collapse of the upper airway as muscles relax during sleep.   Almost everyone has occasional apneas. Most people with sleep apnea have had brief interruptions at night frequently for many years.  The severity of sleep apnea is related to how frequent and severe the events are.   2. What are the consequences of IZABEL? Symptoms include: feeling sleepy during the day, snoring loudly, gasping or stopping of breathing, trouble sleeping, and occasionally morning headaches or heartburn at night.  Sleepiness can be serious and even increase the risk of falling asleep while driving. Other health consequences may include development of high blood pressure and other cardiovascular disease in persons who are susceptible. Untreated IZABEL  can contribute to heart disease, stroke and diabetes.   3. What are the treatment options? In most situations, sleep apnea is a lifelong disease that must be managed with daily therapy. Medications are not effective for sleep apnea and surgery is generally not considered until other therapies have been tried. Your treatment is your choice . Continuous Positive Airway (CPAP) works right away and is the therapy that is effective in nearly everyone. An oral device to hold your jaw forward is usually the next most " reliable option. Other options include postioning devices (to keep you off your back), weight loss, and surgery including a tongue pacing device. There is more detail about some of these options below.  4. Are my sleep studies covered by insurance? Although we will request verification of coverage, we advise you also check in advance of the study to ensure there is coverage.    Important tips for those choosing CPAP and similar devices  REMEMBER-IF YOU RECEIVE A CALL FROM  [phone]-->IT IS TO SETUP A DEVICE  For new devices, sign up for device WILY to monitor your device for your followup visits  We encourage you to utilize the [employer] wily or website ( https://LiveAction.mytrax/ ) to monitor your therapy progress and share the data with your healthcare team when you discuss your sleep apnea.                                                    Know your equipment:  CPAP is continuous positive airway pressure that prevents obstructive sleep apnea by keeping the throat from collapsing while you are sleeping. In most cases, the device is  smart  and can slowly self-adjusts if your throat collapses and keeps a record every day of how well you are treated-this information is available to you and your care team.  BPAP is bilevel positive airway pressure that keeps your throat open and also assists each breath with a pressure boost to maintain adequate breathing.  Special kinds of BPAP are used in patients who have inadequate breathing from lung or heart disease. In most cases, the device is  smart  and can slowly self-adjusts to assist breathing. Like CPAP, the device keeps a record of how well you are treated.  Your mask is your connection to the device. You get to choose what feels most comfortable and the staff will help to make sure if fits. Here: are some examples of the different masks that are available: Magnetic mask aids may assist with use but there are safety issues that should be addressed when  considering with magnets* ( see end of discussion).       Key points to remember on your journey with sleep apnea:  Sleep study.  PAP devices often need to be adjusted during a sleep study to show that they are effective and adjusted right.  Good tips to remember: Try wearing just the mask during a quiet time during the day so your body adapts to wearing it. A humidifier is recommended for comfort in most cases to prevent drying of your nose and throat. Allergy medication from your provider may help you if you are having nasal congestion.  Getting settled-in. It takes more than one night for most of us to get used to wearing a mask. Try wearing just the mask during a quiet time during the day so your body adapts to wearing it. A humidifier is recommended for comfort in most cases. Our team will work with you carefully on the first day and will be in contact within 4 days and again at 2 and 4 weeks for advice and remote device adjustments. Your therapy is evaluated by the device each day.   Use it every night. The more you are able to sleep naturally for 7-8 hours, the more likely you will have good sleep and to prevent health risks or symptoms from sleep apnea. Even if you use it 4 hours it helps. Occasionally all of us are unable to use a medical therapy, in sleep apnea, it is not dangerous to miss one night.   Communicate. Call our skilled team on the number provided on the first day if your visit for problems that make it difficult to wear the device. Over 2 out of 3 patients can learn to wear the device long-term with help from our team. Remember to call our team or your sleep providers if you are unable to wear the device as we may have other solutions for those who cannot adapt to mask CPAP therapy. It is recommended that you sleep your sleep provider within the first 3 months and yearly after that if you are not having problems.   Use it for your health. We encourage use of CPAP masks during daytime quiet  periods to allow your face and brain to adapt to the sensation of CPAP so that it will be a more natural sensation to awaken to at night or during naps. This can be very useful during the first few weeks or months of adapting to CPAP though it does not help medically to wear CPAP during wakefulness and  should not be used as a strategy just to meet guidelines.  Take care of your equipment. Make sure you clean your mask and tubing using directions every day and that your filter and mask are replaced as recommended or if they are not working.     *Masks with magnets:  Updated Contraindications  Masks with magnetic components are contraindicated for use by patients where they, or anyone in close physical contact while using the mask, have the following:   Active medical implants that interact with magnets (i.e., pacemakers, implantable cardioverter defibrillators (ICD), neurostimulators, cerebrospinal fluid (CSF) shunts, insulin/infusion pumps)   Metallic implants/objects containing ferromagnetic material (i.e., aneurysm clips/flow disruption devices, embolic coils, stents, valves, electrodes, implants to restore hearing or balance with implanted magnets, ocular implants, metallic splinters in the eye)  Updated Warning  Keep the mask magnets at a safe distance of at least 6 inches (150 mm) away from implants or medical devices that may be adversely affected by magnetic interference. This warning applies to you or anyone in close physical contact with your mask. The magnets are in the frame and lower headgear clips, with a magnetic field strength of up to 400mT. When worn, they connect to secure the mask but may inadvertently detach while asleep.  Implants/medical devices, including those listed within contraindications, may be adversely affected if they change function under external magnetic fields or contain ferromagnetic materials that attract/repel to magnetic fields (some metallic implants, e.g., contact lenses  with metal, dental implants, metallic cranial plates, screws, brooke hole covers, and bone substitute devices). Consult your physician and  of your implant / other medical device for information on the potential adverse effects of magnetic fields.    BESIDES CPAP, WHAT OTHER THERAPIES ARE THERE?    Positioning Device  Positioning devices are generally used when sleep apnea is mild and only occurs on your back.This example shows a pillow that straps around the waist. It may be appropriate for those whose sleep study shows milder sleep apnea that occurs primarily when lying flat on one's back. Preliminary studies have shown benefit but effectiveness at home may need to be verified by a home sleep test. These devices are generally not covered by medical insurance.  Examples of devices that maintain sleeping on the back to prevent snoring and mild sleep apnea.    Belt type body positioner  http://[employer])/    Electronic reminder  http://nightshifttherapy.[employer]/            Oral Appliance  What is oral appliance therapy?  An oral appliance device fits on your teeth at night like a retainer used after having braces. The device is made by a specialized dentist and requires several visits over 1-2 months before a manufactured device is made to fit your teeth and is adjusted to prevent your sleep apnea. Once an oral device is working properly, snoring should be improved. A home sleep test may be recommended at that time if to determine whether the sleep apnea is adequately treated.       Some things to remember:  -Oral devices are often, but not always, covered by your medical insurance. Be sure to check with your insurance provider.   -If you are referred for oral therapy, you will be given a list of specialized dentists to consider or you may choose to visit the Web site of the American Academy of Dental Sleep Medicine  -Oral devices are less likely to work if you have severe sleep apnea or are extremely  overweight.     More detailed information  An oral appliance is a small acrylic device that fits over the upper and lower teeth  (similar to a retainer or a mouth guard). This device slightly moves jaw forward, which moves the base of the tongue forward, opens the airway, improves breathing for effective treat snoring and obstructive sleep apnea in perhaps 7 out of 10 people .  The best working devices are custom-made by a dental device  after a mold is made of the teeth 1, 2, 3.  When is an oral appliance indicated?  Oral appliance therapy is recommended as a first-line treatment for patients with primary snoring, mild sleep apnea, and for patients with moderate sleep apnea who prefer appliance therapy to use of CPAP4, 5. Severity of sleep apnea is determined by sleep testing and is based on the number of respiratory events per hour of sleep.   How successful is oral appliance therapy?  The success rate of oral appliance therapy in patients with mild sleep apnea is 75-80% while in patients with moderate sleep apnea it is 50-70%. The chance of success in patients with severe sleep apnea is 40-50%. The research also shows that oral appliances have a beneficial effect on the cardiovascular health of IZABEL patients at the same magnitude as CPAP therapy7.  Oral appliances should be a second-line treatment in cases of severe sleep apnea, but if not completely successful then a combination therapy utilizing CPAP plus oral appliance therapy may be effective. Oral appliances tend to be effective in a broad range of patients although studies show that the patients who have the highest success are females, younger patients, those with milder disease, and less severe obesity. 3, 6.   Finding a dentist that practices dental sleep medicine  Specific training is available through the American Academy of Dental Sleep Medicine for dentists interested in working in the field of sleep. To find a dentist who is educated in  the field of sleep and the use of oral appliances, near you, visit the Web site of the American Academy of Dental Sleep Medicine.    References  1. Ayo, et al. Objectively measured vs self-reported compliance during oral appliance therapy for sleep-disordered breathing. Chest 2013; 144(5): 5757-8930.  2. Miesha et al. Objective measurement of compliance during oral appliance therapy for sleep-disordered breathing. Thorax 2013; 68(1): 91-96.  3. Simeon et al. Mandibular advancement devices in 620 men and women with IZABEL and snoring: tolerability and predictors of treatment success. Chest 2004; 125: 6420-1316.  4. Jurgen, et al. Oral appliances for snoring and IZABEL: a review. Sleep 2006; 29: 244-262.  5. Tj et al. Oral appliance treatment for IZABEL: an update. J Clin Sleep Med 2014; 10(2): 215-227.  6. Luis et al. Predictors of OSAH treatment outcome. J Dent Res 2007; 86: 6541-6668.      Weight Loss:   Your Body mass index is 24.72 kg/m .    Being overweight does not necessarily mean you will have health consequences.  Those who have BMI over 35 or over 27 with existing medical conditions carries greater risk.   Weight loss decreases severity of sleep apnea in most people with obesity. For those with mild obesity who have developed snoring with weight gain, even 15-30 pound weight loss can improve and occasionally milder eliminate sleep apnea.  Structured and life-long dietary and health habits are necessary to lose weight and keep healthier weight levels.     The Comprehensive Weight loss program offers all aspects of weight loss strategies including two Non-Surgical Weight Loss Programs: Medical Weight Management and our 24 Week Healthy Lifestyle Program:    Medical Weight Management: You will meet with a Medical Weight Management Provider, as well as a Registered Dietician. The program may include medication therapy, dietary education, recommended exercise and physical therapy programs,  monthly support group meetings, and possible psychological counseling. Follow up visits with the provider or dietician are scheduled based on your progress and needs.    24 Week Healthy Lifestyle Program: This unique program is designed to give you the support of weekly appointments and activities thru a 24-week period. It may include all of the components of the basic program (above), with the addition of 11 individual Health  Visits, 24-week access to the [employer] website for over 700 online classes, and monthly support group meetings. This program has an out-of-pocket expense of $499 to cover the items that can not be billed to insurance (health coaches and [employer] access), and is non-refundable/non-transferable (you may be able to use a Health Savings Account; ask your HSA provider). There may be an optional meal replacement plan prescribed as well.   Surgical management achieves meaningful long-term weight loss and improvement in health risks in most patients with more severe obesity.      Sleep Apnea Surgery:    Surgery for obstructive sleep apnea is considered generally only when other therapies fail to work. Surgery may be discussed with you if you are having a difficult time tolerating CPAP and or when there is an abnormal structure that requires surgical correction.  Nose and throat surgeries often enlarge the airway to prevent collapse.  Most of these surgeries create pain for 1-2 weeks and up to half of the most common surgeries are not effective throughout life.  You should carefully discuss the benefits and drawbacks to surgery with your sleep provider and surgeon to determine if it is the best solution for you.   More information  Surgery for IZABEL is directed at areas that are responsible for narrowing or complete obstruction of the airway during sleep.  There are a wide range of procedures available to enlarge and/or stabilize the airway to prevent blockage of breathing in the three major  areas where it can occur: the palate, tongue, and nasal regions.  Successful surgical treatment depends on the accurate identification of the factors responsible for obstructive sleep apnea in each person.  A personalized approach is required because there is no single treatment that works well for everyone.  Because of anatomic variation, consultation with an examination by a sleep surgeon is a critical first step in determining what surgical options are best for each patient.  In some cases, examination during sedation may be recommended in order to guide the selection of procedures.  Patients will be counseled about risks and benefits as well as the typical recovery course after surgery. Surgery is typically not a cure for a person s IZABEL.  However, surgery will often significantly improve one s IZABEL severity (termed  success rate ).  Even in the absence of a cure, surgery will decrease the cardiovascular risk associated with OSA7; improve overall quality of life8 (sleepiness, functionality, sleep quality, etc).      Palate Procedures:  Patients with IZABEL often have narrowing of their airway in the region of their tonsils and uvula.  The goals of palate procedures are to widen the airway in this region as well as to help the tissues resist collapse.  Modern palate procedure techniques focus on tissue conservation and soft tissue rearrangement, rather than tissue removal.  Often the uvula is preserved in this procedure. Residual sleep apnea is common in patient after pharyngoplasty with an average reduction in sleep apnea events of 33%2.      Tongue Procedures:  ExamWhile patients are awake, the muscles that surround the throat are active and keep this region open for breathing. These muscles relax during sleep, allowing the tongue and other structures to collapse and block breathing.  There are several different tongue procedures available.  Selection of a tongue base procedure depends on characteristics seen on  physical exam.  Generally, procedures are aimed at removing bulky tissues in this area or preventing the back of the tongue from falling back during sleep.  Success rates for tongue surgery range from 50-62%3.    Hypoglossal Nerve Stimulation:  Hypoglossal nerve stimulation has recently received approval from the [place] Food and Drug Administration for the treatment of obstructive sleep apnea.  This is based on research showing that the system was safe and effective in treating sleep apnea6.  Results showed that the median AHI score decreased 68%, from 29.3 to 9.0. This therapy uses an implant system that senses breathing patterns and delivers mild stimulation to airway muscles, which keeps the airway open during sleep.  The system consists of three fully implanted components: a small generator (similar in size to a pacemaker), a breathing sensor, and a stimulation lead.  Using a small handheld remote, a patient turns the therapy on before bed and off upon awakening.    Candidates for this device must be greater than 18 years of age, have moderate to severe obstructive sleep apnea with less than 25% central events  (AHI between 15-65), BMI less than 35, have tried CPAP/oral appliance for at least 8 weeks without success, and have appropriate upper airway anatomy (determined by a sleep endoscopy performed by Dr. Fili Mihcael or Dr. Refugio Feng).    Nasal Procedures:  Nasal obstruction can interfere with nasal breathing during the day and night.  Studies have shown that relief of nasal obstruction can improve the ability of some patients to tolerate positive airway pressure therapy for obstructive sleep apnea1.  Treatment options include medications such as nasal saline, topical corticosteroid and antihistamine sprays, and oral medications such as antihistamines or decongestants. Non-surgical treatments can include external nasal dilators for selected patients. If these are not successful by themselves,  surgery can improve the nasal airway either alone or in combination with these other options.        Combination Procedures:  Combination of surgical procedures and other treatments may be recommended, particularly if patients have more than one area of narrowing or persistent positional disease.  The success rate of combination surgery ranges from 66-80%2,3.    References  Guido DIANA. The Role of the Nose in Snoring and Obstructive Sleep Apnoea: An Update.  Eur Arch Otorhinolaryngol. 2011; 268: 1365-73.   Chetna SM; Harshad JA; Ursula JR; Pallanch JF; Leia MB; You SG; Hunter STEVENS. Surgical modifications of the upper airway for obstructive sleep apnea in adults: a systematic review and meta-analysis. SLEEP 2010;33(10):8218-1852. Christ VILLANUEVA. Hypopharyngeal surgery in obstructive sleep apnea: an evidence-based medicine review.  Arch Otolaryngol Head Neck Surg. 2006 Feb;132(2):206-13.  Satish YH1, Trev Y, Vinod BEENA. The efficacy of anatomically based multilevel surgery for obstructive sleep apnea. Otolaryngol Head Neck Surg. 2003 Oct;129(4):327-35.  Christ VILLANUEVA, Goldberg A. Hypopharyngeal Surgery in Obstructive Sleep Apnea: An Evidence-Based Medicine Review. Arch Otolaryngol Head Neck Surg. 2006 Feb;132(2):206-13.  Azalia BURGER et al. Upper-Airway Stimulation for Obstructive Sleep Apnea.  N Engl J Med. 2014 Jan 9;370(2):139-49.  Laura Y et al. Increased Incidence of Cardiovascular Disease in Middle-aged Men with Obstructive Sleep Apnea. Am J Respir Crit Care Med; 2002 166: 159-165  Corado EM et al. Studying Life Effects and Effectiveness of Palatopharyngoplasty (SLEEP) study: Subjective Outcomes of Isolated Uvulopalatopharyngoplasty. Otolaryngol Head Neck Surg. 2011; 144: 623-631.        WHAT IF I ONLY HAVE SNORING?    Mandibular advancement devices, lateral sleep positioning, long-term weight loss and treatment of nasal allergies have been shown to improve snoring.  Exercising tongue muscles with a game  (https://Glaxstar.QWiPS.Honesty Online/us/wily/soundly-reduce-snoring/qf2855675740) or stimulating the tongue during the day with a device (https://doi.org/10.3390/xvw82163935) have improved snoring in some individuals.  https://www.NoiseToys.[employer] Online/  https://www.sleepfoundation.org/best-anti-snoring-mouthpieces-and-mouthguards    Remember to Drive Safe... Drive Alive     Sleep health profoundly affects your health, mood, and your safety.  Thirty three percent of the population (one in three of us) is not getting enough sleep and many have a sleep disorder. Not getting enough sleep or having an untreated / undertreated sleep condition may make us sleepy without even knowing it. In fact, our driving could be dramatically impaired due to our sleep health. As your provider, here are some things I would like you to know about driving:     Here are some warning signs for impairment and dangerous drowsy driving:              -Having been awake more than 16 hours               -Looking tired               -Eyelid drooping              -Head nodding (it could be too late at this point)              -Driving for more than 30 minutes     Some things you could do to make the driving safer if you are experiencing some drowsiness:              -Stop driving and rest              -Call for transportation              -Make sure your sleep disorder is adequately treated     Some things that have been shown NOT to work when experiencing drowsiness while driving:              -Turning on the radio              -Opening windows              -Eating any  distracting  /  entertaining  foods (e.g., sunflower seeds, candy, or any other)              -Talking on the phone      Your decision may not only impact your life, but also the life of others. Please, remember to drive safe for yourself and all of us.           Your Body mass index is 24.72 kg/m .  Weight management is a personal decision.  If you are interested in exploring weight loss strategies,  the following discussion covers the approaches that may be successful. Body mass index (BMI) is one way to tell whether you are at a healthy weight, overweight, or obese. It measures your weight in relation to your height.  A BMI of 18.5 to 24.9 is in the healthy range. A person with a BMI of 25 to 29.9 is considered overweight, and someone with a BMI of 30 or greater is considered obese. More than two-thirds of American adults are considered overweight or obese.  Being overweight or obese increases the risk for further weight gain. Excess weight may lead to heart disease and diabetes.  Creating and following plans for healthy eating and physical activity may help you improve your health.  Weight control is part of healthy lifestyle and includes exercise, emotional health, and healthy eating habits. Careful eating habits lifelong are the mainstay of weight control. Though there are significant health benefits from weight loss, long-term weight loss with diet alone may be very difficult to achieve- studies show long-term success with dietary management in less than 10% of people. Attaining a healthy weight may be especially difficult to achieve in those with severe obesity. In some cases, medications, devices and surgical management might be considered.  What can you do?  If you are overweight or obese and are interested in methods for weight loss, you should discuss this with your provider.   Consider reducing daily calorie intake by 500 calories.   Keep a food journal.   Avoiding skipping meals, consider cutting portions instead.    Diet combined with exercise helps maintain muscle while optimizing fat loss. Strength training is particularly important for building and maintaining muscle mass. Exercise helps reduce stress, increase energy, and improves fitness. Increasing exercise without diet control, however, may not burn enough calories to loose weight.     Start walking three days a week 10-20 minutes at a  time  Work towards walking thirty minutes five days a week   Eventually, increase the speed of your walking for 1-2 minutes at time    In addition, we recommend that you review healthy lifestyles and methods for weight loss available through the National Institutes of Health patient information sites:  http://win.niddk.nih.gov/publications/index.htm    And look into health and wellness programs that may be available through your health insurance provider, employer, local community center, or [employer].

## 2024-06-05 DIAGNOSIS — N76.0 BACTERIAL VAGINOSIS: ICD-10-CM

## 2024-06-05 DIAGNOSIS — B96.89 BACTERIAL VAGINOSIS: ICD-10-CM

## 2024-06-05 RX ORDER — METRONIDAZOLE 500 MG/1
500 TABLET ORAL 2 TIMES DAILY
Qty: 14 TABLET | Refills: 3 | Status: SHIPPED | OUTPATIENT
Start: 2024-06-05

## 2024-06-17 ENCOUNTER — PRE VISIT (OUTPATIENT)
Dept: ENDOCRINOLOGY | Facility: CLINIC | Age: 69
End: 2024-06-17

## 2024-06-26 DIAGNOSIS — E53.8 VITAMIN B12 DEFICIENCY (NON ANEMIC): ICD-10-CM

## 2024-06-26 DIAGNOSIS — E11.9 TYPE 2 DIABETES MELLITUS WITHOUT COMPLICATION, WITHOUT LONG-TERM CURRENT USE OF INSULIN (H): ICD-10-CM

## 2024-06-27 RX ORDER — LANOLIN ALCOHOL/MO/W.PET/CERES
CREAM (GRAM) TOPICAL
Qty: 90 TABLET | Refills: 0 | Status: SHIPPED | OUTPATIENT
Start: 2024-06-27

## 2024-06-27 RX ORDER — FLASH GLUCOSE SENSOR
KIT MISCELLANEOUS
Qty: 2 EACH | Refills: 2 | Status: SHIPPED | OUTPATIENT
Start: 2024-06-27 | End: 2024-08-26

## 2024-06-27 NOTE — TELEPHONE ENCOUNTER
Medication requested: cyanocobalamin (VITAMIN B-12) 1000 MCG tablet   Last office visit: 5/2/24  [facility] appointments: none  Medication last refilled: 7/10/23; 90 + 3 refill  Last qualifying labs:   Component      Latest Ref Rng 9/5/2023  4:11 PM   Vitamin B12      232 - 1,245 pg/mL 856      Medication requested: Continuous Blood Gluc Sensor (FREESTYLE JORDIN 14 DAY SENSOR) [facility] [place]   Medication last refilled: 8/31/23; 2 + 11 refills  Last qualifying labs:   Component      Latest Ref Rng 5/2/2024  3:48 PM   Hemoglobin A1C      0.0 - 5.6 % 5.8 (H)      Prescription approved per [facility] Refill Protocol.    Navin PHILPI, RN  06/27/24 9:04 AM

## 2024-07-02 NOTE — PROGRESS NOTES
"[facility]  [address]  [facility] [address]  Phone: [phone]  Fax: [phone]    Patient:  Karine Moss, Date of birth 1955  Date of Visit:  07/02/2024  Referring Provider Referred Self      Assessment & Plan    (S30.175K) Abrasion of labia, initial encounter  (primary encounter diagnosis)  Comment: 1.0 cm x 0.5cm discreet bordered ulceration inferior to posterior fourchette, right side.  Some underlying tender induration, no fluctuance. Does not appear to be lichen sclerosus or STD more consistent with uncomplicated abrasion  Plan: conservative management with 1% hydrocortisone ointment twice daily x 5-7 d. Otherwise keep covered with vaseline or aquaphor to protect area.  Notify me if worsening despite treatment. Could refer to ob/gyn for biopsy      (E11.9) Type 2 diabetes mellitus without complication, without long-term current use of insulin (H)  Comment: no current use of insulin, FBS avg 130-150  Plan: recheck A1c in 3-6 mos    Anay Martinez MD         Karine Moss is a 69 year old female with hx of type II DM, peripheral neuropathy, narcolepsy, hyperlipidemia, sleep apnea, depression, irritable bowel, s/p gastric bypass surgery, osteopenia, PTSD,CKD who presents with her daughter to discuss-       Vulvar itching  One week hx of itching externally  Pt reports it \"looks like a boil\", painful when touched  No fever or chills, no pain with sitting but painful to wipe  Little bit of blood on toilet paper  Not painful to urinate  No concern for STD, no partner    Diet controlled DM  Last seen in clinic 5/2/24  Off all medications. Was having hypoglycemic episodes with insulin  Hypoglycemia thought secondary to dumping syndrome, hx of gastric bypass 2013.   Bariatric team consider starting a glp-1 or acarbose for post prandial blood sugar management.   Jardiance initiated 4/2022, stopped 3/2024, held when she was having " hypoglycemic episodes,  Jardiance then restarted on 5/2/24  130-150s FBS, no current hypoglycemia       Stage 3a chronic kidney disease (H)  Referred Karine to nephrology clinic for evaluation of CKD  GFR fell from 55 (11/2023) to 39 (3/19/24)  Jardiance held when Karine was experiencing hypoglycemia but resumed 5/2/24  Has follow up with nephrology team in October 2024.       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)    Hyperlipidemia LDL goal <100    Obstructive sleep apnea    Major depression in partial remission (H24)    Low back pain    DJD (degenerative joint disease) of knee    IBS (irritable bowel syndrome)    Heart murmur    Hypertension goal BP (blood pressure) < 140/90    Type 2 diabetes mellitus without complication, without long-term current use of insulin (H)    Osteopenia of hip    PTSD (post-traumatic stress disorder)    Diabetic peripheral neuropathy (H)    Bilateral sciatica    Gastrointestinal hemorrhage associated with gastric ulcer    Chronic kidney disease, stage 3 (H)    Chronic diastolic congestive heart failure (H)    Osteopenia of spine    Chronic left shoulder pain    Dumping syndrome    History of Rai-en-Y gastric bypass    Hypoglycemia    Insomnia    Hx laparoscopic cholecystectomy    Major depressive disorder, recurrent episode, mild (H24)    Peripheral neuropathy    RLS (restless legs syndrome)       Current Outpatient Medications   Medication Sig Dispense Refill    amphetamine-dextroamphetamine (ADDERALL) 30 MG tablet Take 1 tablet (30 mg) by mouth every 24 hours 1.5 mg a total of 45 mg  Daily 30 tablet 0    atorvastatin (LIPITOR) 20 MG tablet Take 1 tablet (20 mg) by mouth daily 90 tablet 3    blood glucose (NO BRAND SPECIFIED) lancets standard Use to test blood sugar 1 times daily or as directed. 90 Lancet 3    blood glucose (NO BRAND SPECIFIED) test strip Use to test blood sugar 1 times daily or as directed. 100 strip 3     blood glucose (NO BRAND SPECIFIED) test strip Use to test blood sugar as needed with Freestyle Nirmal CGM. 100 strip 0    blood glucose (NO BRAND SPECIFIED) test strip Use to test blood sugar as directed with CGM sensor. 50 strip 1    blood glucose calibration (NO BRAND SPECIFIED) solution Use to calibrate blood glucose monitor as needed as directed. 1 each 3    blood glucose monitoring (NO BRAND SPECIFIED) meter device kit Use to test blood sugar 1 times daily or as directed. 1 kit 0    Calcium Acetate 667 MG TABS Take 1 tablet by mouth 2 times daily for 360 days 180 tablet 3    calcium carbonate (OS-KINJAL) 1500 (600 Ca) MG tablet Take 2 tablets (1,200 mg) by mouth daily      cevimeline (EVOXAC) 30 MG capsule take 1 cap  capsule 1    cholecalciferol (VITAMIN D3) 125 mcg (5000 units) capsule Take 125 mcg by mouth daily      COMPOUNDED NON-CONTROLLED SUBSTANCE (CMPD RX) - PHARMACY TO MIX COMPOUNDED MEDICATION Estradiol 0.0075% in HRT cream  Apply 2 grams,  intravaginally and small amount externally daily for one week then twice weekly for maintenance. 45 g 11    Continuous Blood Gluc  (FREESTYLE NIRMAL 14 DAY READER) EBEN Use to read blood sugars as per 's instructions. 1 each 0    Continuous Glucose Sensor (FREESTYLE NIRMAL 14 DAY SENSOR) MISC Change every 14 days. 2 each 2    cyanocobalamin (VITAMIN B-12) 1000 MCG tablet Take one every other day 90 tablet 0    DULoxetine (CYMBALTA) 30 MG capsule Take 1 capsule (30 mg) by mouth 2 times daily 180 capsule 1    DULoxetine (CYMBALTA) 60 MG capsule Take 1 capsule (60 mg) by mouth at bedtime Take in addition to current 30 mg evening dose for a total of 90 mg at bedtime. 90 capsule 1    empagliflozin (JARDIANCE) 10 MG TABS tablet Take one daily 90 tablet 3    finasteride (PROSCAR) 5 MG tablet Take 1/2 tablet daily 45 tablet 3    losartan (COZAAR) 50 MG tablet Take 1 tablet (50 mg) by mouth daily 90 tablet 1    metroNIDAZOLE (FLAGYL) 500 MG tablet  "Take 1 tablet (500 mg) by mouth 2 times daily 14 tablet 3    omeprazole (PRILOSEC) 40 MG DR capsule Take 40mg twice daily 180 capsule 3    pregabalin (LYRICA) 100 MG capsule Take 100 mg by mouth daily      tolterodine ER (DETROL LA) 4 MG 24 hr capsule Take 1 capsule (4 mg) by mouth daily 90 capsule 3    traZODone (DESYREL) 50 MG tablet Take 1 tablet by mouth every 24 hours      triamcinolone (KENALOG) 0.1 % paste Apply to tongue twice daily x 10 days 5 g 0       Allergies   Allergen Reactions    Pravastatin Other (See Comments)     woozy    Codeine Nausea and Vomiting    Nsaids GI Disturbance and Other (See Comments)     Pt had bariatric surgery, should not ever take oral NSAIDS due to risk of gastric ulcers.  Pt had bariatric surgery, should not ever take oral NSAIDS due to risk of gastric ulcers.        EXAM  /68 (BP Location: Left arm, Patient Position: Sitting, Cuff Size: Adult Regular)   Pulse 89   Temp (!) 96.6  F (35.9  C) (Skin)   Resp 14   Ht 1.626 m (5' 4.02\")   Wt 67.1 kg (148 lb)   SpO2 98%   BMI 25.39 kg/m    Gen: Alert, pleasant, NAD  Abdomen: Soft, non tender, normal bowel sounds  : External genitalia with discreet superficial ulceration at the inferior right inner labia, no surrounding redness, no drainage          "

## 2024-07-03 ENCOUNTER — TELEPHONE (OUTPATIENT)
Dept: FAMILY MEDICINE | Facility: CLINIC | Age: 69
End: 2024-07-03

## 2024-07-03 ENCOUNTER — OFFICE VISIT (OUTPATIENT)
Dept: FAMILY MEDICINE | Facility: CLINIC | Age: 69
End: 2024-07-03
Payer: COMMERCIAL

## 2024-07-03 VITALS
OXYGEN SATURATION: 98 % | DIASTOLIC BLOOD PRESSURE: 68 MMHG | SYSTOLIC BLOOD PRESSURE: 109 MMHG | HEIGHT: 64 IN | BODY MASS INDEX: 25.27 KG/M2 | RESPIRATION RATE: 14 BRPM | TEMPERATURE: 96.6 F | HEART RATE: 89 BPM | WEIGHT: 148 LBS

## 2024-07-03 DIAGNOSIS — E11.9 TYPE 2 DIABETES MELLITUS WITHOUT COMPLICATION, WITHOUT LONG-TERM CURRENT USE OF INSULIN (H): ICD-10-CM

## 2024-07-03 DIAGNOSIS — S30.814A ABRASION OF LABIA, INITIAL ENCOUNTER: Primary | ICD-10-CM

## 2024-07-03 ASSESSMENT — PATIENT HEALTH QUESTIONNAIRE - PHQ9
SUM OF ALL RESPONSES TO PHQ QUESTIONS 1-9: 9
5. POOR APPETITE OR OVEREATING: MORE THAN HALF THE DAYS

## 2024-07-03 ASSESSMENT — ANXIETY QUESTIONNAIRES
2. NOT BEING ABLE TO STOP OR CONTROL WORRYING: NOT AT ALL
5. BEING SO RESTLESS THAT IT IS HARD TO SIT STILL: MORE THAN HALF THE DAYS
GAD7 TOTAL SCORE: 4
7. FEELING AFRAID AS IF SOMETHING AWFUL MIGHT HAPPEN: NOT AT ALL
6. BECOMING EASILY ANNOYED OR IRRITABLE: NOT AT ALL
1. FEELING NERVOUS, ANXIOUS, OR ON EDGE: NOT AT ALL
IF YOU CHECKED OFF ANY PROBLEMS ON THIS QUESTIONNAIRE, HOW DIFFICULT HAVE THESE PROBLEMS MADE IT FOR YOU TO DO YOUR WORK, TAKE CARE OF THINGS AT HOME, OR GET ALONG WITH OTHER PEOPLE: SOMEWHAT DIFFICULT
3. WORRYING TOO MUCH ABOUT DIFFERENT THINGS: NOT AT ALL
GAD7 TOTAL SCORE: 4

## 2024-07-03 NOTE — TELEPHONE ENCOUNTER
Medication questions (route to triage team)    Who is calling - Leila   Full medication name and dosage - Hydrocortisone ointment   Question/clarification needed - Leila is wondering when this will be sent to pharmacy     Ok to leave a message on VM? Yes

## 2024-07-03 NOTE — PATIENT INSTRUCTIONS
1% hydrocortisone OINTMENT   Apply twice daily, once at bedtime    Keep covered with Vaseline or Aquaphor ointment to keep protected    If not improving over the next 10-14 days, notify me

## 2024-07-03 NOTE — NURSING NOTE
"69 year old  Chief Complaint   Patient presents with    Vaginal Problem     Suspects herpes outbreak, stinging and burning while wiping. Noticed two days ago.        Blood pressure 109/68, pulse 89, temperature (!) 96.6  F (35.9  C), temperature source Skin, resp. rate 14, height 1.626 m (5' 4.02\"), weight 67.1 kg (148 lb), SpO2 98%, not currently breastfeeding. Body mass index is 25.39 kg/m .  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)    Hyperlipidemia LDL goal <100    Obstructive sleep apnea    Major depression in partial remission (H24)    Low back pain    DJD (degenerative joint disease) of knee    IBS (irritable bowel syndrome)    Heart murmur    Hypertension goal BP (blood pressure) < 140/90    Type 2 diabetes mellitus without complication, without long-term current use of insulin (H)    Osteopenia of hip    PTSD (post-traumatic stress disorder)    Diabetic peripheral neuropathy (H)    Bilateral sciatica    Gastrointestinal hemorrhage associated with gastric ulcer    Chronic kidney disease, stage 3 (H)    Chronic diastolic congestive heart failure (H)    Osteopenia of spine    Chronic left shoulder pain    Dumping syndrome    History of Rai-en-Y gastric bypass    Hypoglycemia    Insomnia    Hx laparoscopic cholecystectomy    Major depressive disorder, recurrent episode, mild (H24)    Peripheral neuropathy    RLS (restless legs syndrome)       Wt Readings from Last 2 Encounters:   07/03/24 67.1 kg (148 lb)   06/04/24 65.3 kg (144 lb)     BP Readings from Last 3 Encounters:   07/03/24 109/68   05/10/24 130/86   05/02/24 116/70         Current Outpatient Medications   Medication Sig Dispense Refill    amphetamine-dextroamphetamine (ADDERALL) 30 MG tablet Take 1 tablet (30 mg) by mouth every 24 hours 1.5 mg a total of 45 mg  Daily 30 tablet 0    atorvastatin (LIPITOR) 20 MG tablet Take 1 tablet (20 mg) by mouth daily 90 tablet 3    blood glucose (NO " BRAND SPECIFIED) lancets standard Use to test blood sugar 1 times daily or as directed. 90 Lancet 3    blood glucose (NO BRAND SPECIFIED) test strip Use to test blood sugar 1 times daily or as directed. 100 strip 3    blood glucose (NO BRAND SPECIFIED) test strip Use to test blood sugar as needed with Freestyle Nirmal CGM. 100 strip 0    blood glucose (NO BRAND SPECIFIED) test strip Use to test blood sugar as directed with CGM sensor. 50 strip 1    blood glucose calibration (NO BRAND SPECIFIED) solution Use to calibrate blood glucose monitor as needed as directed. 1 each 3    blood glucose monitoring (NO BRAND SPECIFIED) meter device kit Use to test blood sugar 1 times daily or as directed. 1 kit 0    Calcium Acetate 667 MG TABS Take 1 tablet by mouth 2 times daily for 360 days 180 tablet 3    calcium carbonate (OS-KNIJAL) 1500 (600 Ca) MG tablet Take 2 tablets (1,200 mg) by mouth daily      cevimeline (EVOXAC) 30 MG capsule take 1 cap  capsule 1    cholecalciferol (VITAMIN D3) 125 mcg (5000 units) capsule Take 125 mcg by mouth daily      COMPOUNDED NON-CONTROLLED SUBSTANCE (CMPD RX) - PHARMACY TO MIX COMPOUNDED MEDICATION Estradiol 0.0075% in HRT cream  Apply 2 grams,  intravaginally and small amount externally daily for one week then twice weekly for maintenance. 45 g 11    Continuous Blood Gluc  (FREESTYLE NIRMAL 14 DAY READER) EBEN Use to read blood sugars as per 's instructions. 1 each 0    Continuous Glucose Sensor (FREESTYLE NIRMAL 14 DAY SENSOR) MISC Change every 14 days. 2 each 2    cyanocobalamin (VITAMIN B-12) 1000 MCG tablet Take one every other day 90 tablet 0    DULoxetine (CYMBALTA) 30 MG capsule Take 1 capsule (30 mg) by mouth 2 times daily 180 capsule 1    DULoxetine (CYMBALTA) 60 MG capsule Take 1 capsule (60 mg) by mouth at bedtime Take in addition to current 30 mg evening dose for a total of 90 mg at bedtime. 90 capsule 1    empagliflozin (JARDIANCE) 10 MG TABS tablet Take  one daily 90 tablet 3    finasteride (PROSCAR) 5 MG tablet Take 1/2 tablet daily 45 tablet 3    losartan (COZAAR) 50 MG tablet Take 1 tablet (50 mg) by mouth daily 90 tablet 1    metroNIDAZOLE (FLAGYL) 500 MG tablet Take 1 tablet (500 mg) by mouth 2 times daily 14 tablet 3    omeprazole (PRILOSEC) 40 MG DR capsule Take 40mg twice daily 180 capsule 3    pregabalin (LYRICA) 100 MG capsule Take 100 mg by mouth daily      tolterodine ER (DETROL LA) 4 MG 24 hr capsule Take 1 capsule (4 mg) by mouth daily 90 capsule 3    traZODone (DESYREL) 50 MG tablet Take 1 tablet by mouth every 24 hours      triamcinolone (KENALOG) 0.1 % paste Apply to tongue twice daily x 10 days 5 g 0     No current facility-administered medications for this visit.       Social History     Tobacco Use    Smoking status: Never     Passive exposure: Never    Smokeless tobacco: Never   Vaping Use    Vaping status: Never Used   Substance Use Topics    Alcohol use: Not Currently     Comment: rare    Drug use: No       Health Maintenance Due   Topic Date Due    HF ACTION PLAN  Never done    ADVANCE CARE PLANNING  Never done    HEPATITIS C SCREENING  Never done    RSV VACCINE (Pregnancy & 60+) (1 - 1-dose 60+ series) Never done    MEDICARE ANNUAL WELLNESS VISIT  04/12/2020    EYE EXAM  03/10/2021    COLORECTAL CANCER SCREENING  11/17/2021    Pneumococcal Vaccine: 65+ Years (2 of 2 - PCV) 05/27/2022    FALL RISK ASSESSMENT  02/17/2023    ALT  12/09/2023    COVID-19 Vaccine (6 - 2023-24 season) 02/07/2024    PHQ-9  07/09/2024       Lab Results   Component Value Date    PAP NIL 04/12/2019         July 3, 2024 11:47 AM

## 2024-07-03 NOTE — TELEPHONE ENCOUNTER
Spoke with Leila on phone and confirmed that 1 % hydrocortisone is available over the counter. Reinforced Dr. Martinez's instructions for Karine's care, and asked her to contact us if she does not improve in 10-14 days. Leila confirms understanding.    Navin PHILIP, RN  07/03/24 4:08 PM

## 2024-07-15 ENCOUNTER — TELEPHONE (OUTPATIENT)
Dept: SLEEP MEDICINE | Facility: CLINIC | Age: 69
End: 2024-07-15
Payer: COMMERCIAL

## 2024-07-15 NOTE — TELEPHONE ENCOUNTER
Received a order in Central Scheduling Workqueue from provider Deangelo Howard for patient to have a Comprehensive Sleep Study test marked with Actigraphy. Central scheduling department do not schedule this type of test. Please review and help patient get schedule for the procedure.      Jossy Dahl   [facility]  Central Scheduler

## 2024-07-17 ENCOUNTER — TELEPHONE (OUTPATIENT)
Dept: SLEEP MEDICINE | Facility: CLINIC | Age: 69
End: 2024-07-17
Payer: COMMERCIAL

## 2024-07-26 DIAGNOSIS — E11.42 DIABETIC POLYNEUROPATHY ASSOCIATED WITH TYPE 2 DIABETES MELLITUS (H): ICD-10-CM

## 2024-07-26 RX ORDER — DULOXETIN HYDROCHLORIDE 60 MG/1
60 CAPSULE, DELAYED RELEASE ORAL AT BEDTIME
Qty: 90 CAPSULE | Refills: 1 | Status: SHIPPED | OUTPATIENT
Start: 2024-07-26

## 2024-07-26 RX ORDER — DULOXETIN HYDROCHLORIDE 30 MG/1
30 CAPSULE, DELAYED RELEASE ORAL 2 TIMES DAILY
Qty: 180 CAPSULE | Refills: 1 | Status: SHIPPED | OUTPATIENT
Start: 2024-07-26

## 2024-07-26 NOTE — TELEPHONE ENCOUNTER
Medication requested: DULoxetine (CYMBALTA) 30 MG capsule   Last office visit: 7/3/2024  [facility] appointments: none  Medication last refilled: 1/29/2024; 180 caps + 1 refill  Last qualifying labs:     Medication requested: DULoxetine (CYMBALTA) 60 MG capsule   Medication last refilled: 1/29/2024; 90 caps + 1 refill  Last qualifying labs:     BP Readings from Last 3 Encounters:   07/03/24 109/68   05/10/24 130/86   05/02/24 116/70     Prescription approved per [facility] Refill Protocol.    CEDRICK Juares, RN  07/26/24, 3:00 PM

## 2024-08-05 ENCOUNTER — VIRTUAL VISIT (OUTPATIENT)
Dept: GASTROENTEROLOGY | Facility: CLINIC | Age: 69
End: 2024-08-05
Payer: COMMERCIAL

## 2024-08-05 DIAGNOSIS — R11.2 NAUSEA AND VOMITING, UNSPECIFIED VOMITING TYPE: ICD-10-CM

## 2024-08-05 DIAGNOSIS — R19.5 CHANGE IN STOOL: ICD-10-CM

## 2024-08-05 DIAGNOSIS — K92.1 HEMATOCHEZIA: ICD-10-CM

## 2024-08-05 DIAGNOSIS — R10.11 ABDOMINAL PAIN, RIGHT UPPER QUADRANT: ICD-10-CM

## 2024-08-05 DIAGNOSIS — K27.9 PEPTIC ULCER DISEASE: Primary | ICD-10-CM

## 2024-08-05 PROCEDURE — 99417 PROLNG OP E/M EACH 15 MIN: CPT | Performed by: PHYSICIAN ASSISTANT

## 2024-08-05 PROCEDURE — 99215 OFFICE O/P EST HI 40 MIN: CPT | Mod: 95 | Performed by: PHYSICIAN ASSISTANT

## 2024-08-05 NOTE — NURSING NOTE
Current patient location: [address] [place], [place] [address] [facility] [address]    Is the patient currently in the state of [place]? YES    Visit mode:VIDEO    If the visit is dropped, the patient can be reconnected by: VIDEO VISIT: Send to e-mail at: [email or web address]    Will anyone else be joining the visit? Yes, daughter will be with patient  (If patient encounters technical issues they should call [phone]    How would you like to obtain your AVS? MyChart    Are changes needed to the allergy or medication list? No    Are refills needed on medications prescribed by this physician? NO    Rooming Documentation:  Questionnaire(s) not pre-assigned      Reason for visit: RECHECK    Allyssa ALASF

## 2024-08-05 NOTE — PROGRESS NOTES
Virtual Visit Details    Type of service:  Video Visit     Originating Location (pt. Location): Home    Distant Location (provider location):  On-site  Platform used for Video Visit: Bernadette    Joined the call at 8/5/2024, 11:00:36 am.  Left the call at 8/5/2024, 11:42:55 am.  You were on the call for 42 minutes 18 seconds .    GI CLINIC VISIT    ASSESSMENT/PLAN:  69 year old female with PMH of gastric bypass, T2DM, HTN, hyperlipidemia, gastric ulcer presenting to GI clinic for follow up on gastric ulceration.     #Gastric Ulcer  Reportedly hospitalized for GI bleed and found to have a large anastomotic gastric ulcer suspected to be due to NSAID in 11/2021, follow up EGD to check for healing continued to show a 21-25 mm gastric ulcer in the antrum. Follow up EGD 1/2023 with clean based gastric ulcer, with diameter 5 mm. She was placed on omeprazole 40 mg twice daily indefinitely and asked to avoid NSAIDs. In general had been doing well but now having intermittent episode of sharp RUQ abd pain (occurring once every few months).  While it was not associated with other GI sx, the concern would be from gastric ulceration, though this is thought less likely given her continued use of PPI. Hgb has been stable without sx of melena. Will plan to follow this with an upper endoscopy. They would prefer this evaluation method. Order RUQ US +hepatic function panel to eval bilary ductal system     Plan  -Order EGD   -Cont PPI  -RUQ US with hepatic function ordered   -ER precautions discussed previously     #nausea/vomiting/abd discomfort  Episodes of severe nausea/vomiting and abd discomfort spanning a few days, occurring more frequently. Sx could be due to gastroparesis though testing with dedicated emptying study would be difficult to interpret in setting of her prior RYGB. Additional to include atypical bilary presentation (thought less likely given s/p CCY status). Obtain RUQ US + hepatic function panel. Ddx to include gastric  "outlet obstruction, transient upper GI infection, DGBI vs other    #loose stools  #scant hematochezia seen on TP   This could be due to microscopic colitis with ddx to include other inflammation, infection, malabsorption vs other. She is due for a colonoscopy with last procedure done 2020 and 1 year recall given prep quality. She had a history of tubular adenoma though I am not able to find out if this polyp was advanced. Her 2008 Cscope indication was for surveillance for \"high risk colon CA surveillance.\"    -order colonoscopy with random colon biopsies   -enteric panel and c.diff ordered  -crp and fecal calpro ordered     RTC - 1-2 weeks after upper endoscopy     Thank you for this consultation. It was a pleasure to participate in the care of this patient; please contact us with any further questions.    Amie Grace PA-C    Follow up: As planned above. Today, I personally spent 42 minutes in direct face to face time with the patient, of which greater than 50% of the time was spent in patient education and counseling as described above. Approximately 20 minutes were spent on indirect care associated with the patient's consultation including but not limited to review of: patient medical records to date, clinic visits, hospital records, lab results, imaging studies, procedural documentation, and coordinating care with other providers. The findings from this review are summarized in the above note. All of the above accounted for a cumulative time of 62 minutes and was performed on the date of service.         HPI: 69 year old female with PMH of gastric bypass, T2DM, HTN, hyperlipidemia, gastric ulcer following with the Gen GI team for history of gastric ulcer. She last saw our team in 2023, Dr Mason and Dorian. Today is my first appt with the patient.     Dr Mason 2023 note   67 year old history of gastric bypass, T2DM, HTN, hyperlipidemia, gastric ulcer who was reportedly hospitalized for GI bleed and found to have " a large anastomotic gastric ulcer suspected to be due to NSAID in 11/2021, follow up EGD to check for healing continued to show a 21-25 mm gastric ulcer in the antrum and patient was supposed to get another follow up EGD, unclear why that was not done.   Patient then recently followed up with her PCP who then requested she followed up with GI and gets a follow up EGD.     Today 8/5/24  Her last EGD was in 2023 with finding of clean based ulcer, measuring 5 mm in diameter. She was to keep taking omeprazole 40 mg twice daily indefinitely, which she currently takes.   Her largest concern today is wanting to know what sx are of GI ulceration. She started with intermittent but sharp RUQ abd pain in the past year, typically lasts 15 min to 60 min in duration. Denies associated sx of nausea/vomiting.   -she is not on NSAIDs  -meds are sorted by dgt so she should be on omeprazole 40 mg BID   -she's not passing dark stools but is now having 3-4 episodes of scant hematochezia seen on the TP. It does not drip into the water, seen on surface of the stool, or mixed within. She denies passage of blood clots.   BM - she does not pass a BM daily, it's typically every other day with fair evacuation. Denies straining.   -usually one BM, soft serve consistency stool  -does have ongoing fecal incontinence for years, seen with colorectal with an appt 3/2023 - reports offered stimulator  -last colonoscopy in the system is from 2020 under fair prep to cecal intubation. Colon was unremarkable and plan was for a recall in 1 year given history of tubular adenomas. It does not appear this was followed up.   -not on NSAIDs but does take daily PPI   -gained 20# (to 146#) over past 5 months. Appetite is stable.   Not weekly but reporting 1-2x a month with nausea/vomiting associated with abd pain/discomfort. This is becoming more frequent. Immediately as she eats, get exteremly nausea, lasting 2-3d. Frequency increasing in Spring 2024 but sx  onset back at least a year, if not longer. It does not appear this been evaluated yet.   Following with endo for h/o RYGB done 11 years ago.   Tolerated MAC from 2023 without issues. Previously had some trouble with post op nausea during prior scope.   ROS: 10pt ROS performed and otherwise negative.    PAST MEDICAL HISTORY:  Past Medical History:   Diagnosis Date    Arthritis     Basal cell carcinoma     Chest pain     seeing Cardiology    Depression 1991    after 's death    Diabetes mellitus (H)     Diabetic renal disease (H)     microalbuminuria    DJD (degenerative joint disease) of knee     H/O vitamin D deficiency     Hypertension     IBS (irritable bowel syndrome)     diarrhea     Keratoconus     Low back pain     Narcolepsy 2007    Dx'd by Sleep specialist 347.00, pt discontinued Adderal mid Nov. 13 due to tacycardia concerns    Obesity     Obstructive sleep apnea     327.23, 3 sleep studies,Dr. [facility]    Pancreatitis     related to Victoza, also on Xyrem    Panic     RLS (restless legs syndrome)     Sinus tachycardia        PREVIOUS ABDOMINAL/GYNECOLOGIC SURGERIES:  Past Surgical History:   Procedure Laterality Date    COLONOSCOPY  10/01/2020    poor quality prep    ear surgery  2002 and 01/2004    ESOPHAGOSCOPY, GASTROSCOPY, DUODENOSCOPY (EGD), COMBINED N/A 11/16/2021    Procedure: ESOPHAGOGASTRODUODENOSCOPY (EGD);  Surgeon: Jayla Calvo MD;  Location: [facility]    ESOPHAGOSCOPY, GASTROSCOPY, DUODENOSCOPY (EGD), COMBINED N/A 1/9/2023    Procedure: ESOPHAGOGASTRODUODENOSCOPY, WITH BIOPSY;  Surgeon: Brandon Witt MD;  Location:  GI    GASTRIC BYPASS  2013    laparoscopic jimmy en y c ometopexy    HEMORRHOIDECTOMY  09/14/2000    LAPAROSCOPIC CHOLECYSTECTOMY  2015    LASIK BILATERAL      UVULOPALATOPHARYNGOPLASTY      UVVP           PERTINENT MEDICATIONS:  Current Outpatient Medications   Medication Sig Dispense Refill    amphetamine-dextroamphetamine (ADDERALL) 30 MG tablet Take 1  tablet (30 mg) by mouth every 24 hours 1.5 mg a total of 45 mg  Daily 30 tablet 0    atorvastatin (LIPITOR) 20 MG tablet Take 1 tablet (20 mg) by mouth daily 90 tablet 3    blood glucose (NO BRAND SPECIFIED) lancets standard Use to test blood sugar 1 times daily or as directed. 90 Lancet 3    blood glucose (NO BRAND SPECIFIED) test strip Use to test blood sugar 1 times daily or as directed. 100 strip 3    blood glucose (NO BRAND SPECIFIED) test strip Use to test blood sugar as needed with Freestyle Nirmal CGM. 100 strip 0    blood glucose (NO BRAND SPECIFIED) test strip Use to test blood sugar as directed with CGM sensor. 50 strip 1    blood glucose calibration (NO BRAND SPECIFIED) solution Use to calibrate blood glucose monitor as needed as directed. 1 each 3    blood glucose monitoring (NO BRAND SPECIFIED) meter device kit Use to test blood sugar 1 times daily or as directed. 1 kit 0    Calcium Acetate 667 MG TABS Take 1 tablet by mouth 2 times daily for 360 days 180 tablet 3    calcium carbonate (OS-KINJAL) 1500 (600 Ca) MG tablet Take 2 tablets (1,200 mg) by mouth daily      cevimeline (EVOXAC) 30 MG capsule take 1 cap  capsule 1    cholecalciferol (VITAMIN D3) 125 mcg (5000 units) capsule Take 125 mcg by mouth daily      COMPOUNDED NON-CONTROLLED SUBSTANCE (CMPD RX) - PHARMACY TO MIX COMPOUNDED MEDICATION Estradiol 0.0075% in HRT cream  Apply 2 grams,  intravaginally and small amount externally daily for one week then twice weekly for maintenance. 45 g 11    Continuous Blood Gluc  (FREESTYLE NIRMAL 14 DAY READER) EBEN Use to read blood sugars as per 's instructions. 1 each 0    Continuous Glucose Sensor (FREESTYLE NIRMAL 14 DAY SENSOR) MISC Change every 14 days. 2 each 2    cyanocobalamin (VITAMIN B-12) 1000 MCG tablet Take one every other day 90 tablet 0    DULoxetine (CYMBALTA) 30 MG capsule Take 1 capsule (30 mg) by mouth 2 times daily 180 capsule 1    DULoxetine (CYMBALTA) 60 MG capsule  Take 1 capsule (60 mg) by mouth at bedtime Take in addition to current 30 mg evening dose for a total of 90 mg at bedtime. 90 capsule 1    empagliflozin (JARDIANCE) 10 MG TABS tablet Take one daily 90 tablet 3    finasteride (PROSCAR) 5 MG tablet Take 1/2 tablet daily 45 tablet 3    losartan (COZAAR) 50 MG tablet Take 1 tablet (50 mg) by mouth daily 90 tablet 1    metroNIDAZOLE (FLAGYL) 500 MG tablet Take 1 tablet (500 mg) by mouth 2 times daily 14 tablet 3    omeprazole (PRILOSEC) 40 MG DR capsule Take 40mg twice daily 180 capsule 3    pregabalin (LYRICA) 100 MG capsule Take 100 mg by mouth daily      tolterodine ER (DETROL LA) 4 MG 24 hr capsule Take 1 capsule (4 mg) by mouth daily 90 capsule 3    traZODone (DESYREL) 50 MG tablet Take 1 tablet by mouth every 24 hours      triamcinolone (KENALOG) 0.1 % paste Apply to tongue twice daily x 10 days 5 g 0     SOCIAL HISTORY:    Social History     Socioeconomic History    Marital status: Single     Spouse name: Not on file    Number of children: 1    Years of education: Not on file    Highest education level: Not on file   Occupational History     Employer: [employer]     Comment: on disability   Tobacco Use    Smoking status: Never     Passive exposure: Never    Smokeless tobacco: Never   Vaping Use    Vaping status: Never Used   Substance and Sexual Activity    Alcohol use: Not Currently     Comment: rare    Drug use: No    Sexual activity: Never   Other Topics Concern    Parent/sibling w/ CABG, MI or angioplasty before 65F 55M? No   Social History Narrative    Daughter- 42,  since 1991    Drives only short distances due to narcolepsy            --------------------------------------------------------------------------------    Surgical History  Return To Top     Status Surgery Time Frame Comment Record Date     Inactive  ear surgery  1/04 5/27/2008      Inactive  eye surgery  2002 5/27/2008      Inactive  Hemorrhoidectomy  9/14/00 5/27/2008           --------------------------------------------------------------------------------    Food Allergy  Return To Top     Allergen Reaction Comment Record Date     * No known food allergies      10/28/2008          --------------------------------------------------------------------------------    Drug Allergy  Return To Top     Allergen Reaction Comment Record Date     Codeine  nausea    6/21/2007          --------------------------------------------------------------------------------    Environment Allergy  Return To Top     Allergen Reaction Comment Record Date     * No known environmental allergies      10/28/2008          --------------------------------------------------------------------------------    Social History  Return To Top     Question Answer Comment Record Date     Marital status      5/27/2008      Advance Directive or Living Will  Form Given to Patient    1/18/2010      Emotional Abuse  Yes    1/18/2010      Exercise  No    1/18/2010      Caffeine  Yes    5/27/2008      Physical Abuse  No    1/18/2010      Sealtbelts  Yes    1/18/2010      Sexual Abuse  No    1/18/2010      Breast/Testicle Self Check  No    1/18/2010      Number of children  1    1/18/2010      Living arrangements  House    1/18/2010      Number of adults in household  2    1/18/2010      Education level  High School Graduate    1/18/2010      Employment  Currently employed    1/18/2010      Tobacco history  Has never smoked or chewed tobacco    5/27/2008      Alcohol history  Currently drinks alcohol    5/27/2008      Has the patient ever used illegal drugs?  Has never used illegal drugs    5/27/2008          --------------------------------------------------------------------------------    History - Overall Remark: 3/21/2012 Return To Top         --------------------------------------------------------------------------------    Medical History Return To Top     Status Diagnosis Time Frame Comment Record Date     Active  (250.00) - C - Diabetes mellitus, type II controlled      5/15/2012      Active (788.41) - C - Urinary frequency      4/17/2012      Active (250.02) - C - Diabetes mellitus, type II uncontrolled      3/6/2012      Active (278.00) - C - Obesity      2/14/2011      Active (250.00) - C - Diabetes mellitus, type II controlled      2/14/2011      Active (739.3) - C - Somatic dysfunction, lumbar region      8/16/2010      Active (739.5) - C - Somatic dysfunction, pelvic region      8/16/2010      Active (401.9) - C - Hypertension      2/8/2010      Active 268.9 UNSP VITAMIN D DEFICIENCY      1/18/2010      Active (695.3) - C - Rosacea      1/18/2010      Active 272.1 Hypertriglyceridemia      1/18/2010      Active 300.4 Depression with Anxiety (Dysthymic Disorder)      10/28/2008      Active 739.6 Somatic dysfunction, lower extremities      10/28/2008      Active 780.79 Fatigue      6/23/2008      Active 278.01 Obesity, morbid      6/19/2008      Active 347.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, 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      6/19/2008      Inactive  (462) - C - Pharyngitis, acute      1/15/2011      Inactive  250.02 Diabetes mellitus, type II uncontrolled      1/18/2010      Inactive  726.71 Tendinitis, achilles      10/28/2008      Inactive  (250.00) - C - Diabetes mellitus, type II controlled      10/28/2008      Inactive  (250.00) - C - Diabetes mellitus, type II controlled      6/23/2008      Inactive  V77.91 Screening, lipids      6/23/2008      Inactive  599.0 Urinary tract infection, unspec./pyuria (UTI)      6/23/2008      Inactive  V70.0 Routine Medical Exam      6/19/2008      Active Constipation      5/27/2008      Active Hemorrhoids      5/27/2008      Pancreatitis pancreatitis 2013 April     --------------------------------------------------------------------------------    M        --------------------------------------------------------------------------------    Family History  Return To Top     Status Relationship Disease Comment Record  Date     Alive Sister  *Denies any medical problems  3 sisters  2008      Alive Brother  *Denies any medical problems  2 brothers  2008         Father    Age of death 56  2010         Mother  Dementia  Age of death 82  2008          --------------------------------------------------------------------------------                         Social Determinants of Health     Financial Resource Strain: Not on file   Food Insecurity: Not on file   Transportation Needs: Not on file   Physical Activity: Not on file   Stress: Not on file   Social Connections: Unknown (1/3/2024)    [facility]    Social Connections     Frequency of Communication with Friends and Family: Not on file   Interpersonal Safety: Low Risk  (2024)    Interpersonal Safety     Do you feel physically and emotionally safe where you currently live?: Yes     Within the past 12 months, have you been hit, slapped, kicked or otherwise physically hurt by someone?: No     Within the past 12 months, have you been humiliated or emotionally abused in other ways by your partner or ex-partner?: No   Housing Stability: Not on file       FAMILY HISTORY:    Family History   Problem Relation Age of Onset    Memory loss Mother     Other - See Comments Mother         hair thinning    Diabetes Father     Chronic Obstructive Pulmonary Disease Sister     Anemia Sister         hx of ulcers    Other - See Comments Sister 65        MVA, followed by leg pain after a fall    Dementia Sister 68    Dementia Brother 70    Cancer Brother         lung    Cancer - colorectal Maternal Grandmother     Cancer Daughter     Obesity Daughter         s/p lap band     PHYSICAL EXAMINATION:  Vitals reviewed: There were no vitals taken for this visit.  Wt:   Wt Readings from Last 2 Encounters:   24 67.1 kg (148 lb)   24 65.3 kg (144 lb)      Video  physical exam  General: Patient appears well in no acute distress.   Skin: No visualized rash or lesions on visualized skin  Eyes: EOMI, no erythema, sclera icterus or discharge noted  Resp: Appears to be breathing comfortably without accessory muscle usage, speaking in full sentences, no cough  MSK: Appears to have normal range of motion based on visualized movements  Neurologic: No apparent tremors, facial movements symmetric  Psych: affect normal, alert and oriented    PERTINENT STUDIES - Reviewed in EMR     Lab Results   Component Value Date    WBC 7.8 05/10/2024    WBC 7.6 01/09/2024    WBC 7.6 06/12/2023    HGB 12.6 05/10/2024    HGB 12.1 01/09/2024    HGB 12.7 06/12/2023     05/10/2024     01/09/2024     06/12/2023    CHOL 133 01/09/2024    CHOL 126 03/29/2022    CHOL 113.0 03/08/2021    TRIG 129 01/09/2024    TRIG 123 03/29/2022    TRIG 157.0 (H) 03/08/2021    HDL 55 01/09/2024    HDL 52 03/29/2022    HDL 35.0 (L) 03/08/2021    ALT 15 12/09/2022    ALT 21 10/15/2022    ALT 11 08/25/2022    AST 23 12/09/2022    AST 16 10/15/2022    AST 21 08/25/2022     05/10/2024     05/02/2024     04/05/2024    BUN 34.6 (H) 05/10/2024    BUN 48.0 (H) 05/02/2024    BUN 31.6 (H) 04/05/2024    CO2 25 05/10/2024    CO2 24 05/02/2024    CO2 26 04/05/2024    TSH 1.01 06/12/2023    TSH 1.52 12/09/2022    TSH 1.69 10/22/2019    INR 1.10 11/15/2021        Liver Function Studies -   Recent Labs   Lab Test 05/10/24  1304 12/09/22  1203   PROTTOTAL  --  7.5   ALBUMIN 4.3 4.2   BILITOTAL  --  0.4   ALKPHOS  --  88   AST  --  23   ALT  --  15        PREVIOUS ENDOSCOPY

## 2024-08-05 NOTE — LETTER
8/5/2024      Karine Moss  [address]  [facility] [address]      Dear Colleague,    Thank you for referring your patient, Karine Moss, to the [facility] [place]. Please see a copy of my visit note below.    Virtual Visit Details    Type of service:  Video Visit     Originating Location (pt. Location): Home    Distant Location (provider location):  On-site  Platform used for Video Visit: Bernadette    Joined the call at 8/5/2024, 11:00:36 am.  Left the call at 8/5/2024, 11:42:55 am.  You were on the call for 42 minutes 18 seconds .    GI CLINIC VISIT    ASSESSMENT/PLAN:  69 year old female with PMH of gastric bypass, T2DM, HTN, hyperlipidemia, gastric ulcer presenting to GI clinic for follow up on gastric ulceration.     #Gastric Ulcer  Reportedly hospitalized for GI bleed and found to have a large anastomotic gastric ulcer suspected to be due to NSAID in 11/2021, follow up EGD to check for healing continued to show a 21-25 mm gastric ulcer in the antrum. Follow up EGD 1/2023 with clean based gastric ulcer, with diameter 5 mm. She was placed on omeprazole 40 mg twice daily indefinitely and asked to avoid NSAIDs. In general had been doing well but now having intermittent episode of sharp RUQ abd pain (occurring once every few months).  While it was not associated with other GI sx, the concern would be from gastric ulceration, though this is thought less likely given her continued use of PPI. Hgb has been stable without sx of melena. Will plan to follow this with an upper endoscopy. They would prefer this evaluation method. Order RUQ US +hepatic function panel to eval bilary ductal system     Plan  -Order EGD   -Cont PPI  -RUQ US with hepatic function ordered   -ER precautions discussed previously     #nausea/vomiting/abd discomfort  Episodes of severe nausea/vomiting and abd discomfort spanning a few days, occurring more frequently. Sx could be due to gastroparesis  "though testing with dedicated emptying study would be difficult to interpret in setting of her prior RYGB. Additional to include atypical bilary presentation (thought less likely given s/p CCY status). Obtain RUQ US + hepatic function panel. Ddx to include gastric outlet obstruction, transient upper GI infection, DGBI vs other    #loose stools  #scant hematochezia seen on TP   This could be due to microscopic colitis with ddx to include other inflammation, infection, malabsorption vs other. She is due for a colonoscopy with last procedure done 2020 and 1 year recall given prep quality. She had a history of tubular adenoma though I am not able to find out if this polyp was advanced. Her 2008 Cscope indication was for surveillance for \"high risk colon CA surveillance.\"    -order colonoscopy with random colon biopsies   -enteric panel and c.diff ordered  -crp and fecal calpro ordered     RTC - 1-2 weeks after upper endoscopy     Thank you for this consultation. It was a pleasure to participate in the care of this patient; please contact us with any further questions.    Amie Grace PA-C    Follow up: As planned above. Today, I personally spent 42 minutes in direct face to face time with the patient, of which greater than 50% of the time was spent in patient education and counseling as described above. Approximately 20 minutes were spent on indirect care associated with the patient's consultation including but not limited to review of: patient medical records to date, clinic visits, hospital records, lab results, imaging studies, procedural documentation, and coordinating care with other providers. The findings from this review are summarized in the above note. All of the above accounted for a cumulative time of 62 minutes and was performed on the date of service.         HPI: 69 year old female with PMH of gastric bypass, T2DM, HTN, hyperlipidemia, gastric ulcer following with the Gen GI team for history of gastric " ulcer. She last saw our team in 2023, Dr Mason and Dorian. Today is my first appt with the patient.     Dr Mason 2023 note   67 year old history of gastric bypass, T2DM, HTN, hyperlipidemia, gastric ulcer who was reportedly hospitalized for GI bleed and found to have a large anastomotic gastric ulcer suspected to be due to NSAID in 11/2021, follow up EGD to check for healing continued to show a 21-25 mm gastric ulcer in the antrum and patient was supposed to get another follow up EGD, unclear why that was not done.   Patient then recently followed up with her PCP who then requested she followed up with GI and gets a follow up EGD.     Today 8/5/24  Her last EGD was in 2023 with finding of clean based ulcer, measuring 5 mm in diameter. She was to keep taking omeprazole 40 mg twice daily indefinitely, which she currently takes.   Her largest concern today is wanting to know what sx are of GI ulceration. She started with intermittent but sharp RUQ abd pain in the past year, typically lasts 15 min to 60 min in duration. Denies associated sx of nausea/vomiting.   -she is not on NSAIDs  -meds are sorted by dgt so she should be on omeprazole 40 mg BID   -she's not passing dark stools but is now having 3-4 episodes of scant hematochezia seen on the TP. It does not drip into the water, seen on surface of the stool, or mixed within. She denies passage of blood clots.   BM - she does not pass a BM daily, it's typically every other day with fair evacuation. Denies straining.   -usually one BM, soft serve consistency stool  -does have ongoing fecal incontinence for years, seen with colorectal with an appt 3/2023 - reports offered stimulator  -last colonoscopy in the system is from 2020 under fair prep to cecal intubation. Colon was unremarkable and plan was for a recall in 1 year given history of tubular adenomas. It does not appear this was followed up.   -not on NSAIDs but does take daily PPI   -gained 20# (to 146#) over past  5 months. Appetite is stable.   Not weekly but reporting 1-2x a month with nausea/vomiting associated with abd pain/discomfort. This is becoming more frequent. Immediately as she eats, get exteremly nausea, lasting 2-3d. Frequency increasing in Spring 2024 but sx onset back at least a year, if not longer. It does not appear this been evaluated yet.   Following with endo for h/o RYGB done 11 years ago.   Tolerated MAC from 2023 without issues. Previously had some trouble with post op nausea during prior scope.   ROS: 10pt ROS performed and otherwise negative.    PAST MEDICAL HISTORY:  Past Medical History:   Diagnosis Date    Arthritis     Basal cell carcinoma     Chest pain     seeing Cardiology    Depression 1991    after 's death    Diabetes mellitus (H)     Diabetic renal disease (H)     microalbuminuria    DJD (degenerative joint disease) of knee     H/O vitamin D deficiency     Hypertension     IBS (irritable bowel syndrome)     diarrhea     Keratoconus     Low back pain     Narcolepsy 2007    Dx'd by Sleep specialist 347.00, pt discontinued Adderal mid Nov. 13 due to tacycardia concerns    Obesity     Obstructive sleep apnea     327.23, 3 sleep studies,Dr. [facility]    Pancreatitis     related to Victoza, also on Xyrem    Panic     RLS (restless legs syndrome)     Sinus tachycardia        PREVIOUS ABDOMINAL/GYNECOLOGIC SURGERIES:  Past Surgical History:   Procedure Laterality Date    COLONOSCOPY  10/01/2020    poor quality prep    ear surgery  2002 and 01/2004    ESOPHAGOSCOPY, GASTROSCOPY, DUODENOSCOPY (EGD), COMBINED N/A 11/16/2021    Procedure: ESOPHAGOGASTRODUODENOSCOPY (EGD);  Surgeon: Jayla Calvo MD;  Location: [facility]    ESOPHAGOSCOPY, GASTROSCOPY, DUODENOSCOPY (EGD), COMBINED N/A 1/9/2023    Procedure: ESOPHAGOGASTRODUODENOSCOPY, WITH BIOPSY;  Surgeon: Brandon Witt MD;  Location:  GI    GASTRIC BYPASS  2013    laparoscopic jimmy en y c ometopexy    HEMORRHOIDECTOMY   09/14/2000    LAPAROSCOPIC CHOLECYSTECTOMY  2015    LASIK BILATERAL      UVULOPALATOPHARYNGOPLASTY      UVVP           PERTINENT MEDICATIONS:  Current Outpatient Medications   Medication Sig Dispense Refill    amphetamine-dextroamphetamine (ADDERALL) 30 MG tablet Take 1 tablet (30 mg) by mouth every 24 hours 1.5 mg a total of 45 mg  Daily 30 tablet 0    atorvastatin (LIPITOR) 20 MG tablet Take 1 tablet (20 mg) by mouth daily 90 tablet 3    blood glucose (NO BRAND SPECIFIED) lancets standard Use to test blood sugar 1 times daily or as directed. 90 Lancet 3    blood glucose (NO BRAND SPECIFIED) test strip Use to test blood sugar 1 times daily or as directed. 100 strip 3    blood glucose (NO BRAND SPECIFIED) test strip Use to test blood sugar as needed with Freestyle Nirmal CGM. 100 strip 0    blood glucose (NO BRAND SPECIFIED) test strip Use to test blood sugar as directed with CGM sensor. 50 strip 1    blood glucose calibration (NO BRAND SPECIFIED) solution Use to calibrate blood glucose monitor as needed as directed. 1 each 3    blood glucose monitoring (NO BRAND SPECIFIED) meter device kit Use to test blood sugar 1 times daily or as directed. 1 kit 0    Calcium Acetate 667 MG TABS Take 1 tablet by mouth 2 times daily for 360 days 180 tablet 3    calcium carbonate (OS-KINJAL) 1500 (600 Ca) MG tablet Take 2 tablets (1,200 mg) by mouth daily      cevimeline (EVOXAC) 30 MG capsule take 1 cap  capsule 1    cholecalciferol (VITAMIN D3) 125 mcg (5000 units) capsule Take 125 mcg by mouth daily      COMPOUNDED NON-CONTROLLED SUBSTANCE (CMPD RX) - PHARMACY TO MIX COMPOUNDED MEDICATION Estradiol 0.0075% in HRT cream  Apply 2 grams,  intravaginally and small amount externally daily for one week then twice weekly for maintenance. 45 g 11    Continuous Blood Gluc  (FREESTYLE NIRMAL 14 DAY READER) EBEN Use to read blood sugars as per 's instructions. 1 each 0    Continuous Glucose Sensor (FREESTYLE NIRMAL 14  DAY SENSOR) MISC Change every 14 days. 2 each 2    cyanocobalamin (VITAMIN B-12) 1000 MCG tablet Take one every other day 90 tablet 0    DULoxetine (CYMBALTA) 30 MG capsule Take 1 capsule (30 mg) by mouth 2 times daily 180 capsule 1    DULoxetine (CYMBALTA) 60 MG capsule Take 1 capsule (60 mg) by mouth at bedtime Take in addition to current 30 mg evening dose for a total of 90 mg at bedtime. 90 capsule 1    empagliflozin (JARDIANCE) 10 MG TABS tablet Take one daily 90 tablet 3    finasteride (PROSCAR) 5 MG tablet Take 1/2 tablet daily 45 tablet 3    losartan (COZAAR) 50 MG tablet Take 1 tablet (50 mg) by mouth daily 90 tablet 1    metroNIDAZOLE (FLAGYL) 500 MG tablet Take 1 tablet (500 mg) by mouth 2 times daily 14 tablet 3    omeprazole (PRILOSEC) 40 MG DR capsule Take 40mg twice daily 180 capsule 3    pregabalin (LYRICA) 100 MG capsule Take 100 mg by mouth daily      tolterodine ER (DETROL LA) 4 MG 24 hr capsule Take 1 capsule (4 mg) by mouth daily 90 capsule 3    traZODone (DESYREL) 50 MG tablet Take 1 tablet by mouth every 24 hours      triamcinolone (KENALOG) 0.1 % paste Apply to tongue twice daily x 10 days 5 g 0     SOCIAL HISTORY:    Social History     Socioeconomic History    Marital status: Single     Spouse name: Not on file    Number of children: 1    Years of education: Not on file    Highest education level: Not on file   Occupational History     Employer: [id number]     Comment: on disability   Tobacco Use    Smoking status: Never     Passive exposure: Never    Smokeless tobacco: Never   Vaping Use    Vaping status: Never Used   Substance and Sexual Activity    Alcohol use: Not Currently     Comment: rare    Drug use: No    Sexual activity: Never   Other Topics Concern    Parent/sibling w/ CABG, MI or angioplasty before 65F 55M? No   Social History Narrative    Daughter- 42,  since 1991    Drives only short distances due to narcolepsy             --------------------------------------------------------------------------------    Surgical History  Return To Top     Status Surgery Time Frame Comment Record Date     Inactive  ear surgery  1/04 5/27/2008      Inactive  eye surgery  2002 5/27/2008      Inactive  Hemorrhoidectomy  9/14/00 5/27/2008          --------------------------------------------------------------------------------    Food Allergy  Return To Top     Allergen Reaction Comment Record Date     * No known food allergies      10/28/2008          --------------------------------------------------------------------------------    Drug Allergy  Return To Top     Allergen Reaction Comment Record Date     Codeine  nausea    6/21/2007          --------------------------------------------------------------------------------    Environment Allergy  Return To Top     Allergen Reaction Comment Record Date     * No known environmental allergies      10/28/2008          --------------------------------------------------------------------------------    Social History  Return To Top     Question Answer Comment Record Date     Marital status      5/27/2008      Advance Directive or Living Will  Form Given to Patient    1/18/2010      Emotional Abuse  Yes    1/18/2010      Exercise  No    1/18/2010      Caffeine  Yes    5/27/2008      Physical Abuse  No    1/18/2010      Sealtbelts  Yes    1/18/2010      Sexual Abuse  No    1/18/2010      Breast/Testicle Self Check  No    1/18/2010      Number of children  1    1/18/2010      Living arrangements  House    1/18/2010      Number of adults in household  2    1/18/2010      Education level  High School Graduate    1/18/2010      Employment  Currently employed    1/18/2010      Tobacco history  Has never smoked or chewed tobacco    5/27/2008      Alcohol history  Currently drinks alcohol    5/27/2008      Has the patient ever used illegal drugs?  Has never used illegal drugs    5/27/2008           --------------------------------------------------------------------------------    History - Overall Remark: 3/21/2012 Return To Top         --------------------------------------------------------------------------------    Medical History Return To Top     Status Diagnosis Time Frame Comment Record Date     Active (250.00) - C - Diabetes mellitus, type II controlled      5/15/2012      Active (788.41) - C - Urinary frequency      4/17/2012      Active (250.02) - C - Diabetes mellitus, type II uncontrolled      3/6/2012      Active (278.00) - C - Obesity      2/14/2011      Active (250.00) - C - Diabetes mellitus, type II controlled      2/14/2011      Active (739.3) - C - Somatic dysfunction, lumbar region      8/16/2010      Active (739.5) - C - Somatic dysfunction, pelvic region      8/16/2010      Active (401.9) - C - Hypertension      2/8/2010      Active 268.9 UNSP VITAMIN D DEFICIENCY      1/18/2010      Active (695.3) - C - Rosacea      1/18/2010      Active 272.1 Hypertriglyceridemia      1/18/2010      Active 300.4 Depression with Anxiety (Dysthymic Disorder)      10/28/2008      Active 739.6 Somatic dysfunction, lower extremities      10/28/2008      Active 780.79 Fatigue      6/23/2008      Active 278.01 Obesity, morbid      6/19/2008      Active 347.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, 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      6/19/2008      Inactive  (462) - C - Pharyngitis, acute      1/15/2011      Inactive  250.02 Diabetes mellitus, type II uncontrolled      1/18/2010      Inactive  726.71 Tendinitis, achilles      10/28/2008      Inactive  (250.00) - C - Diabetes mellitus, type II controlled      10/28/2008      Inactive  (250.00) - C - Diabetes mellitus, type II controlled      6/23/2008      Inactive  V77.91 Screening, lipids      6/23/2008      Inactive  599.0 Urinary tract infection, unspec./pyuria (UTI)      6/23/2008      Inactive  V70.0 Routine Medical Exam      6/19/2008      Active Constipation      5/27/2008      Active  Hemorrhoids      2008      Pancreatitis pancreatitis 2013     --------------------------------------------------------------------------------    M        --------------------------------------------------------------------------------    Family History  Return To Top     Status Relationship Disease Comment Record Date     Alive Sister  *Denies any medical problems  3 sisters  2008      Alive Brother  *Denies any medical problems  2 brothers  2008         Father    Age of death 56  2010         Mother  Dementia  Age of death 82  2008          --------------------------------------------------------------------------------                         Social Determinants of Health     Financial Resource Strain: Not on file   Food Insecurity: Not on file   Transportation Needs: Not on file   Physical Activity: Not on file   Stress: Not on file   Social Connections: Unknown (1/3/2024)    [facility] [employer] [facility] [employer] [facility] [employer] [facility]    Social Connections     Frequency of Communication with Friends and Family: Not on file   Interpersonal Safety: Low Risk  (2024)    Interpersonal Safety     Do you feel physically and emotionally safe where you currently live?: Yes     Within the past 12 months, have you been hit, slapped, kicked or otherwise physically hurt by someone?: No     Within the past 12 months, have you been humiliated or emotionally abused in other ways by your partner or ex-partner?: No   Housing Stability: Not on file       FAMILY HISTORY:    Family History   Problem Relation Age of Onset    Memory loss Mother     Other - See Comments Mother         hair thinning    Diabetes Father     Chronic Obstructive Pulmonary Disease Sister     Anemia Sister         hx of ulcers    Other - See Comments Sister 65        MVA, followed by leg pain after a fall    Dementia Sister 68    Dementia Brother 70    Cancer  Brother         lung    Cancer - colorectal Maternal Grandmother     Cancer Daughter     Obesity Daughter         s/p lap band     PHYSICAL EXAMINATION:  Vitals reviewed: There were no vitals taken for this visit.  Wt:   Wt Readings from Last 2 Encounters:   07/03/24 67.1 kg (148 lb)   06/04/24 65.3 kg (144 lb)      Video physical exam  General: Patient appears well in no acute distress.   Skin: No visualized rash or lesions on visualized skin  Eyes: EOMI, no erythema, sclera icterus or discharge noted  Resp: Appears to be breathing comfortably without accessory muscle usage, speaking in full sentences, no cough  MSK: Appears to have normal range of motion based on visualized movements  Neurologic: No apparent tremors, facial movements symmetric  Psych: affect normal, alert and oriented    PERTINENT STUDIES - Reviewed in EMR     Lab Results   Component Value Date    WBC 7.8 05/10/2024    WBC 7.6 01/09/2024    WBC 7.6 06/12/2023    HGB 12.6 05/10/2024    HGB 12.1 01/09/2024    HGB 12.7 06/12/2023     05/10/2024     01/09/2024     06/12/2023    CHOL 133 01/09/2024    CHOL 126 03/29/2022    CHOL 113.0 03/08/2021    TRIG 129 01/09/2024    TRIG 123 03/29/2022    TRIG 157.0 (H) 03/08/2021    HDL 55 01/09/2024    HDL 52 03/29/2022    HDL 35.0 (L) 03/08/2021    ALT 15 12/09/2022    ALT 21 10/15/2022    ALT 11 08/25/2022    AST 23 12/09/2022    AST 16 10/15/2022    AST 21 08/25/2022     05/10/2024     05/02/2024     04/05/2024    BUN 34.6 (H) 05/10/2024    BUN 48.0 (H) 05/02/2024    BUN 31.6 (H) 04/05/2024    CO2 25 05/10/2024    CO2 24 05/02/2024    CO2 26 04/05/2024    TSH 1.01 06/12/2023    TSH 1.52 12/09/2022    TSH 1.69 10/22/2019    INR 1.10 11/15/2021        Liver Function Studies -   Recent Labs   Lab Test 05/10/24  1304 12/09/22  1203   PROTTOTAL  --  7.5   ALBUMIN 4.3 4.2   BILITOTAL  --  0.4   ALKPHOS  --  88   AST  --  23   ALT  --  15        PREVIOUS ENDOSCOPY      Again,  thank you for allowing me to participate in the care of your patient.      Sincerely,    Amie Grace PA-C

## 2024-08-05 NOTE — PATIENT INSTRUCTIONS
It was a pleasure taking care of you today.  I've included a brief summary of our discussion and care plan from today's visit below.  Please review this information with your primary care provider.  _______________________________________________________________________    My recommendations are summarized as follows:    Ordering stool studies, labs for further eval. Please call to schedule   Order both upper and lower colonoscopy. Putting in for priority on both test, try to get both done on the same time, if possible. Please call to schedule.   Order right upper quad ultrasound - please call to schedule.     Please call our clinic or send a milabentt message to us if you have any questions or concerns. Pixowlhart messages are answered by your nurse or doctor typically within 24 hours.  Please allow extra time on weekends and holidays    Return to GI Clinic in 1-2 weeks after scope months to review your progress.    _______________________________________________________________________    How do I schedule labs, imaging studies, or procedures that were ordered in clinic today?      Labs: To schedule lab appointment at the [facility], use my chart or call [phone]. If you have a [place] lab closer to home where you are regularly seen you can give them a call.      Procedures: If a colonoscopy, upper endoscopy, breath test, esophageal manometry, or pH impedence was ordered today, our endoscopy team will call you to schedule this. If you have not heard from our endoscopy team within a week, please call [phone] option 2 to schedule.      Imaging Studies: If you were scheduled for a CT scan, X-ray, MRI, ultrasound, HIDA scan or other imaging study, please call [phone] to have this scheduled.      Referral: If a referral to another specialty was ordered, expect a phone call or follow instructions above. If you have not heard from anyone regarding your referral in a week, please call our clinic  to check the status.      Who do I call with any questions after my visit?  Please be in touch if there are any further questions that arise following today's visit.  There are multiple ways to contact your gastroenterology care team.       During business hours, you may reach a Gastroenterology nurse at [phone]     To schedule or reschedule an appointment, please call [phone].      You can always send a secure message through Hunie.  Hunie messages are answered by your nurse or doctor typically within 24 hours.  Please allow extra time on weekends and holidays.       For urgent/emergent questions after business hours, you may reach the on-call GI Fellow by contacting the [facility]  at [phone].     How will I get the results of any tests ordered?    You will receive all of your results.  If you have signed up for Jarvamt, any tests ordered at your visit will be available to you after your physician reviews them.  Typically this takes 1-2 weeks.  If there are urgent results that require a change in your care plan, your physician or nurse will call you to discuss the next steps.       What is Hunie?  Hunie is a secure way for you to access all of your healthcare records from the [facility].  It is a web based computer program, so you can sign on to it from any location.  It also allows you to send secure messages to your care team.  I recommend signing up for Hunie access if you have not already done so and are comfortable with using a computer.       How to I schedule a follow-up visit?  If you did not schedule a follow-up visit today, please call [phone] to schedule a follow-up office visit.      Sincerely,    Amie Grace PA-C  Gastroenterology

## 2024-08-07 ENCOUNTER — TELEPHONE (OUTPATIENT)
Dept: GASTROENTEROLOGY | Facility: CLINIC | Age: 69
End: 2024-08-07
Payer: COMMERCIAL

## 2024-08-07 NOTE — TELEPHONE ENCOUNTER
"Endoscopy Scheduling Screen    Have you had a positive Covid test in the last 14 days?  No    What is your communication preference for Instructions and/or Bowel Prep?   MyChart    What insurance is in the chart?  Other:  [employer]    Ordering/Referring Provider: ELAINE PELAYO   (If ordering provider performs procedure, schedule with ordering provider unless otherwise instructed. )    BMI: Estimated body mass index is 25.39 kg/m  as calculated from the following:    Height as of 7/3/24: 1.626 m (5' 4.02\").    Weight as of 7/3/24: 67.1 kg (148 lb).     Sedation Ordered  MAC/deep sedation.   BMI<= 45 45 < BMI <= 48 48 < BMI < = 50  BMI > 50   No Restrictions No [facility]  No [facility]  [facility] with exceptions Hospital Only OR Only       Do you have a history of malignant hyperthermia?  Yes, (Hospital Only. Send InSmartRxet message to RN Pre-assessment pool for anaesthesia eval.)    (Females) Are you currently pregnant?   No     Have you been diagnosed or told you have pulmonary hypertension?   No    Do you have an LVAD?  No    Have you been told you have moderate to severe sleep apnea?  Yes (RN Review required for scheduling unless scheduling in Hospital.)    Have you been told you have COPD, asthma, or any other lung disease?  No    Do you have any heart conditions?  No     Have you ever had or are you waiting for an organ transplant?  No. Continue scheduling, no site restrictions.    Have you had a stroke or transient ischemic attack (TIA aka \"mini stroke\" in the last 6 months?   No    Have you been diagnosed with or been told you have cirrhosis of the liver?   No    Are you currently on dialysis?   No    Do you need assistance transferring?   No    BMI: Estimated body mass index is 25.39 kg/m  as calculated from the following:    Height as of 7/3/24: 1.626 m (5' 4.02\").    Weight as of 7/3/24: 67.1 kg (148 lb).     Is patients BMI > 40 and scheduling location [facility]?  No    Do you take an injectable medication for " weight loss or diabetes (excluding insulin)?  No    Do you take the medication Naltrexone?  No    Do you take blood thinners?  No       Prep   Are you currently on dialysis or do you have chronic kidney disease?  No    Do you have a diagnosis of diabetes?  No    Do you have a diagnosis of cystic fibrosis (CF)?  No    On a regular basis do you go 3 -5 days between bowel movements?  Yes (Extended Prep)    BMI > 40?  No    Preferred Pharmacy:    [id number] - [place] - [address] [place] [address]  Phone: [phone] Fax: [phone]    [facility] w/ MAC. Will escalate to Ije/Thisa. Per patient's daughter, if having issues find a spot to complete procedures together, they are ok with splitting them into two separate cases, but the priority would be the EGD.

## 2024-08-09 NOTE — TELEPHONE ENCOUNTER
Writer called Leila to offer 8/15 opening at [facility]. Unable to take spot. Will follow up with other openings as they come up.

## 2024-08-15 ENCOUNTER — TRANSCRIBE ORDERS (OUTPATIENT)
Dept: OTHER | Age: 69
End: 2024-08-15

## 2024-08-15 DIAGNOSIS — M54.16 CHRONIC RIGHT-SIDED LUMBAR RADICULOPATHY: ICD-10-CM

## 2024-08-15 DIAGNOSIS — G62.9 PERIPHERAL POLYNEUROPATHY: Primary | ICD-10-CM

## 2024-08-16 ENCOUNTER — TELEPHONE (OUTPATIENT)
Dept: GASTROENTEROLOGY | Facility: CLINIC | Age: 69
End: 2024-08-16
Payer: COMMERCIAL

## 2024-08-16 DIAGNOSIS — K92.1 HEMATOCHEZIA: Primary | ICD-10-CM

## 2024-08-16 RX ORDER — METOCLOPRAMIDE 10 MG/1
TABLET ORAL
Qty: 2 TABLET | Refills: 0 | Status: SHIPPED | OUTPATIENT
Start: 2024-08-16

## 2024-08-16 RX ORDER — BISACODYL 5 MG/1
TABLET, DELAYED RELEASE ORAL
Qty: 4 TABLET | Refills: 0 | Status: SHIPPED | OUTPATIENT
Start: 2024-08-16

## 2024-08-16 NOTE — TELEPHONE ENCOUNTER
Pre assessment completed for upcoming procedure with patient's daughter, SANDRA Dee on file.      Procedure details:    Patient scheduled for Colonoscopy/Upper endoscopy (EGD) on 8/22/24.     Arrival time: 0630. Procedure time 0800    Facility location: [facility]; [address], [address], [place] [address]. Check in location: Main entrance at registration desk.    Sedation type: Conscious sedation     Pre op exam needed? No.    Indication for procedure: Peptic ulcer disease, change in stool, hematochezia     Designated  policy reviewed. Instructed to have someone stay 24 hours post procedure.       Chart review:     Electronic implanted devices? No    Recent diagnosis of diverticulitis within the last 6 weeks?  No      Medication review:    Diabetic? Yes. Diabetic medication HOLDING recommendations: Oral diabetic medications: Jardiance (empagliflozin): HOLD  3 days before procedure.     Anticoagulants? No    Weight loss medication/injectable? No.    NSAIDS? No    Other medication HOLDING recommendations:  N/A      Prep for procedure:     Bowel prep recommendation: Low Volume Extended Golytely. Bowel prep prescription sent to    [id number] AT [facility] [address] along with Reglan.    Due to: hx of gastric bypass. and poor prep/inadequate bowel prep in the past.     Prep instructions sent via [employer]     Reviewed procedure prep instructions.     Patient's daughter verbalized understanding and had no questions or concerns at this time.        Sharona Mckeon RN  Endoscopy Procedure Pre Assessment   [phone] option 4

## 2024-08-19 ENCOUNTER — TELEPHONE (OUTPATIENT)
Dept: GASTROENTEROLOGY | Facility: CLINIC | Age: 69
End: 2024-08-19
Payer: COMMERCIAL

## 2024-08-19 NOTE — TELEPHONE ENCOUNTER
Left Voicemail (1st Attempt) and Sent Mychart (1st Attempt) for the patient to call back and schedule the following:    Appointment type: Return GI  Provider: Amie Grace  Return date: around 1-2 weeks after procedure on 8/22/24- jed or new slot okay to use per MUSA Baumann  Specialty phone number: [phone]  Additional appointment(s) needed: NA  Additonal Notes: LVM/Chon x1

## 2024-08-21 NOTE — TELEPHONE ENCOUNTER
Incoming call received from pt daughter Leila called, Consent to communicate is IS on file.  Leila was asking if pt could add castillo lemonade flavor pack et to her Golytely prep as she did not have Crystal light available.   It was advised that is ok as long as it is not the color red or purple.     No further questions or concerns at this time.      Shandra Nicholson RN

## 2024-08-22 ENCOUNTER — ANESTHESIA EVENT (OUTPATIENT)
Dept: GASTROENTEROLOGY | Facility: CLINIC | Age: 69
End: 2024-08-22
Payer: COMMERCIAL

## 2024-08-22 ENCOUNTER — ANESTHESIA (OUTPATIENT)
Dept: GASTROENTEROLOGY | Facility: CLINIC | Age: 69
End: 2024-08-22
Payer: COMMERCIAL

## 2024-08-22 ENCOUNTER — HOSPITAL ENCOUNTER (OUTPATIENT)
Facility: CLINIC | Age: 69
Discharge: HOME | End: 2024-08-22
Attending: STUDENT IN AN ORGANIZED HEALTH CARE EDUCATION/TRAINING PROGRAM | Admitting: STUDENT IN AN ORGANIZED HEALTH CARE EDUCATION/TRAINING PROGRAM
Payer: COMMERCIAL

## 2024-08-22 VITALS — TEMPERATURE: 98.2 F | OXYGEN SATURATION: 97 % | SYSTOLIC BLOOD PRESSURE: 107 MMHG | DIASTOLIC BLOOD PRESSURE: 75 MMHG

## 2024-08-22 LAB
COLONOSCOPY: NORMAL
GLUCOSE BLDC GLUCOMTR-MCNC: 98 MG/DL (ref 70–99)
UPPER GI ENDOSCOPY: NORMAL

## 2024-08-22 PROCEDURE — 250N000011 HC RX IP 250 OP 636

## 2024-08-22 PROCEDURE — 88305 TISSUE EXAM BY PATHOLOGIST: CPT | Mod: TC | Performed by: STUDENT IN AN ORGANIZED HEALTH CARE EDUCATION/TRAINING PROGRAM

## 2024-08-22 PROCEDURE — 45380 COLONOSCOPY AND BIOPSY: CPT | Mod: XS | Performed by: STUDENT IN AN ORGANIZED HEALTH CARE EDUCATION/TRAINING PROGRAM

## 2024-08-22 PROCEDURE — 43235 EGD DIAGNOSTIC BRUSH WASH: CPT | Performed by: STUDENT IN AN ORGANIZED HEALTH CARE EDUCATION/TRAINING PROGRAM

## 2024-08-22 PROCEDURE — 45385 COLONOSCOPY W/LESION REMOVAL: CPT

## 2024-08-22 PROCEDURE — 82962 GLUCOSE BLOOD TEST: CPT

## 2024-08-22 PROCEDURE — 250N000009 HC RX 250

## 2024-08-22 PROCEDURE — 88305 TISSUE EXAM BY PATHOLOGIST: CPT | Mod: 26 | Performed by: PATHOLOGY

## 2024-08-22 PROCEDURE — 45385 COLONOSCOPY W/LESION REMOVAL: CPT | Performed by: STUDENT IN AN ORGANIZED HEALTH CARE EDUCATION/TRAINING PROGRAM

## 2024-08-22 PROCEDURE — 45385 COLONOSCOPY W/LESION REMOVAL: CPT | Performed by: ANESTHESIOLOGY

## 2024-08-22 PROCEDURE — 258N000003 HC RX IP 258 OP 636

## 2024-08-22 PROCEDURE — 370N000017 HC ANESTHESIA TECHNICAL FEE, PER MIN: Performed by: STUDENT IN AN ORGANIZED HEALTH CARE EDUCATION/TRAINING PROGRAM

## 2024-08-22 RX ORDER — PROCHLORPERAZINE MALEATE 5 MG
5 TABLET ORAL EVERY 6 HOURS PRN
Status: DISCONTINUED | OUTPATIENT
Start: 2024-08-22 | End: 2024-08-22 | Stop reason: HOSPADM

## 2024-08-22 RX ORDER — NALOXONE HYDROCHLORIDE 0.4 MG/ML
0.4 INJECTION, SOLUTION INTRAMUSCULAR; INTRAVENOUS; SUBCUTANEOUS
Status: DISCONTINUED | OUTPATIENT
Start: 2024-08-22 | End: 2024-08-22 | Stop reason: HOSPADM

## 2024-08-22 RX ORDER — ONDANSETRON 2 MG/ML
4 INJECTION INTRAMUSCULAR; INTRAVENOUS
Status: DISCONTINUED | OUTPATIENT
Start: 2024-08-22 | End: 2024-08-22 | Stop reason: HOSPADM

## 2024-08-22 RX ORDER — PROPOFOL 10 MG/ML
INJECTION, EMULSION INTRAVENOUS CONTINUOUS PRN
Status: DISCONTINUED | OUTPATIENT
Start: 2024-08-22 | End: 2024-08-22

## 2024-08-22 RX ORDER — FLUMAZENIL 0.1 MG/ML
0.2 INJECTION, SOLUTION INTRAVENOUS
Status: DISCONTINUED | OUTPATIENT
Start: 2024-08-22 | End: 2024-08-22 | Stop reason: HOSPADM

## 2024-08-22 RX ORDER — PROPOFOL 10 MG/ML
INJECTION, EMULSION INTRAVENOUS PRN
Status: DISCONTINUED | OUTPATIENT
Start: 2024-08-22 | End: 2024-08-22

## 2024-08-22 RX ORDER — LIDOCAINE 40 MG/G
CREAM TOPICAL
Status: DISCONTINUED | OUTPATIENT
Start: 2024-08-22 | End: 2024-08-22 | Stop reason: HOSPADM

## 2024-08-22 RX ORDER — ONDANSETRON 4 MG/1
4 TABLET, ORALLY DISINTEGRATING ORAL EVERY 6 HOURS PRN
Status: DISCONTINUED | OUTPATIENT
Start: 2024-08-22 | End: 2024-08-22 | Stop reason: HOSPADM

## 2024-08-22 RX ORDER — SODIUM CHLORIDE, SODIUM LACTATE, POTASSIUM CHLORIDE, CALCIUM CHLORIDE 600; 310; 30; 20 MG/100ML; MG/100ML; MG/100ML; MG/100ML
INJECTION, SOLUTION INTRAVENOUS CONTINUOUS PRN
Status: DISCONTINUED | OUTPATIENT
Start: 2024-08-22 | End: 2024-08-22

## 2024-08-22 RX ORDER — ONDANSETRON 2 MG/ML
4 INJECTION INTRAMUSCULAR; INTRAVENOUS EVERY 6 HOURS PRN
Status: DISCONTINUED | OUTPATIENT
Start: 2024-08-22 | End: 2024-08-22 | Stop reason: HOSPADM

## 2024-08-22 RX ORDER — ONDANSETRON 2 MG/ML
INJECTION INTRAMUSCULAR; INTRAVENOUS PRN
Status: DISCONTINUED | OUTPATIENT
Start: 2024-08-22 | End: 2024-08-22

## 2024-08-22 RX ORDER — NALOXONE HYDROCHLORIDE 0.4 MG/ML
0.2 INJECTION, SOLUTION INTRAMUSCULAR; INTRAVENOUS; SUBCUTANEOUS
Status: DISCONTINUED | OUTPATIENT
Start: 2024-08-22 | End: 2024-08-22 | Stop reason: HOSPADM

## 2024-08-22 RX ORDER — LIDOCAINE HYDROCHLORIDE 20 MG/ML
INJECTION, SOLUTION INFILTRATION; PERINEURAL PRN
Status: DISCONTINUED | OUTPATIENT
Start: 2024-08-22 | End: 2024-08-22

## 2024-08-22 RX ADMIN — LIDOCAINE HYDROCHLORIDE 80 MG: 20 INJECTION, SOLUTION INFILTRATION; PERINEURAL at 08:03

## 2024-08-22 RX ADMIN — PROPOFOL 50 MG: 10 INJECTION, EMULSION INTRAVENOUS at 08:03

## 2024-08-22 RX ADMIN — SODIUM CHLORIDE, POTASSIUM CHLORIDE, SODIUM LACTATE AND CALCIUM CHLORIDE: 600; 310; 30; 20 INJECTION, SOLUTION INTRAVENOUS at 07:58

## 2024-08-22 RX ADMIN — PROPOFOL 150 MCG/KG/MIN: 10 INJECTION, EMULSION INTRAVENOUS at 08:00

## 2024-08-22 RX ADMIN — ONDANSETRON 4 MG: 2 INJECTION INTRAMUSCULAR; INTRAVENOUS at 08:11

## 2024-08-22 ASSESSMENT — ACTIVITIES OF DAILY LIVING (ADL)
ADLS_ACUITY_SCORE: 38

## 2024-08-22 NOTE — ANESTHESIA POSTPROCEDURE EVALUATION
Patient: Karine Moss    Procedure: Procedure(s):  COLONOSCOPY, WITH POLYPECTOMY AND BIOPSY  Esophagoscopy, gastroscopy, duodenoscopy (EGD), combined       Anesthesia Type:  MAC    Note:  Disposition: Outpatient   Postop Pain Control: Uneventful            Sign Out: Well controlled pain   PONV: No   Neuro/Psych: Uneventful            Sign Out: Acceptable/Baseline neuro status   Airway/Respiratory: Uneventful            Sign Out: Acceptable/Baseline resp. status   CV/Hemodynamics: Uneventful            Sign Out: Acceptable CV status; No obvious hypovolemia; No obvious fluid overload   Other NRE: NONE   DID A NON-ROUTINE EVENT OCCUR? No           Last vitals:  Vitals Value Taken Time   /73 08/22/24 0910   Temp     Pulse     Resp     SpO2 100 % 08/22/24 0917   Vitals shown include unfiled device data.    Electronically Signed By: Phani Hui MD, MD  August 22, 2024  9:17 AM

## 2024-08-22 NOTE — ANESTHESIA CARE TRANSFER NOTE
Patient: Karine Moss    Procedure: Procedure(s):  COLONOSCOPY, WITH POLYPECTOMY AND BIOPSY  Esophagoscopy, gastroscopy, duodenoscopy (EGD), combined       Diagnosis: Peptic ulcer disease [K27.9]  Change in stool caliber [R19.5]  Hematochezia [K92.1]  Diagnosis Additional Information: No value filed.    Anesthesia Type:   MAC     Note:    Oropharynx: oropharynx clear of all foreign objects and spontaneously breathing  Level of Consciousness: awake  Oxygen Supplementation: room air    Independent Airway: airway patency satisfactory and stable  Dentition: dentition unchanged  Vital Signs Stable: post-procedure vital signs reviewed and stable  Report to RN Given: handoff report given  Patient transferred to: Phase II    Handoff Report: Identifed the Patient, Identified the Reponsible Provider, Reviewed the pertinent medical history, Discussed the surgical course, Reviewed Intra-OP anesthesia mangement and issues during anesthesia, Set expectations for post-procedure period and Allowed opportunity for questions and acknowledgement of understanding  Vitals:  Vitals Value Taken Time   BP 98/63 08/22/24 0854   Temp     Pulse     Resp     SpO2 98 % 08/22/24 0857   Vitals shown include unfiled device data.    Electronically Signed By: Katya Amin DMD  August 22, 2024  8:57 AM

## 2024-08-22 NOTE — OR NURSING
Pt tolerated EGD and colonoscopy  under MAC, very well. No samples were taken during the EGD. % colon polyps were removed with a cold stare. Random colon biopsies were also taken

## 2024-08-22 NOTE — LETTER
August 23, 2024      Karine Moss  [address]  [place] [facility] [place] [address]        Dear ,    We are writing to inform you of your test results.    A least one of the polyps removed during your recent colonoscopy was found to be an adenoma - this is considered to be a precancerous polyp.  Please note there was NO cancer in the polyp.     Given the finding of this type of polyp, you are at higher risk for growing precancerous polyps in the future. I recommend that you undergo a repeat colonoscopy in 7 years. However, a sooner examination might be necessary if you start developing any symptoms such as rectal bleeding, change in bowel habits, anemia, etc.  Please discuss this with your primary physician at the time of your next office appointment.  Your primary physician will schedule this repeat colonoscopy at the appropriate time.    Please share this information with your family members so they can discuss with their primary physician their need for colorectal cancer screening.      It has been a pleasure to participate in your care.  Please call our clinic if you have any questions or concerns.      Resulted Orders   Surgical Pathology Exam   Result Value Ref Range    Case Report       Surgical Pathology Report                         Case: [id number]                                  Authorizing Provider:  Andreina Layne MD          Collected:           08/22/2024 08:26 AM          Ordering Location:     [facility]          Received:            08/22/2024 09:28 AM                                 Endoscopy                                                                    Pathologist:           Mono Flores DO                                                            Specimens:   A) - Large Intestine, Colon, Random Colon biopsies                                                  B) - Large Intestine, Colon, Transverse, 2 seperate polyps                                          C) - Rectum,  "3 seperate polyps                                                             Final Diagnosis       A.  COLON, RANDOM, BIOPSY:  - Unremarkable colonic mucosa  - No significant acute cryptitis, chronic changes, or microscopic colitis identified    B.  COLON, TRANSVERSE, POLYPS:  - Tubular adenomas  - No high-grade dysplasia or malignancy identified    C.  RECTUM, POLYPS:  - Hyperplastic polyps        Clinical Information       Peptic ulcer disease      Gross Description       A(1). Large Intestine, Colon, Random Colon biopsies:  The specimen is received in formalin with proper patient identification, labeled \"random colon biopsies\".  The specimen consists of 6 irregular tan pieces of soft tissue averaging 0.2 cm in greatest dimension which are entirely submitted in cassette   B(2). Large Intestine, Colon, Transverse, 2 seperate polyps:  The specimen is received in formalin with proper patient identification, labeled \"transverse: 2 separate polyps\".  The specimen consists of 8 irregular tan pieces of soft tissue ranging from 0.1-0.4 cm in greatest dimension which are entirely submitted in cassette B1.  C(3). Rectum, 3 seperate polyps:  The specimen is received in formalin with proper patient identification, labeled \"rectum 3 separate polyps\".  The specimen consists of 6 irregular and flattened portions of soft tissue ranging from 0.2-1.1 cm in greatest dimension.  The specimen is entirely submitted in cassette C1.       Microscopic Description       Microscopic examination was performed.        Performing Labs       The technical component of this testing was completed at [facility].    Stain controls for all stains resulted within this report have been reviewed and show appropriate reactivity.       Case Images         If you have any questions or concerns, please call the clinic at the number listed above.       Sincerely,      Andreina Layne, " MD

## 2024-08-22 NOTE — H&P
Karine Moss  [id number]  female  69 year old      Reason for procedure/surgery: Egd and colonoscopy for hematochezia and diarrhea    follow up anastomotic ulcer  Colonoscopy 2020 normal     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)    Hyperlipidemia LDL goal <100    Obstructive sleep apnea    Major depression in partial remission (H24)    Low back pain    DJD (degenerative joint disease) of knee    IBS (irritable bowel syndrome)    Heart murmur    Hypertension goal BP (blood pressure) < 140/90    Type 2 diabetes mellitus without complication, without long-term current use of insulin (H)    Osteopenia of hip    PTSD (post-traumatic stress disorder)    Diabetic peripheral neuropathy (H)    Bilateral sciatica    Gastrointestinal hemorrhage associated with gastric ulcer    Chronic kidney disease, stage 3 (H)    Chronic diastolic congestive heart failure (H)    Osteopenia of spine    Chronic left shoulder pain    Dumping syndrome    History of Jimmy-en-Y gastric bypass    Hypoglycemia    Insomnia    Hx laparoscopic cholecystectomy    Major depressive disorder, recurrent episode, mild (H24)    Peripheral neuropathy    RLS (restless legs syndrome)       Past Surgical History:    Past Surgical History:   Procedure Laterality Date    COLONOSCOPY  10/01/2020    poor quality prep    ear surgery  2002 and 01/2004    ESOPHAGOSCOPY, GASTROSCOPY, DUODENOSCOPY (EGD), COMBINED N/A 11/16/2021    Procedure: ESOPHAGOGASTRODUODENOSCOPY (EGD);  Surgeon: Jayla Calvo MD;  Location: [facility]    ESOPHAGOSCOPY, GASTROSCOPY, DUODENOSCOPY (EGD), COMBINED N/A 1/9/2023    Procedure: ESOPHAGOGASTRODUODENOSCOPY, WITH BIOPSY;  Surgeon: Brandon Witt MD;  Location:  GI    GASTRIC BYPASS  2013    laparoscopic jimmy en y c ometopexy    HEMORRHOIDECTOMY  09/14/2000    LAPAROSCOPIC CHOLECYSTECTOMY  2015    LASIK BILATERAL      UVULOPALATOPHARYNGOPLASTY      UVVP          Past Medical History:   Past Medical History:   Diagnosis Date    Arthritis     Basal cell carcinoma     Chest pain     seeing Cardiology    Depression 1991    after 's death    Diabetes mellitus (H)     Diabetic renal disease (H)     microalbuminuria    DJD (degenerative joint disease) of knee     H/O vitamin D deficiency     Hypertension     IBS (irritable bowel syndrome)     diarrhea     Keratoconus     Low back pain     Narcolepsy 2007    Dx'd by Sleep specialist 347.00, pt discontinued Adderal mid Nov. 13 due to tacycardia concerns    Obesity     Obstructive sleep apnea     327.23, 3 sleep studies,Dr. [facility]    Pancreatitis     related to Victoza, also on Xyrem    Panic     RLS (restless legs syndrome)     Sinus tachycardia        Social History:   Social History     Tobacco Use    Smoking status: Never     Passive exposure: Never    Smokeless tobacco: Never   Substance Use Topics    Alcohol use: Not Currently     Comment: rare       Family History:   Family History   Problem Relation Age of Onset    Memory loss Mother     Other - See Comments Mother         hair thinning    Diabetes Father     Chronic Obstructive Pulmonary Disease Sister     Anemia Sister         hx of ulcers    Other - See Comments Sister 65        MVA, followed by leg pain after a fall    Dementia Sister 68    Dementia Brother 70    Cancer Brother         lung    Cancer - colorectal Maternal Grandmother     Cancer Daughter     Obesity Daughter         s/p lap band       Allergies:   Allergies   Allergen Reactions    Pravastatin Other (See Comments)     woozy    Codeine Nausea and Vomiting    Nsaids GI Disturbance and Other (See Comments)     Pt had bariatric surgery, should not ever take oral NSAIDS due to risk of gastric ulcers.  Pt had bariatric surgery, should not ever take oral NSAIDS due to risk of gastric ulcers.       Active Medications:   No current outpatient medications on file.       Systemic Review:    CONSTITUTIONAL: NEGATIVE for fever, chills, change in weight  ENT/MOUTH: NEGATIVE for ear, mouth and throat problems  RESP: NEGATIVE for significant cough or SOB  CV: NEGATIVE for chest pain, palpitations or peripheral edema    Physical Examination:   Vital Signs: /82   Temp 98.2  F (36.8  C) (Oral)   SpO2 100%   GENERAL: healthy, alert and no distress  NECK: no adenopathy, no asymmetry, masses, or scars  RESP: lungs clear to auscultation - no rales, rhonchi or wheezes  CV: regular rate and rhythm, normal S1 S2, no S3 or S4, no murmur, click or rub, no peripheral edema and peripheral pulses strong  ABDOMEN: soft, nontender, no hepatosplenomegaly, no masses and bowel sounds normal  MS: no gross musculoskeletal defects noted, no edema      Plan: Appropriate to proceed as scheduled.      Andreina Layne MD  8/22/2024    PCP:  Anay Martinez

## 2024-08-22 NOTE — ANESTHESIA PREPROCEDURE EVALUATION
Anesthesia Pre-Procedure Evaluation    Patient: Karine Moss   MRN: [id number] : 1955        Procedure : Procedure(s):  Colonoscopy  Esophagoscopy, gastroscopy, duodenoscopy (EGD), combined          Past Medical History:   Diagnosis Date    Arthritis     Basal cell carcinoma     Chest pain     seeing Cardiology    Depression     after 's death    Diabetes mellitus (H)     Diabetic renal disease (H)     microalbuminuria    DJD (degenerative joint disease) of knee     H/O vitamin D deficiency     Hypertension     IBS (irritable bowel syndrome)     diarrhea     Keratoconus     Low back pain     Narcolepsy     Dx'd by Sleep specialist 347.00, pt discontinued Adderal mid  due to tacycardia concerns    Obesity     Obstructive sleep apnea     327.23, 3 sleep studies,Dr. Flaco [place] [facility]    Pancreatitis     related to Victoza, also on Xyrem    Panic     RLS (restless legs syndrome)     Sinus tachycardia       Past Surgical History:   Procedure Laterality Date    COLONOSCOPY  10/01/2020    poor quality prep    ear surgery   and 2004    ESOPHAGOSCOPY, GASTROSCOPY, DUODENOSCOPY (EGD), COMBINED N/A 2021    Procedure: ESOPHAGOGASTRODUODENOSCOPY (EGD);  Surgeon: Jayla Calvo MD;  Location: [facility]    ESOPHAGOSCOPY, GASTROSCOPY, DUODENOSCOPY (EGD), COMBINED N/A 2023    Procedure: ESOPHAGOGASTRODUODENOSCOPY, WITH BIOPSY;  Surgeon: Brandon Witt MD;  Location:  GI    GASTRIC BYPASS      laparoscopic jimmy en y c ometopexy    HEMORRHOIDECTOMY  2000    LAPAROSCOPIC CHOLECYSTECTOMY  2015    LASIK BILATERAL      UVULOPALATOPHARYNGOPLASTY      UVVP        Allergies   Allergen Reactions    Pravastatin Other (See Comments)     woozy    Codeine Nausea and Vomiting    Nsaids GI Disturbance and Other (See Comments)     Pt had bariatric surgery, should not ever take oral NSAIDS due to risk of gastric ulcers.  Pt had bariatric surgery, should not ever take oral NSAIDS due  to risk of gastric ulcers.      Social History     Tobacco Use    Smoking status: Never     Passive exposure: Never    Smokeless tobacco: Never   Substance Use Topics    Alcohol use: Not Currently     Comment: rare      Wt Readings from Last 1 Encounters:   07/03/24 67.1 kg (148 lb)        Anesthesia Evaluation   Pt has had prior anesthetic. Type: General.    History of anesthetic complications  - PONV.      ROS/MED HX  ENT/Pulmonary:     (+) sleep apnea, doesn't use CPAP,                                      Neurologic:  - neg neurologic ROS     Cardiovascular:     (+)  hypertension- -   -  - -      CHF                     valvular problems/murmurs           METS/Exercise Tolerance:     Hematologic:  - neg hematologic  ROS     Musculoskeletal:  - neg musculoskeletal ROS     GI/Hepatic:       Renal/Genitourinary:     (+) renal disease, type: CRI,            Endo:     (+) type I DM,              Obesity,       Psychiatric/Substance Use:  - neg psychiatric ROS     Infectious Disease:       Malignancy:   (+) Malignancy,     Other:            Physical Exam    Airway  airway exam normal           Respiratory Devices and Support         Dental       (+) Edentulous      Cardiovascular   cardiovascular exam normal          Pulmonary   pulmonary exam normal                OUTSIDE LABS:  CBC:   Lab Results   Component Value Date    WBC 7.8 05/10/2024    WBC 7.6 01/09/2024    HGB 12.6 05/10/2024    HGB 12.1 01/09/2024    HCT 40.1 05/10/2024    HCT 38.3 01/09/2024     05/10/2024     01/09/2024     BMP:   Lab Results   Component Value Date     05/10/2024     05/02/2024    POTASSIUM 4.7 05/10/2024    POTASSIUM 4.5 05/02/2024    CHLORIDE 106 05/10/2024    CHLORIDE 105 05/02/2024    CO2 25 05/10/2024    CO2 24 05/02/2024    BUN 34.6 (H) 05/10/2024    BUN 48.0 (H) 05/02/2024    CR 1.42 (H) 05/10/2024    CR 1.27 (H) 05/02/2024     (H) 05/10/2024     (H) 05/02/2024     COAGS:   Lab Results  "  Component Value Date    INR 1.10 11/15/2021     POC: No results found for: \"BGM\", \"HCG\", \"HCGS\"  HEPATIC:   Lab Results   Component Value Date    ALBUMIN 4.3 05/10/2024    PROTTOTAL 7.5 12/09/2022    ALT 15 12/09/2022    AST 23 12/09/2022    ALKPHOS 88 12/09/2022    BILITOTAL 0.4 12/09/2022     OTHER:   Lab Results   Component Value Date    LACT 2.2 (H) 11/16/2021    A1C 5.8 (H) 05/02/2024    KINJAL 9.2 05/10/2024    PHOS 3.7 05/10/2024    MAG 1.7 12/09/2022    LIPASE 249 12/03/2013    TSH 1.01 06/12/2023       Anesthesia Plan    ASA Status:  3    NPO Status:  NPO Appropriate    Anesthesia Type: MAC.     - Reason for MAC: straight local not clinically adequate   Induction: Intravenous.   Maintenance: TIVA.        Consents    Anesthesia Plan(s) and associated risks, benefits, and realistic alternatives discussed. Questions answered and patient/representative(s) expressed understanding.     - Discussed: Risks, Benefits and Alternatives for BOTH SEDATION and the PROCEDURE were discussed     - Discussed with:  Patient            Postoperative Care    Pain management: Oral pain medications, Multi-modal analgesia.   PONV prophylaxis: Ondansetron (or other 5HT-3), Dexamethasone or Solumedrol     Comments:               Phani Hui MD, MD    I have reviewed the pertinent notes and labs in the chart from the past 30 days and (re)examined the patient.  Any updates or changes from those notes are reflected in this note.                  "

## 2024-08-26 DIAGNOSIS — E11.9 TYPE 2 DIABETES MELLITUS WITHOUT COMPLICATION, WITHOUT LONG-TERM CURRENT USE OF INSULIN (H): ICD-10-CM

## 2024-08-27 RX ORDER — FLASH GLUCOSE SENSOR
KIT MISCELLANEOUS
Qty: 2 EACH | Refills: 2 | Status: SHIPPED | OUTPATIENT
Start: 2024-08-27

## 2024-08-27 NOTE — TELEPHONE ENCOUNTER
Medication requested: Continuous Glucose Sensor (FREESTYLE JORDIN 14 DAY SENSOR[place] [facility] [place]   [place] office visit: 7/3/24  [facility] appointments: none  Medication last refilled: 6/27/24; 2 + 2 refills  Last qualifying labs:   Component      Latest Ref Rng 5/2/2024  3:48 PM   Hemoglobin A1C      0.0 - 5.6 % 5.8 (H)      Prescription approved per [facility] Refill Protocol.    Navin PHILIP, RN  08/27/24 4:27 PM

## 2024-09-10 ENCOUNTER — THERAPY VISIT (OUTPATIENT)
Dept: PHYSICAL THERAPY | Facility: CLINIC | Age: 69
End: 2024-09-10
Attending: PSYCHIATRY & NEUROLOGY
Payer: COMMERCIAL

## 2024-09-10 DIAGNOSIS — M54.16 CHRONIC RIGHT-SIDED LUMBAR RADICULOPATHY: ICD-10-CM

## 2024-09-10 DIAGNOSIS — G62.9 PERIPHERAL POLYNEUROPATHY: Primary | ICD-10-CM

## 2024-09-10 PROCEDURE — 97116 GAIT TRAINING THERAPY: CPT | Mod: GP | Performed by: PHYSICAL THERAPIST

## 2024-09-10 PROCEDURE — 97161 PT EVAL LOW COMPLEX 20 MIN: CPT | Mod: GP | Performed by: PHYSICAL THERAPIST

## 2024-09-10 PROCEDURE — 97110 THERAPEUTIC EXERCISES: CPT | Mod: GP | Performed by: PHYSICAL THERAPIST

## 2024-09-10 NOTE — PROGRESS NOTES
PHYSICAL THERAPY EVALUATION  Type of Visit: Evaluation       Fall Risk Screen:  Have you fallen 2 or more times in the past year?: Yes  Have you fallen and had an injury in the past year?: Yes  Is patient a fall risk?: Yes; Department fall risk interventions implemented    Subjective       Presenting condition or subjective complaint:   She did have therapy at [facility] that she finished recently. The therapy did help somewhat, but isn't where she wants to be yet. Feels her balance is pretty bad. She had home exercises but hasn't done them for a while.   Has chronic back pain that has gotten worse with time. Has had neuropathy for at least 20 years.    Date of onset: 08/15/24    Relevant medical history:     Dates & types of surgery:      Prior diagnostic imaging/testing results:       Prior therapy history for the same diagnosis, illness or injury:     PT at [facility]     Prior Level of Function  Transfers: Independent  Ambulation: Independent  ADL: Independent  IADL: Housekeeping, Laundry, Meal preparation, Medication management    Living Environment  Social support:   lives with grandsons  Type of home:   house  Stairs to enter the home:       2 steps, no rail  Ramp:     Stairs inside the home:       1 flight to the basement with 1 rail  Help at home:   none  Equipment owned:   cane (doesn't use)    Employment:      Hobbies/Interests:      Patient goals for therapy:   be able to walk better    Pain assessment: Pain present--low back, constant. She is unable to lift/carry 10 lbs, per her report. Improvement in back pain with extension.     Objective      Cognitive Status Examination  Orientation: Oriented to person, place and time   Level of Consciousness: Alert  Follows Commands and Answers Questions: 100% of the time  Personal Safety and Judgement: Intact  Memory: Intact    OBSERVATION: Patient arrives independently to appointment using no assistive device  INTEGUMENTARY: Intact  POSTURE:  forward head,  rounded shoulders. Decreased lumbar lordosis  PALPATION: N/T  RANGE OF MOTION:  Limited lumbar extension ROM, but reports of improvement in pain with extension motion . Tightness noted in heelcords and hamstrings bilaterally.  STRENGTH: LE Strength WNL    BED MOBILITY: Independent    TRANSFERS: Independent    WHEELCHAIR MOBILITY: N/A    GAIT:   Level of [place]: Independent  Assistive Device(s): None  Gait Deviations: Ambulates with no device independently. Short steps, slow diamond. No arm swing and minimal trunk rotation. Improved diamond and step length noted with trial of SEC in clinic.  Gait Distance: 400 feet  Stairs: Step-to pattern with rail    BALANCE: Impaired dynamic balance with head turns, tandem walk, and walking with eyes closed    SPECIAL TESTS  Functional Gait Assessment (FGA) TOTAL SCORE: (MAXIMUM SCORE 30): 17    10 Meter Walk Test (Comfortable)  8.61 (gait speed .7 m/sec)         SENSATION: decreased sensation/numbness in toes, extending up to the ankle. Decreased sensation in hands/fingers.    REFLEXES: N/T  COORDINATION: WFL  MUSCLE TONE: WFL        Assessment & Plan   CLINICAL IMPRESSIONS  Medical Diagnosis: peripheral polyneuropathy, Chronic R sided lumbar radiculopathy    Treatment Diagnosis: Sensory Selection and Weighting Deficit   Impression/Assessment: Patient is a 69 year old female with complaints of chronic back pain, neuropathy, and falls.  The following significant findings have been identified: Pain, Decreased ROM/flexibility, Impaired balance, Impaired sensation, Impaired gait, and Impaired posture. These impairments interfere with their ability to perform recreational activities, household chores, and community mobility as compared to previous level of function.     Clinical Decision Making (Complexity):  Clinical Presentation: Stable/Uncomplicated  Clinical Presentation Rationale: based on medical and personal factors listed in PT evaluation  Clinical Decision Making  (Complexity): Low complexity    PLAN OF CARE  Treatment Interventions:  Interventions: Gait Training, Manual Therapy, Neuromuscular Re-education, Therapeutic Activity, Therapeutic Exercise, Self-Care/Home Management    Long Term Goals     PT Goal 1  Goal Identifier: HEP  Goal Description: Patient will be independent with Home Exercise Program for continued improvements in health and wellness upon d/c from therapy  Target Date: 11/05/24  PT Goal 2  Goal Identifier: FGA  Goal Description: Patient will improve FGA score to 22 or better  Rationale: to maximize safety and independence with performance of ADLs and functional tasks  Target Date: 11/05/24  PT Goal 3  Goal Identifier: Gait speed  Goal Description: Patient will improve gait speed to .8 m/sec or better  Rationale: to maximize safety and independence within the community  Target Date: 11/05/24      Frequency of Treatment: 1x/week  Duration of Treatment: 8 weeks    Recommended Referrals to Other Professionals:   Education Assessment:   Learner/Method: Patient;Listening;Demonstration;Pictures/Video;No Barriers to Learning  Education Comments: HEP and POC    Risks and benefits of evaluation/treatment have been explained.   Patient/Family/caregiver agrees with Plan of Care.     Evaluation Time:     PT Eval, Low Complexity Minutes (69261): 27       Signing Clinician: Claudia Cerda, PT        [facility]                                                                                   OUTPATIENT PHYSICAL THERAPY      PLAN OF TREATMENT FOR OUTPATIENT REHABILITATION   Patient's Last Name, First Name, Karine Dyer YOB: 1955   Provider's Name   [facility]   Medical Record No.  [id number]     Onset Date: 08/15/24  Start of Care Date: 09/10/24     Medical Diagnosis:  peripheral polyneuropathy, Chronic R sided lumbar radiculopathy      PT Treatment Diagnosis:  Sensory Selection  and Weighting Deficit Plan of Treatment  Frequency/Duration: 1x/week/ 8 weeks    Certification date from 09/10/24 to 11/05/24         See note for plan of treatment details and functional goals     Claudia Cerda, PT                         I CERTIFY THE NEED FOR THESE SERVICES FURNISHED UNDER        THIS PLAN OF TREATMENT AND WHILE UNDER MY CARE .             Physician Signature               Date    X_____________________________________________________                  Referring Provider:  Denise Tan    Initial Assessment  See Epic Evaluation- Start of Care Date: 09/10/24

## 2024-09-19 DIAGNOSIS — N39.46 MIXED INCONTINENCE URGE AND STRESS (MALE)(FEMALE): ICD-10-CM

## 2024-09-19 DIAGNOSIS — I10 HYPERTENSION GOAL BP (BLOOD PRESSURE) < 140/90: ICD-10-CM

## 2024-09-19 RX ORDER — TOLTERODINE 4 MG/1
4 CAPSULE, EXTENDED RELEASE ORAL DAILY
Qty: 90 CAPSULE | Refills: 0 | Status: SHIPPED | OUTPATIENT
Start: 2024-09-19

## 2024-09-19 RX ORDER — LOSARTAN POTASSIUM 50 MG/1
TABLET ORAL
Qty: 90 TABLET | Refills: 0 | Status: SHIPPED | OUTPATIENT
Start: 2024-09-19

## 2024-09-19 NOTE — TELEPHONE ENCOUNTER
Medication requested: tolterodine ER (DETROL LA) 4 MG 24 hr capsule   Last office visit: 7/3/2024  [facility] appointments: none  Medication last refilled: 10/31/2023; 90 caps + 3 refills    Medication requested: losartan (COZAAR) 50 MG tablet   Medication last refilled: 3/26/2024; 90 tabs + 1 refill  Last qualifying labs:     BP Readings from Last 3 Encounters:   08/22/24 107/75   07/03/24 109/68   05/10/24 130/86     Component      Latest Ref Rng 5/10/2024  1:04 PM   GFR Estimate      >60 mL/min/1.73m2 40 (L)       Routing refill request to provider for review/approval because:  Labs out of range:  GFR    CEDRICK Juares, RN  09/19/24, 10:24 AM

## 2024-10-01 ENCOUNTER — THERAPY VISIT (OUTPATIENT)
Dept: PHYSICAL THERAPY | Facility: CLINIC | Age: 69
End: 2024-10-01
Attending: PSYCHIATRY & NEUROLOGY
Payer: COMMERCIAL

## 2024-10-01 DIAGNOSIS — M54.16 CHRONIC RIGHT-SIDED LUMBAR RADICULOPATHY: ICD-10-CM

## 2024-10-01 DIAGNOSIS — G62.9 PERIPHERAL POLYNEUROPATHY: Primary | ICD-10-CM

## 2024-10-01 PROCEDURE — 97110 THERAPEUTIC EXERCISES: CPT | Mod: GP | Performed by: PHYSICAL THERAPIST

## 2024-10-01 PROCEDURE — 97112 NEUROMUSCULAR REEDUCATION: CPT | Mod: GP | Performed by: PHYSICAL THERAPIST

## 2024-10-02 DIAGNOSIS — N18.31 STAGE 3A CHRONIC KIDNEY DISEASE (H): Primary | ICD-10-CM

## 2024-10-09 ENCOUNTER — LAB (OUTPATIENT)
Dept: LAB | Facility: CLINIC | Age: 69
End: 2024-10-09
Payer: COMMERCIAL

## 2024-10-09 DIAGNOSIS — R10.11 ABDOMINAL PAIN, RIGHT UPPER QUADRANT: ICD-10-CM

## 2024-10-09 DIAGNOSIS — N18.31 STAGE 3A CHRONIC KIDNEY DISEASE (H): ICD-10-CM

## 2024-10-09 DIAGNOSIS — K92.1 HEMATOCHEZIA: ICD-10-CM

## 2024-10-09 DIAGNOSIS — R19.5 CHANGE IN STOOL: ICD-10-CM

## 2024-10-09 DIAGNOSIS — K27.9 PEPTIC ULCER DISEASE: ICD-10-CM

## 2024-10-09 LAB
ERYTHROCYTE [DISTWIDTH] IN BLOOD BY AUTOMATED COUNT: 14 % (ref 10–15)
HCT VFR BLD AUTO: 36.6 % (ref 35–47)
HGB BLD-MCNC: 11.3 G/DL (ref 11.7–15.7)
MCH RBC QN AUTO: 29 PG (ref 26.5–33)
MCHC RBC AUTO-ENTMCNC: 30.9 G/DL (ref 31.5–36.5)
MCV RBC AUTO: 94 FL (ref 78–100)
PLATELET # BLD AUTO: 167 10E3/UL (ref 150–450)
RBC # BLD AUTO: 3.89 10E6/UL (ref 3.8–5.2)
WBC # BLD AUTO: 7.6 10E3/UL (ref 4–11)

## 2024-10-09 PROCEDURE — 36415 COLL VENOUS BLD VENIPUNCTURE: CPT

## 2024-10-09 PROCEDURE — 80053 COMPREHEN METABOLIC PANEL: CPT

## 2024-10-09 PROCEDURE — 82248 BILIRUBIN DIRECT: CPT

## 2024-10-09 PROCEDURE — 85027 COMPLETE CBC AUTOMATED: CPT

## 2024-10-09 PROCEDURE — 86140 C-REACTIVE PROTEIN: CPT

## 2024-10-09 PROCEDURE — 84100 ASSAY OF PHOSPHORUS: CPT

## 2024-10-10 ENCOUNTER — TELEPHONE (OUTPATIENT)
Dept: FAMILY MEDICINE | Facility: CLINIC | Age: 69
End: 2024-10-10

## 2024-10-10 ENCOUNTER — MYC MEDICAL ADVICE (OUTPATIENT)
Dept: SLEEP MEDICINE | Facility: CLINIC | Age: 69
End: 2024-10-10

## 2024-10-10 DIAGNOSIS — G47.419 NARCOLEPSY WITHOUT CATAPLEXY(347.00): Primary | ICD-10-CM

## 2024-10-10 LAB
ADV 40+41 DNA STL QL NAA+NON-PROBE: NEGATIVE
ALBUMIN SERPL BCG-MCNC: 4.2 G/DL (ref 3.5–5.2)
ALBUMIN UR-MCNC: NEGATIVE MG/DL
ALP SERPL-CCNC: 94 U/L (ref 40–150)
ALT SERPL W P-5'-P-CCNC: 20 U/L (ref 0–50)
ANION GAP SERPL CALCULATED.3IONS-SCNC: 12 MMOL/L (ref 7–15)
APPEARANCE UR: CLEAR
AST SERPL W P-5'-P-CCNC: 24 U/L (ref 0–45)
ASTRO TYP 1-8 RNA STL QL NAA+NON-PROBE: NEGATIVE
BACTERIA #/AREA URNS HPF: ABNORMAL /HPF
BILIRUB DIRECT SERPL-MCNC: <0.2 MG/DL (ref 0–0.3)
BILIRUB SERPL-MCNC: 0.6 MG/DL
BILIRUB UR QL STRIP: NEGATIVE
BUN SERPL-MCNC: 54.1 MG/DL (ref 8–23)
C CAYETANENSIS DNA STL QL NAA+NON-PROBE: NEGATIVE
C DIFF TOX B STL QL: NEGATIVE
CALCIUM SERPL-MCNC: 8.9 MG/DL (ref 8.8–10.4)
CAMPYLOBACTER DNA SPEC NAA+PROBE: NEGATIVE
CHLORIDE SERPL-SCNC: 107 MMOL/L (ref 98–107)
COLOR UR AUTO: YELLOW
CREAT SERPL-MCNC: 2.58 MG/DL (ref 0.51–0.95)
CRP SERPL-MCNC: <3 MG/L
CRYPTOSP DNA STL QL NAA+NON-PROBE: NEGATIVE
E COLI O157 DNA STL QL NAA+NON-PROBE: NORMAL
E HISTOLYT DNA STL QL NAA+NON-PROBE: NEGATIVE
EAEC ASTA GENE ISLT QL NAA+PROBE: NEGATIVE
EC STX1+STX2 GENES STL QL NAA+NON-PROBE: NEGATIVE
EGFRCR SERPLBLD CKD-EPI 2021: 19 ML/MIN/1.73M2
EPEC EAE GENE STL QL NAA+NON-PROBE: NEGATIVE
ETEC LTA+ST1A+ST1B TOX ST NAA+NON-PROBE: NEGATIVE
G LAMBLIA DNA STL QL NAA+NON-PROBE: NEGATIVE
GLUCOSE SERPL-MCNC: 88 MG/DL (ref 70–99)
GLUCOSE UR STRIP-MCNC: >=1000 MG/DL
HCO3 SERPL-SCNC: 20 MMOL/L (ref 22–29)
HGB UR QL STRIP: NEGATIVE
KETONES UR STRIP-MCNC: NEGATIVE MG/DL
LEUKOCYTE ESTERASE UR QL STRIP: NEGATIVE
NITRATE UR QL: POSITIVE
NOROVIRUS GI+II RNA STL QL NAA+NON-PROBE: NEGATIVE
P SHIGELLOIDES DNA STL QL NAA+NON-PROBE: NEGATIVE
PH UR STRIP: 5 [PH] (ref 5–7)
PHOSPHATE SERPL-MCNC: 5.4 MG/DL (ref 2.5–4.5)
POTASSIUM SERPL-SCNC: 5.4 MMOL/L (ref 3.4–5.3)
PROT SERPL-MCNC: 7.5 G/DL (ref 6.4–8.3)
RBC #/AREA URNS AUTO: ABNORMAL /HPF
RVA RNA STL QL NAA+NON-PROBE: NEGATIVE
SALMONELLA SP RPOD STL QL NAA+PROBE: NEGATIVE
SAPO I+II+IV+V RNA STL QL NAA+NON-PROBE: NEGATIVE
SHIGELLA SP+EIEC IPAH ST NAA+NON-PROBE: NEGATIVE
SODIUM SERPL-SCNC: 139 MMOL/L (ref 135–145)
SP GR UR STRIP: 1.01 (ref 1–1.03)
SQUAMOUS #/AREA URNS AUTO: ABNORMAL /LPF
UROBILINOGEN UR STRIP-ACNC: 0.2 E.U./DL
V CHOLERAE DNA SPEC QL NAA+PROBE: NEGATIVE
VIBRIO DNA SPEC NAA+PROBE: NEGATIVE
WBC #/AREA URNS AUTO: ABNORMAL /HPF
Y ENTEROCOL DNA STL QL NAA+PROBE: NEGATIVE

## 2024-10-10 PROCEDURE — 87507 IADNA-DNA/RNA PROBE TQ 12-25: CPT

## 2024-10-10 PROCEDURE — 87493 C DIFF AMPLIFIED PROBE: CPT | Mod: 59

## 2024-10-10 PROCEDURE — 83993 ASSAY FOR CALPROTECTIN FECAL: CPT

## 2024-10-10 PROCEDURE — 82043 UR ALBUMIN QUANTITATIVE: CPT

## 2024-10-10 PROCEDURE — 82570 ASSAY OF URINE CREATININE: CPT

## 2024-10-10 PROCEDURE — 84156 ASSAY OF PROTEIN URINE: CPT

## 2024-10-10 PROCEDURE — 81001 URINALYSIS AUTO W/SCOPE: CPT

## 2024-10-10 NOTE — TELEPHONE ENCOUNTER
General (use ONLY if request does not fit into other dot phrase categories)    Who is calling - Leila   Reason for call - Adderall prescription  Action desired - Wants a callback because current sleep provider isn't returning her messages and calls to refill this medication.   Return call needed? Yes  Advised patient that response may take up to 1-2 business days? Yes  Ok to leave a message on VM? No

## 2024-10-10 NOTE — TELEPHONE ENCOUNTER
Called daughter, Leila, and let her know that Dr. Howard sent a message back to the sleep medicine nursing pool that Karine can stay on the Adderall as she's chosen not to do the MSLT therapy.  I encouraged Leila to send a [employer] message to Dr. Howard's team requesting they send the prescription to [facility] pharmacy and request they follow up with her to let her know when the prescription has been sent in.  She has the right to set that expectation.  CEDRICK Fernandes, RN, CCM  RN Care Coordinator  [facility]  10/10/24  11:04 AM  Phone: [phone]

## 2024-10-11 ENCOUNTER — OFFICE VISIT (OUTPATIENT)
Dept: NEPHROLOGY | Facility: CLINIC | Age: 69
End: 2024-10-11
Attending: STUDENT IN AN ORGANIZED HEALTH CARE EDUCATION/TRAINING PROGRAM
Payer: COMMERCIAL

## 2024-10-11 VITALS
SYSTOLIC BLOOD PRESSURE: 138 MMHG | DIASTOLIC BLOOD PRESSURE: 80 MMHG | HEART RATE: 82 BPM | BODY MASS INDEX: 26.4 KG/M2 | TEMPERATURE: 98.6 F | OXYGEN SATURATION: 98 % | WEIGHT: 153.9 LBS

## 2024-10-11 DIAGNOSIS — E11.22 TYPE 2 DIABETES MELLITUS WITH STAGE 3B CHRONIC KIDNEY DISEASE, WITHOUT LONG-TERM CURRENT USE OF INSULIN (H): ICD-10-CM

## 2024-10-11 DIAGNOSIS — I50.32 CHRONIC DIASTOLIC CONGESTIVE HEART FAILURE (H): Chronic | ICD-10-CM

## 2024-10-11 DIAGNOSIS — E87.5 HYPERKALEMIA: ICD-10-CM

## 2024-10-11 DIAGNOSIS — N18.32 TYPE 2 DIABETES MELLITUS WITH STAGE 3B CHRONIC KIDNEY DISEASE, WITHOUT LONG-TERM CURRENT USE OF INSULIN (H): ICD-10-CM

## 2024-10-11 DIAGNOSIS — N18.32 STAGE 3B CHRONIC KIDNEY DISEASE (H): ICD-10-CM

## 2024-10-11 DIAGNOSIS — I10 HYPERTENSION, ESSENTIAL: ICD-10-CM

## 2024-10-11 DIAGNOSIS — N17.9 ACUTE KIDNEY INJURY SUPERIMPOSED ON CKD (H): Primary | ICD-10-CM

## 2024-10-11 DIAGNOSIS — N18.9 ACUTE KIDNEY INJURY SUPERIMPOSED ON CKD (H): Primary | ICD-10-CM

## 2024-10-11 LAB
ALBUMIN MFR UR ELPH: 12.5 MG/DL
CALPROTECTIN STL-MCNT: 114 MG/KG (ref 0–49.9)
CREAT UR-MCNC: 36.1 MG/DL
CREAT UR-MCNC: 36.1 MG/DL
MICROALBUMIN UR-MCNC: 20.8 MG/L
MICROALBUMIN/CREAT UR: 57.62 MG/G CR (ref 0–25)
PROT/CREAT 24H UR: 0.35 MG/MG CR (ref 0–0.2)

## 2024-10-11 PROCEDURE — G0463 HOSPITAL OUTPT CLINIC VISIT: HCPCS | Performed by: STUDENT IN AN ORGANIZED HEALTH CARE EDUCATION/TRAINING PROGRAM

## 2024-10-11 PROCEDURE — 99214 OFFICE O/P EST MOD 30 MIN: CPT | Performed by: STUDENT IN AN ORGANIZED HEALTH CARE EDUCATION/TRAINING PROGRAM

## 2024-10-11 ASSESSMENT — PAIN SCALES - GENERAL: PAINLEVEL: NO PAIN (0)

## 2024-10-11 NOTE — LETTER
10/11/2024       RE: Karine Moss  [address]  [facility] [address]     Dear Colleague,    Thank you for referring your patient, Karine Moss, to the [facility] [place] at [facility]. Please see a copy of my visit note below.    Nephrology Clinic    SUBJECTIVE:   Karine Moss is a 69 year old year old female with h/o DM2, HTN, obesity s/p gastric bypass, IZABEL not using CPAP, and CKD here for: CKD follow-up    The patient returns to kidney clinic after 5 months. Her eGFR has decreased from 40 to 19 since then, with no checks in the interim. She states she feels well - no N/V, no new confusion, no LE swelling, no diarrhea, no SOB or CP.     She denies NSAID use.     Of note, she had begun her SGLT2i just a few days before her last BMP check in May. This was a restart of the SGLT2i after a previous pause due to hypoglycemia.     The patient states she has been eating a lot of bananas lately.     UPCR is 0.35 g/g on losartan 50mg daily, which the patient and her daughter state was recently decreased from 100mg daily due to c/f over-treating her HTN and resulting lightheadedness. BP today is 130s/80.    UA is without hematuria.     Review of systems:  4 point ROS negative except as noted above.    Past Medical History:   Diagnosis Date     Arthritis      Basal cell carcinoma      Chest pain     seeing Cardiology     Depression 1991    after 's death     Diabetes mellitus (H)      Diabetic renal disease (H)     microalbuminuria     DJD (degenerative joint disease) of knee      H/O vitamin D deficiency      Hypertension      IBS (irritable bowel syndrome)     diarrhea      Keratoconus      Low back pain      Narcolepsy 2007    Dx'd by Sleep specialist 347.00, pt discontinued Adderal mid Nov. 13 due to tacycardia concerns     Obesity      Obstructive sleep apnea     327.23, 3 sleep studies,Dr. Flaco SELBY Lung      Pancreatitis     related to Victoza, also on Xyrem     Panic      RLS (restless legs syndrome)      Sinus tachycardia         Current Outpatient Medications   Medication Sig Dispense Refill     amphetamine-dextroamphetamine (ADDERALL) 30 MG tablet Take 1 tablet (30 mg) by mouth every 24 hours 1.5 mg a total of 45 mg  Daily 30 tablet 0     atorvastatin (LIPITOR) 20 MG tablet Take 1 tablet (20 mg) by mouth daily 90 tablet 3     bisacodyl (DULCOLAX) 5 MG EC tablet Two days prior to procedure take two (2) tablets at 10am. One day prior to procedure take two (2) tablets at 10am 4 tablet 0     blood glucose (NO BRAND SPECIFIED) lancets standard Use to test blood sugar 1 times daily or as directed. 90 Lancet 3     blood glucose (NO BRAND SPECIFIED) test strip Use to test blood sugar 1 times daily or as directed. 100 strip 3     blood glucose (NO BRAND SPECIFIED) test strip Use to test blood sugar as needed with Freestyle Nirmal CGM. 100 strip 0     blood glucose (NO BRAND SPECIFIED) test strip Use to test blood sugar as directed with CGM sensor. 50 strip 1     blood glucose calibration (NO BRAND SPECIFIED) solution Use to calibrate blood glucose monitor as needed as directed. 1 each 3     blood glucose monitoring (NO BRAND SPECIFIED) meter device kit Use to test blood sugar 1 times daily or as directed. 1 kit 0     Calcium Acetate 667 MG TABS Take 1 tablet by mouth 2 times daily for 360 days 180 tablet 3     cevimeline (EVOXAC) 30 MG capsule take 1 cap  capsule 1     cholecalciferol (VITAMIN D3) 125 mcg (5000 units) capsule Take 125 mcg by mouth daily       COMPOUNDED NON-CONTROLLED SUBSTANCE (CMPD RX) - PHARMACY TO MIX COMPOUNDED MEDICATION Estradiol 0.0075% in HRT cream  Apply 2 grams,  intravaginally and small amount externally daily for one week then twice weekly for maintenance. 45 g 11     Continuous Blood Gluc  (FREESTYLE NIRMAL 14 DAY READER) EBEN Use to read blood sugars as per 's  instructions. 1 each 0     Continuous Glucose Sensor (FREESTYLE JORDIN 14 DAY SENSOR) MISC Change every 14 days. 2 each 2     cyanocobalamin (VITAMIN B-12) 1000 MCG tablet Take one every other day 90 tablet 0     DULoxetine (CYMBALTA) 30 MG capsule Take 1 capsule (30 mg) by mouth 2 times daily 180 capsule 1     DULoxetine (CYMBALTA) 60 MG capsule Take 1 capsule (60 mg) by mouth at bedtime Take in addition to current 30 mg evening dose for a total of 90 mg at bedtime. 90 capsule 1     empagliflozin (JARDIANCE) 10 MG TABS tablet Take one daily 90 tablet 3     finasteride (PROSCAR) 5 MG tablet Take 1/2 tablet daily 45 tablet 3     losartan (COZAAR) 50 MG tablet Take one daily, schedule clinic visit in Oct for diabetic, BP check 90 tablet 0     metoclopramide (REGLAN) 10 MG tablet Take one tablet 30 minutes prior to drinking bowel prep to help with nausea. 2 tablet 0     metroNIDAZOLE (FLAGYL) 500 MG tablet Take 1 tablet (500 mg) by mouth 2 times daily 14 tablet 3     omeprazole (PRILOSEC) 40 MG DR capsule Take 40mg twice daily 180 capsule 3     polyethylene glycol (GOLYTELY) 236 g suspension One day prior to procedure at 3 pm fill container with water. Cover and shake until mixed well. Drink an 8 oz. glass of mixture every 10-15 minutes until the 1st half of the jug is gone. Place the remainder of the Golytely in the refrigerator. At 8 pm, drink the 2nd half of the jug of Golytely bowel prep. Drink an 8 oz. glass of Golytely every 10-15 minutes until the container of Golytely is gone. 4000 mL 0     pregabalin (LYRICA) 100 MG capsule Take 100 mg by mouth daily       tolterodine ER (DETROL LA) 4 MG 24 hr capsule Take 1 capsule by mouth daily. 90 capsule 0     traZODone (DESYREL) 50 MG tablet Take 1 tablet by mouth every 24 hours       triamcinolone (KENALOG) 0.1 % paste Apply to tongue twice daily x 10 days 5 g 0     No current facility-administered medications for this visit.        OBJECTIVE:  Physical exam:  /80   " Pulse 82   Temp 98.6  F (37  C) (Oral)   Wt 69.8 kg (153 lb 14.4 oz)   SpO2 98%   BMI 26.40 kg/m    Body mass index is 26.4 kg/m .   Gen: Well-developed, well-nourished and in no apparent distress  HEENT: normocephalic, anicteric   CV: regular rate and rhythm, normal S1 S2, no S3 or S4, and no murmur, click, or rub  Resp: clear to ausculation bilaterally, normal respiratory effort  Abd: soft, NTND  Ext: no LE edema   Skin: warm and dry, no rashes or ecchymoses on exposed skin  Psych: normal mood, normal affect  Neuro:  alert and oriented x3    Renal panel, UA, UPCR, UACR, CBC reviewed.   Recent Labs   Lab Test 10/09/24  1651 08/22/24  0725 05/10/24  1304     --  142   POTASSIUM 5.4*  --  4.7   CHLORIDE 107  --  106   CO2 20*  --  25   ANIONGAP 12  --  11   GLC 88 98 152*   BUN 54.1*  --  34.6*   CR 2.58*  --  1.42*   KINJAL 8.9  --  9.2       Lab Results   Component Value Date    WBC 7.6 10/09/2024    WBC 7.2 05/27/2021     Lab Results   Component Value Date    RBC 3.89 10/09/2024    RBC 4.45 05/27/2021     Lab Results   Component Value Date    HGB 11.3 10/09/2024    HGB 12.4 05/27/2021     Lab Results   Component Value Date    HCT 36.6 10/09/2024    HCT 40.0 05/27/2021     No components found for: \"MCT\"  Lab Results   Component Value Date    MCV 94 10/09/2024    MCV 90 05/27/2021     Lab Results   Component Value Date    MCH 29.0 10/09/2024    MCH 27.9 05/27/2021     Lab Results   Component Value Date    MCHC 30.9 10/09/2024    MCHC 31.0 05/27/2021     Lab Results   Component Value Date    RDW 14.0 10/09/2024    RDW 14.6 05/27/2021     Lab Results   Component Value Date     10/09/2024     05/27/2021       Color Urine (no units)   Date Value   10/10/2024 Yellow   05/27/2021 Canceled, Test credited     Appearance Urine (no units)   Date Value   10/10/2024 Clear   05/27/2021 Canceled, Test credited     Glucose Urine (mg/dL)   Date Value   10/10/2024 >=1000 (A)   05/27/2021 Canceled, Test " credited     Bilirubin Urine (no units)   Date Value   10/10/2024 Negative   05/27/2021 Canceled, Test credited     Ketones Urine (mg/dL)   Date Value   10/10/2024 Negative   05/27/2021 Canceled, Test credited     Specific Gravity Urine (no units)   Date Value   10/10/2024 1.010   05/27/2021 Canceled, Test credited     pH Urine   Date Value   10/10/2024 5.0   05/27/2021 Canceled, Test credited pH     Protein Albumin Urine (mg/dL)   Date Value   10/10/2024 Negative   05/27/2021 Canceled, Test credited     Urobilinogen Urine   Date Value   10/10/2024 0.2 E.U./dL   05/27/2021 Canceled, Test credited EU/dL     Nitrite Urine (no units)   Date Value   10/10/2024 Positive (A)   05/27/2021 Canceled, Test credited     Leukocyte Esterase Urine (no units)   Date Value   10/10/2024 Negative   05/27/2021 Canceled, Test credited       ASSESSMENT and PLAN:   Karine was seen today for recheck.    Diagnoses and all orders for this visit:    Acute kidney injury superimposed on CKD (H)  -     Basic metabolic panel; Future    Chronic diastolic congestive heart failure (H)    Stage 3b chronic kidney disease (H)    Type 2 diabetes mellitus with stage 3b chronic kidney disease, without long-term current use of insulin (H)    Hyperkalemia    Hypertension, essential    The patient has had a significant GFR drop, seemingly an QUIN on CKD stage 3b. Her eGFR had dropped 6 points in May right after SGLT2i was (re)started - perhaps that is the cause of this much more significant GFR change now seen months later. She does not appear to be in heart failure exacerbation - so I am less concerned with CV harm with stopping her SGLT2i, and she plans to follow-up with her PCP regarding DM2 treatment very soon.  Repeat BMP in 2 weeks to see if GFR improving (or stable or worsening).   Discussed reducing potassium in diet in the interim.   BP is mildly elevated but actually is acceptable for an QUIN-event. No change to BP meds, including no change to her  losartan 50mg daily (which was recently reduced from 100mg daily for over-treated HTN, according to the patient and her daughter).       Return to clinic pending repeat BMP check in 2 weeks (to determine if recovering from apparent QUIN on CKD or not, which would require quicker follow-up or less urgent).     Sarwat Chandler MD  Nephrology  Pager: [phone]      Again, thank you for allowing me to participate in the care of your patient.      Sincerely,    Sarwat Chandler MD

## 2024-10-11 NOTE — NURSING NOTE
Chief Complaint   Patient presents with    RECHECK     5 mo f/u       Vitals:    10/11/24 1010 10/11/24 1017 10/11/24 1018   BP: 134/78 135/80 138/80   Pulse: 82     Temp: 98.6  F (37  C)     TempSrc: Oral     SpO2: 98%     Weight: 69.8 kg (153 lb 14.4 oz)         BP Readings from Last 3 Encounters:   10/11/24 138/80   08/22/24 107/75   07/03/24 109/68       /80   Pulse 82   Temp 98.6  F (37  C) (Oral)   Wt 69.8 kg (153 lb 14.4 oz)   SpO2 98%   BMI 26.40 kg/m       Farshad Patel     I reviewed the H&P, I examined the patient, and there are no changes in the patient's condition.

## 2024-10-11 NOTE — PROGRESS NOTES
Nephrology Clinic    SUBJECTIVE:   Karine Moss is a 69 year old year old female with h/o DM2, HTN, obesity s/p gastric bypass, IZABEL not using CPAP, and CKD here for: CKD follow-up    The patient returns to kidney clinic after 5 months. Her eGFR has decreased from 40 to 19 since then, with no checks in the interim. She states she feels well - no N/V, no new confusion, no LE swelling, no diarrhea, no SOB or CP.     She denies NSAID use.     Of note, she had begun her SGLT2i just a few days before her last BMP check in May. This was a restart of the SGLT2i after a previous pause due to hypoglycemia.     The patient states she has been eating a lot of bananas lately.     UPCR is 0.35 g/g on losartan 50mg daily, which the patient and her daughter state was recently decreased from 100mg daily due to c/f over-treating her HTN and resulting lightheadedness. BP today is 130s/80.    UA is without hematuria.     Her 5/2024 renal U/S showed 1 kidney is 6.5cm and the other is 10.7cm in size.     Review of systems:  4 point ROS negative except as noted above.    Past Medical History:   Diagnosis Date    Arthritis     Basal cell carcinoma     Chest pain     seeing Cardiology    Depression 1991    after 's death    Diabetes mellitus (H)     Diabetic renal disease (H)     microalbuminuria    DJD (degenerative joint disease) of knee     H/O vitamin D deficiency     Hypertension     IBS (irritable bowel syndrome)     diarrhea     Keratoconus     Low back pain     Narcolepsy 2007    Dx'd by Sleep specialist 347.00, pt discontinued Adderal mid Nov. 13 due to tacycardia concerns    Obesity     Obstructive sleep apnea     327.23, 3 sleep studies,Dr. [facility]    Pancreatitis     related to Victoza, also on Xyrem    Panic     RLS (restless legs syndrome)     Sinus tachycardia         Current Outpatient Medications   Medication Sig Dispense Refill    amphetamine-dextroamphetamine (ADDERALL) 30 MG tablet Take 1 tablet (30 mg)  by mouth every 24 hours 1.5 mg a total of 45 mg  Daily 30 tablet 0    atorvastatin (LIPITOR) 20 MG tablet Take 1 tablet (20 mg) by mouth daily 90 tablet 3    bisacodyl (DULCOLAX) 5 MG EC tablet Two days prior to procedure take two (2) tablets at 10am. One day prior to procedure take two (2) tablets at 10am 4 tablet 0    blood glucose (NO BRAND SPECIFIED) lancets standard Use to test blood sugar 1 times daily or as directed. 90 Lancet 3    blood glucose (NO BRAND SPECIFIED) test strip Use to test blood sugar 1 times daily or as directed. 100 strip 3    blood glucose (NO BRAND SPECIFIED) test strip Use to test blood sugar as needed with Freestyle Nirmal CGM. 100 strip 0    blood glucose (NO BRAND SPECIFIED) test strip Use to test blood sugar as directed with CGM sensor. 50 strip 1    blood glucose calibration (NO BRAND SPECIFIED) solution Use to calibrate blood glucose monitor as needed as directed. 1 each 3    blood glucose monitoring (NO BRAND SPECIFIED) meter device kit Use to test blood sugar 1 times daily or as directed. 1 kit 0    Calcium Acetate 667 MG TABS Take 1 tablet by mouth 2 times daily for 360 days 180 tablet 3    cevimeline (EVOXAC) 30 MG capsule take 1 cap  capsule 1    cholecalciferol (VITAMIN D3) 125 mcg (5000 units) capsule Take 125 mcg by mouth daily      COMPOUNDED NON-CONTROLLED SUBSTANCE (CMPD RX) - PHARMACY TO MIX COMPOUNDED MEDICATION Estradiol 0.0075% in HRT cream  Apply 2 grams,  intravaginally and small amount externally daily for one week then twice weekly for maintenance. 45 g 11    Continuous Blood Gluc  (FREESTYLE NIRMAL 14 DAY READER) EBEN Use to read blood sugars as per 's instructions. 1 each 0    Continuous Glucose Sensor (FREESTYLE NIRMAL 14 DAY SENSOR) MISC Change every 14 days. 2 each 2    cyanocobalamin (VITAMIN B-12) 1000 MCG tablet Take one every other day 90 tablet 0    DULoxetine (CYMBALTA) 30 MG capsule Take 1 capsule (30 mg) by mouth 2 times daily  180 capsule 1    DULoxetine (CYMBALTA) 60 MG capsule Take 1 capsule (60 mg) by mouth at bedtime Take in addition to current 30 mg evening dose for a total of 90 mg at bedtime. 90 capsule 1    empagliflozin (JARDIANCE) 10 MG TABS tablet Take one daily 90 tablet 3    finasteride (PROSCAR) 5 MG tablet Take 1/2 tablet daily 45 tablet 3    losartan (COZAAR) 50 MG tablet Take one daily, schedule clinic visit in Oct for diabetic, BP check 90 tablet 0    metoclopramide (REGLAN) 10 MG tablet Take one tablet 30 minutes prior to drinking bowel prep to help with nausea. 2 tablet 0    metroNIDAZOLE (FLAGYL) 500 MG tablet Take 1 tablet (500 mg) by mouth 2 times daily 14 tablet 3    omeprazole (PRILOSEC) 40 MG DR capsule Take 40mg twice daily 180 capsule 3    polyethylene glycol (GOLYTELY) 236 g suspension One day prior to procedure at 3 pm fill container with water. Cover and shake until mixed well. Drink an 8 oz. glass of mixture every 10-15 minutes until the 1st half of the jug is gone. Place the remainder of the Golytely in the refrigerator. At 8 pm, drink the 2nd half of the jug of Golytely bowel prep. Drink an 8 oz. glass of Golytely every 10-15 minutes until the container of Golytely is gone. 4000 mL 0    pregabalin (LYRICA) 100 MG capsule Take 100 mg by mouth daily      tolterodine ER (DETROL LA) 4 MG 24 hr capsule Take 1 capsule by mouth daily. 90 capsule 0    traZODone (DESYREL) 50 MG tablet Take 1 tablet by mouth every 24 hours      triamcinolone (KENALOG) 0.1 % paste Apply to tongue twice daily x 10 days 5 g 0     No current facility-administered medications for this visit.        OBJECTIVE:  Physical exam:  /80   Pulse 82   Temp 98.6  F (37  C) (Oral)   Wt 69.8 kg (153 lb 14.4 oz)   SpO2 98%   BMI 26.40 kg/m    Body mass index is 26.4 kg/m .   Gen: Well-developed, well-nourished and in no apparent distress  HEENT: normocephalic, anicteric   CV: regular rate and rhythm, normal S1 S2, no S3 or S4, and no  "murmur, click, or rub  Resp: clear to ausculation bilaterally, normal respiratory effort  Abd: soft, NTND  Ext: no LE edema   Skin: warm and dry, no rashes or ecchymoses on exposed skin  Psych: normal mood, normal affect  Neuro:  alert and oriented x3    Renal panel, UA, UPCR, UACR, CBC reviewed.   Recent Labs   Lab Test 10/09/24  1651 08/22/24  0725 05/10/24  1304     --  142   POTASSIUM 5.4*  --  4.7   CHLORIDE 107  --  106   CO2 20*  --  25   ANIONGAP 12  --  11   GLC 88 98 152*   BUN 54.1*  --  34.6*   CR 2.58*  --  1.42*   KINJAL 8.9  --  9.2       Lab Results   Component Value Date    WBC 7.6 10/09/2024    WBC 7.2 05/27/2021     Lab Results   Component Value Date    RBC 3.89 10/09/2024    RBC 4.45 05/27/2021     Lab Results   Component Value Date    HGB 11.3 10/09/2024    HGB 12.4 05/27/2021     Lab Results   Component Value Date    HCT 36.6 10/09/2024    HCT 40.0 05/27/2021     No components found for: \"MCT\"  Lab Results   Component Value Date    MCV 94 10/09/2024    MCV 90 05/27/2021     Lab Results   Component Value Date    MCH 29.0 10/09/2024    MCH 27.9 05/27/2021     Lab Results   Component Value Date    MCHC 30.9 10/09/2024    MCHC 31.0 05/27/2021     Lab Results   Component Value Date    RDW 14.0 10/09/2024    RDW 14.6 05/27/2021     Lab Results   Component Value Date     10/09/2024     05/27/2021       Color Urine (no units)   Date Value   10/10/2024 Yellow   05/27/2021 Canceled, Test credited     Appearance Urine (no units)   Date Value   10/10/2024 Clear   05/27/2021 Canceled, Test credited     Glucose Urine (mg/dL)   Date Value   10/10/2024 >=1000 (A)   05/27/2021 Canceled, Test credited     Bilirubin Urine (no units)   Date Value   10/10/2024 Negative   05/27/2021 Canceled, Test credited     Ketones Urine (mg/dL)   Date Value   10/10/2024 Negative   05/27/2021 Canceled, Test credited     Specific Gravity Urine (no units)   Date Value   10/10/2024 1.010   05/27/2021 Canceled, Test " credited     pH Urine   Date Value   10/10/2024 5.0   05/27/2021 Canceled, Test credited pH     Protein Albumin Urine (mg/dL)   Date Value   10/10/2024 Negative   05/27/2021 Canceled, Test credited     Urobilinogen Urine   Date Value   10/10/2024 0.2 E.U./dL   05/27/2021 Canceled, Test credited EU/dL     Nitrite Urine (no units)   Date Value   10/10/2024 Positive (A)   05/27/2021 Canceled, Test credited     Leukocyte Esterase Urine (no units)   Date Value   10/10/2024 Negative   05/27/2021 Canceled, Test credited       ASSESSMENT and PLAN:   Karine was seen today for recheck.    Diagnoses and all orders for this visit:    Acute kidney injury superimposed on CKD (H)  -     Basic metabolic panel; Future    Chronic diastolic congestive heart failure (H)    Stage 3b chronic kidney disease (H)    Type 2 diabetes mellitus with stage 3b chronic kidney disease, without long-term current use of insulin (H)    Hyperkalemia    Hypertension, essential    The patient has had a significant GFR drop, seemingly an QUIN on CKD stage 3b. Her eGFR had dropped 6 points in May right after SGLT2i was (re)started - perhaps that is the cause of this much more significant GFR change now seen months later. She does not appear to be in heart failure exacerbation - so I am less concerned with CV harm with stopping her SGLT2i, and she plans to follow-up with her PCP regarding DM2 treatment very soon.  Repeat BMP in 2 weeks to see if GFR improving (or stable or worsening).   Discussed reducing potassium in diet in the interim.   BP is mildly elevated but actually is acceptable for an QUIN-event. No change to BP meds, including no change to her losartan 50mg daily (which was recently reduced from 100mg daily for over-treated HTN, according to the patient and her daughter).       Return to clinic pending repeat BMP check in 2 weeks (to determine if recovering from apparent QUIN on CKD or not, which would require quicker follow-up or less urgent).      Sarwat Chandler MD  Nephrology  Pager: [phone]

## 2024-10-14 NOTE — TELEPHONE ENCOUNTER
Patient requesting script to restart Adderall 30mg, current script not available in med list to reorder.

## 2024-10-14 NOTE — TELEPHONE ENCOUNTER
FYI - Status Update    Who is Calling: family member, daughter    Update: sent a myc and has not received a respond     Does caller want a call/response back: Yes     Could we send this information to you in [employer] or would you prefer to receive a phone call?:   Patient would prefer a phone call   Okay to leave a detailed message?: Yes at Home number on file [phone] (home)

## 2024-10-15 ENCOUNTER — TRANSFERRED RECORDS (OUTPATIENT)
Dept: HEALTH INFORMATION MANAGEMENT | Facility: CLINIC | Age: 69
End: 2024-10-15
Payer: COMMERCIAL

## 2024-10-15 NOTE — TELEPHONE ENCOUNTER
FYI - Status Update    Who is Calling: family member, Daughter    Update: Pt's daughter is calling back to see what the status for her med refill and if pt is needing a sleep study. PT's daughter would really like to get this figured out.     Does caller want a call/response back: Yes     Could we send this information to you in [employer] or would you prefer to receive a phone call?:   Patient would prefer a phone call   Okay to leave a detailed message?: Yes at Cell number on file:    Telephone Information:   Mobile [phone]

## 2024-10-16 RX ORDER — DEXTROAMPHETAMINE SACCHARATE, AMPHETAMINE ASPARTATE, DEXTROAMPHETAMINE SULFATE AND AMPHETAMINE SULFATE 7.5; 7.5; 7.5; 7.5 MG/1; MG/1; MG/1; MG/1
TABLET ORAL
Qty: 60 TABLET | Refills: 0 | Status: SHIPPED | OUTPATIENT
Start: 2024-12-15

## 2024-10-16 RX ORDER — DEXTROAMPHETAMINE SACCHARATE, AMPHETAMINE ASPARTATE, DEXTROAMPHETAMINE SULFATE AND AMPHETAMINE SULFATE 7.5; 7.5; 7.5; 7.5 MG/1; MG/1; MG/1; MG/1
TABLET ORAL
Qty: 60 TABLET | Refills: 0 | Status: SHIPPED | OUTPATIENT
Start: 2024-11-15

## 2024-10-16 RX ORDER — DEXTROAMPHETAMINE SACCHARATE, AMPHETAMINE ASPARTATE, DEXTROAMPHETAMINE SULFATE AND AMPHETAMINE SULFATE 7.5; 7.5; 7.5; 7.5 MG/1; MG/1; MG/1; MG/1
TABLET ORAL
Qty: 60 TABLET | Refills: 0 | Status: SHIPPED | OUTPATIENT
Start: 2024-10-16

## 2024-10-16 NOTE — TELEPHONE ENCOUNTER
I spoke with the patient and her daughter about use of ADDERALL and potential side effects. Medication initially started by Dr. Sam who has since retired. She is on higher doses than recommended. Without richard dose Adderall, her quality of life is very poor. She is very sleepy during the day and does not get out of bed. She takes 30-60 mg daily.   Decided to continue for the time being.

## 2024-10-17 ENCOUNTER — VIRTUAL VISIT (OUTPATIENT)
Dept: GASTROENTEROLOGY | Facility: CLINIC | Age: 69
End: 2024-10-17
Attending: PHYSICIAN ASSISTANT
Payer: COMMERCIAL

## 2024-10-17 ENCOUNTER — TELEPHONE (OUTPATIENT)
Dept: SLEEP MEDICINE | Facility: CLINIC | Age: 69
End: 2024-10-17

## 2024-10-17 DIAGNOSIS — N39.3 STRESS INCONTINENCE OF URINE: ICD-10-CM

## 2024-10-17 DIAGNOSIS — R10.11 ABDOMINAL PAIN, RIGHT UPPER QUADRANT: ICD-10-CM

## 2024-10-17 DIAGNOSIS — Z98.84 HISTORY OF ROUX-EN-Y GASTRIC BYPASS: Chronic | ICD-10-CM

## 2024-10-17 DIAGNOSIS — R19.5 CHANGE IN STOOL: ICD-10-CM

## 2024-10-17 DIAGNOSIS — R11.2 NAUSEA AND VOMITING, UNSPECIFIED VOMITING TYPE: ICD-10-CM

## 2024-10-17 DIAGNOSIS — R19.5 ELEVATED FECAL CALPROTECTIN: ICD-10-CM

## 2024-10-17 DIAGNOSIS — K27.9 PEPTIC ULCER DISEASE: Primary | ICD-10-CM

## 2024-10-17 PROCEDURE — 99215 OFFICE O/P EST HI 40 MIN: CPT | Mod: 95 | Performed by: PHYSICIAN ASSISTANT

## 2024-10-17 PROCEDURE — 99417 PROLNG OP E/M EACH 15 MIN: CPT | Mod: 95 | Performed by: PHYSICIAN ASSISTANT

## 2024-10-17 NOTE — NURSING NOTE
Current patient location: [address] [place] [id number]    Is the patient currently in the state of [place]? YES    Visit mode:VIDEO    If the visit is dropped, the patient can be reconnected by: VIDEO VISIT: Send to e-mail at: [email or web address]    Will anyone else be joining the visit? YES: How would they like to receive their invitation? Text to cell phone: PT will be with daughter  (If patient encounters technical issues they should call [phone])    Are changes needed to the allergy or medication list? No    Are refills needed on medications prescribed by this physician? NO    Rooming Documentation:  Questionnaire(s) completed    Reason for visit: RECHECK    Sammy CASTANEDA

## 2024-10-17 NOTE — PATIENT INSTRUCTIONS
It was a pleasure taking care of you today.  I've included a brief summary of our discussion and care plan from today's visit below.  Please review this information with your primary care provider.  _______________________________________________________________________    My recommendations are summarized as follows:    Repeat a stool sample - this checks for inflammation and the level was borderline. I want to recheck this level - OK to get end of Nov  Continue benefiber, recommend take a second dose later in the day too . Can start slowly and increase  (so start with 1 tsp for a week, and increase by a tsp weekly for an eventual goal of 1 tbsp later in the day). This is in addition to the 1 tbsp in the morning  We can consider dietary consult in future. If you have nausea/vomiting again, downgrade the diet to soft textured meals. OK to add on boost/ensure nutritional supplements. If symptoms persist 24-48 hours and or if there is lethargy, low energy, confusion, poor oral intake - let care team know and or consider ER evaluation for more immediate labs and work up   Referral to PT for urinary incontinence  Continue omeprazole as you are  Continue to avoid anti-inflammatory meds     Please call our clinic or send a MyChart message to us if you have any questions or concerns. MyChart messages are answered by your nurse or doctor typically within 24 hours.  Please allow extra time on weekends and holidays    Return to GI Clinic in 6 months to review your progress.    _______________________________________________________________________    How do I schedule labs, imaging studies, or procedures that were ordered in clinic today?      Labs: To schedule lab appointment at the [facility], use my chart or call [phone]. If you have a [facility] closer to home where you are regularly seen you can give them a call.      Procedures: If a colonoscopy, upper endoscopy, breath test, esophageal manometry,  or pH impedence was ordered today, our endoscopy team will call you to schedule this. If you have not heard from our endoscopy team within a week, please call [phone] option 2 to schedule.      Imaging Studies: If you were scheduled for a CT scan, X-ray, MRI, ultrasound, HIDA scan, EKG or other imaging study, please call [phone] to have this scheduled.      Referral: If a referral to another specialty was ordered, expect a phone call or follow instructions above. If you have not heard from anyone regarding your referral in a week, please call our clinic to check the status.      Who do I call with any questions after my visit?  Please be in touch if there are any further questions that arise following today's visit.  There are multiple ways to contact your gastroenterology care team.       During business hours, you may reach a Gastroenterology nurse at [phone]     To schedule or reschedule an appointment, please call [phone].      You can always send a secure message through [employer].  [employer] messages are answered by your nurse or doctor typically within 24 hours.  Please allow extra time on weekends and holidays.       For urgent/emergent questions after business hours, you may reach the on-call GI Fellow by contacting the [facility]  at [phone].     How will I get the results of any tests ordered?    You will receive all of your results.  If you have signed up for [employer], any tests ordered at your visit will be available to you after your physician reviews them.  Typically this takes 1-2 weeks.  If there are urgent results that require a change in your care plan, your physician or nurse will call you to discuss the next steps.       What is [employer]?  [employer] is a secure way for you to access all of your healthcare records from the [facility].  It is a web based computer program, so you can sign on to it from any location.  It also allows you to send  secure messages to your care team.  I recommend signing up for [employer] access if you have not already done so and are comfortable with using a computer.       How to I schedule a follow-up visit?  If you did not schedule a follow-up visit today, please call [phone] to schedule a follow-up office visit.      Sincerely,    Amie Grace PA-C  Gastroenterology

## 2024-10-17 NOTE — PROGRESS NOTES
Virtual Visit Details    Type of service:  Video Visit     Originating Location (pt. Location): Home in [place]    Distant Location (provider location):  On-site  Platform used for Video Visit: Bernadette    Joined the call at 10/17/2024, 10:00:37 am.  Left the call at 10/17/2024, 10:38:48 am.  You were on the call for 38 minutes 11 seconds .    Here w/ dgt Leila     GI CLINIC VISIT    ASSESSMENT/PLAN:  69 year old female with PMH of gastric bypass, T2DM, HTN, hyperlipidemia, gastric ulcer presenting to GI clinic for follow up on h/o gastric ulcer, episodes of n/v with abd discomfort, and loose stools     #Gastric Ulcer, h/o (healed)  11/2021 hospitalized for GI bleed and found to have a 2 anastomotic gastric ulcers (12 mm, 10 mm) suspected to be due to NSAID. She was placed on protonix 40 mg BID. Follow up EGD 2/11/2022 to check for healing showed an enlarging ulcer, measured at 21-25 mm gastric ulcer in the antrum. She was switched to omperazole 40 mg BID. Follow up EGD 1/2023 showed a 5 mm clean based gastric ulcer near the anastomosis (Perkins class III). She was placed on omeprazole 40 mg twice daily indefinitely and asked to avoid NSAIDs.    Due to recurrent (but intermittent) RUQ abd pain w/o melena, pt and family reported concern for recurrance at our appt 8/5/24 and therefore a repeat EGD was undertaken 8/22/2024 - the gastrojejunal anastomosis was characterized by scarring from the previous ulceration proximal to the anastomosis site. No stenosis seen. Jejunum was intubated and it appeared healthy.     Today she reports doing well and remains on daily PPI and has continued to avoid NSAIDs. No further RUQ abd pain. HIDA and RUQ not done but as she's asymptomatic now, OK to hold.     Plan  -cont omeprazole, indefinite    -avoid NSAIDs    Future consideration  -complete abd US with doppler +/- HIDA  -for recurrent pain with n/v, consider repeat EGD for evaluation of GJ stricturing. Especially if there is weight  loss  -alternate PPI (previously Protonix, currently omeprazole)     #nausea/vomiting/abd discomfort, h/o  Episodes of severe nausea/vomiting and abd discomfort spanning a few days, occurring more frequently. No longer symptomatic since our last appt, which is reassuring. Sx could be due to gastroparesis though testing with dedicated emptying study would be difficult to interpret in setting of her prior RYGB. This is also supported by scarring seen proximal to GJ anastomotic site on recent EGD.  Additional ddx includes atypical bilary presentation (though less likely given s/p CCY status).     HIDA and RUQ not done but as she's asymptomatic now, OK to hold. If sx were to flare up, can consider repeat EGD to evaluate for stricture at GJ anastomotic site . We discussed general dietary strategies (decrease to soft textured, use of nutritional supplements) for increased upper GI sx. Formal GD RD consult declined today. I also asked they inform care team for similar sx > 24-48h. ER precautions reviewed     Future consideration  -complete abd US with doppler +/- HIDA  -GI RD consult   -for recurrent pain with n/v, consider repeat EGD for evaluation of GJ stricturing. Especially if there is weight loss    #loose stools, h/o   #scant hematochezia seen on TP, h/o    Infectious stool studies negative. Colonoscopy 8/2024 with TI intubation, a few small TA but  was otherwise unremarkable; random colon biopsies were negative for microscopic colitis.     For now her stooling is much improved, passing a formed stool every 3 days.  We discussed pelvic floor dysfunction, which I am suspicious she have. I asked she increase her benefiber to BID and take PRN miralax. Currently she denies any obstructive defecatory sx so will defer dedicated defecography studies, though notable to mention she has had trouble with fecal incontinence in past (seen previously with CRS in 2023) and had findings of decreased sphincter tone on AZALEA during her  CSCope. She is reporting stress urinary incontinence and welcomes a referral to pelvic floor team for further eval of this. Ddx include retained stools with overflow, SIBO, other malabsorption, functional disease  vs other     Plan  -pelvic floor PT consult ordered at [facility]  -increase benefiber +/- use of PRN miralax    Future consideration  -consider dedicated PF eval with defecography studies, she would likely benefit   -breath test for eval of SIBO   -GI RD consult     #borderline fecal calpro  To 114 in setting of recent unremarkable bidirectional scopes. Infectious stool studies negative. She is no longer having sx as well which is reassuring.   Trend, repeat in Nov.     #CRC screening   TAs on 8/2024 scope, recall 7y (due 8/2031)    RTC - 6 mo or sooner PRN     Thank you for this consultation. It was a pleasure to participate in the care of this patient; please contact us with any further questions.    Amie Grace PA-C    Follow up: As planned above. Today, I personally spent 38 minutes in direct face to face time with the patient, of which greater than 50% of the time was spent in patient education and counseling as described above. Approximately 30 minutes were spent on indirect care associated with the patient's consultation including but not limited to review of: patient medical records to date, clinic visits, hospital records, lab results, imaging studies, procedural documentation, and coordinating care with other providers. The findings from this review are summarized in the above note. All of the above accounted for a cumulative time of 68 minutes and was performed on the date of service.       HPI: 69 year old female with PMH of gastric bypass, T2DM, HTN, hyperlipidemia, gastric ulcer following with the Gen GI team for history of gastric ulcer. She last saw our team in 2023, Dr Mason and Dorian. Today is my 2nd appt with the patient.     Dr Mason 2023 note   67 year old history of gastric bypass,  T2DM, HTN, hyperlipidemia, gastric ulcer who was reportedly hospitalized for GI bleed and found to have a large anastomotic gastric ulcer suspected to be due to NSAID in 11/2021, follow up EGD to check for healing continued to show a 21-25 mm gastric ulcer in the antrum and patient was supposed to get another follow up EGD, unclear why that was not done.   Patient then recently followed up with her PCP who then requested she followed up with GI and gets a follow up EGD.     10/17/24  Dgt Leila present who helps supply some collateral history   Doing a lot better these days . No trouble with scopes or sedation.   No longer having N/V/or abd pain  Eating her normal diet w/o issues. No weight loss, though shares she did put on 20# over the last 6 mo.this weight gain has stablized in the past 2 mo.   Continues on PPI and not taking NSAIDs. No heartburn, dysphagia, odynphagia, melena.   Does have urinary incontinence, with standing up, laughing hard, sneezing. onset years ago. No dysuria, hematuria. Overall stable, per pt. Not done pelvic floor PT would like eval   BM - soft log, will have 2-3 BM every 3 days; large volume. No straining. Good evacuation. Uncoupled.  No bloody or black stools.   Does have fecal urgency, but no leakage, denies fecal incontinenc  Benefiber - once daily, 1 tbsp daily . No bloat or fullness on it   No frim hards stools.     PAST MEDICAL HISTORY:  Past Medical History:   Diagnosis Date    Arthritis     Basal cell carcinoma     Chest pain     seeing Cardiology    Depression 1991    after 's death    Diabetes mellitus (H)     Diabetic renal disease (H)     microalbuminuria    DJD (degenerative joint disease) of knee     H/O vitamin D deficiency     Hypertension     IBS (irritable bowel syndrome)     diarrhea     Keratoconus     Low back pain     Narcolepsy 2007    Dx'd by Sleep specialist 347.00, pt discontinued Adderal mid Nov. 13 due to tacycardia concerns    Obesity     Obstructive  sleep apnea     327.23, 3 sleep studies,Dr. Flaco [facility]    Pancreatitis     related to Victoza, also on Xyrem    Panic     RLS (restless legs syndrome)     Sinus tachycardia        PREVIOUS ABDOMINAL/GYNECOLOGIC SURGERIES:  Past Surgical History:   Procedure Laterality Date    COLONOSCOPY  10/01/2020    poor quality prep    COLONOSCOPY N/A 8/22/2024    Procedure: COLONOSCOPY, WITH POLYPECTOMY AND BIOPSY;  Surgeon: Andreina Layne MD;  Location: [facility]    ear surgery  2002 and 01/2004    ESOPHAGOSCOPY, GASTROSCOPY, DUODENOSCOPY (EGD), COMBINED N/A 11/16/2021    Procedure: ESOPHAGOGASTRODUODENOSCOPY (EGD);  Surgeon: Jayla Calvo MD;  [facility]    ESOPHAGOSCOPY, GASTROSCOPY, DUODENOSCOPY (EGD), COMBINED N/A 1/9/2023    Procedure: ESOPHAGOGASTRODUODENOSCOPY, WITH BIOPSY;  Surgeon: Brandon Witt MD;  Location: [facility]    ESOPHAGOSCOPY, GASTROSCOPY, DUODENOSCOPY (EGD), COMBINED N/A 8/22/2024    Procedure: Esophagoscopy, gastroscopy, duodenoscopy (EGD), combined;  Surgeon: Andreina Layne MD;  Location: [facility]    GASTRIC BYPASS  2013    laparoscopic jimmy en y c ometopexy    HEMORRHOIDECTOMY  09/14/2000    LAPAROSCOPIC CHOLECYSTECTOMY  2015    LASIK BILATERAL      UVULOPALATOPHARYNGOPLASTY      UVVP           PERTINENT MEDICATIONS:  Current Outpatient Medications   Medication Sig Dispense Refill    amphetamine-dextroamphetamine (ADDERALL) 30 MG tablet Take 1-2 tablets 60 tablet 0    [START ON 11/15/2024] amphetamine-dextroamphetamine (ADDERALL) 30 MG tablet Take 1-2 tablets 60 tablet 0    [START ON 12/15/2024] amphetamine-dextroamphetamine (ADDERALL) 30 MG tablet Take 1-2 tablets 60 tablet 0    amphetamine-dextroamphetamine (ADDERALL) 30 MG tablet Take 1 tablet (30 mg) by mouth every 24 hours 1.5 mg a total of 45 mg  Daily 30 tablet 0    atorvastatin (LIPITOR) 20 MG tablet Take 1 tablet (20 mg) by mouth daily 90 tablet 3    bisacodyl (DULCOLAX) 5 MG EC tablet Two days prior to procedure take two (2)  tablets at 10am. One day prior to procedure take two (2) tablets at 10am 4 tablet 0    blood glucose (NO BRAND SPECIFIED) lancets standard Use to test blood sugar 1 times daily or as directed. 90 Lancet 3    blood glucose (NO BRAND SPECIFIED) test strip Use to test blood sugar 1 times daily or as directed. 100 strip 3    blood glucose (NO BRAND SPECIFIED) test strip Use to test blood sugar as needed with Freestyle Inrmal CGM. 100 strip 0    blood glucose (NO BRAND SPECIFIED) test strip Use to test blood sugar as directed with CGM sensor. 50 strip 1    blood glucose calibration (NO BRAND SPECIFIED) solution Use to calibrate blood glucose monitor as needed as directed. 1 each 3    blood glucose monitoring (NO BRAND SPECIFIED) meter device kit Use to test blood sugar 1 times daily or as directed. 1 kit 0    Calcium Acetate 667 MG TABS Take 1 tablet by mouth 2 times daily for 360 days 180 tablet 3    cevimeline (EVOXAC) 30 MG capsule take 1 cap  capsule 1    cholecalciferol (VITAMIN D3) 125 mcg (5000 units) capsule Take 125 mcg by mouth daily      COMPOUNDED NON-CONTROLLED SUBSTANCE (CMPD RX) - PHARMACY TO MIX COMPOUNDED MEDICATION Estradiol 0.0075% in HRT cream  Apply 2 grams,  intravaginally and small amount externally daily for one week then twice weekly for maintenance. 45 g 11    Continuous Blood Gluc  (FREESTYLE NIRMAL 14 DAY READER) EBEN Use to read blood sugars as per 's instructions. 1 each 0    Continuous Glucose Sensor (FREESTYLE NIRMAL 14 DAY SENSOR) MISC Change every 14 days. 2 each 2    cyanocobalamin (VITAMIN B-12) 1000 MCG tablet Take one every other day 90 tablet 0    DULoxetine (CYMBALTA) 30 MG capsule Take 1 capsule (30 mg) by mouth 2 times daily 180 capsule 1    DULoxetine (CYMBALTA) 60 MG capsule Take 1 capsule (60 mg) by mouth at bedtime Take in addition to current 30 mg evening dose for a total of 90 mg at bedtime. 90 capsule 1    empagliflozin (JARDIANCE) 10 MG TABS tablet  Take one daily 90 tablet 3    finasteride (PROSCAR) 5 MG tablet Take 1/2 tablet daily 45 tablet 3    losartan (COZAAR) 50 MG tablet Take one daily, schedule clinic visit in Oct for diabetic, BP check 90 tablet 0    metoclopramide (REGLAN) 10 MG tablet Take one tablet 30 minutes prior to drinking bowel prep to help with nausea. 2 tablet 0    metroNIDAZOLE (FLAGYL) 500 MG tablet Take 1 tablet (500 mg) by mouth 2 times daily 14 tablet 3    omeprazole (PRILOSEC) 40 MG DR capsule Take 40mg twice daily 180 capsule 3    polyethylene glycol (GOLYTELY) 236 g suspension One day prior to procedure at 3 pm fill container with water. Cover and shake until mixed well. Drink an 8 oz. glass of mixture every 10-15 minutes until the 1st half of the jug is gone. Place the remainder of the Golytely in the refrigerator. At 8 pm, drink the 2nd half of the jug of Golytely bowel prep. Drink an 8 oz. glass of Golytely every 10-15 minutes until the container of Golytely is gone. 4000 mL 0    pregabalin (LYRICA) 100 MG capsule Take 100 mg by mouth daily      tolterodine ER (DETROL LA) 4 MG 24 hr capsule Take 1 capsule by mouth daily. 90 capsule 0    traZODone (DESYREL) 50 MG tablet Take 1 tablet by mouth every 24 hours      triamcinolone (KENALOG) 0.1 % paste Apply to tongue twice daily x 10 days 5 g 0     SOCIAL HISTORY:    Social History     Socioeconomic History    Marital status: Single     Spouse name: Not on file    Number of children: 1    Years of education: Not on file    Highest education level: Not on file   Occupational History     Employer: [employer]     Comment: on disability   Tobacco Use    Smoking status: Never     Passive exposure: Never    Smokeless tobacco: Never   Vaping Use    Vaping status: Never Used   Substance and Sexual Activity    Alcohol use: Not Currently     Comment: rare    Drug use: No    Sexual activity: Never   Other Topics Concern    Parent/sibling w/ CABG, MI or angioplasty before 65F 55M? No   Social  History Narrative    Daughter- dianna White since 1991    Drives only short distances due to narcolepsy            --------------------------------------------------------------------------------    Surgical History  Return To Top     Status Surgery Time Frame Comment Record Date     Inactive  ear surgery  1/04 5/27/2008      Inactive  eye surgery  2002 5/27/2008      Inactive  Hemorrhoidectomy  9/14/00 5/27/2008          --------------------------------------------------------------------------------    Food Allergy  Return To Top     Allergen Reaction Comment Record Date     * No known food allergies      10/28/2008          --------------------------------------------------------------------------------    Drug Allergy  Return To Top     Allergen Reaction Comment Record Date     Codeine  nausea    6/21/2007          --------------------------------------------------------------------------------    Environment Allergy  Return To Top     Allergen Reaction Comment Record Date     * No known environmental allergies      10/28/2008          --------------------------------------------------------------------------------    Social History  Return To Top     Question Answer Comment Record Date     Marital status      5/27/2008      Advance Directive or Living Will  Form Given to Patient    1/18/2010      Emotional Abuse  Yes    1/18/2010      Exercise  No    1/18/2010      Caffeine  Yes    5/27/2008      Physical Abuse  No    1/18/2010      Sealtbelts  Yes    1/18/2010      Sexual Abuse  No    1/18/2010      Breast/Testicle Self Check  No    1/18/2010      Number of children  1    1/18/2010      Living arrangements  House    1/18/2010      Number of adults in household  2    1/18/2010      Education level  High School Graduate    1/18/2010      Employment  Currently employed    1/18/2010      Tobacco history  Has never smoked or chewed tobacco    5/27/2008      Alcohol history  Currently drinks alcohol     5/27/2008      Has the patient ever used illegal drugs?  Has never used illegal drugs    5/27/2008          --------------------------------------------------------------------------------    History - Overall Remark: 3/21/2012 Return To Top         --------------------------------------------------------------------------------    Medical History Return To Top     Status Diagnosis Time Frame Comment Record Date     Active (250.00) - C - Diabetes mellitus, type II controlled      5/15/2012      Active (788.41) - C - Urinary frequency      4/17/2012      Active (250.02) - C - Diabetes mellitus, type II uncontrolled      3/6/2012      Active (278.00) - C - Obesity      2/14/2011      Active (250.00) - C - Diabetes mellitus, type II controlled      2/14/2011      Active (739.3) - C - Somatic dysfunction, lumbar region      8/16/2010      Active (739.5) - C - Somatic dysfunction, pelvic region      8/16/2010      Active (401.9) - C - Hypertension      2/8/2010      Active 268.9 UNSP VITAMIN D DEFICIENCY      1/18/2010      Active (695.3) - C - Rosacea      1/18/2010      Active 272.1 Hypertriglyceridemia      1/18/2010      Active 300.4 Depression with Anxiety (Dysthymic Disorder)      10/28/2008      Active 739.6 Somatic dysfunction, lower extremities      10/28/2008      Active 780.79 Fatigue      6/23/2008      Active 278.01 Obesity, morbid      6/19/2008      Active 347.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, 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      6/19/2008      Inactive  (462) - C - Pharyngitis, acute      1/15/2011      Inactive  250.02 Diabetes mellitus, type II uncontrolled      1/18/2010      Inactive  726.71 Tendinitis, achilles      10/28/2008      Inactive  (250.00) - C - Diabetes mellitus, type II controlled      10/28/2008      Inactive  (250.00) - C - Diabetes mellitus, type II controlled      6/23/2008      Inactive  V77.91 Screening, lipids      6/23/2008      Inactive  599.0 Urinary tract infection, unspec./pyuria (UTI)      6/23/2008       Inactive  V70.0 Routine Medical Exam      2008      Active Constipation      2008      Active Hemorrhoids      2008      Pancreatitis pancreatitis 2013     --------------------------------------------------------------------------------    M        --------------------------------------------------------------------------------    Family History  Return To Top     Status Relationship Disease Comment Record Date     Alive Sister  *Denies any medical problems  3 sisters  2008      Alive Brother  *Denies any medical problems  2 brothers  2008         Father    Age of death 56  2010         Mother  Dementia  Age of death 82  2008          --------------------------------------------------------------------------------                         Social Determinants of Health     Financial Resource Strain: Not on file   Food Insecurity: Not on file   Transportation Needs: Not on file   Physical Activity: Not on file   Stress: Not on file   Social Connections: Unknown (1/3/2024)    [facility]    Social Connections     Frequency of Communication with Friends and Family: Not on file   Interpersonal Safety: Low Risk  (9/10/2024)    Interpersonal Safety     Do you feel physically and emotionally safe where you currently live?: Yes     Within the past 12 months, have you been hit, slapped, kicked or otherwise physically hurt by someone?: No     Within the past 12 months, have you been humiliated or emotionally abused in other ways by your partner or ex-partner?: No   Housing Stability: Not on file       FAMILY HISTORY:    Family History   Problem Relation Age of Onset    Memory loss Mother     Other - See Comments Mother         hair thinning    Diabetes Father     Chronic Obstructive Pulmonary Disease Sister     Anemia Sister         hx of ulcers    Other - See Comments Sister 65         MVA, followed by leg pain after a fall    Dementia Sister 68    Dementia Brother 70    Cancer Brother         lung    Cancer - colorectal Maternal Grandmother     Cancer Daughter     Obesity Daughter         s/p lap band     PHYSICAL EXAMINATION:  Vitals reviewed: There were no vitals taken for this visit.  Wt:   Wt Readings from Last 2 Encounters:   10/11/24 69.8 kg (153 lb 14.4 oz)   07/03/24 67.1 kg (148 lb)      Video physical exam  General: Patient appears well in no acute distress.   Skin: No visualized rash or lesions on visualized skin  Eyes: EOMI, no erythema, sclera icterus or discharge noted  Resp: Appears to be breathing comfortably without accessory muscle usage, speaking in full sentences, no cough  MSK: Appears to have normal range of motion based on visualized movements  Neurologic: No apparent tremors, facial movements symmetric  Psych: affect normal, alert and oriented    PERTINENT STUDIES - Reviewed in EMR     Lab Results   Component Value Date    WBC 7.6 10/09/2024    WBC 7.8 05/10/2024    WBC 7.6 01/09/2024    HGB 11.3 (L) 10/09/2024    HGB 12.6 05/10/2024    HGB 12.1 01/09/2024     10/09/2024     05/10/2024     01/09/2024    CHOL 133 01/09/2024    CHOL 126 03/29/2022    CHOL 113.0 03/08/2021    TRIG 129 01/09/2024    TRIG 123 03/29/2022    TRIG 157.0 (H) 03/08/2021    HDL 55 01/09/2024    HDL 52 03/29/2022    HDL 35.0 (L) 03/08/2021    ALT 20 10/09/2024    ALT 15 12/09/2022    ALT 21 10/15/2022    AST 24 10/09/2024    AST 23 12/09/2022    AST 16 10/15/2022     10/09/2024     05/10/2024     05/02/2024    BUN 54.1 (H) 10/09/2024    BUN 34.6 (H) 05/10/2024    BUN 48.0 (H) 05/02/2024    CO2 20 (L) 10/09/2024    CO2 25 05/10/2024    CO2 24 05/02/2024    TSH 1.01 06/12/2023    TSH 1.52 12/09/2022    TSH 1.69 10/22/2019    INR 1.10 11/15/2021        Liver Function Studies -   Recent Labs   Lab Test 05/10/24  1304 12/09/22  1203   PROTTOTAL  --  7.5    ALBUMIN 4.3 4.2   BILITOTAL  --  0.4   ALKPHOS  --  88   AST  --  23   ALT  --  15        PREVIOUS ENDOSCOPY  8/22/2024 - EGD - the gastrojejunal anastomosis was characterized by scarring from the previous ulceration proximal to the anastomosis site. No stenosis seen. Jejunum was intubated and it appeared healthy.    8/22/2024 - Colonoscopy with 10 cm TI intubation, BBPS score 6. A few small TA from transverse w/o high grade dysplasia. Normal TI and normal macro colon. Random colon biopsies were negative for microscopic colitis. Sigmoid diverticular dz w/o inflammation or bleeding. Grade II internal hemorrhoids. AZALEA revealed decreased sphincter tone. 7y recall

## 2024-10-17 NOTE — TELEPHONE ENCOUNTER
General Call    Contacts       Contact Date/Time Type Contact Phone/Fax    10/17/2024 04:16 PM CDT Phone (Incoming) [id number] - [place], [place] - [address]  (Pharmacy) [phone]     Requesting clarification on medication orders          Reason for Call:  Medication Orders    What are your questions or concerns: The pharmacy is needing the dosing frequency for the Adderall medication that was placed 10/16/24    Can either call back or resend the prescription order with clarification    Date of last appointment with provider: N/A    Could we send this information to you in [facility] or would you prefer to receive a phone call?:   Patient would prefer a phone call   Okay to leave a detailed message?: Yes at Other phone number:  [phone]

## 2024-10-17 NOTE — LETTER
10/17/2024      Karine oMss  [address]  [facility] [address]      Dear Colleague,    Thank you for referring your patient, Karine Moss, to the [facility] [place]. Please see a copy of my visit note below.    Virtual Visit Details    Type of service:  Video Visit     Originating Location (pt. Location): Home in [place]    Distant Location (provider location):  On-site  Platform used for Video Visit: Bernadette    Joined the call at 10/17/2024, 10:00:37 am.  Left the call at 10/17/2024, 10:38:48 am.  You were on the call for 38 minutes 11 seconds .    Here w/ dgt Leila     GI CLINIC VISIT    ASSESSMENT/PLAN:  69 year old female with PMH of gastric bypass, T2DM, HTN, hyperlipidemia, gastric ulcer presenting to GI clinic for follow up on h/o gastric ulcer, episodes of n/v with abd discomfort, and loose stools     #Gastric Ulcer, h/o (healed)  11/2021 hospitalized for GI bleed and found to have a 2 anastomotic gastric ulcers (12 mm, 10 mm) suspected to be due to NSAID. She was placed on protonix 40 mg BID. Follow up EGD 2/11/2022 to check for healing showed an enlarging ulcer, measured at 21-25 mm gastric ulcer in the antrum. She was switched to omperazole 40 mg BID. Follow up EGD 1/2023 showed a 5 mm clean based gastric ulcer near the anastomosis ([place] class III). She was placed on omeprazole 40 mg twice daily indefinitely and asked to avoid NSAIDs.    Due to recurrent (but intermittent) RUQ abd pain w/o melena, pt and family reported concern for recurrance at our appt 8/5/24 and therefore a repeat EGD was undertaken 8/22/2024 - the gastrojejunal anastomosis was characterized by scarring from the previous ulceration proximal to the anastomosis site. No stenosis seen. Jejunum was intubated and it appeared healthy.     Today she reports doing well and remains on daily PPI and has continued to avoid NSAIDs. No further RUQ abd pain. HIDA and RUQ not done but as she's  asymptomatic now, OK to hold.     Plan  -cont omeprazole, indefinite    -avoid NSAIDs    Future consideration  -complete abd US with doppler +/- HIDA  -for recurrent pain with n/v, consider repeat EGD for evaluation of GJ stricturing. Especially if there is weight loss  -alternate PPI (previously Protonix, currently omeprazole)     #nausea/vomiting/abd discomfort, h/o  Episodes of severe nausea/vomiting and abd discomfort spanning a few days, occurring more frequently. No longer symptomatic since our last appt, which is reassuring. Sx could be due to gastroparesis though testing with dedicated emptying study would be difficult to interpret in setting of her prior RYGB. This is also supported by scarring seen proximal to GJ anastomotic site on recent EGD.  Additional ddx includes atypical bilary presentation (though less likely given s/p CCY status).     HIDA and RUQ not done but as she's asymptomatic now, OK to hold. If sx were to flare up, can consider repeat EGD to evaluate for stricture at GJ anastomotic site . We discussed general dietary strategies (decrease to soft textured, use of nutritional supplements) for increased upper GI sx. Formal GD RD consult declined today. I also asked they inform care team for similar sx > 24-48h. ER precautions reviewed     Future consideration  -complete abd US with doppler +/- HIDA  -GI RD consult   -for recurrent pain with n/v, consider repeat EGD for evaluation of GJ stricturing. Especially if there is weight loss    #loose stools, h/o   #scant hematochezia seen on TP, h/o    Infectious stool studies negative. Colonoscopy 8/2024 with TI intubation, a few small TA but  was otherwise unremarkable; random colon biopsies were negative for microscopic colitis.     For now her stooling is much improved, passing a formed stool every 3 days.  We discussed pelvic floor dysfunction, which I am suspicious she have. I asked she increase her benefiber to BID and take PRN miralax.  Currently she denies any obstructive defecatory sx so will defer dedicated defecography studies, though notable to mention she has had trouble with fecal incontinence in past (seen previously with CRS in 2023) and had findings of decreased sphincter tone on AZALEA during her CSCope. She is reporting stress urinary incontinence and welcomes a referral to pelvic floor team for further eval of this. Ddx include retained stools with overflow, SIBO, other malabsorption, functional disease  vs other     Plan  -pelvic floor PT consult ordered at [place]  -increase benefiber +/- use of PRN miralax    Future consideration  -consider dedicated PF eval with defecography studies, she would likely benefit   -breath test for eval of SIBO   -GI RD consult     #borderline fecal calpro  To 114 in setting of recent unremarkable bidirectional scopes. Infectious stool studies negative. She is no longer having sx as well which is reassuring.   Trend, repeat in Nov.     #CRC screening   TAs on 8/2024 scope, recall 7y (due 8/2031)    RTC - 6 mo or sooner PRN     Thank you for this consultation. It was a pleasure to participate in the care of this patient; please contact us with any further questions.    Amie Grace PA-C    Follow up: As planned above. Today, I personally spent 38 minutes in direct face to face time with the patient, of which greater than 50% of the time was spent in patient education and counseling as described above. Approximately 30 minutes were spent on indirect care associated with the patient's consultation including but not limited to review of: patient medical records to date, clinic visits, hospital records, lab results, imaging studies, procedural documentation, and coordinating care with other providers. The findings from this review are summarized in the above note. All of the above accounted for a cumulative time of 68 minutes and was performed on the date of service.       HPI: 69 year old female with PMH of  gastric bypass, T2DM, HTN, hyperlipidemia, gastric ulcer following with the Gen GI team for history of gastric ulcer. She last saw our team in 2023, Dr Mason and Dorian. Today is my 2nd appt with the patient.     Dr Mason 2023 note   67 year old history of gastric bypass, T2DM, HTN, hyperlipidemia, gastric ulcer who was reportedly hospitalized for GI bleed and found to have a large anastomotic gastric ulcer suspected to be due to NSAID in 11/2021, follow up EGD to check for healing continued to show a 21-25 mm gastric ulcer in the antrum and patient was supposed to get another follow up EGD, unclear why that was not done.   Patient then recently followed up with her PCP who then requested she followed up with GI and gets a follow up EGD.     10/17/24  Dgt Leila present who helps supply some collateral history   Doing a lot better these days . No trouble with scopes or sedation.   No longer having N/V/or abd pain  Eating her normal diet w/o issues. No weight loss, though shares she did put on 20# over the last 6 mo.this weight gain has stablized in the past 2 mo.   Continues on PPI and not taking NSAIDs. No heartburn, dysphagia, odynphagia, melena.   Does have urinary incontinence, with standing up, laughing hard, sneezing. onset years ago. No dysuria, hematuria. Overall stable, per pt. Not done pelvic floor PT would like eval   BM - soft log, will have 2-3 BM every 3 days; large volume. No straining. Good evacuation. Uncoupled.  No bloody or black stools.   Does have fecal urgency, but no leakage, denies fecal incontinenc  Benefiber - once daily, 1 tbsp daily . No bloat or fullness on it   No frim hards stools.     PAST MEDICAL HISTORY:  Past Medical History:   Diagnosis Date     Arthritis      Basal cell carcinoma      Chest pain     seeing Cardiology     Depression 1991    after 's death     Diabetes mellitus (H)      Diabetic renal disease (H)     microalbuminuria     DJD (degenerative joint disease) of  knee      H/O vitamin D deficiency      Hypertension      IBS (irritable bowel syndrome)     diarrhea      Keratoconus      Low back pain      Narcolepsy 2007    Dx'd by Sleep specialist 347.00, pt discontinued Adderal mid Nov. 13 due to tacycardia concerns     Obesity      Obstructive sleep apnea     327.23, 3 sleep studies,Dr. [facility]     Pancreatitis     related to Victoza, also on Xyrem     Panic      RLS (restless legs syndrome)      Sinus tachycardia        PREVIOUS ABDOMINAL/GYNECOLOGIC SURGERIES:  Past Surgical History:   Procedure Laterality Date     COLONOSCOPY  10/01/2020    poor quality prep     COLONOSCOPY N/A 8/22/2024    Procedure: COLONOSCOPY, WITH POLYPECTOMY AND BIOPSY;  Surgeon: Andreina Layne MD;  Location:  GI     ear surgery  2002 and 01/2004     ESOPHAGOSCOPY, GASTROSCOPY, DUODENOSCOPY (EGD), COMBINED N/A 11/16/2021    Procedure: ESOPHAGOGASTRODUODENOSCOPY (EGD);  Surgeon: Jayla Calvo MD;  Location: [facility]     ESOPHAGOSCOPY, GASTROSCOPY, DUODENOSCOPY (EGD), COMBINED N/A 1/9/2023    Procedure: ESOPHAGOGASTRODUODENOSCOPY, WITH BIOPSY;  Surgeon: Brandon Witt MD;  Location: [facility]     ESOPHAGOSCOPY, GASTROSCOPY, DUODENOSCOPY (EGD), COMBINED N/A 8/22/2024    Procedure: Esophagoscopy, gastroscopy, duodenoscopy (EGD), combined;  Surgeon: Andreina Layne MD;  Location: [facility]     GASTRIC BYPASS  2013    laparoscopic jimmy en y c ometopexy     HEMORRHOIDECTOMY  09/14/2000     LAPAROSCOPIC CHOLECYSTECTOMY  2015     LASIK BILATERAL       UVULOPALATOPHARYNGOPLASTY       UVVP           PERTINENT MEDICATIONS:  Current Outpatient Medications   Medication Sig Dispense Refill     amphetamine-dextroamphetamine (ADDERALL) 30 MG tablet Take 1-2 tablets 60 tablet 0     [START ON 11/15/2024] amphetamine-dextroamphetamine (ADDERALL) 30 MG tablet Take 1-2 tablets 60 tablet 0     [START ON 12/15/2024] amphetamine-dextroamphetamine (ADDERALL) 30 MG tablet Take 1-2 tablets 60 tablet 0      amphetamine-dextroamphetamine (ADDERALL) 30 MG tablet Take 1 tablet (30 mg) by mouth every 24 hours 1.5 mg a total of 45 mg  Daily 30 tablet 0     atorvastatin (LIPITOR) 20 MG tablet Take 1 tablet (20 mg) by mouth daily 90 tablet 3     bisacodyl (DULCOLAX) 5 MG EC tablet Two days prior to procedure take two (2) tablets at 10am. One day prior to procedure take two (2) tablets at 10am 4 tablet 0     blood glucose (NO BRAND SPECIFIED) lancets standard Use to test blood sugar 1 times daily or as directed. 90 Lancet 3     blood glucose (NO BRAND SPECIFIED) test strip Use to test blood sugar 1 times daily or as directed. 100 strip 3     blood glucose (NO BRAND SPECIFIED) test strip Use to test blood sugar as needed with Freestyle Nirmal CGM. 100 strip 0     blood glucose (NO BRAND SPECIFIED) test strip Use to test blood sugar as directed with CGM sensor. 50 strip 1     blood glucose calibration (NO BRAND SPECIFIED) solution Use to calibrate blood glucose monitor as needed as directed. 1 each 3     blood glucose monitoring (NO BRAND SPECIFIED) meter device kit Use to test blood sugar 1 times daily or as directed. 1 kit 0     Calcium Acetate 667 MG TABS Take 1 tablet by mouth 2 times daily for 360 days 180 tablet 3     cevimeline (EVOXAC) 30 MG capsule take 1 cap  capsule 1     cholecalciferol (VITAMIN D3) 125 mcg (5000 units) capsule Take 125 mcg by mouth daily       COMPOUNDED NON-CONTROLLED SUBSTANCE (CMPD RX) - PHARMACY TO MIX COMPOUNDED MEDICATION Estradiol 0.0075% in HRT cream  Apply 2 grams,  intravaginally and small amount externally daily for one week then twice weekly for maintenance. 45 g 11     Continuous Blood Gluc  (FREESTYLE NIRMAL 14 DAY READER) EBEN Use to read blood sugars as per 's instructions. 1 each 0     Continuous Glucose Sensor (FREESTYLE NIRMAL 14 DAY SENSOR) MISC Change every 14 days. 2 each 2     cyanocobalamin (VITAMIN B-12) 1000 MCG tablet Take one every other day 90  tablet 0     DULoxetine (CYMBALTA) 30 MG capsule Take 1 capsule (30 mg) by mouth 2 times daily 180 capsule 1     DULoxetine (CYMBALTA) 60 MG capsule Take 1 capsule (60 mg) by mouth at bedtime Take in addition to current 30 mg evening dose for a total of 90 mg at bedtime. 90 capsule 1     empagliflozin (JARDIANCE) 10 MG TABS tablet Take one daily 90 tablet 3     finasteride (PROSCAR) 5 MG tablet Take 1/2 tablet daily 45 tablet 3     losartan (COZAAR) 50 MG tablet Take one daily, schedule clinic visit in Oct for diabetic, BP check 90 tablet 0     metoclopramide (REGLAN) 10 MG tablet Take one tablet 30 minutes prior to drinking bowel prep to help with nausea. 2 tablet 0     metroNIDAZOLE (FLAGYL) 500 MG tablet Take 1 tablet (500 mg) by mouth 2 times daily 14 tablet 3     omeprazole (PRILOSEC) 40 MG DR capsule Take 40mg twice daily 180 capsule 3     polyethylene glycol (GOLYTELY) 236 g suspension One day prior to procedure at 3 pm fill container with water. Cover and shake until mixed well. Drink an 8 oz. glass of mixture every 10-15 minutes until the 1st half of the jug is gone. Place the remainder of the Golytely in the refrigerator. At 8 pm, drink the 2nd half of the jug of Golytely bowel prep. Drink an 8 oz. glass of Golytely every 10-15 minutes until the container of Golytely is gone. 4000 mL 0     pregabalin (LYRICA) 100 MG capsule Take 100 mg by mouth daily       tolterodine ER (DETROL LA) 4 MG 24 hr capsule Take 1 capsule by mouth daily. 90 capsule 0     traZODone (DESYREL) 50 MG tablet Take 1 tablet by mouth every 24 hours       triamcinolone (KENALOG) 0.1 % paste Apply to tongue twice daily x 10 days 5 g 0     SOCIAL HISTORY:    Social History     Socioeconomic History     Marital status: Single     Spouse name: Not on file     Number of children: 1     Years of education: Not on file     Highest education level: Not on file   Occupational History     Employer: [employer]     Comment: on disability   Tobacco  Use     Smoking status: Never     Passive exposure: Never     Smokeless tobacco: Never   Vaping Use     Vaping status: Never Used   Substance and Sexual Activity     Alcohol use: Not Currently     Comment: rare     Drug use: No     Sexual activity: Never   Other Topics Concern     Parent/sibling w/ CABG, MI or angioplasty before 65F 55M? No   Social History Narrative    Daughter- 42,  since 1991    Drives only short distances due to narcolepsy            --------------------------------------------------------------------------------    Surgical History  Return To Top     Status Surgery Time Frame Comment Record Date     Inactive  ear surgery  1/04 5/27/2008      Inactive  eye surgery  2002 5/27/2008      Inactive  Hemorrhoidectomy  9/14/00 5/27/2008          --------------------------------------------------------------------------------    Food Allergy  Return To Top     Allergen Reaction Comment Record Date     * No known food allergies      10/28/2008          --------------------------------------------------------------------------------    Drug Allergy  Return To Top     Allergen Reaction Comment Record Date     Codeine  nausea    6/21/2007          --------------------------------------------------------------------------------    Environment Allergy  Return To Top     Allergen Reaction Comment Record Date     * No known environmental allergies      10/28/2008          --------------------------------------------------------------------------------    Social History  Return To Top     Question Answer Comment Record Date     Marital status      5/27/2008      Advance Directive or Living Will  Form Given to Patient    1/18/2010      Emotional Abuse  Yes    1/18/2010      Exercise  No    1/18/2010      Caffeine  Yes    5/27/2008      Physical Abuse  No    1/18/2010      Sealtbelts  Yes    1/18/2010      Sexual Abuse  No    1/18/2010      Breast/Testicle Self Check  No    1/18/2010       Number of children  1    1/18/2010      Living arrangements  House    1/18/2010      Number of adults in household  2    1/18/2010      Education level  High School Graduate    1/18/2010      Employment  Currently employed    1/18/2010      Tobacco history  Has never smoked or chewed tobacco    5/27/2008      Alcohol history  Currently drinks alcohol    5/27/2008      Has the patient ever used illegal drugs?  Has never used illegal drugs    5/27/2008          --------------------------------------------------------------------------------    History - Overall Remark: 3/21/2012 Return To Top         --------------------------------------------------------------------------------    Medical History Return To Top     Status Diagnosis Time Frame Comment Record Date     Active (250.00) - C - Diabetes mellitus, type II controlled      5/15/2012      Active (788.41) - C - Urinary frequency      4/17/2012      Active (250.02) - C - Diabetes mellitus, type II uncontrolled      3/6/2012      Active (278.00) - C - Obesity      2/14/2011      Active (250.00) - C - Diabetes mellitus, type II controlled      2/14/2011      Active (739.3) - C - Somatic dysfunction, lumbar region      8/16/2010      Active (739.5) - C - Somatic dysfunction, pelvic region      8/16/2010      Active (401.9) - C - Hypertension      2/8/2010      Active 268.9 UNSP VITAMIN D DEFICIENCY      1/18/2010      Active (695.3) - C - Rosacea      1/18/2010      Active 272.1 Hypertriglyceridemia      1/18/2010      Active 300.4 Depression with Anxiety (Dysthymic Disorder)      10/28/2008      Active 739.6 Somatic dysfunction, lower extremities      10/28/2008      Active 780.79 Fatigue      6/23/2008      Active 278.01 Obesity, morbid      6/19/2008      Active 347.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, 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      6/19/2008      Inactive  (462) - C - Pharyngitis, acute      1/15/2011      Inactive  250.02 Diabetes mellitus, type II uncontrolled      1/18/2010      Inactive   726.71 Tendinitis, achilles      10/28/2008      Inactive  (250.00) - C - Diabetes mellitus, type II controlled      10/28/2008      Inactive  (250.00) - C - Diabetes mellitus, type II controlled      2008      Inactive  V77.91 Screening, lipids      2008      Inactive  599.0 Urinary tract infection, unspec./pyuria (UTI)      2008      Inactive  V70.0 Routine Medical Exam      2008      Active Constipation      2008      Active Hemorrhoids      2008      Pancreatitis pancreatitis 2013-------------------------------------------------------------------------------    M        --------------------------------------------------------------------------------    Family History  Return To Top     Status Relationship Disease Comment Record Date     Alive Sister  *Denies any medical problems  3 sisters  2008      Alive Brother  *Denies any medical problems  2 brothers  2008         Father    Age of death 56  2010         Mother  Dementia  Age of death 82  2008          --------------------------------------------------------------------------------                         Social Determinants of Health     Financial Resource Strain: Not on file   Food Insecurity: Not on file   Transportation Needs: Not on file   Physical Activity: Not on file   Stress: Not on file   Social Connections: Unknown (1/3/2024)    [facility] [employer]    Social Connections      Frequency of Communication with Friends and Family: Not on file   Interpersonal Safety: Low Risk  (9/10/2024)    Interpersonal Safety      Do you feel physically and emotionally safe where you currently live?: Yes      Within the past 12 months, have you been hit, slapped, kicked or otherwise physically hurt by someone?: No      Within the past 12 months, have you been humiliated or emotionally abused in other ways  by your partner or ex-partner?: No   Housing Stability: Not on file       FAMILY HISTORY:    Family History   Problem Relation Age of Onset     Memory loss Mother      Other - See Comments Mother         hair thinning     Diabetes Father      Chronic Obstructive Pulmonary Disease Sister      Anemia Sister         hx of ulcers     Other - See Comments Sister 65        MVA, followed by leg pain after a fall     Dementia Sister 68     Dementia Brother 70     Cancer Brother         lung     Cancer - colorectal Maternal Grandmother      Cancer Daughter      Obesity Daughter         s/p lap band     PHYSICAL EXAMINATION:  Vitals reviewed: There were no vitals taken for this visit.  Wt:   Wt Readings from Last 2 Encounters:   10/11/24 69.8 kg (153 lb 14.4 oz)   07/03/24 67.1 kg (148 lb)      Video physical exam  General: Patient appears well in no acute distress.   Skin: No visualized rash or lesions on visualized skin  Eyes: EOMI, no erythema, sclera icterus or discharge noted  Resp: Appears to be breathing comfortably without accessory muscle usage, speaking in full sentences, no cough  MSK: Appears to have normal range of motion based on visualized movements  Neurologic: No apparent tremors, facial movements symmetric  Psych: affect normal, alert and oriented    PERTINENT STUDIES - Reviewed in EMR     Lab Results   Component Value Date    WBC 7.6 10/09/2024    WBC 7.8 05/10/2024    WBC 7.6 01/09/2024    HGB 11.3 (L) 10/09/2024    HGB 12.6 05/10/2024    HGB 12.1 01/09/2024     10/09/2024     05/10/2024     01/09/2024    CHOL 133 01/09/2024    CHOL 126 03/29/2022    CHOL 113.0 03/08/2021    TRIG 129 01/09/2024    TRIG 123 03/29/2022    TRIG 157.0 (H) 03/08/2021    HDL 55 01/09/2024    HDL 52 03/29/2022    HDL 35.0 (L) 03/08/2021    ALT 20 10/09/2024    ALT 15 12/09/2022    ALT 21 10/15/2022    AST 24 10/09/2024    AST 23 12/09/2022    AST 16 10/15/2022     10/09/2024     05/10/2024    NA  140 05/02/2024    BUN 54.1 (H) 10/09/2024    BUN 34.6 (H) 05/10/2024    BUN 48.0 (H) 05/02/2024    CO2 20 (L) 10/09/2024    CO2 25 05/10/2024    CO2 24 05/02/2024    TSH 1.01 06/12/2023    TSH 1.52 12/09/2022    TSH 1.69 10/22/2019    INR 1.10 11/15/2021        Liver Function Studies -   Recent Labs   Lab Test 05/10/24  1304 12/09/22  1203   PROTTOTAL  --  7.5   ALBUMIN 4.3 4.2   BILITOTAL  --  0.4   ALKPHOS  --  88   AST  --  23   ALT  --  15        PREVIOUS ENDOSCOPY  8/22/2024 - EGD - the gastrojejunal anastomosis was characterized by scarring from the previous ulceration proximal to the anastomosis site. No stenosis seen. Jejunum was intubated and it appeared healthy.    8/22/2024 - Colonoscopy with 10 cm TI intubation, BBPS score 6. A few small TA from transverse w/o high grade dysplasia. Normal TI and normal macro colon. Random colon biopsies were negative for microscopic colitis. Sigmoid diverticular dz w/o inflammation or bleeding. Grade II internal hemorrhoids. AZALEA revealed decreased sphincter tone. 7y recall       Again, thank you for allowing me to participate in the care of your patient.        Sincerely,        Amie Grace PA-C

## 2024-10-18 NOTE — TELEPHONE ENCOUNTER
Spoke with pharmacy. Advised 1-2 tablets daily. They will fill for patient.    Nida VENEGAS RN  [facility]

## 2024-10-22 ENCOUNTER — THERAPY VISIT (OUTPATIENT)
Dept: PHYSICAL THERAPY | Facility: CLINIC | Age: 69
End: 2024-10-22
Payer: COMMERCIAL

## 2024-10-22 DIAGNOSIS — G62.9 PERIPHERAL POLYNEUROPATHY: Primary | ICD-10-CM

## 2024-10-22 DIAGNOSIS — M54.16 CHRONIC RIGHT-SIDED LUMBAR RADICULOPATHY: ICD-10-CM

## 2024-10-22 PROCEDURE — 97112 NEUROMUSCULAR REEDUCATION: CPT | Mod: GP | Performed by: PHYSICAL THERAPIST

## 2024-10-22 PROCEDURE — 97110 THERAPEUTIC EXERCISES: CPT | Mod: GP | Performed by: PHYSICAL THERAPIST

## 2024-10-23 ENCOUNTER — LAB (OUTPATIENT)
Dept: LAB | Facility: CLINIC | Age: 69
End: 2024-10-23
Payer: COMMERCIAL

## 2024-10-23 DIAGNOSIS — N18.9 ACUTE KIDNEY INJURY SUPERIMPOSED ON CKD (H): ICD-10-CM

## 2024-10-23 DIAGNOSIS — N17.9 ACUTE KIDNEY INJURY SUPERIMPOSED ON CKD (H): ICD-10-CM

## 2024-10-23 LAB
ANION GAP SERPL CALCULATED.3IONS-SCNC: 9 MMOL/L (ref 7–15)
BUN SERPL-MCNC: 32.8 MG/DL (ref 8–23)
CALCIUM SERPL-MCNC: 9.4 MG/DL (ref 8.8–10.4)
CHLORIDE SERPL-SCNC: 109 MMOL/L (ref 98–107)
CREAT SERPL-MCNC: 1.78 MG/DL (ref 0.51–0.95)
EGFRCR SERPLBLD CKD-EPI 2021: 30 ML/MIN/1.73M2
GLUCOSE SERPL-MCNC: 193 MG/DL (ref 70–99)
HCO3 SERPL-SCNC: 23 MMOL/L (ref 22–29)
POTASSIUM SERPL-SCNC: 5.5 MMOL/L (ref 3.4–5.3)
SODIUM SERPL-SCNC: 141 MMOL/L (ref 135–145)

## 2024-10-23 PROCEDURE — 80048 BASIC METABOLIC PNL TOTAL CA: CPT

## 2024-10-23 PROCEDURE — 36415 COLL VENOUS BLD VENIPUNCTURE: CPT

## 2024-10-24 ENCOUNTER — TELEPHONE (OUTPATIENT)
Dept: GASTROENTEROLOGY | Facility: CLINIC | Age: 69
End: 2024-10-24
Payer: COMMERCIAL

## 2024-10-24 NOTE — TELEPHONE ENCOUNTER
Patient confirmed scheduled appointment:  Date: 4/22/25  Time: 2:30 pm  Visit type: return gi  Provider: Amie Grace  Location: virtual  Testing/imaging: NA  Additional notes: NA

## 2024-10-26 DIAGNOSIS — N17.9 AKI (ACUTE KIDNEY INJURY) (H): Primary | ICD-10-CM

## 2024-10-27 NOTE — PROGRESS NOTES
[facility] [address], [address]  [facility] [address]  Phone: [phone]  Fax: [phone]    Patient:  Karine Moss, Date of birth 1955  Date of Visit:  10/27/2024  Referring Provider Referred Self      Assessment & Plan    (N18.4) CKD (chronic kidney disease) stage 4, GFR 15-29 ml/min (H)  (primary encounter diagnosis)  Comment: followed by nephrology, GFR dropped from 40 to 10 in Sept. Jardiance held, continuing losartan. Most recent GFR rising from 19 to 30. Jardiance still on hold  Plan: Karine will continue holding Jardiance    (K91.1) Dumping syndrome  Comment: hx of gastric bypass, was having multiple hypoglycemic muscles, now resolved with intake of higher protein  Plan: continue with current dietary modifications     (G25.81) RLS (restless legs syndrome)  Comment: recent evaluation with neurologist, Ferritin 30, they recommend supplementation  Plan: Ferritin        Will be taking ferrous sulfate 50mg daily along with Vit C 125 mg daily  Recheck numbers in 3 mos  ADDENDUM: October 29, 2024  Ferritin returned at 120.   Recommend she share this number with neurologist  No need to start iron at this time  Will have her repeat lab again, as this could be an error.    (G62.9) Peripheral polyneuropathy  Comment: follow up with neurologist. EMG confirmed diagnosis  Plan: Hemoglobin A1c.  On metformin   Lab Results   Component Value Date    A1C 6.2 10/28/2024    A1C 5.8 05/02/2024   No change in dose, recheck A1c in january              (N39.46) Mixed incontinence urge and stress (male)(female)  Comment: persistent symptoms, has appt with pelvic floor clinic , currently has rx for Tolterodine but unsure if it is helping.   Plan: may try stopping medication for 2 days and let me know how you are doing    (I10) Hypertension goal BP (blood pressure) < 140/90  Comment: BP in target reange  BP Readings from Last 3 Encounters:   10/28/24 139/88  "  10/11/24 138/80   08/22/24 107/75   Plan: losartan (COZAAR) 50 MG tablet        Continue losartan        Anay Martinez MD         Karine Moss is a 69 year old year old female with h/o DM2, HTN, obesity s/p gastric bypass, IZABEL not using CPAP, and CKD. She is here to discuss:     CKD  Referred to nephrology earlier this year due to decline in GFR  Jardiance 10mg dose initiated 6/2024  Followed by nephrology , last visit 10/11/24  GFR declined from 40 to current 19 in past 6 mos  Potassium 5.4 at her last check. She is on losartan 50mg daily. Reported eating \"a lot of bananas\"  Serum Cr 2.58, up from 1.42 in May 2024  Nephrologist recommended no medication changes but wished to recheck BMP in 2 wks.       Peripheral polyneuropathy/ RLS  Referred to [facility], initial consultation 4/2024  Lyrica 100mg q hs + Duloxetine 60-90mg dose without any improvement.   Previous use of Gabapentin not helpful  MRI lumbar spine 4/15/24--potential impingement of left L3 and or L2 nerve roots, L3-4 severe right foraminal narrowing with potential impingment on the right L3 root. Severe bilateral facet osteoarthritis bilaterally L4-5 and L5-S1  EMG 8/13/24--mild to mod length dependent sensorimotor axonal-predominant polyneuropathy, mild chronic right L2-3 radiculopathies.   RLS workup with ferritin 30, B6 23.3  They recommended increase of Lyrica by 100mg increments to 200mg for polyneuropathy and RLS--ferritin goal >70  They recommended she contact me for management of iron due to hx of RLS  Current iron supplement planning to start  Feels twitching and burning, improved with Lyrica 200mg at bedtime, will be adding 100mg in the morning  this Wedneseday  No longer using trazodone    Diabetes--Diet controlled  Previous use of insulin, which was discontinued due to hypoglycemic events.   S/p jimmy en Y gastric bypass surgery 2013 at [facility]  Their impression: Lizeth hypoglycemic events are most likely due to early " dumping syndrome vs postprandial hyperinsulinemic hypoglycemia (also referred to as late dumping syndrome   Karine modified her diet, avoiding sweets and getting extra protein and symptoms improved.   Protein goal 60-90g per day.   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)    Hyperlipidemia LDL goal <100    Obstructive sleep apnea    Major depression in partial remission (H)    Low back pain    DJD (degenerative joint disease) of knee    IBS (irritable bowel syndrome)    Heart murmur    Hypertension goal BP (blood pressure) < 140/90    Type 2 diabetes mellitus without complication, without long-term current use of insulin (H)    Osteopenia of hip    PTSD (post-traumatic stress disorder)    Diabetic peripheral neuropathy (H)    Bilateral sciatica    Gastrointestinal hemorrhage associated with gastric ulcer    Chronic kidney disease, stage 3 (H)    Chronic diastolic congestive heart failure (H)    Osteopenia of spine    Chronic left shoulder pain    Dumping syndrome    History of Rai-en-Y gastric bypass    Hypoglycemia    Insomnia    Hx laparoscopic cholecystectomy    Major depressive disorder, recurrent episode, mild (H)    Peripheral neuropathy    RLS (restless legs syndrome)       Current Outpatient Medications   Medication Sig Dispense Refill    amphetamine-dextroamphetamine (ADDERALL) 30 MG tablet Take 1-2 tablets 60 tablet 0    [START ON 11/15/2024] amphetamine-dextroamphetamine (ADDERALL) 30 MG tablet Take 1-2 tablets 60 tablet 0    [START ON 12/15/2024] amphetamine-dextroamphetamine (ADDERALL) 30 MG tablet Take 1-2 tablets 60 tablet 0    atorvastatin (LIPITOR) 20 MG tablet Take 1 tablet (20 mg) by mouth daily 90 tablet 3    bisacodyl (DULCOLAX) 5 MG EC tablet Two days prior to procedure take two (2) tablets at 10am. One day  prior to procedure take two (2) tablets at 10am 4 tablet 0    blood glucose (NO BRAND SPECIFIED) lancets standard Use to test blood sugar 1 times daily or as directed. 90 Lancet 3    blood glucose (NO BRAND SPECIFIED) test strip Use to test blood sugar 1 times daily or as directed. 100 strip 3    blood glucose (NO BRAND SPECIFIED) test strip Use to test blood sugar as needed with Freestyle Nirmal CGM. 100 strip 0    blood glucose (NO BRAND SPECIFIED) test strip Use to test blood sugar as directed with CGM sensor. 50 strip 1    blood glucose calibration (NO BRAND SPECIFIED) solution Use to calibrate blood glucose monitor as needed as directed. 1 each 3    blood glucose monitoring (NO BRAND SPECIFIED) meter device kit Use to test blood sugar 1 times daily or as directed. 1 kit 0    Calcium Acetate 667 MG TABS Take 1 tablet by mouth 2 times daily for 360 days 180 tablet 3    cevimeline (EVOXAC) 30 MG capsule take 1 cap  capsule 1    cholecalciferol (VITAMIN D3) 125 mcg (5000 units) capsule Take 125 mcg by mouth daily      COMPOUNDED NON-CONTROLLED SUBSTANCE (CMPD RX) - PHARMACY TO MIX COMPOUNDED MEDICATION Estradiol 0.0075% in HRT cream  Apply 2 grams,  intravaginally and small amount externally daily for one week then twice weekly for maintenance. 45 g 11    Continuous Blood Gluc  (FREESTYLE NIRMAL 14 DAY READER) EBEN Use to read blood sugars as per 's instructions. 1 each 0    Continuous Glucose Sensor (FREESTYLE NIRMAL 14 DAY SENSOR) MISC Change every 14 days. 2 each 2    cyanocobalamin (VITAMIN B-12) 1000 MCG tablet Take one every other day 90 tablet 0    DULoxetine (CYMBALTA) 30 MG capsule Take 1 capsule (30 mg) by mouth 2 times daily 180 capsule 1    DULoxetine (CYMBALTA) 60 MG capsule Take 1 capsule (60 mg) by mouth at bedtime Take in addition to current 30 mg evening dose for a total of 90 mg at bedtime. 90 capsule 1    empagliflozin (JARDIANCE) 10 MG TABS tablet Take one daily 90 tablet  "3    finasteride (PROSCAR) 5 MG tablet Take 1/2 tablet daily 45 tablet 3    losartan (COZAAR) 50 MG tablet Take one daily, schedule clinic visit in Oct for diabetic, BP check 90 tablet 0    metoclopramide (REGLAN) 10 MG tablet Take one tablet 30 minutes prior to drinking bowel prep to help with nausea. 2 tablet 0    metroNIDAZOLE (FLAGYL) 500 MG tablet Take 1 tablet (500 mg) by mouth 2 times daily 14 tablet 3    omeprazole (PRILOSEC) 40 MG DR capsule Take 40mg twice daily 180 capsule 3    polyethylene glycol (GOLYTELY) 236 g suspension One day prior to procedure at 3 pm fill container with water. Cover and shake until mixed well. Drink an 8 oz. glass of mixture every 10-15 minutes until the 1st half of the jug is gone. Place the remainder of the Golytely in the refrigerator. At 8 pm, drink the 2nd half of the jug of Golytely bowel prep. Drink an 8 oz. glass of Golytely every 10-15 minutes until the container of Golytely is gone. 4000 mL 0    pregabalin (LYRICA) 100 MG capsule Take 100 mg by mouth daily      tolterodine ER (DETROL LA) 4 MG 24 hr capsule Take 1 capsule by mouth daily. 90 capsule 0    traZODone (DESYREL) 50 MG tablet Take 1 tablet by mouth every 24 hours      triamcinolone (KENALOG) 0.1 % paste Apply to tongue twice daily x 10 days 5 g 0       Allergies   Allergen Reactions    Pravastatin Other (See Comments)     woozy    Codeine Nausea and Vomiting    Nsaids GI Disturbance and Other (See Comments)     Pt had bariatric surgery, should not ever take oral NSAIDS due to risk of gastric ulcers.  Pt had bariatric surgery, should not ever take oral NSAIDS due to risk of gastric ulcers.        EXAM  /88 (BP Location: Right arm, Patient Position: Sitting, Cuff Size: Adult Regular)   Pulse 82   Temp 98.3  F (36.8  C) (Skin)   Resp 14   Ht 1.626 m (5' 4.02\")   Wt 70.8 kg (156 lb)   SpO2 96%   BMI 26.76 kg/m    Gen: Alert, pleasant, NAD      "

## 2024-10-28 ENCOUNTER — OFFICE VISIT (OUTPATIENT)
Dept: FAMILY MEDICINE | Facility: CLINIC | Age: 69
End: 2024-10-28
Payer: COMMERCIAL

## 2024-10-28 VITALS
OXYGEN SATURATION: 96 % | SYSTOLIC BLOOD PRESSURE: 139 MMHG | HEART RATE: 82 BPM | TEMPERATURE: 98.3 F | DIASTOLIC BLOOD PRESSURE: 88 MMHG | WEIGHT: 156 LBS | BODY MASS INDEX: 26.63 KG/M2 | RESPIRATION RATE: 14 BRPM | HEIGHT: 64 IN

## 2024-10-28 DIAGNOSIS — I10 HYPERTENSION GOAL BP (BLOOD PRESSURE) < 140/90: ICD-10-CM

## 2024-10-28 DIAGNOSIS — G25.81 RLS (RESTLESS LEGS SYNDROME): Chronic | ICD-10-CM

## 2024-10-28 DIAGNOSIS — N18.4 CKD (CHRONIC KIDNEY DISEASE) STAGE 4, GFR 15-29 ML/MIN (H): Primary | ICD-10-CM

## 2024-10-28 DIAGNOSIS — K91.1 DUMPING SYNDROME: ICD-10-CM

## 2024-10-28 DIAGNOSIS — N39.46 MIXED INCONTINENCE URGE AND STRESS (MALE)(FEMALE): ICD-10-CM

## 2024-10-28 DIAGNOSIS — N17.9 AKI (ACUTE KIDNEY INJURY) (H): ICD-10-CM

## 2024-10-28 DIAGNOSIS — G62.9 PERIPHERAL POLYNEUROPATHY: ICD-10-CM

## 2024-10-28 LAB
EST. AVERAGE GLUCOSE BLD GHB EST-MCNC: 131 MG/DL
HBA1C MFR BLD: 6.2 % (ref 0–5.6)

## 2024-10-28 PROCEDURE — 82728 ASSAY OF FERRITIN: CPT | Performed by: INTERNAL MEDICINE

## 2024-10-28 PROCEDURE — 80048 BASIC METABOLIC PNL TOTAL CA: CPT | Performed by: INTERNAL MEDICINE

## 2024-10-28 RX ORDER — LOSARTAN POTASSIUM 50 MG/1
TABLET ORAL
Qty: 90 TABLET | Refills: 1 | Status: SHIPPED | OUTPATIENT
Start: 2024-10-28

## 2024-10-28 ASSESSMENT — ANXIETY QUESTIONNAIRES
7. FEELING AFRAID AS IF SOMETHING AWFUL MIGHT HAPPEN: NOT AT ALL
3. WORRYING TOO MUCH ABOUT DIFFERENT THINGS: NOT AT ALL
4. TROUBLE RELAXING: SEVERAL DAYS
GAD7 TOTAL SCORE: 2
GAD7 TOTAL SCORE: 2
IF YOU CHECKED OFF ANY PROBLEMS ON THIS QUESTIONNAIRE, HOW DIFFICULT HAVE THESE PROBLEMS MADE IT FOR YOU TO DO YOUR WORK, TAKE CARE OF THINGS AT HOME, OR GET ALONG WITH OTHER PEOPLE: SOMEWHAT DIFFICULT
8. IF YOU CHECKED OFF ANY PROBLEMS, HOW DIFFICULT HAVE THESE MADE IT FOR YOU TO DO YOUR WORK, TAKE CARE OF THINGS AT HOME, OR GET ALONG WITH OTHER PEOPLE?: SOMEWHAT DIFFICULT
5. BEING SO RESTLESS THAT IT IS HARD TO SIT STILL: SEVERAL DAYS
7. FEELING AFRAID AS IF SOMETHING AWFUL MIGHT HAPPEN: NOT AT ALL
GAD7 TOTAL SCORE: 2
2. NOT BEING ABLE TO STOP OR CONTROL WORRYING: NOT AT ALL
6. BECOMING EASILY ANNOYED OR IRRITABLE: NOT AT ALL
1. FEELING NERVOUS, ANXIOUS, OR ON EDGE: NOT AT ALL

## 2024-10-28 ASSESSMENT — PATIENT HEALTH QUESTIONNAIRE - PHQ9
SUM OF ALL RESPONSES TO PHQ QUESTIONS 1-9: 3
SUM OF ALL RESPONSES TO PHQ QUESTIONS 1-9: 3

## 2024-10-28 NOTE — PATIENT INSTRUCTIONS
Iron supplement  Take 2 pills (25mg x 2) PLUS Vitamin C tablet all at once    Ferritin goal >75  Check level again in 3 months    Tolteradine- for bladder leaking  Overactive bladder  Try not taking it to what happen    Keep appt with pelvic floor clinic

## 2024-10-28 NOTE — NURSING NOTE
"69 year old  Chief Complaint   Patient presents with    Follow Up     Kidneys    Diabetes    Labs Only     Ferritin levels checked       Blood pressure 139/88, pulse 82, temperature 98.3  F (36.8  C), temperature source Skin, resp. rate 14, height 1.626 m (5' 4.02\"), weight 70.8 kg (156 lb), SpO2 96%, not currently breastfeeding. Body mass index is 26.76 kg/m .  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)    Hyperlipidemia LDL goal <100    Obstructive sleep apnea    Major depression in partial remission (H)    Low back pain    DJD (degenerative joint disease) of knee    IBS (irritable bowel syndrome)    Heart murmur    Hypertension goal BP (blood pressure) < 140/90    Type 2 diabetes mellitus without complication, without long-term current use of insulin (H)    Osteopenia of hip    PTSD (post-traumatic stress disorder)    Diabetic peripheral neuropathy (H)    Bilateral sciatica    Gastrointestinal hemorrhage associated with gastric ulcer    Chronic kidney disease, stage 3 (H)    Chronic diastolic congestive heart failure (H)    Osteopenia of spine    Chronic left shoulder pain    Dumping syndrome    History of Rai-en-Y gastric bypass    Hypoglycemia    Insomnia    Hx laparoscopic cholecystectomy    Major depressive disorder, recurrent episode, mild (H)    Peripheral neuropathy    RLS (restless legs syndrome)       Wt Readings from Last 2 Encounters:   10/28/24 70.8 kg (156 lb)   10/11/24 69.8 kg (153 lb 14.4 oz)     BP Readings from Last 3 Encounters:   10/28/24 139/88   10/11/24 138/80   08/22/24 107/75         Current Outpatient Medications   Medication Sig Dispense Refill    amphetamine-dextroamphetamine (ADDERALL) 30 MG tablet Take 1-2 tablets 60 tablet 0    [START ON 11/15/2024] amphetamine-dextroamphetamine (ADDERALL) 30 MG tablet Take 1-2 tablets 60 tablet 0    [START ON 12/15/2024] amphetamine-dextroamphetamine (ADDERALL) 30 MG tablet Take 1-2 " tablets 60 tablet 0    atorvastatin (LIPITOR) 20 MG tablet Take 1 tablet (20 mg) by mouth daily 90 tablet 3    blood glucose (NO BRAND SPECIFIED) lancets standard Use to test blood sugar 1 times daily or as directed. 90 Lancet 3    blood glucose (NO BRAND SPECIFIED) test strip Use to test blood sugar 1 times daily or as directed. 100 strip 3    blood glucose (NO BRAND SPECIFIED) test strip Use to test blood sugar as needed with Freestyle Nirmal CGM. 100 strip 0    blood glucose (NO BRAND SPECIFIED) test strip Use to test blood sugar as directed with CGM sensor. 50 strip 1    blood glucose calibration (NO BRAND SPECIFIED) solution Use to calibrate blood glucose monitor as needed as directed. 1 each 3    blood glucose monitoring (NO BRAND SPECIFIED) meter device kit Use to test blood sugar 1 times daily or as directed. 1 kit 0    Calcium Acetate 667 MG TABS Take 1 tablet by mouth 2 times daily for 360 days 180 tablet 3    cevimeline (EVOXAC) 30 MG capsule take 1 cap  capsule 1    cholecalciferol (VITAMIN D3) 125 mcg (5000 units) capsule Take 125 mcg by mouth daily      COMPOUNDED NON-CONTROLLED SUBSTANCE (CMPD RX) - PHARMACY TO MIX COMPOUNDED MEDICATION Estradiol 0.0075% in HRT cream  Apply 2 grams,  intravaginally and small amount externally daily for one week then twice weekly for maintenance. 45 g 11    Continuous Blood Gluc  (FREESTYLE NIRMAL 14 DAY READER) EBEN Use to read blood sugars as per 's instructions. 1 each 0    Continuous Glucose Sensor (FREESTYLE NIRMAL 14 DAY SENSOR) MISC Change every 14 days. 2 each 2    cyanocobalamin (VITAMIN B-12) 1000 MCG tablet Take one every other day 90 tablet 0    DULoxetine (CYMBALTA) 30 MG capsule Take 1 capsule (30 mg) by mouth 2 times daily 180 capsule 1    DULoxetine (CYMBALTA) 60 MG capsule Take 1 capsule (60 mg) by mouth at bedtime Take in addition to current 30 mg evening dose for a total of 90 mg at bedtime. 90 capsule 1    finasteride  (PROSCAR) 5 MG tablet Take 1/2 tablet daily 45 tablet 3    losartan (COZAAR) 50 MG tablet Take one daily, schedule clinic visit in Oct for diabetic, BP check 90 tablet 0    metroNIDAZOLE (FLAGYL) 500 MG tablet Take 1 tablet (500 mg) by mouth 2 times daily 14 tablet 3    omeprazole (PRILOSEC) 40 MG DR capsule Take 40mg twice daily 180 capsule 3    pregabalin (LYRICA) 100 MG capsule Take 200 mg by mouth daily. Starting Wednesday an additional 100 mg      tolterodine ER (DETROL LA) 4 MG 24 hr capsule Take 1 capsule by mouth daily. 90 capsule 0    traZODone (DESYREL) 50 MG tablet Take 1 tablet by mouth every 24 hours      triamcinolone (KENALOG) 0.1 % paste Apply to tongue twice daily x 10 days 5 g 0    bisacodyl (DULCOLAX) 5 MG EC tablet Two days prior to procedure take two (2) tablets at 10am. One day prior to procedure take two (2) tablets at 10am (Patient not taking: Reported on 10/28/2024) 4 tablet 0    empagliflozin (JARDIANCE) 10 MG TABS tablet Take one daily (Patient not taking: Reported on 10/28/2024) 90 tablet 3    metoclopramide (REGLAN) 10 MG tablet Take one tablet 30 minutes prior to drinking bowel prep to help with nausea. (Patient not taking: Reported on 10/28/2024) 2 tablet 0    polyethylene glycol (GOLYTELY) 236 g suspension One day prior to procedure at 3 pm fill container with water. Cover and shake until mixed well. Drink an 8 oz. glass of mixture every 10-15 minutes until the 1st half of the jug is gone. Place the remainder of the Golytely in the refrigerator. At 8 pm, drink the 2nd half of the jug of Golytely bowel prep. Drink an 8 oz. glass of Golytely every 10-15 minutes until the container of Golytely is gone. (Patient not taking: Reported on 10/28/2024) 4000 mL 0     No current facility-administered medications for this visit.       Social History     Tobacco Use    Smoking status: Never     Passive exposure: Never    Smokeless tobacco: Never   Vaping Use    Vaping status: Never Used    Substance Use Topics    Alcohol use: Not Currently     Comment: rare    Drug use: No       Health Maintenance Due   Topic Date Due    HF ACTION PLAN  Never done    ADVANCE CARE PLANNING  Never done    HEPATITIS C SCREENING  Never done    RSV VACCINE (1 - Risk 60-74 years 1-dose series) Never done    MEDICARE ANNUAL WELLNESS VISIT  04/12/2020    EYE EXAM  03/10/2021    Pneumococcal Vaccine: 65+ Years (2 of 2 - PCV) 05/27/2022    FALL RISK ASSESSMENT  02/17/2023    INFLUENZA VACCINE (1) 09/01/2024    COVID-19 Vaccine (6 - 2024-25 season) 09/01/2024    A1C  11/02/2024       Lab Results   Component Value Date    PAP NIL 04/12/2019 October 28, 2024 4:06 PM

## 2024-10-29 ENCOUNTER — PATIENT OUTREACH (OUTPATIENT)
Dept: NEPHROLOGY | Facility: CLINIC | Age: 69
End: 2024-10-29
Payer: COMMERCIAL

## 2024-10-29 ENCOUNTER — MYC MEDICAL ADVICE (OUTPATIENT)
Dept: ORTHOPEDICS | Facility: CLINIC | Age: 69
End: 2024-10-29
Payer: COMMERCIAL

## 2024-10-29 DIAGNOSIS — N17.9 ACUTE KIDNEY INJURY SUPERIMPOSED ON CKD (H): Primary | ICD-10-CM

## 2024-10-29 DIAGNOSIS — N18.30 STAGE 3 CHRONIC KIDNEY DISEASE, UNSPECIFIED WHETHER STAGE 3A OR 3B CKD (H): ICD-10-CM

## 2024-10-29 DIAGNOSIS — M85.88 OSTEOPENIA OF SPINE: Primary | ICD-10-CM

## 2024-10-29 DIAGNOSIS — S92.351A CLOSED FRACTURE OF BASE OF FIFTH METATARSAL BONE OF RIGHT FOOT, INITIAL ENCOUNTER: ICD-10-CM

## 2024-10-29 DIAGNOSIS — N18.9 ACUTE KIDNEY INJURY SUPERIMPOSED ON CKD (H): Primary | ICD-10-CM

## 2024-10-29 LAB
ANION GAP SERPL CALCULATED.3IONS-SCNC: 11 MMOL/L (ref 7–15)
BUN SERPL-MCNC: 39 MG/DL (ref 8–23)
CALCIUM SERPL-MCNC: 9.4 MG/DL (ref 8.8–10.4)
CHLORIDE SERPL-SCNC: 107 MMOL/L (ref 98–107)
CREAT SERPL-MCNC: 1.81 MG/DL (ref 0.51–0.95)
EGFRCR SERPLBLD CKD-EPI 2021: 30 ML/MIN/1.73M2
FERRITIN SERPL-MCNC: 102 NG/ML (ref 11–328)
GLUCOSE SERPL-MCNC: 109 MG/DL (ref 70–99)
HCO3 SERPL-SCNC: 25 MMOL/L (ref 22–29)
POTASSIUM SERPL-SCNC: 5.3 MMOL/L (ref 3.4–5.3)
SODIUM SERPL-SCNC: 143 MMOL/L (ref 135–145)

## 2024-10-29 NOTE — PROGRESS NOTES
10/29- Called [facility] to discuss recent lab results. The following is part of Dr. Chandler's note on 10/7. I will follow up w/provider to see what he recommends for Karine at this point.  Return to clinic pending repeat BMP check in 2 weeks (to determine if recovering from apparent QUIN on CKD or not, which would require quicker follow-up or less urgent).   No answer and no VM option. Sent my chart message to return my call.  Burak ARTIS RN  Nephrology care coordinator  [facility]    Alycia Dee's daughter returned my call, her moms labs were accidentally pulled at her PCP appt. She wanted to make sure new labs are in place for 11/8 lab appt. Orders placed by writer.  Burak ARTIS RN  Nephrology care coordinator  [facility]

## 2024-10-31 ENCOUNTER — TELEPHONE (OUTPATIENT)
Dept: ORTHOPEDICS | Facility: CLINIC | Age: 69
End: 2024-10-31
Payer: COMMERCIAL

## 2024-10-31 NOTE — TELEPHONE ENCOUNTER
Patient and daughter confirmed scheduled appointment:  Date: 12/13/24  Time: 2pm  Visit type: return bone health  Provider: Dr Friedman  [place] [facility] [place]

## 2024-11-05 ENCOUNTER — THERAPY VISIT (OUTPATIENT)
Dept: PHYSICAL THERAPY | Facility: CLINIC | Age: 69
End: 2024-11-05
Payer: COMMERCIAL

## 2024-11-05 ENCOUNTER — OFFICE VISIT (OUTPATIENT)
Dept: DERMATOLOGY | Facility: CLINIC | Age: 69
End: 2024-11-05
Payer: COMMERCIAL

## 2024-11-05 DIAGNOSIS — G62.9 PERIPHERAL POLYNEUROPATHY: Primary | ICD-10-CM

## 2024-11-05 DIAGNOSIS — M54.16 CHRONIC RIGHT-SIDED LUMBAR RADICULOPATHY: ICD-10-CM

## 2024-11-05 DIAGNOSIS — L64.9 ANDROGENETIC ALOPECIA: Primary | ICD-10-CM

## 2024-11-05 PROCEDURE — 97112 NEUROMUSCULAR REEDUCATION: CPT | Mod: GP | Performed by: PHYSICAL THERAPIST

## 2024-11-05 PROCEDURE — 97110 THERAPEUTIC EXERCISES: CPT | Mod: GP | Performed by: PHYSICAL THERAPIST

## 2024-11-05 PROCEDURE — 99213 OFFICE O/P EST LOW 20 MIN: CPT | Performed by: PHYSICIAN ASSISTANT

## 2024-11-05 RX ORDER — FINASTERIDE 5 MG/1
TABLET, FILM COATED ORAL
Qty: 90 TABLET | Refills: 3 | Status: SHIPPED | OUTPATIENT
Start: 2024-11-05

## 2024-11-05 NOTE — PATIENT INSTRUCTIONS
Start using topical 5% minoxidil solution (ok to get the men's)     Increase to 5mg (one whole pill) of finasteride daily    Follow-up in 9-12 months

## 2024-11-05 NOTE — PROGRESS NOTES
Functional Gait Assessment (FGA): The FGA assesses postural stability during various walking tasks.     Gait assistive device used: None     Scores of <22 /30 have been correlated with predicting falls in community-dwelling older adults according to [employer] 2010.   Scores of <18 /30 have been correlated with increased risk for falls in patients with Parkinsons Disease according to Leonel Grace Zhou et al 2014.  Minimal Detectable Change for patients with acute/chronic stroke = 4.2 according to Maria Dolores & Jose 2009  Minimal Detectable Change for patients with vestibular disorder = 8 according to [employer] 2010     Assessment (rationale for performing, application to patient s function & care plan): Performed to assess balance with gait. Scored at 26/30, which is a significant improvement from 17/30 at initial evaluation. Pt will benefit from continued home exercise program focused on balance.

## 2024-11-05 NOTE — LETTER
11/5/2024      Karine Moss  [address]  [facility] [address]      Dear Colleague,    Thank you for referring your patient, Karine Moss, to the [facility]. Please see a copy of my visit note below.    HPI:   Chief complaints: Karine Moss is a pleasant 68 year old female who presents for recheck androgenetic alopecia. She reports less hair falling out of her scalp buy does not think she has grown new hair. No issues on finasteride. She did not like the minoxidil foam as this was messy to use.       PHYSICAL EXAM:    There were no vitals taken for this visit.  Skin exam performed as follows: Type 2 skin. Mood appropriate  Alert and Oriented X 3. Well developed, well nourished in no distress.  General appearance: Normal  Head including face: Normal  Eyes: conjunctiva and lids: Normal  Mouth: Lips, teeth, gums: Normal  Neck: Normal  Skin: Scalp and body hair: See below    Decreased density through vertex scalp. Scalp normal without erythema or scaling. Negative hair pull.     ASSESSMENT/PLAN:     Androgenic alopecia - improved along the frontal scalp; stable elsewhere. Advised on natural course and progression secondary to hormones and genetics. Discussed topical minoxidil 5% foam or solution as well as finasteride.      --Increase to finasteride 5 mg daily. Discussed risks of decreased libido, decreased spermatogenesis, erectile dysfunction and increased risk of breast cancer in men.   --Start 5% minoxidil solution (instead of foam) daily   --Photographs taken today for reference           Follow-up: 9-12 months  CC:   Scribed By: Deneen Pereira, MS, PA-C      HPI:   Chief complaints: Karine Moss is a pleasant 68 year old female who presents for evaluation of hair loss. She has had gradual thinning of her scalp hair for many years, worse since 2015 post bariatric surgery. She denies any pain or itching on her scalp. She has not tried any treatments for  this yet. She had blood work done in 2020 and more recently which was all normal. Fhx of hair loss in the males of her family.       PHYSICAL EXAM:    There were no vitals taken for this visit.  Skin exam performed as follows: Type 2 skin. Mood appropriate  Alert and Oriented X 3. Well developed, well nourished in no distress.  General appearance: Normal  Head including face: Normal  Eyes: conjunctiva and lids: Normal  Mouth: Lips, teeth, gums: Normal  Neck: Normal  Skin: Scalp and body hair: See below    Decreased density through vertex scalp. Scalp normal without erythema or scaling. Negative hair pull.     ASSESSMENT/PLAN:     Androgenic alopecia. Advised on natural course and progression secondary to hormones and genetics. Discussed topical minoxidil 5% foam or solution as well as finasteride.      --Start finasteride 2.5 mg daily. Discussed risks of decreased libido, decreased spermatogenesis, erectile dysfunction and increased risk of breast cancer in men.   --Start 5% minoxidil daily   --Photographs taken today for reference           Follow-up: 9-12 months  CC:   Scribed By: Deneen Pereira, MS, PA-C      Again, thank you for allowing me to participate in the care of your patient.        Sincerely,        Dneeen Pereira PA-C

## 2024-11-05 NOTE — PROGRESS NOTES
HPI:   Chief complaints: Karine Moss is a pleasant 68 year old female who presents for recheck androgenetic alopecia. She reports less hair falling out of her scalp buy does not think she has grown new hair. No issues on finasteride. She did not like the minoxidil foam as this was messy to use.       PHYSICAL EXAM:    There were no vitals taken for this visit.  Skin exam performed as follows: Type 2 skin. Mood appropriate  Alert and Oriented X 3. Well developed, well nourished in no distress.  General appearance: Normal  Head including face: Normal  Eyes: conjunctiva and lids: Normal  Mouth: Lips, teeth, gums: Normal  Neck: Normal  Skin: Scalp and body hair: See below    Decreased density through vertex scalp. Scalp normal without erythema or scaling. Negative hair pull.     ASSESSMENT/PLAN:     Androgenic alopecia - improved along the frontal scalp; stable elsewhere. Advised on natural course and progression secondary to hormones and genetics. Discussed topical minoxidil 5% foam or solution as well as finasteride.      --Increase to finasteride 5 mg daily. Discussed risks of decreased libido, decreased spermatogenesis, erectile dysfunction and increased risk of breast cancer in men.   --Start 5% minoxidil solution (instead of foam) daily   --Photographs taken today for reference           Follow-up: 9-12 months  CC:   Scribed By: Deneen Pereira, MS, PAHARSHAD

## 2024-11-05 NOTE — PROGRESS NOTES
HPI:   Chief complaints: Karine Moss is a pleasant 68 year old female who presents for evaluation of hair loss. She has had gradual thinning of her scalp hair for many years, worse since 2015 post bariatric surgery. She denies any pain or itching on her scalp. She has not tried any treatments for this yet. She had blood work done in 2020 and more recently which was all normal. Fhx of hair loss in the males of her family.       PHYSICAL EXAM:    There were no vitals taken for this visit.  Skin exam performed as follows: Type 2 skin. Mood appropriate  Alert and Oriented X 3. Well developed, well nourished in no distress.  General appearance: Normal  Head including face: Normal  Eyes: conjunctiva and lids: Normal  Mouth: Lips, teeth, gums: Normal  Neck: Normal  Skin: Scalp and body hair: See below    Decreased density through vertex scalp. Scalp normal without erythema or scaling. Negative hair pull.     ASSESSMENT/PLAN:     Androgenic alopecia. Advised on natural course and progression secondary to hormones and genetics. Discussed topical minoxidil 5% foam or solution as well as finasteride.      --Start finasteride 2.5 mg daily. Discussed risks of decreased libido, decreased spermatogenesis, erectile dysfunction and increased risk of breast cancer in men.   --Start 5% minoxidil daily   --Photographs taken today for reference           Follow-up: 9-12 months  CC:   Scribed By: Deneen Pereira, MS, PASergeC

## 2024-11-06 DIAGNOSIS — B96.89 BACTERIAL VAGINOSIS: ICD-10-CM

## 2024-11-06 DIAGNOSIS — N76.0 BACTERIAL VAGINOSIS: ICD-10-CM

## 2024-11-06 RX ORDER — METRONIDAZOLE 500 MG/1
500 TABLET ORAL 2 TIMES DAILY
COMMUNITY

## 2024-11-06 RX ORDER — METRONIDAZOLE 500 MG/1
500 TABLET ORAL 2 TIMES DAILY
Qty: 14 TABLET | Refills: 3 | Status: SHIPPED | OUTPATIENT
Start: 2024-11-06

## 2024-11-06 NOTE — TELEPHONE ENCOUNTER
Who is calling? Pharmacy   Medication name: metroNIDAZOLE (FLAGYL) 500 MG tablet  Is this a refill request? Yes  Have they contacted the pharmacy?  Yes  Pharmacy:   [id number] - [place], [place] - [address]   [address] [place] [address]  Phone: [phone] Fax: [phone]   Question/Concern:   Would patient like a call back? No

## 2024-11-06 NOTE — TELEPHONE ENCOUNTER
Medication requested: metroNIDAZOLE (FLAGYL) 500 MG tablet   Last office visit: 10/28/24  [facility] appointments: 1/9/25  Medication last refilled: 6/5/24; 14 + 3 refills  Last qualifying labs: N/A    Routing refill request to provider for review/approval because:  Drug not on the G refill protocol   Drug not active on patient's medication list    Navin PHILIP, RN  11/06/24 1:05 PM

## 2024-11-08 ENCOUNTER — LAB (OUTPATIENT)
Dept: LAB | Facility: CLINIC | Age: 69
End: 2024-11-08
Payer: COMMERCIAL

## 2024-11-08 DIAGNOSIS — N18.9 ACUTE KIDNEY INJURY SUPERIMPOSED ON CKD (H): ICD-10-CM

## 2024-11-08 DIAGNOSIS — G25.81 RLS (RESTLESS LEGS SYNDROME): Chronic | ICD-10-CM

## 2024-11-08 DIAGNOSIS — N17.9 ACUTE KIDNEY INJURY SUPERIMPOSED ON CKD (H): ICD-10-CM

## 2024-11-08 LAB
ALBUMIN UR-MCNC: 100 MG/DL
APPEARANCE UR: CLEAR
BACTERIA #/AREA URNS HPF: ABNORMAL /HPF
BILIRUB UR QL STRIP: NEGATIVE
COLOR UR AUTO: YELLOW
GLUCOSE UR STRIP-MCNC: NEGATIVE MG/DL
HGB UR QL STRIP: ABNORMAL
KETONES UR STRIP-MCNC: NEGATIVE MG/DL
LEUKOCYTE ESTERASE UR QL STRIP: ABNORMAL
NITRATE UR QL: NEGATIVE
PH UR STRIP: 5.5 [PH] (ref 5–7)
RBC #/AREA URNS AUTO: ABNORMAL /HPF
SP GR UR STRIP: 1.01 (ref 1–1.03)
SQUAMOUS #/AREA URNS AUTO: ABNORMAL /LPF
UROBILINOGEN UR STRIP-ACNC: 0.2 E.U./DL
WBC #/AREA URNS AUTO: ABNORMAL /HPF

## 2024-11-08 PROCEDURE — 84156 ASSAY OF PROTEIN URINE: CPT

## 2024-11-08 PROCEDURE — 80069 RENAL FUNCTION PANEL: CPT

## 2024-11-08 PROCEDURE — 81001 URINALYSIS AUTO W/SCOPE: CPT

## 2024-11-08 PROCEDURE — 36415 COLL VENOUS BLD VENIPUNCTURE: CPT

## 2024-11-08 PROCEDURE — 82728 ASSAY OF FERRITIN: CPT

## 2024-11-09 LAB
ALBUMIN MFR UR ELPH: 64.7 MG/DL
ALBUMIN SERPL BCG-MCNC: 4.4 G/DL (ref 3.5–5.2)
ANION GAP SERPL CALCULATED.3IONS-SCNC: 15 MMOL/L (ref 7–15)
BUN SERPL-MCNC: 26.2 MG/DL (ref 8–23)
CALCIUM SERPL-MCNC: 10 MG/DL (ref 8.8–10.4)
CHLORIDE SERPL-SCNC: 104 MMOL/L (ref 98–107)
CREAT SERPL-MCNC: 1.41 MG/DL (ref 0.51–0.95)
CREAT UR-MCNC: 109 MG/DL
EGFRCR SERPLBLD CKD-EPI 2021: 40 ML/MIN/1.73M2
FERRITIN SERPL-MCNC: 76 NG/ML (ref 11–328)
GLUCOSE SERPL-MCNC: 141 MG/DL (ref 70–99)
HCO3 SERPL-SCNC: 23 MMOL/L (ref 22–29)
PHOSPHATE SERPL-MCNC: 3.2 MG/DL (ref 2.5–4.5)
POTASSIUM SERPL-SCNC: 5 MMOL/L (ref 3.4–5.3)
PROT/CREAT 24H UR: 0.59 MG/MG CR (ref 0–0.2)
SODIUM SERPL-SCNC: 142 MMOL/L (ref 135–145)

## 2024-11-11 NOTE — RESULT ENCOUNTER NOTE
Shaw Milton,     Your kidney function is now back up to 40 (think of this like 40%, where perfectly normal is >90%). You were in the 40s for kidney function before this recent drop - from which it appears you have now recovered back to baseline.     I would suggest we continue to keep your Jardiance on hold for now - but don't throw it away, as we may wish to restart this important medication again in the future (with close kidney monitoring). I realize that this was not your first time on Jardiance, but rather this was a restart after previously having low blood sugar while on Jardiance. Since this drop in kidney function (that may or may not have been due to Jardiance) is a different matter from low blood sugar, I don't want to commit to giving up on Jardiance just yet, if you're willing to consider one more try at this med in the future. You don't need to decide now, but I wanted to let you know my thinking on the matter.     Also important to note, your blood potassium level is back to normal.    I will have my schedulers reach out to you to have you scheduled in kidney clinic, either to see me or one of my nurse practitioners in about 3-4 months from now.    Sincerely,     Dr. Chandler, nephrology/kidneys

## 2024-11-11 NOTE — TELEPHONE ENCOUNTER
Routed to sleep specialists to schedule Actigraphy x2 weeks    Ginny Cruz    [facility]

## 2024-11-14 ENCOUNTER — THERAPY VISIT (OUTPATIENT)
Dept: PHYSICAL THERAPY | Facility: CLINIC | Age: 69
End: 2024-11-14
Attending: PHYSICIAN ASSISTANT
Payer: COMMERCIAL

## 2024-11-14 DIAGNOSIS — N39.3 STRESS INCONTINENCE OF URINE: ICD-10-CM

## 2024-11-14 PROCEDURE — 97530 THERAPEUTIC ACTIVITIES: CPT | Mod: GP

## 2024-11-14 PROCEDURE — 97161 PT EVAL LOW COMPLEX 20 MIN: CPT | Mod: GP

## 2024-11-14 PROCEDURE — 97110 THERAPEUTIC EXERCISES: CPT | Mod: GP

## 2024-11-14 NOTE — PROGRESS NOTES
PHYSICAL THERAPY EVALUATION  Type of Visit: Evaluation             Subjective  Pt notes that she has been having problems with urination since her 40s when if she saw the toilet and she would leak. Pt notes that if she is sitting or laying down and then she stands up she will leak. Pt notes a sometimes large leak with this. Pt tries to stop urine from leaking while she walks to the bathroom but it does not work.   Pt notes occasional stool incontinence if it is not formed, patient will not feel this or be aware of it.         Presenting condition or subjective complaint: (Proxy-Rptd) Urine and bowel control  Date of onset: 10/17/24    Relevant medical history: (Proxy-Rptd) Bladder or bowel problems; Depression; Diabetes; Dizziness; Hearing problems; High blood pressure; Incontinence; Kidney disease; Menopause; Numbness or tingling in perianal area; Osteoporosis; Overweight; Pain at night or rest   Dates & types of surgery: (Proxy-Rptd) Weightloss bypass 2013    Prior diagnostic imaging/testing results: (Proxy-Rptd) MRI; X-ray; EMG; Bone scan     Prior therapy history for the same diagnosis, illness or injury: (Proxy-Rptd) No      Living Environment  Social support: (Proxy-Rptd) With family members   Type of home: (Proxy-Rptd) House   Stairs to enter the home: (Proxy-Rptd) Yes (Proxy-Rptd) 7 Is there a railing: (Proxy-Rptd) No     Ramp: (Proxy-Rptd) No   Stairs inside the home: (Proxy-Rptd) Yes (Proxy-Rptd) 13 Is there a railing: (Proxy-Rptd) Yes     Help at home: (Proxy-Rptd) Medication and/or finances  Equipment owned: (Proxy-Rptd) Straight Cane     Employment: (Proxy-Rptd) No    Hobbies/Interests: (Proxy-Rptd) Crocheting, sewing, crafting, doll house furniture, bike riding if i could, fawad, baking,    Patient goals for therapy: (Proxy-Rptd) Lift things without gushing, not have to wear pads or depends, not have accidents in public       Objective      PELVIC EVALUATION  ADDITIONAL HISTORY:  Sex assigned at  birth: (Proxy-Rptd) Female  Gender identity: (Proxy-Rptd) Female    Pronouns: (Proxy-Rptd) She/Her Hers      Bladder History: Pt feels the leaking as it occurs, unable to stop it.   Feels bladder filling: (Proxy-Rptd) No  Triggers for feeling of inability to wait to go to the bathroom: (Proxy-Rptd) Yes (Proxy-Rptd) Standing up,from sitting or laying down  How long can you wait to urinate: (Proxy-Rptd) 2hrs get up about 4 times a night, more frequent in the last year.   Gets up at night to urinate: (Proxy-Rptd) Yes (Proxy-Rptd) 4 at least  Can stop the flow of urine when urinating: (Proxy-Rptd) No  Volume of urine usually released: (Proxy-Rptd) Small, morning pee is the most volume, rest feel small due to so much leaking on the way.   Other issues: (Proxy-Rptd) Trouble emptying bladder completely; due to frequency.   Number of bladder infections in last 12 months:    Fluid intake per day: (Proxy-Rptd) 32 oz    , drinks at least a couple cups of juice per day   Medications taken for bladder: (Proxy-Rptd) Yes (Proxy-Rptd) Tolterodine   Activities causing urine leak: (Proxy-Rptd) Cough; Sneeze; Jump; Run; Hurrying to the bathroom due to a strong urge to urinate (pee); Other activities (Proxy-Rptd) Lifting  Amount of urine typically leaked: (Proxy-Rptd) Waterfall  Pads used to help with leaking: (Proxy-Rptd) Yes I use this many pads per day: (Proxy-Rptd) 3-6   I use this type/brand: (Proxy-Rptd) Always #4      Bowel History: Pt is not regular due to bypass surgery, if its formed you can make it if its loose you do not feel it coming out. Started in the last year.   Frequency of bowel movement: (Proxy-Rptd) Every 2 or 3 days  Consistency of stool: (Proxy-Rptd) Soft-formed    Ignores the urge to defecate: (Proxy-Rptd) No  Other bowel issues: (Proxy-Rptd) Loss of stool; Loss of gas  Length of time spent trying to have a bowel movement:      Sexual Function History:  Sexual orientation: (Proxy-Rptd) Straight    Sexually  active: (Proxy-Rptd) No  Lubrication used: (Proxy-Rptd) No (Proxy-Rptd) No  Pelvic pain:     Pt feels like it would hurt at this point.   Pain or difficulty with orgasms/erection/ejaculation: (Proxy-Rptd) No    State of menopause: (Proxy-Rptd) Post-menopause (I am done with menopause)  Hormone medications: (Proxy-Rptd) No      Are you currently pregnant: (Proxy-Rptd) No  Number of previous pregnancies: (Proxy-Rptd) 2  Number of deliveries: (Proxy-Rptd) 1  If you have delivered before, did you have any of these issues during delivery: (Proxy-Rptd) Tearing; Vaginal delivery - Pt notes that she had a lot of tearing and she could feel the scar tissue.   Have you been diagnosed with pelvic prolapse or abdominal separation: (Proxy-Rptd) No  Do you get regular exercise: (Proxy-Rptd) No  Have you tried pelvic floor strengthening exercises for 4 weeks: (Proxy-Rptd) No  Do you have any history of trauma that is relevant to your care that you d like to share: (Proxy-Rptd) No      Discussed reason for referral regarding pelvic health needs and external/internal pelvic floor muscle examination with patient/guardian.  Opportunity provided to ask questions and verbal consent for assessment and intervention was given.    POSTURE: Standing Posture: Rounded shoulders, Forward head, Lordosis decreased, Thoracic kyphosis increased  LUMBAR SCREEN:  Flexion: ankles, painful; Extension 10%, painful   HIP SCREEN:  Strength:  Hip Flex: 4+/5 bilat, Hip ER  5-/5 bilat Hip IR:  4+/5 bilat     Functional Strength Testing:  STS: decreased speed, holding breath    PELVIC EXAM  External Visual Inspection:  At rest: Normal  With voluntary pelvic floor contraction: Perineal elevation  Relaxation of PFM: Partial/delayed relaxation  With intra-abdominal pressure: Cough: Perineal descent  Bearing down as defecation: Perineal descent    Integumentary:   Introitus: Unremarkable    External Digital Palpation per Perineum:   Ischiocavernosis:  Unremarkable  Bulbo cavernosis: Unremarkable  Transverse perineal: Unremarkable  Levator ani: Unremarkable    Internal Digital Palpation:  Per Vagina:  Tenderness diffuse through tissue, increases with pressure and movement  Myofascial Resistance to Palpation: Firm  Digital Muscle Performance: P (Power): 3+/5  E (Endurance): 6 seconds   F (Fast Twitch): 10/10  Compensations: Abdominals, Breath holding  Relaxation Post-Contraction: Partial/delayed relaxation      Assessment & Plan   CLINICAL IMPRESSIONS  Medical Diagnosis: Stress incontinence of urine    Treatment Diagnosis: Stress incontinence of urine   Impression/Assessment: Patient is a 69 year old female with pelvic floor dysfunction complaints.  The following significant findings have been identified: Pain, Decreased ROM/flexibility, Decreased strength, Impaired muscle performance, Decreased activity tolerance, and Impaired posture. These impairments interfere with their ability to perform self care tasks, recreational activities, household chores, household mobility, and community mobility as compared to previous level of function.     Clinical Decision Making (Complexity):  Clinical Presentation: Stable/Uncomplicated  Clinical Presentation Rationale: based on medical and personal factors listed in PT evaluation  Clinical Decision Making (Complexity): Low complexity    PLAN OF CARE  Treatment Interventions:  Modalities: Biofeedback  Interventions: Manual Therapy, Neuromuscular Re-education, Therapeutic Activity, Therapeutic Exercise, Self-Care/Home Management    Long Term Goals     PT Goal 1  Goal Identifier: Urge UI  Goal Description: Pt will successfully demonstrate urge suppression techniques evidence by leaking <1x per week with urge.  Rationale: to maximize safety and independence with self cares  Target Date: 02/11/25  PT Goal 2  Goal Identifier: ROBI  Goal Description: Pt will not leak with intra-abdominal pressure for 1 week.  Rationale: to maximize  safety and independence with performance of ADLs and functional tasks  Target Date: 02/11/25      Frequency of Treatment: 1x per week for 4 weeks, every other week for 8 weeks  Duration of Treatment: 12 weeks    Education Assessment:        Risks and benefits of evaluation/treatment have been explained.   Patient/Family/caregiver agrees with Plan of Care.     Evaluation Time:     PT Eval, Low Complexity Minutes (79863): 26     Signing Clinician: ERIC [facility]                                                                                   OUTPATIENT PHYSICAL THERAPY      PLAN OF TREATMENT FOR OUTPATIENT REHABILITATION   Patient's Last Name, First Name, Karine Dyer YOB: 1955   Provider's Name   [facility]   Medical Record No.  [id number]     Onset Date: 10/17/24  Start of Care Date: 11/14/24     Medical Diagnosis:  Stress incontinence of urine      PT Treatment Diagnosis:  Stress incontinence of urine Plan of Treatment  Frequency/Duration: 1x per week for 4 weeks, every other week for 8 weeks/ 12 weeks    Certification date from 11/14/24 to 02/11/25         See note for plan of treatment details and functional goals     Kateryna Mohamud PT                         I CERTIFY THE NEED FOR THESE SERVICES FURNISHED UNDER        THIS PLAN OF TREATMENT AND WHILE UNDER MY CARE     (Physician attestation of this document indicates review and certification of the therapy plan).              Referring Provider:  Amie Grace    Initial Assessment  See Epic Evaluation- Start of Care Date: 11/14/24

## 2024-12-02 ENCOUNTER — HOSPITAL ENCOUNTER (OUTPATIENT)
Dept: BONE DENSITY | Facility: CLINIC | Age: 69
Discharge: HOME | End: 2024-12-02
Attending: FAMILY MEDICINE | Admitting: FAMILY MEDICINE
Payer: COMMERCIAL

## 2024-12-02 DIAGNOSIS — S92.351A CLOSED FRACTURE OF BASE OF FIFTH METATARSAL BONE OF RIGHT FOOT, INITIAL ENCOUNTER: ICD-10-CM

## 2024-12-02 DIAGNOSIS — N18.30 STAGE 3 CHRONIC KIDNEY DISEASE, UNSPECIFIED WHETHER STAGE 3A OR 3B CKD (H): ICD-10-CM

## 2024-12-02 DIAGNOSIS — M85.88 OSTEOPENIA OF SPINE: ICD-10-CM

## 2024-12-02 PROCEDURE — 77080 DXA BONE DENSITY AXIAL: CPT

## 2024-12-05 ENCOUNTER — THERAPY VISIT (OUTPATIENT)
Dept: PHYSICAL THERAPY | Facility: CLINIC | Age: 69
End: 2024-12-05
Payer: COMMERCIAL

## 2024-12-05 DIAGNOSIS — N39.3 STRESS INCONTINENCE OF URINE: Primary | ICD-10-CM

## 2024-12-10 ENCOUNTER — TRANSFERRED RECORDS (OUTPATIENT)
Dept: MULTI SPECIALTY CLINIC | Facility: CLINIC | Age: 69
End: 2024-12-10

## 2024-12-10 LAB — RETINOPATHY: NORMAL

## 2024-12-12 ENCOUNTER — THERAPY VISIT (OUTPATIENT)
Dept: PHYSICAL THERAPY | Facility: CLINIC | Age: 69
End: 2024-12-12
Payer: COMMERCIAL

## 2024-12-12 DIAGNOSIS — N39.3 STRESS INCONTINENCE OF URINE: Primary | ICD-10-CM

## 2024-12-13 ENCOUNTER — ANCILLARY PROCEDURE (OUTPATIENT)
Dept: MAMMOGRAPHY | Facility: CLINIC | Age: 69
End: 2024-12-13
Attending: INTERNAL MEDICINE
Payer: COMMERCIAL

## 2024-12-13 DIAGNOSIS — Z12.31 VISIT FOR SCREENING MAMMOGRAM: ICD-10-CM

## 2024-12-13 PROCEDURE — 77067 SCR MAMMO BI INCL CAD: CPT | Mod: GC | Performed by: STUDENT IN AN ORGANIZED HEALTH CARE EDUCATION/TRAINING PROGRAM

## 2024-12-13 PROCEDURE — 77063 BREAST TOMOSYNTHESIS BI: CPT | Mod: GC | Performed by: STUDENT IN AN ORGANIZED HEALTH CARE EDUCATION/TRAINING PROGRAM

## 2024-12-18 DIAGNOSIS — E53.8 VITAMIN B12 DEFICIENCY (NON ANEMIC): ICD-10-CM

## 2024-12-18 DIAGNOSIS — N39.46 MIXED INCONTINENCE URGE AND STRESS (MALE)(FEMALE): ICD-10-CM

## 2024-12-18 RX ORDER — TOLTERODINE 4 MG/1
4 CAPSULE, EXTENDED RELEASE ORAL DAILY
Qty: 90 CAPSULE | Refills: 0 | Status: SHIPPED | OUTPATIENT
Start: 2024-12-18

## 2024-12-18 RX ORDER — LANOLIN ALCOHOL/MO/W.PET/CERES
CREAM (GRAM) TOPICAL
Qty: 90 TABLET | Refills: 0 | Status: SHIPPED | OUTPATIENT
Start: 2024-12-18

## 2024-12-18 NOTE — TELEPHONE ENCOUNTER
Medication requested: cyanocobalamin (VITAMIN B-12) 1000 MCG tablet   Last office visit: 10/28/2024  [facility] appointments: 1/9/2025  Medication last refilled: 6/27/2024; #90 + 0 refills    Medication requested: tolterodine ER (DETROL LA) 4 MG 24 hr capsule   Medication last refilled: 9/19/2024; 90 caps +0 refills  Last qualifying labs:     Medication is being filled for 1 time refill only due to:   pt has upcoming appt.    CEDRICK Juares, RN  12/18/24, 3:29 PM

## 2024-12-25 RX ORDER — ALBUTEROL SULFATE 0.83 MG/ML
2.5 SOLUTION RESPIRATORY (INHALATION)
OUTPATIENT
Start: 2025-01-01

## 2024-12-25 RX ORDER — HEPARIN SODIUM,PORCINE 10 UNIT/ML
5-20 VIAL (ML) INTRAVENOUS DAILY PRN
OUTPATIENT
Start: 2025-01-01

## 2024-12-25 RX ORDER — METHYLPREDNISOLONE SODIUM SUCCINATE 40 MG/ML
40 INJECTION INTRAMUSCULAR; INTRAVENOUS
Start: 2025-01-01

## 2024-12-25 RX ORDER — ZOLEDRONIC ACID 0.05 MG/ML
5 INJECTION, SOLUTION INTRAVENOUS ONCE
Start: 2025-01-01

## 2024-12-25 RX ORDER — HEPARIN SODIUM (PORCINE) LOCK FLUSH IV SOLN 100 UNIT/ML 100 UNIT/ML
5 SOLUTION INTRAVENOUS
OUTPATIENT
Start: 2025-01-01

## 2024-12-25 RX ORDER — EPINEPHRINE 1 MG/ML
0.3 INJECTION, SOLUTION INTRAMUSCULAR; SUBCUTANEOUS EVERY 5 MIN PRN
OUTPATIENT
Start: 2025-01-01

## 2024-12-25 RX ORDER — DIPHENHYDRAMINE HYDROCHLORIDE 50 MG/ML
50 INJECTION INTRAMUSCULAR; INTRAVENOUS
Start: 2025-01-01

## 2024-12-25 RX ORDER — ACETAMINOPHEN 325 MG/1
650 TABLET ORAL
OUTPATIENT
Start: 2025-01-01

## 2024-12-25 RX ORDER — MEPERIDINE HYDROCHLORIDE 25 MG/ML
25 INJECTION INTRAMUSCULAR; INTRAVENOUS; SUBCUTANEOUS
OUTPATIENT
Start: 2025-01-01

## 2024-12-25 RX ORDER — DIPHENHYDRAMINE HYDROCHLORIDE 50 MG/ML
25 INJECTION INTRAMUSCULAR; INTRAVENOUS
Start: 2025-01-01

## 2024-12-25 RX ORDER — ALBUTEROL SULFATE 90 UG/1
1-2 INHALANT RESPIRATORY (INHALATION)
Start: 2025-01-01

## 2024-12-26 ENCOUNTER — INFUSION THERAPY VISIT (OUTPATIENT)
Dept: INFUSION THERAPY | Facility: CLINIC | Age: 69
End: 2024-12-26
Attending: FAMILY MEDICINE
Payer: COMMERCIAL

## 2024-12-26 ENCOUNTER — APPOINTMENT (OUTPATIENT)
Dept: LAB | Facility: CLINIC | Age: 69
End: 2024-12-26
Attending: FAMILY MEDICINE
Payer: COMMERCIAL

## 2024-12-26 VITALS
DIASTOLIC BLOOD PRESSURE: 83 MMHG | HEART RATE: 82 BPM | WEIGHT: 153.2 LBS | HEIGHT: 64 IN | TEMPERATURE: 97.5 F | SYSTOLIC BLOOD PRESSURE: 152 MMHG | OXYGEN SATURATION: 96 % | RESPIRATION RATE: 16 BRPM | BODY MASS INDEX: 26.15 KG/M2

## 2024-12-26 DIAGNOSIS — M85.852 OSTEOPENIA OF LEFT HIP: Primary | ICD-10-CM

## 2024-12-26 LAB
CALCIUM SERPL-MCNC: 9.8 MG/DL (ref 8.8–10.4)
CREAT SERPL-MCNC: 1.37 MG/DL (ref 0.51–0.95)
EGFRCR SERPLBLD CKD-EPI 2021: 42 ML/MIN/1.73M2

## 2024-12-26 PROCEDURE — 82310 ASSAY OF CALCIUM: CPT | Performed by: FAMILY MEDICINE

## 2024-12-26 PROCEDURE — 250N000011 HC RX IP 250 OP 636: Performed by: FAMILY MEDICINE

## 2024-12-26 PROCEDURE — 82565 ASSAY OF CREATININE: CPT | Performed by: FAMILY MEDICINE

## 2024-12-26 PROCEDURE — 36415 COLL VENOUS BLD VENIPUNCTURE: CPT | Performed by: FAMILY MEDICINE

## 2024-12-26 RX ORDER — ACETAMINOPHEN 325 MG/1
650 TABLET ORAL
Status: DISCONTINUED | OUTPATIENT
Start: 2024-12-26 | End: 2024-12-26 | Stop reason: HOSPADM

## 2024-12-26 RX ORDER — ZOLEDRONIC ACID 0.05 MG/ML
5 INJECTION, SOLUTION INTRAVENOUS ONCE
Status: COMPLETED | OUTPATIENT
Start: 2024-12-26 | End: 2024-12-26

## 2024-12-26 RX ADMIN — ZOLEDRONIC ACID 5 MG: 0.05 INJECTION, SOLUTION INTRAVENOUS at 16:17

## 2024-12-26 ASSESSMENT — PAIN SCALES - GENERAL: PAINLEVEL_OUTOF10: MILD PAIN (2)

## 2024-12-26 NOTE — NURSING NOTE
Chief Complaint   Patient presents with    Labs Only     PIV placed, vitals checked     Tamiko Finch RN on 12/26/2024 at 3:20 PM

## 2024-12-26 NOTE — PROGRESS NOTES
"Treatment Conditions:  \"Verify that patient is taking calcium/vitamin D supplements: 1 tablet PO BID. Each tablet should have 500-600mg elemental calcium and 400 units of vitamin D. Exception: patients who have or have a history of hypercalcemia.\"    Patient verified she is taking both.    \"Give if: Creatinine Clearance is >35 mL/min and calcium level within normal limits. If calcium is <8.5 release the add on albumin level. If the corrected calcium is WNL okay to proceed with the infusion. Labs must be within 2 months of dose.\"    153 lbs 3.2 oz    5' 4\"    Creatinine   Date Value Ref Range Status   12/26/2024 1.37 (H) 0.51 - 0.95 mg/dL Final   05/27/2021 1.27 (H) 0.52 - 1.04 mg/dL Final     Calcium   Date Value Ref Range Status   12/26/2024 9.8 8.8 - 10.4 mg/dL Final     Comment:     Reference intervals for this test were updated on 7/16/2024 to reflect our healthy population more accurately. There may be differences in the flagging of prior results with similar values performed with this method. Those prior results can be interpreted in the context of the updated reference intervals.   05/27/2021 9.5 8.5 - 10.1 mg/dL Final     43 mL/min  Creatinine clearance, original Cockcroft-Gault    Safe to proceed with infusion.    Barron Espinoza RN  "

## 2024-12-26 NOTE — PROGRESS NOTES
"Infusion Nursing Note:  Karine Moss presents today for Reclast.    Patient seen by provider today: No   present during visit today: Not Applicable.    Note: Reclast infused as ordered. Declined as needed Tylenol.  Administrations This Visit       zoledronic acid (RECLAST) infusion 5 mg       Admin Date  12/26/2024 Action  $New Bag Dose  5 mg Rate  200 mL/hr Route  Intravenous Documented By  So King RN                     Intravenous Access:  Peripheral IV placed in the lab.    Treatment Conditions:  -42 mL/min  Creatinine clearance, original Cockcroft-Gault.     Latest Reference Range & Units 12/26/24 15:16   Creatinine 0.51 - 0.95 mg/dL 1.37 (H)   GFR Estimate >60 mL/min/1.73m2 42 (L)   Calcium 8.8 - 10.4 mg/dL 9.8   (H): Data is abnormally high  (L): Data is abnormally low    -verbalized taking Vitamin D and Calcium.    Post Infusion Assessment:  Patient tolerated infusion without incident.  Blood return noted pre and post infusion.  Site patent and intact, free from redness, edema or discomfort.  No evidence of extravasations.  Access discontinued per protocol.       Discharge Plan:   Discharge instructions reviewed with: Patient.  Patient and/or family verbalized understanding of discharge instructions and all questions answered.  AVS to patient via [employer].  Patient will return to her provider as needed for next appointment.   Patient discharged in stable condition accompanied by: self.  Departure Mode: Ambulatory.    BP (!) 152/83 (BP Location: Left arm, Patient Position: Semi-Deng's, Cuff Size: Adult Regular)   Pulse 82   Temp 97.5  F (36.4  C)   Resp 16   Ht 1.626 m (5' 4\")   Wt 69.5 kg (153 lb 3.2 oz)   SpO2 96%   BMI 26.30 kg/m       So King RN  "

## 2024-12-26 NOTE — PATIENT INSTRUCTIONS
Dear Karine Moss    Thank you for choosing [facility] for your infusion.  The following information is a summary of our appointment as well as important reminders.        If you have any questions on your upcoming Specialty Infusion appointments, please call scheduling at [phone].  It was a pleasure taking care of you today.    Sincerely,    [facility]  [address] [place] [address]  [address]  Phone:  [phone]

## 2025-01-13 ENCOUNTER — MYC MEDICAL ADVICE (OUTPATIENT)
Dept: SLEEP MEDICINE | Facility: CLINIC | Age: 70
End: 2025-01-13
Payer: COMMERCIAL

## 2025-01-13 DIAGNOSIS — G47.419 NARCOLEPSY WITHOUT CATAPLEXY(347.00): ICD-10-CM

## 2025-01-15 RX ORDER — DEXTROAMPHETAMINE SACCHARATE, AMPHETAMINE ASPARTATE, DEXTROAMPHETAMINE SULFATE AND AMPHETAMINE SULFATE 7.5; 7.5; 7.5; 7.5 MG/1; MG/1; MG/1; MG/1
TABLET ORAL
Qty: 60 TABLET | Refills: 0 | Status: SHIPPED | OUTPATIENT
Start: 2025-01-15

## 2025-01-15 NOTE — TELEPHONE ENCOUNTER
Amphetamine-Dextroamphetamine Oral Tab 30 MG      Last Written Prescription Date:  12/15/2024  Last Fill Quantity: 60,   # refills: 0  Last Office Visit: 06/4/2024  Future Office visit:Sleep study and Actigraphy scheduled. Nothing scheduled with provider.  Janki Rutledge, CMA

## 2025-01-23 ENCOUNTER — THERAPY VISIT (OUTPATIENT)
Dept: PHYSICAL THERAPY | Facility: CLINIC | Age: 70
End: 2025-01-23
Payer: COMMERCIAL

## 2025-01-23 DIAGNOSIS — N39.3 STRESS INCONTINENCE OF URINE: Primary | ICD-10-CM

## 2025-01-28 DIAGNOSIS — E11.9 TYPE 2 DIABETES MELLITUS WITHOUT COMPLICATION, WITHOUT LONG-TERM CURRENT USE OF INSULIN (H): ICD-10-CM

## 2025-01-28 RX ORDER — FLASH GLUCOSE SENSOR
KIT MISCELLANEOUS
Qty: 2 EACH | Refills: 2 | Status: SHIPPED | OUTPATIENT
Start: 2025-01-28

## 2025-01-28 NOTE — TELEPHONE ENCOUNTER
CGS (Freestyle Nirmal 14-day sensor)    Last Office Visit: 10/28/24  Future [facility] Appointments: 1/29/25  Medication last refilled: 8/27/24 #2 with 2 refill(s)    Prescription approved per [facility] Refill Protocol.    Joi Taveras, DELIAN, RN, CCM

## 2025-01-28 NOTE — PROGRESS NOTES
[facility] [address], [address]  [facility] [address]  Phone: [phone]  Fax: [phone]    Patient:  Karine Moss, Date of birth 1955  Date of Visit:  01/28/2025  Referring Provider Referred Self      Assessment & Plan      ***    Anay Martinez MD     Karine Moss is a 69 year old female who presents to the clinic today for a recheck of    DIABETES  Here for Type II diabetes.  Last eye exam was December 2024.  Retinopathy: none  Peripheral neuropathy: toes and metatarsal heads  Stopped pregabalyn for neuropathy 11/2024 due weight gain (30 lbs)--will see neurologist ([facility] ) in Feb 2024  Weight: up 10 lb since May 2024 (when pregabalin was started)  Wt Readings from Last 3 Encounters:   01/29/25 69.9 kg (154 lb)   12/26/24 69.5 kg (153 lb 3.2 oz)   10/28/24 70.8 kg (156 lb)     DM Meds: Jardiance 10mg daily ---treating kidney disease  Stopped Sept 2024  Comment: followed by nephrology, GFR dropped from 40 to 10 in Sept. Jardiance held, continuing losartan. Most recent GFR rising from 19 to 30. Jardiance still on hold  Plan: Karine will continue holding Jardiance    Kidney doctor     Not checking sugars ran out of sensors  Refilled 1/28 with refills   No longer having hypoglycemia  Eats several small meals and 2 main meal    Lab Results   Component Value Date    A1C 6.2 10/28/2024    A1C 5.8 05/02/2024    A1C 7.0 03/08/2021    A1C 6.2 09/04/2020       Candidiasis/skin issues: no  UTI/ yeast infections: no.  Foot exam: ***      HYPERLIPIDEMIA  Recent Labs   Lab Test 01/09/24  1100 03/29/22  1033 03/08/21  1615 02/06/20  1523   CHOL 133 126 113.0 111.0   HDL 55 52 35.0* 47.0*   LDL 52 49 46.0 29.0   TRIG 129 123 157.0* 179.0*   CHOLHDLRATIO  --   --  3.2 2.4     Atorvastatin 20mg daily  No hx of ASCVD or stroke  Due for labs today, not fasting    HYPERTENSION  BP Readings from Last 3 Encounters:   01/29/25 134/79   12/26/24 (!)  "152/83   10/28/24 139/88     Losartan 50mg daily  No chest pain, no palpitations  sedentary     Dry mouth  Cevimeline 30mg 1 po TID ---consistent with dose  Stopped med and found she really needed it    Upper GI bleed  EGD 8/2024, healed gastrojejunal anstomosis (previous gastric bypass), scarring from previous ulceration prox to the anastomosis.  Omeprazole 40mg bid, ---consistent with taking it  Symtpoms well controlled  No dysphagia  Stomach feels upset, doesn't want to eat, yoghurt, not pain just unsettled--occurs a couple times a month but can last 3 d  No diarrhea or constipation when this happened    Colonoscopy 8/2024  5 polyps removed--tubular adenoma and hyperplastic  Diverticulosis  Repeat in 7 yr  No constipation or diarrhea    Mixed incontinence urge and stress (male)(female)  Tolterodine ER 4mg daily  Paused med and noted symptoms worsened  Seeing pelvic therapy  Home exercises helping  Huge maxi pad down to a pantiliner  Nocuturia x 2    Pregabalin  Twice daily , larger dose at night to help with sleep  Daughter remembers he sleeping better with pregabalin    Did not like trazodone, \"awful dreams\"    Sleep clinic   Established with [facility] [place]  Actigraphy bracelet Feb 2025  Sleep study after that    Compression stockings  Helps legs at night Air massager air compresser TEMU, heat, comfort helps  Has one at home will buy second one      EXAM  /79 (BP Location: Left arm, Patient Position: Sitting, Cuff Size: Adult Regular)   Pulse 94   Temp 98.5  F (36.9  C) (Skin)   Resp 16   Ht 1.626 m (5' 4\")   Wt 69.9 kg (154 lb)   SpO2 96%   BMI 26.43 kg/m    Gen: Alert, NAD  Cor: S1S2, *** murmur  Lungs: CTA bilaterally  Abd: soft nontender +BS ***  Ext: no edema, pulses *** palpable  Skin: ***   Feet: no ulcerations, Callouses ***, sensation ***  ***  Answers submitted by the patient for this visit:  Patient Health Questionnaire (Submitted on 1/29/2025)  If you checked off any problems, how difficult " have these problems made it for you to do your work, take care of things at home, or get along with other people?: Somewhat difficult  PHQ9 TOTAL SCORE: 4  Patient Health Questionnaire (G7) (Submitted on 1/29/2025)  DELMY 7 TOTAL SCORE: 0     "(previous gastric bypass), scarring from previous ulceration prox to the anastomosis.  Omeprazole 40mg bid, ---consistent with taking it  Symtpoms well controlled  No dysphagia  Stomach feels upset, doesn't want to eat, yoghurt, not pain just unsettled--occurs a couple times a month but can last 3 d  No diarrhea or constipation when this happened    Colonoscopy 8/2024  5 polyps removed--tubular adenoma and hyperplastic  Diverticulosis  Repeat in 7 yr  No constipation or diarrhea    Mixed incontinence urge and stress (male)(female)  Tolterodine ER 4mg daily  Paused med and noted symptoms worsened  Seeing pelvic therapy  Home exercises helping  Huge maxi pad down to a pantiliner  Nocuturia x 2    Peripheral neuropathy  Follow[facility] by [facility]  Pregablin was prescribed BID, larger dose at night to help with sleep  Karine stopped Lyrica in October 2024, concerned it caused weight gain  Neuropathy worse since that time  Other sleeping agents, ie  trazodone,triggered  \"awful dreams\"  Wearing heated rechargeable air compression wrap at night on leg(s). Provides comfort    Restless legs  Established with [facility] [place] [facility]  Hx of RLS, takes iron 28mg elemental 3x per week  Actigraphy bracelet --Feb 2025  Sleep study after that.    EXAM  /79 (BP Location: Left arm, Patient Position: Sitting, Cuff Size: Adult Regular)   Pulse 94   Temp 98.5  F (36.9  C) (Skin)   Resp 16   Ht 1.626 m (5' 4\")   Wt 69.9 kg (154 lb)   SpO2 96%   BMI 26.43 kg/m    Gen: Alert, NAD  Cor: S1S2, no murmur  HEENT: very dry mouth/tongue  Lungs: CTA bilaterally  Abd: soft nontender +BS  EXT: no edema  Feet: verrucous thickened lesions along medial left heel  2nd toe of right foot with scabbed lesion on pad of toe, Illuminated linear ?FB with light, appears deep, tender with palpation, no overlying redness or warmth  Neuro: dampened sensation with use of microfilament to the level of the malleoli both feet.     Answers submitted by the " patient for this visit:  Patient Health Questionnaire (Submitted on 1/29/2025)  If you checked off any problems, how difficult have these problems made it for you to do your work, take care of things at home, or get along with other people?: Somewhat difficult  PHQ9 TOTAL SCORE: 4  Patient Health Questionnaire (G7) (Submitted on 1/29/2025)  DELMY 7 TOTAL SCORE: 0

## 2025-01-29 ENCOUNTER — OFFICE VISIT (OUTPATIENT)
Dept: FAMILY MEDICINE | Facility: CLINIC | Age: 70
End: 2025-01-29
Payer: COMMERCIAL

## 2025-01-29 VITALS
HEART RATE: 94 BPM | OXYGEN SATURATION: 96 % | RESPIRATION RATE: 16 BRPM | HEIGHT: 64 IN | WEIGHT: 154 LBS | SYSTOLIC BLOOD PRESSURE: 134 MMHG | TEMPERATURE: 98.5 F | BODY MASS INDEX: 26.29 KG/M2 | DIASTOLIC BLOOD PRESSURE: 79 MMHG

## 2025-01-29 DIAGNOSIS — N39.46 MIXED INCONTINENCE URGE AND STRESS (MALE)(FEMALE): ICD-10-CM

## 2025-01-29 DIAGNOSIS — B07.0 PLANTAR WART: ICD-10-CM

## 2025-01-29 DIAGNOSIS — K92.2 UPPER GI BLEED: ICD-10-CM

## 2025-01-29 DIAGNOSIS — N18.4 CKD (CHRONIC KIDNEY DISEASE) STAGE 4, GFR 15-29 ML/MIN (H): ICD-10-CM

## 2025-01-29 DIAGNOSIS — R68.2 DRY MOUTH, UNSPECIFIED: ICD-10-CM

## 2025-01-29 DIAGNOSIS — S90.454A: ICD-10-CM

## 2025-01-29 DIAGNOSIS — E11.9 TYPE 2 DIABETES MELLITUS WITHOUT COMPLICATION, WITHOUT LONG-TERM CURRENT USE OF INSULIN (H): Primary | ICD-10-CM

## 2025-01-29 DIAGNOSIS — I10 HYPERTENSION GOAL BP (BLOOD PRESSURE) < 140/90: ICD-10-CM

## 2025-01-29 DIAGNOSIS — E78.5 HYPERLIPIDEMIA LDL GOAL <100: ICD-10-CM

## 2025-01-29 LAB
EST. AVERAGE GLUCOSE BLD GHB EST-MCNC: 166 MG/DL
HBA1C MFR BLD: 7.4 % (ref 0–5.6)

## 2025-01-29 PROCEDURE — 80061 LIPID PANEL: CPT | Performed by: INTERNAL MEDICINE

## 2025-01-29 PROCEDURE — 82310 ASSAY OF CALCIUM: CPT | Performed by: INTERNAL MEDICINE

## 2025-01-29 PROCEDURE — 80053 COMPREHEN METABOLIC PANEL: CPT | Performed by: INTERNAL MEDICINE

## 2025-01-29 RX ORDER — LOSARTAN POTASSIUM 50 MG/1
TABLET ORAL
Qty: 90 TABLET | Refills: 3 | Status: SHIPPED | OUTPATIENT
Start: 2025-01-29

## 2025-01-29 RX ORDER — TOLTERODINE 4 MG/1
4 CAPSULE, EXTENDED RELEASE ORAL DAILY
Qty: 90 CAPSULE | Refills: 3 | Status: SHIPPED | OUTPATIENT
Start: 2025-01-29

## 2025-01-29 RX ORDER — CEVIMELINE HYDROCHLORIDE 30 MG/1
CAPSULE ORAL
Qty: 270 CAPSULE | Refills: 3 | Status: SHIPPED | OUTPATIENT
Start: 2025-01-29

## 2025-01-29 RX ORDER — OMEPRAZOLE 40 MG/1
CAPSULE, DELAYED RELEASE ORAL
Qty: 180 CAPSULE | Refills: 3 | Status: SHIPPED | OUTPATIENT
Start: 2025-01-29

## 2025-01-29 RX ORDER — ATORVASTATIN CALCIUM 20 MG/1
20 TABLET, FILM COATED ORAL DAILY
Qty: 90 TABLET | Refills: 3 | Status: SHIPPED | OUTPATIENT
Start: 2025-01-29

## 2025-01-29 ASSESSMENT — ANXIETY QUESTIONNAIRES
IF YOU CHECKED OFF ANY PROBLEMS ON THIS QUESTIONNAIRE, HOW DIFFICULT HAVE THESE PROBLEMS MADE IT FOR YOU TO DO YOUR WORK, TAKE CARE OF THINGS AT HOME, OR GET ALONG WITH OTHER PEOPLE: NOT DIFFICULT AT ALL
4. TROUBLE RELAXING: NOT AT ALL
GAD7 TOTAL SCORE: 0
6. BECOMING EASILY ANNOYED OR IRRITABLE: NOT AT ALL
GAD7 TOTAL SCORE: 0
1. FEELING NERVOUS, ANXIOUS, OR ON EDGE: NOT AT ALL
7. FEELING AFRAID AS IF SOMETHING AWFUL MIGHT HAPPEN: NOT AT ALL
5. BEING SO RESTLESS THAT IT IS HARD TO SIT STILL: NOT AT ALL
GAD7 TOTAL SCORE: 0
7. FEELING AFRAID AS IF SOMETHING AWFUL MIGHT HAPPEN: NOT AT ALL
2. NOT BEING ABLE TO STOP OR CONTROL WORRYING: NOT AT ALL
8. IF YOU CHECKED OFF ANY PROBLEMS, HOW DIFFICULT HAVE THESE MADE IT FOR YOU TO DO YOUR WORK, TAKE CARE OF THINGS AT HOME, OR GET ALONG WITH OTHER PEOPLE?: NOT DIFFICULT AT ALL
3. WORRYING TOO MUCH ABOUT DIFFERENT THINGS: NOT AT ALL

## 2025-01-29 NOTE — NURSING NOTE
"69 year old  Chief Complaint   Patient presents with    Diabetes     Follow up        Blood pressure 134/79, pulse 94, temperature 98.5  F (36.9  C), temperature source Skin, resp. rate 16, height 1.626 m (5' 4\"), weight 69.9 kg (154 lb), SpO2 96%, not currently breastfeeding. Body mass index is 26.43 kg/m .  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)    Hyperlipidemia LDL goal <100    Obstructive sleep apnea    Major depression in partial remission    Low back pain    DJD (degenerative joint disease) of knee    IBS (irritable bowel syndrome)    Heart murmur    Hypertension goal BP (blood pressure) < 140/90    Type 2 diabetes mellitus without complication, without long-term current use of insulin (H)    Osteopenia of hip    PTSD (post-traumatic stress disorder)    Diabetic peripheral neuropathy (H)    Bilateral sciatica    Gastrointestinal hemorrhage associated with gastric ulcer    Chronic kidney disease, stage 3 (H)    Chronic diastolic congestive heart failure (H)    Osteopenia of spine    Chronic left shoulder pain    Dumping syndrome    History of Rai-en-Y gastric bypass    Hypoglycemia    Insomnia    Hx laparoscopic cholecystectomy    Major depressive disorder, recurrent episode, mild    Peripheral neuropathy    RLS (restless legs syndrome)    Bacterial vaginosis    Stress incontinence of urine       Wt Readings from Last 2 Encounters:   01/29/25 69.9 kg (154 lb)   12/26/24 69.5 kg (153 lb 3.2 oz)     BP Readings from Last 3 Encounters:   01/29/25 134/79   12/26/24 (!) 152/83   10/28/24 139/88         Current Outpatient Medications   Medication Sig Dispense Refill    amphetamine-dextroamphetamine (ADDERALL) 30 MG tablet Take 1-2 tablets 60 tablet 0    atorvastatin (LIPITOR) 20 MG tablet Take 1 tablet (20 mg) by mouth daily 90 tablet 3    blood glucose (NO BRAND SPECIFIED) lancets standard Use to test blood sugar 1 times daily or as directed. 90 " Lancet 3    blood glucose (NO BRAND SPECIFIED) test strip Use to test blood sugar 1 times daily or as directed. 100 strip 3    blood glucose calibration (NO BRAND SPECIFIED) solution Use to calibrate blood glucose monitor as needed as directed. 1 each 3    blood glucose monitoring (NO BRAND SPECIFIED) meter device kit Use to test blood sugar 1 times daily or as directed. 1 kit 0    Calcium Acetate 667 MG TABS Take 1 tablet by mouth 2 times daily for 360 days 180 tablet 3    cevimeline (EVOXAC) 30 MG capsule take 1 cap  capsule 1    cholecalciferol (VITAMIN D3) 125 mcg (5000 units) capsule Take 125 mcg by mouth daily      COMPOUNDED NON-CONTROLLED SUBSTANCE (CMPD RX) - PHARMACY TO MIX COMPOUNDED MEDICATION Estradiol 0.0075% in HRT cream  Apply 2 grams,  intravaginally and small amount externally daily for one week then twice weekly for maintenance. 45 g 11    Continuous Blood Gluc  (FREESTYLE JORDIN 14 DAY READER) EBEN Use to read blood sugars as per 's instructions. 1 each 0    Continuous Glucose Sensor (FREESTYLE JORDIN 14 DAY SENSOR) MISC Change every 14 days. 2 each 2    cyanocobalamin (VITAMIN B-12) 1000 MCG tablet Take 1 tablet by mouth every other day. 90 tablet 0    DULoxetine (CYMBALTA) 30 MG capsule Take 1 capsule by mouth twice daily. 180 capsule 0    DULoxetine (CYMBALTA) 60 MG capsule Take 1 capsule by mouth at bedtime. Take in addition to current 30 mg evening dose for a total of 90 mg at bedtime. 90 capsule 0    empagliflozin (JARDIANCE) 10 MG TABS tablet Take one daily 90 tablet 3    finasteride (PROSCAR) 5 MG tablet Take 1 tablet daily 90 tablet 3    losartan (COZAAR) 50 MG tablet Take one daily 90 tablet 1    metroNIDAZOLE (FLAGYL) 500 MG tablet Take 1 tablet (500 mg) by mouth 2 times daily. 14 tablet 3    omeprazole (PRILOSEC) 40 MG DR capsule Take 40mg twice daily 180 capsule 3    pregabalin (LYRICA) 100 MG capsule Take 200 mg by mouth daily. Starting Wednesday an  additional 100 mg      tolterodine ER (DETROL LA) 4 MG 24 hr capsule Take 1 capsule by mouth daily. 90 capsule 0    triamcinolone (KENALOG) 0.1 % paste Apply to tongue twice daily x 10 days 5 g 0     No current facility-administered medications for this visit.       Social History     Tobacco Use    Smoking status: Never     Passive exposure: Never    Smokeless tobacco: Never   Vaping Use    Vaping status: Never Used   Substance Use Topics    Alcohol use: Not Currently     Comment: rare    Drug use: No       Health Maintenance Due   Topic Date Due    HF ACTION PLAN  Never done    ADVANCE CARE PLANNING  Never done    RSV VACCINE (1 - Risk 60-74 years 1-dose series) Never done    MEDICARE ANNUAL WELLNESS VISIT  04/12/2020    EYE EXAM  03/10/2021    Pneumococcal Vaccine: 50+ Years (2 of 2 - PCV) 05/27/2022    FALL RISK ASSESSMENT  02/17/2023    LIPID  01/09/2025    DIABETIC FOOT EXAM  01/09/2025       Lab Results   Component Value Date    PAP NIL 04/12/2019 January 29, 2025 10:28 AM

## 2025-01-30 LAB
ALBUMIN SERPL BCG-MCNC: 4.2 G/DL (ref 3.5–5.2)
ALP SERPL-CCNC: 92 U/L (ref 40–150)
ALT SERPL W P-5'-P-CCNC: 18 U/L (ref 0–50)
ANION GAP SERPL CALCULATED.3IONS-SCNC: 9 MMOL/L (ref 7–15)
AST SERPL W P-5'-P-CCNC: 24 U/L (ref 0–45)
BILIRUB SERPL-MCNC: 0.5 MG/DL
BUN SERPL-MCNC: 32.9 MG/DL (ref 8–23)
CALCIUM SERPL-MCNC: 9.6 MG/DL (ref 8.8–10.4)
CHLORIDE SERPL-SCNC: 102 MMOL/L (ref 98–107)
CHOLEST SERPL-MCNC: 148 MG/DL
CREAT SERPL-MCNC: 1.84 MG/DL (ref 0.51–0.95)
EGFRCR SERPLBLD CKD-EPI 2021: 29 ML/MIN/1.73M2
FASTING STATUS PATIENT QL REPORTED: NO
FASTING STATUS PATIENT QL REPORTED: NO
GLUCOSE SERPL-MCNC: 159 MG/DL (ref 70–99)
HCO3 SERPL-SCNC: 27 MMOL/L (ref 22–29)
HDLC SERPL-MCNC: 41 MG/DL
LDLC SERPL CALC-MCNC: 73 MG/DL
NONHDLC SERPL-MCNC: 107 MG/DL
POTASSIUM SERPL-SCNC: 5.2 MMOL/L (ref 3.4–5.3)
PROT SERPL-MCNC: 7.8 G/DL (ref 6.4–8.3)
SODIUM SERPL-SCNC: 138 MMOL/L (ref 135–145)
TRIGL SERPL-MCNC: 171 MG/DL

## 2025-02-01 PROBLEM — K92.2 UPPER GI BLEED: Status: ACTIVE | Noted: 2021-11-16

## 2025-02-01 PROBLEM — N39.46 MIXED INCONTINENCE URGE AND STRESS (MALE)(FEMALE): Status: ACTIVE | Noted: 2025-02-01

## 2025-02-01 PROBLEM — N18.4 CKD (CHRONIC KIDNEY DISEASE) STAGE 4, GFR 15-29 ML/MIN (H): Status: ACTIVE | Noted: 2025-02-01

## 2025-02-01 PROBLEM — R68.2 DRY MOUTH, UNSPECIFIED: Status: ACTIVE | Noted: 2025-02-01

## 2025-02-01 RX ORDER — GINGER ROOT/GINGER ROOT EXT 262.5 MG
1 CAPSULE ORAL
COMMUNITY
Start: 2025-02-01

## 2025-02-07 ENCOUNTER — MYC REFILL (OUTPATIENT)
Dept: SLEEP MEDICINE | Facility: CLINIC | Age: 70
End: 2025-02-07

## 2025-02-07 DIAGNOSIS — G47.419 NARCOLEPSY WITHOUT CATAPLEXY(347.00): ICD-10-CM

## 2025-02-10 ENCOUNTER — TELEPHONE (OUTPATIENT)
Dept: DERMATOLOGY | Facility: CLINIC | Age: 70
End: 2025-02-10
Payer: COMMERCIAL

## 2025-02-10 NOTE — TELEPHONE ENCOUNTER
[facility]    Phone Message    May a detailed message be left on voicemail: no     Reason for Call: Other: Patients daughter called to schedule hair metrix appointment. Please call Leila back to schedule.     Action Taken: Message routed to:  [facility] DERM    Travel Screening: Not Applicable     Date of Service:

## 2025-02-11 NOTE — TELEPHONE ENCOUNTER
Pending Prescriptions:                       Disp   Refills    amphetamine-dextroamphetamine (ADDERALL) *60 tab*0            Sig: Take 1-2 tablets          Last Written Prescription Date:  1/15/25  Last Fill Quantity: 60,   # refills: 0  Last Office Visit: 6/4/24  Future Office visit:  acti and study scheduled      Routing refill request to provider for review/approval because:  Drug not on the [facility], [facility] or [facility] refill protocol or controlled substance

## 2025-02-12 RX ORDER — DEXTROAMPHETAMINE SACCHARATE, AMPHETAMINE ASPARTATE, DEXTROAMPHETAMINE SULFATE AND AMPHETAMINE SULFATE 7.5; 7.5; 7.5; 7.5 MG/1; MG/1; MG/1; MG/1
TABLET ORAL
Qty: 60 TABLET | Refills: 0 | Status: SHIPPED | OUTPATIENT
Start: 2025-02-12

## 2025-02-14 NOTE — TELEPHONE ENCOUNTER
At this time, scheduling for HairMet[employer] with clinical research fellows is limited to patients being seen by Dr. Dhillon.

## 2025-02-17 ENCOUNTER — TRANSFERRED RECORDS (OUTPATIENT)
Dept: HEALTH INFORMATION MANAGEMENT | Facility: CLINIC | Age: 70
End: 2025-02-17
Payer: COMMERCIAL

## 2025-02-17 DIAGNOSIS — N18.9 ACUTE KIDNEY INJURY SUPERIMPOSED ON CKD: Primary | ICD-10-CM

## 2025-02-17 DIAGNOSIS — N17.9 ACUTE KIDNEY INJURY SUPERIMPOSED ON CKD: Primary | ICD-10-CM

## 2025-02-17 DIAGNOSIS — E11.9 TYPE 2 DIABETES MELLITUS WITHOUT COMPLICATION, WITHOUT LONG-TERM CURRENT USE OF INSULIN (H): ICD-10-CM

## 2025-02-17 RX ORDER — FLASH GLUCOSE SENSOR
KIT MISCELLANEOUS
Qty: 2 EACH | Refills: 0 | Status: SHIPPED | OUTPATIENT
Start: 2025-02-17

## 2025-02-17 NOTE — TELEPHONE ENCOUNTER
Sent patient MyChart message update    Patient is not an established patient with Dr. Dhillon at this time.    [place] LPN

## 2025-02-17 NOTE — TELEPHONE ENCOUNTER
Blood Glucose Test Strips + Continuous Glucose Sensor (Freestyle Nirmal 14-day Sensor)     Last Office Visit: 1/29/25  [facility] Appointments: 3/5/25  Medication last refilled:     Medication last refilled:   3/27/24 #100 with 3 refill(s) - Test Strips  1/28/25 #2 with 2 refill(s) - Freestyle Nirmal Sensors    Required labs per protocol:    LAB REF RANGE 10/28/24 1/29/25   Creatinine 0.67-1.17 mg/dL 1.81 High 1.84 High   Hgb A1C 0.0-5.6 % 6.2 High 7.4 High     Prescription approved per [facility] Refill Protocol.    DELIA FernandesN, RN, CCM

## 2025-02-18 ENCOUNTER — TELEPHONE (OUTPATIENT)
Dept: DERMATOLOGY | Facility: CLINIC | Age: 70
End: 2025-02-18
Payer: COMMERCIAL

## 2025-02-18 NOTE — TELEPHONE ENCOUNTER
2/18 Patient's daughter confirmed scheduled appointment:  Date: 12/2/25  Time: 3pm  Visit type: New Hairloss  Provider: Jacquie  Location: [facility]  Testing/imaging: na  Additional notes: na

## 2025-02-20 ENCOUNTER — OFFICE VISIT (OUTPATIENT)
Dept: SLEEP MEDICINE | Facility: CLINIC | Age: 70
End: 2025-02-20
Payer: COMMERCIAL

## 2025-02-20 DIAGNOSIS — G47.10 EXCESSIVE SLEEPINESS: Primary | ICD-10-CM

## 2025-02-24 ENCOUNTER — LAB (OUTPATIENT)
Dept: LAB | Facility: CLINIC | Age: 70
End: 2025-02-24
Payer: COMMERCIAL

## 2025-02-24 ENCOUNTER — OFFICE VISIT (OUTPATIENT)
Dept: NEPHROLOGY | Facility: CLINIC | Age: 70
End: 2025-02-24
Payer: COMMERCIAL

## 2025-02-24 VITALS
SYSTOLIC BLOOD PRESSURE: 136 MMHG | WEIGHT: 148 LBS | TEMPERATURE: 98 F | BODY MASS INDEX: 25.27 KG/M2 | OXYGEN SATURATION: 96 % | HEIGHT: 64 IN | HEART RATE: 94 BPM | DIASTOLIC BLOOD PRESSURE: 85 MMHG

## 2025-02-24 DIAGNOSIS — N18.9 ACUTE KIDNEY INJURY SUPERIMPOSED ON CKD: ICD-10-CM

## 2025-02-24 DIAGNOSIS — E55.9 VITAMIN D DEFICIENCY: ICD-10-CM

## 2025-02-24 DIAGNOSIS — N18.32 ANEMIA IN STAGE 3B CHRONIC KIDNEY DISEASE (H): ICD-10-CM

## 2025-02-24 DIAGNOSIS — N17.9 ACUTE KIDNEY INJURY SUPERIMPOSED ON CKD: ICD-10-CM

## 2025-02-24 DIAGNOSIS — D63.1 ANEMIA IN STAGE 3B CHRONIC KIDNEY DISEASE (H): ICD-10-CM

## 2025-02-24 DIAGNOSIS — E11.22 TYPE 2 DIABETES MELLITUS WITH STAGE 3B CHRONIC KIDNEY DISEASE, WITHOUT LONG-TERM CURRENT USE OF INSULIN (H): ICD-10-CM

## 2025-02-24 DIAGNOSIS — N18.32 STAGE 3B CHRONIC KIDNEY DISEASE (H): Primary | ICD-10-CM

## 2025-02-24 DIAGNOSIS — N18.32 TYPE 2 DIABETES MELLITUS WITH STAGE 3B CHRONIC KIDNEY DISEASE, WITHOUT LONG-TERM CURRENT USE OF INSULIN (H): ICD-10-CM

## 2025-02-24 LAB
ALBUMIN MFR UR ELPH: 34.9 MG/DL
ALBUMIN SERPL BCG-MCNC: 4.2 G/DL (ref 3.5–5.2)
ALBUMIN UR-MCNC: 10 MG/DL
ANION GAP SERPL CALCULATED.3IONS-SCNC: 10 MMOL/L (ref 7–15)
APPEARANCE UR: CLEAR
BACTERIA #/AREA URNS HPF: ABNORMAL /HPF
BILIRUB UR QL STRIP: NEGATIVE
BUN SERPL-MCNC: 27.9 MG/DL (ref 8–23)
CALCIUM SERPL-MCNC: 9.7 MG/DL (ref 8.8–10.4)
CHLORIDE SERPL-SCNC: 106 MMOL/L (ref 98–107)
COLOR UR AUTO: YELLOW
CREAT SERPL-MCNC: 1.61 MG/DL (ref 0.51–0.95)
CREAT UR-MCNC: 106 MG/DL
EGFRCR SERPLBLD CKD-EPI 2021: 34 ML/MIN/1.73M2
ERYTHROCYTE [DISTWIDTH] IN BLOOD BY AUTOMATED COUNT: 13.2 % (ref 10–15)
GLUCOSE SERPL-MCNC: 206 MG/DL (ref 70–99)
GLUCOSE UR STRIP-MCNC: NEGATIVE MG/DL
HCO3 SERPL-SCNC: 26 MMOL/L (ref 22–29)
HCT VFR BLD AUTO: 36.8 % (ref 35–47)
HGB BLD-MCNC: 11.6 G/DL (ref 11.7–15.7)
HGB UR QL STRIP: NEGATIVE
HYALINE CASTS: 1 /LPF
KETONES UR STRIP-MCNC: NEGATIVE MG/DL
LEUKOCYTE ESTERASE UR QL STRIP: ABNORMAL
MCH RBC QN AUTO: 28.5 PG (ref 26.5–33)
MCHC RBC AUTO-ENTMCNC: 31.5 G/DL (ref 31.5–36.5)
MCV RBC AUTO: 90 FL (ref 78–100)
NITRATE UR QL: POSITIVE
PH UR STRIP: 5.5 [PH] (ref 5–7)
PHOSPHATE SERPL-MCNC: 3.5 MG/DL (ref 2.5–4.5)
PLATELET # BLD AUTO: 151 10E3/UL (ref 150–450)
POTASSIUM SERPL-SCNC: 4.6 MMOL/L (ref 3.4–5.3)
PROT/CREAT 24H UR: 0.33 MG/MG CR (ref 0–0.2)
RBC # BLD AUTO: 4.07 10E6/UL (ref 3.8–5.2)
RBC URINE: 1 /HPF
SODIUM SERPL-SCNC: 142 MMOL/L (ref 135–145)
SP GR UR STRIP: 1.02 (ref 1–1.03)
SQUAMOUS EPITHELIAL: <1 /HPF
UROBILINOGEN UR STRIP-MCNC: NORMAL MG/DL
WBC # BLD AUTO: 7.8 10E3/UL (ref 4–11)
WBC URINE: 45 /HPF

## 2025-02-24 PROCEDURE — 81001 URINALYSIS AUTO W/SCOPE: CPT | Performed by: PATHOLOGY

## 2025-02-24 PROCEDURE — 1126F AMNT PAIN NOTED NONE PRSNT: CPT

## 2025-02-24 PROCEDURE — 3079F DIAST BP 80-89 MM HG: CPT

## 2025-02-24 PROCEDURE — 80069 RENAL FUNCTION PANEL: CPT | Performed by: PATHOLOGY

## 2025-02-24 PROCEDURE — 3074F SYST BP LT 130 MM HG: CPT

## 2025-02-24 PROCEDURE — 99000 SPECIMEN HANDLING OFFICE-LAB: CPT | Performed by: PATHOLOGY

## 2025-02-24 PROCEDURE — 82043 UR ALBUMIN QUANTITATIVE: CPT

## 2025-02-24 PROCEDURE — 99214 OFFICE O/P EST MOD 30 MIN: CPT

## 2025-02-24 PROCEDURE — 84156 ASSAY OF PROTEIN URINE: CPT | Performed by: PATHOLOGY

## 2025-02-24 PROCEDURE — 85027 COMPLETE CBC AUTOMATED: CPT | Performed by: PATHOLOGY

## 2025-02-24 PROCEDURE — 36415 COLL VENOUS BLD VENIPUNCTURE: CPT | Performed by: PATHOLOGY

## 2025-02-24 PROCEDURE — G0463 HOSPITAL OUTPT CLINIC VISIT: HCPCS

## 2025-02-24 ASSESSMENT — PAIN SCALES - GENERAL: PAINLEVEL_OUTOF10: NO PAIN (0)

## 2025-02-24 NOTE — NURSING NOTE
"Chief Complaint   Patient presents with    RECHECK     Follow Up     /85   Pulse 94   Temp 98  F (36.7  C) (Oral)   Ht 1.626 m (5' 4\")   Wt 67.1 kg (148 lb)   SpO2 96%   BMI 25.40 kg/m    Savannah Keys on 2/24/2025 at 11:09 AM    "

## 2025-02-24 NOTE — LETTER
2/24/2025       RE: Karine Moss  [address]  [facility] [address]     Dear Colleague,    Thank you for referring your patient, Karine Moss, to the [facility] [place] [facility]. Please see a copy of my visit note below.    Nephrology Clinic Visit 2/24/25    Assessment and Plan:    CKD3b w/proteinuria - Stable. Creat 1.6 eGFR 34 ml/mn, UPCR 0.3 mg/mgCr   - Etiology for her CKD likely DM/HTN   - Baseline creat mid 1's   - B/p appears controlled   - DM borderline controlled with current A1c 7.4%   - On ARB ( dose could be increased)   - SGLT2 resulted in unacceptable bump in creat   - On statin for CV risk reduction    2. CV/Volume - Clinic b/ps 129-145/83-88 HR 94 weight 148 # from 153 # previous visit. No home readings. Echo 4/24: Normal LV function, IVC normal  Meds that may contribute to elevated b/ps include Adderall, Detrol  Current regimen:    Losartan 50 mg every day     - Recommend home monitoring     - Will consider increasing Losartan next visit depending upon her home readings    3. DM2 - Borderline controlled with A1c 7.4% ( higher than her usual). Off SGLT2 given unacceptable rise in creat after starting. Random    - OK from a renal standpoint to resume Metformin for now    4. Electrolytes/acid base - No acute concerns. K 4.6 Na 142 bicarb 26    5. BMD/Osteoporosis - Ca 9.7 Phos 3.5 albumin 4.2   - Check Vit D/PTH next visit   - On Vit D 5000 U every day, Ca acetate 667 mg bid   - Received Reclast 12/26/24    6. Anemia - Hgb 11.6   - On daily B12 and iron 3 times/week   - Check iron studies   - Colonoscopy 8/24 with tubular and hyperplastic polyps    7.  Dispo - RTC 5/21/25 for follow up with labs prior    Assessment and plan was discussed with patient and she voiced her understanding and agreement.    Reason for Visit:  CKD3b    HPI:  Ms Moss is a 70 yo female with PMH significant for DM2, HTN,  Obesity s/p gastric bypass, IZABEL, CKD3b, HFpEF, Incontinence, H/O UGIB, present today for routine CKD follow up.   Last seen in clinic by Dr Chandler 10/11/24  SGLT2i discontinued given abn rise in creat with improvement once discontinued  Baseline creat mid 1's    ROS:   No acute renal concerns  Patient is concerned about her glycemic control since Jardiance discontinued. Previously on Metformin and tolerated w/o difficulty  No chest pain, dyspnea, GI or  concerns  Never very hungry but does stay hydrated  Energy level could be better  Occ leg edema that resolves spontaneously    Chronic Health Problems:    DM2, HTN, Obesity s/p gastric bypass, IZABEL, CKD3b, HFpEF, Incontinence, H/O UGIB, Vit D def, HLD, Depression, IBS, diabetic peripheral neuropathy, PTSD, BCC, RLS     Family Hx:   Family History   Problem Relation Age of Onset     Memory loss Mother      Other - See Comments Mother         hair thinning     Diabetes Father      Chronic Obstructive Pulmonary Disease Sister      Anemia Sister         hx of ulcers     Other - See Comments Sister 65        MVA, followed by leg pain after a fall     Dementia Sister 68     Dementia Brother 70     Cancer Brother         lung     Cancer - colorectal Maternal Grandmother      Cancer Daughter      Obesity Daughter         s/p lap band     Personal Hx:   Single, 2 grandsons living with patient, NS. ETOH rare    Allergies:  Allergies   Allergen Reactions     Pravastatin Other (See Comments)     woozy     Codeine Nausea and Vomiting     Nsaids GI Disturbance and Other (See Comments)     Pt had bariatric surgery, should not ever take oral NSAIDS due to risk of gastric ulcers.  Pt had bariatric surgery, should not ever take oral NSAIDS due to risk of gastric ulcers.       Medications:  Current Outpatient Medications   Medication Sig Dispense Refill     amphetamine-dextroamphetamine (ADDERALL) 30 MG tablet Take 1-2 tablets 60 tablet 0     atorvastatin (LIPITOR) 20 MG tablet Take 1  tablet (20 mg) by mouth daily. 90 tablet 3     blood glucose (NO BRAND SPECIFIED) lancets standard Use to test blood sugar 1 times daily or as directed. 90 Lancet 3     blood glucose (NO BRAND SPECIFIED) test strip Use to test blood sugar 1 times daily or as directed. 100 strip 3     blood glucose calibration (NO BRAND SPECIFIED) solution Use to calibrate blood glucose monitor as needed as directed. 1 each 3     blood glucose monitoring (NO BRAND SPECIFIED) meter device kit Use to test blood sugar 1 times daily or as directed. 1 kit 0     Calcium Acetate 667 MG TABS Take 1 tablet by mouth 2 times daily for 360 days 180 tablet 3     cevimeline (EVOXAC) 30 MG capsule take 1 cap  capsule 3     cholecalciferol (VITAMIN D3) 125 mcg (5000 units) capsule Take 125 mcg by mouth daily       COMPOUNDED NON-CONTROLLED SUBSTANCE (CMPD RX) - PHARMACY TO MIX COMPOUNDED MEDICATION Estradiol 0.0075% in HRT cream  Apply 2 grams,  intravaginally and small amount externally daily for one week then twice weekly for maintenance. 45 g 11     Continuous Blood Gluc  (FREESTYLE JORDIN 14 DAY READER) EBEN Use to read blood sugars as per 's instructions. 1 each 0     Continuous Glucose Sensor (FREESTYLE JORDIN 14 DAY SENSOR) MISC Change every 14 days. 2 each 0     cyanocobalamin (VITAMIN B-12) 1000 MCG tablet Take 1 tablet by mouth every other day. 90 tablet 0     DULoxetine (CYMBALTA) 30 MG capsule Take 1 capsule by mouth twice daily. 180 capsule 0     DULoxetine (CYMBALTA) 60 MG capsule Take 1 capsule by mouth at bedtime. Take in addition to current 30 mg evening dose for a total of 90 mg at bedtime. 90 capsule 0     Ferrous Bisglycinate Chelate (EASY IRON) 28 MG CAPS Take 1 capsule by mouth three times a week.       finasteride (PROSCAR) 5 MG tablet Take 1 tablet daily 90 tablet 3     losartan (COZAAR) 50 MG tablet Take one daily 90 tablet 3     omeprazole (PRILOSEC) 40 MG DR capsule Take 40mg twice daily 180  "capsule 3     pregabalin (LYRICA) 100 MG capsule Take 200 mg by mouth daily. Starting Wednesday an additional 100 mg       tolterodine ER (DETROL LA) 4 MG 24 hr capsule Take 1 capsule (4 mg) by mouth daily. 90 capsule 3     triamcinolone (KENALOG) 0.1 % paste Apply to tongue twice daily x 10 days 5 g 0     No current facility-administered medications for this visit.      Vitals:  /85   Pulse 94   Temp 98  F (36.7  C) (Oral)   Ht 1.626 m (5' 4\")   Wt 67.1 kg (148 lb)   SpO2 96%   BMI 25.40 kg/m      Exam:  GENERAL APPEARANCE: alert and no distress. Daughter Leila present  RESP: lungs clear to auscultation  CV: tachy  EDEMA: no LE edema bilaterally  ABDOMEN: soft, nondistended  MS: extremities normal - no gross deformities noted  NEUR: A/O    LABS:   CMP  Recent Labs   Lab Test 02/24/25  1038 01/29/25  1133 12/26/24  1516 11/08/24  1312 10/28/24  1647 09/09/21  1509 05/27/21  1502 09/04/20  1542 06/18/20  1109 02/06/20  1523 10/22/19  1130    138  --  142 143   < > 141   < > 135  --  139   POTASSIUM 4.6 5.2  --  5.0 5.3   < > 4.5   < > 5.2  --  4.8   CHLORIDE 106 102  --  104 107   < > 110*   < > 104  --  108   CO2 26 27  --  23 25   < > 26   < > 26  --  26   ANIONGAP 10 9  --  15 11   < > 5  --  5  --  6   * 159*  --  141* 109*   < > 110*   < > 153*   < > 139*   BUN 27.9* 32.9*  --  26.2* 39.0*   < > 18   < > 37*  --  21   CR 1.61* 1.84* 1.37* 1.41* 1.81*   < > 1.27*   < > 1.67*  --  0.94   GFRESTIMATED 34* 29* 42* 40* 30*   < > 44*  --  32*  --  64   GFRESTBLACK  --   --   --   --   --   --  51*  --  37*  --  74   KINJAL 9.7 9.6 9.8 10.0 9.4   < > 9.5   < > 9.7  --  9.4    < > = values in this interval not displayed.     Recent Labs   Lab Test 01/29/25  1133 10/09/24  1651 12/09/22  1203 10/15/22  2122   BILITOTAL 0.5 0.6 0.4 0.6   ALKPHOS 92 94 88 95   ALT 18 20 15 21   AST 24 24 23 16     CBC  Recent Labs   Lab Test 02/24/25  1038 10/09/24  1651 05/10/24  1304 01/09/24  1100   HGB 11.6* 11.3* " 12.6 12.1   WBC 7.8 7.6 7.8 7.6   RBC 4.07 3.89 4.32 4.19   HCT 36.8 36.6 40.1 38.3   MCV 90 94 93 91   MCH 28.5 29.0 29.2 28.9   MCHC 31.5 30.9* 31.4* 31.6   RDW 13.2 14.0 13.5 13.9    167 153 160     URINE STUDIES  Recent Labs   Lab Test 02/24/25  1043 11/08/24  1326 10/10/24  1130 05/10/24  1307 10/16/22  0024 05/27/21  1511   COLOR Yellow Yellow Yellow Yellow   < > Canceled, Test credited   APPEARANCE Clear Clear Clear Clear   < > Canceled, Test credited   URINEGLC Negative Negative >=1000* >=1000*   < > Canceled, Test credited   URINEBILI Negative Negative Negative Negative   < > Canceled, Test credited   URINEKETONE Negative Negative Negative Negative   < > Canceled, Test credited   SG 1.019 1.015 1.010 1.018   < > Canceled, Test credited   UBLD Negative Trace* Negative Negative   < > Canceled, Test credited   URINEPH 5.5 5.5 5.0 5.0   < > Canceled, Test credited   PROTEIN 10* 100* Negative Negative   < > Canceled, Test credited   UROBILINOGEN  --  0.2 0.2  --   --  Canceled, Test credited   NITRITE Positive* Negative Positive* Negative   < > Canceled, Test credited   LEUKEST Moderate* Trace* Negative Negative   < > Canceled, Test credited   RBCU 1 2-5* 0-2 1   < > Canceled, Test credited   WBCU 45* 5-10* 0-5 1   < > Canceled, Test credited    < > = values in this interval not displayed.     No lab results found.  PTH  Recent Labs   Lab Test 12/09/22  1203 05/27/21  1502   PTHI 76* 76     IRON STUDIES  Recent Labs   Lab Test 11/08/24  1312 10/28/24  1647 06/12/23  1627 08/25/22  1605 05/27/21  1502 10/19/20  1038 09/09/20  0827 06/27/18  1454   IRON  --   --   --   --  61 19*  --  94   FEB  --   --   --   --  439* 519*  --  364   IRONSAT  --   --   --   --  14*  --   --  26   WESTLEY 76 102 74   < >  --   --    < > 63    < > = values in this interval not displayed.       Ela Huynh, NP        Again, thank you for allowing me to participate in the care of your patient.      Sincerely,    Ela HOPKINS  Amina, NP

## 2025-02-25 ENCOUNTER — THERAPY VISIT (OUTPATIENT)
Dept: PHYSICAL THERAPY | Facility: CLINIC | Age: 70
End: 2025-02-25
Payer: COMMERCIAL

## 2025-02-25 DIAGNOSIS — N39.3 STRESS INCONTINENCE OF URINE: Primary | ICD-10-CM

## 2025-02-25 LAB
CREAT UR-MCNC: 108.5 MG/DL
MICROALBUMIN UR-MCNC: 79.9 MG/L
MICROALBUMIN/CREAT UR: 73.64 MG/G CR (ref 0–25)

## 2025-02-25 PROCEDURE — 97530 THERAPEUTIC ACTIVITIES: CPT | Mod: GP

## 2025-02-25 PROCEDURE — 97110 THERAPEUTIC EXERCISES: CPT | Mod: GP

## 2025-02-25 NOTE — PROGRESS NOTES
[facility]                                                                                   OUTPATIENT PHYSICAL THERAPY    PLAN OF TREATMENT FOR OUTPATIENT REHABILITATION   Patient's Last Name, First Name, Karine Dyer YOB: 1955   Provider's Name   [facility]   Medical Record No.  [id number]     Onset Date: 10/17/24  Start of Care Date: 11/14/24     Medical Diagnosis:  Stress incontinence of urine      PT Treatment Diagnosis:  Stress incontinence of urine Plan of Treatment  Frequency/Duration: 1x per month/ 12 weeks    Certification date from 02/11/25 to 05/06/25         See note for plan of treatment details and functional goals     Kateryna Mohamud, PT                         I CERTIFY THE NEED FOR THESE SERVICES FURNISHED UNDER        THIS PLAN OF TREATMENT AND WHILE UNDER MY CARE     (Physician attestation of this document indicates review and certification of the therapy plan).              Referring Provider:  Amie Grace    Initial Assessment  See Epic Evaluation- Start of Care Date: 11/14/24

## 2025-02-26 ENCOUNTER — MYC MEDICAL ADVICE (OUTPATIENT)
Dept: FAMILY MEDICINE | Facility: CLINIC | Age: 70
End: 2025-02-26

## 2025-02-26 DIAGNOSIS — E11.9 TYPE 2 DIABETES MELLITUS WITHOUT COMPLICATION, WITHOUT LONG-TERM CURRENT USE OF INSULIN (H): ICD-10-CM

## 2025-02-26 NOTE — PROGRESS NOTES
Nephrology Clinic Visit 2/24/25    Assessment and Plan:    CKD3b w/proteinuria - Stable. Creat 1.6 eGFR 34 ml/mn, UPCR 0.3 mg/mgCr   - Etiology for her CKD likely DM/HTN   - Baseline creat mid 1's   - B/p appears controlled   - DM borderline controlled with current A1c 7.4%   - On ARB ( dose could be increased)   - SGLT2 resulted in unacceptable bump in creat   - On statin for CV risk reduction    2. CV/Volume - Clinic b/ps 129-145/83-88 HR 94 weight 148 # from 153 # previous visit. No home readings. Echo 4/24: Normal LV function, IVC normal  Meds that may contribute to elevated b/ps include Adderall, Detrol  Current regimen:    Losartan 50 mg every day     - Recommend home monitoring     - Will consider increasing Losartan next visit depending upon her home readings    3. DM2 - Borderline controlled with A1c 7.4% ( higher than her usual). Off SGLT2 given unacceptable rise in creat after starting. Random    - OK from a renal standpoint to resume Metformin for now    4. Electrolytes/acid base - No acute concerns. K 4.6 Na 142 bicarb 26    5. BMD/Osteoporosis - Ca 9.7 Phos 3.5 albumin 4.2   - Check Vit D/PTH next visit   - On Vit D 5000 U every day, Ca acetate 667 mg bid   - Received Reclast 12/26/24    6. Anemia - Hgb 11.6   - On daily B12 and iron 3 times/week   - Check iron studies   - Colonoscopy 8/24 with tubular and hyperplastic polyps    7.  Dispo - RTC 5/21/25 for follow up with labs prior    Assessment and plan was discussed with patient and she voiced her understanding and agreement.    Reason for Visit:  CKD3b    HPI:  Ms Moss is a 70 yo female with PMH significant for DM2, HTN, Obesity s/p gastric bypass, IZABEL, CKD3b, HFpEF, Incontinence, H/O UGIB, present today for routine CKD follow up.   Last seen in clinic by Dr Chandler 10/11/24  SGLT2i discontinued given abn rise in creat with improvement once discontinued  Baseline creat mid 1's    ROS:   No acute renal concerns  Patient is concerned about  her glycemic control since Jardiance discontinued. Previously on Metformin and tolerated w/o difficulty  No chest pain, dyspnea, GI or  concerns  Never very hungry but does stay hydrated  Energy level could be better  Occ leg edema that resolves spontaneously    Chronic Health Problems:    DM2, HTN, Obesity s/p gastric bypass, IZABEL, CKD3b, HFpEF, Incontinence, H/O UGIB, Vit D def, HLD, Depression, IBS, diabetic peripheral neuropathy, PTSD, BCC, RLS     Family Hx:   Family History   Problem Relation Age of Onset    Memory loss Mother     Other - See Comments Mother         hair thinning    Diabetes Father     Chronic Obstructive Pulmonary Disease Sister     Anemia Sister         hx of ulcers    Other - See Comments Sister 65        MVA, followed by leg pain after a fall    Dementia Sister 68    Dementia Brother 70    Cancer Brother         lung    Cancer - colorectal Maternal Grandmother     Cancer Daughter     Obesity Daughter         s/p lap band     Personal Hx:   Single, 2 grandsons living with patient, NS. ETOH rare    Allergies:  Allergies   Allergen Reactions    Pravastatin Other (See Comments)     woozy    Codeine Nausea and Vomiting    Nsaids GI Disturbance and Other (See Comments)     Pt had bariatric surgery, should not ever take oral NSAIDS due to risk of gastric ulcers.  Pt had bariatric surgery, should not ever take oral NSAIDS due to risk of gastric ulcers.       Medications:  Current Outpatient Medications   Medication Sig Dispense Refill    amphetamine-dextroamphetamine (ADDERALL) 30 MG tablet Take 1-2 tablets 60 tablet 0    atorvastatin (LIPITOR) 20 MG tablet Take 1 tablet (20 mg) by mouth daily. 90 tablet 3    blood glucose (NO BRAND SPECIFIED) lancets standard Use to test blood sugar 1 times daily or as directed. 90 Lancet 3    blood glucose (NO BRAND SPECIFIED) test strip Use to test blood sugar 1 times daily or as directed. 100 strip 3    blood glucose calibration (NO BRAND SPECIFIED) solution  Use to calibrate blood glucose monitor as needed as directed. 1 each 3    blood glucose monitoring (NO BRAND SPECIFIED) meter device kit Use to test blood sugar 1 times daily or as directed. 1 kit 0    Calcium Acetate 667 MG TABS Take 1 tablet by mouth 2 times daily for 360 days 180 tablet 3    cevimeline (EVOXAC) 30 MG capsule take 1 cap  capsule 3    cholecalciferol (VITAMIN D3) 125 mcg (5000 units) capsule Take 125 mcg by mouth daily      COMPOUNDED NON-CONTROLLED SUBSTANCE (CMPD RX) - PHARMACY TO MIX COMPOUNDED MEDICATION Estradiol 0.0075% in HRT cream  Apply 2 grams,  intravaginally and small amount externally daily for one week then twice weekly for maintenance. 45 g 11    Continuous Blood Gluc  (FREESTYLE JORDIN 14 DAY READER) EBEN Use to read blood sugars as per 's instructions. 1 each 0    Continuous Glucose Sensor (FREESTYLE JORDIN 14 DAY SENSOR) MISC Change every 14 days. 2 each 0    cyanocobalamin (VITAMIN B-12) 1000 MCG tablet Take 1 tablet by mouth every other day. 90 tablet 0    DULoxetine (CYMBALTA) 30 MG capsule Take 1 capsule by mouth twice daily. 180 capsule 0    DULoxetine (CYMBALTA) 60 MG capsule Take 1 capsule by mouth at bedtime. Take in addition to current 30 mg evening dose for a total of 90 mg at bedtime. 90 capsule 0    Ferrous Bisglycinate Chelate (EASY IRON) 28 MG CAPS Take 1 capsule by mouth three times a week.      finasteride (PROSCAR) 5 MG tablet Take 1 tablet daily 90 tablet 3    losartan (COZAAR) 50 MG tablet Take one daily 90 tablet 3    omeprazole (PRILOSEC) 40 MG DR capsule Take 40mg twice daily 180 capsule 3    pregabalin (LYRICA) 100 MG capsule Take 200 mg by mouth daily. Starting Wednesday an additional 100 mg      tolterodine ER (DETROL LA) 4 MG 24 hr capsule Take 1 capsule (4 mg) by mouth daily. 90 capsule 3    triamcinolone (KENALOG) 0.1 % paste Apply to tongue twice daily x 10 days 5 g 0     No current facility-administered medications for this  "visit.      Vitals:  /85   Pulse 94   Temp 98  F (36.7  C) (Oral)   Ht 1.626 m (5' 4\")   Wt 67.1 kg (148 lb)   SpO2 96%   BMI 25.40 kg/m      Exam:  GENERAL APPEARANCE: alert and no distress. Daughter Leila present  RESP: lungs clear to auscultation  CV: tachy  EDEMA: no LE edema bilaterally  ABDOMEN: soft, nondistended  MS: extremities normal - no gross deformities noted  NEUR: A/O    LABS:   CMP  Recent Labs   Lab Test 02/24/25  1038 01/29/25  1133 12/26/24  1516 11/08/24  1312 10/28/24  1647 09/09/21  1509 05/27/21  1502 09/04/20  1542 06/18/20  1109 02/06/20  1523 10/22/19  1130    138  --  142 143   < > 141   < > 135  --  139   POTASSIUM 4.6 5.2  --  5.0 5.3   < > 4.5   < > 5.2  --  4.8   CHLORIDE 106 102  --  104 107   < > 110*   < > 104  --  108   CO2 26 27  --  23 25   < > 26   < > 26  --  26   ANIONGAP 10 9  --  15 11   < > 5  --  5  --  6   * 159*  --  141* 109*   < > 110*   < > 153*   < > 139*   BUN 27.9* 32.9*  --  26.2* 39.0*   < > 18   < > 37*  --  21   CR 1.61* 1.84* 1.37* 1.41* 1.81*   < > 1.27*   < > 1.67*  --  0.94   GFRESTIMATED 34* 29* 42* 40* 30*   < > 44*  --  32*  --  64   GFRESTBLACK  --   --   --   --   --   --  51*  --  37*  --  74   KINJAL 9.7 9.6 9.8 10.0 9.4   < > 9.5   < > 9.7  --  9.4    < > = values in this interval not displayed.     Recent Labs   Lab Test 01/29/25  1133 10/09/24  1651 12/09/22  1203 10/15/22  2122   BILITOTAL 0.5 0.6 0.4 0.6   ALKPHOS 92 94 88 95   ALT 18 20 15 21   AST 24 24 23 16     CBC  Recent Labs   Lab Test 02/24/25  1038 10/09/24  1651 05/10/24  1304 01/09/24  1100   HGB 11.6* 11.3* 12.6 12.1   WBC 7.8 7.6 7.8 7.6   RBC 4.07 3.89 4.32 4.19   HCT 36.8 36.6 40.1 38.3   MCV 90 94 93 91   MCH 28.5 29.0 29.2 28.9   MCHC 31.5 30.9* 31.4* 31.6   RDW 13.2 14.0 13.5 13.9    167 153 160     URINE STUDIES  Recent Labs   Lab Test 02/24/25  1043 11/08/24  1326 10/10/24  1130 05/10/24  1307 10/16/22  0024 05/27/21  1511   COLOR Yellow Yellow " Yellow Yellow   < > Canceled, Test credited   APPEARANCE Clear Clear Clear Clear   < > Canceled, Test credited   URINEGLC Negative Negative >=1000* >=1000*   < > Canceled, Test credited   URINEBILI Negative Negative Negative Negative   < > Canceled, Test credited   URINEKETONE Negative Negative Negative Negative   < > Canceled, Test credited   SG 1.019 1.015 1.010 1.018   < > Canceled, Test credited   UBLD Negative Trace* Negative Negative   < > Canceled, Test credited   URINEPH 5.5 5.5 5.0 5.0   < > Canceled, Test credited   PROTEIN 10* 100* Negative Negative   < > Canceled, Test credited   UROBILINOGEN  --  0.2 0.2  --   --  Canceled, Test credited   NITRITE Positive* Negative Positive* Negative   < > Canceled, Test credited   LEUKEST Moderate* Trace* Negative Negative   < > Canceled, Test credited   RBCU 1 2-5* 0-2 1   < > Canceled, Test credited   WBCU 45* 5-10* 0-5 1   < > Canceled, Test credited    < > = values in this interval not displayed.     No lab results found.  PTH  Recent Labs   Lab Test 12/09/22  1203 05/27/21  1502   PTHI 76* 76     IRON STUDIES  Recent Labs   Lab Test 11/08/24  1312 10/28/24  1647 06/12/23  1627 08/25/22  1605 05/27/21  1502 10/19/20  1038 09/09/20  0827 06/27/18  1454   IRON  --   --   --   --  61 19*  --  94   FEB  --   --   --   --  439* 519*  --  364   IRONSAT  --   --   --   --  14*  --   --  26   WESTLEY 76 102 74   < >  --   --    < > 63    < > = values in this interval not displayed.       Ela Huynh, NP

## 2025-02-27 RX ORDER — METFORMIN HYDROCHLORIDE 500 MG/1
TABLET, EXTENDED RELEASE ORAL
Qty: 180 TABLET | Refills: 0 | Status: SHIPPED | OUTPATIENT
Start: 2025-02-27

## 2025-03-05 ENCOUNTER — OFFICE VISIT (OUTPATIENT)
Dept: FAMILY MEDICINE | Facility: CLINIC | Age: 70
End: 2025-03-05
Payer: COMMERCIAL

## 2025-03-05 VITALS
DIASTOLIC BLOOD PRESSURE: 83 MMHG | TEMPERATURE: 98.2 F | OXYGEN SATURATION: 97 % | SYSTOLIC BLOOD PRESSURE: 144 MMHG | WEIGHT: 146 LBS | BODY MASS INDEX: 24.92 KG/M2 | HEIGHT: 64 IN | HEART RATE: 88 BPM | RESPIRATION RATE: 15 BRPM

## 2025-03-05 DIAGNOSIS — N76.0 BACTERIAL VAGINOSIS: ICD-10-CM

## 2025-03-05 DIAGNOSIS — E11.9 TYPE 2 DIABETES MELLITUS WITHOUT COMPLICATION, WITHOUT LONG-TERM CURRENT USE OF INSULIN (H): ICD-10-CM

## 2025-03-05 DIAGNOSIS — G56.01 CARPAL TUNNEL SYNDROME OF RIGHT WRIST: ICD-10-CM

## 2025-03-05 DIAGNOSIS — Z23 NEED FOR PNEUMOCOCCAL VACCINATION: ICD-10-CM

## 2025-03-05 DIAGNOSIS — N18.32 CHRONIC KIDNEY DISEASE, STAGE 3B (H): ICD-10-CM

## 2025-03-05 DIAGNOSIS — Z00.00 ENCOUNTER FOR MEDICARE ANNUAL WELLNESS EXAM: Primary | ICD-10-CM

## 2025-03-05 DIAGNOSIS — B96.89 BACTERIAL VAGINOSIS: ICD-10-CM

## 2025-03-05 DIAGNOSIS — E11.42 DIABETIC POLYNEUROPATHY ASSOCIATED WITH TYPE 2 DIABETES MELLITUS (H): ICD-10-CM

## 2025-03-05 DIAGNOSIS — M65.30 TRIGGER FINGER, ACQUIRED: ICD-10-CM

## 2025-03-05 RX ORDER — METRONIDAZOLE 7.5 MG/G
1 GEL VAGINAL AT BEDTIME
Qty: 70 G | Refills: 3 | Status: SHIPPED | OUTPATIENT
Start: 2025-03-05 | End: 2025-03-10

## 2025-03-05 RX ORDER — DULOXETIN HYDROCHLORIDE 60 MG/1
CAPSULE, DELAYED RELEASE ORAL
Qty: 90 CAPSULE | Refills: 3 | Status: SHIPPED | OUTPATIENT
Start: 2025-03-05

## 2025-03-05 RX ORDER — DULOXETIN HYDROCHLORIDE 30 MG/1
30 CAPSULE, DELAYED RELEASE ORAL 2 TIMES DAILY
Qty: 180 CAPSULE | Refills: 3 | Status: SHIPPED | OUTPATIENT
Start: 2025-03-05

## 2025-03-05 RX ORDER — FLASH GLUCOSE SCANNING READER
EACH MISCELLANEOUS
Qty: 6 EACH | Refills: 3 | Status: SHIPPED | OUTPATIENT
Start: 2025-03-05 | End: 2025-03-06

## 2025-03-05 SDOH — HEALTH STABILITY: PHYSICAL HEALTH: ON AVERAGE, HOW MANY DAYS PER WEEK DO YOU ENGAGE IN MODERATE TO STRENUOUS EXERCISE (LIKE A BRISK WALK)?: 1 DAY

## 2025-03-05 ASSESSMENT — PATIENT HEALTH QUESTIONNAIRE - PHQ9
SUM OF ALL RESPONSES TO PHQ QUESTIONS 1-9: 6
SUM OF ALL RESPONSES TO PHQ QUESTIONS 1-9: 6
10. IF YOU CHECKED OFF ANY PROBLEMS, HOW DIFFICULT HAVE THESE PROBLEMS MADE IT FOR YOU TO DO YOUR WORK, TAKE CARE OF THINGS AT HOME, OR GET ALONG WITH OTHER PEOPLE: SOMEWHAT DIFFICULT

## 2025-03-05 ASSESSMENT — ANXIETY QUESTIONNAIRES
GAD7 TOTAL SCORE: 2
GAD7 TOTAL SCORE: 2
1. FEELING NERVOUS, ANXIOUS, OR ON EDGE: NOT AT ALL
4. TROUBLE RELAXING: SEVERAL DAYS
6. BECOMING EASILY ANNOYED OR IRRITABLE: NOT AT ALL
8. IF YOU CHECKED OFF ANY PROBLEMS, HOW DIFFICULT HAVE THESE MADE IT FOR YOU TO DO YOUR WORK, TAKE CARE OF THINGS AT HOME, OR GET ALONG WITH OTHER PEOPLE?: SOMEWHAT DIFFICULT
3. WORRYING TOO MUCH ABOUT DIFFERENT THINGS: NOT AT ALL
5. BEING SO RESTLESS THAT IT IS HARD TO SIT STILL: SEVERAL DAYS
7. FEELING AFRAID AS IF SOMETHING AWFUL MIGHT HAPPEN: NOT AT ALL
GAD7 TOTAL SCORE: 2
IF YOU CHECKED OFF ANY PROBLEMS ON THIS QUESTIONNAIRE, HOW DIFFICULT HAVE THESE PROBLEMS MADE IT FOR YOU TO DO YOUR WORK, TAKE CARE OF THINGS AT HOME, OR GET ALONG WITH OTHER PEOPLE: SOMEWHAT DIFFICULT
7. FEELING AFRAID AS IF SOMETHING AWFUL MIGHT HAPPEN: NOT AT ALL
2. NOT BEING ABLE TO STOP OR CONTROL WORRYING: NOT AT ALL

## 2025-03-05 ASSESSMENT — SOCIAL DETERMINANTS OF HEALTH (SDOH): HOW OFTEN DO YOU GET TOGETHER WITH FRIENDS OR RELATIVES?: PATIENT DECLINED

## 2025-03-05 NOTE — NURSING NOTE
"69 year old  Chief Complaint   Patient presents with    Physical     Check in on carpal tunnel -- [facility]? Fingers locking up often.       Blood pressure (!) 144/83, pulse 88, temperature 98.2  F (36.8  C), temperature source Temporal, resp. rate 15, height 1.632 m (5' 4.25\"), weight 66.2 kg (146 lb), SpO2 97%, not currently breastfeeding. Body mass index is 24.87 kg/m .  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)    Hyperlipidemia LDL goal <100    Obstructive sleep apnea    Major depression in partial remission    Low back pain    DJD (degenerative joint disease) of knee    IBS (irritable bowel syndrome)    Heart murmur    Hypertension goal BP (blood pressure) < 140/90    Type 2 diabetes mellitus without complication, without long-term current use of insulin (H)    Osteopenia of hip    PTSD (post-traumatic stress disorder)    Diabetic polyneuropathy associated with type 2 diabetes mellitus (H)    Bilateral sciatica    Gastrointestinal hemorrhage associated with gastric ulcer    Chronic kidney disease, stage 3b (H)    Chronic diastolic congestive heart failure (H)    Upper GI bleed    Osteopenia of spine    Chronic left shoulder pain    Dumping syndrome    History of Rai-en-Y gastric bypass    Hypoglycemia    Insomnia    Hx laparoscopic cholecystectomy    Major depressive disorder, recurrent episode, mild    Peripheral neuropathy    RLS (restless legs syndrome)    Bacterial vaginosis    Stress incontinence of urine    CKD (chronic kidney disease) stage 4, GFR 15-29 ml/min (H)    Mixed incontinence urge and stress (male)(female)    Dry mouth, unspecified       Wt Readings from Last 2 Encounters:   03/05/25 66.2 kg (146 lb)   02/24/25 67.1 kg (148 lb)     BP Readings from Last 3 Encounters:   03/05/25 (!) 144/83   02/24/25 136/85   02/07/25 105/70         Current Outpatient Medications   Medication Sig Dispense Refill    amphetamine-dextroamphetamine " (ADDERALL) 30 MG tablet Take 1-2 tablets 60 tablet 0    atorvastatin (LIPITOR) 20 MG tablet Take 1 tablet (20 mg) by mouth daily. 90 tablet 3    blood glucose (NO BRAND SPECIFIED) lancets standard Use to test blood sugar 1 times daily or as directed. 90 Lancet 3    blood glucose (NO BRAND SPECIFIED) test strip Use to test blood sugar 1 times daily or as directed. 100 strip 3    blood glucose calibration (NO BRAND SPECIFIED) solution Use to calibrate blood glucose monitor as needed as directed. 1 each 3    blood glucose monitoring (NO BRAND SPECIFIED) meter device kit Use to test blood sugar 1 times daily or as directed. 1 kit 0    Calcium Acetate 667 MG TABS Take 1 tablet by mouth 2 times daily for 360 days 180 tablet 3    cevimeline (EVOXAC) 30 MG capsule take 1 cap  capsule 3    cholecalciferol (VITAMIN D3) 125 mcg (5000 units) capsule Take 125 mcg by mouth daily      COMPOUNDED NON-CONTROLLED SUBSTANCE (CMPD RX) - PHARMACY TO MIX COMPOUNDED MEDICATION Estradiol 0.0075% in HRT cream  Apply 2 grams,  intravaginally and small amount externally daily for one week then twice weekly for maintenance. 45 g 11    Continuous Blood Gluc  (FREESTYLE JORDIN 14 DAY READER) EBEN Use to read blood sugars as per 's instructions. 1 each 0    Continuous Glucose Sensor (FREESTYLE JORDIN 14 DAY SENSOR) MISC Change every 14 days. 2 each 0    cyanocobalamin (VITAMIN B-12) 1000 MCG tablet Take 1 tablet by mouth every other day. 90 tablet 0    DULoxetine (CYMBALTA) 30 MG capsule Take 1 capsule by mouth twice daily. 180 capsule 0    DULoxetine (CYMBALTA) 60 MG capsule Take 1 capsule by mouth at bedtime. Take in addition to current 30 mg evening dose for a total of 90 mg at bedtime. 90 capsule 0    Ferrous Bisglycinate Chelate (EASY IRON) 28 MG CAPS Take 1 capsule by mouth three times a week.      finasteride (PROSCAR) 5 MG tablet Take 1 tablet daily 90 tablet 3    losartan (COZAAR) 50 MG tablet Take one daily 90  tablet 3    metFORMIN (GLUCOPHAGE XR) 500 MG 24 hr tablet Take 2 po q am with breakfast. 180 tablet 0    omeprazole (PRILOSEC) 40 MG DR capsule Take 40mg twice daily 180 capsule 3    pregabalin (LYRICA) 100 MG capsule Take 200 mg by mouth daily. Starting Wednesday an additional 100 mg      tolterodine ER (DETROL LA) 4 MG 24 hr capsule Take 1 capsule (4 mg) by mouth daily. 90 capsule 3    triamcinolone (KENALOG) 0.1 % paste Apply to tongue twice daily x 10 days 5 g 0    empagliflozin (JARDIANCE) 10 MG TABS tablet Take 10 mg by mouth daily. (Patient not taking: Reported on 3/5/2025)       No current facility-administered medications for this visit.       Social History     Tobacco Use    Smoking status: Never     Passive exposure: Never    Smokeless tobacco: Never   Vaping Use    Vaping status: Never Used   Substance Use Topics    Alcohol use: Not Currently     Comment: rare    Drug use: No       Health Maintenance Due   Topic Date Due    HF ACTION PLAN  Never done    ADVANCE CARE PLANNING  Never done    RSV VACCINE (1 - Risk 60-74 years 1-dose series) Never done    Pneumococcal Vaccine: 50+ Years (2 of 2 - PCV) 05/27/2022       Lab Results   Component Value Date    PAP NIL 04/12/2019 March 5, 2025 12:57 PM

## 2025-03-05 NOTE — PROGRESS NOTES
Karine Moss is a 69 year old female here for a general check up.  She is not fasting (lipids done last visit). She is up to date on eye exams and no dental visits (dentures, upper and lower). She is accompanied by her daughter.     [facility]  Advanced directive: none on file --gave info today  COVID/FLU vaccine up to date  Other vaccines  due for Prevnar 20--will do today  Colon cancer screening completed 8/2024, repeat 2031  Pap no longer indicated  Mammogram completed 12/2024  DEXA completed 12/2024---followed by Dr. Friedman--Reclast infusion 12/2024    Medicare questions:  Hearing concerns: uses HA both ears  Fall Risk: no  Independent at home: yes  Safe : yes  Memory concerns: workup for memory, hard time finding keys shoes purse, HA.    Declines cognitive screening, she has known cognitive issues.       Diet: omnivore  Lives with 2 grandsons  Wt Readings from Last 4 Encounters:   03/05/25 66.2 kg (146 lb)   02/24/25 67.1 kg (148 lb)   02/07/25 69.9 kg (154 lb)   01/29/25 69.9 kg (154 lb)     Body mass index is 24.87 kg/m .    Type II DM  Diet controlled  Last seen 1/29/25, A1c rising to 7.4% from 6.2%  Ok per nephrology to restart Metformin XR 500mg, take 2/d  Just started yesterday  FBS not checking  Sensor refills needed    Hyperlipidemia--LDL in target range, on Atorvastatin 20mg daily  Recent Labs   Lab Test 01/29/25  1133 01/09/24  1100 03/29/22  1033 03/08/21  1615 02/06/20  1523   CHOL 148 133   < > 113.0 111.0   HDL 41* 55   < > 35.0* 47.0*   LDL 73 52   < > 46.0 29.0   TRIG 171* 129   < > 157.0* 179.0*   CHOLHDLRATIO  --   --   --  3.2 2.4    < > = values in this interval not displayed.       CKD stage 3b  Last seen by nephrologist 2/24/25  Cr stable, ok with resuming metformin with next lab check and clinic visit 5/2025    Polyneuropathy  Followed by Jai patel --last visit 2/17/25  She stopped lyrica, concern for weight gain, but willing to try again per 2/17/25 Jai visit, agreed to take Lyrica  100mg q hs (held for sleep study)  Previous gabapentin s relief  Cymbalta dose --pill pack  30mg in am, 30mg + 60mg q hs  EEG  Right sided median neuropathy--severe  Mild to mod length dependent sensorimotor, axonal-predominant polyneuropathy both LE  Mild chronic R sided L2-3 radiculopathies    Carpal tunnel  Right hand dominant, R>L symptoms  Recent EMG confirms carpal tunnel, severe, on right side  Awakens with numbness in right hand >left. Improves during the day    Trigger fingers   Right hand---> index and long fingers stuck in flexion at times  Both thumbs lock  Refer to ortho for evaluation today    Depression  Cymbalta 30mg in am, 90 mg in evening  Mood is stable.      10/28/2024     3:43 PM 1/29/2025    10:21 AM 3/5/2025    12:37 PM   PHQ   PHQ-9 Total Score 3  4  6    Q9: Thoughts of better off dead/self-harm past 2 weeks Not at all Not at all Not at all       Patient-reported         10/28/2024     3:45 PM 1/29/2025    10:23 AM 3/5/2025    12:38 PM   DELMY-7 SCORE   Total Score 2 (minimal anxiety) 0 (minimal anxiety) 2 (minimal anxiety)   Total Score 2  0  2        Patient-reported    Proxy-reported     Urinary symptoms  On tolterodine daily ---helping  Tried going off med and noted difference  No longer using large pad, using small pad  Going to pelvic floor clinic    PMH, PSH, FH, medications, allergies and immunizations are updated this visit.      Social  Single, 2 grandsons living with her  1 daughter      HABITS:  Tob: never   ETOH: rare   Calcium: supplements Ca/D  Caffeine: none  Exercise: no formal program, biking hard to do due to balance issues.   Balance issues    OB/GYN HISTORY:  LMP: postmenopausal, no bleeding  Fish vaginal odor---started a week ago, no partner  Hx abnormal pap?  no  Vaginal dryness no current topical estrogen cream  G 1   P 1   A 0      Current Outpatient Medications   Medication Sig Dispense Refill    amphetamine-dextroamphetamine (ADDERALL) 30 MG tablet Take 1-2 tablets  60 tablet 0    atorvastatin (LIPITOR) 20 MG tablet Take 1 tablet (20 mg) by mouth daily. 90 tablet 3    blood glucose (NO BRAND SPECIFIED) lancets standard Use to test blood sugar 1 times daily or as directed. 90 Lancet 3    blood glucose (NO BRAND SPECIFIED) test strip Use to test blood sugar 1 times daily or as directed. 100 strip 3    blood glucose calibration (NO BRAND SPECIFIED) solution Use to calibrate blood glucose monitor as needed as directed. 1 each 3    blood glucose monitoring (NO BRAND SPECIFIED) meter device kit Use to test blood sugar 1 times daily or as directed. 1 kit 0    Calcium Acetate 667 MG TABS Take 1 tablet by mouth 2 times daily for 360 days 180 tablet 3    cevimeline (EVOXAC) 30 MG capsule take 1 cap  capsule 3    cholecalciferol (VITAMIN D3) 125 mcg (5000 units) capsule Take 125 mcg by mouth daily      COMPOUNDED NON-CONTROLLED SUBSTANCE (CMPD RX) - PHARMACY TO MIX COMPOUNDED MEDICATION Estradiol 0.0075% in HRT cream  Apply 2 grams,  intravaginally and small amount externally daily for one week then twice weekly for maintenance. 45 g 11    Continuous Blood Gluc  (FREESTYLE JORDIN 14 DAY READER) EBEN Use to read blood sugars as per 's instructions. 1 each 0    Continuous Glucose Sensor (FREESTYLE JORDIN 14 DAY SENSOR) MISC Change every 14 days. 2 each 0    cyanocobalamin (VITAMIN B-12) 1000 MCG tablet Take 1 tablet by mouth every other day. 90 tablet 0    DULoxetine (CYMBALTA) 30 MG capsule Take 1 capsule by mouth twice daily. 180 capsule 0    DULoxetine (CYMBALTA) 60 MG capsule Take 1 capsule by mouth at bedtime. Take in addition to current 30 mg evening dose for a total of 90 mg at bedtime. 90 capsule 0    Ferrous Bisglycinate Chelate (EASY IRON) 28 MG CAPS Take 1 capsule by mouth three times a week.      finasteride (PROSCAR) 5 MG tablet Take 1 tablet daily 90 tablet 3    losartan (COZAAR) 50 MG tablet Take one daily 90 tablet 3    metFORMIN (GLUCOPHAGE XR) 500  "MG 24 hr tablet Take 2 po q am with breakfast. 180 tablet 0    omeprazole (PRILOSEC) 40 MG DR capsule Take 40mg twice daily 180 capsule 3    pregabalin (LYRICA) 100 MG capsule Take 200 mg by mouth daily. Starting Wednesday an additional 100 mg      tolterodine ER (DETROL LA) 4 MG 24 hr capsule Take 1 capsule (4 mg) by mouth daily. 90 capsule 3    triamcinolone (KENALOG) 0.1 % paste Apply to tongue twice daily x 10 days 5 g 0     Allergies   Allergen Reactions    Pravastatin Other (See Comments)     woozy    Codeine Nausea and Vomiting    Nsaids GI Disturbance and Other (See Comments)     Pt had bariatric surgery, should not ever take oral NSAIDS due to risk of gastric ulcers.  Pt had bariatric surgery, should not ever take oral NSAIDS due to risk of gastric ulcers.         ROS  CONSTITUTIONAL:NEGATIVE for fever, chills, Down 8lb in past month  INTEGUMENTARY/SKIN: NEGATIVE for worrisome rashes, moles or lesions  EYES: NEGATIVE for vision changes or irritation  ENT/MOUTH: NEGATIVE for ear, mouth and throat problems, + dentures bilaterally, + hearing aids  RESP:NEGATIVE for significant cough or SOB  CV: NEGATIVE for chest pain, palpitations, THAPA, orthopnea, PND  or peripheral edema  GI: NEGATIVE for nausea, abdominal pain, +heartburn No change in bowel habits  :NEGATIVE for frequency, dysuria, or hematuria, + urinary incontinence + CKD  MUSCULOSKELETAL:chronic pain, upper and lower back, knees, hips  NEURO: polyneuropathy, follows with Jai. See above  ENDOCRINE: NEGATIVE for polyuria/dipsia,  temperature intolerance, skin/hair changes  +type II DM  HEME/ALLERGY/IMMUNE: NEGATIVE for bleeding problems  PSYCHIATRIC: NEGATIVE for changes in mood or affect--mood stable  +restless leg, +sleep issues, awaiting sleep evaluation    EXAM  BP (!) 144/83 (BP Location: Left arm, Patient Position: Sitting, Cuff Size: Adult Regular)   Pulse 88   Temp 98.2  F (36.8  C) (Temporal)   Resp 15   Ht 1.632 m (5' 4.25\")   Wt 66.2 kg " (146 lb)   SpO2 97%   BMI 24.87 kg/m      GENERAL APPEARANCE: Alert, pleasant, NAD  EYES: PERRL, EOMI, conjunctiva clear  HENT: TM normal bilaterally. Nose and mouth without lesions  NECK: no adenopathy, thyroid normal to palpation  RESP: lungs clear to auscultation bilaterally  CV: regular rate and rhythm, normal S1 S2, no murmur, no carotid bruits  ABDOMEN: soft, nontender, without HSM or masses. Bowel sounds normal  MS: extremities normal- no gross deformities noted, no tender, hot or swollen joints.    SKIN: no suspicious lesions or rashes  NEURO: Normal strength and tone, sensory exam grossly normal, DTR normoreflexive in upper and lower extremities  +phalen sign  PSYCH: mentation appears normal. and affect normal/bright.  EXT: no peripheral edema, pedal pulses palpable  NO dupuytren contractures    Assessment:  (Z00.00) Encounter for Medicare annual wellness exam  (primary encounter diagnosis)  Comment: 69 year old woman in good health  Plan: Today we discussed ways to maintain a healthy lifestyle with sensible eating, regular exercise and self cares. We dicussed calcium and Vitamin D intake, vaccinations and preventive health screens.  She is up to date on mammogram, colonoscopy and DEXA. Due for Prevnar 20 vaccine today    (Z23) Need for pneumococcal vaccination  Comment: due for routine vaccine  Plan: given    (E11.42) Diabetic polyneuropathy associated with type 2 diabetes mellitus (H)  Comment: Recently resumed metformin 1000 mg daily  Plan: DULoxetine (CYMBALTA) 30 MG capsule, DULoxetine        (CYMBALTA) 60 MG capsule        Follow-up in May with her nephrologist who will also check an A1c    (N18.32) Chronic kidney disease, stage 3b (H)  Comment: stage 3b, metformin recently resumed  Plan: Monitor creatinine, next check in May 2025    (M65.30) Trigger finger, acquired  Comment: Thumbs on both hands, index and long finger on right hand  Plan: Orthopedic  Referral        Refer for formal  evaluation    (G56.01) Carpal tunnel syndrome of right wrist  Comment: Has bilateral numbness in hands, positive Phalen test left side  Plan: Orthopedic  Referral        Refer to hand surgeon for formal evaluation    (E11.9) Type 2 diabetes mellitus without complication, without long-term current use of insulin (H)  Comment: Currently on metformin, uses continuous glucose monitor  Plan: Continuous Glucose  ([employer]YLE JORDIN 14        DAY READER) EBEN        Refilled medication for 1 year    (N76.0,  B96.89) Bacterial vaginosis  Comment: 1 week history of fishy vaginal odor  Plan: metroNIDAZOLE (METROGEL) 0.75 % vaginal gel        Treat empirically for bacterial vaginosis, refills given    RTC August 2025    Anay Martinez MD  Internal Medicine/Pediatrics

## 2025-03-05 NOTE — PATIENT INSTRUCTIONS
Patient Education   Preventive Care Advice   This is general advice given by our system to help you stay healthy. However, your care team may have specific advice just for you. Please talk to your care team about your preventive care needs.  Nutrition  Eat 5 or more servings of fruits and vegetables each day.  Try wheat bread, brown rice and whole grain pasta (instead of white bread, rice, and pasta).  Get enough calcium and vitamin D. Check the label on foods and aim for 100% of the RDA (recommended daily allowance).  Lifestyle  Exercise at least 150 minutes each week  (30 minutes a day, 5 days a week).  Do muscle strengthening activities 2 days a week. These help control your weight and prevent disease.  No smoking.  Wear sunscreen to prevent skin cancer.  Have a dental exam and cleaning every 6 months.  Yearly exams  See your health care team every year to talk about:  Any changes in your health.  Any medicines your care team has prescribed.  Preventive care, family planning, and ways to prevent chronic diseases.  Shots (vaccines)   HPV shots (up to age 26), if you've never had them before.  Hepatitis B shots (up to age 59), if you've never had them before.  COVID-19 shot: Get this shot when it's due.  Flu shot: Get a flu shot every year.  Tetanus shot: Get a tetanus shot every 10 years.  Pneumococcal, hepatitis A, and RSV shots: Ask your care team if you need these based on your risk.  Shingles shot (for age 50 and up)  General health tests  Diabetes screening:  Starting at age 35, Get screened for diabetes at least every 3 years.  If you are younger than age 35, ask your care team if you should be screened for diabetes.  Cholesterol test: At age 39, start having a cholesterol test every 5 years, or more often if advised.  Bone density scan (DEXA): At age 50, ask your care team if you should have this scan for osteoporosis (brittle bones).  Hepatitis C: Get tested at least once in your life.  STIs (sexually  transmitted infections)  Before age 24: Ask your care team if you should be screened for STIs.  After age 24: Get screened for STIs if you're at risk. You are at risk for STIs (including HIV) if:  You are sexually active with more than one person.  You don't use condoms every time.  You or a partner was diagnosed with a sexually transmitted infection.  If you are at risk for HIV, ask about PrEP medicine to prevent HIV.  Get tested for HIV at least once in your life, whether you are at risk for HIV or not.  Cancer screening tests  Cervical cancer screening: If you have a cervix, begin getting regular cervical cancer screening tests starting at age 21.  Breast cancer scan (mammogram): If you've ever had breasts, begin having regular mammograms starting at age 40. This is a scan to check for breast cancer.  Colon cancer screening: It is important to start screening for colon cancer at age 45.  Have a colonoscopy test every 10 years (or more often if you're at risk) Or, ask your provider about stool tests like a FIT test every year or Cologuard test every 3 years.  To learn more about your testing options, visit:   .  For help making a decision, visit:   https://bit.ly/ei82516.  Prostate cancer screening test: If you have a prostate, ask your care team if a prostate cancer screening test (PSA) at age 55 is right for you.  Lung cancer screening: If you are a current or former smoker ages 50 to 80, ask your care team if ongoing lung cancer screenings are right for you.  For informational purposes only. Not to replace the advice of your health care provider. Copyright   2023 [employer]. All rights reserved. Clinically reviewed by the [facility]. [employer] [id number] - REV 01/24.

## 2025-03-05 NOTE — NURSING NOTE
Prior to immunization administration, verified patients identity using patient s name and date of birth. Please see Immunization Activity for additional information.     Screening Questionnaire for Adult Immunization    Are you sick today?   No   Do you have allergies to medications, food, a vaccine component or latex?   No   Have you ever had a serious reaction after receiving a vaccination?   No   Do you have a long-term health problem with heart, lung, kidney, or metabolic disease (e.g., diabetes), asthma, a blood disorder, no spleen, complement component deficiency, a cochlear implant, or a spinal fluid leak?  Are you on long-term aspirin therapy?   No   Do you have cancer, leukemia, HIV/AIDS, or any other immune system problem?   No   Do you have a parent, brother, or sister with an immune system problem?   No   In the past 3 months, have you taken medications that affect  your immune system, such as prednisone, other steroids, or anticancer drugs; drugs for the treatment of rheumatoid arthritis, Crohn s disease, or psoriasis; or have you had radiation treatments?   No   Have you had a seizure, or a brain or other nervous system problem?   No   During the past year, have you received a transfusion of blood or blood    products, or been given immune (gamma) globulin or antiviral drug?   No   For women: Are you pregnant or is there a chance you could become       pregnant during the next month?   No   Have you received any vaccinations in the past 4 weeks?   No     Immunization questionnaire answers were all negative.      Patient instructed to remain in clinic for 15 minutes afterwards, and to report any adverse reactions.     Screening performed by Asia Guevara LPN on 3/5/2025 at 1:45 PM.

## 2025-03-06 ENCOUNTER — PATIENT OUTREACH (OUTPATIENT)
Dept: CARE COORDINATION | Facility: CLINIC | Age: 70
End: 2025-03-06

## 2025-03-06 ENCOUNTER — TELEPHONE (OUTPATIENT)
Dept: FAMILY MEDICINE | Facility: CLINIC | Age: 70
End: 2025-03-06

## 2025-03-06 DIAGNOSIS — E11.9 TYPE 2 DIABETES MELLITUS WITHOUT COMPLICATION, WITHOUT LONG-TERM CURRENT USE OF INSULIN (H): Primary | Chronic | ICD-10-CM

## 2025-03-06 NOTE — TELEPHONE ENCOUNTER
Received a call from Sourav Dianping, from [employer], that the Freestyle Nirmal 14-day reader device is no longer being manufactured and is unavailable.  Sent new prescriptions for the Freestyle Nirmal 2 Device and Sensors.  DELIA FernandesN, RN, [facility]  RN Care Coordinator  [facility]  03/06/25  11:27 AM  Phone: [phone]

## 2025-03-07 ENCOUNTER — DOCUMENTATION ONLY (OUTPATIENT)
Dept: SLEEP MEDICINE | Facility: CLINIC | Age: 70
End: 2025-03-07
Payer: COMMERCIAL

## 2025-03-10 ENCOUNTER — PATIENT OUTREACH (OUTPATIENT)
Dept: CARE COORDINATION | Facility: CLINIC | Age: 70
End: 2025-03-10
Payer: COMMERCIAL

## 2025-03-11 PROBLEM — B96.89 BACTERIAL VAGINOSIS: Status: RESOLVED | Noted: 2024-11-06 | Resolved: 2025-03-11

## 2025-03-11 PROBLEM — E11.9 TYPE 2 DIABETES MELLITUS, WITH LONG-TERM CURRENT USE OF INSULIN (H): Chronic | Status: ACTIVE | Noted: 2018-06-27

## 2025-03-11 PROBLEM — N18.32 CHRONIC KIDNEY DISEASE, STAGE 3B (H): Chronic | Status: ACTIVE | Noted: 2021-07-29

## 2025-03-11 PROBLEM — N76.0 BACTERIAL VAGINOSIS: Status: RESOLVED | Noted: 2024-11-06 | Resolved: 2025-03-11

## 2025-03-11 PROBLEM — M85.80 OSTEOPENIA: Chronic | Status: ACTIVE | Noted: 2019-04-29

## 2025-03-11 PROBLEM — K92.2 UPPER GI BLEED: Status: RESOLVED | Noted: 2021-11-16 | Resolved: 2025-03-11

## 2025-03-11 PROBLEM — K25.4 GASTROINTESTINAL HEMORRHAGE ASSOCIATED WITH GASTRIC ULCER: Status: RESOLVED | Noted: 2020-11-05 | Resolved: 2025-03-11

## 2025-03-11 PROBLEM — E16.2 HYPOGLYCEMIA: Status: RESOLVED | Noted: 2024-04-07 | Resolved: 2025-03-11

## 2025-03-11 PROBLEM — M85.859 OSTEOPENIA OF HIP: Status: ACTIVE | Noted: 2019-04-29

## 2025-03-11 PROBLEM — Z79.4 TYPE 2 DIABETES MELLITUS, WITH LONG-TERM CURRENT USE OF INSULIN (H): Chronic | Status: ACTIVE | Noted: 2018-06-27

## 2025-03-11 PROBLEM — Z98.84 HISTORY OF ROUX-EN-Y GASTRIC BYPASS: Chronic | Status: ACTIVE | Noted: 2024-04-07

## 2025-03-18 NOTE — TELEPHONE ENCOUNTER
DIAGNOSIS: Trigger finger, acquired [M65.30]  Carpal tunnel syndrome of right wrist \Anay Martinez MD in [facility]\ [facility]\ ortho con    APPOINTMENT DATE: 4/7/25   NOTES STATUS DETAILS   OFFICE NOTE from referring provider Internal 3/5/25 - Anay Martinez MD    MEDICATION LIST Internal    IMAGES     XRAYS (IMAGES & REPORTS) PACS 10/15/22 - XR Wrist Right

## 2025-03-25 ENCOUNTER — THERAPY VISIT (OUTPATIENT)
Dept: PHYSICAL THERAPY | Facility: CLINIC | Age: 70
End: 2025-03-25
Payer: COMMERCIAL

## 2025-03-25 DIAGNOSIS — N39.3 STRESS INCONTINENCE OF URINE: Primary | ICD-10-CM

## 2025-03-25 PROCEDURE — 97110 THERAPEUTIC EXERCISES: CPT | Mod: GP

## 2025-03-25 NOTE — PROGRESS NOTES
DISCHARGE  Reason for Discharge: Patient has met all goals.      Discharge Plan: Patient to continue home program.    Referring Provider:  Amie Grace

## 2025-03-26 ENCOUNTER — DOCUMENTATION ONLY (OUTPATIENT)
Dept: FAMILY MEDICINE | Facility: CLINIC | Age: 70
End: 2025-03-26

## 2025-03-26 NOTE — PROGRESS NOTES
Medicare requires that the MD/DO treating the patient for diabetes answers all of the questions and signs the pended order for the patient to qualify for diabetic shoes. Once the order is completed, please route the encounter back to sender.    Argelia Hussein

## 2025-04-07 ENCOUNTER — OFFICE VISIT (OUTPATIENT)
Dept: ORTHOPEDICS | Facility: CLINIC | Age: 70
End: 2025-04-07
Attending: INTERNAL MEDICINE
Payer: COMMERCIAL

## 2025-04-07 ENCOUNTER — PRE VISIT (OUTPATIENT)
Dept: ORTHOPEDICS | Facility: CLINIC | Age: 70
End: 2025-04-07

## 2025-04-07 DIAGNOSIS — M65.30 TRIGGER FINGER, ACQUIRED: ICD-10-CM

## 2025-04-07 DIAGNOSIS — G56.01 CARPAL TUNNEL SYNDROME OF RIGHT WRIST: ICD-10-CM

## 2025-04-07 PROCEDURE — G2211 COMPLEX E/M VISIT ADD ON: HCPCS | Performed by: FAMILY MEDICINE

## 2025-04-07 PROCEDURE — 99214 OFFICE O/P EST MOD 30 MIN: CPT | Performed by: FAMILY MEDICINE

## 2025-04-07 NOTE — LETTER
4/7/2025      RE: Karine Moss  [address]  [place] [address]     Dear Colleague,    Thank you for referring your patient, Karine Moss, to the [facility]. Please see a copy of my visit note below.      [facility]  SPORTS & ORTHOPEDIC CLINIC VISIT     Apr 7, 2025        ASSESSMENT & PLAN    70-year-old with chronic paresthesias in the right hand consistent with carpal tunnel which has been confirmed on recent EMG.  She additionally more recently has been having triggering of the thumb index and middle fingers of the right hand.    Reviewed imaging and assessment with patient in detail  Given the duration of symptoms as well as the severity is seen on EMG the patient is interested in surgical consultation at this time.  She will be assisted with scheduling.  She additionally will try using a wrist splint at nighttime.  She would like to pursue a custom splint with hand therapy.  Referral provided.  For the trigger fingers we will try oval 8 splinting at nighttime and as tolerated during the day.  Will try topical anti-inflammatory at least once daily.  If this is ineffective steroid injection would be a reasonable next step.    Byron Claudio MD  [facility]    The longitudinal plan of care for the diagnosis(es)/condition(s) as documented were addressed during this visit. Due to the added complexity in care, I will continue to support Karine in the subsequent management and with ongoing continuity of care.    -----  Chief Complaint   Patient presents with     Right Wrist - Pain       SUBJECTIVE  Karine Moss is a/an 70 year old female who is seen in consultation at the request of  Anay Martinez M.D. for evaluation of  right hand pain.     The patient is seen with their daughter.  The patient is Right handed    Onset: 6 months-1 years(s) ago. Reports insidious onset without  acute precipitating event.  Location of Pain: right hand and wrist   Worsened by: discomfort when fingers are numb   Better with: NA   Treatments tried: no treatment tried to date  Associated symptoms: numbness and tingling    Orthopedic/Surgical history: NO  Social History/Occupation: Retired       REVIEW OF SYSTEMS:  Do you have fever, chills, weight loss? No  Do you have any vision problems? No  Do you have any chest pain or edema? No  Do you have any shortness of breath or wheezing?  No  Do you have stomach problems? No  Do you have any numbness or focal weakness? Yes, r hand   Do you have diabetes? Yes, type 2  Do you have problems with bleeding or clotting? No  Do you have an rashes or other skin lesions? No    OBJECTIVE:  There were no vitals taken for this visit.     General  - alert, pleasant, no distress  CV  - normal radial pulse, cap refill brisk  Musculoskeletal - right wrist  - inspection: normal joint alignment, no obvious deformity, no swelling  - palpation: no bony or soft tissue tenderness, no tenderness at the anatomical snuffbox  - ROM:  90 deg flexion   70 deg extension   25 deg abduction   65 deg adduction  - strength: 5/5  strength, 5/5 flexion, extension, pronation, supination, adduction, abduction  - special tests:  (+) Tinel's  (-) Finkelstein  (+) Phalen  (-) Yañez click test  (-) ulnar impaction  Neuro  - no sensory or motor deficit, grossly normal coordination, normal muscle tone  Skin  - no ecchymosis, erythema, warmth, or induration, no obvious rash    Also has triggering of the right index and middle fingers with tenderness over the volar aspect of the metacarpal head.    RADIOLOGY:    Reviewed outside EMG from the [facility] dated 8/13/2024 which demonstrates severe carpal tunnel of the right wrist.          Again, thank you for allowing me to participate in the care of your patient.      Sincerely,    Byron Claudio MD

## 2025-04-07 NOTE — PROGRESS NOTES
[facility]  SPORTS & ORTHOPEDIC CLINIC VISIT     Apr 7, 2025        ASSESSMENT & PLAN    70-year-old with chronic paresthesias in the right hand consistent with carpal tunnel which has been confirmed on recent EMG.  She additionally more recently has been having triggering of the thumb index and middle fingers of the right hand.    Reviewed imaging and assessment with patient in detail  Given the duration of symptoms as well as the severity is seen on EMG the patient is interested in surgical consultation at this time.  She will be assisted with scheduling.  She additionally will try using a wrist splint at nighttime.  She would like to pursue a custom splint with hand therapy.  Referral provided.  For the trigger fingers we will try oval 8 splinting at nighttime and as tolerated during the day.  Will try topical anti-inflammatory at least once daily.  If this is ineffective steroid injection would be a reasonable next step.    Byron Claudio MD  [facility] [place]    The longitudinal plan of care for the diagnosis(es)/condition(s) as documented were addressed during this visit. Due to the added complexity in care, I will continue to support Karine in the subsequent management and with ongoing continuity of care.    -----  Chief Complaint   Patient presents with    Right Wrist - Pain       SUBJECTIVE  Karine Moss is a/an 70 year old female who is seen in consultation at the request of  Anay Martinez M.D. for evaluation of  right hand pain.     The patient is seen with their daughter.  The patient is Right handed    Onset: 6 months-1 years(s) ago. Reports insidious onset without acute precipitating event.  Location of Pain: right hand and wrist   Worsened by: discomfort when fingers are numb   Better with: NA   Treatments tried: no treatment tried to date  Associated symptoms: numbness and tingling    Orthopedic/Surgical history: NO  Social  History/Occupation: Retired       REVIEW OF SYSTEMS:  Do you have fever, chills, weight loss? No  Do you have any vision problems? No  Do you have any chest pain or edema? No  Do you have any shortness of breath or wheezing?  No  Do you have stomach problems? No  Do you have any numbness or focal weakness? Yes, r hand   Do you have diabetes? Yes, type 2  Do you have problems with bleeding or clotting? No  Do you have an rashes or other skin lesions? No    OBJECTIVE:  There were no vitals taken for this visit.     General  - alert, pleasant, no distress  CV  - normal radial pulse, cap refill brisk  Musculoskeletal - right wrist  - inspection: normal joint alignment, no obvious deformity, no swelling  - palpation: no bony or soft tissue tenderness, no tenderness at the anatomical snuffbox  - ROM:  90 deg flexion   70 deg extension   25 deg abduction   65 deg adduction  - strength: 5/5  strength, 5/5 flexion, extension, pronation, supination, adduction, abduction  - special tests:  (+) Tinel's  (-) Finkelstein  (+) Phalen  (-) Yañez click test  (-) ulnar impaction  Neuro  - no sensory or motor deficit, grossly normal coordination, normal muscle tone  Skin  - no ecchymosis, erythema, warmth, or induration, no obvious rash    Also has triggering of the right index and middle fingers with tenderness over the volar aspect of the metacarpal head.    RADIOLOGY:    Reviewed outside EMG from the [facility] dated 8/13/2024 which demonstrates severe carpal tunnel of the right wrist.         General

## 2025-04-07 NOTE — TELEPHONE ENCOUNTER
DIAGNOSIS: Trigger finger, acquired [M65.30]  Carpal tunnel syndrome of right wrist [G56.01]    APPOINTMENT DATE: 4/14/25   NOTES STATUS DETAILS   OFFICE NOTE from referring provider Internal 4/7/25 - Byron Claudio MD - Sports Med    OFFICE NOTE from other specialist Internal 3/5/25 - Anay Martinez MD    MEDICATION LIST Internal    XRAYS (IMAGES & REPORTS) Internal 10/15/22 - XR Wrist Right

## 2025-04-07 NOTE — PATIENT INSTRUCTIONS
- Try using topical diclofenac on the fingers that are triggering.  -Also use oval 8 splints at night for the next 3 to 4 weeks.  -If this is ineffective could consider a steroid injection for this problem      - Follow-up with hand therapy for wrist splint for your right wrist carpal tunnel syndrome.  -Follow-up with hand surgery to discuss surgical intervention.

## 2025-04-08 ENCOUNTER — PATIENT OUTREACH (OUTPATIENT)
Dept: CARE COORDINATION | Facility: CLINIC | Age: 70
End: 2025-04-08
Payer: COMMERCIAL

## 2025-04-14 ENCOUNTER — PRE VISIT (OUTPATIENT)
Dept: ORTHOPEDICS | Facility: CLINIC | Age: 70
End: 2025-04-14

## 2025-04-14 ENCOUNTER — OFFICE VISIT (OUTPATIENT)
Dept: ORTHOPEDICS | Facility: CLINIC | Age: 70
End: 2025-04-14
Payer: COMMERCIAL

## 2025-04-14 DIAGNOSIS — M65.30 TRIGGER FINGER, ACQUIRED: ICD-10-CM

## 2025-04-14 DIAGNOSIS — G56.01 CARPAL TUNNEL SYNDROME, RIGHT: Primary | ICD-10-CM

## 2025-04-14 PROCEDURE — 99214 OFFICE O/P EST MOD 30 MIN: CPT | Performed by: PHYSICIAN ASSISTANT

## 2025-04-14 PROCEDURE — 1125F AMNT PAIN NOTED PAIN PRSNT: CPT | Performed by: PHYSICIAN ASSISTANT

## 2025-04-14 NOTE — H&P (VIEW-ONLY)
Date of Service: Apr 14, 2025    Chief Complaint:   Chief Complaint   Patient presents with    Consult     Trigger Finger of the right index and middle finger and right carpal tunnel of the wrist     History of Present Illness: Karine Moss is a 70 year old, right handed female who presents today for further evaluation of right hand numbness and tingling. Numbness and tingling effects the thumb, index, long (middle), ring, and small. Symptoms first began more than 2 years ago.  Symptoms seem to start a few years after bilateral distal radius fractures.  Symptoms DO wake the patient up at night. Symptoms made worse during the following activities: holding phone. . Symptoms are present at all times. The following modalities have been tried: none.     Patient also reports triggering of the thumb, index, and middle finger of the right hand.  Previously seen by Dr. Claudio who recommended oval 8 splinting.  Patient admits she has not worn these.  Symptoms started approximately 6 months ago, they occasionally get stuck in the morning require manual unlocking.    Documentation by Dr. Adkins reviewed.    Review of Systems: A 14-point review of systems was obtained on intake and reviewed.      Past Medical History:   Diagnosis Date    Arthritis     Bacterial vaginosis 11/06/2024    Basal cell carcinoma     Depression 1991    after 's death    Diabetes mellitus (H)     Diabetic renal disease (H)     microalbuminuria    DJD (degenerative joint disease) of knee     Gastrointestinal hemorrhage associated with gastric ulcer 11/05/2020    H/O vitamin D deficiency     Hypertension     Hypoglycemia 04/07/2024    Low back pain     Obstructive sleep apnea     Pancreatitis     related to Victoza, also on Xyrem    Panic     RLS (restless legs syndrome)     Upper GI bleed 11/16/2021       Past Surgical History:   Procedure Laterality Date    COLONOSCOPY  10/01/2020    poor quality prep    COLONOSCOPY N/A 8/22/2024    Procedure:  COLONOSCOPY, WITH POLYPECTOMY AND BIOPSY;  Surgeon: Andreina Layne MD;  Location: [facility]    ear surgery  2002 and 01/2004    ESOPHAGOSCOPY, GASTROSCOPY, DUODENOSCOPY (EGD), COMBINED N/A 11/16/2021    Procedure: ESOPHAGOGASTRODUODENOSCOPY (EGD);  Surgeon: Jayla Calvo MD;  Location:  GI    ESOPHAGOSCOPY, GASTROSCOPY, DUODENOSCOPY (EGD), COMBINED N/A 1/9/2023    Procedure: ESOPHAGOGASTRODUODENOSCOPY, WITH BIOPSY;  Surgeon: Brandon Witt MD;  Location: [facility]    ESOPHAGOSCOPY, GASTROSCOPY, DUODENOSCOPY (EGD), COMBINED N/A 8/22/2024    Procedure: Esophagoscopy, gastroscopy, duodenoscopy (EGD), combined;  Surgeon: Andreina Layne MD;  Location: [facility]    GASTRIC BYPASS  2013    laparoscopic jimmy en y c ometopexy    HEMORRHOIDECTOMY  09/14/2000    LAPAROSCOPIC CHOLECYSTECTOMY  2015    LASIK BILATERAL      UVULOPALATOPHARYNGOPLASTY      UVVP           Current Outpatient Medications:     amphetamine-dextroamphetamine (ADDERALL) 30 MG tablet, Take 1-2 tablets, Disp: 60 tablet, Rfl: 0    atorvastatin (LIPITOR) 20 MG tablet, Take 1 tablet (20 mg) by mouth daily., Disp: 90 tablet, Rfl: 3    blood glucose (NO BRAND SPECIFIED) lancets standard, Use to test blood sugar 1 times daily or as directed., Disp: 90 Lancet, Rfl: 3    blood glucose (NO BRAND SPECIFIED) test strip, Use to test blood sugar 1 times daily or as directed., Disp: 100 strip, Rfl: 3    blood glucose calibration (NO BRAND SPECIFIED) solution, Use to calibrate blood glucose monitor as needed as directed., Disp: 1 each, Rfl: 3    blood glucose monitoring (NO BRAND SPECIFIED) meter device kit, Use to test blood sugar 1 times daily or as directed., Disp: 1 kit, Rfl: 0    Calcium Acetate 667 MG TABS, Take 1 tablet by mouth 2 times daily for 360 days, Disp: 180 tablet, Rfl: 3    cevimeline (EVOXAC) 30 MG capsule, take 1 cap TID, Disp: 270 capsule, Rfl: 3    cholecalciferol (VITAMIN D3) 125 mcg (5000 units) capsule, Take 125 mcg by mouth daily, Disp: ,  Rfl:     COMPOUNDED NON-CONTROLLED SUBSTANCE (CMPD RX) - PHARMACY TO MIX COMPOUNDED MEDICATION, Wart peel in remedium sig apply to warts at night as tolerated, Disp: 5 g, Rfl: 5    COMPOUNDED NON-CONTROLLED SUBSTANCE (CMPD RX) - PHARMACY TO MIX COMPOUNDED MEDICATION, Estradiol 0.0075% in HRT cream  Apply 2 grams,  intravaginally and small amount externally daily for one week then twice weekly for maintenance., Disp: 45 g, Rfl: 11    Continuous Glucose Sensor (FREESTYLE JORDIN 3 PLUS SENSOR) MISC, Use 1 sensor every 15 days. Use to read blood sugars per 's instructions., Disp: 6 each, Rfl: 3    cyanocobalamin (VITAMIN B-12) 1000 MCG tablet, Take 1 tablet by mouth every other day., Disp: 90 tablet, Rfl: 0    DULoxetine (CYMBALTA) 30 MG capsule, Take 1 capsule (30 mg) by mouth 2 times daily., Disp: 180 capsule, Rfl: 3    DULoxetine (CYMBALTA) 60 MG capsule, Take 1 60mg dose, along with 30mg dose q hs, Disp: 90 capsule, Rfl: 3    Ferrous Bisglycinate Chelate (EASY IRON) 28 MG CAPS, Take 1 capsule by mouth three times a week., Disp: , Rfl:     finasteride (PROSCAR) 5 MG tablet, Take 1 tablet daily, Disp: 90 tablet, Rfl: 3    losartan (COZAAR) 50 MG tablet, Take one daily, Disp: 90 tablet, Rfl: 3    metFORMIN (GLUCOPHAGE XR) 500 MG 24 hr tablet, Take 2 po q am with breakfast., Disp: 180 tablet, Rfl: 0    omeprazole (PRILOSEC) 40 MG DR capsule, Take 40mg twice daily, Disp: 180 capsule, Rfl: 3    pregabalin (LYRICA) 100 MG capsule, Take 200 mg by mouth daily. Starting Wednesday an additional 100 mg, Disp: , Rfl:     tolterodine ER (DETROL LA) 4 MG 24 hr capsule, Take 1 capsule (4 mg) by mouth daily., Disp: 90 capsule, Rfl: 3    triamcinolone (KENALOG) 0.1 % paste, Apply to tongue twice daily x 10 days, Disp: 5 g, Rfl: 0    Allergies   Allergen Reactions    Pravastatin Other (See Comments)     woozy    Codeine Nausea and Vomiting    Nsaids GI Disturbance and Other (See Comments)     Pt had bariatric surgery,  should not ever take oral NSAIDS due to risk of gastric ulcers.  Pt had bariatric surgery, should not ever take oral NSAIDS due to risk of gastric ulcers.       Social History     Tobacco Use    Smoking status: Never     Passive exposure: Never    Smokeless tobacco: Never   Vaping Use    Vaping status: Never Used   Substance Use Topics    Alcohol use: Not Currently     Comment: rare    Drug use: No       Family History   Problem Relation Age of Onset    Memory loss Mother     Other - See Comments Mother         hair thinning    Diabetes Father     Chronic Obstructive Pulmonary Disease Sister     Anemia Sister         hx of ulcers    Other - See Comments Sister 65        MVA, followed by leg pain after a fall    Dementia Sister 68    Dementia Brother 70    Cancer Brother         lung    Cancer - colorectal Maternal Grandmother     Cancer Daughter     Obesity Daughter         s/p lap band    Skin Cancer No family hx of        Physical examination:  Well-developed, well-nourished and in no acute distress.  Alert and oriented to surroundings.  On examination of the right upper extremity:   Palpable nodules at the level of the A1 pulleys to the thumb, index, middle fingers.  No obvious snapping or triggering today, though tendon does appear sluggish.  Skin clean, dry and intact  Sensation:  Median: diminished  Radial: intact  Ulnar: intact  Thumb opposition strength: diminished  Interosseous strength: intact  Thenar atrophy: Not Present  Interosseous atrophy: Not Present  Tinel's over carpal tunnel: Present  Tinel's over cubital tunnel: Not Present  Phalen's: Positive  Carpal tunnel compression: Positive  Elbow flexion compression: Negative    Two point discrimination (mm):   Median: 8 mm (right 4 mm)  Ulnar: 6 mm (right 5 mm).     EMG/NCS: EMG obtained on 2/25/2025 demonstrates Severe right-sided carpal tunnel syndrome, electrical evidence of mild to moderate, length-dependent, sensorimotor, axonal predominant  polyneuropathy involving the lower extremities symmetrically.     Assessment: 70 year old female with:  Right carpal tunnel syndrome.   Right thumb, index, and middle trigger fingers.   Type 2 diabetes, well controlled with last A1c 7.4 January 2025. Diabetic neuropathy.    Plan:     We had a lengthy discussion about EMG results and possible treatment options. We discussed three possible treatment options. The first would be to continue night splinting and to observe the symptoms for any change or progression. The second would be to perform a corticosteroid injection. The third is to consider surgery, which would be an open carpal tunnel release. I have described the procedure, post-operative protocol, and expected outcomes.  I also discussed the risks of surgery including but  not limited to, bleeding, infection, nerve or vessel damage, wound healing problems, persistent pain, persistent numbness and the possibility for further surgery.  We also did discuss that diabetic neuropathy could be contributing to her symptoms as well.  For treatment of her trigger fingers discussed treatment options including corticosteroid injections and surgical release.  We did discuss in the setting of her diabetes would recommend against injecting all 3 at the same time.  However we did discuss her trigger fingers appear mild and may resolve with steroid injections only.  Also discussed surgical release.  Discussed the procedure and postoperative plan I discussed the risks and benefits of surgery, including but not limited to: infection, bleeding, pain, scarring, damage to nerves, blood vessels, or other nearby structures, re-currence or persistence of trigger, stiffness, need for further surgery, and wound healing issues.  Patient is interested in avoiding injections and definitively treating at the same time as a carpal tunnel release.  After thorough discussion patient elected to proceed with a right carpal tunnel release and  right trigger fingers (thumb, index, middle) releases under MAC plus local with Dr. Briana Renteria.      Amyloidosis screen for patients undergoing carpal tunnel release:    TIER 1  - bilateral carpal tunnel syndrome (or history of release on contralateral side)  - trigger finger (or history of prior trigger finger)  - male > 50 years old  - female > 60 years old    TIER 2  - spinal stenosis  - distal biceps rupture  - atrial fibrillation, atrial flutter, cardiac arrhythmias  - pacemaker  - congestive heart failure/valvular problems  - hearing loss  - family history of amyloidosis    TOTAL: 2 positive Tier 1, 0 positive Tier 2    (Biopsy indicated if two points from tier 1 OR one point from tier 1 + one point from tier 2. Biopsy is 1 cm2 of tenosynovium from within carpal tunnel, specify congo red staining)    Based on screening, testing IS indicated.  To be discussed on day of surgery.      CLAYTON BARRON PA-C  Orthopaedic Surgery

## 2025-04-14 NOTE — Clinical Note
Saw this lady in clinic today. Ordered carpal tunnel release, and multiple trigger fingers. Her trigger fingers are pretty mild but she wants to just take care of it at time of CTR. Will need to discuss amyloid biopsy at time of surgery.

## 2025-04-14 NOTE — NURSING NOTE
Teaching Flowsheet     Visit Type: In Clinic  Please speak with daughter Leila Crespo for scheduling at [phone], consent to communicate on file.    Time Start: 1056  Time End: 1106  Total Time Spent: 10 min.    Surgeon: Dr Briana Renteria  Location of Surgery (known or anticipated): [facility]   Type of Anesthesia: MAC with local  Worker's Compensation Procedure: No    Pertinent Medical History: Diabetes type 2, last HgbA1C 7.4% on 1-29-25. Rai n Y gastric bypass, CHF, CKD stage 3b, HTN, IZABEL, narcolepsy  Were medical conditions reviewed and appropriate for location? Yes  BMI: Normal BMI (24.87)    Relevant Diagnosis: Carpal tunnel syndrome on Right. Right thumb, index and middle trigger fingers.  Teaching Topic: Right open carpal tunnel release.  Right thumb, index and middle finger trigger releases.    Person(s) involved in teaching:   Patient and daughter Leila  : No.   Verified Patient's Phone Number: NO    Caregiver//  Name: Leila Crespo  Phone Number: [phone]    Relationship: Daughter  Consent to Communicate on file: Yes       Motivation Level:  Asks Questions: Yes  Eager to Learn: Yes  Cooperative: Yes  Receptive (willing/able to accept information): Yes  Any cultural factors/Christian beliefs that may influence understanding or compliance? No     Patient demonstrates understanding of the following:  Reason for the appointment, diagnosis and treatment plan: Yes  Knowledge of proper use of medications and conditions for which they are ordered (with special attention to potential side effects or drug interactions): Yes  Which situations necessitate calling provider and whom to contact: Yes     Teaching Concerns Addressed:   Proper use and care of medical equip, care aids, etc.: Yes  Nutritional needs and diet plan: Yes  Pain management techniques: Yes  Wound Care: Yes  How and/when to access community resources: Yes  Need for pre-op with in 30 days: YES, will be done with PCP.  I asked them to ensure they go over their daily medications during this visit and discuss what medications need to be stopped before surgery and when. If you are doing a pre-op with your PCP and they are not within the [facility], I ask them to fax it to our pre-op office. Patient verbalized understanding.       Does patient have difficulty getting a ride to appointments (post-ops, PT/OT): No  Patient's plan after discharge: home with family or spouse     Instructional Materials Used/Given:   .   - Important Contact Info/Phone Numbers: emphasizing clinic number [phone] and after hours number [phone]  - Map/location of surgery and follow-up appointments  - Showering instructions  - Stoplight Tool     -Next step: schedule a surgery date with patient daughter Leila.  Schedule pre-op exam with PCP.    Jamaica Zavala RN

## 2025-04-14 NOTE — LETTER
4/14/2025      Karine Moss  [address]  [facility] [address]      Dear Colleague,    Thank you for referring your patient, Karine Moss, to the [facility] [place]. Please see a copy of my visit note below.    Date of Service: Apr 14, 2025    Chief Complaint:   Chief Complaint   Patient presents with     Consult     Trigger Finger of the right index and middle finger and right carpal tunnel of the wrist     History of Present Illness: Karine Moss is a 70 year old, right handed female who presents today for further evaluation of right hand numbness and tingling. Numbness and tingling effects the thumb, index, long (middle), ring, and small. Symptoms first began more than 2 years ago.  Symptoms seem to start a few years after bilateral distal radius fractures.  Symptoms DO wake the patient up at night. Symptoms made worse during the following activities: holding phone. . Symptoms are present at all times. The following modalities have been tried: none.     Patient also reports triggering of the thumb, index, and middle finger of the right hand.  Previously seen by Dr. Claudio who recommended oval 8 splinting.  Patient admits she has not worn these.  Symptoms started approximately 6 months ago, they occasionally get stuck in the morning require manual unlocking.    Documentation by Dr. Adkins reviewed.    Review of Systems: A 14-point review of systems was obtained on intake and reviewed.      Past Medical History:   Diagnosis Date     Arthritis      Bacterial vaginosis 11/06/2024     Basal cell carcinoma      Depression 1991    after 's death     Diabetes mellitus (H)      Diabetic renal disease (H)     microalbuminuria     DJD (degenerative joint disease) of knee      Gastrointestinal hemorrhage associated with gastric ulcer 11/05/2020     H/O vitamin D deficiency      Hypertension      Hypoglycemia 04/07/2024     Low back pain      Obstructive sleep apnea       Pancreatitis     related to Victoza, also on Xyrem     Panic      RLS (restless legs syndrome)      Upper GI bleed 11/16/2021       Past Surgical History:   Procedure Laterality Date     COLONOSCOPY  10/01/2020    poor quality prep     COLONOSCOPY N/A 8/22/2024    Procedure: COLONOSCOPY, WITH POLYPECTOMY AND BIOPSY;  Surgeon: Andreina Layne MD;  Location:  GI     ear surgery  2002 and 01/2004     ESOPHAGOSCOPY, GASTROSCOPY, DUODENOSCOPY (EGD), COMBINED N/A 11/16/2021    Procedure: ESOPHAGOGASTRODUODENOSCOPY (EGD);  Surgeon: Jayla Calvo MD;  Location: [facility]     ESOPHAGOSCOPY, GASTROSCOPY, DUODENOSCOPY (EGD), COMBINED N/A 1/9/2023    Procedure: ESOPHAGOGASTRODUODENOSCOPY, WITH BIOPSY;  Surgeon: Brandon Witt MD;  Location: [facility]     ESOPHAGOSCOPY, GASTROSCOPY, DUODENOSCOPY (EGD), COMBINED N/A 8/22/2024    Procedure: Esophagoscopy, gastroscopy, duodenoscopy (EGD), combined;  Surgeon: Andreina Layne MD;  Location: [facility]     GASTRIC BYPASS  2013    laparoscopic jimmy en y c ometopexy     HEMORRHOIDECTOMY  09/14/2000     LAPAROSCOPIC CHOLECYSTECTOMY  2015     LASIK BILATERAL       UVULOPALATOPHARYNGOPLASTY       UVVP           Current Outpatient Medications:      amphetamine-dextroamphetamine (ADDERALL) 30 MG tablet, Take 1-2 tablets, Disp: 60 tablet, Rfl: 0     atorvastatin (LIPITOR) 20 MG tablet, Take 1 tablet (20 mg) by mouth daily., Disp: 90 tablet, Rfl: 3     blood glucose (NO BRAND SPECIFIED) lancets standard, Use to test blood sugar 1 times daily or as directed., Disp: 90 Lancet, Rfl: 3     blood glucose (NO BRAND SPECIFIED) test strip, Use to test blood sugar 1 times daily or as directed., Disp: 100 strip, Rfl: 3     blood glucose calibration (NO BRAND SPECIFIED) solution, Use to calibrate blood glucose monitor as needed as directed., Disp: 1 each, Rfl: 3     blood glucose monitoring (NO BRAND SPECIFIED) meter device kit, Use to test blood sugar 1 times daily or as directed., Disp: 1 kit,  Rfl: 0     Calcium Acetate 667 MG TABS, Take 1 tablet by mouth 2 times daily for 360 days, Disp: 180 tablet, Rfl: 3     cevimeline (EVOXAC) 30 MG capsule, take 1 cap TID, Disp: 270 capsule, Rfl: 3     cholecalciferol (VITAMIN D3) 125 mcg (5000 units) capsule, Take 125 mcg by mouth daily, Disp: , Rfl:      COMPOUNDED NON-CONTROLLED SUBSTANCE (CMPD RX) - PHARMACY TO MIX COMPOUNDED MEDICATION, Wart peel in remedium sig apply to warts at night as tolerated, Disp: 5 g, Rfl: 5     COMPOUNDED NON-CONTROLLED SUBSTANCE (CMPD RX) - PHARMACY TO MIX COMPOUNDED MEDICATION, Estradiol 0.0075% in HRT cream  Apply 2 grams,  intravaginally and small amount externally daily for one week then twice weekly for maintenance., Disp: 45 g, Rfl: 11     Continuous Glucose Sensor (FREESTYLE JORDIN 3 PLUS SENSOR) MISC, Use 1 sensor every 15 days. Use to read blood sugars per 's instructions., Disp: 6 each, Rfl: 3     cyanocobalamin (VITAMIN B-12) 1000 MCG tablet, Take 1 tablet by mouth every other day., Disp: 90 tablet, Rfl: 0     DULoxetine (CYMBALTA) 30 MG capsule, Take 1 capsule (30 mg) by mouth 2 times daily., Disp: 180 capsule, Rfl: 3     DULoxetine (CYMBALTA) 60 MG capsule, Take 1 60mg dose, along with 30mg dose q hs, Disp: 90 capsule, Rfl: 3     Ferrous Bisglycinate Chelate (EASY IRON) 28 MG CAPS, Take 1 capsule by mouth three times a week., Disp: , Rfl:      finasteride (PROSCAR) 5 MG tablet, Take 1 tablet daily, Disp: 90 tablet, Rfl: 3     losartan (COZAAR) 50 MG tablet, Take one daily, Disp: 90 tablet, Rfl: 3     metFORMIN (GLUCOPHAGE XR) 500 MG 24 hr tablet, Take 2 po q am with breakfast., Disp: 180 tablet, Rfl: 0     omeprazole (PRILOSEC) 40 MG DR capsule, Take 40mg twice daily, Disp: 180 capsule, Rfl: 3     pregabalin (LYRICA) 100 MG capsule, Take 200 mg by mouth daily. Starting Wednesday an additional 100 mg, Disp: , Rfl:      tolterodine ER (DETROL LA) 4 MG 24 hr capsule, Take 1 capsule (4 mg) by mouth daily., Disp: 90  capsule, Rfl: 3     triamcinolone (KENALOG) 0.1 % paste, Apply to tongue twice daily x 10 days, Disp: 5 g, Rfl: 0    Allergies   Allergen Reactions     Pravastatin Other (See Comments)     woozy     Codeine Nausea and Vomiting     Nsaids GI Disturbance and Other (See Comments)     Pt had bariatric surgery, should not ever take oral NSAIDS due to risk of gastric ulcers.  Pt had bariatric surgery, should not ever take oral NSAIDS due to risk of gastric ulcers.       Social History     Tobacco Use     Smoking status: Never     Passive exposure: Never     Smokeless tobacco: Never   Vaping Use     Vaping status: Never Used   Substance Use Topics     Alcohol use: Not Currently     Comment: rare     Drug use: No       Family History   Problem Relation Age of Onset     Memory loss Mother      Other - See Comments Mother         hair thinning     Diabetes Father      Chronic Obstructive Pulmonary Disease Sister      Anemia Sister         hx of ulcers     Other - See Comments Sister 65        MVA, followed by leg pain after a fall     Dementia Sister 68     Dementia Brother 70     Cancer Brother         lung     Cancer - colorectal Maternal Grandmother      Cancer Daughter      Obesity Daughter         s/p lap band     Skin Cancer No family hx of        Physical examination:  Well-developed, well-nourished and in no acute distress.  Alert and oriented to surroundings.  On examination of the right upper extremity:   Palpable nodules at the level of the A1 pulleys to the thumb, index, middle fingers.  No obvious snapping or triggering today, though tendon does appear sluggish.  Skin clean, dry and intact  Sensation:  Median: diminished  Radial: intact  Ulnar: intact  Thumb opposition strength: diminished  Interosseous strength: intact  Thenar atrophy: Not Present  Interosseous atrophy: Not Present  Tinel's over carpal tunnel: Present  Tinel's over cubital tunnel: Not Present  Phalen's: Positive  Carpal tunnel compression:  Positive  Elbow flexion compression: Negative    Two point discrimination (mm):   Median: 8 mm (right 4 mm)  Ulnar: 6 mm (right 5 mm).     EMG/NCS: EMG obtained on 2/25/2025 demonstrates Severe right-sided carpal tunnel syndrome, electrical evidence of mild to moderate, length-dependent, sensorimotor, axonal predominant polyneuropathy involving the lower extremities symmetrically.     Assessment: 70 year old female with:  Right carpal tunnel syndrome.   Right thumb, index, and middle trigger fingers.   Type 2 diabetes, well controlled with last A1c 7.4 January 2025. Diabetic neuropathy.    Plan:     We had a lengthy discussion about EMG results and possible treatment options. We discussed three possible treatment options. The first would be to continue night splinting and to observe the symptoms for any change or progression. The second would be to perform a corticosteroid injection. The third is to consider surgery, which would be an open carpal tunnel release. I have described the procedure, post-operative protocol, and expected outcomes.  I also discussed the risks of surgery including but  not limited to, bleeding, infection, nerve or vessel damage, wound healing problems, persistent pain, persistent numbness and the possibility for further surgery.  We also did discuss that diabetic neuropathy could be contributing to her symptoms as well.  For treatment of her trigger fingers discussed treatment options including corticosteroid injections and surgical release.  We did discuss in the setting of her diabetes would recommend against injecting all 3 at the same time.  However we did discuss her trigger fingers appear mild and may resolve with steroid injections only.  Also discussed surgical release.  Discussed the procedure and postoperative plan I discussed the risks and benefits of surgery, including but not limited to: infection, bleeding, pain, scarring, damage to nerves, blood vessels, or other nearby  structures, re-currence or persistence of trigger, stiffness, need for further surgery, and wound healing issues.  Patient is interested in avoiding injections and definitively treating at the same time as a carpal tunnel release.  After thorough discussion patient elected to proceed with a right carpal tunnel release and right trigger fingers (thumb, index, middle) releases under MAC plus local with Dr. Briana Renteria.      Amyloidosis screen for patients undergoing carpal tunnel release:    TIER 1  - bilateral carpal tunnel syndrome (or history of release on contralateral side)  - trigger finger (or history of prior trigger finger)  - male > 50 years old  - female > 60 years old    TIER 2  - spinal stenosis  - distal biceps rupture  - atrial fibrillation, atrial flutter, cardiac arrhythmias  - pacemaker  - congestive heart failure/valvular problems  - hearing loss  - family history of amyloidosis    TOTAL: 2 positive Tier 1, 0 positive Tier 2    (Biopsy indicated if two points from tier 1 OR one point from tier 1 + one point from tier 2. Biopsy is 1 cm2 of tenosynovium from within carpal tunnel, specify congo red staining)    Based on screening, testing IS indicated.  To be discussed on day of surgery.      SOLEDAD BARRON PA-C  Orthopaedic Surgery       Again, thank you for allowing me to participate in the care of your patient.        Sincerely,        Soledad Barron PA-C    Electronically signed

## 2025-04-14 NOTE — NURSING NOTE
Reason For Visit:   Chief Complaint   Patient presents with    Consult     Trigger Finger of the right index and middle finger and right carpal tunnel of the wrist       Primary MD: Anay Martinez  Ref. MD: Eber    Age: 70 year old    ?  No      There were no vitals taken for this visit.      Pain Assessment  Patient Currently in Pain: Yes  0-10 Pain Scale: 4  Primary Pain Location: Finger (Comment which one) (Right Fingers)  Pain Descriptors: Numbness    Hand Dominance Evaluation  Hand Dominance: Right          QuickDASH Assessment      10/28/2022    11:09 AM   QuickDASH Main   1. Open a tight or new jar Unable   2. Do heavy household chores (e.g., wash walls, floors) Unable   3. Carry a shopping bag or briefcase Unable   4. Wash your back Unable   5. Use a knife to cut food Unable   6. Recreational activities in which you take some force or impact through your arm, shoulder or hand (e.g., golf, hammering, tennis, etc.) Unable   7. During the past week, to what extent has your arm, shoulder or hand problem interfered with your normal social activities with family, friends, neighbours or groups Quite a bit   8. During the past week, were you limited in your work or other regular daily activities as a result of your arm, shoulder or hand problem Very limited   9. Arm, shoulder or hand pain Moderate   10.Tingling (pins and needles) in your arm,shoulder or hand Moderate   11. During the past week, how much difficulty have you had sleeping because of the pain in your arm, shoulder or hand Moderate difficulty   Quickdash Ability Score 81.82          Current Outpatient Medications   Medication Sig Dispense Refill    amphetamine-dextroamphetamine (ADDERALL) 30 MG tablet Take 1-2 tablets 60 tablet 0    atorvastatin (LIPITOR) 20 MG tablet Take 1 tablet (20 mg) by mouth daily. 90 tablet 3    blood glucose (NO BRAND SPECIFIED) lancets standard Use to test blood sugar 1 times daily or as directed. 90 Lancet 3     blood glucose (NO BRAND SPECIFIED) test strip Use to test blood sugar 1 times daily or as directed. 100 strip 3    blood glucose calibration (NO BRAND SPECIFIED) solution Use to calibrate blood glucose monitor as needed as directed. 1 each 3    blood glucose monitoring (NO BRAND SPECIFIED) meter device kit Use to test blood sugar 1 times daily or as directed. 1 kit 0    Calcium Acetate 667 MG TABS Take 1 tablet by mouth 2 times daily for 360 days 180 tablet 3    cevimeline (EVOXAC) 30 MG capsule take 1 cap  capsule 3    cholecalciferol (VITAMIN D3) 125 mcg (5000 units) capsule Take 125 mcg by mouth daily      COMPOUNDED NON-CONTROLLED SUBSTANCE (CMPD RX) - PHARMACY TO MIX COMPOUNDED MEDICATION Wart peel in remedium sig apply to warts at night as tolerated 5 g 5    COMPOUNDED NON-CONTROLLED SUBSTANCE (CMPD RX) - PHARMACY TO MIX COMPOUNDED MEDICATION Estradiol 0.0075% in HRT cream  Apply 2 grams,  intravaginally and small amount externally daily for one week then twice weekly for maintenance. 45 g 11    Continuous Glucose Sensor (FREESTYLE JORDIN 3 PLUS SENSOR) MISC Use 1 sensor every 15 days. Use to read blood sugars per 's instructions. 6 each 3    cyanocobalamin (VITAMIN B-12) 1000 MCG tablet Take 1 tablet by mouth every other day. 90 tablet 0    DULoxetine (CYMBALTA) 30 MG capsule Take 1 capsule (30 mg) by mouth 2 times daily. 180 capsule 3    DULoxetine (CYMBALTA) 60 MG capsule Take 1 60mg dose, along with 30mg dose q hs 90 capsule 3    Ferrous Bisglycinate Chelate (EASY IRON) 28 MG CAPS Take 1 capsule by mouth three times a week.      finasteride (PROSCAR) 5 MG tablet Take 1 tablet daily 90 tablet 3    losartan (COZAAR) 50 MG tablet Take one daily 90 tablet 3    metFORMIN (GLUCOPHAGE XR) 500 MG 24 hr tablet Take 2 po q am with breakfast. 180 tablet 0    omeprazole (PRILOSEC) 40 MG DR capsule Take 40mg twice daily 180 capsule 3    pregabalin (LYRICA) 100 MG capsule Take 200 mg by mouth daily.  Starting Wednesday an additional 100 mg      tolterodine ER (DETROL LA) 4 MG 24 hr capsule Take 1 capsule (4 mg) by mouth daily. 90 capsule 3    triamcinolone (KENALOG) 0.1 % paste Apply to tongue twice daily x 10 days 5 g 0       Allergies   Allergen Reactions    Pravastatin Other (See Comments)     woozy    Codeine Nausea and Vomiting    Nsaids GI Disturbance and Other (See Comments)     Pt had bariatric surgery, should not ever take oral NSAIDS due to risk of gastric ulcers.  Pt had bariatric surgery, should not ever take oral NSAIDS due to risk of gastric ulcers.       Katya Copeland, ATC

## 2025-04-14 NOTE — PROGRESS NOTES
Date of Service: Apr 14, 2025    Chief Complaint:   Chief Complaint   Patient presents with    Consult     Trigger Finger of the right index and middle finger and right carpal tunnel of the wrist     History of Present Illness: Karine Moss is a 70 year old, right handed female who presents today for further evaluation of right hand numbness and tingling. Numbness and tingling effects the thumb, index, long (middle), ring, and small. Symptoms first began more than 2 years ago.  Symptoms seem to start a few years after bilateral distal radius fractures.  Symptoms DO wake the patient up at night. Symptoms made worse during the following activities: holding phone. . Symptoms are present at all times. The following modalities have been tried: none.     Patient also reports triggering of the thumb, index, and middle finger of the right hand.  Previously seen by Dr. Claudio who recommended oval 8 splinting.  Patient admits she has not worn these.  Symptoms started approximately 6 months ago, they occasionally get stuck in the morning require manual unlocking.    Documentation by Dr. Adkins reviewed.    Review of Systems: A 14-point review of systems was obtained on intake and reviewed.      Past Medical History:   Diagnosis Date    Arthritis     Bacterial vaginosis 11/06/2024    Basal cell carcinoma     Depression 1991    after 's death    Diabetes mellitus (H)     Diabetic renal disease (H)     microalbuminuria    DJD (degenerative joint disease) of knee     Gastrointestinal hemorrhage associated with gastric ulcer 11/05/2020    H/O vitamin D deficiency     Hypertension     Hypoglycemia 04/07/2024    Low back pain     Obstructive sleep apnea     Pancreatitis     related to Victoza, also on Xyrem    Panic     RLS (restless legs syndrome)     Upper GI bleed 11/16/2021       Past Surgical History:   Procedure Laterality Date    COLONOSCOPY  10/01/2020    poor quality prep    COLONOSCOPY N/A 8/22/2024    Procedure:  COLONOSCOPY, WITH POLYPECTOMY AND BIOPSY;  Surgeon: Andreina Layne MD;  Location: [facility]    ear surgery  2002 and 01/2004    ESOPHAGOSCOPY, GASTROSCOPY, DUODENOSCOPY (EGD), COMBINED N/A 11/16/2021    Procedure: ESOPHAGOGASTRODUODENOSCOPY (EGD);  Surgeon: Jayla Calvo MD;  Location:  GI    ESOPHAGOSCOPY, GASTROSCOPY, DUODENOSCOPY (EGD), COMBINED N/A 1/9/2023    Procedure: ESOPHAGOGASTRODUODENOSCOPY, WITH BIOPSY;  Surgeon: Brandon Witt MD;  Location: [facility]    ESOPHAGOSCOPY, GASTROSCOPY, DUODENOSCOPY (EGD), COMBINED N/A 8/22/2024    Procedure: Esophagoscopy, gastroscopy, duodenoscopy (EGD), combined;  Surgeon: Andreina Layne MD;  Location: [facility]    GASTRIC BYPASS  2013    laparoscopic jimmy en y c ometopexy    HEMORRHOIDECTOMY  09/14/2000    LAPAROSCOPIC CHOLECYSTECTOMY  2015    LASIK BILATERAL      UVULOPALATOPHARYNGOPLASTY      UVVP           Current Outpatient Medications:     amphetamine-dextroamphetamine (ADDERALL) 30 MG tablet, Take 1-2 tablets, Disp: 60 tablet, Rfl: 0    atorvastatin (LIPITOR) 20 MG tablet, Take 1 tablet (20 mg) by mouth daily., Disp: 90 tablet, Rfl: 3    blood glucose (NO BRAND SPECIFIED) lancets standard, Use to test blood sugar 1 times daily or as directed., Disp: 90 Lancet, Rfl: 3    blood glucose (NO BRAND SPECIFIED) test strip, Use to test blood sugar 1 times daily or as directed., Disp: 100 strip, Rfl: 3    blood glucose calibration (NO BRAND SPECIFIED) solution, Use to calibrate blood glucose monitor as needed as directed., Disp: 1 each, Rfl: 3    blood glucose monitoring (NO BRAND SPECIFIED) meter device kit, Use to test blood sugar 1 times daily or as directed., Disp: 1 kit, Rfl: 0    Calcium Acetate 667 MG TABS, Take 1 tablet by mouth 2 times daily for 360 days, Disp: 180 tablet, Rfl: 3    cevimeline (EVOXAC) 30 MG capsule, take 1 cap TID, Disp: 270 capsule, Rfl: 3    cholecalciferol (VITAMIN D3) 125 mcg (5000 units) capsule, Take 125 mcg by mouth daily, Disp: ,  Rfl:     COMPOUNDED NON-CONTROLLED SUBSTANCE (CMPD RX) - PHARMACY TO MIX COMPOUNDED MEDICATION, Wart peel in remedium sig apply to warts at night as tolerated, Disp: 5 g, Rfl: 5    COMPOUNDED NON-CONTROLLED SUBSTANCE (CMPD RX) - PHARMACY TO MIX COMPOUNDED MEDICATION, Estradiol 0.0075% in HRT cream  Apply 2 grams,  intravaginally and small amount externally daily for one week then twice weekly for maintenance., Disp: 45 g, Rfl: 11    Continuous Glucose Sensor (FREESTYLE JORDIN 3 PLUS SENSOR) MISC, Use 1 sensor every 15 days. Use to read blood sugars per 's instructions., Disp: 6 each, Rfl: 3    cyanocobalamin (VITAMIN B-12) 1000 MCG tablet, Take 1 tablet by mouth every other day., Disp: 90 tablet, Rfl: 0    DULoxetine (CYMBALTA) 30 MG capsule, Take 1 capsule (30 mg) by mouth 2 times daily., Disp: 180 capsule, Rfl: 3    DULoxetine (CYMBALTA) 60 MG capsule, Take 1 60mg dose, along with 30mg dose q hs, Disp: 90 capsule, Rfl: 3    Ferrous Bisglycinate Chelate (EASY IRON) 28 MG CAPS, Take 1 capsule by mouth three times a week., Disp: , Rfl:     finasteride (PROSCAR) 5 MG tablet, Take 1 tablet daily, Disp: 90 tablet, Rfl: 3    losartan (COZAAR) 50 MG tablet, Take one daily, Disp: 90 tablet, Rfl: 3    metFORMIN (GLUCOPHAGE XR) 500 MG 24 hr tablet, Take 2 po q am with breakfast., Disp: 180 tablet, Rfl: 0    omeprazole (PRILOSEC) 40 MG DR capsule, Take 40mg twice daily, Disp: 180 capsule, Rfl: 3    pregabalin (LYRICA) 100 MG capsule, Take 200 mg by mouth daily. Starting Wednesday an additional 100 mg, Disp: , Rfl:     tolterodine ER (DETROL LA) 4 MG 24 hr capsule, Take 1 capsule (4 mg) by mouth daily., Disp: 90 capsule, Rfl: 3    triamcinolone (KENALOG) 0.1 % paste, Apply to tongue twice daily x 10 days, Disp: 5 g, Rfl: 0    Allergies   Allergen Reactions    Pravastatin Other (See Comments)     woozy    Codeine Nausea and Vomiting    Nsaids GI Disturbance and Other (See Comments)     Pt had bariatric surgery,  should not ever take oral NSAIDS due to risk of gastric ulcers.  Pt had bariatric surgery, should not ever take oral NSAIDS due to risk of gastric ulcers.       Social History     Tobacco Use    Smoking status: Never     Passive exposure: Never    Smokeless tobacco: Never   Vaping Use    Vaping status: Never Used   Substance Use Topics    Alcohol use: Not Currently     Comment: rare    Drug use: No       Family History   Problem Relation Age of Onset    Memory loss Mother     Other - See Comments Mother         hair thinning    Diabetes Father     Chronic Obstructive Pulmonary Disease Sister     Anemia Sister         hx of ulcers    Other - See Comments Sister 65        MVA, followed by leg pain after a fall    Dementia Sister 68    Dementia Brother 70    Cancer Brother         lung    Cancer - colorectal Maternal Grandmother     Cancer Daughter     Obesity Daughter         s/p lap band    Skin Cancer No family hx of        Physical examination:  Well-developed, well-nourished and in no acute distress.  Alert and oriented to surroundings.  On examination of the right upper extremity:   Palpable nodules at the level of the A1 pulleys to the thumb, index, middle fingers.  No obvious snapping or triggering today, though tendon does appear sluggish.  Skin clean, dry and intact  Sensation:  Median: diminished  Radial: intact  Ulnar: intact  Thumb opposition strength: diminished  Interosseous strength: intact  Thenar atrophy: Not Present  Interosseous atrophy: Not Present  Tinel's over carpal tunnel: Present  Tinel's over cubital tunnel: Not Present  Phalen's: Positive  Carpal tunnel compression: Positive  Elbow flexion compression: Negative    Two point discrimination (mm):   Median: 8 mm (right 4 mm)  Ulnar: 6 mm (right 5 mm).     EMG/NCS: EMG obtained on 2/25/2025 demonstrates Severe right-sided carpal tunnel syndrome, electrical evidence of mild to moderate, length-dependent, sensorimotor, axonal predominant  polyneuropathy involving the lower extremities symmetrically.     Assessment: 70 year old female with:  Right carpal tunnel syndrome.   Right thumb, index, and middle trigger fingers.   Type 2 diabetes, well controlled with last A1c 7.4 January 2025. Diabetic neuropathy.    Plan:     We had a lengthy discussion about EMG results and possible treatment options. We discussed three possible treatment options. The first would be to continue night splinting and to observe the symptoms for any change or progression. The second would be to perform a corticosteroid injection. The third is to consider surgery, which would be an open carpal tunnel release. I have described the procedure, post-operative protocol, and expected outcomes.  I also discussed the risks of surgery including but  not limited to, bleeding, infection, nerve or vessel damage, wound healing problems, persistent pain, persistent numbness and the possibility for further surgery.  We also did discuss that diabetic neuropathy could be contributing to her symptoms as well.  For treatment of her trigger fingers discussed treatment options including corticosteroid injections and surgical release.  We did discuss in the setting of her diabetes would recommend against injecting all 3 at the same time.  However we did discuss her trigger fingers appear mild and may resolve with steroid injections only.  Also discussed surgical release.  Discussed the procedure and postoperative plan I discussed the risks and benefits of surgery, including but not limited to: infection, bleeding, pain, scarring, damage to nerves, blood vessels, or other nearby structures, re-currence or persistence of trigger, stiffness, need for further surgery, and wound healing issues.  Patient is interested in avoiding injections and definitively treating at the same time as a carpal tunnel release.  After thorough discussion patient elected to proceed with a right carpal tunnel release and  right trigger fingers (thumb, index, middle) releases under MAC plus local with Dr. Briana Renteria.      Amyloidosis screen for patients undergoing carpal tunnel release:    TIER 1  - bilateral carpal tunnel syndrome (or history of release on contralateral side)  - trigger finger (or history of prior trigger finger)  - male > 50 years old  - female > 60 years old    TIER 2  - spinal stenosis  - distal biceps rupture  - atrial fibrillation, atrial flutter, cardiac arrhythmias  - pacemaker  - congestive heart failure/valvular problems  - hearing loss  - family history of amyloidosis    TOTAL: 2 positive Tier 1, 0 positive Tier 2    (Biopsy indicated if two points from tier 1 OR one point from tier 1 + one point from tier 2. Biopsy is 1 cm2 of tenosynovium from within carpal tunnel, specify congo red staining)    Based on screening, testing IS indicated.  To be discussed on day of surgery.      CLAYTON BARRON PA-C  Orthopaedic Surgery

## 2025-04-15 ENCOUNTER — MYC REFILL (OUTPATIENT)
Dept: SLEEP MEDICINE | Facility: CLINIC | Age: 70
End: 2025-04-15
Payer: COMMERCIAL

## 2025-04-15 DIAGNOSIS — G47.419 NARCOLEPSY WITHOUT CATAPLEXY(347.00): ICD-10-CM

## 2025-04-16 ENCOUNTER — OFFICE VISIT (OUTPATIENT)
Dept: DERMATOLOGY | Facility: CLINIC | Age: 70
End: 2025-04-16
Payer: COMMERCIAL

## 2025-04-16 DIAGNOSIS — L84 CALLUS: ICD-10-CM

## 2025-04-16 DIAGNOSIS — B07.9 VERRUCA VULGARIS: Primary | ICD-10-CM

## 2025-04-16 PROBLEM — G56.01 CARPAL TUNNEL SYNDROME, RIGHT: Status: ACTIVE | Noted: 2025-04-14

## 2025-04-16 PROBLEM — M65.30 TRIGGER FINGER, ACQUIRED: Status: ACTIVE | Noted: 2025-04-14

## 2025-04-16 ASSESSMENT — PAIN SCALES - GENERAL: PAINLEVEL_OUTOF10: NO PAIN (0)

## 2025-04-16 NOTE — PATIENT INSTRUCTIONS
Cryotherapy    What is it?  Use of a very cold liquid, such as liquid nitrogen, to freeze and destroy abnormal skin cells that need to be removed    What should I expect?  Tenderness and redness  A small blister that might grow and fill with dark purple blood. There may be crusting.  More than one treatment may be needed if the lesions do not go away.    How do I care for the treated area?  Gently wash the area with your hands when bathing.  Use a thin layer of Vaseline to help with healing. You may use a Band-Aid.   The area should heal within 7-10 days and may leave behind a pink or lighter color.   Do not use an antibiotic or Neosporin ointment.   You may take acetaminophen (Tylenol) for pain.     Call your doctor if you have:  Severe pain  Signs of infection (warmth, redness, cloudy yellow drainage, and or a bad smell)  Questions or concerns    Who should I call with questions?      [facility]: [phone]      [facility]: [phone]      For urgent needs outside of business hours call the [facility] at [phone] and ask for the dermatology resident on call

## 2025-04-16 NOTE — LETTER
4/16/2025      Karine Moss  [address]  [place] [address]      Dear Colleague,    Thank you for referring your patient, Karine Moss, to the [facility]. Please see a copy of my visit note below.    [facility] Dermatology Note  Encounter Date: Apr 16, 2025  Office Visit     Reviewed patients past medical history and pertinent chart review prior to patients visit today.     Dermatology Problem List:  # Verruca vulgaris, left plantar foot  - s/p cryo 3/6/2025, 4/16/2024  - WartPeel  # History of NMSC  -iBCC, right neck, s/p Mohs 11/10/2022  # Androgenetic alopecia  -Finasteride  -Minoxidil 5% solution     Family History: Negative for skin cancer  ____________________________________________    Assessment & Plan:     # Wart, left mid lateral plantar foot  - The wart at the left medial plantar heel has completely resolved.  She has a faint wart present at the left mid lateral plantar foot that we will treat today.  - Cryotherapy: Patient elects to treat warts today with cryotherapy. After verbal consent and discussion of risks and benefits including but no limited to dyspigmentation/scar, blister, and pain. After being cleansed with an alcohol swab, the wart(s) were pared down with a 15 blade. A total of 1 warts were treated with 1-2mm freeze border for 3 cycle with liquid nitrogen. Post cryotherapy instructions were provided. The patient tolerated the procedure well. The patient will follow-up in 3-4 weeks at which time we will likely proceed with repeat cryotherapy.  -Continue WartPeel. Apply nightly to warts.     Follow-up: 1 month(s) in-person, or earlier for new or changing lesions    All risks, benefits and alternatives were discussed with patient.  Patient is in agreement and understands the assessment and plan.  All questions were answered.  Cheri Bynum PA-C  [facility]  Dermatology  _______________________________________    CC: Wart    HPI:  Ms. Karine Moss is a(n) 70 year old female who presents today as a return patient for warts on the left plantar foot x 2.  She was last seen in dermatology on 3/6/2025 by myself at which time wart peel was prescribed.  We also treated with paring and cryotherapy to 2 warts on her left plantar foot.    Today she presents for follow-up.  She states she was very diligent with applying the wart peel nightly for the past 1 month.  She is very pleased with her response as she feels one of the warts has completely resolved. She also tolerated the cryotherapy well.    Patient is otherwise feeling well, without additional skin concerns.    Physical Exam:  SKIN: Focused examination of left plantar foot was performed.  - left mid-lateral plantar heel subtle verrucous papule with few thrombosed capillaries.   - No wart present-no interruption of dermatoglyphics or thrombosed capillaries seen at the left medial plantar heel.    - No other lesions of concern on areas examined.     Medications:  Current Outpatient Medications   Medication Sig Dispense Refill     amphetamine-dextroamphetamine (ADDERALL) 30 MG tablet Take 1-2 tablets 60 tablet 0     atorvastatin (LIPITOR) 20 MG tablet Take 1 tablet (20 mg) by mouth daily. 90 tablet 3     blood glucose (NO BRAND SPECIFIED) lancets standard Use to test blood sugar 1 times daily or as directed. 90 Lancet 3     blood glucose (NO BRAND SPECIFIED) test strip Use to test blood sugar 1 times daily or as directed. 100 strip 3     blood glucose calibration (NO BRAND SPECIFIED) solution Use to calibrate blood glucose monitor as needed as directed. 1 each 3     blood glucose monitoring (NO BRAND SPECIFIED) meter device kit Use to test blood sugar 1 times daily or as directed. 1 kit 0     Calcium Acetate 667 MG TABS Take 1 tablet by mouth 2 times daily for 360 days 180 tablet 3     cevimeline (EVOXAC) 30 MG capsule  take 1 cap  capsule 3     cholecalciferol (VITAMIN D3) 125 mcg (5000 units) capsule Take 125 mcg by mouth daily       COMPOUNDED NON-CONTROLLED SUBSTANCE (CMPD RX) - PHARMACY TO MIX COMPOUNDED MEDICATION Wart peel in remedium sig apply to warts at night as tolerated 5 g 5     COMPOUNDED NON-CONTROLLED SUBSTANCE (CMPD RX) - PHARMACY TO MIX COMPOUNDED MEDICATION Estradiol 0.0075% in HRT cream  Apply 2 grams,  intravaginally and small amount externally daily for one week then twice weekly for maintenance. 45 g 11     Continuous Glucose Sensor (FREESTYLE JORDIN 3 PLUS SENSOR) MISC Use 1 sensor every 15 days. Use to read blood sugars per 's instructions. 6 each 3     cyanocobalamin (VITAMIN B-12) 1000 MCG tablet Take 1 tablet by mouth every other day. 90 tablet 0     DULoxetine (CYMBALTA) 30 MG capsule Take 1 capsule (30 mg) by mouth 2 times daily. 180 capsule 3     DULoxetine (CYMBALTA) 60 MG capsule Take 1 60mg dose, along with 30mg dose q hs 90 capsule 3     Ferrous Bisglycinate Chelate (EASY IRON) 28 MG CAPS Take 1 capsule by mouth three times a week.       finasteride (PROSCAR) 5 MG tablet Take 1 tablet daily 90 tablet 3     losartan (COZAAR) 50 MG tablet Take one daily 90 tablet 3     metFORMIN (GLUCOPHAGE XR) 500 MG 24 hr tablet Take 2 po q am with breakfast. 180 tablet 0     omeprazole (PRILOSEC) 40 MG DR capsule Take 40mg twice daily 180 capsule 3     pregabalin (LYRICA) 100 MG capsule Take 200 mg by mouth daily. Starting Wednesday an additional 100 mg       tolterodine ER (DETROL LA) 4 MG 24 hr capsule Take 1 capsule (4 mg) by mouth daily. 90 capsule 3     triamcinolone (KENALOG) 0.1 % paste Apply to tongue twice daily x 10 days 5 g 0     No current facility-administered medications for this visit.        Past Medical History:   Patient Active Problem List   Diagnosis     Vitamin D deficiency     Narcolepsy without cataplexy(347.00)     Hyperlipidemia LDL goal <100     Obstructive sleep apnea  - 'mild' (AHI 9)     Major depression     Low back pain     DJD (degenerative joint disease) of knee     Hypertension goal BP (blood pressure) < 140/90     Type 2 diabetes mellitus, with long-term current use of insulin (H)     Osteopenia     PTSD (post-traumatic stress disorder)     Diabetic polyneuropathy associated with type 2 diabetes mellitus (H)     Bilateral sciatica     Chronic kidney disease, stage 3b (H)     Chronic diastolic congestive heart failure (H)     Chronic left shoulder pain     Dumping syndrome     History of Rai-en-Y gastric bypass     Insomnia     Peripheral neuropathy     RLS (restless legs syndrome)     Mixed incontinence urge and stress (male)(female)     Dry mouth, unspecified     Past Medical History:   Diagnosis Date     Arthritis      Bacterial vaginosis 11/06/2024     Basal cell carcinoma      Depression 1991    after 's death     Diabetes mellitus (H)      Diabetic renal disease (H)     microalbuminuria     DJD (degenerative joint disease) of knee      Gastrointestinal hemorrhage associated with gastric ulcer 11/05/2020     H/O vitamin D deficiency      Hypertension      Hypoglycemia 04/07/2024     Low back pain      Obstructive sleep apnea      Pancreatitis     related to Victoza, also on Xyrem     Panic      RLS (restless legs syndrome)      Upper GI bleed 11/16/2021       CC Referred Self, MD  No address on file on close of this encounter.       Again, thank you for allowing me to participate in the care of your patient.        Sincerely,        Sylvia Bynum PA-C    Electronically signed

## 2025-04-16 NOTE — PROGRESS NOTES
[facility] Dermatology Note  Encounter Date: Apr 16, 2025  Office Visit     Reviewed patients past medical history and pertinent chart review prior to patients visit today.     Dermatology Problem List:  # Verruca vulgaris, left plantar foot  - s/p cryo 3/6/2025, 4/16/2024  - WartPeel  # History of NMSC  -iBCC, right neck, s/p Mohs 11/10/2022  # Androgenetic alopecia  -Finasteride  -Minoxidil 5% solution     Family History: Negative for skin cancer  ____________________________________________    Assessment & Plan:     # Wart, left mid lateral plantar foot  - The wart at the left medial plantar heel has completely resolved.  She has a faint wart present at the left mid lateral plantar foot that we will treat today.  - Cryotherapy: Patient elects to treat warts today with cryotherapy. After verbal consent and discussion of risks and benefits including but no limited to dyspigmentation/scar, blister, and pain. After being cleansed with an alcohol swab, the wart(s) were pared down with a 15 blade. A total of 1 warts were treated with 1-2mm freeze border for 3 cycle with liquid nitrogen. Post cryotherapy instructions were provided. The patient tolerated the procedure well. The patient will follow-up in 3-4 weeks at which time we will likely proceed with repeat cryotherapy.  -Continue WartPeel. Apply nightly to warts.     # Callus  -She can trial urea cream 20% daily to the affected area.    Follow-up: 1 month(s) in-person, or earlier for new or changing lesions    All risks, benefits and alternatives were discussed with patient.  Patient is in agreement and understands the assessment and plan.  All questions were answered.  Cheri Bynum PA-C  [facility]  _______________________________________    CC: Wart    HPI:  Ms. Karine Moss is a(n) 70 year old female who presents today as a return patient for warts on the left plantar foot x 2.  She was last seen in dermatology on  3/6/2025 by myself at which time wart peel was prescribed.  We also treated with paring and cryotherapy to 2 warts on her left plantar foot.    Today she presents for follow-up.  She states she was very diligent with applying the wart peel nightly for the past 1 month.  She is very pleased with her response as she feels one of the warts has completely resolved. She also tolerated the cryotherapy well.    Patient is otherwise feeling well, without additional skin concerns.    Physical Exam:  SKIN: Focused examination of left plantar foot was performed.  - left mid-lateral plantar heel subtle verrucous papule with few thrombosed capillaries.   - No wart present-no interruption of dermatoglyphics or thrombosed capillaries seen at the left medial plantar heel.  -heel, yellow, hyperkeratotic papule consistent with a callus    - No other lesions of concern on areas examined.     Medications:  Current Outpatient Medications   Medication Sig Dispense Refill    amphetamine-dextroamphetamine (ADDERALL) 30 MG tablet Take 1-2 tablets 60 tablet 0    atorvastatin (LIPITOR) 20 MG tablet Take 1 tablet (20 mg) by mouth daily. 90 tablet 3    blood glucose (NO BRAND SPECIFIED) lancets standard Use to test blood sugar 1 times daily or as directed. 90 Lancet 3    blood glucose (NO BRAND SPECIFIED) test strip Use to test blood sugar 1 times daily or as directed. 100 strip 3    blood glucose calibration (NO BRAND SPECIFIED) solution Use to calibrate blood glucose monitor as needed as directed. 1 each 3    blood glucose monitoring (NO BRAND SPECIFIED) meter device kit Use to test blood sugar 1 times daily or as directed. 1 kit 0    Calcium Acetate 667 MG TABS Take 1 tablet by mouth 2 times daily for 360 days 180 tablet 3    cevimeline (EVOXAC) 30 MG capsule take 1 cap  capsule 3    cholecalciferol (VITAMIN D3) 125 mcg (5000 units) capsule Take 125 mcg by mouth daily      COMPOUNDED NON-CONTROLLED SUBSTANCE (CMPD RX) - PHARMACY TO  MIX COMPOUNDED MEDICATION Wart peel in remedium sig apply to warts at night as tolerated 5 g 5    COMPOUNDED NON-CONTROLLED SUBSTANCE (CMPD RX) - PHARMACY TO MIX COMPOUNDED MEDICATION Estradiol 0.0075% in HRT cream  Apply 2 grams,  intravaginally and small amount externally daily for one week then twice weekly for maintenance. 45 g 11    Continuous Glucose Sensor (FREESTYLE JORDIN 3 PLUS SENSOR) MISC Use 1 sensor every 15 days. Use to read blood sugars per 's instructions. 6 each 3    cyanocobalamin (VITAMIN B-12) 1000 MCG tablet Take 1 tablet by mouth every other day. 90 tablet 0    DULoxetine (CYMBALTA) 30 MG capsule Take 1 capsule (30 mg) by mouth 2 times daily. 180 capsule 3    DULoxetine (CYMBALTA) 60 MG capsule Take 1 60mg dose, along with 30mg dose q hs 90 capsule 3    Ferrous Bisglycinate Chelate (EASY IRON) 28 MG CAPS Take 1 capsule by mouth three times a week.      finasteride (PROSCAR) 5 MG tablet Take 1 tablet daily 90 tablet 3    losartan (COZAAR) 50 MG tablet Take one daily 90 tablet 3    metFORMIN (GLUCOPHAGE XR) 500 MG 24 hr tablet Take 2 po q am with breakfast. 180 tablet 0    omeprazole (PRILOSEC) 40 MG DR capsule Take 40mg twice daily 180 capsule 3    pregabalin (LYRICA) 100 MG capsule Take 200 mg by mouth daily. Starting Wednesday an additional 100 mg      tolterodine ER (DETROL LA) 4 MG 24 hr capsule Take 1 capsule (4 mg) by mouth daily. 90 capsule 3    triamcinolone (KENALOG) 0.1 % paste Apply to tongue twice daily x 10 days 5 g 0     No current facility-administered medications for this visit.        Past Medical History:   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)    Hyperlipidemia LDL goal <100    Obstructive sleep apnea - 'mild' (AHI 9)    Major depression    Low back pain    DJD (degenerative joint disease) of knee    Hypertension goal BP (blood pressure) < 140/90    Type 2 diabetes mellitus, with long-term current  use of insulin (H)    Osteopenia    PTSD (post-traumatic stress disorder)    Diabetic polyneuropathy associated with type 2 diabetes mellitus (H)    Bilateral sciatica    Chronic kidney disease, stage 3b (H)    Chronic diastolic congestive heart failure (H)    Chronic left shoulder pain    Dumping syndrome    History of Rai-en-Y gastric bypass    Insomnia    Peripheral neuropathy    RLS (restless legs syndrome)    Mixed incontinence urge and stress (male)(female)    Dry mouth, unspecified     Past Medical History:   Diagnosis Date    Arthritis     Bacterial vaginosis 11/06/2024    Basal cell carcinoma     Depression 1991    after 's death    Diabetes mellitus (H)     Diabetic renal disease (H)     microalbuminuria    DJD (degenerative joint disease) of knee     Gastrointestinal hemorrhage associated with gastric ulcer 11/05/2020    H/O vitamin D deficiency     Hypertension     Hypoglycemia 04/07/2024    Low back pain     Obstructive sleep apnea     Pancreatitis     related to Victoza, also on Xyrem    Panic     RLS (restless legs syndrome)     Upper GI bleed 11/16/2021       CC Referred Self, MD  No address on file on close of this encounter.

## 2025-04-17 RX ORDER — DEXTROAMPHETAMINE SACCHARATE, AMPHETAMINE ASPARTATE, DEXTROAMPHETAMINE SULFATE AND AMPHETAMINE SULFATE 7.5; 7.5; 7.5; 7.5 MG/1; MG/1; MG/1; MG/1
TABLET ORAL
Qty: 60 TABLET | Refills: 0 | Status: SHIPPED | OUTPATIENT
Start: 2025-04-21

## 2025-04-17 NOTE — TELEPHONE ENCOUNTER
Pending Prescriptions:                       Disp   Refills    amphetamine-dextroamphetamine (ADDERALL) *60 tab*0            Sig: Take 1-2 tablets          Last Written Prescription Date: 3/21/25  Last Fill Quantity: 60   # refills: 0  Last Office Visit: 6/4/24   Future Office visit: NA      Routing refill request to provider for review/approval because:  Drug not on the [facility] or [facility] refill protocol or controlled substance

## 2025-04-18 PROCEDURE — 83993 ASSAY FOR CALPROTECTIN FECAL: CPT | Performed by: PHYSICIAN ASSISTANT

## 2025-04-18 PROCEDURE — 99000 SPECIMEN HANDLING OFFICE-LAB: CPT | Performed by: PATHOLOGY

## 2025-04-21 ENCOUNTER — DOCUMENTATION ONLY (OUTPATIENT)
Dept: ORTHOPEDICS | Facility: CLINIC | Age: 70
End: 2025-04-21
Payer: COMMERCIAL

## 2025-04-21 ENCOUNTER — MYC MEDICAL ADVICE (OUTPATIENT)
Dept: GASTROENTEROLOGY | Facility: CLINIC | Age: 70
End: 2025-04-21
Payer: COMMERCIAL

## 2025-04-21 ENCOUNTER — TELEPHONE (OUTPATIENT)
Dept: ORTHOPEDICS | Facility: CLINIC | Age: 70
End: 2025-04-21
Payer: COMMERCIAL

## 2025-04-21 NOTE — PROGRESS NOTES
Post-op appointment scheduled for patient on Wednesday 5/14/25 at 3:00pm per care team.    Tasha  Perioperative   [phone]

## 2025-04-21 NOTE — TELEPHONE ENCOUNTER
Teaching Flowsheet     Visit Type: Telephone    Time Start: 1330  Time End: 1345  Total Time Spent: 15 min.    Surgeon: JAYJAY   Location of Surgery (known or anticipated): [facility]   Type of Anesthesia: MAC  Worker's Compensation Procedure: No    Pertinent Medical History:  .  Were medical conditions reviewed and appropriate for location? Yes  BMI: Overweight BMI 25-29.9    Relevant Diagnosis: HAND  Teaching Topic: PREOP    Person(s) involved in teaching:   Daughter  : No.   Verified Patient's Phone Number: YES: .    Caregiver//  DAUGHTER SUSAN STATED UNDERSTANDING INFORMED PATIENT NEEDS  & ADULT MUST STAY WITH PATIENT FOR 24H AFTER ANESTHESIA    Name: SUSAN   Phone Number: .   Relationship: Daughter  Consent to Communicate on file: Yes  Authorization to Share Protected Health Information- Person to person communication signed by patient and authorized the following person or people:      Motivation Level:  Asks Questions: Yes  Eager to Learn: Yes  Cooperative: Yes  Receptive (willing/able to accept information): Yes  Any cultural factors/B[employer] beliefs that may influence understanding or compliance? No     Family demonstrates understanding of the following:  Reason for the appointment, diagnosis and treatment plan: Yes  Knowledge of proper use of medications and conditions for which they are ordered (with special attention to potential side effects or drug interactions): Yes  Which situations necessitate calling provider and whom to contact: Yes     Teaching Concerns Addressed:   Proper use and care of medical equip, care aids, etc.: Yes  Nutritional needs and diet plan: Yes  Pain management techniques: Yes  Wound Care: Yes  How and/when to access community resources: Yes  Need for pre-op with in 30 days: YES, will be done with PCP. I asked them to ensure they go over their daily medications during this visit and discuss what medications need to be stopped before surgery and  when. If you are doing a pre-op with your PCP and they are not within the [facility], I ask them to fax it to our pre-op office. Patient verbalized understanding.       Does patient have difficulty getting a ride to appointments (post-ops, PT/OT): No  Patient's plan after discharge: home with family or spouse     Instructional Materials Used/Given:  two bottles of chlorhexidine soap and a surgery packet given to patient in clinic. Instructed patient to buy or get two 8 ounces bottles of antiseptic surgical soap called 4% CHG. Common name brand of this soap are Hibiclens and Exidine. I told them they can find this at their local pharmacy, clinic or retail store. If they have trouble finding it, I told them to ask their pharmacist to help them find a substitute.   - Important Contact Info/Phone Numbers: emphasizing clinic number [phone] and after hours number [phone]  - Map/location of surgery and follow-up appointments  - Showering instructions  - Stoplight Tool     -Next step: schedule a pre-op with PCP.    Tesha Akins RN

## 2025-04-22 ENCOUNTER — VIRTUAL VISIT (OUTPATIENT)
Dept: GASTROENTEROLOGY | Facility: CLINIC | Age: 70
End: 2025-04-22
Attending: PHYSICIAN ASSISTANT
Payer: COMMERCIAL

## 2025-04-22 DIAGNOSIS — R19.5 ELEVATED FECAL CALPROTECTIN: Primary | ICD-10-CM

## 2025-04-22 DIAGNOSIS — Z98.84 HISTORY OF ROUX-EN-Y GASTRIC BYPASS: Chronic | ICD-10-CM

## 2025-04-22 PROCEDURE — 1126F AMNT PAIN NOTED NONE PRSNT: CPT | Mod: 95 | Performed by: PHYSICIAN ASSISTANT

## 2025-04-22 PROCEDURE — 98006 SYNCH AUDIO-VIDEO EST MOD 30: CPT | Mod: 24 | Performed by: PHYSICIAN ASSISTANT

## 2025-04-22 NOTE — LETTER
4/22/2025      Karine Moss  [address]  [facility] [address]      Dear Colleague,    Thank you for referring your patient, Karine Moss, to the [facility] [place]. Please see a copy of my visit note below.    Virtual Visit Details    Type of service:  Video Visit     Originating Location (pt. Location): Home    Distant Location (provider location):  Off-site  Platform used for Video Visit: Bernadette    Joined the call at 4/22/2025, 2:36:00 pm.  Left the call at 4/22/2025, 3:07:08 pm.  You were on the call for 31 minutes 8 seconds.    Here w/ dgt Leila     GI CLINIC VISIT    ASSESSMENT/PLAN:  70 year old female with PMH of gastric bypass, T2DM, HTN, hyperlipidemia, gastric ulcer presenting to GI clinic for follow up on h/o gastric ulcer, episodes of n/v with abd discomfort, and loose stools     #Gastric Ulcer, h/o (healed)  11/2021 hospitalized for GI bleed and found to have a 2 anastomotic gastric ulcers (12 mm, 10 mm) suspected to be due to NSAID. She was placed on protonix 40 mg BID. Follow up EGD 2/11/2022 to check for healing showed an enlarging ulcer, measured at 21-25 mm gastric ulcer in the antrum. She was switched to omperazole 40 mg BID. Follow up EGD 1/2023 showed a 5 mm clean based gastric ulcer near the anastomosis (Magnolia class III). She was placed on omeprazole 40 mg twice daily indefinitely and asked to avoid NSAIDs.    Due to recurrent (but intermittent) RUQ abd pain w/o melena, pt and family reported concern for recurrance at our appt 8/5/24 and therefore a repeat EGD was undertaken 8/22/2024 - the gastrojejunal anastomosis was characterized by scarring from the previous ulceration proximal to the anastomosis site. No stenosis seen. Jejunum was intubated and it appeared healthy.     Today she reports doing well and remains on daily PPI and has continued to avoid NSAIDs. No further RUQ abd pain. HIDA and RUQ not done but as she's asymptomatic now,  OK to hold.     Plan  -cont omeprazole, indefinite    -avoid NSAIDs    Future consideration  -complete abd US with doppler +/- HIDA  -for recurrent pain with n/v, consider repeat EGD for evaluation of GJ stricturing. Especially if there is weight loss  -alternate PPI (previously Protonix, currently omeprazole)     #nausea/vomiting/abd discomfort, h/o  Episodes of severe nausea/vomiting and abd discomfort spanning a few days, occurring more frequently. No longer symptomatic since our last appt, which is reassuring. Sx could be due to gastroparesis though testing with dedicated emptying study would be difficult to interpret in setting of her prior RYGB. This is also supported by scarring seen proximal to GJ anastomotic site on recent EGD.  Additional ddx includes atypical bilary presentation (though less likely given s/p CCY status).     HIDA and RUQ not done but as she's asymptomatic now, OK to hold. If sx were to flare up, can consider repeat EGD to evaluate for stricture at GJ anastomotic site . We previously discussed general dietary strategies (decrease to soft textured, use of nutritional supplements) for increased upper GI sx. Formal GD RD consult declined . Previously I also asked they inform care team for similar sx > 24-48h. ER precautions reviewed.     Future consideration  -complete abd US with doppler +/- HIDA  -GI RD consult   -for recurrent pain with n/v, consider repeat EGD for evaluation of GJ stricturing. Especially if there is weight loss    #loose stools, h/o   #scant hematochezia seen on TP, h/o    Infectious stool studies negative. Colonoscopy 8/2024 with TI intubation, a few small TA but  was otherwise unremarkable; random colon biopsies were negative for microscopic colitis.     For now her stooling is much improved, passing a formed stool every 3 days.  We discussed pelvic floor dysfunction, which I am suspicious she have. I asked she increase her benefiber to BID and take PRN miralax. She has  established with [facility] for pelvic floor dysfunction. Currently she denies any obstructive defecatory sx so will defer dedicated defecography studies, though notable to mention she has had trouble with fecal incontinence in past (seen previously with CRS in 2023) and had findings of decreased sphincter tone on AZALEA during her CSCope. Ddx include retained stools with overflow, SIBO, other malabsorption, functional disease  vs other     Plan  -continue pelvic floor PT exercises (now home regiment)  -continue benefiber +/- use of PRN miralax    Future consideration  -consider dedicated PF eval with defecography studies, she would likely benefit   -breath test for eval of SIBO   -GI RD consult     #borderline fecal calpro  To 114 with recent test slightly elevated to 268 -though overall asymptomatic and also in setting of recent unremarkable bidirectional scopes. Infectious stool studies negative. No liver disesae or OTC NSAID use. d    MRE ordered - can consider VCE too     #mild anemia  H/o gastric bypass - I recommended they make an appt again w/ bariatrics for continued care     #CRC screening   TAs on 8/2024 scope, recall 7y (due 8/2031)    RTC - dependent on MRE results     Thank you for this consultation. It was a pleasure to participate in the care of this patient; please contact us with any further questions.    Amie Grace PA-C  Follow up: As planned above. Today, I personally spent 31  minutes spent on the date of the encounter doing chart review, history and exam, documentation and further activities per the note.        HPI: 70 year old female with PMH of gastric bypass, T2DM, HTN, hyperlipidemia, gastric ulcer following with the Gen GI team for history of gastric ulcer. She last saw our team in 2023, Dr Mason and Dorian. Today is my 2nd appt with the patient.     Dr Mason 2023 note   67 year old history of gastric bypass, T2DM, HTN, hyperlipidemia, gastric ulcer who was reportedly hospitalized for GI  bleed and found to have a large anastomotic gastric ulcer suspected to be due to NSAID in 11/2021, follow up EGD to check for healing continued to show a 21-25 mm gastric ulcer in the antrum and patient was supposed to get another follow up EGD, unclear why that was not done.   Patient then recently followed up with her PCP who then requested she followed up with GI and gets a follow up EGD.     10/17/24  Dgt Leila present who helps supply some collateral history   Doing a lot better these days . No trouble with scopes or sedation.   No longer having N/V/or abd pain  Eating her normal diet w/o issues. No weight loss, though shares she did put on 20# over the last 6 mo.this weight gain has stablized in the past 2 mo.   Continues on PPI and not taking NSAIDs. No heartburn, dysphagia, odynphagia, melena.   Does have urinary incontinence, with standing up, laughing hard, sneezing. onset years ago. No dysuria, hematuria. Overall stable, per pt. Not done pelvic floor PT would like eval   BM - soft log, will have 2-3 BM every 3 days; large volume. No straining. Good evacuation. Uncoupled.  No bloody or black stools.   Does have fecal urgency, but no leakage, denies fecal incontinenc  Benefiber - once daily, 1 tbsp daily . No bloat or fullness on it   No frim hards stools.     April 22, 2025  Overall doing well   Having 1-2 stools a day, formed and soft   No diarrhea/loose stools any more   -good evacuation in general, occasional incomplete evacuation   -no food, oily droplets, or hard to clean stools  -brown color  -some mucus when wiping  -no bloody or black stools  -still w/ benefiber   -no abd pain    Urination - improved with physical therapy   Performing home sessions now   No dysphagia, melena, hematochezia, nausea/vomiting, abd pain, fever, chills   Continues on PPI and not taking OTC NSAIDs as discussed previously   Appetite stable and weight stable in 145-151 range in last few months     PAST MEDICAL HISTORY:  Past  Medical History:   Diagnosis Date     Arthritis      Bacterial vaginosis 11/06/2024     Basal cell carcinoma      Depression 1991    after 's death     Diabetes mellitus (H)      Diabetic renal disease (H)     microalbuminuria     DJD (degenerative joint disease) of knee      Gastrointestinal hemorrhage associated with gastric ulcer 11/05/2020     H/O vitamin D deficiency      Hypertension      Hypoglycemia 04/07/2024     Low back pain      Obstructive sleep apnea      Pancreatitis     related to Victoza, also on Xyrem     Panic      RLS (restless legs syndrome)      Upper GI bleed 11/16/2021       PREVIOUS ABDOMINAL/GYNECOLOGIC SURGERIES:  Past Surgical History:   Procedure Laterality Date     COLONOSCOPY  10/01/2020    poor quality prep     COLONOSCOPY N/A 8/22/2024    Procedure: COLONOSCOPY, WITH POLYPECTOMY AND BIOPSY;  Surgeon: Andreina Layne MD;  Location:  GI     ear surgery  2002 and 01/2004     ESOPHAGOSCOPY, GASTROSCOPY, DUODENOSCOPY (EGD), COMBINED N/A 11/16/2021    Procedure: ESOPHAGOGASTRODUODENOSCOPY (EGD);  Surgeon: Jayla Calvo MD;  Location: [facility]     ESOPHAGOSCOPY, GASTROSCOPY, DUODENOSCOPY (EGD), COMBINED N/A 1/9/2023    Procedure: ESOPHAGOGASTRODUODENOSCOPY, WITH BIOPSY;  Surgeon: Brandon Witt MD;  Location: [facility]     ESOPHAGOSCOPY, GASTROSCOPY, DUODENOSCOPY (EGD), COMBINED N/A 8/22/2024    Procedure: Esophagoscopy, gastroscopy, duodenoscopy (EGD), combined;  Surgeon: Andreina Layne MD;  Location: [facility]     GASTRIC BYPASS  2013    laparoscopic jimmy en y c ometopexy     HEMORRHOIDECTOMY  09/14/2000     LAPAROSCOPIC CHOLECYSTECTOMY  2015     LASIK BILATERAL       UVULOPALATOPHARYNGOPLASTY       UVVP           PERTINENT MEDICATIONS:  Current Outpatient Medications   Medication Sig Dispense Refill     amphetamine-dextroamphetamine (ADDERALL) 30 MG tablet Take 1-2 tablets 60 tablet 0     atorvastatin (LIPITOR) 20 MG tablet Take 1 tablet (20 mg) by mouth daily. 90 tablet 3      blood glucose (NO BRAND SPECIFIED) lancets standard Use to test blood sugar 1 times daily or as directed. 90 Lancet 3     blood glucose (NO BRAND SPECIFIED) test strip Use to test blood sugar 1 times daily or as directed. 100 strip 3     blood glucose calibration (NO BRAND SPECIFIED) solution Use to calibrate blood glucose monitor as needed as directed. 1 each 3     blood glucose monitoring (NO BRAND SPECIFIED) meter device kit Use to test blood sugar 1 times daily or as directed. 1 kit 0     Calcium Acetate 667 MG TABS Take 1 tablet by mouth 2 times daily for 360 days 180 tablet 3     cevimeline (EVOXAC) 30 MG capsule take 1 cap  capsule 3     cholecalciferol (VITAMIN D3) 125 mcg (5000 units) capsule Take 125 mcg by mouth daily       COMPOUNDED NON-CONTROLLED SUBSTANCE (CMPD RX) - PHARMACY TO MIX COMPOUNDED MEDICATION Wart peel in remedium sig apply to warts at night as tolerated 5 g 5     COMPOUNDED NON-CONTROLLED SUBSTANCE (CMPD RX) - PHARMACY TO MIX COMPOUNDED MEDICATION Estradiol 0.0075% in HRT cream  Apply 2 grams,  intravaginally and small amount externally daily for one week then twice weekly for maintenance. 45 g 11     Continuous Glucose Sensor (FREESTYLE JORDIN 3 PLUS SENSOR) MISC Use 1 sensor every 15 days. Use to read blood sugars per 's instructions. 6 each 3     cyanocobalamin (VITAMIN B-12) 1000 MCG tablet Take 1 tablet by mouth every other day. 90 tablet 0     DULoxetine (CYMBALTA) 30 MG capsule Take 1 capsule (30 mg) by mouth 2 times daily. 180 capsule 3     DULoxetine (CYMBALTA) 60 MG capsule Take 1 60mg dose, along with 30mg dose q hs 90 capsule 3     Ferrous Bisglycinate Chelate (EASY IRON) 28 MG CAPS Take 1 capsule by mouth three times a week.       finasteride (PROSCAR) 5 MG tablet Take 1 tablet daily 90 tablet 3     losartan (COZAAR) 50 MG tablet Take one daily 90 tablet 3     metFORMIN (GLUCOPHAGE XR) 500 MG 24 hr tablet Take 2 po q am with breakfast. 180 tablet 0      omeprazole (PRILOSEC) 40 MG DR capsule Take 40mg twice daily 180 capsule 3     pregabalin (LYRICA) 100 MG capsule Take 200 mg by mouth daily. Starting Wednesday an additional 100 mg       tolterodine ER (DETROL LA) 4 MG 24 hr capsule Take 1 capsule (4 mg) by mouth daily. 90 capsule 3     triamcinolone (KENALOG) 0.1 % paste Apply to tongue twice daily x 10 days 5 g 0     SOCIAL HISTORY:    Social History     Socioeconomic History     Marital status: Single     Spouse name: Not on file     Number of children: 1     Years of education: Not on file     Highest education level: Not on file   Occupational History     Employer: [employer]     Comment: on disability   Tobacco Use     Smoking status: Never     Passive exposure: Never     Smokeless tobacco: Never   Vaping Use     Vaping status: Never Used   Substance and Sexual Activity     Alcohol use: Not Currently     Comment: rare     Drug use: No     Sexual activity: Never   Other Topics Concern     Parent/sibling w/ CABG, MI or angioplasty before 65F 55M? No   Social History Narrative    Daughter- 42,  since 1991    Drives only short distances due to narcolepsy            --------------------------------------------------------------------------------    Surgical History  Return To Top     Status Surgery Time Frame Comment Record Date     Inactive  ear surgery  1/04 5/27/2008      Inactive  eye surgery  2002    5/27/2008      Inactive  Hemorrhoidectomy  9/14/00 5/27/2008          --------------------------------------------------------------------------------    Food Allergy  Return To Top     Allergen Reaction Comment Record Date     * No known food allergies      10/28/2008          --------------------------------------------------------------------------------    Drug Allergy  Return To Top     Allergen Reaction Comment Record Date     Codeine  nausea    6/21/2007          --------------------------------------------------------------------------------     Environment Allergy  Return To Top     Allergen Reaction Comment Record Date     * No known environmental allergies      10/28/2008          --------------------------------------------------------------------------------    Social History  Return To Top     Question Answer Comment Record Date     Marital status      5/27/2008      Advance Directive or Living Will  Form Given to Patient    1/18/2010      Emotional Abuse  Yes    1/18/2010      Exercise  No    1/18/2010      Caffeine  Yes    5/27/2008      Physical Abuse  No    1/18/2010      Sealtbelts  Yes    1/18/2010      Sexual Abuse  No    1/18/2010      Breast/Testicle Self Check  No    1/18/2010      Number of children  1    1/18/2010      Living arrangements  House    1/18/2010      Number of adults in household  2    1/18/2010      Education level  High School Graduate    1/18/2010      Employment  Currently employed    1/18/2010      Tobacco history  Has never smoked or chewed tobacco    5/27/2008      Alcohol history  Currently drinks alcohol    5/27/2008      Has the patient ever used illegal drugs?  Has never used illegal drugs    5/27/2008          --------------------------------------------------------------------------------    History - Overall Remark: 3/21/2012 Return To Top         --------------------------------------------------------------------------------    Medical History Return To Top     Status Diagnosis Time Frame Comment Record Date     Active (250.00) - C - Diabetes mellitus, type II controlled      5/15/2012      Active (788.41) - C - Urinary frequency      4/17/2012      Active (250.02) - C - Diabetes mellitus, type II uncontrolled      3/6/2012      Active (278.00) - C - Obesity      2/14/2011      Active (250.00) - C - Diabetes mellitus, type II controlled      2/14/2011      Active (739.3) - C - Somatic dysfunction, lumbar region      8/16/2010      Active (739.5) - C - Somatic dysfunction, pelvic region      8/16/2010       Active (401.9) - C - Hypertension      2010      Active 268.9 UNSP VITAMIN D DEFICIENCY      2010      Active (695.3) - C - Rosacea      2010      Active 272.1 Hypertriglyceridemia      2010      Active 300.4 Depression with Anxiety (Dysthymic Disorder)      10/28/2008      Active 739.6 Somatic dysfunction, lower extremities      10/28/2008      Active 780.79 Fatigue      2008      Active 278.01 Obesity, morbid      2008      Active 347.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, 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      2008      Inactive  (462) - C - Pharyngitis, acute      1/15/2011      Inactive  250.02 Diabetes mellitus, type II uncontrolled      2010      Inactive  726.71 Tendinitis, achilles      10/28/2008      Inactive  (250.00) - C - Diabetes mellitus, type II controlled      10/28/2008      Inactive  (250.00) - C - Diabetes mellitus, type II controlled      2008      Inactive  V77.91 Screening, lipids      2008      Inactive  599.0 Urinary tract infection, unspec./pyuria (UTI)      2008      Inactive  V70.0 Routine Medical Exam      2008      Active Constipation      2008      Active Hemorrhoids      2008      Pancreatitis pancreatitis 2013-------------------------------------------------------------------------------    M        --------------------------------------------------------------------------------    Family History  Return To Top     Status Relationship Disease Comment Record Date     Alive Sister  *Denies any medical problems  3 sisters  2008      Alive Brother  *Denies any medical problems  2 brothers  2008         Father    Age of death 56  2010         Mother  Dementia  Age of death 82  2008          --------------------------------------------------------------------------------                         Social Drivers of Health     Financial Resource Strain: Low Risk  (3/5/2025)    Financial Resource Strain      Within  the past 12 months, have you or your family members you live with been unable to get utilities (heat, electricity) when it was really needed?: No   Food Insecurity: Low Risk  (3/5/2025)    Food Insecurity      Within the past 12 months, did you worry that your food would run out before you got money to buy more?: No      Within the past 12 months, did the food you bought just not last and you didn t have money to get more?: No   Transportation Needs: Low Risk  (3/5/2025)    Transportation Needs      Within the past 12 months, has lack of transportation kept you from medical appointments, getting your medicines, non-medical meetings or appointments, work, or from getting things that you need?: No   Physical Activity: Unknown (3/5/2025)    Exercise Vital Sign      Days of Exercise per Week: 1 day      Minutes of Exercise per Session: Not on file   Stress: No Stress Concern Present (3/5/2025)    [employer] [place] [employer] - Occupational Stress Questionnaire      Feeling of Stress : Not at all   Social Connections: Unknown (3/5/2025)    Social Connection and Isolation Panel [NHANES]      Frequency of Communication with Friends and Family: Not on file      Frequency of Social Gatherings with Friends and Family: Patient declined      Attends Hinduism Services: Not on file      Active Member of Clubs or Organizations: Not on file      Attends Club or Organization Meetings: Not on file      Marital Status: Not on file   Interpersonal Safety: Low Risk  (11/14/2024)    Interpersonal Safety      Do you feel physically and emotionally safe where you currently live?: Yes      Within the past 12 months, have you been hit, slapped, kicked or otherwise physically hurt by someone?: No      Within the past 12 months, have you been humiliated or emotionally abused in other ways by your partner or ex-partner?: No   Housing Stability: High Risk (3/5/2025)    Housing Stability      Do you have housing? : Yes      Are you  worried about losing your housing?: Yes       FAMILY HISTORY:    Family History   Problem Relation Age of Onset     Memory loss Mother      Other - See Comments Mother         hair thinning     Diabetes Father      Chronic Obstructive Pulmonary Disease Sister      Anemia Sister         hx of ulcers     Other - See Comments Sister 65        MVA, followed by leg pain after a fall     Dementia Sister 68     Dementia Brother 70     Cancer Brother         lung     Cancer - colorectal Maternal Grandmother      Cancer Daughter      Obesity Daughter         s/p lap band     Skin Cancer No family hx of      PHYSICAL EXAMINATION:  Vitals reviewed: There were no vitals taken for this visit.  Wt:   Wt Readings from Last 2 Encounters:   03/05/25 66.2 kg (146 lb)   02/24/25 67.1 kg (148 lb)      Video physical exam  General: Patient appears well in no acute distress.   Skin: No visualized rash or lesions on visualized skin  Eyes: EOMI, no erythema, sclera icterus or discharge noted  Resp: Appears to be breathing comfortably without accessory muscle usage, speaking in full sentences, no cough  MSK: Appears to have normal range of motion based on visualized movements  Neurologic: No apparent tremors, facial movements symmetric  Psych: affect normal, alert and oriented  Dgt Leila present as well     PERTINENT STUDIES - Reviewed in EMR     Lab Results   Component Value Date    WBC 7.8 02/24/2025    WBC 7.6 10/09/2024    WBC 7.8 05/10/2024    HGB 11.6 (L) 02/24/2025    HGB 11.3 (L) 10/09/2024    HGB 12.6 05/10/2024     02/24/2025     10/09/2024     05/10/2024    CHOL 148 01/29/2025    CHOL 133 01/09/2024    CHOL 126 03/29/2022    TRIG 171 (H) 01/29/2025    TRIG 129 01/09/2024    TRIG 123 03/29/2022    HDL 41 (L) 01/29/2025    HDL 55 01/09/2024    HDL 52 03/29/2022    ALT 18 01/29/2025    ALT 20 10/09/2024    ALT 15 12/09/2022    AST 24 01/29/2025    AST 24 10/09/2024    AST 23 12/09/2022     02/24/2025    NA  138 01/29/2025     11/08/2024    BUN 27.9 (H) 02/24/2025    BUN 32.9 (H) 01/29/2025    BUN 26.2 (H) 11/08/2024    CO2 26 02/24/2025    CO2 27 01/29/2025    CO2 23 11/08/2024    TSH 1.01 06/12/2023    TSH 1.52 12/09/2022    TSH 1.69 10/22/2019    INR 1.10 11/15/2021        Liver Function Studies -   Recent Labs   Lab Test 05/10/24  1304 12/09/22  1203   PROTTOTAL  --  7.5   ALBUMIN 4.3 4.2   BILITOTAL  --  0.4   ALKPHOS  --  88   AST  --  23   ALT  --  15        PREVIOUS ENDOSCOPY  8/22/2024 - EGD - the gastrojejunal anastomosis was characterized by scarring from the previous ulceration proximal to the anastomosis site. No stenosis seen. Jejunum was intubated and it appeared healthy.    8/22/2024 - Colonoscopy with 10 cm TI intubation, BBPS score 6. A few small TA from transverse w/o high grade dysplasia. Normal TI and normal macro colon. Random colon biopsies were negative for microscopic colitis. Sigmoid diverticular dz w/o inflammation or bleeding. Grade II internal hemorrhoids. AZALEA revealed decreased sphincter tone. 7y recall       Again, thank you for allowing me to participate in the care of your patient.        Sincerely,        Amie Grace PA-C    Electronically signed

## 2025-04-22 NOTE — PATIENT INSTRUCTIONS
It was a pleasure taking care of you today.  I've included a brief summary of our discussion and care plan from today's visit below.  Please review this information with your primary care provider.  _______________________________________________________________________    My recommendations are summarized as follows:    MRI of small intestines - please call Imaging number to schedule this study  Make an appt with bariatric surgery - mild anemia. Might need nutrient levels checked   After I review the MRI of intestines and updated blood work, I will let you know if we need to do a special camera endoscopy procedure (pill cam you swallow)  We will decide if we need a follow-up with GI after the above     Return to GI Clinic in - to be determined -  months to review your progress.     Please see below for any additional questions and scheduling guidelines.    Sign up for [employer]: [employer] patient portal serves as a secure platform for accessing your medical records from the [facility]. Additionally, [employer] facilitates easy, timely, and secure messaging with your care team. If you have not signed up, you may do so by using the provided code or calling [phone].    Coordinating your care after your visit:  There are multiple options for scheduling your follow-up care based on your provider's recommendation.    How do I schedule a follow-up clinic appointment:   After your appointment, you may receive scheduling assistance with the Clinic Coordinators by having a seat in the waiting room and a Clinic Coordinator will call you up to schedule.  Virtual visits or after you leave the clinic:  Your provider has placed a follow-up order in the [employer] portal for scheduling your return appointment. A member of the scheduling team will contact you to schedule.  [employer] Scheduling: Timely scheduling through [employer] is advised to ensure appointment availability.   Call to schedule: You may schedule your follow-up  appointment(s) by calling [phone], option 1.    How do I schedule my endoscopy or colonoscopy procedure:  If a procedure, such as a colonoscopy or upper endoscopy was ordered by your provider, the scheduling team will contact you to schedule this procedure. Or you may choose to call to schedule at   [phone], option 2.  Please allow 20-30 minutes when scheduling a procedure.    How do I get my blood work done? To get your blood work done, you need to schedule a lab appointment at an [facility]. There are multiple ways to schedule:   At the clinic: The Clinic Coordinator you meet after your visit can help you schedule a lab appointment.   [facility] scheduling: [facility] offers online lab scheduling at all [facility] laboratory locations.   Call to schedule: You can call [phone] to schedule your lab appointment.    How do I schedule my imaging study: To schedule imaging studies, such as CT scans, ultrasounds, MRIs, or X-rays, contact Imaging Services at [phone].    How do I schedule a referral to another doctor: If your provider recommended a referral to another specialist(s), the referral order was placed by your provider. You will receive a phone call to schedule this referral, or you may choose to call the number attached to the referral to self-schedule.    For Post-Visit Question(s):  For any inquiries following today's visit:  Please utilize [employer] messaging and allow 48 hours for reply or contact the Call Center during normal business hours at [phone], option 3.  For Emergent After-hours questions, contact the On-Call GI Fellow through the [facility]  at [phone].  In addition, you may contact your Nurse directly using the provided contact information.    Test Results:  Test results will be accessible via [employer] in compliance with the 21st Century Cures Act. This means that your results will be available to you at the same time as your  provider. Often you may see your results before your provider does. Results are reviewed by staff within two weeks with communication follow-up. Results may be released in the patient portal prior to your care team review.    Prescription Refill(s):  Medication prescribed by your provider will be addressed during your visit. For future refills, please coordinate with your pharmacy. If you have not had a recent clinic visit or routine labs, for your safety, your provider may not be able to refill your prescription.     Sincerely,    Amie Grace PA-C  Gastroenterology

## 2025-04-22 NOTE — PROGRESS NOTES
Virtual Visit Details    Type of service:  Video Visit     Originating Location (pt. Location): Home    Distant Location (provider location):  Off-site  Platform used for Video Visit: Bernadette    Joined the call at 4/22/2025, 2:36:00 pm.  Left the call at 4/22/2025, 3:07:08 pm.  You were on the call for 31 minutes 8 seconds.    Here w/ dgt Leila     GI CLINIC VISIT    ASSESSMENT/PLAN:  70 year old female with PMH of gastric bypass, T2DM, HTN, hyperlipidemia, gastric ulcer presenting to GI clinic for follow up on h/o gastric ulcer, episodes of n/v with abd discomfort, and loose stools     #Gastric Ulcer, h/o (healed)  11/2021 hospitalized for GI bleed and found to have a 2 anastomotic gastric ulcers (12 mm, 10 mm) suspected to be due to NSAID. She was placed on protonix 40 mg BID. Follow up EGD 2/11/2022 to check for healing showed an enlarging ulcer, measured at 21-25 mm gastric ulcer in the antrum. She was switched to omperazole 40 mg BID. Follow up EGD 1/2023 showed a 5 mm clean based gastric ulcer near the anastomosis ([place] class III). She was placed on omeprazole 40 mg twice daily indefinitely and asked to avoid NSAIDs.    Due to recurrent (but intermittent) RUQ abd pain w/o melena, pt and family reported concern for recurrance at our appt 8/5/24 and therefore a repeat EGD was undertaken 8/22/2024 - the gastrojejunal anastomosis was characterized by scarring from the previous ulceration proximal to the anastomosis site. No stenosis seen. Jejunum was intubated and it appeared healthy.     Today she reports doing well and remains on daily PPI and has continued to avoid NSAIDs. No further RUQ abd pain. HIDA and RUQ not done but as she's asymptomatic now, OK to hold.     Plan  -cont omeprazole, indefinite    -avoid NSAIDs    Future consideration  -complete abd US with doppler +/- HIDA  -for recurrent pain with n/v, consider repeat EGD for evaluation of GJ stricturing. Especially if there is weight loss  -alternate  PPI (previously Protonix, currently omeprazole)     #nausea/vomiting/abd discomfort, h/o  Episodes of severe nausea/vomiting and abd discomfort spanning a few days, occurring more frequently. No longer symptomatic since our last appt, which is reassuring. Sx could be due to gastroparesis though testing with dedicated emptying study would be difficult to interpret in setting of her prior RYGB. This is also supported by scarring seen proximal to GJ anastomotic site on recent EGD.  Additional ddx includes atypical bilary presentation (though less likely given s/p CCY status).     HIDA and RUQ not done but as she's asymptomatic now, OK to hold. If sx were to flare up, can consider repeat EGD to evaluate for stricture at GJ anastomotic site . We previously discussed general dietary strategies (decrease to soft textured, use of nutritional supplements) for increased upper GI sx. Formal GD RD consult declined . Previously I also asked they inform care team for similar sx > 24-48h. ER precautions reviewed.     Future consideration  -complete abd US with doppler +/- HIDA  -GI RD consult   -for recurrent pain with n/v, consider repeat EGD for evaluation of GJ stricturing. Especially if there is weight loss    #loose stools, h/o   #scant hematochezia seen on TP, h/o    Infectious stool studies negative. Colonoscopy 8/2024 with TI intubation, a few small TA but  was otherwise unremarkable; random colon biopsies were negative for microscopic colitis.     For now her stooling is much improved, passing a formed stool every 3 days.  We discussed pelvic floor dysfunction, which I am suspicious she have. I asked she increase her benefiber to BID and take PRN miralax. She has established with [facility] for pelvic floor dysfunction. Currently she denies any obstructive defecatory sx so will defer dedicated defecography studies, though notable to mention she has had trouble with fecal incontinence in past (seen previously with CRS in  2023) and had findings of decreased sphincter tone on AZALEA during her CSCope. Ddx include retained stools with overflow, SIBO, other malabsorption, functional disease  vs other     Plan  -continue pelvic floor PT exercises (now home regiment)  -continue benefiber +/- use of PRN miralax    Future consideration  -consider dedicated PF eval with defecography studies, she would likely benefit   -breath test for eval of SIBO   -GI RD consult     #borderline fecal calpro  To 114 with recent test slightly elevated to 268 -though overall asymptomatic and also in setting of recent unremarkable bidirectional scopes. Infectious stool studies negative. No liver disesae or OTC NSAID use. d    MRE ordered - can consider VCE too     #mild anemia  H/o gastric bypass - I recommended they make an appt again w/ bariatrics for continued care     #CRC screening   TAs on 8/2024 scope, recall 7y (due 8/2031)    RTC - dependent on MRE results     Thank you for this consultation. It was a pleasure to participate in the care of this patient; please contact us with any further questions.    Amie Grace PA-C  Follow up: As planned above. Today, I personally spent 31  minutes spent on the date of the encounter doing chart review, history and exam, documentation and further activities per the note.        HPI: 70 year old female with PMH of gastric bypass, T2DM, HTN, hyperlipidemia, gastric ulcer following with the Gen GI team for history of gastric ulcer. She last saw our team in 2023, Dr Mason and Dorian. Today is my 2nd appt with the patient.     Dr Mason 2023 note   67 year old history of gastric bypass, T2DM, HTN, hyperlipidemia, gastric ulcer who was reportedly hospitalized for GI bleed and found to have a large anastomotic gastric ulcer suspected to be due to NSAID in 11/2021, follow up EGD to check for healing continued to show a 21-25 mm gastric ulcer in the antrum and patient was supposed to get another follow up EGD, unclear why that  was not done.   Patient then recently followed up with her PCP who then requested she followed up with GI and gets a follow up EGD.     10/17/24  Dgt Leila present who helps supply some collateral history   Doing a lot better these days . No trouble with scopes or sedation.   No longer having N/V/or abd pain  Eating her normal diet w/o issues. No weight loss, though shares she did put on 20# over the last 6 mo.this weight gain has stablized in the past 2 mo.   Continues on PPI and not taking NSAIDs. No heartburn, dysphagia, odynphagia, melena.   Does have urinary incontinence, with standing up, laughing hard, sneezing. onset years ago. No dysuria, hematuria. Overall stable, per pt. Not done pelvic floor PT would like eval   BM - soft log, will have 2-3 BM every 3 days; large volume. No straining. Good evacuation. Uncoupled.  No bloody or black stools.   Does have fecal urgency, but no leakage, denies fecal incontinenc  Benefiber - once daily, 1 tbsp daily . No bloat or fullness on it   No frim hards stools.     April 22, 2025  Overall doing well   Having 1-2 stools a day, formed and soft   No diarrhea/loose stools any more   -good evacuation in general, occasional incomplete evacuation   -no food, oily droplets, or hard to clean stools  -brown color  -some mucus when wiping  -no bloody or black stools  -still w/ benefiber   -no abd pain    Urination - improved with physical therapy   Performing home sessions now   No dysphagia, melena, hematochezia, nausea/vomiting, abd pain, fever, chills   Continues on PPI and not taking OTC NSAIDs as discussed previously   Appetite stable and weight stable in 145-151 range in last few months     PAST MEDICAL HISTORY:  Past Medical History:   Diagnosis Date    Arthritis     Bacterial vaginosis 11/06/2024    Basal cell carcinoma     Depression 1991    after 's death    Diabetes mellitus (H)     Diabetic renal disease (H)     microalbuminuria    DJD (degenerative joint  disease) of knee     Gastrointestinal hemorrhage associated with gastric ulcer 11/05/2020    H/O vitamin D deficiency     Hypertension     Hypoglycemia 04/07/2024    Low back pain     Obstructive sleep apnea     Pancreatitis     related to Victoza, also on Xyrem    Panic     RLS (restless legs syndrome)     Upper GI bleed 11/16/2021       PREVIOUS ABDOMINAL/GYNECOLOGIC SURGERIES:  Past Surgical History:   Procedure Laterality Date    COLONOSCOPY  10/01/2020    poor quality prep    COLONOSCOPY N/A 8/22/2024    Procedure: COLONOSCOPY, WITH POLYPECTOMY AND BIOPSY;  Surgeon: Andreina Layne MD;  Location:  GI    ear surgery  2002 and 01/2004    ESOPHAGOSCOPY, GASTROSCOPY, DUODENOSCOPY (EGD), COMBINED N/A 11/16/2021    Procedure: ESOPHAGOGASTRODUODENOSCOPY (EGD);  Surgeon: Jayla Calvo MD;  Location: [facility]    ESOPHAGOSCOPY, GASTROSCOPY, DUODENOSCOPY (EGD), COMBINED N/A 1/9/2023    Procedure: ESOPHAGOGASTRODUODENOSCOPY, WITH BIOPSY;  Surgeon: Brandon Witt MD;  Location: [facility]    ESOPHAGOSCOPY, GASTROSCOPY, DUODENOSCOPY (EGD), COMBINED N/A 8/22/2024    Procedure: Esophagoscopy, gastroscopy, duodenoscopy (EGD), combined;  Surgeon: Andreina Layne MD;  Location: [facility]    GASTRIC BYPASS  2013    laparoscopic jimmy en y c ometopexy    HEMORRHOIDECTOMY  09/14/2000    LAPAROSCOPIC CHOLECYSTECTOMY  2015    LASIK BILATERAL      UVULOPALATOPHARYNGOPLASTY      UVVP           PERTINENT MEDICATIONS:  Current Outpatient Medications   Medication Sig Dispense Refill    amphetamine-dextroamphetamine (ADDERALL) 30 MG tablet Take 1-2 tablets 60 tablet 0    atorvastatin (LIPITOR) 20 MG tablet Take 1 tablet (20 mg) by mouth daily. 90 tablet 3    blood glucose (NO BRAND SPECIFIED) lancets standard Use to test blood sugar 1 times daily or as directed. 90 Lancet 3    blood glucose (NO BRAND SPECIFIED) test strip Use to test blood sugar 1 times daily or as directed. 100 strip 3    blood glucose calibration (NO BRAND SPECIFIED)  solution Use to calibrate blood glucose monitor as needed as directed. 1 each 3    blood glucose monitoring (NO BRAND SPECIFIED) meter device kit Use to test blood sugar 1 times daily or as directed. 1 kit 0    Calcium Acetate 667 MG TABS Take 1 tablet by mouth 2 times daily for 360 days 180 tablet 3    cevimeline (EVOXAC) 30 MG capsule take 1 cap  capsule 3    cholecalciferol (VITAMIN D3) 125 mcg (5000 units) capsule Take 125 mcg by mouth daily      COMPOUNDED NON-CONTROLLED SUBSTANCE (CMPD RX) - PHARMACY TO MIX COMPOUNDED MEDICATION Wart peel in remedium sig apply to warts at night as tolerated 5 g 5    COMPOUNDED NON-CONTROLLED SUBSTANCE (CMPD RX) - PHARMACY TO MIX COMPOUNDED MEDICATION Estradiol 0.0075% in HRT cream  Apply 2 grams,  intravaginally and small amount externally daily for one week then twice weekly for maintenance. 45 g 11    Continuous Glucose Sensor (FREESTYLE JORDIN 3 PLUS SENSOR) MISC Use 1 sensor every 15 days. Use to read blood sugars per 's instructions. 6 each 3    cyanocobalamin (VITAMIN B-12) 1000 MCG tablet Take 1 tablet by mouth every other day. 90 tablet 0    DULoxetine (CYMBALTA) 30 MG capsule Take 1 capsule (30 mg) by mouth 2 times daily. 180 capsule 3    DULoxetine (CYMBALTA) 60 MG capsule Take 1 60mg dose, along with 30mg dose q hs 90 capsule 3    Ferrous Bisglycinate Chelate (EASY IRON) 28 MG CAPS Take 1 capsule by mouth three times a week.      finasteride (PROSCAR) 5 MG tablet Take 1 tablet daily 90 tablet 3    losartan (COZAAR) 50 MG tablet Take one daily 90 tablet 3    metFORMIN (GLUCOPHAGE XR) 500 MG 24 hr tablet Take 2 po q am with breakfast. 180 tablet 0    omeprazole (PRILOSEC) 40 MG DR capsule Take 40mg twice daily 180 capsule 3    pregabalin (LYRICA) 100 MG capsule Take 200 mg by mouth daily. Starting Wednesday an additional 100 mg      tolterodine ER (DETROL LA) 4 MG 24 hr capsule Take 1 capsule (4 mg) by mouth daily. 90 capsule 3    triamcinolone  (KENALOG) 0.1 % paste Apply to tongue twice daily x 10 days 5 g 0     SOCIAL HISTORY:    Social History     Socioeconomic History    Marital status: Single     Spouse name: Not on file    Number of children: 1    Years of education: Not on file    Highest education level: Not on file   Occupational History     Employer: [employer]     Comment: on disability   Tobacco Use    Smoking status: Never     Passive exposure: Never    Smokeless tobacco: Never   Vaping Use    Vaping status: Never Used   Substance and Sexual Activity    Alcohol use: Not Currently     Comment: rare    Drug use: No    Sexual activity: Never   Other Topics Concern    Parent/sibling w/ CABG, MI or angioplasty before 65F 55M? No   Social History Narrative    Daughter- 42,  since 1991    Drives only short distances due to narcolepsy            --------------------------------------------------------------------------------    Surgical History  Return To Top     Status Surgery Time Frame Comment Record Date     Inactive  ear surgery  1/04 5/27/2008      Inactive  eye surgery  2002 5/27/2008      Inactive  Hemorrhoidectomy  9/14/00 5/27/2008          --------------------------------------------------------------------------------    Food Allergy  Return To Top     Allergen Reaction Comment Record Date     * No known food allergies      10/28/2008          --------------------------------------------------------------------------------    Drug Allergy  Return To Top     Allergen Reaction Comment Record Date     Codeine  nausea    6/21/2007          --------------------------------------------------------------------------------    Environment Allergy  Return To Top     Allergen Reaction Comment Record Date     * No known environmental allergies      10/28/2008          --------------------------------------------------------------------------------    Social History  Return To Top     Question Answer Comment Record Date     Marital status       5/27/2008      Advance Directive or Living Will  Form Given to Patient    1/18/2010      Emotional Abuse  Yes    1/18/2010      Exercise  No    1/18/2010      Caffeine  Yes    5/27/2008      Physical Abuse  No    1/18/2010      Sealtbelts  Yes    1/18/2010      Sexual Abuse  No    1/18/2010      Breast/Testicle Self Check  No    1/18/2010      Number of children  1    1/18/2010      Living arrangements  House    1/18/2010      Number of adults in household  2    1/18/2010      Education level  High School Graduate    1/18/2010      Employment  Currently employed    1/18/2010      Tobacco history  Has never smoked or chewed tobacco    5/27/2008      Alcohol history  Currently drinks alcohol    5/27/2008      Has the patient ever used illegal drugs?  Has never used illegal drugs    5/27/2008          --------------------------------------------------------------------------------    History - Overall Remark: 3/21/2012 Return To Top         --------------------------------------------------------------------------------    Medical History Return To Top     Status Diagnosis Time Frame Comment Record Date     Active (250.00) - C - Diabetes mellitus, type II controlled      5/15/2012      Active (788.41) - C - Urinary frequency      4/17/2012      Active (250.02) - C - Diabetes mellitus, type II uncontrolled      3/6/2012      Active (278.00) - C - Obesity      2/14/2011      Active (250.00) - C - Diabetes mellitus, type II controlled      2/14/2011      Active (739.3) - C - Somatic dysfunction, lumbar region      8/16/2010      Active (739.5) - C - Somatic dysfunction, pelvic region      8/16/2010      Active (401.9) - C - Hypertension      2/8/2010      Active 268.9 UNSP VITAMIN D DEFICIENCY      1/18/2010      Active (695.3) - C - Rosacea      1/18/2010      Active 272.1 Hypertriglyceridemia      1/18/2010      Active 300.4 Depression with Anxiety (Dysthymic Disorder)      10/28/2008      Active 739.6  Somatic dysfunction, lower extremities      10/28/2008      Active 780.79 Fatigue      2008      Active 278.01 Obesity, morbid      2008      Active 347.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, 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      2008      Inactive  (462) - C - Pharyngitis, acute      1/15/2011      Inactive  250.02 Diabetes mellitus, type II uncontrolled      2010      Inactive  726.71 Tendinitis, achilles      10/28/2008      Inactive  (250.00) - C - Diabetes mellitus, type II controlled      10/28/2008      Inactive  (250.00) - C - Diabetes mellitus, type II controlled      2008      Inactive  V77.91 Screening, lipids      2008      Inactive  599.0 Urinary tract infection, unspec./pyuria (UTI)      2008      Inactive  V70.0 Routine Medical Exam      2008      Active Constipation      2008      Active Hemorrhoids      2008      Pancreatitis pancreatitis 2013-------------------------------------------------------------------------------    M        --------------------------------------------------------------------------------    Family History  Return To Top     Status Relationship Disease Comment Record Date     Alive Sister  *Denies any medical problems  3 sisters  2008      Alive Brother  *Denies any medical problems  2 brothers  2008         Father    Age of death 56  2010         Mother  Dementia  Age of death 82  2008          --------------------------------------------------------------------------------                         Social Drivers of Health     Financial Resource Strain: Low Risk  (3/5/2025)    Financial Resource Strain     Within the past 12 months, have you or your family members you live with been unable to get utilities (heat, electricity) when it was really needed?: No   Food Insecurity: Low Risk  (3/5/2025)    Food Insecurity     Within the past 12 months, did you worry that your food would run out before you got money to buy more?:  No     Within the past 12 months, did the food you bought just not last and you didn t have money to get more?: No   Transportation Needs: Low Risk  (3/5/2025)    Transportation Needs     Within the past 12 months, has lack of transportation kept you from medical appointments, getting your medicines, non-medical meetings or appointments, work, or from getting things that you need?: No   Physical Activity: Unknown (3/5/2025)    Exercise Vital Sign     Days of Exercise per Week: 1 day     Minutes of Exercise per Session: Not on file   Stress: No Stress Concern Present (3/5/2025)    [employer] [place] [employer] - Occupational Stress Questionnaire     Feeling of Stress : Not at all   Social Connections: Unknown (3/5/2025)    Social Connection and Isolation Panel [NHANES]     Frequency of Communication with Friends and Family: Not on file     Frequency of Social Gatherings with Friends and Family: Patient declined     Attends Rastafari Services: Not on file     Active Member of Clubs or Organizations: Not on file     Attends Club or Organization Meetings: Not on file     Marital Status: Not on file   Interpersonal Safety: Low Risk  (11/14/2024)    Interpersonal Safety     Do you feel physically and emotionally safe where you currently live?: Yes     Within the past 12 months, have you been hit, slapped, kicked or otherwise physically hurt by someone?: No     Within the past 12 months, have you been humiliated or emotionally abused in other ways by your partner or ex-partner?: No   Housing Stability: High Risk (3/5/2025)    Housing Stability     Do you have housing? : Yes     Are you worried about losing your housing?: Yes       FAMILY HISTORY:    Family History   Problem Relation Age of Onset    Memory loss Mother     Other - See Comments Mother         hair thinning    Diabetes Father     Chronic Obstructive Pulmonary Disease Sister     Anemia Sister         hx of ulcers    Other - See Comments Sister 65         MVA, followed by leg pain after a fall    Dementia Sister 68    Dementia Brother 70    Cancer Brother         lung    Cancer - colorectal Maternal Grandmother     Cancer Daughter     Obesity Daughter         s/p lap band    Skin Cancer No family hx of      PHYSICAL EXAMINATION:  Vitals reviewed: There were no vitals taken for this visit.  Wt:   Wt Readings from Last 2 Encounters:   03/05/25 66.2 kg (146 lb)   02/24/25 67.1 kg (148 lb)      Video physical exam  General: Patient appears well in no acute distress.   Skin: No visualized rash or lesions on visualized skin  Eyes: EOMI, no erythema, sclera icterus or discharge noted  Resp: Appears to be breathing comfortably without accessory muscle usage, speaking in full sentences, no cough  MSK: Appears to have normal range of motion based on visualized movements  Neurologic: No apparent tremors, facial movements symmetric  Psych: affect normal, alert and oriented  Dgt Leila present as well     PERTINENT STUDIES - Reviewed in EMR     Lab Results   Component Value Date    WBC 7.8 02/24/2025    WBC 7.6 10/09/2024    WBC 7.8 05/10/2024    HGB 11.6 (L) 02/24/2025    HGB 11.3 (L) 10/09/2024    HGB 12.6 05/10/2024     02/24/2025     10/09/2024     05/10/2024    CHOL 148 01/29/2025    CHOL 133 01/09/2024    CHOL 126 03/29/2022    TRIG 171 (H) 01/29/2025    TRIG 129 01/09/2024    TRIG 123 03/29/2022    HDL 41 (L) 01/29/2025    HDL 55 01/09/2024    HDL 52 03/29/2022    ALT 18 01/29/2025    ALT 20 10/09/2024    ALT 15 12/09/2022    AST 24 01/29/2025    AST 24 10/09/2024    AST 23 12/09/2022     02/24/2025     01/29/2025     11/08/2024    BUN 27.9 (H) 02/24/2025    BUN 32.9 (H) 01/29/2025    BUN 26.2 (H) 11/08/2024    CO2 26 02/24/2025    CO2 27 01/29/2025    CO2 23 11/08/2024    TSH 1.01 06/12/2023    TSH 1.52 12/09/2022    TSH 1.69 10/22/2019    INR 1.10 11/15/2021        Liver Function Studies -   Recent Labs   Lab Test 05/10/24  1306  12/09/22  1203   PROTTOTAL  --  7.5   ALBUMIN 4.3 4.2   BILITOTAL  --  0.4   ALKPHOS  --  88   AST  --  23   ALT  --  15        PREVIOUS ENDOSCOPY  8/22/2024 - EGD - the gastrojejunal anastomosis was characterized by scarring from the previous ulceration proximal to the anastomosis site. No stenosis seen. Jejunum was intubated and it appeared healthy.    8/22/2024 - Colonoscopy with 10 cm TI intubation, BBPS score 6. A few small TA from transverse w/o high grade dysplasia. Normal TI and normal macro colon. Random colon biopsies were negative for microscopic colitis. Sigmoid diverticular dz w/o inflammation or bleeding. Grade II internal hemorrhoids. AZALEA revealed decreased sphincter tone. 7y recall

## 2025-04-22 NOTE — NURSING NOTE
Current patient location: [address] [place] [id number]    Is the patient currently in the state of [place]? YES    Visit mode: VIDEO    If the visit is dropped, the patient can be reconnected by:VIDEO VISIT: Send to e-mail at: [email or web address]    Will anyone else be joining the visit? YES: How would they like to receive their invitation? Send to e-mail: PT will be with daughter  (If patient encounters technical issues they should call [phone])    Are changes needed to the allergy or medication list? No    Are refills needed on medications prescribed by this physician? NO    Rooming Documentation:  Questionnaire(s) completed    Reason for visit: RECHECK    Sammy CASTANEDA

## 2025-05-02 ENCOUNTER — ANESTHESIA EVENT (OUTPATIENT)
Dept: SURGERY | Facility: AMBULATORY SURGERY CENTER | Age: 70
End: 2025-05-02
Payer: COMMERCIAL

## 2025-05-06 ENCOUNTER — ANESTHESIA (OUTPATIENT)
Dept: SURGERY | Facility: AMBULATORY SURGERY CENTER | Age: 70
End: 2025-05-06
Payer: COMMERCIAL

## 2025-05-06 ENCOUNTER — HOSPITAL ENCOUNTER (OUTPATIENT)
Facility: AMBULATORY SURGERY CENTER | Age: 70
Discharge: HOME | End: 2025-05-06
Attending: STUDENT IN AN ORGANIZED HEALTH CARE EDUCATION/TRAINING PROGRAM
Payer: COMMERCIAL

## 2025-05-06 VITALS
WEIGHT: 148 LBS | OXYGEN SATURATION: 96 % | RESPIRATION RATE: 16 BRPM | BODY MASS INDEX: 26.22 KG/M2 | HEART RATE: 74 BPM | DIASTOLIC BLOOD PRESSURE: 72 MMHG | TEMPERATURE: 97.6 F | HEIGHT: 63 IN | SYSTOLIC BLOOD PRESSURE: 144 MMHG

## 2025-05-06 DIAGNOSIS — M65.30 TRIGGER FINGER, ACQUIRED: Primary | ICD-10-CM

## 2025-05-06 DIAGNOSIS — G56.01 CARPAL TUNNEL SYNDROME, RIGHT: ICD-10-CM

## 2025-05-06 LAB — GLUCOSE BLDC GLUCOMTR-MCNC: 132 MG/DL (ref 70–99)

## 2025-05-06 PROCEDURE — 88313 SPECIAL STAINS GROUP 2: CPT | Mod: 26 | Performed by: PATHOLOGY

## 2025-05-06 PROCEDURE — 88305 TISSUE EXAM BY PATHOLOGIST: CPT | Mod: 26 | Performed by: PATHOLOGY

## 2025-05-06 PROCEDURE — 26055 INCISE FINGER TENDON SHEATH: CPT | Mod: F5 | Performed by: STUDENT IN AN ORGANIZED HEALTH CARE EDUCATION/TRAINING PROGRAM

## 2025-05-06 PROCEDURE — 64721 CARPAL TUNNEL SURGERY: CPT | Mod: RT | Performed by: STUDENT IN AN ORGANIZED HEALTH CARE EDUCATION/TRAINING PROGRAM

## 2025-05-06 PROCEDURE — 26055 INCISE FINGER TENDON SHEATH: CPT | Mod: F6 | Performed by: STUDENT IN AN ORGANIZED HEALTH CARE EDUCATION/TRAINING PROGRAM

## 2025-05-06 PROCEDURE — 88313 SPECIAL STAINS GROUP 2: CPT | Mod: TC | Performed by: STUDENT IN AN ORGANIZED HEALTH CARE EDUCATION/TRAINING PROGRAM

## 2025-05-06 PROCEDURE — 82962 GLUCOSE BLOOD TEST: CPT | Performed by: PATHOLOGY

## 2025-05-06 RX ORDER — OXYCODONE HYDROCHLORIDE 5 MG/1
5 TABLET ORAL
Status: DISCONTINUED | OUTPATIENT
Start: 2025-05-06 | End: 2025-05-07 | Stop reason: HOSPADM

## 2025-05-06 RX ORDER — FLUMAZENIL 0.1 MG/ML
0.2 INJECTION, SOLUTION INTRAVENOUS
Status: CANCELLED | OUTPATIENT
Start: 2025-05-06

## 2025-05-06 RX ORDER — NALOXONE HYDROCHLORIDE 0.4 MG/ML
0.2 INJECTION, SOLUTION INTRAMUSCULAR; INTRAVENOUS; SUBCUTANEOUS
Status: CANCELLED | OUTPATIENT
Start: 2025-05-06

## 2025-05-06 RX ORDER — NALOXONE HYDROCHLORIDE 0.4 MG/ML
0.1 INJECTION, SOLUTION INTRAMUSCULAR; INTRAVENOUS; SUBCUTANEOUS
Status: DISCONTINUED | OUTPATIENT
Start: 2025-05-06 | End: 2025-05-07 | Stop reason: HOSPADM

## 2025-05-06 RX ORDER — HYDROMORPHONE HYDROCHLORIDE 1 MG/ML
0.4 INJECTION, SOLUTION INTRAMUSCULAR; INTRAVENOUS; SUBCUTANEOUS EVERY 5 MIN PRN
Status: DISCONTINUED | OUTPATIENT
Start: 2025-05-06 | End: 2025-05-07 | Stop reason: HOSPADM

## 2025-05-06 RX ORDER — FENTANYL CITRATE 50 UG/ML
50 INJECTION, SOLUTION INTRAMUSCULAR; INTRAVENOUS EVERY 5 MIN PRN
Status: DISCONTINUED | OUTPATIENT
Start: 2025-05-06 | End: 2025-05-07 | Stop reason: HOSPADM

## 2025-05-06 RX ORDER — OXYCODONE HYDROCHLORIDE 5 MG/1
5 TABLET ORAL EVERY 6 HOURS PRN
Qty: 5 TABLET | Refills: 0 | Status: SHIPPED | OUTPATIENT
Start: 2025-05-06 | End: 2025-05-09

## 2025-05-06 RX ORDER — PROPOFOL 10 MG/ML
INJECTION, EMULSION INTRAVENOUS PRN
Status: DISCONTINUED | OUTPATIENT
Start: 2025-05-06 | End: 2025-05-06

## 2025-05-06 RX ORDER — ACETAMINOPHEN 325 MG/1
975 TABLET ORAL ONCE
Status: COMPLETED | OUTPATIENT
Start: 2025-05-06 | End: 2025-05-06

## 2025-05-06 RX ORDER — SODIUM CHLORIDE, SODIUM LACTATE, POTASSIUM CHLORIDE, CALCIUM CHLORIDE 600; 310; 30; 20 MG/100ML; MG/100ML; MG/100ML; MG/100ML
INJECTION, SOLUTION INTRAVENOUS CONTINUOUS
Status: DISCONTINUED | OUTPATIENT
Start: 2025-05-06 | End: 2025-05-07 | Stop reason: HOSPADM

## 2025-05-06 RX ORDER — ONDANSETRON 2 MG/ML
4 INJECTION INTRAMUSCULAR; INTRAVENOUS EVERY 30 MIN PRN
Status: DISCONTINUED | OUTPATIENT
Start: 2025-05-06 | End: 2025-05-07 | Stop reason: HOSPADM

## 2025-05-06 RX ORDER — NALOXONE HYDROCHLORIDE 0.4 MG/ML
0.4 INJECTION, SOLUTION INTRAMUSCULAR; INTRAVENOUS; SUBCUTANEOUS
Status: CANCELLED | OUTPATIENT
Start: 2025-05-06

## 2025-05-06 RX ORDER — LIDOCAINE HYDROCHLORIDE AND EPINEPHRINE 10; 10 MG/ML; UG/ML
INJECTION, SOLUTION INFILTRATION; PERINEURAL PRN
Status: DISCONTINUED | OUTPATIENT
Start: 2025-05-06 | End: 2025-05-06 | Stop reason: HOSPADM

## 2025-05-06 RX ORDER — OXYCODONE HYDROCHLORIDE 5 MG/1
10 TABLET ORAL
Status: DISCONTINUED | OUTPATIENT
Start: 2025-05-06 | End: 2025-05-07 | Stop reason: HOSPADM

## 2025-05-06 RX ORDER — LIDOCAINE 40 MG/G
CREAM TOPICAL
Status: DISCONTINUED | OUTPATIENT
Start: 2025-05-06 | End: 2025-05-07 | Stop reason: HOSPADM

## 2025-05-06 RX ORDER — DEXAMETHASONE SODIUM PHOSPHATE 10 MG/ML
4 INJECTION, SOLUTION INTRAMUSCULAR; INTRAVENOUS
Status: DISCONTINUED | OUTPATIENT
Start: 2025-05-06 | End: 2025-05-07 | Stop reason: HOSPADM

## 2025-05-06 RX ORDER — FENTANYL CITRATE 50 UG/ML
25-50 INJECTION, SOLUTION INTRAMUSCULAR; INTRAVENOUS
Status: CANCELLED | OUTPATIENT
Start: 2025-05-06

## 2025-05-06 RX ORDER — ONDANSETRON 4 MG/1
4 TABLET, ORALLY DISINTEGRATING ORAL EVERY 30 MIN PRN
Status: DISCONTINUED | OUTPATIENT
Start: 2025-05-06 | End: 2025-05-07 | Stop reason: HOSPADM

## 2025-05-06 RX ORDER — FENTANYL CITRATE 50 UG/ML
25 INJECTION, SOLUTION INTRAMUSCULAR; INTRAVENOUS EVERY 5 MIN PRN
Status: DISCONTINUED | OUTPATIENT
Start: 2025-05-06 | End: 2025-05-07 | Stop reason: HOSPADM

## 2025-05-06 RX ORDER — ONDANSETRON 2 MG/ML
INJECTION INTRAMUSCULAR; INTRAVENOUS PRN
Status: DISCONTINUED | OUTPATIENT
Start: 2025-05-06 | End: 2025-05-06

## 2025-05-06 RX ORDER — PROPOFOL 10 MG/ML
INJECTION, EMULSION INTRAVENOUS CONTINUOUS PRN
Status: DISCONTINUED | OUTPATIENT
Start: 2025-05-06 | End: 2025-05-06

## 2025-05-06 RX ORDER — FENTANYL CITRATE 50 UG/ML
INJECTION, SOLUTION INTRAMUSCULAR; INTRAVENOUS PRN
Status: DISCONTINUED | OUTPATIENT
Start: 2025-05-06 | End: 2025-05-06

## 2025-05-06 RX ORDER — HYDROMORPHONE HYDROCHLORIDE 1 MG/ML
0.2 INJECTION, SOLUTION INTRAMUSCULAR; INTRAVENOUS; SUBCUTANEOUS EVERY 5 MIN PRN
Status: DISCONTINUED | OUTPATIENT
Start: 2025-05-06 | End: 2025-05-07 | Stop reason: HOSPADM

## 2025-05-06 RX ORDER — LABETALOL HYDROCHLORIDE 5 MG/ML
10 INJECTION, SOLUTION INTRAVENOUS
Status: DISCONTINUED | OUTPATIENT
Start: 2025-05-06 | End: 2025-05-07 | Stop reason: HOSPADM

## 2025-05-06 RX ADMIN — FENTANYL CITRATE 25 MCG: 50 INJECTION, SOLUTION INTRAMUSCULAR; INTRAVENOUS at 07:59

## 2025-05-06 RX ADMIN — PROPOFOL 30 MG: 10 INJECTION, EMULSION INTRAVENOUS at 07:56

## 2025-05-06 RX ADMIN — PROPOFOL 70 MCG/KG/MIN: 10 INJECTION, EMULSION INTRAVENOUS at 07:56

## 2025-05-06 RX ADMIN — PROPOFOL 20 MG: 10 INJECTION, EMULSION INTRAVENOUS at 08:32

## 2025-05-06 RX ADMIN — ONDANSETRON 4 MG: 2 INJECTION INTRAMUSCULAR; INTRAVENOUS at 07:56

## 2025-05-06 RX ADMIN — ACETAMINOPHEN 975 MG: 325 TABLET ORAL at 07:14

## 2025-05-06 RX ADMIN — SODIUM CHLORIDE, SODIUM LACTATE, POTASSIUM CHLORIDE, CALCIUM CHLORIDE: 600; 310; 30; 20 INJECTION, SOLUTION INTRAVENOUS at 07:54

## 2025-05-06 NOTE — ANESTHESIA PREPROCEDURE EVALUATION
Anesthesia Pre-Procedure Evaluation    Patient: Karine Moss   MRN: [id number] : 1955        Procedure : Procedure(s):  RIGHT OPEN CARPAL TUNNEL RELEASE  RIGHT THUMB, INDEX, AND MIDDLE TRIGGER FINGER RELEASES          Past Medical History:   Diagnosis Date    Arthritis     Bacterial vaginosis 2024    Basal cell carcinoma     Depression 1991    after 's death    Diabetes mellitus (H)     Diabetic renal disease (H)     microalbuminuria    DJD (degenerative joint disease) of knee     Gastrointestinal hemorrhage associated with gastric ulcer 2020    H/O vitamin D deficiency     Hypertension     Hypoglycemia 2024    Low back pain     Obstructive sleep apnea     Pancreatitis     related to Victoza, also on Xyrem    Panic     RLS (restless legs syndrome)     Upper GI bleed 2021      Past Surgical History:   Procedure Laterality Date    COLONOSCOPY  10/01/2020    poor quality prep    COLONOSCOPY N/A 2024    Procedure: COLONOSCOPY, WITH POLYPECTOMY AND BIOPSY;  Surgeon: Andreina Layne MD;  Location: [facility]    ear surgery   and 2004    ESOPHAGOSCOPY, GASTROSCOPY, DUODENOSCOPY (EGD), COMBINED N/A 2021    Procedure: ESOPHAGOGASTRODUODENOSCOPY (EGD);  Surgeon: Jayla Calvo MD;  Location: [facility]    ESOPHAGOSCOPY, GASTROSCOPY, DUODENOSCOPY (EGD), COMBINED N/A 2023    Procedure: ESOPHAGOGASTRODUODENOSCOPY, WITH BIOPSY;  Surgeon: Brandon Witt MD;  Location: [facility]    ESOPHAGOSCOPY, GASTROSCOPY, DUODENOSCOPY (EGD), COMBINED N/A 2024    Procedure: Esophagoscopy, gastroscopy, duodenoscopy (EGD), combined;  Surgeon: Andreina Layne MD;  Location: [facility]    GASTRIC BYPASS  2013    laparoscopic jimmy en y c ometopexy    HEMORRHOIDECTOMY  2000    LAPAROSCOPIC CHOLECYSTECTOMY  2015    LASIK BILATERAL      UVULOPALATOPHARYNGOPLASTY      UVVP        Allergies   Allergen Reactions    Pravastatin Other (See Comments)     woozy    Codeine Nausea and  Vomiting    Nsaids GI Disturbance and Other (See Comments)     Pt had bariatric surgery, should not ever take oral NSAIDS due to risk of gastric ulcers.  Pt had bariatric surgery, should not ever take oral NSAIDS due to risk of gastric ulcers.      Social History     Tobacco Use    Smoking status: Never     Passive exposure: Never    Smokeless tobacco: Never   Substance Use Topics    Alcohol use: Not Currently     Comment: rare      Wt Readings from Last 1 Encounters:   05/01/25 67.1 kg (148 lb)        Anesthesia Evaluation   Pt has had prior anesthetic. Type: General.    History of anesthetic complications  - PONV.      ROS/MED HX  ENT/Pulmonary:     (+) sleep apnea, doesn't use CPAP,                                      Neurologic:  - neg neurologic ROS     Cardiovascular:     (+)  hypertension- -   -  - -      CHF                     valvular problems/murmurs      Previous cardiac testing   Echo: Date: 5/4/24 Results:  Global and regional left ventricular function is normal with an EF of 55-60%.  Basal septal thickning is present.  Right ventricular function, chamber size, wall motion, and thickness are  normal.  Mild to moderate mitral annular calcification is present.  No pericardial effusion is present.  Stress Test:  Date: Results:    ECG Reviewed:  Date: 10/15/22 Results:  NSR  Cath:  Date: Results:      METS/Exercise Tolerance:     Hematologic:  - neg hematologic  ROS     Musculoskeletal:  - neg musculoskeletal ROS     GI/Hepatic:       Renal/Genitourinary:     (+) renal disease, type: CRI,            Endo:     (+)  type II DM, Last HgA1c: 7.4, date: 1/29/25, Not using insulin,          Obesity,       Psychiatric/Substance Use:  - neg psychiatric ROS     Infectious Disease:       Malignancy:   (+) Malignancy,     Other:               OUTSIDE LABS:  CBC:   Lab Results   Component Value Date    WBC 7.8 02/24/2025    WBC 7.6 10/09/2024    HGB 11.6 (L) 02/24/2025    HGB 11.3 (L) 10/09/2024    HCT 36.8  "02/24/2025    HCT 36.6 10/09/2024     02/24/2025     10/09/2024     BMP:   Lab Results   Component Value Date     02/24/2025     01/29/2025    POTASSIUM 4.6 02/24/2025    POTASSIUM 5.2 01/29/2025    CHLORIDE 106 02/24/2025    CHLORIDE 102 01/29/2025    CO2 26 02/24/2025    CO2 27 01/29/2025    BUN 27.9 (H) 02/24/2025    BUN 32.9 (H) 01/29/2025    CR 1.61 (H) 02/24/2025    CR 1.84 (H) 01/29/2025     (H) 02/24/2025     (H) 01/29/2025     COAGS:   Lab Results   Component Value Date    INR 1.10 11/15/2021     POC: No results found for: \"BGM\", \"HCG\", \"HCGS\"  HEPATIC:   Lab Results   Component Value Date    ALBUMIN 4.2 02/24/2025    PROTTOTAL 7.8 01/29/2025    ALT 18 01/29/2025    AST 24 01/29/2025    ALKPHOS 92 01/29/2025    BILITOTAL 0.5 01/29/2025     OTHER:   Lab Results   Component Value Date    LACT 2.2 (H) 11/16/2021    A1C 7.4 (H) 01/29/2025    KINJAL 9.7 02/24/2025    PHOS 3.5 02/24/2025    MAG 1.7 12/09/2022    LIPASE 249 12/03/2013    TSH 1.01 06/12/2023       Anesthesia Plan    ASA Status:  3    NPO Status:  NPO Appropriate    Anesthesia Type: MAC.     - Reason for MAC: straight local not clinically adequate   Induction: Intravenous, Propofol.   Maintenance: TIVA.        Consents          - Extended Intubation/Ventilatory Support Discussed: No.      - Patient is DNR/DNI Status: No     Use of blood products discussed: No .     Postoperative Care    Pain management: IV analgesics, Multi-modal analgesia, Oral pain medications.   PONV prophylaxis: Background Propofol Infusion, Ondansetron (or other 5HT-3), Dexamethasone or Solumedrol     Comments:               Brendan Nielson MD    Clinically Significant Risk Factors Present on Admission                   # Hypertension: Noted on problem list  # Chronic heart failure with preserved ejection fraction: heart failure noted on problem list and last echo with EF >50%         # DMII: A1C = N/A within past 6 months    # Overweight: " "Estimated body mass index is 26.22 kg/m  as calculated from the following:    Height as of 5/1/25: 1.6 m (5' 3\").    Weight as of 5/1/25: 67.1 kg (148 lb).       # Financial/Environmental Concerns:             "

## 2025-05-06 NOTE — ANESTHESIA CARE TRANSFER NOTE
Patient: Karine Moss    Procedure: Procedure(s):  RIGHT OPEN CARPAL TUNNEL RELEASE  RIGHT THUMB, INDEX, AND MIDDLE TRIGGER FINGER RELEASES       Diagnosis: Carpal tunnel syndrome, right [G56.01]  Trigger finger, acquired [M65.30]  Diagnosis Additional Information: No value filed.    Anesthesia Type:   MAC     Note:    Oropharynx: oropharynx clear of all foreign objects and spontaneously breathing  Level of Consciousness: awake  Oxygen Supplementation: room air    Independent Airway: airway patency satisfactory and stable  Dentition: dentition unchanged  Vital Signs Stable: post-procedure vital signs reviewed and stable  Report to RN Given: handoff report given  Patient transferred to: Phase II    Handoff Report: Identifed the Patient, Identified the Reponsible Provider, Reviewed the pertinent medical history, Discussed the surgical course, Reviewed Intra-OP anesthesia mangement and issues during anesthesia, Set expectations for post-procedure period and Allowed opportunity for questions and acknowledgement of understanding      Vitals:  Vitals Value Taken Time   /64    Temp 36.4    Pulse 74    Resp 16    SpO2 92 % 05/06/25 0842   Vitals shown include unfiled device data.    Electronically Signed By: PARIS oTth CRNA  May 6, 2025  8:43 AM

## 2025-05-06 NOTE — OP NOTE
OPERATIVE REPORT    PATIENT NAME: Karine Moss  MRN: [id number]    DATE: 5/6/2025    PREOPERATIVE DIAGNOSIS:   1. Right wrist carpal tunnel syndrome.  2. Right thumb trigger finger  3. Right index trigger finger  4. Right middle trigger finger    POSTOPERATIVE DIAGNOSIS:   1. Right wrist carpal tunnel syndrome.  2. Right thumb trigger finger  3. Right index trigger finger  4. Right middle trigger finger    OPERATION:   1. Right wrist carpal tunnel release. With amyloidosis biopsy  2. Right thumb trigger finger release  3. Right index trigger finger release  4. Right middle trigger finger release    SURGEON: Briana Renteria MD     ASSISTANT(S): Soledad Martin PA-C    The physician assistant was necessary for the procedure. They placed and held retractors to provide adequate exposure that allowed the case to proceed in a safe, efficient manner. They assisted in identifying and protecting important structures. They ligated blood vessels to maintain hemostasis and minimize bleeding risk. They suctioned fluids when needed. They performed or assisted with wound closure of the operative incisions. An experienced physician assistant was medically necessary to ensure a safe and efficient procedure. The assistance that they provided decreased operative time and thereby, reduced the risk of infection and complications from prolonged time of anesthesia. Their assistance was vital in achieving best practices. No qualified residents or fellows were available to assist with this case.       STAFF: Circulator: Alyssa Mckeon RN  Scrub Person: Claire Latif    ANESTHESIA:  Local and IV sedation    IMPLANT(S): * No implants in log *    SPECIMEN:   ID Type Source Tests Collected by Time Destination   1 : Tenosynovium Tissue Hand, Right SURGICAL PATHOLOGY EXAM Briana Renteria MD 5/6/2025  8:15 AM        ESTIMATED BLOOD LOSS: 5 mL.    FLUIDS: See anesthesia records.     URINE OUTPUT: Not applicable.  Fitzpatrick was not placed.      TOURNIQUET TIME: 14 minutes.    COMPLICATIONS: None.     INDICATIONS: Karine Moss is a 70 year old female who developed right carpal tunnel syndrome and right index/middle/thumb trigger finger. The patient did not respond to conservative management and was therefore indicated for operative intervention. The risks, benefits, and alternatives were discussed with the patient.  The patient verbalized understanding of the treatment plan and an informed consent was signed.     DESCRIPTION OF PROCEDURE: The patient was taken to the operating room and placed in supine position on the operating table. Anesthesia was administered by the Department of Anesthesia. A forearm  tourniquet was applied to the right upper extremity, which was then prepped and draped in the usual sterile fashion.  A timeout was performed with all OR staff.  The patient name, MRN, operative extremity, procedure, allergies, antibiotics, DVT prophylaxis, and fire precautions/plan were reviewed, and all were in agreement. The extremity was exsanguinated with an Esmarch bandage and the tourniquet was inflated to 200 mmHg.    A longitudinal incision was made in the palm in-line with the 4th ray, just radial to the hook of hamate.  The incision extended from the distal wrist crease to Ruby's cardinal line. The skin and the subcutaneous tissue were sharply incised.  Dissection was carried down to the palmar fascia, which was incised followed by release of the transverse carpal ligament.  Distally the sentinel fat pad and superficial arch were identified and proximally the dissection was carried out under direct visualization to divide the antebrachial fascia and remainder of the transverse carpal ligament. A complete release was confirmed and exploration of the carpal canal revealed no masses. The median nerve and its branches were intact.      A longitudinal incision was made over the volar aspect of the MCP joint/metacarpal head at the level of  the distal palmar crease of the middle finger.  Skin and subcutaneous tissues were sharply incised. Blunt dissection was carried down to the level of the A1 pulley.  Soft tissues were elevated off the A1 pulley. The radial and ulnar digital neurovascular bundles were protected.  The A1 pulley was incised.  The A2 pulley was preserved. The FDS and FDP were noted to have independent glide. There was no palpable trigger with passive flexion/extension of the digit.    A longitudinal incision was made over the volar aspect of the MCP joint/metacarpal head at the level of the distal palmar crease of the index finger.  Skin and subcutaneous tissues were sharply incised. Blunt dissection was carried down to the level of the A1 pulley.  Soft tissues were elevated off the A1 pulley. The radial and ulnar digital neurovascular bundles were protected.  The A1 pulley was incised.  The A2 pulley was preserved. The FDS and FDP were noted to have independent glide. There was no palpable trigger with passive flexion/extension of the digit.    A transverse incision was made over the volar aspect of the thumb in the MCP joint crease.  Skin and subcutaneous tissues were sharply incised. Blunt dissection was carried down to the level of the A1 pulley.  Soft tissues were elevated off the A1 pulley. The radial and ulnar digital nerves were identified and protected.  The A1 pulley was incised.  The oblique pulley was preserved. Traction tenolysis was performed. The FPL was noted to have independent glide.    The tourniquet was let down. The wounds were irrigated.  Hemostasis was achieved. Closure was performed with 4-0 Monocryl suture for the carpal tunnel and 4-0 Vicryl rapide for the fingers.  Sterile dressings were applied.  Capillary refill was intact < 2 seconds.      The patient was aroused from anesthesia and taken to the recovery room in stable condition.      All counts were correct at the end of the case.    There were no  complications.    POSTOPERATIVE PLAN: return to clinic in 1-2 weeks for wound check    LILIAN RO MD

## 2025-05-06 NOTE — DISCHARGE INSTRUCTIONS
Date: 2025    DIAGNOSIS  No diagnosis found.    MEDICATIONS   Resume all home medications as directed unless otherwise instructed during this hospitalization. If there is any question, double check with your primary care provider.  Start new discharge medications as directed.    Take 1 tablet of 650 mg Tylenol (acetaminophen) Arthritis Strength (extended release) and 1 tablet of Aleve (naproxen) 220 mg in the morning with breakfast and in the evening with dinner.     For breakthrough pain use narcotic pain medication as prescribed.    Do not drive or operate machinery while taking narcotic pain medications.   If you are taking other Tylenol containing medicines at home, be sure NOT to exceed 4 gram's (4000 milligrams) of Tylenol per day.   If you are taking pain medications, be sure to take Colace (docusate sodium) as well to prevent constipation. If constipated, try adding another cathartic or enema.  If nausea and vomiting, call the hospital or seek medical attention.    ACTIVITY   Weight bearin lb coffee cup weight bearing to operative extremity    DIET  Resume same diet prior to your hospital admission.    WOUND   Leave dressing on until you are seen in clinic for your follow up visit.   Watch for signs and symptoms of infection of your wounds including; pain, redness, swelling, drainage or fever.  If you notice any of these symptoms please call or seek medical attention.    Keep wound clean, dry, and intact.  Do not submerge wounds in water until they are healed. No baths, soaking, swimming, or prolonged water exposure for 4 weeks after surgery.    RETURN   Follow-up with Orthopedic Clinic as directed.     Future Appointments   Date Time Provider Department Center   2025 12:30 PM Rojas Johnson OT [facility]    2025  3:00 PM Soledad Martin PA-C [facility]   [facility]  9:30 AM  [facility]   2025 10:30 AM Ela Huynh NP [facility]   2025  4:00 PM  [facility]   7/29/2025 10:40 AM Anay Martinez MD [facility]   12/2/2025  3:00 PM Shandra Dhillon MD [facility]       Call the [facility] at [phone] during business hours for any symptoms such as:    * Fevers with Temperature greater than 101.5 degrees.   * Pus drainage from wound site.   * Severe pain, not controlled by medication.   * Persistent nausea, vomiting and inablility to tolerate fluids.    If you are receiving care in [place], you may call the Orthopedic clinic at [phone].    FOR URGENT PROBLEMS ONLY, after hours or on weekends call the hospital  at [phone] and ask to speak with the orthopedic resident on call.   [facility]  Home Care Following Anesthesia  For 24 hours after surgery:  Get plenty of rest.  A responsible adult must stay with you for at least 24 hours after you leave the surgery center.  Do not drive or use heavy equipment.  If you have weakness or tingling, don't drive or use heavy equipment until this feeling goes away.   Do not drink alcohol.   Avoid strenuous or risky activities.  Ask for help when climbing stairs.  You may feel lightheaded.  IF so, sit for a few minutes before standing.  Have someone help you get up.   If you have nausea (feel sick to your stomach): Drink only clear liquids such as apple juice, ginger ale, broth or 7-Up.  Rest may also help.  Be sure to drink enough fluids.  Move to a regular diet as you feel able.   You may have a slight fever.  Call the doctor if your fever is over 100 F (37.7 C) (taken under the tongue) or lasts longer than 24 hours.  You may have a dry mouth, a sore throat, muscle aches or trouble sleeping. These should go away after 24 hours.  Do not make important or legal decisions.   It is recommended to avoid smoking.               Tips for taking pain medications  To get the best pain relief possible,  remember these points:  Take pain medications as directed, before pain becomes severe.  Pain medication can upset your stomach: taking it with food may help.  Constipation is a common side effect of pain medication. Drink plenty of  fluids.  Eat foods high in fiber. Take a stool softener if recommended by your doctor or pharmacist.  Do not drink alcohol, drive or operate machinery while taking pain medications.  Ask about other ways to control pain, such as with heat, ice or relaxation.    Tylenol/Acetaminophen Consumption    If you feel your pain relief is insufficient, you may take Tylenol/Acetaminophen in addition to your narcotic pain medication.   Be careful not to exceed 4,000 mg of Tylenol/Acetaminophen in a 24 hour period from all sources.  If you are taking extra strength Tylenol/acetaminophen (500 mg), the maximum dose is 8 tablets in 24 hours.  If you are taking regular strength acetaminophen (325 mg), the maximum dose is 12 tablets in 24 hours.    Call a doctor for any of the following:  Signs of infection (fever, growing tenderness at the surgery site, a large amount of drainage or bleeding, severe pain, foul-smelling drainage, redness, swelling).  It has been over 8 to 10 hours since surgery and you are still not able to urinate (pass water).  Headache for over 24 hours.  Numbness, tingling or weakness the day after surgery (if you had spinal anesthesia).  Signs of Covid-19 infection (temperature over 100 degrees, shortness of breath, cough, loss of taste/smell, generalized body aches, persistent headache, chills, sore throat, nausea/vomiting/diarrhea)    Your doctor is:       Dr. Briana Renteria, Orthopaedics: [phone]               After hours and weekends call the hospital @ [phone] and ask for the resident on call for:  Orthopaedics  For emergency care, call the:  [facility]:  [phone] (TTY for hearing impaired: [phone])

## 2025-05-06 NOTE — ANESTHESIA POSTPROCEDURE EVALUATION
Patient: Karine Moss    Procedure: Procedure(s):  RIGHT OPEN CARPAL TUNNEL RELEASE  RIGHT THUMB, INDEX, AND MIDDLE TRIGGER FINGER RELEASES       Anesthesia Type:  MAC    Note:  Disposition: Outpatient   Postop Pain Control: Uneventful            Sign Out: Well controlled pain   PONV: No   Neuro/Psych: Uneventful            Sign Out: Acceptable/Baseline neuro status   Airway/Respiratory: Uneventful            Sign Out: Acceptable/Baseline resp. status   CV/Hemodynamics: Uneventful            Sign Out: Acceptable CV status; No obvious hypovolemia; No obvious fluid overload   Other NRE: NONE   DID A NON-ROUTINE EVENT OCCUR? No           Last vitals:  Vitals Value Taken Time   /72 05/06/25 0915   Temp 36.4  C (97.6  F) 05/06/25 0840   Pulse 74 05/06/25 0915   Resp 16 05/06/25 0900   SpO2 97 % 05/06/25 0909   Vitals shown include unfiled device data.    Electronically Signed By: Cris Washburn MD  May 6, 2025  9:18 AM

## 2025-05-14 ENCOUNTER — OFFICE VISIT (OUTPATIENT)
Dept: ORTHOPEDICS | Facility: CLINIC | Age: 70
End: 2025-05-14
Payer: COMMERCIAL

## 2025-05-14 DIAGNOSIS — G56.01 CARPAL TUNNEL SYNDROME, RIGHT: Primary | ICD-10-CM

## 2025-05-14 DIAGNOSIS — Z98.890 POST-OPERATIVE STATE: ICD-10-CM

## 2025-05-14 DIAGNOSIS — M65.30 TRIGGER FINGER, ACQUIRED: ICD-10-CM

## 2025-05-14 NOTE — LETTER
5/14/2025      Karine Moss  [address]  [facility] [address]      Dear Colleague,    Thank you for referring your patient, Karine Moss, to the [facility] [place]. Please see a copy of my visit note below.    Date of Service: May 14, 2025    Reason for visit: Postoperative follow-up    Date of Surgery: 5/6/2025    Procedure Performed:   1. Right wrist carpal tunnel release. With amyloidosis biopsy  2. Right thumb trigger finger release  3. Right index trigger finger release  4. Right middle trigger finger release    Surgeon: Dr. Briana Renteria    Interval events: Karine Moss presents for postoperative follow-up today. Trigger has resolved.  Numbness and tingling is improved no fevers or chills. No longer taking pain medication. No other issues to report.    Physical examination:  Well-developed, well-nourished and in no acute distress.  Alert and oriented to surroundings.  On examination of the right hand, all incisions are well-approximated, exception of the thumb incision 1 suture has torn and there is superficial wound dehiscence.  No erythema or drainage.. There is no erythema, drainage, or dehiscence. Sensation is intact in median, radial and ulnar nerve distributions. Thumb opposition is intact. NO further triggering. Patient can actively flex and extend all digits and thumb. Interosseous muscles, EPL, and FDP-2 fire. Fingers are warm and well-perfused.     Assessment: Karine Moss is a 70 year old y/o female who presents s/p Right wrist carpal tunnel release (With amyloidosis biopsy), Right thumb trigger finger release, Right index trigger finger release, Right middle trigger finger release.  Urschel superficial wound dehiscence to the thumb.    Plan: Pathology results reviewed negative for amyloid.  Patient should continue to wash wound with soap and water daily and pat dry.  Should cover the thumb wound with antibiotic ointment and a Band-Aid  until it is fully healed.  Discussed signs of infection or worsening wound dehiscence that would prompt return to clinic.  No immersing the wound in water for prolonged periods such as tub baths or dishwashing without gloves until wound has healed. Do not lift anything heavier than a coffee cup or do forceful gripping with the operative hand until the wound has healed as this may split the wound open. This will typically take 1-2 more weeks. May use the hand for light activities like eating, shaving, typing, and brushing your teeth. After the wound has completley healed, may progress activity gradually according to comfort level, but heavy lifting (> 10 pounds),  forceful gripping, and weight bearing directly on the palm (such as pushups) are discouraged for the first 6 weeks after surgery to give the area time to heal. Painful activities should be avoided. The patient will follow-up at 6 weeks postop if they have any questions or concerns regarding their continued progress. However, if they are back to full activity, are satisfied with the outcome of surgery, and have no remaining questions at that time, this visit may be deferred.     SOLEDAD BARRON PA-C  Orthopaedic Surgery    Again, thank you for allowing me to participate in the care of your patient.        Sincerely,        Soledad Barron PA-C    Electronically signed

## 2025-05-14 NOTE — NURSING NOTE
"Reason For Visit:   Chief Complaint   Patient presents with    Surgical Followup     Post op Right Open Carpal Tunnel Release - Right   Right Thumb, Index, And Middle Trigger Finger Releases - Right  DOS: 5/6/25            Primary MD: Anay Martinez  Ref. MD: Eber    Age: 70 year old    ?  No      There were no vitals taken for this visit.      Pain Assessment  Patient Currently in Pain: Yes  Primary Pain Location: Hand (Right)  Pain Descriptors: Sore, Intermittent (skin feels \"tight\")    Hand Dominance Evaluation  Hand Dominance: Right          QuickDASH Assessment      4/14/2025    10:02 AM   Quick[facility] Main   1. Open a tight or new jar Unable   2. Do heavy household chores (e.g., wash walls, floors) Severe difficulty   3. Carry a shopping bag or briefcase Mild difficulty   4. Wash your back Unable   5. Use a knife to cut food No difficulty   6. Recreational activities in which you take some force or impact through your arm, shoulder or hand (e.g., golf, hammering, tennis, etc.) Unable to answer   7. During the past week, to what extent has your arm, shoulder or hand problem interfered with your normal social activities with family, friends, neighbours or groups Slightly          Current Outpatient Medications   Medication Sig Dispense Refill    amphetamine-dextroamphetamine (ADDERALL) 30 MG tablet Take 1-2 tablets 60 tablet 0    atorvastatin (LIPITOR) 20 MG tablet Take 1 tablet (20 mg) by mouth daily. 90 tablet 3    blood glucose (NO BRAND SPECIFIED) lancets standard Use to test blood sugar 1 times daily or as directed. 90 Lancet 3    blood glucose (NO BRAND SPECIFIED) test strip Use to test blood sugar 1 times daily or as directed. 100 strip 3    blood glucose calibration (NO BRAND SPECIFIED) solution Use to calibrate blood glucose monitor as needed as directed. 1 each 3    blood glucose monitoring (NO BRAND SPECIFIED) meter device kit Use to test blood sugar 1 times daily or as directed. 1 kit " 0    Calcium Acetate 667 MG TABS Take 1 tablet by mouth 2 times daily for 360 days 180 tablet 3    cevimeline (EVOXAC) 30 MG capsule take 1 cap  capsule 3    cholecalciferol (VITAMIN D3) 125 mcg (5000 units) capsule Take 125 mcg by mouth daily      COMPOUNDED NON-CONTROLLED SUBSTANCE (CMPD RX) - PHARMACY TO MIX COMPOUNDED MEDICATION Wart peel in remedium sig apply to warts at night as tolerated (Patient not taking: Reported on 5/1/2025) 5 g 5    COMPOUNDED NON-CONTROLLED SUBSTANCE (CMPD RX) - PHARMACY TO MIX COMPOUNDED MEDICATION Estradiol 0.0075% in HRT cream  Apply 2 grams,  intravaginally and small amount externally daily for one week then twice weekly for maintenance. 45 g 11    Continuous Glucose Sensor (FREESTYLE JORDIN 3 PLUS SENSOR) MISC Use 1 sensor every 15 days. Use to read blood sugars per 's instructions. 6 each 3    cyanocobalamin (VITAMIN B-12) 1000 MCG tablet Take 1 tablet by mouth every other day. 90 tablet 0    DULoxetine (CYMBALTA) 30 MG capsule Take 1 capsule (30 mg) by mouth 2 times daily. 180 capsule 3    DULoxetine (CYMBALTA) 60 MG capsule Take 1 60mg dose, along with 30mg dose q hs 90 capsule 3    Ferrous Bisglycinate Chelate (EASY IRON) 28 MG CAPS Take 1 capsule by mouth three times a week.      finasteride (PROSCAR) 5 MG tablet Take 1 tablet daily 90 tablet 3    losartan (COZAAR) 50 MG tablet Take one daily 90 tablet 3    metFORMIN (GLUCOPHAGE XR) 500 MG 24 hr tablet Take 2 po q am with breakfast. 180 tablet 0    omeprazole (PRILOSEC) 40 MG DR capsule Take 40mg twice daily 180 capsule 3    pregabalin (LYRICA) 100 MG capsule Take 200 mg by mouth daily. Starting Wednesday an additional 100 mg      tolterodine ER (DETROL LA) 4 MG 24 hr capsule Take 1 capsule (4 mg) by mouth daily. 90 capsule 3    triamcinolone (KENALOG) 0.1 % paste Apply to tongue twice daily x 10 days 5 g 0       Allergies   Allergen Reactions    Pravastatin Other (See Comments)     woozy    Codeine Nausea  and Vomiting    Nsaids GI Disturbance and Other (See Comments)     Pt had bariatric surgery, should not ever take oral NSAIDS due to risk of gastric ulcers.  Pt had bariatric surgery, should not ever take oral NSAIDS due to risk of gastric ulcers.       Le Mack, ATC

## 2025-05-14 NOTE — PROGRESS NOTES
Date of Service: May 14, 2025    Reason for visit: Postoperative follow-up    Date of Surgery: 5/6/2025    Procedure Performed:   1. Right wrist carpal tunnel release. With amyloidosis biopsy  2. Right thumb trigger finger release  3. Right index trigger finger release  4. Right middle trigger finger release    Surgeon: Dr. Briana Renteria    Interval events: Karine Moss presents for postoperative follow-up today. Trigger has resolved.  Numbness and tingling is improved no fevers or chills. No longer taking pain medication. No other issues to report.    Physical examination:  Well-developed, well-nourished and in no acute distress.  Alert and oriented to surroundings.  On examination of the right hand, all incisions are well-approximated, exception of the thumb incision 1 suture has torn and there is superficial wound dehiscence.  No erythema or drainage.. There is no erythema, drainage, or dehiscence. Sensation is intact in median, radial and ulnar nerve distributions. Thumb opposition is intact. NO further triggering. Patient can actively flex and extend all digits and thumb. Interosseous muscles, EPL, and FDP-2 fire. Fingers are warm and well-perfused.     Assessment: Karine Moss is a 70 year old y/o female who presents s/p Right wrist carpal tunnel release (With amyloidosis biopsy), Right thumb trigger finger release, Right index trigger finger release, Right middle trigger finger release.  Urschel superficial wound dehiscence to the thumb.    Plan: Pathology results reviewed negative for amyloid.  Patient should continue to wash wound with soap and water daily and pat dry.  Should cover the thumb wound with antibiotic ointment and a Band-Aid until it is fully healed.  Discussed signs of infection or worsening wound dehiscence that would prompt return to clinic.  No immersing the wound in water for prolonged periods such as tub baths or dishwashing without gloves until wound has healed. Do not lift  anything heavier than a coffee cup or do forceful gripping with the operative hand until the wound has healed as this may split the wound open. This will typically take 1-2 more weeks. May use the hand for light activities like eating, shaving, typing, and brushing your teeth. After the wound has completley healed, may progress activity gradually according to comfort level, but heavy lifting (> 10 pounds),  forceful gripping, and weight bearing directly on the palm (such as pushups) are discouraged for the first 6 weeks after surgery to give the area time to heal. Painful activities should be avoided. The patient will follow-up at 6 weeks postop if they have any questions or concerns regarding their continued progress. However, if they are back to full activity, are satisfied with the outcome of surgery, and have no remaining questions at that time, this visit may be deferred.     CLAYTON BARRON PA-C  Orthopaedic Surgery

## 2025-05-21 ENCOUNTER — MYC REFILL (OUTPATIENT)
Dept: SLEEP MEDICINE | Facility: CLINIC | Age: 70
End: 2025-05-21

## 2025-05-21 ENCOUNTER — OFFICE VISIT (OUTPATIENT)
Dept: NEPHROLOGY | Facility: CLINIC | Age: 70
End: 2025-05-21
Payer: COMMERCIAL

## 2025-05-21 ENCOUNTER — LAB (OUTPATIENT)
Dept: LAB | Facility: CLINIC | Age: 70
End: 2025-05-21
Payer: COMMERCIAL

## 2025-05-21 VITALS
BODY MASS INDEX: 25.81 KG/M2 | SYSTOLIC BLOOD PRESSURE: 122 MMHG | WEIGHT: 151.2 LBS | DIASTOLIC BLOOD PRESSURE: 72 MMHG | HEART RATE: 85 BPM | HEIGHT: 64 IN | TEMPERATURE: 98 F | OXYGEN SATURATION: 97 %

## 2025-05-21 DIAGNOSIS — D63.1 ANEMIA IN STAGE 3B CHRONIC KIDNEY DISEASE (H): ICD-10-CM

## 2025-05-21 DIAGNOSIS — N18.32 TYPE 2 DIABETES MELLITUS WITH STAGE 3B CHRONIC KIDNEY DISEASE, WITHOUT LONG-TERM CURRENT USE OF INSULIN (H): ICD-10-CM

## 2025-05-21 DIAGNOSIS — G47.419 NARCOLEPSY WITHOUT CATAPLEXY(347.00): ICD-10-CM

## 2025-05-21 DIAGNOSIS — N18.32 STAGE 3B CHRONIC KIDNEY DISEASE (H): ICD-10-CM

## 2025-05-21 DIAGNOSIS — E11.22 TYPE 2 DIABETES MELLITUS WITH STAGE 3B CHRONIC KIDNEY DISEASE, WITHOUT LONG-TERM CURRENT USE OF INSULIN (H): ICD-10-CM

## 2025-05-21 DIAGNOSIS — E55.9 VITAMIN D DEFICIENCY: ICD-10-CM

## 2025-05-21 DIAGNOSIS — N18.32 ANEMIA IN STAGE 3B CHRONIC KIDNEY DISEASE (H): ICD-10-CM

## 2025-05-21 DIAGNOSIS — E11.22 TYPE 2 DIABETES MELLITUS WITH STAGE 3B CHRONIC KIDNEY DISEASE, WITHOUT LONG-TERM CURRENT USE OF INSULIN (H): Primary | ICD-10-CM

## 2025-05-21 DIAGNOSIS — N18.32 TYPE 2 DIABETES MELLITUS WITH STAGE 3B CHRONIC KIDNEY DISEASE, WITHOUT LONG-TERM CURRENT USE OF INSULIN (H): Primary | ICD-10-CM

## 2025-05-21 LAB
ALBUMIN MFR UR ELPH: 13.6 MG/DL
ALBUMIN SERPL BCG-MCNC: 4 G/DL (ref 3.5–5.2)
ANION GAP SERPL CALCULATED.3IONS-SCNC: 11 MMOL/L (ref 7–15)
BUN SERPL-MCNC: 34.6 MG/DL (ref 8–23)
CALCIUM SERPL-MCNC: 9.2 MG/DL (ref 8.8–10.4)
CHLORIDE SERPL-SCNC: 110 MMOL/L (ref 98–107)
CREAT SERPL-MCNC: 1.89 MG/DL (ref 0.51–0.95)
CREAT UR-MCNC: 73.7 MG/DL
EGFRCR SERPLBLD CKD-EPI 2021: 28 ML/MIN/1.73M2
EST. AVERAGE GLUCOSE BLD GHB EST-MCNC: 140 MG/DL
FERRITIN SERPL-MCNC: 127 NG/ML (ref 11–328)
GLUCOSE SERPL-MCNC: 144 MG/DL (ref 70–99)
HBA1C MFR BLD: 6.5 %
HCO3 SERPL-SCNC: 21 MMOL/L (ref 22–29)
HGB BLD-MCNC: 11.4 G/DL (ref 11.7–15.7)
IRON BINDING CAPACITY (ROCHE): 289 UG/DL (ref 240–430)
IRON SATN MFR SERPL: 24 % (ref 15–46)
IRON SERPL-MCNC: 70 UG/DL (ref 37–145)
MCV RBC AUTO: 95 FL (ref 78–100)
PHOSPHATE SERPL-MCNC: 3.8 MG/DL (ref 2.5–4.5)
POTASSIUM SERPL-SCNC: 4.7 MMOL/L (ref 3.4–5.3)
PROT/CREAT 24H UR: 0.18 MG/MG CR (ref 0–0.2)
PTH-INTACT SERPL-MCNC: 72 PG/ML (ref 15–65)
SODIUM SERPL-SCNC: 142 MMOL/L (ref 135–145)
VIT D+METAB SERPL-MCNC: 84 NG/ML (ref 20–50)

## 2025-05-21 PROCEDURE — 83970 ASSAY OF PARATHORMONE: CPT | Performed by: PATHOLOGY

## 2025-05-21 PROCEDURE — G0463 HOSPITAL OUTPT CLINIC VISIT: HCPCS

## 2025-05-21 PROCEDURE — 85018 HEMOGLOBIN: CPT | Performed by: PATHOLOGY

## 2025-05-21 PROCEDURE — 84156 ASSAY OF PROTEIN URINE: CPT | Performed by: PATHOLOGY

## 2025-05-21 PROCEDURE — 82306 VITAMIN D 25 HYDROXY: CPT

## 2025-05-21 PROCEDURE — 36415 COLL VENOUS BLD VENIPUNCTURE: CPT | Performed by: PATHOLOGY

## 2025-05-21 PROCEDURE — 80069 RENAL FUNCTION PANEL: CPT | Performed by: PATHOLOGY

## 2025-05-21 PROCEDURE — 83540 ASSAY OF IRON: CPT | Performed by: PATHOLOGY

## 2025-05-21 PROCEDURE — 82728 ASSAY OF FERRITIN: CPT | Performed by: PATHOLOGY

## 2025-05-21 PROCEDURE — 83036 HEMOGLOBIN GLYCOSYLATED A1C: CPT

## 2025-05-21 PROCEDURE — 99000 SPECIMEN HANDLING OFFICE-LAB: CPT | Performed by: PATHOLOGY

## 2025-05-21 PROCEDURE — 83550 IRON BINDING TEST: CPT | Performed by: PATHOLOGY

## 2025-05-21 ASSESSMENT — PAIN SCALES - GENERAL: PAINLEVEL_OUTOF10: NO PAIN (0)

## 2025-05-21 NOTE — LETTER
5/21/2025       RE: Karine Moss  [address]  [facility] [address]     Dear Colleague,    Thank you for referring your patient, Karine Moss, to the [facility] [place] at [facility]. Please see a copy of my visit note below.    Nephrology Clinic Visit 5/21/25    Assessment and Plan:    CKD3b w/proteinuria - Stable. Creat 1.8 eGFR 34 ml/mn, UPCR 0.1 mg/mgCr ( improved)   - Etiology for her CKD likely DM/HTN   - Baseline creat mid 1's with significant variability   - B/p controlled   - DM borderline controlled with A1c 7.4%   - On ARB    - SGLT2 resulted in unacceptable bump in creat so discontinued   - On statin for CV risk reduction    2. CV/Volume - Clinic b/ps 122/72 HR 85 weight 151 # from 148 #, 153 #. No home readings. Echo 4/24: Normal LV function, IVC normal  Current regimen:    Losartan 50 mg every day     - Recommend home monitoring. Asked that she check b/ps daily for one week prior to next visit and bring in readings and device    3. DM2 - Borderline controlled with A1c 7.4% ( higher than her usual). Off SGLT2 given unacceptable rise in creat after starting. Random    - On Metformin. A1c today in progress    4. Electrolytes/acid base - No acute concerns. K 4.7 Na 142 bicarb 21    5. BMD/Osteoporosis - Ca 9.2 Phos 3.8 albumin 4.0   - Vit D in progress,  PTH 72 ( 5/25)   - On Vit D 5000 U every day, Ca acetate 667 mg bid   - Receiving Reclast q 6 month    6. Anemia - Hgb 11.4   - On daily B12 and iron 3 times/week   - Iron studies 5/25: Ferritin - Fe 70 IS 24   - Colonoscopy 8/24 with tubular and hyperplastic polyps    7. Wooziness/brain fog - suspect either related to ceruminosis ( never takes out her hearing aids, sleeps with them) vs residual effect from anesthesia. Had CTR on 5/6/25   - Recommended checking in with PCP if no improvement over the next couple of days    8.  Dispo - RTC 8/8/25 for follow up  with labs prior    Assessment and plan was discussed with patient and she voiced her understanding and agreement.    Reason for Visit:  CKD3b    HPI:  Ms Moss is a 71 yo female with PMH significant for DM2, HTN, Obesity s/p gastric bypass, IZABEL, CKD3b, HFpEF, Incontinence, H/O UGIB, present today for routine CKD follow up.   Last seen in clinic by me 2/24/25  Baseline creat mid 1's    ROS:   No acute renal concerns  Patient is reporting feeling woozy w/brain fog since surgery on 5/6/25.   No home b/ps  No chest pain, dyspnea, GI or  concerns  Appetite is OK. Admits to not drinking much in the way of fluid  Energy level low  Occ leg edema that resolves spontaneously    Chronic Health Problems:    DM2, HTN, Obesity s/p gastric bypass, IZABEL, CKD3b, HFpEF, Incontinence, H/O UGIB, Vit D def, HLD, Depression, IBS, diabetic peripheral neuropathy, PTSD, BCC, RLS     Family Hx:   Family History   Problem Relation Age of Onset     Memory loss Mother      Other - See Comments Mother         hair thinning     Diabetes Father      Chronic Obstructive Pulmonary Disease Sister      Anemia Sister         hx of ulcers     Other - See Comments Sister 65        MVA, followed by leg pain after a fall     Dementia Sister 68     Dementia Brother 70     Cancer Brother         lung     Cancer - colorectal Maternal Grandmother      Cancer Daughter      Obesity Daughter         s/p lap band     Skin Cancer No family hx of      Personal Hx:   Single, 2 grandsons living with patient, NS. ETOH rare    Allergies:  Allergies   Allergen Reactions     Pravastatin Other (See Comments)     woozy     Codeine Nausea and Vomiting     Nsaids GI Disturbance and Other (See Comments)     Pt had bariatric surgery, should not ever take oral NSAIDS due to risk of gastric ulcers.  Pt had bariatric surgery, should not ever take oral NSAIDS due to risk of gastric ulcers.       Medications:  Current Outpatient Medications   Medication Sig Dispense Refill      amphetamine-dextroamphetamine (ADDERALL) 30 MG tablet Take 1-2 tablets 60 tablet 0     atorvastatin (LIPITOR) 20 MG tablet Take 1 tablet (20 mg) by mouth daily. 90 tablet 3     blood glucose (NO BRAND SPECIFIED) lancets standard Use to test blood sugar 1 times daily or as directed. 90 Lancet 3     blood glucose (NO BRAND SPECIFIED) test strip Use to test blood sugar 1 times daily or as directed. 100 strip 3     blood glucose calibration (NO BRAND SPECIFIED) solution Use to calibrate blood glucose monitor as needed as directed. 1 each 3     blood glucose monitoring (NO BRAND SPECIFIED) meter device kit Use to test blood sugar 1 times daily or as directed. 1 kit 0     Calcium Acetate 667 MG TABS Take 1 tablet by mouth 2 times daily for 360 days 180 tablet 3     cevimeline (EVOXAC) 30 MG capsule take 1 cap  capsule 3     cholecalciferol (VITAMIN D3) 125 mcg (5000 units) capsule Take 125 mcg by mouth daily       Continuous Glucose Sensor (FREESTYLE JORDIN 3 PLUS SENSOR) MISC Use 1 sensor every 15 days. Use to read blood sugars per 's instructions. 6 each 3     cyanocobalamin (VITAMIN B-12) 1000 MCG tablet Take 1 tablet by mouth every other day. 90 tablet 0     DULoxetine (CYMBALTA) 30 MG capsule Take 1 capsule (30 mg) by mouth 2 times daily. 180 capsule 3     DULoxetine (CYMBALTA) 60 MG capsule Take 1 60mg dose, along with 30mg dose q hs 90 capsule 3     Ferrous Bisglycinate Chelate (EASY IRON) 28 MG CAPS Take 1 capsule by mouth three times a week.       finasteride (PROSCAR) 5 MG tablet Take 1 tablet daily 90 tablet 3     losartan (COZAAR) 50 MG tablet Take one daily 90 tablet 3     metFORMIN (GLUCOPHAGE XR) 500 MG 24 hr tablet Take 2 po q am with breakfast. 180 tablet 0     omeprazole (PRILOSEC) 40 MG DR capsule Take 40mg twice daily 180 capsule 3     pregabalin (LYRICA) 100 MG capsule Take 200 mg by mouth daily. Starting Wednesday an additional 100 mg       tolterodine ER (DETROL LA) 4 MG 24 hr  "capsule Take 1 capsule (4 mg) by mouth daily. 90 capsule 3     COMPOUNDED NON-CONTROLLED SUBSTANCE (CMPD RX) - PHARMACY TO MIX COMPOUNDED MEDICATION Wart peel in remedium sig apply to warts at night as tolerated (Patient not taking: Reported on 5/21/2025) 5 g 5     COMPOUNDED NON-CONTROLLED SUBSTANCE (CMPD RX) - PHARMACY TO MIX COMPOUNDED MEDICATION Estradiol 0.0075% in HRT cream  Apply 2 grams,  intravaginally and small amount externally daily for one week then twice weekly for maintenance. (Patient not taking: Reported on 5/21/2025) 45 g 11     triamcinolone (KENALOG) 0.1 % paste Apply to tongue twice daily x 10 days (Patient not taking: Reported on 5/21/2025) 5 g 0     No current facility-administered medications for this visit.      Vitals:  /72 (BP Location: Right arm, Patient Position: Sitting, Cuff Size: Adult Regular)   Pulse 85   Temp 98  F (36.7  C) (Oral)   Ht 1.626 m (5' 4\")   Wt 68.6 kg (151 lb 3.2 oz)   SpO2 97%   BMI 25.95 kg/m      Exam:  GENERAL APPEARANCE: alert and no distress. Daughter Leila present  RESP: lungs clear to auscultation  CV: RRR  EDEMA: no LE edema bilaterally  ABDOMEN: soft, nondistended  MS: extremities normal - no gross deformities noted  NEUR: A/O    LABS:   [facility]  Recent Labs   Lab Test 05/21/25  0952 05/06/25  0711 02/24/25  1038 01/29/25  1133 12/26/24  1516 11/08/24  1312 09/09/21  1509 05/27/21  1502 09/04/20  1542 06/18/20  1109 02/06/20  1523 10/22/19  1130     --  142 138  --  142   < > 141   < > 135  --  139   POTASSIUM 4.7  --  4.6 5.2  --  5.0   < > 4.5   < > 5.2  --  4.8   CHLORIDE 110*  --  106 102  --  104   < > 110*   < > 104  --  108   CO2 21*  --  26 27  --  23   < > 26   < > 26  --  26   ANIONGAP 11  --  10 9  --  15   < > 5  --  5  --  6   * 132* 206* 159*  --  141*   < > 110*   < > 153*   < > 139*   BUN 34.6*  --  27.9* 32.9*  --  26.2*   < > 18   < > 37*  --  21   CR 1.89*  --  1.61* 1.84* 1.37* 1.41*   < > 1.27*   < > 1.67*  --  0.94 "   GFRESTIMATED 28*  --  34* 29* 42* 40*   < > 44*  --  32*  --  64   GFRESTBLACK  --   --   --   --   --   --   --  51*  --  37*  --  74   KINJAL 9.2  --  9.7 9.6 9.8 10.0   < > 9.5   < > 9.7  --  9.4    < > = values in this interval not displayed.     Recent Labs   Lab Test 01/29/25  1133 10/09/24  1651 12/09/22  1203 10/15/22  2122   BILITOTAL 0.5 0.6 0.4 0.6   ALKPHOS 92 94 88 95   ALT 18 20 15 21   AST 24 24 23 16     CBC  Recent Labs   Lab Test 05/21/25  0952 02/24/25  1038 10/09/24  1651 05/10/24  1304 01/09/24  1100   HGB 11.4* 11.6* 11.3* 12.6 12.1   WBC  --  7.8 7.6 7.8 7.6   RBC  --  4.07 3.89 4.32 4.19   HCT  --  36.8 36.6 40.1 38.3   MCV 95 90 94 93 91   MCH  --  28.5 29.0 29.2 28.9   MCHC  --  31.5 30.9* 31.4* 31.6   RDW  --  13.2 14.0 13.5 13.9   PLT  --  151 167 153 160     URINE STUDIES  Recent Labs   Lab Test 02/24/25  1043 11/08/24  1326 10/10/24  1130 05/10/24  1307 10/16/22  0024 05/27/21  1511   COLOR Yellow Yellow Yellow Yellow   < > Canceled, Test credited   APPEARANCE Clear Clear Clear Clear   < > Canceled, Test credited   URINEGLC Negative Negative >=1000* >=1000*   < > Canceled, Test credited   URINEBILI Negative Negative Negative Negative   < > Canceled, Test credited   URINEKETONE Negative Negative Negative Negative   < > Canceled, Test credited   SG 1.019 1.015 1.010 1.018   < > Canceled, Test credited   UBLD Negative Trace* Negative Negative   < > Canceled, Test credited   URINEPH 5.5 5.5 5.0 5.0   < > Canceled, Test credited   PROTEIN 10* 100* Negative Negative   < > Canceled, Test credited   UROBILINOGEN  --  0.2 0.2  --   --  Canceled, Test credited   NITRITE Positive* Negative Positive* Negative   < > Canceled, Test credited   LEUKEST Moderate* Trace* Negative Negative   < > Canceled, Test credited   RBCU 1 2-5* 0-2 1   < > Canceled, Test credited   WBCU 45* 5-10* 0-5 1   < > Canceled, Test credited    < > = values in this interval not displayed.     No lab results found.  PTH  Recent  Labs   Lab Test 05/21/25  0952 12/09/22  1203 05/27/21  1502   PTHI 72* 76* 76     IRON STUDIES  Recent Labs   Lab Test 05/21/25  0952 11/08/24  1312 10/28/24  1647 08/25/22  1605 05/27/21  1502 10/19/20  1038 09/09/20  0827 06/27/18  1454   IRON 70  --   --   --  61 19*  --  94     --   --   --  439* 519*  --  364   IRONSAT 24  --   --   --  14*  --   --  26   WESTLEY 127 76 102   < >  --   --    < > 63    < > = values in this interval not displayed.       Ela Huynh NP        Again, thank you for allowing me to participate in the care of your patient.      Sincerely,    Ela Huynh NP

## 2025-05-21 NOTE — PROGRESS NOTES
Nephrology Clinic Visit 5/21/25    Assessment and Plan:    CKD3b w/proteinuria - Stable. Creat 1.8 eGFR 34 ml/mn, UPCR 0.1 mg/mgCr ( improved)   - Etiology for her CKD likely DM/HTN   - Baseline creat mid 1's with significant variability   - B/p controlled   - DM borderline controlled with A1c 7.4%   - On ARB    - SGLT2 resulted in unacceptable bump in creat so discontinued   - On statin for CV risk reduction    2. CV/Volume - Clinic b/ps 122/72 HR 85 weight 151 # from 148 #, 153 #. No home readings. Echo 4/24: Normal LV function, IVC normal  Current regimen:    Losartan 50 mg every day     - Recommend home monitoring. Asked that she check b/ps daily for one week prior to next visit and bring in readings and device    3. DM2 - Borderline controlled with A1c 7.4% ( higher than her usual). Off SGLT2 given unacceptable rise in creat after starting. Random    - On Metformin. A1c today in progress    4. Electrolytes/acid base - No acute concerns. K 4.7 Na 142 bicarb 21    5. BMD/Osteoporosis - Ca 9.2 Phos 3.8 albumin 4.0   - Vit D in progress,  PTH 72 ( 5/25)   - On Vit D 5000 U every day, Ca acetate 667 mg bid   - Receiving Reclast q 6 month    6. Anemia - Hgb 11.4   - On daily B12 and iron 3 times/week   - Iron studies 5/25: Ferritin - Fe 70 IS 24   - Colonoscopy 8/24 with tubular and hyperplastic polyps    7. Wooziness/brain fog - suspect either related to ceruminosis ( never takes out her hearing aids, sleeps with them) vs residual effect from anesthesia. Had CTR on 5/6/25   - Recommended checking in with PCP if no improvement over the next couple of days    8.  Dispo - RTC 8/8/25 for follow up with labs prior    Assessment and plan was discussed with patient and she voiced her understanding and agreement.    Reason for Visit:  CKD3b    HPI:  Ms Moss is a 71 yo female with PMH significant for DM2, HTN, Obesity s/p gastric bypass, IZABEL, CKD3b, HFpEF, Incontinence, H/O UGIB, present today for routine CKD  follow up.   Last seen in clinic by me 2/24/25  Baseline creat mid 1's    ROS:   No acute renal concerns  Patient is reporting feeling woozy w/brain fog since surgery on 5/6/25.   No home b/ps  No chest pain, dyspnea, GI or  concerns  Appetite is OK. Admits to not drinking much in the way of fluid  Energy level low  Occ leg edema that resolves spontaneously    Chronic Health Problems:    DM2, HTN, Obesity s/p gastric bypass, IZABEL, CKD3b, HFpEF, Incontinence, H/O UGIB, Vit D def, HLD, Depression, IBS, diabetic peripheral neuropathy, PTSD, BCC, RLS     Family Hx:   Family History   Problem Relation Age of Onset    Memory loss Mother     Other - See Comments Mother         hair thinning    Diabetes Father     Chronic Obstructive Pulmonary Disease Sister     Anemia Sister         hx of ulcers    Other - See Comments Sister 65        MVA, followed by leg pain after a fall    Dementia Sister 68    Dementia Brother 70    Cancer Brother         lung    Cancer - colorectal Maternal Grandmother     Cancer Daughter     Obesity Daughter         s/p lap band    Skin Cancer No family hx of      Personal Hx:   Single, 2 grandsons living with patient, NS. ETOH rare    Allergies:  Allergies   Allergen Reactions    Pravastatin Other (See Comments)     woozy    Codeine Nausea and Vomiting    Nsaids GI Disturbance and Other (See Comments)     Pt had bariatric surgery, should not ever take oral NSAIDS due to risk of gastric ulcers.  Pt had bariatric surgery, should not ever take oral NSAIDS due to risk of gastric ulcers.       Medications:  Current Outpatient Medications   Medication Sig Dispense Refill    amphetamine-dextroamphetamine (ADDERALL) 30 MG tablet Take 1-2 tablets 60 tablet 0    atorvastatin (LIPITOR) 20 MG tablet Take 1 tablet (20 mg) by mouth daily. 90 tablet 3    blood glucose (NO BRAND SPECIFIED) lancets standard Use to test blood sugar 1 times daily or as directed. 90 Lancet 3    blood glucose (NO BRAND SPECIFIED) test  strip Use to test blood sugar 1 times daily or as directed. 100 strip 3    blood glucose calibration (NO BRAND SPECIFIED) solution Use to calibrate blood glucose monitor as needed as directed. 1 each 3    blood glucose monitoring (NO BRAND SPECIFIED) meter device kit Use to test blood sugar 1 times daily or as directed. 1 kit 0    Calcium Acetate 667 MG TABS Take 1 tablet by mouth 2 times daily for 360 days 180 tablet 3    cevimeline (EVOXAC) 30 MG capsule take 1 cap  capsule 3    cholecalciferol (VITAMIN D3) 125 mcg (5000 units) capsule Take 125 mcg by mouth daily      Continuous Glucose Sensor (FREESTYLE JORDIN 3 PLUS SENSOR) MISC Use 1 sensor every 15 days. Use to read blood sugars per 's instructions. 6 each 3    cyanocobalamin (VITAMIN B-12) 1000 MCG tablet Take 1 tablet by mouth every other day. 90 tablet 0    DULoxetine (CYMBALTA) 30 MG capsule Take 1 capsule (30 mg) by mouth 2 times daily. 180 capsule 3    DULoxetine (CYMBALTA) 60 MG capsule Take 1 60mg dose, along with 30mg dose q hs 90 capsule 3    Ferrous Bisglycinate Chelate (EASY IRON) 28 MG CAPS Take 1 capsule by mouth three times a week.      finasteride (PROSCAR) 5 MG tablet Take 1 tablet daily 90 tablet 3    losartan (COZAAR) 50 MG tablet Take one daily 90 tablet 3    metFORMIN (GLUCOPHAGE XR) 500 MG 24 hr tablet Take 2 po q am with breakfast. 180 tablet 0    omeprazole (PRILOSEC) 40 MG DR capsule Take 40mg twice daily 180 capsule 3    pregabalin (LYRICA) 100 MG capsule Take 200 mg by mouth daily. Starting Wednesday an additional 100 mg      tolterodine ER (DETROL LA) 4 MG 24 hr capsule Take 1 capsule (4 mg) by mouth daily. 90 capsule 3    COMPOUNDED NON-CONTROLLED SUBSTANCE (CMPD RX) - PHARMACY TO MIX COMPOUNDED MEDICATION Wart peel in remedium sig apply to warts at night as tolerated (Patient not taking: Reported on 5/21/2025) 5 g 5    COMPOUNDED NON-CONTROLLED SUBSTANCE (CMPD RX) - PHARMACY TO MIX COMPOUNDED MEDICATION Estradiol  "0.0075% in HRT cream  Apply 2 grams,  intravaginally and small amount externally daily for one week then twice weekly for maintenance. (Patient not taking: Reported on 5/21/2025) 45 g 11    triamcinolone (KENALOG) 0.1 % paste Apply to tongue twice daily x 10 days (Patient not taking: Reported on 5/21/2025) 5 g 0     No current facility-administered medications for this visit.      Vitals:  /72 (BP Location: Right arm, Patient Position: Sitting, Cuff Size: Adult Regular)   Pulse 85   Temp 98  F (36.7  C) (Oral)   Ht 1.626 m (5' 4\")   Wt 68.6 kg (151 lb 3.2 oz)   SpO2 97%   BMI 25.95 kg/m      Exam:  GENERAL APPEARANCE: alert and no distress. Daughter Leila present  RESP: lungs clear to auscultation  CV: RRR  EDEMA: no LE edema bilaterally  ABDOMEN: soft, nondistended  MS: extremities normal - no gross deformities noted  NEUR: A/O    LABS:   CMP  Recent Labs   Lab Test 05/21/25  0952 05/06/25  0711 02/24/25  1038 01/29/25  1133 12/26/24  1516 11/08/24  1312 09/09/21  1509 05/27/21  1502 09/04/20  1542 06/18/20  1109 02/06/20  1523 10/22/19  1130     --  142 138  --  142   < > 141   < > 135  --  139   POTASSIUM 4.7  --  4.6 5.2  --  5.0   < > 4.5   < > 5.2  --  4.8   CHLORIDE 110*  --  106 102  --  104   < > 110*   < > 104  --  108   CO2 21*  --  26 27  --  23   < > 26   < > 26  --  26   ANIONGAP 11  --  10 9  --  15   < > 5  --  5  --  6   * 132* 206* 159*  --  141*   < > 110*   < > 153*   < > 139*   BUN 34.6*  --  27.9* 32.9*  --  26.2*   < > 18   < > 37*  --  21   CR 1.89*  --  1.61* 1.84* 1.37* 1.41*   < > 1.27*   < > 1.67*  --  0.94   GFRESTIMATED 28*  --  34* 29* 42* 40*   < > 44*  --  32*  --  64   GFRESTBLACK  --   --   --   --   --   --   --  51*  --  37*  --  74   KINJAL 9.2  --  9.7 9.6 9.8 10.0   < > 9.5   < > 9.7  --  9.4    < > = values in this interval not displayed.     Recent Labs   Lab Test 01/29/25  1133 10/09/24  1651 12/09/22  1203 10/15/22  2122   BILITOTAL 0.5 0.6 0.4 0.6 "   ALKPHOS 92 94 88 95   ALT 18 20 15 21   AST 24 24 23 16     CBC  Recent Labs   Lab Test 05/21/25  0952 02/24/25  1038 10/09/24  1651 05/10/24  1304 01/09/24  1100   HGB 11.4* 11.6* 11.3* 12.6 12.1   WBC  --  7.8 7.6 7.8 7.6   RBC  --  4.07 3.89 4.32 4.19   HCT  --  36.8 36.6 40.1 38.3   MCV 95 90 94 93 91   MCH  --  28.5 29.0 29.2 28.9   MCHC  --  31.5 30.9* 31.4* 31.6   RDW  --  13.2 14.0 13.5 13.9   PLT  --  151 167 153 160     URINE STUDIES  Recent Labs   Lab Test 02/24/25  1043 11/08/24  1326 10/10/24  1130 05/10/24  1307 10/16/22  0024 05/27/21  1511   COLOR Yellow Yellow Yellow Yellow   < > Canceled, Test credited   APPEARANCE Clear Clear Clear Clear   < > Canceled, Test credited   URINEGLC Negative Negative >=1000* >=1000*   < > Canceled, Test credited   URINEBILI Negative Negative Negative Negative   < > Canceled, Test credited   URINEKETONE Negative Negative Negative Negative   < > Canceled, Test credited   SG 1.019 1.015 1.010 1.018   < > Canceled, Test credited   UBLD Negative Trace* Negative Negative   < > Canceled, Test credited   URINEPH 5.5 5.5 5.0 5.0   < > Canceled, Test credited   PROTEIN 10* 100* Negative Negative   < > Canceled, Test credited   UROBILINOGEN  --  0.2 0.2  --   --  Canceled, Test credited   NITRITE Positive* Negative Positive* Negative   < > Canceled, Test credited   LEUKEST Moderate* Trace* Negative Negative   < > Canceled, Test credited   RBCU 1 2-5* 0-2 1   < > Canceled, Test credited   WBCU 45* 5-10* 0-5 1   < > Canceled, Test credited    < > = values in this interval not displayed.     No lab results found.  PTH  Recent Labs   Lab Test 05/21/25  0952 12/09/22  1203 05/27/21  1502   PTHI 72* 76* 76     IRON STUDIES  Recent Labs   Lab Test 05/21/25  0952 11/08/24  1312 10/28/24  1647 08/25/22  1605 05/27/21  1502 10/19/20  1038 09/09/20  0827 06/27/18  1454   IRON 70  --   --   --  61 19*  --  94     --   --   --  439* 519*  --  364   IRONSAT 24  --   --   --  14*  --    --  26   WESTLEY 127 76 102   < >  --   --    < > 63    < > = values in this interval not displayed.       Ela Huynh, NP

## 2025-05-21 NOTE — PATIENT INSTRUCTIONS
Check blood pressure around dinner for one week prior to the next visit and bring in results and the machine

## 2025-05-21 NOTE — NURSING NOTE
"Chief Complaint   Patient presents with    RECHECK     Follow up. PT reports no new or worsening symptoms.      Vitals:    05/21/25 1012   BP: 122/72   BP Location: Right arm   Patient Position: Sitting   Cuff Size: Adult Regular   Pulse: 85   Temp: 98  F (36.7  C)   TempSrc: Oral   SpO2: 97%   Weight: 68.6 kg (151 lb 3.2 oz)   Height: 1.626 m (5' 4\")       BP Readings from Last 3 Encounters:   05/21/25 122/72   05/06/25 (!) 144/72   05/01/25 106/74       /72 (BP Location: Right arm, Patient Position: Sitting, Cuff Size: Adult Regular)   Pulse 85   Temp 98  F (36.7  C) (Oral)   Ht 1.626 m (5' 4\")   Wt 68.6 kg (151 lb 3.2 oz)   SpO2 97%   BMI 25.95 kg/m       Alejandrina Smith    "

## 2025-05-22 RX ORDER — DEXTROAMPHETAMINE SACCHARATE, AMPHETAMINE ASPARTATE, DEXTROAMPHETAMINE SULFATE AND AMPHETAMINE SULFATE 7.5; 7.5; 7.5; 7.5 MG/1; MG/1; MG/1; MG/1
TABLET ORAL
Qty: 60 TABLET | Refills: 0 | Status: SHIPPED | OUTPATIENT
Start: 2025-05-22

## 2025-05-22 NOTE — TELEPHONE ENCOUNTER
Pending Prescriptions:                       Disp   Refills    amphetamine-dextroamphetamine (ADDERALL) *60 tab*0            Sig: Take 1-2 tablets          Last Written Prescription Date:  4/17/25  Last Fill Quantity: 60   # refills: 0  Last Office Visit: 2/20/2025  Future Office visit: NA      Routing refill request to provider for review/approval because:  Drug not on the [facility] or [facility] refill protocol or controlled substance

## 2025-05-29 ENCOUNTER — TELEPHONE (OUTPATIENT)
Dept: FAMILY MEDICINE | Facility: CLINIC | Age: 70
End: 2025-05-29

## 2025-05-29 NOTE — TELEPHONE ENCOUNTER
Returned call to pt's daughter who reports pt cannot taste her food. She has been vomiting and is fatigued for the past 12-24 hours. Denies fever. Took covid test with invalid result. Advised purchasing another test kit and calling back if result is positive.    CEDRICK Juares, RN  05/29/25, 4:25 PM

## 2025-05-29 NOTE — TELEPHONE ENCOUNTER
Symptoms (route to triage team)    Who is calling - Leila   What is the symptom/s being reported - Possible covid, fatigue, throwing up and unable to taste.    When did the symptom/s begin - Leila said it began yesterday  Additional comments/details - Her daughter is requesting for someone to follow-up with her on next steps with her mom.   Same day office visit offered? No  Advised patient that RN will call back within 3 hours? Yes  Ok to leave a message on VM? Yes

## 2025-06-02 ENCOUNTER — MYC REFILL (OUTPATIENT)
Dept: NEPHROLOGY | Facility: CLINIC | Age: 70
End: 2025-06-02
Payer: COMMERCIAL

## 2025-06-02 DIAGNOSIS — R79.89 HIGH PLASMA OXALATE: ICD-10-CM

## 2025-06-03 RX ORDER — CALCIUM ACETATE 667 MG/1
1 TABLET ORAL 2 TIMES DAILY
Qty: 180 TABLET | Refills: 3 | Status: SHIPPED | OUTPATIENT
Start: 2025-06-03

## 2025-06-05 ENCOUNTER — OFFICE VISIT (OUTPATIENT)
Dept: DERMATOLOGY | Facility: CLINIC | Age: 70
End: 2025-06-05
Payer: COMMERCIAL

## 2025-06-05 DIAGNOSIS — B07.9 VERRUCA VULGARIS: Primary | ICD-10-CM

## 2025-06-05 ASSESSMENT — PAIN SCALES - GENERAL: PAINLEVEL_OUTOF10: NO PAIN (0)

## 2025-06-05 NOTE — LETTER
6/5/2025      Karine Moss  [address]  [place] [facility] [address]      Dear Colleague,    Thank you for referring your patient, Karine Moss, to the [facility]. Please see a copy of my visit note below.    [facility] Dermatology Note  Encounter Date: Jun 5, 2025  Office Visit     Reviewed patients past medical history and pertinent chart review prior to patients visit today.     Dermatology Problem List:  # Verruca vulgaris, left plantar foot  - s/p cryo 3/6/2025, 4/16/2024, 6/5/2025  - WartPeel  # History of NMSC  -iBCC, right neck, s/p Mohs 11/10/2022  # Androgenetic alopecia  -Finasteride  -Minoxidil 5% solution     Family History: Negative for skin cancer  ____________________________________________    Assessment & Plan:     # Warts, left medial plantar heel and left mid-lateral plantar heel   - Photos obtained with patient consent  - Her warts were nearly resolved after our last visit. She then stopped treating with WartPeel and now feels the warts have returned.  - Cryotherapy: Patient elects to treat warts today with cryotherapy. After verbal consent and discussion of risks and benefits including but no limited to dyspigmentation/scar, blister, and pain. After being cleansed with an alcohol swab, the wart(s) were pared down with a 2 mm curette. A total of 2 warts were treated with 1-2mm freeze border for 3 cycle with liquid nitrogen. Post cryotherapy instructions were provided. The patient tolerated the procedure well. The patient will follow-up in 3-4 weeks at which time we will likely proceed with repeat cryotherapy.   -Continue WartPeel. Apply nightly to warts.     Follow-up: 1 month(s) in-person, or earlier for new or changing lesions    All risks, benefits and alternatives were discussed with patient.  Patient is in agreement and understands the assessment and plan.  All questions were answered.  Cheri Bynum PA-C  [facility]  Dermatology  _______________________________________    CC: Wart    HPI:  Ms. Karine Moss is a(n) 70 year old female who presents today as a return patient for warts on the left plantar foot x 2.  She was last seen in dermatology on 3/6/2025 by myself at which time wart peel was prescribed.  We also treated with paring and cryotherapy to 2 warts on her left plantar foot.    Today she presents for follow-up.  She states she was very diligent with applying the wart peel nightly for the past 1 month.  She is very pleased with her response as she feels one of the warts has completely resolved. She also tolerated the cryotherapy well.    Patient is otherwise feeling well, without additional skin concerns.    Physical Exam:  SKIN: Focused examination of left plantar foot was performed.  - left medial plantar heel and left mid-lateral plantar heel slightly hyperkeratotic papules with few thrombosed capillaries seen on dermoscopy.     - No other lesions of concern on areas examined.     Medications:  Current Outpatient Medications   Medication Sig Dispense Refill     amphetamine-dextroamphetamine (ADDERALL) 30 MG tablet Take 1-2 tablets 60 tablet 0     atorvastatin (LIPITOR) 20 MG tablet Take 1 tablet (20 mg) by mouth daily. 90 tablet 3     blood glucose (NO BRAND SPECIFIED) lancets standard Use to test blood sugar 1 times daily or as directed. 90 Lancet 3     blood glucose (NO BRAND SPECIFIED) test strip Use to test blood sugar 1 times daily or as directed. 100 strip 3     blood glucose calibration (NO BRAND SPECIFIED) solution Use to calibrate blood glucose monitor as needed as directed. 1 each 3     blood glucose monitoring (NO BRAND SPECIFIED) meter device kit Use to test blood sugar 1 times daily or as directed. 1 kit 0     Calcium Acetate 667 MG TABS Take 1 tablet by mouth 2 times daily. 180 tablet 3     cevimeline (EVOXAC) 30 MG capsule take 1 cap  capsule 3     cholecalciferol (VITAMIN D3) 125 mcg (5000  units) capsule Take 125 mcg by mouth daily       COMPOUNDED NON-CONTROLLED SUBSTANCE (CMPD RX) - PHARMACY TO MIX COMPOUNDED MEDICATION Wart peel in remedium sig apply to warts at night as tolerated (Patient not taking: Reported on 5/21/2025) 5 g 5     COMPOUNDED NON-CONTROLLED SUBSTANCE (CMPD RX) - PHARMACY TO MIX COMPOUNDED MEDICATION Estradiol 0.0075% in HRT cream  Apply 2 grams,  intravaginally and small amount externally daily for one week then twice weekly for maintenance. (Patient not taking: Reported on 5/21/2025) 45 g 11     Continuous Glucose Sensor (FREESTYLE JORDIN 3 PLUS SENSOR) MISC Use 1 sensor every 15 days. Use to read blood sugars per 's instructions. 6 each 3     cyanocobalamin (VITAMIN B-12) 1000 MCG tablet Take 1 tablet by mouth every other day. 90 tablet 0     DULoxetine (CYMBALTA) 30 MG capsule Take 1 capsule (30 mg) by mouth 2 times daily. 180 capsule 3     DULoxetine (CYMBALTA) 60 MG capsule Take 1 60mg dose, along with 30mg dose q hs 90 capsule 3     Ferrous Bisglycinate Chelate (EASY IRON) 28 MG CAPS Take 1 capsule by mouth three times a week.       finasteride (PROSCAR) 5 MG tablet Take 1 tablet daily 90 tablet 3     losartan (COZAAR) 50 MG tablet Take one daily 90 tablet 3     metFORMIN (GLUCOPHAGE XR) 500 MG 24 hr tablet Take 2 po q am with breakfast. 180 tablet 0     omeprazole (PRILOSEC) 40 MG DR capsule Take 40mg twice daily 180 capsule 3     pregabalin (LYRICA) 100 MG capsule Take 200 mg by mouth daily. Starting Wednesday an additional 100 mg       tolterodine ER (DETROL LA) 4 MG 24 hr capsule Take 1 capsule (4 mg) by mouth daily. 90 capsule 3     triamcinolone (KENALOG) 0.1 % paste Apply to tongue twice daily x 10 days (Patient not taking: Reported on 5/21/2025) 5 g 0     No current facility-administered medications for this visit.        Past Medical History:   Patient Active Problem List   Diagnosis     Vitamin D deficiency     Narcolepsy without cataplexy(347.00)      Hyperlipidemia LDL goal <100     Obstructive sleep apnea - 'mild' (AHI 9)     Major depression     Low back pain     DJD (degenerative joint disease) of knee     Hypertension goal BP (blood pressure) < 140/90     Type 2 diabetes mellitus, with long-term current use of insulin (H)     Osteopenia     PTSD (post-traumatic stress disorder)     Diabetic polyneuropathy associated with type 2 diabetes mellitus (H)     Bilateral sciatica     Chronic kidney disease, stage 3b (H)     Chronic diastolic congestive heart failure (H)     Chronic left shoulder pain     Dumping syndrome     History of Rai-en-Y gastric bypass     Insomnia     Peripheral neuropathy     RLS (restless legs syndrome)     Mixed incontinence urge and stress (male)(female)     Dry mouth, unspecified     Carpal tunnel syndrome, right     Trigger finger, acquired     Past Medical History:   Diagnosis Date     Arthritis      Bacterial vaginosis 11/06/2024     Basal cell carcinoma      Depression 1991    after 's death     Diabetes mellitus (H)      Diabetic renal disease (H)     microalbuminuria     DJD (degenerative joint disease) of knee      Gastrointestinal hemorrhage associated with gastric ulcer 11/05/2020     H/O vitamin D deficiency      Hypertension      Hypoglycemia 04/07/2024     Low back pain      Obstructive sleep apnea      Pancreatitis     related to Victoza, also on Xyrem     Panic      RLS (restless legs syndrome)      Upper GI bleed 11/16/2021       CC Referred Self, MD  No address on file on close of this encounter.     Again, thank you for allowing me to participate in the care of your patient.        Sincerely,        Sylvia Bynum PA-C    Electronically signed

## 2025-06-05 NOTE — PATIENT INSTRUCTIONS
Cryotherapy    What is it?  Use of a very cold liquid, such as liquid nitrogen, to freeze and destroy abnormal skin cells that need to be removed    What should I expect?  Tenderness and redness  A small blister that might grow and fill with dark purple blood. There may be crusting.  More than one treatment may be needed if the lesions do not go away.    How do I care for the treated area?  Gently wash the area with your hands when bathing.  Use a thin layer of Vaseline to help with healing. You may use a Band-Aid.   The area should heal within 7-10 days and may leave behind a pink or lighter color.   Do not use an antibiotic or Neosporin ointment.   You may take acetaminophen (Tylenol) for pain.     Call your doctor if you have:  Severe pain  Signs of infection (warmth, redness, cloudy yellow drainage, and or a bad smell)  Questions or concerns    Who should I call with questions?      [facility]: [phone]      [facility]: [phone]      For urgent needs outside of business hours call the [facility] at [phone] and ask for the dermatology resident on call

## 2025-06-05 NOTE — PROGRESS NOTES
[facility] Dermatology Note  Encounter Date: Jun 5, 2025  Office Visit     Reviewed patients past medical history and pertinent chart review prior to patients visit today.     Dermatology Problem List:  # Verruca vulgaris, left plantar foot  - s/p cryo 3/6/2025, 4/16/2024, 6/5/2025  - WartPeel  # History of NMSC  -iBCC, right neck, s/p Mohs 11/10/2022  # Androgenetic alopecia  -Finasteride  -Minoxidil 5% solution     Family History: Negative for skin cancer  ____________________________________________    Assessment & Plan:     # Warts, left medial plantar heel and left mid-lateral plantar heel   - Photos obtained with patient consent  - Her warts were nearly resolved after our last visit. She then stopped treating with WartPeel and now feels the warts have returned.  - Cryotherapy: Patient elects to treat warts today with cryotherapy. After verbal consent and discussion of risks and benefits including but no limited to dyspigmentation/scar, blister, and pain. After being cleansed with an alcohol swab, the wart(s) were pared down with a 2 mm curette. A total of 2 warts were treated with 1-2mm freeze border for 3 cycle with liquid nitrogen. Post cryotherapy instructions were provided. The patient tolerated the procedure well. The patient will follow-up in 3-4 weeks at which time we will likely proceed with repeat cryotherapy.   -Continue WartPeel. Apply nightly to warts.     Follow-up: 1 month(s) in-person, or earlier for new or changing lesions    All risks, benefits and alternatives were discussed with patient.  Patient is in agreement and understands the assessment and plan.  All questions were answered.  Cheri Bynum PA-C  [facility] Dermatology  _______________________________________    CC: Wart    HPI:  Ms. Karine Moss is a(n) 70 year old female who presents today as a return patient for warts on the left plantar foot x 2.  She was last seen in dermatology on 3/6/2025 by  myself at which time wart peel was prescribed.  We also treated with paring and cryotherapy to 2 warts on her left plantar foot.    Today she presents for follow-up.  She states she was very diligent with applying the wart peel nightly for the past 1 month.  She is very pleased with her response as she feels one of the warts has completely resolved. She also tolerated the cryotherapy well.    Patient is otherwise feeling well, without additional skin concerns.    Physical Exam:  SKIN: Focused examination of left plantar foot was performed.  - left medial plantar heel and left mid-lateral plantar heel slightly hyperkeratotic papules with few thrombosed capillaries seen on dermoscopy.     - No other lesions of concern on areas examined.     Medications:  Current Outpatient Medications   Medication Sig Dispense Refill    amphetamine-dextroamphetamine (ADDERALL) 30 MG tablet Take 1-2 tablets 60 tablet 0    atorvastatin (LIPITOR) 20 MG tablet Take 1 tablet (20 mg) by mouth daily. 90 tablet 3    blood glucose (NO BRAND SPECIFIED) lancets standard Use to test blood sugar 1 times daily or as directed. 90 Lancet 3    blood glucose (NO BRAND SPECIFIED) test strip Use to test blood sugar 1 times daily or as directed. 100 strip 3    blood glucose calibration (NO BRAND SPECIFIED) solution Use to calibrate blood glucose monitor as needed as directed. 1 each 3    blood glucose monitoring (NO BRAND SPECIFIED) meter device kit Use to test blood sugar 1 times daily or as directed. 1 kit 0    Calcium Acetate 667 MG TABS Take 1 tablet by mouth 2 times daily. 180 tablet 3    cevimeline (EVOXAC) 30 MG capsule take 1 cap  capsule 3    cholecalciferol (VITAMIN D3) 125 mcg (5000 units) capsule Take 125 mcg by mouth daily      COMPOUNDED NON-CONTROLLED SUBSTANCE (CMPD RX) - PHARMACY TO MIX COMPOUNDED MEDICATION Wart peel in remedium sig apply to warts at night as tolerated (Patient not taking: Reported on 5/21/2025) 5 g 5     COMPOUNDED NON-CONTROLLED SUBSTANCE (CMPD RX) - PHARMACY TO MIX COMPOUNDED MEDICATION Estradiol 0.0075% in HRT cream  Apply 2 grams,  intravaginally and small amount externally daily for one week then twice weekly for maintenance. (Patient not taking: Reported on 5/21/2025) 45 g 11    Continuous Glucose Sensor (FREESTYLE JORDIN 3 PLUS SENSOR) MISC Use 1 sensor every 15 days. Use to read blood sugars per 's instructions. 6 each 3    cyanocobalamin (VITAMIN B-12) 1000 MCG tablet Take 1 tablet by mouth every other day. 90 tablet 0    DULoxetine (CYMBALTA) 30 MG capsule Take 1 capsule (30 mg) by mouth 2 times daily. 180 capsule 3    DULoxetine (CYMBALTA) 60 MG capsule Take 1 60mg dose, along with 30mg dose q hs 90 capsule 3    Ferrous Bisglycinate Chelate (EASY IRON) 28 MG CAPS Take 1 capsule by mouth three times a week.      finasteride (PROSCAR) 5 MG tablet Take 1 tablet daily 90 tablet 3    losartan (COZAAR) 50 MG tablet Take one daily 90 tablet 3    metFORMIN (GLUCOPHAGE XR) 500 MG 24 hr tablet Take 2 po q am with breakfast. 180 tablet 0    omeprazole (PRILOSEC) 40 MG DR capsule Take 40mg twice daily 180 capsule 3    pregabalin (LYRICA) 100 MG capsule Take 200 mg by mouth daily. Starting Wednesday an additional 100 mg      tolterodine ER (DETROL LA) 4 MG 24 hr capsule Take 1 capsule (4 mg) by mouth daily. 90 capsule 3    triamcinolone (KENALOG) 0.1 % paste Apply to tongue twice daily x 10 days (Patient not taking: Reported on 5/21/2025) 5 g 0     No current facility-administered medications for this visit.        Past Medical History:   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)    Hyperlipidemia LDL goal <100    Obstructive sleep apnea - 'mild' (AHI 9)    Major depression    Low back pain    DJD (degenerative joint disease) of knee    Hypertension goal BP (blood pressure) < 140/90    Type 2 diabetes mellitus, with long-term current use of  insulin (H)    Osteopenia    PTSD (post-traumatic stress disorder)    Diabetic polyneuropathy associated with type 2 diabetes mellitus (H)    Bilateral sciatica    Chronic kidney disease, stage 3b (H)    Chronic diastolic congestive heart failure (H)    Chronic left shoulder pain    Dumping syndrome    History of Rai-en-Y gastric bypass    Insomnia    Peripheral neuropathy    RLS (restless legs syndrome)    Mixed incontinence urge and stress (male)(female)    Dry mouth, unspecified    Carpal tunnel syndrome, right    Trigger finger, acquired     Past Medical History:   Diagnosis Date    Arthritis     Bacterial vaginosis 11/06/2024    Basal cell carcinoma     Depression 1991    after 's death    Diabetes mellitus (H)     Diabetic renal disease (H)     microalbuminuria    DJD (degenerative joint disease) of knee     Gastrointestinal hemorrhage associated with gastric ulcer 11/05/2020    H/O vitamin D deficiency     Hypertension     Hypoglycemia 04/07/2024    Low back pain     Obstructive sleep apnea     Pancreatitis     related to Victoza, also on Xyrem    Panic     RLS (restless legs syndrome)     Upper GI bleed 11/16/2021       CC Referred Self, MD  No address on file on close of this encounter.

## 2025-06-16 DIAGNOSIS — E53.8 VITAMIN B12 DEFICIENCY (NON ANEMIC): ICD-10-CM

## 2025-06-16 RX ORDER — LANOLIN ALCOHOL/MO/W.PET/CERES
1000 CREAM (GRAM) TOPICAL EVERY OTHER DAY
Qty: 90 TABLET | Refills: 1 | Status: SHIPPED | OUTPATIENT
Start: 2025-06-16

## 2025-06-16 NOTE — TELEPHONE ENCOUNTER
Medication requested: cyanocobalamin (VITAMIN B-12) 1000 MCG tablet   Last office visit: 5/1/2025  [facility] appointments: 7/29/2025  Medication last refilled: 12/18/2024; #90 + 0 refills  Last qualifying labs: n/a    Prescription approved per [facility] Refill Protocol.    CEDRICK Juares, RN  06/16/25, 1:55 PM

## 2025-06-18 ENCOUNTER — MYC REFILL (OUTPATIENT)
Dept: SLEEP MEDICINE | Facility: CLINIC | Age: 70
End: 2025-06-18
Payer: COMMERCIAL

## 2025-06-18 DIAGNOSIS — G47.419 NARCOLEPSY WITHOUT CATAPLEXY(347.00): ICD-10-CM

## 2025-06-19 RX ORDER — DEXTROAMPHETAMINE SACCHARATE, AMPHETAMINE ASPARTATE, DEXTROAMPHETAMINE SULFATE AND AMPHETAMINE SULFATE 7.5; 7.5; 7.5; 7.5 MG/1; MG/1; MG/1; MG/1
TABLET ORAL
Qty: 60 TABLET | Refills: 0 | Status: SHIPPED | OUTPATIENT
Start: 2025-06-19

## 2025-06-19 NOTE — TELEPHONE ENCOUNTER
Pending Prescriptions:                       Disp   Refills    amphetamine-dextroamphetamine (ADDERALL) *60 tab*0            Sig: Take 1-2 tablets    Per  filled 5/23/25      Last Written Prescription Date:  5/22/25  Last Fill Quantity: 60,   # refills: 0  Last Office Visit: 6/4/24  Future Office visit:   none    Routing refill request to provider for review/approval because:  Drug not on the [facility], [facility] or [facility] refill protocol or controlled substance

## 2025-06-19 NOTE — PROGRESS NOTES
Date of Service: Jun 25, 2025    Chief Complaint:   Chief Complaint   Patient presents with    Surgical Followup     Post op Right Open Carpal Tunnel Release - Right   Right Thumb, Index, And Middle Trigger Finger Releases - Right. DOS: 5/6/25     Date of Surgery: 5/6/2025  Procedure Performed:   1. Right wrist carpal tunnel release. With amyloidosis biopsy  2. Right thumb trigger finger release  3. Right index trigger finger release  4. Right middle trigger finger release    History of Present Illness: Karine Moss is a 70 year old, right handed female who presents today for further evaluation of right hand numbness and tingling. Numbness and tingling effects the thumb, index, long (middle), ring, and small. Symptoms first began more than 2 years ago.  Symptoms seem to start a few years after bilateral distal radius fractures.  Symptoms DO wake the patient up at night. Symptoms made worse during the following activities: holding phone. Symptoms are present at all times. The following modalities have been tried: none.     Patient also reports triggering of the thumb, index, and middle finger of the right hand.  Previously seen by Dr. Claudio who recommended oval 8 splinting.  Patient admits she has not worn these.  Symptoms started approximately 6 months ago, they occasionally get stuck in the morning require manual unlocking.    Documentation by Dr. Claudio reviewed.    5/14/2025 (LISA Martin): Karine Moss presents for postoperative follow-up today. Trigger has resolved.  Numbness and tingling is improved no fevers or chills. No longer taking pain medication. No other issues to report.     6/25/2025: Middle finger feels stiff, index and thumb move well.  Numbness and tingling from before surgery have resolved.    Past Medical History:   Diagnosis Date    Arthritis     Bacterial vaginosis 11/06/2024    Basal cell carcinoma     Depression 1991    after 's death    Diabetes mellitus (H)     Diabetic  renal disease (H)     microalbuminuria    DJD (degenerative joint disease) of knee     Gastrointestinal hemorrhage associated with gastric ulcer 11/05/2020    H/O vitamin D deficiency     Hypertension     Hypoglycemia 04/07/2024    Low back pain     Obstructive sleep apnea     Pancreatitis     related to Victoza, also on Xyrem    Panic     RLS (restless legs syndrome)     Upper GI bleed 11/16/2021     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)    Hyperlipidemia LDL goal <100    Obstructive sleep apnea - 'mild' (AHI 9)    Major depression    Low back pain    DJD (degenerative joint disease) of knee    Hypertension goal BP (blood pressure) < 140/90    Type 2 diabetes mellitus, with long-term current use of insulin (H)    Osteopenia    PTSD (post-traumatic stress disorder)    Diabetic polyneuropathy associated with type 2 diabetes mellitus (H)    Bilateral sciatica    Chronic kidney disease, stage 3b (H)    Chronic diastolic congestive heart failure (H)    Chronic left shoulder pain    Dumping syndrome    History of Rai-en-Y gastric bypass    Insomnia    Peripheral neuropathy    RLS (restless legs syndrome)    Mixed incontinence urge and stress (male)(female)    Dry mouth, unspecified    Carpal tunnel syndrome, right    Trigger finger, acquired       Physical examination:  Right hand  Skin healed  Thumb: smooth ROM, no triggering, +full flexion/extension  Index: smooth ROM, no triggering, +full flexion/extension  Middle: smooth ROM, no triggering, +full flexion, 10 degrees short of full extension at PIP, feels tight with full extension   Intact APB  Sensation intact to light touch median, radial, ulnar nerve distributions  Fingers wwp      EMG/NCS: EMG obtained on 2/25/2025 demonstrates Severe right-sided carpal tunnel syndrome, electrical evidence of mild to moderate, length-dependent, sensorimotor, axonal predominant polyneuropathy involving the lower  extremities symmetrically.     Assessment: 70 year old female with:  Right carpal tunnel syndrome. Now s/p right carpal tunnel release.  Right thumb, index, and middle trigger fingers. Now s/p right thumb/IF/MF trigger finger release, now c/b stiffness  Type 2 diabetes, well controlled with last A1c 7.4 January 2025. Diabetic neuropathy.    Plan:     I had a discussion with the patient regarding my clinical findings, diagnosis, and treatment plan. We reviewed the post-operative plan, frequency of follow-up, rehabilitation, and expected outcomes.  All questions answered.     WBAT right upper extremity.   Hand therapy for ROM     Next Visit:   Follow-up: 4 weeks   Imaging: None.   Plan: motion check    LILIAN RO MD

## 2025-06-25 ENCOUNTER — OFFICE VISIT (OUTPATIENT)
Dept: ORTHOPEDICS | Facility: CLINIC | Age: 70
End: 2025-06-25
Payer: COMMERCIAL

## 2025-06-25 DIAGNOSIS — M25.641 STIFFNESS OF FINGER JOINT OF RIGHT HAND: Primary | ICD-10-CM

## 2025-06-25 PROCEDURE — 99024 POSTOP FOLLOW-UP VISIT: CPT | Performed by: STUDENT IN AN ORGANIZED HEALTH CARE EDUCATION/TRAINING PROGRAM

## 2025-06-25 NOTE — LETTER
6/25/2025      Karine Moss  [address]  [facility] [place] [facility] [address]      Dear Colleague,    Thank you for referring your patient, Karine Moss, to the [facility] [place]. Please see a copy of my visit note below.    Date of Service: Jun 25, 2025    Chief Complaint:   Chief Complaint   Patient presents with     Surgical Followup     Post op Right Open Carpal Tunnel Release - Right   Right Thumb, Index, And Middle Trigger Finger Releases - Right. DOS: 5/6/25     Date of Surgery: 5/6/2025  Procedure Performed:   1. Right wrist carpal tunnel release. With amyloidosis biopsy  2. Right thumb trigger finger release  3. Right index trigger finger release  4. Right middle trigger finger release    History of Present Illness: Karine Moss is a 70 year old, right handed female who presents today for further evaluation of right hand numbness and tingling. Numbness and tingling effects the thumb, index, long (middle), ring, and small. Symptoms first began more than 2 years ago.  Symptoms seem to start a few years after bilateral distal radius fractures.  Symptoms DO wake the patient up at night. Symptoms made worse during the following activities: holding phone. Symptoms are present at all times. The following modalities have been tried: none.     Patient also reports triggering of the thumb, index, and middle finger of the right hand.  Previously seen by Dr. Claudio who recommended oval 8 splinting.  Patient admits she has not worn these.  Symptoms started approximately 6 months ago, they occasionally get stuck in the morning require manual unlocking.    Documentation by Dr. Claudio reviewed.    5/14/2025 (LISA Martin): Karine Moss presents for postoperative follow-up today. Trigger has resolved.  Numbness and tingling is improved no fevers or chills. No longer taking pain medication. No other issues to report.     6/25/2025: Middle finger feels stiff, index and thumb move  well.  Numbness and tingling from before surgery have resolved.    Past Medical History:   Diagnosis Date     Arthritis      Bacterial vaginosis 11/06/2024     Basal cell carcinoma      Depression 1991    after 's death     Diabetes mellitus (H)      Diabetic renal disease (H)     microalbuminuria     DJD (degenerative joint disease) of knee      Gastrointestinal hemorrhage associated with gastric ulcer 11/05/2020     H/O vitamin D deficiency      Hypertension      Hypoglycemia 04/07/2024     Low back pain      Obstructive sleep apnea      Pancreatitis     related to Victoza, also on Xyrem     Panic      RLS (restless legs syndrome)      Upper GI bleed 11/16/2021     Patient Active Problem List   Diagnosis     Vitamin D deficiency     Narcolepsy without cataplexy(347.00)     Hyperlipidemia LDL goal <100     Obstructive sleep apnea - 'mild' (AHI 9)     Major depression     Low back pain     DJD (degenerative joint disease) of knee     Hypertension goal BP (blood pressure) < 140/90     Type 2 diabetes mellitus, with long-term current use of insulin (H)     Osteopenia     PTSD (post-traumatic stress disorder)     Diabetic polyneuropathy associated with type 2 diabetes mellitus (H)     Bilateral sciatica     Chronic kidney disease, stage 3b (H)     Chronic diastolic congestive heart failure (H)     Chronic left shoulder pain     Dumping syndrome     History of Rai-en-Y gastric bypass     Insomnia     Peripheral neuropathy     RLS (restless legs syndrome)     Mixed incontinence urge and stress (male)(female)     Dry mouth, unspecified     Carpal tunnel syndrome, right     Trigger finger, acquired       Physical examination:  Right hand  Skin healed  Thumb: smooth ROM, no triggering, +full flexion/extension  Index: smooth ROM, no triggering, +full flexion/extension  Middle: smooth ROM, no triggering, +full flexion, 10 degrees short of full extension at PIP, feels tight with full extension   Intact APB  Sensation  intact to light touch median, radial, ulnar nerve distributions  Fingers wwp      EMG/NCS: EMG obtained on 2/25/2025 demonstrates Severe right-sided carpal tunnel syndrome, electrical evidence of mild to moderate, length-dependent, sensorimotor, axonal predominant polyneuropathy involving the lower extremities symmetrically.     Assessment: 70 year old female with:  Right carpal tunnel syndrome. Now s/p right carpal tunnel release.  Right thumb, index, and middle trigger fingers. Now s/p right thumb/IF/MF trigger finger release, now c/b stiffness  Type 2 diabetes, well controlled with last A1c 7.4 January 2025. Diabetic neuropathy.    Plan:     I had a discussion with the patient regarding my clinical findings, diagnosis, and treatment plan. We reviewed the post-operative plan, frequency of follow-up, rehabilitation, and expected outcomes.  All questions answered.      WBAT right upper extremity.    Hand therapy for ROM     Next Visit:    Follow-up: 4 weeks    Imaging: None.    Plan: motion check    LILIAN RO MD      Again, thank you for allowing me to participate in the care of your patient.        Sincerely,        LILIAN RO MD    Electronically signed

## 2025-06-25 NOTE — NURSING NOTE
Reason For Visit:   Chief Complaint   Patient presents with    Surgical Followup     Post op Right Open Carpal Tunnel Release - Right   Right Thumb, Index, And Middle Trigger Finger Releases - Right. DOS: 5/6/25       Primary MD: Anay Martinez  Ref. MD: Eber    Age: 70 year old    ?  No      There were no vitals taken for this visit.      Pain Assessment  Patient Currently in Pain: Yes  Primary Pain Location: Finger (Comment which one) (Right middle finger)  Pain Descriptors:  (Stiffness, swelling)    Hand Dominance Evaluation  Hand Dominance: Right          QuickDASH Assessment      6/25/2025     8:43 AM   QuickDASH Main   1. Open a tight or new jar Moderate difficulty   2. Do heavy household chores (e.g., wash walls, floors) Mild difficulty   3. Carry a shopping bag or briefcase Mild difficulty   4. Wash your back Mild difficulty   5. Use a knife to cut food No difficulty   6. Recreational activities in which you take some force or impact through your arm, shoulder or hand (e.g., golf, hammering, tennis, etc.) No difficulty   7. During the past week, to what extent has your arm, shoulder or hand problem interfered with your normal social activities with family, friends, neighbours or groups Not at all   8. During the past week, were you limited in your work or other regular daily activities as a result of your arm, shoulder or hand problem Slightly limited   9. Arm, shoulder or hand pain Mild   10.Tingling (pins and needles) in your arm,shoulder or hand None   11. During the past week, how much difficulty have you had sleeping because of the pain in your arm, shoulder or hand No difficulty   Quickdash Ability Score 15.91          Current Outpatient Medications   Medication Sig Dispense Refill    amphetamine-dextroamphetamine (ADDERALL) 30 MG tablet Take 1-2 tablets 60 tablet 0    atorvastatin (LIPITOR) 20 MG tablet Take 1 tablet (20 mg) by mouth daily. 90 tablet 3    blood glucose (NO BRAND  SPECIFIED) lancets standard Use to test blood sugar 1 times daily or as directed. 90 Lancet 3    blood glucose (NO BRAND SPECIFIED) test strip Use to test blood sugar 1 times daily or as directed. 100 strip 3    blood glucose calibration (NO BRAND SPECIFIED) solution Use to calibrate blood glucose monitor as needed as directed. 1 each 3    blood glucose monitoring (NO BRAND SPECIFIED) meter device kit Use to test blood sugar 1 times daily or as directed. 1 kit 0    Calcium Acetate 667 MG TABS Take 1 tablet by mouth 2 times daily. 180 tablet 3    cevimeline (EVOXAC) 30 MG capsule take 1 cap  capsule 3    cholecalciferol (VITAMIN D3) 125 mcg (5000 units) capsule Take 125 mcg by mouth daily      COMPOUNDED NON-CONTROLLED SUBSTANCE (CMPD RX) - PHARMACY TO MIX COMPOUNDED MEDICATION Wart peel in remedium sig apply to warts at night as tolerated (Patient not taking: Reported on 5/21/2025) 5 g 5    COMPOUNDED NON-CONTROLLED SUBSTANCE (CMPD RX) - PHARMACY TO MIX COMPOUNDED MEDICATION Estradiol 0.0075% in HRT cream  Apply 2 grams,  intravaginally and small amount externally daily for one week then twice weekly for maintenance. (Patient not taking: Reported on 5/21/2025) 45 g 11    Continuous Glucose Sensor (FREESTYLE JORDIN 3 PLUS SENSOR) MISC Use 1 sensor every 15 days. Use to read blood sugars per 's instructions. 6 each 3    cyanocobalamin (VITAMIN B-12) 1000 MCG tablet Take 1 tablet by mouth every other day. 90 tablet 1    DULoxetine (CYMBALTA) 30 MG capsule Take 1 capsule (30 mg) by mouth 2 times daily. 180 capsule 3    DULoxetine (CYMBALTA) 60 MG capsule Take 1 60mg dose, along with 30mg dose q hs 90 capsule 3    Ferrous Bisglycinate Chelate (EASY IRON) 28 MG CAPS Take 1 capsule by mouth three times a week.      finasteride (PROSCAR) 5 MG tablet Take 1 tablet daily 90 tablet 3    losartan (COZAAR) 50 MG tablet Take one daily 90 tablet 3    metFORMIN (GLUCOPHAGE XR) 500 MG 24 hr tablet Take 2 po q am  with breakfast. 180 tablet 0    omeprazole (PRILOSEC) 40 MG DR capsule Take 40mg twice daily 180 capsule 3    pregabalin (LYRICA) 100 MG capsule Take 200 mg by mouth daily. Starting Wednesday an additional 100 mg      tolterodine ER (DETROL LA) 4 MG 24 hr capsule Take 1 capsule (4 mg) by mouth daily. 90 capsule 3    triamcinolone (KENALOG) 0.1 % paste Apply to tongue twice daily x 10 days (Patient not taking: Reported on 5/21/2025) 5 g 0       Allergies   Allergen Reactions    Pravastatin Other (See Comments)     woozy    Codeine Nausea and Vomiting    Nsaids GI Disturbance and Other (See Comments)     Pt had bariatric surgery, should not ever take oral NSAIDS due to risk of gastric ulcers.  Pt had bariatric surgery, should not ever take oral NSAIDS due to risk of gastric ulcers.       [place], ATC

## 2025-07-13 NOTE — PROGRESS NOTES
"OCCUPATIONAL THERAPY EVALUATION  Type of Visit: Evaluation       Fall Risk Screen:       Subjective        Presenting condition or subjective complaint:    Date of onset: 06/25/25    Relevant medical history:     Dates & types of surgery:      Prior diagnostic imaging/testing results:       Prior therapy history for the same diagnosis, illness or injury:        Triggers have resolved but Middle Finger feels stiff and tight, now index is starting to feel the same. Feels like the tightness is worse in the morning, maybe loosen up some during the day but not much. Also feeling swollen and full, but feels swelling has gone down some. Reports resolution of median nerve symptoms following surgery too.     Prior Level of Function  Ind with ADL, IADL, and driving. Retired    Living Environment  Lives with grandsons (25 and 23 years old), assist with IADLS    Employment:    Retired  Hobbies/Interests:      Patient goals for therapy:  Improve flexibility     Objective   ADDITIONAL HISTORY:  Right hand dominant  Patient reports symptoms of stiffness/loss of motion and edema  Transportation: drives  Currently retired    Functional Outcome Measure:         PAIN:  Denies pain, reports \"its just stiff\"    POSTURE: Normal     EDEMA:   R Long Finger  P1 6.5, PIP 6.5, P2 6.0  L Long Finger  P1 6.4  PIP 6.5 , P2 5.5    R Index Finger  P1 6.9, PIP 6.5, P2 5.5  L Index Finger  P1 6.5, PIP 6.4, P2 5.5     SCAR/WOUND: CTR, index, middle, and thumb incision closed. Mildly adhered at CTR    SENSATION: WNL throughout all nerve distributions; per patient report     ROM:   Able to make full fist and achieve full digit extension, with perception of feeling tight on flexion at volar aspect and mildly at dorsal aspect of MP/P1.   Kapandji Opposition R 9/10, L 9/10    OBSERVATIONS/APPEARANCE: No overt deformity or guarding.      STRENGTH:     Strength   (Measured in pounds)  Pain Report: - none  + mild    ++ moderate    +++ severe     Measured " in pounds Left Right   Trial 1 47 44     Lateral Pinch  Measured in pounds Left Right   Trial 1 14 13     3 Point Pinch  Measured in pounds Left Right   Trial 1 9 6     Strength testing felt more difficult on R for all three    PALPATION: Non tender to palpation to scars for fingers           Assessment & Plan   CLINICAL IMPRESSIONS  Medical Diagnosis: Stiffness of finger joint of right hand    Treatment Diagnosis: Finger stiffness    Impression/Assessment: Pt is a 70 year old female presenting to Occupational Therapy due to R hand stiffness.  The following significant findings have been identified: Impaired activity tolerance, Impaired ROM, and Impaired strength.  These identified deficits interfere with their ability to perform recreational activities and household chores as compared to previous level of function.     Clinical Decision Making (Complexity):  Assessment of Occupational Performance: 3-5 Performance Deficits  Occupational Performance Limitations: home establishment and management, meal preparation and cleanup, shopping, sleep, and leisure activities  Clinical Decision Making (Complexity): Low complexity    PLAN OF CARE  Treatment Interventions:  Modalities:  US and Paraffin  Therapeutic Exercise:  AROM, AAROM, PROM, Tendon Gliding, and Extensor Tracking  Neuromuscular re-education:  Proprioceptive Training and Kinesiotaping  Manual Techniques:  Joint mobilization, Scar mobilization, Friction massage, and Myofascial release  Orthotic Fabrication:  Static and Static progressive  Self Care:  Self Care Tasks    Long Term Goals   OT Goal 1  Goal Identifier: HEP  Goal Description: Karine will demonstrate 100% independence in HEP for exercices to improve stiffness for return to baseline level of engagement in I/ADL routines  Target Date: 09/08/25      Frequency of Treatment: 1-2x/month  Duration of Treatment: 8 weeks     Recommended Referrals to Other Professionals:   Education Assessment: Learner/Method:  Patient  Education Comments: May benefit from review, repeated demonstration of exercises     Risks and benefits of evaluation/treatment have been explained.   Patient/Family/caregiver agrees with Plan of Care.     Evaluation Time:    OT Eval, Low Complexity Minutes (15816): 15       Signing Clinician: JO GARNICA [facility]                                                                                   OUTPATIENT OCCUPATIONAL THERAPY      PLAN OF TREATMENT FOR OUTPATIENT REHABILITATION   Patient's Last Name, First Name, Karine Dyer YOB: 1955   Provider's Name   [facility]   Medical Record No.  [id number]     Onset Date: 06/25/25 Start of Care Date: 07/14/25     Medical Diagnosis:  Stiffness of finger joint of right hand      OT Treatment Diagnosis:  Finger stiffness Plan of Treatment  Frequency/Duration:1-2x/month/8 weeks    Certification date from 07/14/25   To 09/08/25        See note for plan of treatment details and functional goals     JO GARNICA                         I CERTIFY THE NEED FOR THESE SERVICES FURNISHED UNDER        THIS PLAN OF TREATMENT AND WHILE UNDER MY CARE     (Physician attestation of this document indicates review and certification of the therapy plan).              Referring Provider:  Briana Renteria MD    Initial Assessment  See Epic Evaluation- 07/14/25

## 2025-07-14 ENCOUNTER — THERAPY VISIT (OUTPATIENT)
Dept: OCCUPATIONAL THERAPY | Facility: CLINIC | Age: 70
End: 2025-07-14
Payer: COMMERCIAL

## 2025-07-14 DIAGNOSIS — M25.641 STIFFNESS OF FINGER JOINT OF RIGHT HAND: ICD-10-CM

## 2025-07-14 PROCEDURE — 97110 THERAPEUTIC EXERCISES: CPT | Mod: GO | Performed by: SPECIALIST/TECHNOLOGIST

## 2025-07-14 PROCEDURE — 97165 OT EVAL LOW COMPLEX 30 MIN: CPT | Mod: GO | Performed by: SPECIALIST/TECHNOLOGIST

## 2025-07-21 ENCOUNTER — MYC REFILL (OUTPATIENT)
Dept: SLEEP MEDICINE | Facility: CLINIC | Age: 70
End: 2025-07-21

## 2025-07-21 DIAGNOSIS — G47.419 NARCOLEPSY WITHOUT CATAPLEXY(347.00): ICD-10-CM

## 2025-07-22 NOTE — TELEPHONE ENCOUNTER
Pending Prescriptions:                       Disp   Refills    amphetamine-dextroamphetamine (ADDERALL) *60 tab*0            Sig: Take 1-2 tablets          Last Written Prescription Date:  6/19/25  Last Fill Quantity: 60,   # refills: 0  Last Office Visit: 6/4/24  Future Office visit:   none    Routing refill request to provider for review/approval because:  Drug not on the [facility] or [facility] refill protocol or controlled substance

## 2025-07-24 RX ORDER — DEXTROAMPHETAMINE SACCHARATE, AMPHETAMINE ASPARTATE, DEXTROAMPHETAMINE SULFATE AND AMPHETAMINE SULFATE 7.5; 7.5; 7.5; 7.5 MG/1; MG/1; MG/1; MG/1
TABLET ORAL
Qty: 60 TABLET | Refills: 0 | Status: SHIPPED | OUTPATIENT
Start: 2025-07-24

## 2025-07-31 NOTE — PATIENT INSTRUCTIONS
Subjective   Reason for Visit: Cortney Crawley is an 71 y.o. female here for a Medicare Wellness visit.               HPI  4/3/25 FELL AND DISLOCATED AND BROKE ARM AND SHOULDER WENT ONTO A WINDOW.  PUT THE ARM BACK IN PLACE, NO SURGERY    THEN IN MAY FELL GOING INTO THE HOUSE HURT KNEES AND ANKLES AND TOPS OF FOOT, BRUISED GOOD ARM    GETTING THERAPY RIGHT NOW ON ARM, AND THEN PLAN ON STARTING BALANCE THERAPY    NO LOC, NO HEAD TRAUMA.    CHECK BP AT HOME 130/80.  WHEN IN PAIN OR TRAUMA IT GOES VERY HIGH.    TAKE BP EVERY DAY.    DOES NOT WANT BONE DENSITY AS SHE PRE-EMPTIVELY REFUSES MEDS FOR OSTEOPOROSIS    Patient Care Team:  Mario Kamara MD as PCP - General (Internal Medicine)  Mario Kamara MD as PCP - [employer] PCP  Didi StoreyD as Pharmacist (Pharmacy)     Review of Systems   Constitutional:  Negative for chills, diaphoresis and fever.   Respiratory:  Negative for cough and shortness of breath.    Cardiovascular:  Negative for chest pain and leg swelling.   Gastrointestinal:  Negative for constipation, diarrhea, nausea and vomiting.   Musculoskeletal:  Positive for arthralgias. Negative for joint swelling and myalgias.   All other systems reviewed and are negative.      Objective   Vitals:  There were no vitals taken for this visit.      Physical Exam  Vitals reviewed.   Constitutional:       General: She is not in acute distress.     Appearance: She is obese. She is not ill-appearing.     Cardiovascular:      Rate and Rhythm: Normal rate and regular rhythm.      Pulses: Normal pulses.      Heart sounds:      No gallop.   Pulmonary:      Breath sounds: Normal breath sounds. No wheezing, rhonchi or rales.   Abdominal:      General: Abdomen is flat. Bowel sounds are normal.      Palpations: Abdomen is soft.      Tenderness: There is no guarding or rebound.     Musculoskeletal:      Right lower leg: No edema.      Left lower leg: No edema.      Comments: LEFT SHOULDER  To help cramps:  Stay hydrated  A little pickle juice could help!   Stretches    I would recommend using your cane to walk with. It gives you some sensory input which you are missing from your feet.     Mayte Cerda DPT  Physical Therapist  [facility] - [address]  [address] [place] [address]    Appointments: [phone]       LIMITED FLEXION AN OVERALL ROM, NOT TENDER     Skin:     General: Skin is warm and dry.     Neurological:      General: No focal deficit present.      Mental Status: She is oriented to person, place, and time.     Psychiatric:         Mood and Affect: Mood normal.         Behavior: Behavior normal.         Assessment & Plan  Diabetes mellitus without complication    Orders:    POCT glycosylated hemoglobin (Hb A1C) manually resulted    Hyperlipidemia, unspecified hyperlipidemia type    Orders:    pravastatin (Pravachol) 10 mg tablet; Take 1 tablet (10 mg) by mouth once daily.    Lipid Panel; Future    TSH with reflex to Free T4 if abnormal; Future    Comprehensive Metabolic Panel; Future    CBC; Future    Type 2 diabetes mellitus with other specified complication, without long-term current use of insulin    Orders:    Albumin-Creatinine Ratio, Urine Random; Future    Mild vitamin D deficiency    Orders:    Vitamin D 25-Hydroxy,Total (for eval of Vitamin D levels); Future    Vitamin B12 deficiency    Orders:    Vitamin B12; Future    Medicare annual wellness visit, subsequent            Patient Instructions    FASTING LABS ARE ORDERED FOR YOU    2.  THE RIGHT BREAST ULTRASOUND NEEDS TO BE COMPLETED, WE WILL CALL AND CLARIFY WITH THE BREAST CENTER AT [facility] ON [address]    3.  A1C LEVEL TODAY IS 5.9% = EXCELLENT DIABETIC CONTROL.  GOAL IS <7.0%.  CONTINUE PRESENT MEDS FOR NOW    4.  YOU ARE OTHERWISE CAUGHT UP FROM A HEALTH SCREENING AND MAINTENANCE STANDPOINT FOR MEDICARE    5.  I AGREE WITH BALANCE THERAPY AFTER  SHOULDER PT IS DONE FOR THE LEFT HUMERUS FRACTURE WHICH APPEARS TO BE HEALING NICELY PER THE XRAY 7/2/25    6.  REGULAR EYE EXAMS  ARE RECOMMENDED FOR YOU    7.  NEXT COLONOSCOPY 2029    8.  FOLLOW UP 12 MONTHS OR AS NEEDED.

## 2025-08-08 ENCOUNTER — OFFICE VISIT (OUTPATIENT)
Dept: NEPHROLOGY | Facility: CLINIC | Age: 70
End: 2025-08-08
Payer: COMMERCIAL

## 2025-08-08 VITALS
HEART RATE: 94 BPM | DIASTOLIC BLOOD PRESSURE: 69 MMHG | SYSTOLIC BLOOD PRESSURE: 110 MMHG | OXYGEN SATURATION: 96 % | TEMPERATURE: 98.1 F | BODY MASS INDEX: 25.4 KG/M2 | WEIGHT: 148.8 LBS | HEIGHT: 64 IN

## 2025-08-08 DIAGNOSIS — D63.1 ANEMIA IN STAGE 3B CHRONIC KIDNEY DISEASE (H): ICD-10-CM

## 2025-08-08 DIAGNOSIS — N18.32 ANEMIA IN STAGE 3B CHRONIC KIDNEY DISEASE (H): ICD-10-CM

## 2025-08-08 DIAGNOSIS — N18.32 STAGE 3B CHRONIC KIDNEY DISEASE (H): Primary | ICD-10-CM

## 2025-08-08 PROCEDURE — G0463 HOSPITAL OUTPT CLINIC VISIT: HCPCS

## 2025-08-08 PROCEDURE — 99214 OFFICE O/P EST MOD 30 MIN: CPT

## 2025-08-08 PROCEDURE — 1125F AMNT PAIN NOTED PAIN PRSNT: CPT

## 2025-08-08 PROCEDURE — 83036 HEMOGLOBIN GLYCOSYLATED A1C: CPT

## 2025-08-08 PROCEDURE — 3074F SYST BP LT 130 MM HG: CPT

## 2025-08-08 PROCEDURE — 3078F DIAST BP <80 MM HG: CPT

## 2025-08-08 PROCEDURE — 99000 SPECIMEN HANDLING OFFICE-LAB: CPT | Performed by: PATHOLOGY

## 2025-08-08 ASSESSMENT — PAIN SCALES - GENERAL: PAINLEVEL_OUTOF10: MILD PAIN (3)

## 2025-08-11 ENCOUNTER — OFFICE VISIT (OUTPATIENT)
Dept: FAMILY MEDICINE | Facility: CLINIC | Age: 70
End: 2025-08-11
Payer: COMMERCIAL

## 2025-08-11 ENCOUNTER — THERAPY VISIT (OUTPATIENT)
Dept: OCCUPATIONAL THERAPY | Facility: CLINIC | Age: 70
End: 2025-08-11
Payer: COMMERCIAL

## 2025-08-11 VITALS
TEMPERATURE: 98 F | BODY MASS INDEX: 25.4 KG/M2 | WEIGHT: 148 LBS | DIASTOLIC BLOOD PRESSURE: 65 MMHG | SYSTOLIC BLOOD PRESSURE: 101 MMHG | HEART RATE: 94 BPM | OXYGEN SATURATION: 97 %

## 2025-08-11 DIAGNOSIS — M79.674 PAIN OF TOE OF RIGHT FOOT: ICD-10-CM

## 2025-08-11 DIAGNOSIS — M54.50 LUMBAR PAIN: ICD-10-CM

## 2025-08-11 DIAGNOSIS — N18.32 STAGE 3B CHRONIC KIDNEY DISEASE (H): ICD-10-CM

## 2025-08-11 DIAGNOSIS — M25.641 STIFFNESS OF FINGER JOINT OF RIGHT HAND: Primary | ICD-10-CM

## 2025-08-11 DIAGNOSIS — I10 HYPERTENSION GOAL BP (BLOOD PRESSURE) < 140/90: ICD-10-CM

## 2025-08-11 DIAGNOSIS — E11.9 TYPE 2 DIABETES MELLITUS WITHOUT COMPLICATION, WITHOUT LONG-TERM CURRENT USE OF INSULIN (H): Primary | ICD-10-CM

## 2025-08-11 PROCEDURE — 97535 SELF CARE MNGMENT TRAINING: CPT | Mod: GO | Performed by: SPECIALIST/TECHNOLOGIST

## 2025-08-11 RX ORDER — LOSARTAN POTASSIUM 25 MG/1
TABLET ORAL
Qty: 90 TABLET | Refills: 3 | Status: SHIPPED | OUTPATIENT
Start: 2025-08-11

## 2025-08-11 RX ORDER — METFORMIN HYDROCHLORIDE 500 MG/1
TABLET, EXTENDED RELEASE ORAL
Qty: 180 TABLET | Refills: 3 | Status: SHIPPED | OUTPATIENT
Start: 2025-08-11

## 2025-08-12 ENCOUNTER — PATIENT OUTREACH (OUTPATIENT)
Dept: CARE COORDINATION | Facility: CLINIC | Age: 70
End: 2025-08-12
Payer: COMMERCIAL

## 2025-08-14 ENCOUNTER — PATIENT OUTREACH (OUTPATIENT)
Dept: CARE COORDINATION | Facility: CLINIC | Age: 70
End: 2025-08-14
Payer: COMMERCIAL

## 2025-08-18 DIAGNOSIS — G47.419 NARCOLEPSY WITHOUT CATAPLEXY(347.00): ICD-10-CM

## 2025-08-20 RX ORDER — DEXTROAMPHETAMINE SACCHARATE, AMPHETAMINE ASPARTATE, DEXTROAMPHETAMINE SULFATE AND AMPHETAMINE SULFATE 7.5; 7.5; 7.5; 7.5 MG/1; MG/1; MG/1; MG/1
TABLET ORAL
Qty: 60 TABLET | Refills: 0 | Status: SHIPPED | OUTPATIENT
Start: 2025-08-24

## 2025-08-28 ENCOUNTER — OFFICE VISIT (OUTPATIENT)
Dept: PODIATRY | Facility: CLINIC | Age: 70
End: 2025-08-28
Attending: INTERNAL MEDICINE
Payer: COMMERCIAL

## 2025-08-28 ENCOUNTER — ANCILLARY PROCEDURE (OUTPATIENT)
Dept: GENERAL RADIOLOGY | Facility: CLINIC | Age: 70
End: 2025-08-28
Attending: PODIATRIST
Payer: COMMERCIAL

## 2025-08-28 VITALS — WEIGHT: 150 LBS | HEIGHT: 64 IN | BODY MASS INDEX: 25.61 KG/M2 | TEMPERATURE: 98.3 F

## 2025-08-28 DIAGNOSIS — L97.511 ULCER OF TOE OF RIGHT FOOT, LIMITED TO BREAKDOWN OF SKIN (H): ICD-10-CM

## 2025-08-28 DIAGNOSIS — E11.9 TYPE 2 DIABETES MELLITUS WITHOUT COMPLICATION, WITHOUT LONG-TERM CURRENT USE OF INSULIN (H): Primary | ICD-10-CM

## 2025-08-28 DIAGNOSIS — M79.674 TOE PAIN, RIGHT: ICD-10-CM

## 2025-08-28 PROCEDURE — 73630 X-RAY EXAM OF FOOT: CPT | Mod: TC | Performed by: RADIOLOGY

## 2025-08-28 ASSESSMENT — PAIN SCALES - GENERAL: PAINLEVEL_OUTOF10: NO PAIN (0)

## (undated) DEVICE — CAST PADDING 3" STERILE 9043S

## (undated) DEVICE — SU PLAIN 4-0 FS-2 27" H821H

## (undated) DEVICE — SUCTION MANIFOLD NEPTUNE 2 SYS 1 PORT 702-025-000

## (undated) DEVICE — DRSG STERI STRIP 1/2X4" R1547

## (undated) DEVICE — PREP CHLORAPREP 26ML TINTED HI-LITE ORANGE 930815

## (undated) DEVICE — SU MONOCRYL 4-0 PS-2 27" UND Y426H

## (undated) DEVICE — DRSG XEROFORM 1X8"

## (undated) DEVICE — GLOVE BIOGEL PI MICRO SZ 6.0 48560

## (undated) DEVICE — PACK MINOR HAND CUSTOM ASC 37-0097A

## (undated) DEVICE — LINEN ORTHO PACK 5446

## (undated) DEVICE — SOL NACL 0.9% IRRIG 500ML BOTTLE 2F7123

## (undated) RX ORDER — PROPOFOL 10 MG/ML
INJECTION, EMULSION INTRAVENOUS
Status: DISPENSED
Start: 2024-08-22

## (undated) RX ORDER — FENTANYL CITRATE 50 UG/ML
INJECTION, SOLUTION INTRAMUSCULAR; INTRAVENOUS
Status: DISPENSED
Start: 2023-01-09

## (undated) RX ORDER — LIDOCAINE HYDROCHLORIDE AND EPINEPHRINE 10; 10 MG/ML; UG/ML
INJECTION, SOLUTION INFILTRATION; PERINEURAL
Status: DISPENSED
Start: 2025-05-06

## (undated) RX ORDER — ONDANSETRON 2 MG/ML
INJECTION INTRAMUSCULAR; INTRAVENOUS
Status: DISPENSED
Start: 2025-05-06

## (undated) RX ORDER — ACETAMINOPHEN 325 MG/1
TABLET ORAL
Status: DISPENSED
Start: 2025-05-06

## (undated) RX ORDER — FENTANYL CITRATE 50 UG/ML
INJECTION, SOLUTION INTRAMUSCULAR; INTRAVENOUS
Status: DISPENSED
Start: 2025-05-06

## (undated) RX ORDER — ONDANSETRON 2 MG/ML
INJECTION INTRAMUSCULAR; INTRAVENOUS
Status: DISPENSED
Start: 2024-08-22

## (undated) RX ORDER — PROPOFOL 10 MG/ML
INJECTION, EMULSION INTRAVENOUS
Status: DISPENSED
Start: 2025-05-06